# Patient Record
Sex: MALE | Race: BLACK OR AFRICAN AMERICAN | Employment: FULL TIME | URBAN - METROPOLITAN AREA
[De-identification: names, ages, dates, MRNs, and addresses within clinical notes are randomized per-mention and may not be internally consistent; named-entity substitution may affect disease eponyms.]

---

## 2017-01-04 ENCOUNTER — HOSPITAL ENCOUNTER (OUTPATIENT)
Age: 62
Discharge: HOME | End: 2017-01-04
Attending: INTERNAL MEDICINE
Payer: COMMERCIAL

## 2017-01-04 DIAGNOSIS — R25.2 CRAMP OF BOTH LOWER EXTREMITIES: ICD-10-CM

## 2017-01-04 PROCEDURE — 72148 MRI LUMBAR SPINE W/O DYE: CPT

## 2017-01-06 ENCOUNTER — TELEPHONE (OUTPATIENT)
Dept: INTERNAL MEDICINE CLINIC | Age: 62
End: 2017-01-06

## 2017-01-06 NOTE — TELEPHONE ENCOUNTER
Patient called wanting Dr Aletha Thayer nurse to call him concerning the MRI he had done on 01/04/2017.  #[phone]

## 2017-01-06 NOTE — TELEPHONE ENCOUNTER
Patient given mri results--requests something else be sent in to help pain from pressed nerve. OK per Dr Marie West to send in another round of prednisone.

## 2017-01-06 NOTE — TELEPHONE ENCOUNTER
Called and informed patient of MRI results per Dr Francesca Tierney. Informed him that L3-4 disc is out and pressing on nerve running down left side. Patient voiced understanding and stated that he still has severe pain in the left side. Informed him that Dr Francesca Tierney recommends we send him to see Dr Óscar Mahan to which patient agrees. Requests a call once appointment has been made.

## 2017-01-07 RX ORDER — PREDNISONE 20 MG/1
TABLET ORAL
Qty: 20 TAB | Refills: 0 | OUTPATIENT
Start: 2017-01-07 | End: 2017-02-06 | Stop reason: ALTCHOICE

## 2017-01-12 NOTE — TELEPHONE ENCOUNTER
Mr. Kyra Park has been notified of his upcoming appointment with Dr. Rhett Sargent on 1/13 at @ 2:00, at [facility], [address] [place] [address]. [address], [address] [facility] Phone [phone]

## 2017-01-16 RX ORDER — POTASSIUM CHLORIDE 750 MG/1
TABLET, EXTENDED RELEASE ORAL
Qty: 60 TAB | Refills: 1 | Status: SHIPPED | OUTPATIENT
Start: 2017-01-16 | End: 2017-02-09 | Stop reason: SINTOL

## 2017-02-06 ENCOUNTER — OFFICE VISIT (OUTPATIENT)
Dept: INTERNAL MEDICINE CLINIC | Age: 62
End: 2017-02-06

## 2017-02-06 VITALS
TEMPERATURE: 98.5 F | DIASTOLIC BLOOD PRESSURE: 110 MMHG | RESPIRATION RATE: 16 BRPM | BODY MASS INDEX: 42.7 KG/M2 | WEIGHT: 305 LBS | SYSTOLIC BLOOD PRESSURE: 180 MMHG | OXYGEN SATURATION: 96 % | HEART RATE: 87 BPM | HEIGHT: 71 IN

## 2017-02-06 DIAGNOSIS — I10 ESSENTIAL HYPERTENSION: Primary | ICD-10-CM

## 2017-02-06 DIAGNOSIS — I25.10 ATHEROSCLEROSIS OF NATIVE CORONARY ARTERY OF NATIVE HEART WITHOUT ANGINA PECTORIS: ICD-10-CM

## 2017-02-06 RX ORDER — FUROSEMIDE 20 MG/1
TABLET ORAL
Qty: 30 TAB | Refills: 3 | Status: SHIPPED | OUTPATIENT
Start: 2017-02-06 | End: 2017-03-27 | Stop reason: SDUPTHER

## 2017-02-06 NOTE — PROGRESS NOTES
1. Have you been to the ER, urgent care clinic since your last visit? Hospitalized since your last visit? No    2. Have you seen or consulted any other health care providers outside of the [address] [place] [address] since your last visit? Include any pap smears or colon screening.  No

## 2017-02-06 NOTE — PATIENT INSTRUCTIONS
Health Maintenance Due   Topic Date Due   Elissa Sean Test  1955    Eye Exam  01/08/1967    DTaP/Tdap/Td  (1 - Tdap) 01/08/1978    Pneumococcal Vaccine (2 of 3 - PCV13) 02/03/2012    Hemoglobin A1C    12/29/2016    Shingles Vaccine  01/08/2017

## 2017-02-08 NOTE — PROGRESS NOTES
The patient presents to the office today with the chief complaint of hypertension    HPI    The patient remains on medications for hypertension. he is tolerating the medications well. The patient recently lost his father   The patient has no complaints but his BP has been up. The nicol had been on Catapres Patch #2 but now his on on the # 1 patch. Review of Systems   Respiratory: Negative for shortness of breath. Cardiovascular: Negative for chest pain and leg swelling. Allergies   Allergen Reactions    Iodine Other (comments)     Eyes swell shut      Shellfish Containing Products Swelling       Current Outpatient Prescriptions   Medication Sig Dispense Refill    furosemide (LASIX) 20 mg tablet 1 tablet each AM 30 Tab 3    KLOR-CON M10 10 mEq tablet TAKE 1 TABLET BY MOUTH TWICE A DAY 60 Tab 1    hydrALAZINE (APRESOLINE) 50 mg tablet 1 tablet twice per day (Stop Labetalol and Indapamide) 60 Tab 3    metoprolol succinate (TOPROL-XL) 50 mg XL tablet 1 tablet each AM (Stop Labetalol and Indapamide) 30 Tab 3    baclofen (LIORESAL) 10 mg tablet 1 tablet twice per day (Stop Cyclobenzaprine) 60 Tab 3    cloNIDine (CATAPRES) 0.2 mg/24 hr patch Apply weekly 12 Patch 3    amLODIPine-benazepril (LOTREL) 10-40 mg per capsule TAKE 1 CAPSULE DAILY (STOP NIFEDIPINE AND STOP LOSARTAN). 30 Cap 6    cyclobenzaprine (FLEXERIL) 10 mg tablet Take 1 Tab by mouth three (3) times daily as needed for Muscle Spasm(s). 90 Tab 1    allopurinol (ZYLOPRIM) 100 mg tablet Take 1 Tab by mouth daily. 90 Tab 3    tolterodine ER (DETROL LA) 4 mg ER capsule Take 1 Cap by mouth daily. 90 Cap 3    aspirin 81 mg tablet Take 81 mg by mouth daily.          Past Medical History   Diagnosis Date    BPH without obstruction/lower urinary tract symptoms     Bursitis of right shoulder     CAD (coronary artery disease)     Chest pain      history of hospitalization with chest pain and a negative workup in 2008    Chronic edema  Constipation      die to medication    Coronary artery disease      mild, non obstructive/EF 65%    Dyspnea on exertion     Echocardiogram 12/16/08     IVC dilated; suboptimal endocardial border; EF 65%    ED (erectile dysfunction)     Elevated PSA     Frequency     GERD (gastroesophageal reflux disease)     Gout     H/O cystoscopy 06/20/2013    Hematuria, unspecified     Hypercholesterolemia     Hypertension     Hypertension      Hypogonadism male     Impotence of organic origin     Left ventricular diastolic dysfunction     Malignant neoplasm of prostate (HCC)      hx of t1a, izzy 6, 5 % of 1  core    Morbid obesity ([address]     Myocardial perfusion 09/05/08     Basal inferior defect c/w artifact; EF 63%    Overactive bladder     S/P cardiac cath 07/30/10     oD2-40%; pRCA-20-30%; EF 65%    Sleep apnea     Slowing of urinary stream     Type II or unspecified type diabetes mellitus without mention of complication, not stated as uncontrolled        Past Surgical History   Procedure Laterality Date    Hx tonsillectomy      Pr colonoscopy flx dx w/collj spec when pfrmd      Hx heart catheterization  7/30/10    Incision/drain abscess extra      Hx other surgical  06/27/13     Prostate       Social History     Social History    Marital status:      Spouse name: N/A    Number of children: N/A    Years of education: N/A     Occupational History    Not on file.      Social History Main Topics    Smoking status: Former Smoker     Types: Cigars     Quit date: 10/4/1999    Smokeless tobacco: Never Used    Alcohol use No      Comment: socially    Drug use: No    Sexual activity: Not Currently     Other Topics Concern    Not on file     Social History Narrative       Patient does not have an advanced directive on file    Visit Vitals    BP (!) 180/110 (BP 1 Location: Left arm, BP Patient Position: Sitting)    Pulse 87    Temp 98.5 °F (36.9 °C)    Resp 16    Ht 5' 11\" (1.803 m)    Wt 305 lb (138.3 kg)    SpO2 96%    BMI 42.54 kg/m2       Physical Exam   Neck: Carotid bruit is not present. No thyromegaly present. Cardiovascular: Normal rate and regular rhythm. Exam reveals no gallop. No murmur heard. Pulmonary/Chest: He has no wheezes. He has no rales. Abdominal: Soft. Bowel sounds are normal. He exhibits no distension and no mass. There is no tenderness. No renal artery bruits   Musculoskeletal: He exhibits no edema. Lymphadenopathy:     He has no cervical adenopathy. BMI:  I have reviewed/discussed the above normal BMI with the patient. I have recommended the following interventions: dietary management education, guidance, and counseling . The plan is as follows: I have counseled this patient on diet and exercise regimens. .        Orders Only on 12/12/2016   Component Date Value Ref Range Status    Glucose 12/12/2016 109* 65 - 99 mg/dL Final    BUN 12/12/2016 33* 6 - 22 mg/dL Final    Creatinine 12/12/2016 1.5* 0.5 - 1.2 mg/dL Final    Sodium 12/12/2016 144  133 - 145 mmol/L Final    Potassium 12/12/2016 3.5  3.5 - 5.5 mmol/L Final    Chloride 12/12/2016 99  98 - 110 mmol/L Final    CO2 12/12/2016 29  20 - 32 mmol/L Final    AST (SGOT) 12/12/2016 20  10 - 37 U/L Final    ALT (SGPT) 12/12/2016 23  5 - 40 U/L Final    Alk. phosphatase 12/12/2016 61  25 - 115 U/L Final    Bilirubin, total 12/12/2016 <0.1* 0.2 - 1.2 mg/dL Final    Calcium 12/12/2016 9.3  8.4 - 10.4 mg/dL Final    Protein, total 12/12/2016 6.6  6.4 - 8.3 g/dL Final    Albumin 12/12/2016 3.9  3.5 - 5.0 g/dL Final    A-G Ratio 12/12/2016 1.4  1.1 - 2.6 ratio Final    Globulin 12/12/2016 2.7  2.0 - 4.0 g/dL Final    Anion gap 12/12/2016 16.0  mmol/L Final    Comment: Test includes Albumin, Alkaline Phosphatase, ALT, AST, BUN, Calcium, CO2,  Chloride, Creatinine, Glucose, Potassium, Sodium, Total Bilirubin and Total  Protein.   Estimated GFR results are reported in mL/min/1.73 sq.m. by the MDRD equation. This eGFR is validated for stable chronic renal failure patients. This   equation  is unreliable in acute illness or patients with normal renal function.  GFRAA 12/12/2016 56.7* >60.0 Final    GFRNA 12/12/2016 46.8* >60.0 Final    C-Reactive Protein, Qt 12/12/2016 0.5  0.0 - 0.5 mg/dL Final    Sed rate (ESR) 12/12/2016 8.0  0.0 - 20.0 mm/Hr Final    WBC 12/12/2016 4.5  4.0 - 11.0 K/uL Final    RBC 12/12/2016 4.46  3.80 - 5.80 M/uL Final    HGB 12/12/2016 13.0* 13.1 - 17.2 g/dL Final    HCT 12/12/2016 41.1  39.3 - 51.6 % Final    MCV 12/12/2016 92  80 - 95 fL Final    MCH 12/12/2016 29  26 - 34 pg Final    MCHC 12/12/2016 32  32 - 36 g/dL Final    RDW 12/12/2016 14.1  10.0 - 16.0 % Final    PLATELET 13/82/5360 800  140 - 440 K/uL Final    MPV 12/12/2016 10.5  6.0 - 10.8 fL Final    NEUTROPHILS 12/12/2016 54  40 - 75 % Final    Lymphocytes 12/12/2016 34  27 - 45 % Final    MONOCYTES 12/12/2016 7  3 - 9 % Final    EOSINOPHILS 12/12/2016 5  0 - 6 % Final    BASOPHILS 12/12/2016 0  0 - 2 % Final    ABS. NEUTROPHILS 12/12/2016 2.4  1.8 - 7.7 K/uL Final    ABSOLUTE LYMPHOCYTE COUNT 12/12/2016 1.5  1.0 - 4.8 K/uL Final    ABS. MONOCYTES 12/12/2016 0.3  0.1 - 0.9 K/uL Final    ABS. EOSINOPHILS 12/12/2016 0.2  0.0 - 0.5 K/uL Final    ABS. BASOPHILS 12/12/2016 0.0  0.0 - 0.2 K/uL Final       .No results found for any visits on 02/06/17. Assessment / Plan      ICD-10-CM ICD-9-CM    1. Essential hypertension I10 401.9    2. Atherosclerosis of native coronary artery of native heart without angina pectoris I25.10 414.01      Go back to # Catapres  Increase Hydralazine to 100 mg  he was advised to continue his maintenance medications      Follow-up Disposition:  Return in about 2 months (around 4/6/2017). I asked Franck Dominguez if he has any questions and I answered the questions.   Franck Dominguez states that he understands the treatment plan and agrees with the treatment plan

## 2017-02-09 ENCOUNTER — OFFICE VISIT (OUTPATIENT)
Dept: INTERNAL MEDICINE CLINIC | Age: 62
End: 2017-02-09

## 2017-02-09 VITALS
RESPIRATION RATE: 18 BRPM | SYSTOLIC BLOOD PRESSURE: 136 MMHG | OXYGEN SATURATION: 98 % | DIASTOLIC BLOOD PRESSURE: 86 MMHG | BODY MASS INDEX: 43.24 KG/M2 | TEMPERATURE: 97.8 F | HEART RATE: 86 BPM | HEIGHT: 71 IN

## 2017-02-09 DIAGNOSIS — I10 ESSENTIAL HYPERTENSION: ICD-10-CM

## 2017-02-09 DIAGNOSIS — I10 ESSENTIAL HYPERTENSION: Primary | ICD-10-CM

## 2017-02-09 PROBLEM — E66.01 MORBID (SEVERE) OBESITY DUE TO EXCESS CALORIES (HCC): Status: ACTIVE | Noted: 2017-02-09

## 2017-02-09 PROBLEM — C61 PROSTATE CANCER (HCC): Status: ACTIVE | Noted: 2017-02-09

## 2017-02-09 RX ORDER — AMLODIPINE BESYLATE AND BENAZEPRIL HYDROCHLORIDE 10; 40 MG/1; MG/1
CAPSULE ORAL
Qty: 30 CAP | Refills: 6 | Status: SHIPPED | OUTPATIENT
Start: 2017-02-09 | End: 2017-03-27

## 2017-02-09 RX ORDER — HYDRALAZINE HYDROCHLORIDE 50 MG/1
TABLET, FILM COATED ORAL
Qty: 60 TAB | Refills: 3
Start: 2017-02-09 | End: 2017-03-27

## 2017-02-09 NOTE — PROGRESS NOTES
Patient presents for   Chief Complaint   Patient presents with    Hypertension     follow up after high blood pressure at ortho appointment     Fall risk assessment was not indicated. Depression screening was not indicated Follow up questions were not indicated. 1. Have you been to the ER, urgent care clinic since your last visit? Hospitalized since your last visit? No    2. Have you seen or consulted any other health care providers outside of the [address] [place] [address] since your last visit? Include any pap smears or colon screening.  No

## 2017-02-10 LAB
ANION GAP SERPL CALC-SCNC: 16 MMOL/L
BUN SERPL-MCNC: 23 MG/DL (ref 6–22)
CALCIUM SERPL-MCNC: 8.5 MG/DL (ref 8.4–10.4)
CHLORIDE SERPL-SCNC: 104 MMOL/L (ref 98–110)
CO2 SERPL-SCNC: 26 MMOL/L (ref 20–32)
CREAT SERPL-MCNC: 1.4 MG/DL (ref 0.8–1.6)
GFRAA, 66117: >60
GFRNA, 66118: 50.4
GLUCOSE SERPL-MCNC: 105 MG/DL (ref 65–99)
POTASSIUM SERPL-SCNC: 3.6 MMOL/L (ref 3.5–5.5)
SODIUM SERPL-SCNC: 146 MMOL/L (ref 133–145)

## 2017-02-12 NOTE — PROGRESS NOTES
The patient presents to the office today with the chief complaint of hypertension    HPI    The patient remains on medications for hypertension. he is tolerating the medications well. He recently had not been taking the Lotrel. The patient has no complaints but his father has recently . Review of Systems   Respiratory: Negative for shortness of breath. Cardiovascular: Negative for chest pain and leg swelling. Allergies   Allergen Reactions    Iodine Other (comments)     Eyes swell shut      Shellfish Containing Products Swelling       Current Outpatient Prescriptions   Medication Sig Dispense Refill    tolterodine ER (DETROL LA) 4 mg ER capsule Take 1 Cap by mouth daily. 90 Cap 3    avanafil (STENDRA) 200 mg tab tablet Take 1 Tab by mouth as needed for Other. Indications: Erectile Dysfunction 6 Tab 6    hydrALAZINE (APRESOLINE) 50 mg tablet 2 tablets twice per day 60 Tab 3    amLODIPine-benazepril (LOTREL) 10-40 mg per capsule 1 capsule each AM 30 Cap 6    furosemide (LASIX) 20 mg tablet 1 tablet each AM (Patient taking differently: Take 20 mg by mouth daily. 1 tablet each AM) 30 Tab 3    metoprolol succinate (TOPROL-XL) 50 mg XL tablet 1 tablet each AM (Stop Labetalol and Indapamide) 30 Tab 3    cloNIDine (CATAPRES) 0.2 mg/24 hr patch Apply weekly 12 Patch 3    allopurinol (ZYLOPRIM) 100 mg tablet Take 1 Tab by mouth daily. 90 Tab 3    aspirin 81 mg tablet Take 81 mg by mouth daily.       baclofen (LIORESAL) 10 mg tablet 1 tablet twice per day (Stop Cyclobenzaprine) 60 Tab 3       Past Medical History   Diagnosis Date    BPH without obstruction/lower urinary tract symptoms     Bursitis of right shoulder     CAD (coronary artery disease)     Chest pain      history of hospitalization with chest pain and a negative workup in     Chronic edema     Constipation      die to medication    Coronary artery disease      mild, non obstructive/EF 65%    Dyspnea on exertion     Echocardiogram 12/16/08     IVC dilated; suboptimal endocardial border; EF 65%    ED (erectile dysfunction)     Elevated PSA     Frequency     GERD (gastroesophageal reflux disease)     Gout     H/O cystoscopy 06/20/2013    Hematuria, unspecified     Hypercholesterolemia     Hypertension     Hypertension      Hypogonadism male     Impotence of organic origin     Left ventricular diastolic dysfunction     Malignant neoplasm of prostate (HCC)      hx of t1a, izzy 6, 5 % of 1  core    Morbid obesity ([address]     Myocardial perfusion 09/05/08     Basal inferior defect c/w artifact; EF 63%    Overactive bladder     S/P cardiac cath 07/30/10     oD2-40%; pRCA-20-30%; EF 65%    Sleep apnea     Slowing of urinary stream     Type II or unspecified type diabetes mellitus without mention of complication, not stated as uncontrolled        Past Surgical History   Procedure Laterality Date    Hx tonsillectomy      Pr colonoscopy flx dx w/collj spec when pfrmd      Hx heart catheterization  7/30/10    Incision/drain abscess extra      Hx other surgical  06/27/13     Prostate       Social History     Social History    Marital status:      Spouse name: N/A    Number of children: N/A    Years of education: N/A     Occupational History    Not on file. Social History Main Topics    Smoking status: Former Smoker     Types: Cigars     Quit date: 10/4/1999    Smokeless tobacco: Never Used    Alcohol use No      Comment: socially    Drug use: No    Sexual activity: Not Currently     Other Topics Concern    Not on file     Social History Narrative       Patient does not have an advanced directive on file    Visit Vitals    /86 (BP 1 Location: Left arm, BP Patient Position: Sitting)    Pulse 86    Temp 97.8 °F (36.6 °C) (Tympanic)    Resp 18    Ht 5' 11\" (1.803 m)    SpO2 98%    BMI 43.24 kg/m2       Physical Exam   Neck: Carotid bruit is not present.    Cardiovascular: Normal rate and regular rhythm. Exam reveals no gallop. No murmur heard. Pulmonary/Chest: He has no wheezes. He has no rales. BMI:  I have reviewed/discussed the above normal BMI with the patient. I have recommended the following interventions: dietary management education, guidance, and counseling . The plan is as follows: I have counseled this patient on diet and exercise regimens. .        Office Visit on 02/09/2017   Component Date Value Ref Range Status    Color (UA POC) 02/09/2017 Yellow   Final    Clarity (UA POC) 02/09/2017 Clear   Final    Glucose (UA POC) 02/09/2017 Negative  Negative Final    Bilirubin (UA POC) 02/09/2017 Negative  Negative Final    Ketones (UA POC) 02/09/2017 Negative  Negative Final    Specific gravity (UA POC) 02/09/2017 1.015  1.001 - 1.035 Final    Blood (UA POC) 02/09/2017 Negative  Negative Final    pH (UA POC) 02/09/2017 5.5  4.6 - 8.0 Final    Protein (UA POC) 02/09/2017 1+  Negative mg/dL Final    Urobilinogen (UA POC) 02/09/2017 0.2 mg/dL  0.2 - 1 Final    Nitrites (UA POC) 02/09/2017 Negative  Negative Final    Leukocyte esterase (UA POC) 02/09/2017 Negative  Negative Final    PVR 02/09/2017 125  cc Final   Orders Only on 02/09/2017   Component Date Value Ref Range Status    Glucose 02/09/2017 105* 65 - 99 mg/dL Final    BUN 02/09/2017 23* 6 - 22 mg/dL Final    Creatinine 02/09/2017 1.4  0.8 - 1.6 mg/dL Final    Sodium 02/09/2017 146* 133 - 145 mmol/L Final    Potassium 02/09/2017 3.6  3.5 - 5.5 mmol/L Final    Chloride 02/09/2017 104  98 - 110 mmol/L Final    CO2 02/09/2017 26  20 - 32 mmol/L Final    Calcium 02/09/2017 8.5  8.4 - 10.4 mg/dL Final    Anion gap 02/09/2017 16.0  mmol/L Final    Comment: Test includes BUN, CO2, Chloride, Creatinine, Glucose, Potassium, Calcium and  Sodium. Estimated GFR results are reported in mL/min/1.73 sq.m. by the MDRD equation. This eGFR is validated for stable chronic renal failure patients.  This   equation  is unreliable in acute illness or patients with normal renal function.  GFRAA 02/09/2017 >60.0  >60.0 Final    GFRNA 02/09/2017 50.4* >60.0 Final   Orders Only on 12/12/2016   Component Date Value Ref Range Status    Glucose 12/12/2016 109* 65 - 99 mg/dL Final    BUN 12/12/2016 33* 6 - 22 mg/dL Final    Creatinine 12/12/2016 1.5* 0.5 - 1.2 mg/dL Final    Sodium 12/12/2016 144  133 - 145 mmol/L Final    Potassium 12/12/2016 3.5  3.5 - 5.5 mmol/L Final    Chloride 12/12/2016 99  98 - 110 mmol/L Final    CO2 12/12/2016 29  20 - 32 mmol/L Final    AST (SGOT) 12/12/2016 20  10 - 37 U/L Final    ALT (SGPT) 12/12/2016 23  5 - 40 U/L Final    Alk. phosphatase 12/12/2016 61  25 - 115 U/L Final    Bilirubin, total 12/12/2016 <0.1* 0.2 - 1.2 mg/dL Final    Calcium 12/12/2016 9.3  8.4 - 10.4 mg/dL Final    Protein, total 12/12/2016 6.6  6.4 - 8.3 g/dL Final    Albumin 12/12/2016 3.9  3.5 - 5.0 g/dL Final    A-G Ratio 12/12/2016 1.4  1.1 - 2.6 ratio Final    Globulin 12/12/2016 2.7  2.0 - 4.0 g/dL Final    Anion gap 12/12/2016 16.0  mmol/L Final    Comment: Test includes Albumin, Alkaline Phosphatase, ALT, AST, BUN, Calcium, CO2,  Chloride, Creatinine, Glucose, Potassium, Sodium, Total Bilirubin and Total  Protein. Estimated GFR results are reported in mL/min/1.73 sq.m. by the MDRD equation. This eGFR is validated for stable chronic renal failure patients. This   equation  is unreliable in acute illness or patients with normal renal function.       GFRAA 12/12/2016 56.7* >60.0 Final    GFRNA 12/12/2016 46.8* >60.0 Final    C-Reactive Protein, Qt 12/12/2016 0.5  0.0 - 0.5 mg/dL Final    Sed rate (ESR) 12/12/2016 8.0  0.0 - 20.0 mm/Hr Final    WBC 12/12/2016 4.5  4.0 - 11.0 K/uL Final    RBC 12/12/2016 4.46  3.80 - 5.80 M/uL Final    HGB 12/12/2016 13.0* 13.1 - 17.2 g/dL Final    HCT 12/12/2016 41.1  39.3 - 51.6 % Final    MCV 12/12/2016 92  80 - 95 fL Final    MCH 12/12/2016 29  26 - 34 pg Final    MCHC 12/12/2016 32  32 - 36 g/dL Final    RDW 12/12/2016 14.1  10.0 - 16.0 % Final    PLATELET 77/14/1417 275  140 - 440 K/uL Final    MPV 12/12/2016 10.5  6.0 - 10.8 fL Final    NEUTROPHILS 12/12/2016 54  40 - 75 % Final    Lymphocytes 12/12/2016 34  27 - 45 % Final    MONOCYTES 12/12/2016 7  3 - 9 % Final    EOSINOPHILS 12/12/2016 5  0 - 6 % Final    BASOPHILS 12/12/2016 0  0 - 2 % Final    ABS. NEUTROPHILS 12/12/2016 2.4  1.8 - 7.7 K/uL Final    ABSOLUTE LYMPHOCYTE COUNT 12/12/2016 1.5  1.0 - 4.8 K/uL Final    ABS. MONOCYTES 12/12/2016 0.3  0.1 - 0.9 K/uL Final    ABS. EOSINOPHILS 12/12/2016 0.2  0.0 - 0.5 K/uL Final    ABS. BASOPHILS 12/12/2016 0.0  0.0 - 0.2 K/uL Final       .  Results for orders placed or performed in visit on 02/09/17   AMB POC URINALYSIS DIP STICK AUTO W/O MICRO   Result Value Ref Range    Color (UA POC) Yellow     Clarity (UA POC) Clear     Glucose (UA POC) Negative Negative    Bilirubin (UA POC) Negative Negative    Ketones (UA POC) Negative Negative    Specific gravity (UA POC) 1.015 1.001 - 1.035    Blood (UA POC) Negative Negative    pH (UA POC) 5.5 4.6 - 8.0    Protein (UA POC) 1+ Negative mg/dL    Urobilinogen (UA POC) 0.2 mg/dL 0.2 - 1    Nitrites (UA POC) Negative Negative    Leukocyte esterase (UA POC) Negative Negative   AMB POC PVR, CASIE,POST-VOID RES,US,NON-IMAGING   Result Value Ref Range     cc   Results for orders placed or performed in visit on 95/32/29   METABOLIC PANEL, BASIC   Result Value Ref Range    Glucose 105 (H) 65 - 99 mg/dL    BUN 23 (H) 6 - 22 mg/dL    Creatinine 1.4 0.8 - 1.6 mg/dL    Sodium 146 (H) 133 - 145 mmol/L    Potassium 3.6 3.5 - 5.5 mmol/L    Chloride 104 98 - 110 mmol/L    CO2 26 20 - 32 mmol/L    Calcium 8.5 8.4 - 10.4 mg/dL    Anion gap 16.0 mmol/L    GFRAA >60.0 >60.0    GFRNA 50.4 (L) >60.0    Narrative    Unless additionally indicated, test performed at: [facility], [address] [place] [address].  PH: [phone]. Assessment / Plan      ICD-10-CM ICD-9-CM    1. Essential hypertension G05 799.0 METABOLIC PANEL, BASIC     Advised refilling the Lotrel and follow the BP  The patient is to call with report in one week    Follow-up Disposition:  Return in about 3 months (around 5/9/2017). I asked Alyssa Beckman if he has any questions and I answered the questions.   Alyssa Beckman states that he understands the treatment plan and agrees with the treatment plan

## 2017-03-27 ENCOUNTER — OFFICE VISIT (OUTPATIENT)
Dept: INTERNAL MEDICINE CLINIC | Age: 62
End: 2017-03-27

## 2017-03-27 VITALS
WEIGHT: 305 LBS | DIASTOLIC BLOOD PRESSURE: 92 MMHG | OXYGEN SATURATION: 97 % | HEART RATE: 88 BPM | RESPIRATION RATE: 16 BRPM | TEMPERATURE: 97.9 F | SYSTOLIC BLOOD PRESSURE: 152 MMHG | BODY MASS INDEX: 42.7 KG/M2 | HEIGHT: 71 IN

## 2017-03-27 DIAGNOSIS — I10 ESSENTIAL HYPERTENSION: ICD-10-CM

## 2017-03-27 DIAGNOSIS — I10 ESSENTIAL HYPERTENSION: Primary | ICD-10-CM

## 2017-03-27 DIAGNOSIS — R60.0 BILATERAL EDEMA OF LOWER EXTREMITY: ICD-10-CM

## 2017-03-27 RX ORDER — BENAZEPRIL HYDROCHLORIDE 40 MG/1
TABLET ORAL
Qty: 30 TAB | Refills: 3 | Status: SHIPPED | OUTPATIENT
Start: 2017-03-27 | End: 2017-07-08 | Stop reason: SDUPTHER

## 2017-03-27 RX ORDER — GABAPENTIN 300 MG/1
300 CAPSULE ORAL 3 TIMES DAILY
COMMUNITY
End: 2018-09-28 | Stop reason: ALTCHOICE

## 2017-03-27 RX ORDER — HYDRALAZINE HYDROCHLORIDE 100 MG/1
TABLET, FILM COATED ORAL
Qty: 60 TAB | Refills: 2 | Status: SHIPPED | OUTPATIENT
Start: 2017-03-27 | End: 2017-07-03 | Stop reason: SDUPTHER

## 2017-03-27 RX ORDER — FUROSEMIDE 20 MG/1
TABLET ORAL
Qty: 60 TAB | Refills: 3 | Status: SHIPPED | OUTPATIENT
Start: 2017-03-27 | End: 2017-07-03 | Stop reason: DRUGHIGH

## 2017-03-27 NOTE — PATIENT INSTRUCTIONS
Health Maintenance Due   Topic Date Due   Gera LaPorte Test  1955    Eye Exam  01/08/1967    DTaP/Tdap/Td  (1 - Tdap) 01/08/1978    Pneumococcal Vaccine (2 of 3 - PCV13) 02/03/2012    Hemoglobin A1C    12/29/2016    Shingles Vaccine  01/08/2017

## 2017-03-27 NOTE — PROGRESS NOTES
1. Have you been to the ER, urgent care clinic since your last visit? Hospitalized since your last visit? No    2. Have you seen or consulted any other health care providers outside of the [address] [place] [address] since your last visit? Include any pap smears or colon screening.  No

## 2017-03-28 LAB
A-G RATIO,AGRAT: 1.6 RATIO (ref 1.1–2.6)
ABSOLUTE LYMPHOCYTE COUNT, 10803: 2 K/UL (ref 1–4.8)
ALBUMIN SERPL-MCNC: 3.9 G/DL (ref 3.5–5)
ALP SERPL-CCNC: 67 U/L (ref 40–125)
ALT SERPL-CCNC: 16 U/L (ref 5–40)
ANION GAP SERPL CALC-SCNC: 15 MMOL/L
AST SERPL W P-5'-P-CCNC: 14 U/L (ref 10–37)
BASOPHILS # BLD: 0 K/UL (ref 0–0.2)
BASOPHILS NFR BLD: 0 % (ref 0–2)
BILIRUB SERPL-MCNC: 0.2 MG/DL (ref 0.2–1.2)
BUN SERPL-MCNC: 15 MG/DL (ref 6–22)
CALCIUM SERPL-MCNC: 9.3 MG/DL (ref 8.4–10.4)
CHLORIDE SERPL-SCNC: 104 MMOL/L (ref 98–110)
CO2 SERPL-SCNC: 28 MMOL/L (ref 20–32)
CREAT SERPL-MCNC: 1.2 MG/DL (ref 0.8–1.6)
EOSINOPHIL # BLD: 0.1 K/UL (ref 0–0.5)
EOSINOPHIL NFR BLD: 3 % (ref 0–6)
ERYTHROCYTE [DISTWIDTH] IN BLOOD BY AUTOMATED COUNT: 15.3 % (ref 10–16)
GFRAA, 66117: >60
GFRNA, 66118: 59.5
GLOBULIN,GLOB: 2.4 G/DL (ref 2–4)
GLUCOSE SERPL-MCNC: 111 MG/DL (ref 65–99)
GRANULOCYTES,GRANS: 53 % (ref 40–75)
HCT VFR BLD AUTO: 42.3 % (ref 39.3–51.6)
HGB BLD-MCNC: 13.2 G/DL (ref 13.1–17.2)
LYMPHOCYTES, LYMLT: 36 % (ref 27–45)
MCH RBC QN AUTO: 28 PG (ref 26–34)
MCHC RBC AUTO-ENTMCNC: 31 G/DL (ref 32–36)
MCV RBC AUTO: 91 FL (ref 80–95)
MONOCYTES # BLD: 0.4 K/UL (ref 0.1–0.9)
MONOCYTES NFR BLD: 7 % (ref 3–9)
NEUTROPHILS # BLD AUTO: 2.9 K/UL (ref 1.8–7.7)
PLATELET # BLD AUTO: 223 K/UL (ref 140–440)
PMV BLD AUTO: 10.3 FL (ref 6–10.8)
POTASSIUM SERPL-SCNC: 3.8 MMOL/L (ref 3.5–5.5)
PROT SERPL-MCNC: 6.3 G/DL (ref 6.2–8.1)
RBC # BLD AUTO: 4.67 M/UL (ref 3.8–5.8)
SODIUM SERPL-SCNC: 147 MMOL/L (ref 133–145)
TSH SERPL DL<=0.005 MIU/L-ACNC: 1.51 MCU/ML (ref 0.27–4.2)
WBC # BLD AUTO: 5.4 K/UL (ref 4–11)

## 2017-03-28 NOTE — PROGRESS NOTES
The patient presents to the office today with the chief complaint of LE swelling    HPI    The patient is on Lasix but the LE swelling is worsening. The patient denies dyspnea. The patient remains on medications for hypertension. Review of Systems   Respiratory: Negative for shortness of breath. Cardiovascular: Positive for leg swelling. Negative for chest pain. Allergies   Allergen Reactions    Iodine Other (comments)     Eyes swell shut      Shellfish Containing Products Swelling       Current Outpatient Prescriptions   Medication Sig Dispense Refill    gabapentin (NEURONTIN) 300 mg capsule Take 300 mg by mouth three (3) times daily.  furosemide (LASIX) 20 mg tablet 2 tablets daily 60 Tab 3    benazepril (LOTENSIN) 40 mg tablet 1 tablet daily (stop Lotrel) 30 Tab 3    hydrALAZINE (APRESOLINE) 100 mg tablet 1 tablet twice per day (note:  New strength) 60 Tab 2    avanafil (STENDRA) 200 mg tab tablet Take 1 Tab by mouth as needed for Other. Indications: Erectile Dysfunction 6 Tab 6    metoprolol succinate (TOPROL-XL) 50 mg XL tablet 1 tablet each AM (Stop Labetalol and Indapamide) 30 Tab 3    cloNIDine (CATAPRES) 0.2 mg/24 hr patch Apply weekly 12 Patch 3    allopurinol (ZYLOPRIM) 100 mg tablet Take 1 Tab by mouth daily. 90 Tab 3    aspirin 81 mg tablet Take 81 mg by mouth daily.          Past Medical History:   Diagnosis Date    BPH without obstruction/lower urinary tract symptoms     Bursitis of right shoulder     CAD (coronary artery disease)     Chest pain     history of hospitalization with chest pain and a negative workup in 2008    Chronic edema     Constipation     die to medication    Coronary artery disease     mild, non obstructive/EF 65%    Dyspnea on exertion     Echocardiogram 12/16/08    IVC dilated; suboptimal endocardial border; EF 65%    ED (erectile dysfunction)     Elevated PSA     Frequency     GERD (gastroesophageal reflux disease)     Gout     H/O cystoscopy 06/20/2013    Hematuria, unspecified     Hypercholesterolemia     Hypertension     Hypertension      Hypogonadism male     Impotence of organic origin     Left ventricular diastolic dysfunction     Malignant neoplasm of prostate (HCC)     hx of t1a, izzy 6, 5 % of 1  core    Morbid obesity ([facility] [address]     Myocardial perfusion 09/05/08    Basal inferior defect c/w artifact; EF 63%    Overactive bladder     S/P cardiac cath 07/30/10    oD2-40%; pRCA-20-30%; EF 65%    Sleep apnea     Slowing of urinary stream     Type II or unspecified type diabetes mellitus without mention of complication, not stated as uncontrolled        Past Surgical History:   Procedure Laterality Date    HX HEART CATHETERIZATION  7/30/10    HX OTHER SURGICAL  06/27/13    Prostate    HX TONSILLECTOMY      INCISION/DRAIN ABSCESS EXTRA      AL COLONOSCOPY FLX DX W/COLLJ SPEC WHEN PFRMD         Social History     Social History    Marital status:      Spouse name: N/A    Number of children: N/A    Years of education: N/A     Occupational History    Not on file. Social History Main Topics    Smoking status: Former Smoker     Types: Cigars     Quit date: 10/4/1999    Smokeless tobacco: Never Used    Alcohol use No      Comment: socially    Drug use: No    Sexual activity: Not Currently     Other Topics Concern    Not on file     Social History Narrative       Patient does not have an advanced directive on file    Visit Vitals    BP (!) 152/92 (BP 1 Location: Right arm, BP Patient Position: Sitting)    Pulse 88    Temp 97.9 °F (36.6 °C) (Tympanic)    Resp 16    Ht 5' 11\" (1.803 m)    Wt 305 lb (138.3 kg)    SpO2 97%    BMI 42.54 kg/m2       Physical Exam   Neck: No thyromegaly present. Cardiovascular: Normal rate and regular rhythm. Exam reveals no gallop. No murmur heard. Pulmonary/Chest: He has no wheezes. He has no rales.    Musculoskeletal: He exhibits edema (4+ LE swelling in both legs).   Lymphadenopathy:     He has no cervical adenopathy. BMI:  I have reviewed/discussed the above normal BMI with the patient. I have recommended the following interventions: dietary management education, guidance, and counseling . The plan is as follows: I have counseled this patient on diet and exercise regimens. .      Office Visit on 02/09/2017   Component Date Value Ref Range Status    Color (UA POC) 02/09/2017 Yellow   Final    Clarity (UA POC) 02/09/2017 Clear   Final    Glucose (UA POC) 02/09/2017 Negative  Negative Final    Bilirubin (UA POC) 02/09/2017 Negative  Negative Final    Ketones (UA POC) 02/09/2017 Negative  Negative Final    Specific gravity (UA POC) 02/09/2017 1.015  1.001 - 1.035 Final    Blood (UA POC) 02/09/2017 Negative  Negative Final    pH (UA POC) 02/09/2017 5.5  4.6 - 8.0 Final    Protein (UA POC) 02/09/2017 1+  Negative mg/dL Final    Urobilinogen (UA POC) 02/09/2017 0.2 mg/dL  0.2 - 1 Final    Nitrites (UA POC) 02/09/2017 Negative  Negative Final    Leukocyte esterase (UA POC) 02/09/2017 Negative  Negative Final    PVR 02/09/2017 125  cc Final   Orders Only on 02/09/2017   Component Date Value Ref Range Status    Glucose 02/09/2017 105* 65 - 99 mg/dL Final    BUN 02/09/2017 23* 6 - 22 mg/dL Final    Creatinine 02/09/2017 1.4  0.8 - 1.6 mg/dL Final    Sodium 02/09/2017 146* 133 - 145 mmol/L Final    Potassium 02/09/2017 3.6  3.5 - 5.5 mmol/L Final    Chloride 02/09/2017 104  98 - 110 mmol/L Final    CO2 02/09/2017 26  20 - 32 mmol/L Final    Calcium 02/09/2017 8.5  8.4 - 10.4 mg/dL Final    Anion gap 02/09/2017 16.0  mmol/L Final    Comment: Test includes BUN, CO2, Chloride, Creatinine, Glucose, Potassium, Calcium and  Sodium. Estimated GFR results are reported in mL/min/1.73 sq.m. by the MDRD equation. This eGFR is validated for stable chronic renal failure patients.  This   equation  is unreliable in acute illness or patients with normal renal function.  GFRAA 02/09/2017 >60.0  >60.0 Final    GFRNA 02/09/2017 50.4* >60.0 Final       .No results found for any visits on 03/27/17. Assessment / Plan      ICD-10-CM ICD-9-CM    1. Essential hypertension K41 332.4 METABOLIC PANEL, COMPREHENSIVE      CBC WITH AUTOMATED DIFF      TSH 3RD GENERATION      ID COLLECTION VENOUS BLOOD,VENIPUNCTURE   2. Bilateral edema of lower extremity R60.0 782. 3      Discontinue Lotrel as the Amlodipine may worsen the edema  he was advised to continue his maintenance medications  Recheck BP and follow symptoms in 5 days    Follow-up Disposition:  Return in about 3 months (around 6/27/2017). I asked Ajay Mcdermott if he has any questions and I answered the questions.   Ajay Mcdermott states that he understands the treatment plan and agrees with the treatment plan

## 2017-04-04 ENCOUNTER — TELEPHONE (OUTPATIENT)
Dept: INTERNAL MEDICINE CLINIC | Age: 62
End: 2017-04-04

## 2017-04-04 DIAGNOSIS — R60.0 BILATERAL EDEMA OF LOWER EXTREMITY: Primary | ICD-10-CM

## 2017-04-04 NOTE — TELEPHONE ENCOUNTER
Called and spoke with patient who states that after increase in fluid pill he still hasn't noticed any change in the edema in his legs. Questions what else can be done.

## 2017-04-05 NOTE — TELEPHONE ENCOUNTER
Called and left message on patient's personal voice mail informing of referral that has been placed. Patient is to return call if there are any questions or he has dates that will not work for him appointment wise. Referral placed with ok per Dr Pa.

## 2017-04-13 ENCOUNTER — OFFICE VISIT (OUTPATIENT)
Dept: VASCULAR SURGERY | Age: 62
End: 2017-04-13

## 2017-04-13 VITALS
RESPIRATION RATE: 12 BRPM | HEART RATE: 98 BPM | SYSTOLIC BLOOD PRESSURE: 180 MMHG | HEIGHT: 71 IN | WEIGHT: 305 LBS | BODY MASS INDEX: 42.7 KG/M2 | DIASTOLIC BLOOD PRESSURE: 102 MMHG

## 2017-04-13 DIAGNOSIS — I89.0 LYMPHEDEMA OF BOTH LOWER EXTREMITIES: ICD-10-CM

## 2017-04-13 DIAGNOSIS — M79.10 MYALGIA: ICD-10-CM

## 2017-04-13 DIAGNOSIS — I10 ESSENTIAL HYPERTENSION: ICD-10-CM

## 2017-04-13 DIAGNOSIS — R60.0 BILATERAL LEG EDEMA: Primary | ICD-10-CM

## 2017-04-13 NOTE — MR AVS SNAPSHOT
Visit Information Date & Time Provider Department Dept. Phone Encounter #  
 4/13/2017  1:00 PM Nicanor Hanson, [address] [facility] [phone] [id number] Follow-up Instructions Return in about 1 month (around 5/13/2017). Your Appointments 4/17/2017  4:30 PM  
Office Visit with [facility] NURSE  
[facility] ([facility] SCHEDULING) [address] [facility] [address] [place] [id number]  
[phone]  
  
   
 [address] [facility] [address] [place] [id number] 5/16/2017 10:30 AM  
Office Visit with [facility] ([facility] Appt Note: 1 month follow up to check unna boot [address]  
[phone] [address] [place] [address]  
  
    
 8/1/2017  9:50 AM  
Nurse Visit with [facility] NURSE [facility] ([facility]) Appt Note: bw  
 [address] 
[address] [place] [address]  
[address] [facility] [address] [place] [address]  
  
    
  
 8/7/2017  9:30 AM  
Any with Zuleyka Moreno MD  
[facility] ([facility]) Appt Note: 6 mon w/ psa prior [address] [place] [address]  
[address] [facility] [address] [facility] [address] [place] [address] Upcoming Health Maintenance Date Due Hepatitis C Screening 1955 EYE EXAM RETINAL OR DILATED Q1 1/8/1967 DTaP/Tdap/Td series (1 - Tdap) 1/8/1978 Pneumococcal 19-64 Highest Risk (2 of 3 - PCV13) 2/3/2012 HEMOGLOBIN A1C Q6M 12/29/2016 ZOSTER VACCINE AGE 60> 1/8/2017 MICROALBUMIN Q1 6/29/2017 LIPID PANEL Q1 6/29/2017 FOOT EXAM Q1 9/7/2017 FOBT Q 1 YEAR AGE 50-75 9/29/2017 Allergies as of 4/13/2017  Review Complete On: 4/13/2017 By: Carly Grewal Severity Noted Reaction Type Reactions Iodine  04/12/2011    Other (comments) Eyes swell shut Shellfish Containing Products  04/12/2011    Swelling Current Immunizations  Never Reviewed Name Date Influenza Vaccine (Quad) PF 10/20/2016 Influenza Vaccine PF 10/2/2015 Pneumococcal Polysaccharide (PPSV-23) 2/3/2011 Not reviewed this visit You Were Diagnosed With   
  
 Codes Comments Bilateral leg edema    -  Primary ICD-10-CM: R60.0 ICD-9-CM: 913. 3 Vitals BP Pulse Resp Height(growth percentile) Weight(growth percentile) BMI  
 (!) 180/102 (BP 1 Location: Left arm, BP Patient Position: Sitting) 98 12 5' 11\" (1.803 m) 305 lb (138.3 kg) 42.54 kg/m2 Smoking Status Former Smoker Vitals History BMI and BSA Data Body Mass Index Body Surface Area 42.54 kg/m 2 2.63 m 2 Preferred Pharmacy Pharmacy Name Phone [id number] Giraldo, [address] [place] [address] Your Updated Medication List  
  
   
This list is accurate as of: 4/13/17  1:53 PM.  Always use your most recent med list.  
  
  
  
  
 allopurinol 100 mg tablet Commonly known as:  Jamey Calender Take 1 Tab by mouth daily. aspirin 81 mg tablet Take 81 mg by mouth daily. avanafil 200 mg Tab tablet Commonly known as:  EWGBTKB Take 1 Tab by mouth as needed for Other. Indications: Erectile Dysfunction  
  
 benazepril 40 mg tablet Commonly known as:  LOTENSIN  
1 tablet daily (stop Lotrel)  
  
 cloNIDine 0.2 mg/24 hr patch Commonly known as:  CATAPRES Apply weekly  
  
 furosemide 20 mg tablet Commonly known as:  LASIX  
2 tablets daily  
  
 gabapentin 300 mg capsule Commonly known as:  NEURONTIN Take 300 mg by mouth three (3) times daily. hydrALAZINE 100 mg tablet Commonly known as:  APRESOLINE  
1 tablet twice per day (note:  New strength)  
  
 metoprolol succinate 50 mg XL tablet Commonly known as:  TOPROL-XL  
1 tablet each AM (Stop Labetalol and Indapamide) Follow-up Instructions Return in about 1 month (around 5/13/2017). To-Do List   
 04/20/2017 Imaging:  DUPLEX LOWER EXT VENOUS BILAT   
  
 04/20/2017 10:00 AM  
  Appointment with [facility]CH [facility]; [facility] RM 1 at [facility]ULAR [facility] ([phone]) All patients under the age of 15 should be referred to [facility]. There are no restrictions for this study. The patient can eat breakfast and take their medications. Patient may hand-carry an order or the physician can fax a written prescription to Central Scheduling @ [phone]. Patient Instructions For management of leg swelling/pain, incorporate these 4 \"E's\" into your daily routine: 
 
 
· Elastic: Wear compression stockings during the day to help relieve symptoms. Talk to your doctor about which ones to get and where to get them. · Elevate: Prop up your legs at or above the level of your heart when possible. This helps keep the blood from pooling in your lower legs and improves blood flow to the rest of your body. I generally encourage 5 minute intervals every 2 hours, or 15-20 minutes every 3-4 hours. · Avoid sitting and standing for long periods. This puts added stress on your veins. · Exercise: Get regular exercise, and control your weight. Walk, bicycle, or swim to improve blood flow in your legs. Even do simple range of motion exercises including foot/ankle circles, flexion/extension. · Emollient: as you experience swelling, the skin often becomes dry. Keep legs moisturized daily. Good products include Gold Bond, Aveeno, Eucerin, Neutrogena. You may also use petroleum base such as vaseline or bag balm. Some patients choose oils such as olive, E, lanolin. Cocoa Butter is another favorite. Look for labels such as cream or ointment, as lotions do not provide as good hydration. For times of itching, may use hydrocortisone cream (cortaid). Please provide this summary of care documentation to your next provider. Your primary care clinician is listed as Artur Garcia. If you have any questions after today's visit, please call [phone].

## 2017-04-13 NOTE — PATIENT INSTRUCTIONS
For management of leg swelling/pain, incorporate these 4 \"E's\" into your daily routine:      · Elastic: Wear compression stockings during the day to help relieve symptoms. Talk to your doctor about which ones to get and where to get them. · Elevate: Prop up your legs at or above the level of your heart when possible. This helps keep the blood from pooling in your lower legs and improves blood flow to the rest of your body. I generally encourage 5 minute intervals every 2 hours, or 15-20 minutes every 3-4 hours. · Avoid sitting and standing for long periods. This puts added stress on your veins. · Exercise: Get regular exercise, and control your weight. Walk, bicycle, or swim to improve blood flow in your legs. Even do simple range of motion exercises including foot/ankle circles, flexion/extension. · Emollient: as you experience swelling, the skin often becomes dry. Keep legs moisturized daily. Good products include Gold Bond, Aveeno, Eucerin, Neutrogena. You may also use petroleum base such as vaseline or bag balm. Some patients choose oils such as olive, E, lanolin. Cocoa Butter is another favorite. Look for labels such as cream or ointment, as lotions do not provide as good hydration. For times of itching, may use hydrocortisone cream (cortaid).

## 2017-04-14 NOTE — PROGRESS NOTES
[facility] File    Chief Complaint   Patient presents with    Swelling     b/l       History and Physical    Mr Carmon Schlatter is referred by his primary care physician for lower extremity edema. He states that this is been going on for several months now, and it all seemed to start after he had some epidural injections for his spine. He says prior to that he had no issues. He denies varicose veins. He does not report any concerns about heart or kidney disease that have been considered as a contributor to his symptoms. He does not have any shortness of breath. He is on a diuretic, but does not feel that is having any effect on the decreased leg edema. With the swelling, the legs are also very heavy and tired. He does work as a  this is definitely impacting work.     Past Medical History:   Diagnosis Date    BPH without obstruction/lower urinary tract symptoms     Bursitis of right shoulder     CAD (coronary artery disease)     Chest pain     history of hospitalization with chest pain and a negative workup in 2008    Chronic edema     Constipation     die to medication    Coronary artery disease     mild, non obstructive/EF 65%    Dyspnea on exertion     Echocardiogram 12/16/08    IVC dilated; suboptimal endocardial border; EF 65%    ED (erectile dysfunction)     Elevated PSA     Frequency     GERD (gastroesophageal reflux disease)     Gout     H/O cystoscopy 06/20/2013    Hematuria, unspecified     Hypercholesterolemia     Hypertension     Hypertension      Hypogonadism male     Impotence of organic origin     Left ventricular diastolic dysfunction     Malignant neoplasm of prostate (HCC)     hx of t1a, izzy 6, 5 % of 1  core    Morbid obesity ([facility] [address]     Myocardial perfusion 09/05/08    Basal inferior defect c/w artifact; EF 63%    Overactive bladder     S/P cardiac cath 07/30/10    oD2-40%; pRCA-20-30%; EF 65%    Sleep apnea     Slowing of urinary stream     Type II or unspecified type diabetes mellitus without mention of complication, not stated as uncontrolled      Patient Active Problem List   Diagnosis Code    Chest pain 786.5    Hypercholesterolemia E78.00    Sleep apnea G47.30    Left ventricular diastolic dysfunction C35.3    Coronary artery disease I25.10    Atypical chest pain/mild nonobstructive CAD/EF 65% R07.89    Hypogonadism male E29.1    Morbid obesity ([facility] [address] E66.01    Elevated PSA R97.20    Overactive bladder N32.81    Impotence of organic origin N52.9    Slowing of urinary stream R39.198    Frequency [id number]    Gout M10.9    Sleep apnea G47.30    Type 2 diabetes mellitus without complication (HCC) K22.0    Chronic edema R60.9    Essential hypertension I10    Prostate cancer ([facility] [address] C61    Morbid (severe) obesity due to excess calories ([facility] [address] E66.01     Past Surgical History:   Procedure Laterality Date    HX HEART CATHETERIZATION  7/30/10    HX OTHER SURGICAL  06/27/13    Prostate    HX TONSILLECTOMY      INCISION/DRAIN ABSCESS EXTRA      DE COLONOSCOPY FLX DX W/COLLJ SPEC WHEN PFRMD       Current Outpatient Prescriptions   Medication Sig Dispense Refill    gabapentin (NEURONTIN) 300 mg capsule Take 300 mg by mouth three (3) times daily.  furosemide (LASIX) 20 mg tablet 2 tablets daily 60 Tab 3    benazepril (LOTENSIN) 40 mg tablet 1 tablet daily (stop Lotrel) 30 Tab 3    hydrALAZINE (APRESOLINE) 100 mg tablet 1 tablet twice per day (note:  New strength) 60 Tab 2    avanafil (STENDRA) 200 mg tab tablet Take 1 Tab by mouth as needed for Other. Indications: Erectile Dysfunction 6 Tab 6    metoprolol succinate (TOPROL-XL) 50 mg XL tablet 1 tablet each AM (Stop Labetalol and Indapamide) 30 Tab 3    cloNIDine (CATAPRES) 0.2 mg/24 hr patch Apply weekly 12 Patch 3    allopurinol (ZYLOPRIM) 100 mg tablet Take 1 Tab by mouth daily. 90 Tab 3    aspirin 81 mg tablet Take 81 mg by mouth daily.        Allergies   Allergen Reactions    Iodine Other (comments)     Eyes swell shut      Shellfish Containing Products Swelling     Social History     Social History    Marital status:      Spouse name: N/A    Number of children: N/A    Years of education: N/A     Occupational History    Not on file. Social History Main Topics    Smoking status: Former Smoker     Types: Cigars     Quit date: 10/4/1999    Smokeless tobacco: Never Used    Alcohol use No      Comment: socially    Drug use: No    Sexual activity: Not Currently     Other Topics Concern    Not on file     Social History Narrative      Family History   Problem Relation Age of Onset    Hypertension Mother     High Cholesterol Mother     Breast Cancer Mother     Diabetes Mother     Heart Disease Mother     Arthritis-osteo Mother     High Cholesterol Father     Hypertension Father     Diabetes Father     Heart Disease Father     Arthritis-osteo Father        Review of Systems    Review of Systems - History obtained from the patient  General ROS: negative  Psychological ROS: negative  Ophthalmic ROS: negative  Respiratory ROS: negative  Cardiovascular ROS: negative  Gastrointestinal ROS: negative  Musculoskeletal ROS: back pain issues  Neurological ROS: negative  Dermatological ROS: negative  Vascular ROS: lower extremity edema        Physical Exam:    Visit Vitals    BP (!) 180/102 (BP 1 Location: Left arm, BP Patient Position: Sitting)    Pulse 98    Resp 12    Ht 5' 11\" (1.803 m)    Wt 305 lb (138.3 kg)    BMI 42.54 kg/m2      General:  Alert, cooperative, no distress. Head:  Normocephalic, without obvious abnormality, atraumatic. Eyes:    Conjunctivae/corneas clear. Pupils equal, round, reactive to light. Extraocular movements intact. Extremities: Bilateral lower extremity edema of about 3+  No varicosities noted   Pulses: Good pulses, triphasic with doppler   Skin: No stasis skin changes, no ulcerations or celluliits         Impression and Plan:  1. Bilateral leg edema    2. Lymphedema of both lower extremities      Orders Placed This Encounter    APPLY OF PASTE BOOT    APPLY OF PASTE BOOT    DUPLEX LOWER EXT VENOUS BILAT     He does have compression stockings but states that he has not been able to get them on. I think with the amount edema he does currently have, stockings may be more irritable than helpful. I suggested we initiate Unna boots to start some decompression of these legs, in hopes that he may be able to ultimately get into his stockings. I explained that with compression therapy he needs to incorporate some leg elevation and range of motion exercises. He should continue the diuretic as well to help mobilize this fluid that we are trying to do physically as well. He has not had a baseline PVLs, so we will do that as well. This could even be some lymphedema that has ultimately manifested itself. The swelling is in a pattern consistent with that, as he has no other obvious signs of venous insufficiency. So I told him that this may offers the opportunity to provide him with a lymphedema pump or lymphedema therapy after we have assessed his response to a short course of conservative therapy. The PVL is scheduled next week, she will call him if any concerns, otherwise he will have another nurse visit for an Unna boot change as needed, and then will do a follow-up assessment in about 1 month. Follow-up Disposition:  Return in about 1 month (around 5/13/2017). ARAMIS Beck    Portions of this note have been created using voice recognition software.

## 2017-04-17 ENCOUNTER — OFFICE VISIT (OUTPATIENT)
Dept: VASCULAR SURGERY | Age: 62
End: 2017-04-17

## 2017-04-17 DIAGNOSIS — R60.9 CHRONIC EDEMA: Primary | ICD-10-CM

## 2017-04-20 ENCOUNTER — HOSPITAL ENCOUNTER (OUTPATIENT)
Dept: VASCULAR SURGERY | Age: 62
Discharge: HOME | End: 2017-04-20
Attending: PHYSICIAN ASSISTANT
Payer: COMMERCIAL

## 2017-04-20 DIAGNOSIS — I89.0 LYMPHEDEMA OF BOTH LOWER EXTREMITIES: ICD-10-CM

## 2017-04-20 DIAGNOSIS — R60.0 BILATERAL LEG EDEMA: ICD-10-CM

## 2017-04-20 PROCEDURE — 93970 EXTREMITY STUDY: CPT

## 2017-04-20 NOTE — PROCEDURES
[facility]  *** FINAL REPORT ***    Name: Jhoana Honeycutt  MRN: [id number]    Outpatient  : 1955  HIS Order #: [id number]  [address] [facility] Visit #: [id number]  Date: 2017    TYPE OF TEST: Peripheral Venous Testing    REASON FOR TEST  Pain in limb, Limb swelling    Right Leg:-  Deep venous thrombosis:           No  Superficial venous thrombosis:    No  Deep venous insufficiency:        Not examined  Superficial venous insufficiency: Not examined    Left Leg:-  Deep venous thrombosis:           No  Superficial venous thrombosis:    No  Deep venous insufficiency:        Not examined  Superficial venous insufficiency: Not examined      INTERPRETATION/FINDINGS  Duplex images were obtained using 2-D gray scale, color flow, and  spectral Doppler analysis. Right leg :  1. Deep veins visualized include the common femoral, femoral,  popliteal, posterior tibial and peroneal veins. 2. No evidence of deep venous thrombosis detected in the veins  visualized. 3. Superficial veins visualized include the great saphenous vein. 4. No evidence of superficial thrombosis detected. 5. Normal multiphasic flow in the posterior tibial artery. Left leg :  1. Deep veins visualized include the common femoral, femoral,  popliteal, posterior tibial and peroneal veins. 2. No evidence of deep venous thrombosis detected in the veins  visualized. 3. Superficial veins visualized include the great saphenous vein. 4. No evidence of superficial thrombosis detected. 5. Normal multiphasic flow in the posterior tibial artery. ADDITIONAL COMMENTS    I have personally reviewed the data relevant to the interpretation of  this  study. TECHNOLOGIST: J CARLOS Castañeda RVS  Signed: 2017 11:57 AM    PHYSICIAN: Dianna Linda D.O.   Signed: 2017 01:45 PM

## 2017-04-22 RX ORDER — METOPROLOL SUCCINATE 50 MG/1
TABLET, EXTENDED RELEASE ORAL
Qty: 30 TAB | Refills: 3 | Status: SHIPPED | OUTPATIENT
Start: 2017-04-22 | End: 2017-06-06 | Stop reason: ALTCHOICE

## 2017-04-25 ENCOUNTER — OFFICE VISIT (OUTPATIENT)
Dept: VASCULAR SURGERY | Age: 62
End: 2017-04-25

## 2017-04-25 DIAGNOSIS — R60.9 CHRONIC EDEMA: Primary | ICD-10-CM

## 2017-04-25 NOTE — MR AVS SNAPSHOT
Visit Information Date & Time Provider Department Dept. Phone Encounter #  
 4/25/2017  4:30 PM [facility] NURSE [facility] [phone] [id number] Your Appointments 5/25/2017  3:45 PM  
Office Visit with [facility] ([facility]) Appt Note: 4 week office visit for measurements 1212 [facility] 540 200 [facility]  
[phone] [facility]12 [facility]  
  
    
 8/1/2017  9:50 AM  
Nurse Visit with [facility] N[facility]SE [facility] ([facility]) Appt Note: bw  
 [address] 
[address] [place] [address]  
1400 [facility] [facility] [address]  
  
    
  
 8/7/2017  9:30 AM  
Any with Lisette Syed MD  
[facility] ([facility]) Appt Note: 6 mon w/ psa prior [address] [place] [address]  
[address] [facility] [address] [place] [address] Upcoming Health Maintenance Date Due Hepatitis C Screening 1955 EYE EXAM RETINAL OR DILATED Q1 1/8/1967 DTaP/Tdap/Td series (1 - Tdap) 1/8/1978 Pneumococcal 19-64 Highest Risk (2 of 3 - PCV13) 2/3/2012 HEMOGLOBIN A1C Q6M 12/29/2016 ZOSTER VACCINE AGE 60> 1/8/2017 MICROALBUMIN Q1 6/29/2017 LIPID PANEL Q1 6/29/2017 FOOT EXAM Q1 9/7/2017 FOBT Q 1 YEAR AGE 50-75 9/29/2017 Allergies as of 4/25/2017  Review Complete On: 4/17/2017 By: Thiago Medina LPN Severity Noted Reaction Type Reactions Iodine  04/12/2011    Other (comments) Eyes swell shut Shellfish Containing Products  04/12/2011    Swelling Current Immunizations  Never Reviewed Name Date Influenza Vaccine (Quad) PF 10/20/2016 Influenza Vaccine PF 10/2/2015 Pneumococcal Polysaccharide (PPSV-23) 2/3/2011 Not reviewed this visit Vitals Smoking Status Former Smoker Preferred Pharmacy Pharmacy Name Phone [id number] Krishnamurthy [address] Your Updated Medication List  
  
   
This list is accurate as of: 4/25/17  5:04 PM.  Always use your most recent med list.  
  
  
  
  
 allopurinol 100 mg tablet Commonly known as:  Davion Mings Take 1 Tab by mouth daily. aspirin 81 mg tablet Take 81 mg by mouth daily. avanafil 200 mg Tab tablet Commonly known as:  HKUIWRA Take 1 Tab by mouth as needed for Other. Indications: Erectile Dysfunction  
  
 benazepril 40 mg tablet Commonly known as:  LOTENSIN  
1 tablet daily (stop Lotrel)  
  
 cloNIDine 0.2 mg/24 hr patch Commonly known as:  CATAPRES Apply weekly  
  
 furosemide 20 mg tablet Commonly known as:  LASIX  
2 tablets daily  
  
 gabapentin 300 mg capsule Commonly known as:  NEURONTIN Take 300 mg by mouth three (3) times daily. hydrALAZINE 100 mg tablet Commonly known as:  APRESOLINE  
1 tablet twice per day (note:  New strength)  
  
 metoprolol succinate 50 mg XL tablet Commonly known as:  TOPROL-XL  
TAKE 1 TABLET BY MOUTH EVERY MORNING (STOP LABETALOL AND INDAPAMIDE) Please provide this summary of care documentation to your next provider. Your primary care clinician is listed as Tabitha Parker. If you have any questions after today's visit, please call [phone].

## 2017-04-26 NOTE — PROGRESS NOTES
Patient being seen to assess bilateral lower extremity swelling. Patient has been wearing double layer tubi  size E to bilateral lower extremities since last visit and still has some swelling but non pitting at this time. Patient is a  and stays in a seated position majority of the day and is unable to fit into compression stockings that has at home. Patient had reflux ultrasound completed and was found not to have any reflux per ARAMIS Prado. Have taken leg measurements this visit and will attempt to obtain patient a lymphedema pump to help control patient's swelling a little better. Patient attempts to elevate lower extremities at night and when off of work to help with swelling which patient states does make swelling decrease some. Have encouraged patient to exercise some during the day while at work and if able to take breaks and ambulate some, stretch and perform ankle pumps. Patient states understands. Patient will return in 4 weeks for reassessment and is to try and obtain new compression stockings as well.     Right leg  Ankle: 38.0 cm  Calf: 51.0 cm  Knee: 62.5 cm  Thigh: 69.0 cm    Left leg  Ankle: 37.0 cm  Calf: 55.0 cm  Knee: 59.5 cm  Thigh: 60.0 cm

## 2017-05-08 ENCOUNTER — OFFICE VISIT (OUTPATIENT)
Dept: INTERNAL MEDICINE CLINIC | Age: 62
End: 2017-05-08

## 2017-05-08 VITALS
DIASTOLIC BLOOD PRESSURE: 88 MMHG | BODY MASS INDEX: 42.7 KG/M2 | SYSTOLIC BLOOD PRESSURE: 144 MMHG | OXYGEN SATURATION: 97 % | HEIGHT: 71 IN | TEMPERATURE: 98.5 F | WEIGHT: 305 LBS | HEART RATE: 89 BPM | RESPIRATION RATE: 18 BRPM

## 2017-05-08 DIAGNOSIS — I10 ESSENTIAL HYPERTENSION: ICD-10-CM

## 2017-05-08 DIAGNOSIS — R51.9 HEADACHE, TEMPORAL: ICD-10-CM

## 2017-05-08 DIAGNOSIS — R19.5 POSITIVE FECAL OCCULT BLOOD TEST: ICD-10-CM

## 2017-05-08 DIAGNOSIS — I26.99 PULMONARY EMBOLISM, OTHER: Primary | ICD-10-CM

## 2017-05-08 DIAGNOSIS — R60.0 PEDAL EDEMA: ICD-10-CM

## 2017-05-08 DIAGNOSIS — G47.33 OBSTRUCTIVE SLEEP APNEA: ICD-10-CM

## 2017-05-08 RX ORDER — CHLORTHALIDONE 25 MG/1
25 TABLET ORAL DAILY
COMMUNITY
Start: 2017-05-02 | End: 2017-06-06 | Stop reason: ALTCHOICE

## 2017-05-08 NOTE — PROGRESS NOTES
Rea Snider is a 63 y.o. male presenting today for Hospital Follow Up (PE)  . HPI:  Rea Snider presents to the office today for hospital follow-up. Patient was discharged from [facility] on Tuesday, May 2nd. He presented to the ED with chest pain and dyspnea. Patient was diagnosed with a pulmonary embolism and was treated with Xarelto. Patient presents to the office today stating he is feeling much better. He noted having two very large coffee ground bowel movement on yesterday with mild abdominal cramping. Review of Systems   HENT: Negative for congestion and sore throat. Respiratory: Negative for cough, sputum production and shortness of breath. Cardiovascular: Positive for leg swelling. Negative for chest pain and palpitations. Gastrointestinal: Positive for abdominal pain, blood in stool (\"coffe grounds\") and diarrhea (2 large coffe ground BM x 1 day ago). Negative for nausea and vomiting. Genitourinary: Negative for urgency. Musculoskeletal: Negative for back pain. Neurological: Positive for headaches. Negative for dizziness. Allergies   Allergen Reactions    Iodine Other (comments)     Eyes swell shut      Shellfish Containing Products Swelling       Current Outpatient Prescriptions   Medication Sig Dispense Refill    rivaroxaban (XARELTO STARTER CHELSEA) 15 mg (42)- 20 mg (9) DsPk XARELTO Starter Pack for DVT and/or PE treatment - take as directed      chlorthalidone (HYGROTEN) 25 mg tablet Take 25 mg by mouth daily.  metoprolol succinate (TOPROL-XL) 50 mg XL tablet TAKE 1 TABLET BY MOUTH EVERY MORNING (STOP LABETALOL AND INDAPAMIDE) 30 Tab 3    gabapentin (NEURONTIN) 300 mg capsule Take 300 mg by mouth three (3) times daily.       furosemide (LASIX) 20 mg tablet 2 tablets daily 60 Tab 3    benazepril (LOTENSIN) 40 mg tablet 1 tablet daily (stop Lotrel) 30 Tab 3    hydrALAZINE (APRESOLINE) 100 mg tablet 1 tablet twice per day (note:  New strength) 60 Tab 2    avanafil (STENDRA) 200 mg tab tablet Take 1 Tab by mouth as needed for Other. Indications: Erectile Dysfunction 6 Tab 6    cloNIDine (CATAPRES) 0.2 mg/24 hr patch Apply weekly 12 Patch 3    allopurinol (ZYLOPRIM) 100 mg tablet Take 1 Tab by mouth daily. 90 Tab 3    aspirin 81 mg tablet Take 81 mg by mouth daily.          Past Medical History:   Diagnosis Date    BPH without obstruction/lower urinary tract symptoms     Bursitis of right shoulder     CAD (coronary artery disease)     Chest pain     history of hospitalization with chest pain and a negative workup in 2008    Chronic edema     Constipation     die to medication    Coronary artery disease     mild, non obstructive/EF 65%    Dyspnea on exertion     Echocardiogram 12/16/08    IVC dilated; suboptimal endocardial border; EF 65%    ED (erectile dysfunction)     Elevated PSA     Frequency     GERD (gastroesophageal reflux disease)     Gout     H/O cystoscopy 06/20/2013    Hematuria, unspecified     Hypercholesterolemia     Hypertension     Hypertension      Hypogonadism male     Impotence of organic origin     Left ventricular diastolic dysfunction     Malignant neoplasm of prostate (HCC)     hx of t1a, izzy 6, 5 % of 1  core    Morbid obesity ([address]     Myocardial perfusion 09/05/08    Basal inferior defect c/w artifact; EF 63%    Overactive bladder     S/P cardiac cath 07/30/10    oD2-40%; pRCA-20-30%; EF 65%    Sleep apnea     Slowing of urinary stream     Type II or unspecified type diabetes mellitus without mention of complication, not stated as uncontrolled        Past Surgical History:   Procedure Laterality Date    HX HEART CATHETERIZATION  7/30/10    HX OTHER SURGICAL  06/27/13    Prostate    HX TONSILLECTOMY      INCISION/DRAIN ABSCESS EXTRA      IL COLONOSCOPY FLX DX W/COLLJ SPEC WHEN PFRMD         Social History     Social History    Marital status:      Spouse name: N/A    Number of children: N/A    Years of education: N/A     Occupational History    Not on file. Social History Main Topics    Smoking status: Former Smoker     Types: Cigars     Quit date: 10/4/1999    Smokeless tobacco: Never Used    Alcohol use No      Comment: socially    Drug use: No    Sexual activity: Not Currently     Other Topics Concern    Not on file     Social History Narrative       Patient does not have an advanced directive on file    Vitals:    05/08/17 1106   BP: 144/88   Pulse: 89   Resp: 18   Temp: 98.5 °F (36.9 °C)   TempSrc: Tympanic   SpO2: 97%   Weight: 305 lb (138.3 kg)   Height: 5' 11\" (1.803 m)   PainSc:   6   PainLoc: Head       Physical Exam   Constitutional: No distress. Cardiovascular: Normal rate, regular rhythm and normal heart sounds. Pulmonary/Chest: Effort normal and breath sounds normal. He has no rales. Abdominal: Soft. Bowel sounds are normal. There is no tenderness. Genitourinary: Rectal exam shows guaiac positive stool. Musculoskeletal: He exhibits edema (+2 pitting an pedal edema). He exhibits no tenderness. Lymphadenopathy:     He has no cervical adenopathy. Neurological: He is alert. Nursing note and vitals reviewed. Orders Only on 03/27/2017   Component Date Value Ref Range Status    Glucose 03/27/2017 111* 65 - 99 mg/dL Final    BUN 03/27/2017 15  6 - 22 mg/dL Final    Creatinine 03/27/2017 1.2  0.8 - 1.6 mg/dL Final    Sodium 03/27/2017 147* 133 - 145 mmol/L Final    Potassium 03/27/2017 3.8  3.5 - 5.5 mmol/L Final    Chloride 03/27/2017 104  98 - 110 mmol/L Final    CO2 03/27/2017 28  20 - 32 mmol/L Final    AST (SGOT) 03/27/2017 14  10 - 37 U/L Final    ALT (SGPT) 03/27/2017 16  5 - 40 U/L Final    Alk.  phosphatase 03/27/2017 67  40 - 125 U/L Final    Bilirubin, total 03/27/2017 0.2  0.2 - 1.2 mg/dL Final    Calcium 03/27/2017 9.3  8.4 - 10.4 mg/dL Final    Protein, total 03/27/2017 6.3  6.2 - 8.1 g/dL Final    Albumin 03/27/2017 3.9  3.5 - 5.0 g/dL Final    A-G Ratio 03/27/2017 1.6  1.1 - 2.6 ratio Final    Globulin 03/27/2017 2.4  2.0 - 4.0 g/dL Final    Anion gap 03/27/2017 15.0  mmol/L Final    Comment: Test includes Albumin, Alkaline Phosphatase, ALT, AST, BUN, Calcium, CO2,  Chloride, Creatinine, Glucose, Potassium, Sodium, Total Bilirubin and Total  Protein. Estimated GFR results are reported in mL/min/1.73 sq.m. by the MDRD equation. This eGFR is validated for stable chronic renal failure patients. This   equation  is unreliable in acute illness or patients with normal renal function.  GFRAA 03/27/2017 >60.0  >60.0 Final    GFRNA 03/27/2017 59.5* >60.0 Final    WBC 03/27/2017 5.4  4.0 - 11.0 K/uL Final    RBC 03/27/2017 4.67  3.80 - 5.80 M/uL Final    HGB 03/27/2017 13.2  13.1 - 17.2 g/dL Final    HCT 03/27/2017 42.3  39.3 - 51.6 % Final    MCV 03/27/2017 91  80 - 95 fL Final    MCH 03/27/2017 28  26 - 34 pg Final    MCHC 03/27/2017 31* 32 - 36 g/dL Final    RDW 03/27/2017 15.3  10.0 - 16.0 % Final    PLATELET 47/97/1876 632  140 - 440 K/uL Final    MPV 03/27/2017 10.3  6.0 - 10.8 fL Final    NEUTROPHILS 03/27/2017 53  40 - 75 % Final    Lymphocytes 03/27/2017 36  27 - 45 % Final    MONOCYTES 03/27/2017 7  3 - 9 % Final    EOSINOPHILS 03/27/2017 3  0 - 6 % Final    BASOPHILS 03/27/2017 0  0 - 2 % Final    ABS. NEUTROPHILS 03/27/2017 2.9  1.8 - 7.7 K/uL Final    ABSOLUTE LYMPHOCYTE COUNT 03/27/2017 2.0  1.0 - 4.8 K/uL Final    ABS. MONOCYTES 03/27/2017 0.4  0.1 - 0.9 K/uL Final    ABS. EOSINOPHILS 03/27/2017 0.1  0.0 - 0.5 K/uL Final    ABS.  BASOPHILS 03/27/2017 0.0  0.0 - 0.2 K/uL Final    TSH 03/27/2017 1.51  0.27 - 4.20 mcU/mL Final   Office Visit on 02/09/2017   Component Date Value Ref Range Status    Color (UA POC) 02/09/2017 Yellow   Final    Clarity (UA POC) 02/09/2017 Clear   Final    Glucose (UA POC) 02/09/2017 Negative  Negative Final    Bilirubin (UA POC) 02/09/2017 Negative  Negative Final    Ketones (UA POC) 02/09/2017 Negative  Negative Final    Specific gravity (UA POC) 02/09/2017 1.015  1.001 - 1.035 Final    Blood (UA POC) 02/09/2017 Negative  Negative Final    pH (UA POC) 02/09/2017 5.5  4.6 - 8.0 Final    Protein (UA POC) 02/09/2017 1+  Negative mg/dL Final    Urobilinogen (UA POC) 02/09/2017 0.2 mg/dL  0.2 - 1 Final    Nitrites (UA POC) 02/09/2017 Negative  Negative Final    Leukocyte esterase (UA POC) 02/09/2017 Negative  Negative Final    PVR 02/09/2017 125  cc Final   Orders Only on 02/09/2017   Component Date Value Ref Range Status    Glucose 02/09/2017 105* 65 - 99 mg/dL Final    BUN 02/09/2017 23* 6 - 22 mg/dL Final    Creatinine 02/09/2017 1.4  0.8 - 1.6 mg/dL Final    Sodium 02/09/2017 146* 133 - 145 mmol/L Final    Potassium 02/09/2017 3.6  3.5 - 5.5 mmol/L Final    Chloride 02/09/2017 104  98 - 110 mmol/L Final    CO2 02/09/2017 26  20 - 32 mmol/L Final    Calcium 02/09/2017 8.5  8.4 - 10.4 mg/dL Final    Anion gap 02/09/2017 16.0  mmol/L Final    Comment: Test includes BUN, CO2, Chloride, Creatinine, Glucose, Potassium, Calcium and  Sodium. Estimated GFR results are reported in mL/min/1.73 sq.m. by the MDRD equation. This eGFR is validated for stable chronic renal failure patients. This   equation  is unreliable in acute illness or patients with normal renal function.  GFRAA 02/09/2017 >60.0  >60.0 Final    GFRNA 02/09/2017 50.4* >60.0 Final       .No results found for any visits on 05/08/17. Assessment / Plan:      ICD-10-CM ICD-9-CM    1. Pulmonary embolism, other ([facility] I26.99 415.19    2. Headache, temporal R51 784.0 SED RATE (ESR)      C REACTIVE PROTEIN, QT   3. Obstructive sleep apnea G47.33 327.23 REFERRAL TO SLEEP STUDIES   4. Essential hypertension I10 401.9    5. Positive fecal occult blood test R19.5 792.1    6. Pedal edema R60.0 782.3      FOBT positive- will refer patient back to [facility] for ? GI bleed. Recent new Xarelto patient for pulmonary embolism. HTN- slightly above goal.    Headache- patient need Sed Rate and CRP r/o TA  Sleep Study referral for COURTNEY symptoms    Follow-up Disposition:  Return if symptoms worsen or fail to improve. I asked the patient if he  had any questions and answered his  questions.   The patient stated that he understands the treatment plan and agrees with the treatment plan

## 2017-05-08 NOTE — PROGRESS NOTES
Patient presents for   Chief Complaint   Patient presents with   [facility] Follow Up     PE     Fall risk assessment was not indicated. Depression screening was not indicated Follow up questions were not indicated. 1. Have you been to the ER, urgent care clinic since your last visit? Hospitalized since your last visit? No    2. Have you seen or consulted any other health care providers outside of the [address] [place] [address] since your last visit? Include any pap smears or colon screening.  No

## 2017-05-08 NOTE — MR AVS SNAPSHOT
Visit Information Date & Time Provider Department Dept. Phone Encounter #  
 5/8/2017 10:45 AM Jessica Gutierrez NP [facility] OF Marlen Nguyen [phone] [id number] Your Appointments 5/25/2017  3:45 PM  
Office Visit with ARAMIS [facility] ([facility]) Appt Note: 4 week office visit for measurements [address] [facility] [address] [facility]  
[phone] [address] [facility] [address] [facility] [address]  
  
    
 8/1/2017  9:50 AM  
Nurse Visit with [facility] NURSE [facility] Appt Note: bw  
 [address] 
[address] [place] [address]  
14[address] [facility] 3[address] [facility] [address]  
  
    
  
 8/7/2017  9:30 AM  
Any with Barry Raymond MD  
[facility]) Appt Note: 6 mon w/ psa prior [address] [place] [address]  
[address] [facility] [address] [facility] [address] Upcoming Health Maintenance Date Due Hepatitis C Screening 1955 EYE EXAM RETINAL OR DILATED Q1 1/8/1967 DTaP/Tdap/Td series (1 - Tdap) 1/8/1978 Pneumococcal 19-64 Highest Risk (2 of 3 - PCV13) 2/3/2012 HEMOGLOBIN A1C Q6M 12/29/2016 ZOSTER VACCINE AGE 60> 1/8/2017 MICROALBUMIN Q1 6/29/2017 LIPID PANEL Q1 6/29/2017 INFLUENZA AGE 9 TO ADULT 8/1/2017 FOOT EXAM Q1 9/7/2017 FOBT Q 1 YEAR AGE 50-75 9/29/2017 Allergies as of 5/8/2017  Review Complete On: 5/8/2017 By: Jessica Gutierrez NP Severity Noted Reaction Type Reactions Iodine  04/12/2011    Other (comments) Eyes swell shut Shellfish Containing Products  04/12/2011    Swelling Current Immunizations  Never Reviewed Name Date Influenza Vaccine (Quad) PF 10/20/2016 Influenza Vaccine PF 10/2/2015 Pneumococcal Polysaccharide (PPSV-23) 2/3/2011 Not reviewed this visit You Were Diagnosed With   
  
 Codes Comments Headache, temporal    -  Primary ICD-10-CM: O22 ICD-9-CM: 784.0 Obstructive sleep apnea     ICD-10-CM: G47.33 
ICD-9-CM: 327.23 Essential hypertension     ICD-10-CM: I10 
ICD-9-CM: 401.9 Positive fecal occult blood test     ICD-10-CM: R19.5 ICD-9-CM: 792.1 Vitals BP Pulse Temp Resp Height(growth percentile) 144/88 (BP 1 Location: Left arm, BP Patient Position: Sitting) 89 98.5 °F (36.9 °C) (Tympanic) 18 5' 11\" (1.803 m) Weight(growth percentile) SpO2 BMI Smoking Status 305 lb (138.3 kg) 97% 42.54 kg/m2 Former Smoker BMI and BSA Data Body Mass Index Body Surface Area 42.54 kg/m 2 2.63 m 2 Preferred Pharmacy Pharmacy Name Phone [id number]- [place] [address] Your Updated Medication List  
  
   
This list is accurate as of: 5/8/17 12:36 PM.  Always use your most recent med list.  
  
  
  
  
 allopurinol 100 mg tablet Commonly known as:  Onnie Nora Take 1 Tab by mouth daily. aspirin 81 mg tablet Take 81 mg by mouth daily. avanafil 200 mg Tab tablet Commonly known as:  QOPNPBE Take 1 Tab by mouth as needed for Other. Indications: Erectile Dysfunction  
  
 benazepril 40 mg tablet Commonly known as:  LOTENSIN  
1 tablet daily (stop Lotrel)  
  
 chlorthalidone 25 mg tablet Commonly known as:  Ashia Mcclellan Take 25 mg by mouth daily. cloNIDine 0.2 mg/24 hr patch Commonly known as:  CATAPRES Apply weekly  
  
 furosemide 20 mg tablet Commonly known as:  LASIX  
2 tablets daily  
  
 gabapentin 300 mg capsule Commonly known as:  NEURONTIN Take 300 mg by mouth three (3) times daily. hydrALAZINE 100 mg tablet Commonly known as:  APRESOLINE  
1 tablet twice per day (note:  New strength)  
  
 metoprolol succinate 50 mg XL tablet Commonly known as:  TOPROL-XL  
 TAKE 1 TABLET BY MOUTH EVERY MORNING (STOP LABETALOL AND INDAPAMIDE) rivaroxaban 15 mg (42)- 20 mg (9) Dspk Commonly known as:  Morna Cargo CHELSEA Rayray Barthel Pack for DVT and/or PE treatment - take as directed To-Do List   
 05/08/2017 Lab:  C REACTIVE PROTEIN, QT   
  
 05/08/2017 Lab:  SED RATE (ESR) Please provide this summary of care documentation to your next provider. Your primary care clinician is listed as Tabitha Parker. If you have any questions after today's visit, please call [phone].

## 2017-05-19 ENCOUNTER — OFFICE VISIT (OUTPATIENT)
Dept: INTERNAL MEDICINE CLINIC | Age: 62
End: 2017-05-19

## 2017-05-19 VITALS
SYSTOLIC BLOOD PRESSURE: 148 MMHG | TEMPERATURE: 97.9 F | DIASTOLIC BLOOD PRESSURE: 88 MMHG | HEART RATE: 64 BPM | RESPIRATION RATE: 16 BRPM | HEIGHT: 71 IN | OXYGEN SATURATION: 96 % | BODY MASS INDEX: 42.7 KG/M2 | WEIGHT: 305 LBS

## 2017-05-19 DIAGNOSIS — Z87.19 H/O: UPPER GI BLEED: Primary | ICD-10-CM

## 2017-05-19 DIAGNOSIS — R60.9 CHRONIC EDEMA: ICD-10-CM

## 2017-05-19 DIAGNOSIS — I10 ESSENTIAL HYPERTENSION: ICD-10-CM

## 2017-05-19 RX ORDER — COLCHICINE 0.6 MG/1
0.6 TABLET ORAL DAILY
COMMUNITY
End: 2018-08-28 | Stop reason: ALTCHOICE

## 2017-05-19 RX ORDER — OMEPRAZOLE 40 MG/1
40 CAPSULE, DELAYED RELEASE ORAL
COMMUNITY
Start: 2017-05-11 | End: 2017-06-10

## 2017-05-19 RX ORDER — SUCRALFATE 1 G/10ML
SUSPENSION ORAL 4 TIMES DAILY
COMMUNITY
End: 2017-06-06 | Stop reason: SDUPTHER

## 2017-05-19 NOTE — PROGRESS NOTES
1. Have you been to the ER, urgent care clinic since your last visit? Hospitalized since your last visit? Yes When: last week Where: [facility] Reason for visit: pe    2. Have you seen or consulted any other health care providers outside of the [address] [place] [address] since your last visit? Include any pap smears or colon screening.  No

## 2017-05-19 NOTE — MR AVS SNAPSHOT
Visit Information Date & Time Provider Department Dept. Phone Encounter #  
 5/19/2017 10:30 AM Zakiya Cole NP [facility] INTERNAL MEDICINE OF [address] [place] [address] Drive [phone] [id number] Your Appointments 5/25/2017  3:45 PM  
Office Visit with [facility] ([facility]) Appt Note: 4 week office visit for measurements [address] [facility] [address] [facility]  
[phone] [address] [facility] [address]  
  
    
 8/1/2017  9:50 AM  
Nurse Visit with [facility] NURSE [facility] ([facility]) Appt Note: bw  
 [address] 
[address] [place] [address]  
[address] [facility] [address] [place] [address]  
  
    
  
 8/7/2017  9:30 AM  
Any with Gregory Lobato MD  
[facility] ([facility]) Appt Note: 6 mon w/ psa prior [address] [place] [address]  
[address] [facility] [address] [place] [address] Upcoming Health Maintenance Date Due Hepatitis C Screening 1955 EYE EXAM RETINAL OR DILATED Q1 1/8/1967 DTaP/Tdap/Td series (1 - Tdap) 1/8/1978 Pneumococcal 19-64 Highest Risk (2 of 3 - PCV13) 2/3/2012 HEMOGLOBIN A1C Q6M 12/29/2016 ZOSTER VACCINE AGE 60> 1/8/2017 MICROALBUMIN Q1 6/29/2017 INFLUENZA AGE 9 TO ADULT 8/1/2017 FOOT EXAM Q1 9/7/2017 FOBT Q 1 YEAR AGE 50-75 9/29/2017 LIPID PANEL Q1 5/10/2018 Allergies as of 5/19/2017  Review Complete On: 5/19/2017 By: John Andre LPN Severity Noted Reaction Type Reactions Iodine  04/12/2011    Other (comments) Eyes swell shut Shellfish Containing Products  04/12/2011    Swelling Current Immunizations  Never Reviewed Name Date Influenza Vaccine (Quad) PF 10/20/2016 Influenza Vaccine PF 10/2/2015 Pneumococcal Polysaccharide (PPSV-23) 2/3/2011 Not reviewed this visit Vitals BP Pulse Temp Resp Height(growth percentile) 148/88 (BP 1 Location: Left arm, BP Patient Position: Sitting) 64 97.9 °F (36.6 °C) (Tympanic) 16 5' 11\" (1.803 m) Weight(growth percentile) SpO2 BMI Smoking Status 305 lb (138.3 kg) 96% 42.54 kg/m2 Former Smoker Vitals History BMI and BSA Data Body Mass Index Body Surface Area 42.54 kg/m 2 2.63 m 2 Preferred Pharmacy Pharmacy Name Phone [id number]- [place] Sarah, [address] [address] [facility] Your Updated Medication List  
  
   
This list is accurate as of: 5/19/17 11:38 AM.  Always use your most recent med list.  
  
  
  
  
 allopurinol 100 mg tablet Commonly known as:  Lavella Harris Take 1 Tab by mouth daily. aspirin 81 mg tablet Take 81 mg by mouth daily. avanafil 200 mg Tab tablet Commonly known as:  ZXQAQYR Take 1 Tab by mouth as needed for Other. Indications: Erectile Dysfunction  
  
 benazepril 40 mg tablet Commonly known as:  LOTENSIN  
1 tablet daily (stop Lotrel) CARAFATE 100 mg/mL suspension Generic drug:  sucralfate Take  by mouth four (4) times daily. chlorthalidone 25 mg tablet Commonly known as:  Jonita Ruder Take 25 mg by mouth daily. cloNIDine 0.2 mg/24 hr patch Commonly known as:  CATAPRES Apply weekly  
  
 colchicine 0.6 mg tablet Take 0.6 mg by mouth daily. furosemide 20 mg tablet Commonly known as:  LASIX  
2 tablets daily  
  
 gabapentin 300 mg capsule Commonly known as:  NEURONTIN Take 300 mg by mouth three (3) times daily. hydrALAZINE 100 mg tablet Commonly known as:  APRESOLINE  
1 tablet twice per day (note:  New strength)  
  
 metoprolol succinate 50 mg XL tablet Commonly known as:  TOPROL-XL  
TAKE 1 TABLET BY MOUTH EVERY MORNING (STOP LABETALOL AND INDAPAMIDE) omeprazole 40 mg capsule Commonly known as:  PRILOSEC 40 mg. Patient Instructions Health Maintenance Due Topic Date Due  
 Hepatitis C Test  1955  Eye Exam  01/08/1967  
 DTaP/Tdap/Td  (1 - Tdap) 01/08/1978  Pneumococcal Vaccine (2 of 3 - PCV13) 02/03/2012  Hemoglobin A1C    12/29/2016  Shingles Vaccine  01/08/2017 Please provide this summary of care documentation to your next provider. Your primary care clinician is listed as Lelo Patiño. If you have any questions after today's visit, please call [phone].

## 2017-05-19 NOTE — PROGRESS NOTES
Cintia Chavira is a 63 y.o. male presenting today for Hospital Follow Up  . HPI:  Cintia Chavira presents to the office today for hospital follow-up. Patient was admitted to [facility] for Gastrointestinal bleed while taking Xarelto for a pulmonary embolism. Patient has a EGD which noted gastritis. His Xarelto was stopped and he was given Carafate. He presents to the office today feeling much better. He is negative for hematochezia today. He has had two days of soft stools. Review of Systems   Constitutional: Negative for fever. Respiratory: Negative for cough and hemoptysis. Cardiovascular: Positive for leg swelling (lymphedema). Gastrointestinal: Positive for diarrhea. Negative for abdominal pain, blood in stool, constipation, melena, nausea and vomiting. Genitourinary: Negative for dysuria and urgency. Musculoskeletal: Negative for myalgias. Neurological: Negative for headaches. Allergies   Allergen Reactions    Iodine Other (comments)     Eyes swell shut      Shellfish Containing Products Swelling       Current Outpatient Prescriptions   Medication Sig Dispense Refill    omeprazole (PRILOSEC) 40 mg capsule 40 mg.      sucralfate (CARAFATE) 100 mg/mL suspension Take  by mouth four (4) times daily.  colchicine 0.6 mg tablet Take 0.6 mg by mouth daily.  chlorthalidone (HYGROTEN) 25 mg tablet Take 25 mg by mouth daily.  metoprolol succinate (TOPROL-XL) 50 mg XL tablet TAKE 1 TABLET BY MOUTH EVERY MORNING (STOP LABETALOL AND INDAPAMIDE) 30 Tab 3    gabapentin (NEURONTIN) 300 mg capsule Take 300 mg by mouth three (3) times daily.       furosemide (LASIX) 20 mg tablet 2 tablets daily 60 Tab 3    benazepril (LOTENSIN) 40 mg tablet 1 tablet daily (stop Lotrel) 30 Tab 3    hydrALAZINE (APRESOLINE) 100 mg tablet 1 tablet twice per day (note:  New strength) 60 Tab 2    cloNIDine (CATAPRES) 0.2 mg/24 hr patch Apply weekly 12 Patch 3    allopurinol (ZYLOPRIM) 100 mg tablet Take 1 Tab by mouth daily. 90 Tab 3    avanafil (STENDRA) 200 mg tab tablet Take 1 Tab by mouth as needed for Other. Indications: Erectile Dysfunction 6 Tab 6    aspirin 81 mg tablet Take 81 mg by mouth daily. Past Medical History:   Diagnosis Date    BPH without obstruction/lower urinary tract symptoms     Bursitis of right shoulder     CAD (coronary artery disease)     Chest pain     history of hospitalization with chest pain and a negative workup in 2008    Chronic edema     Constipation     die to medication    Coronary artery disease     mild, non obstructive/EF 65%    Dyspnea on exertion     Echocardiogram 12/16/08    IVC dilated; suboptimal endocardial border; EF 65%    ED (erectile dysfunction)     Elevated PSA     Frequency     GERD (gastroesophageal reflux disease)     Gout     H/O cystoscopy 06/20/2013    Hematuria, unspecified     Hypercholesterolemia     Hypertension     Hypertension      Hypogonadism male     Impotence of organic origin     Left ventricular diastolic dysfunction     Malignant neoplasm of prostate (HCC)     hx of t1a, izzy 6, 5 % of 1  core    Morbid obesity ([facility] [address]     Myocardial perfusion 09/05/08    Basal inferior defect c/w artifact; EF 63%    Overactive bladder     S/P cardiac cath 07/30/10    oD2-40%; pRCA-20-30%; EF 65%    Sleep apnea     Slowing of urinary stream     Type II or unspecified type diabetes mellitus without mention of complication, not stated as uncontrolled        Past Surgical History:   Procedure Laterality Date    HX HEART CATHETERIZATION  7/30/10    HX OTHER SURGICAL  06/27/13    Prostate    HX TONSILLECTOMY      INCISION/DRAIN ABSCESS EXTRA      OK COLONOSCOPY FLX DX W/COLLJ SPEC WHEN PFRMD         Social History     Social History    Marital status:      Spouse name: N/A    Number of children: N/A    Years of education: N/A     Occupational History    Not on file.      Social History Main Topics    Smoking status: Former Smoker     Types: Cigars     Quit date: 10/4/1999    Smokeless tobacco: Never Used    Alcohol use No      Comment: socially    Drug use: No    Sexual activity: Not Currently     Other Topics Concern    Not on file     Social History Narrative       Patient does not have an advanced directive on file    Vitals:    05/19/17 1104   BP: 148/88   Pulse: 64   Resp: 16   Temp: 97.9 °F (36.6 °C)   TempSrc: Tympanic   SpO2: 96%   Weight: 305 lb (138.3 kg)   Height: 5' 11\" (1.803 m)   PainSc:   0 - No pain       Physical Exam   Constitutional: No distress. Cardiovascular: Normal rate, regular rhythm and normal heart sounds. No murmur heard. Pulmonary/Chest: Effort normal and breath sounds normal.   Abdominal: Soft. Musculoskeletal: He exhibits edema. Lymphadenopathy:     He has no cervical adenopathy. Nursing note and vitals reviewed. Orders Only on 03/27/2017   Component Date Value Ref Range Status    Glucose 03/27/2017 111* 65 - 99 mg/dL Final    BUN 03/27/2017 15  6 - 22 mg/dL Final    Creatinine 03/27/2017 1.2  0.8 - 1.6 mg/dL Final    Sodium 03/27/2017 147* 133 - 145 mmol/L Final    Potassium 03/27/2017 3.8  3.5 - 5.5 mmol/L Final    Chloride 03/27/2017 104  98 - 110 mmol/L Final    CO2 03/27/2017 28  20 - 32 mmol/L Final    AST (SGOT) 03/27/2017 14  10 - 37 U/L Final    ALT (SGPT) 03/27/2017 16  5 - 40 U/L Final    Alk.  phosphatase 03/27/2017 67  40 - 125 U/L Final    Bilirubin, total 03/27/2017 0.2  0.2 - 1.2 mg/dL Final    Calcium 03/27/2017 9.3  8.4 - 10.4 mg/dL Final    Protein, total 03/27/2017 6.3  6.2 - 8.1 g/dL Final    Albumin 03/27/2017 3.9  3.5 - 5.0 g/dL Final    A-G Ratio 03/27/2017 1.6  1.1 - 2.6 ratio Final    Globulin 03/27/2017 2.4  2.0 - 4.0 g/dL Final    Anion gap 03/27/2017 15.0  mmol/L Final    Comment: Test includes Albumin, Alkaline Phosphatase, ALT, AST, BUN, Calcium, CO2,  Chloride, Creatinine, Glucose, Potassium, Sodium, Total Bilirubin and Total  Protein. Estimated GFR results are reported in mL/min/1.73 sq.m. by the MDRD equation. This eGFR is validated for stable chronic renal failure patients. This   equation  is unreliable in acute illness or patients with normal renal function.  GFRAA 03/27/2017 >60.0  >60.0 Final    GFRNA 03/27/2017 59.5* >60.0 Final    WBC 03/27/2017 5.4  4.0 - 11.0 K/uL Final    RBC 03/27/2017 4.67  3.80 - 5.80 M/uL Final    HGB 03/27/2017 13.2  13.1 - 17.2 g/dL Final    HCT 03/27/2017 42.3  39.3 - 51.6 % Final    MCV 03/27/2017 91  80 - 95 fL Final    MCH 03/27/2017 28  26 - 34 pg Final    MCHC 03/27/2017 31* 32 - 36 g/dL Final    RDW 03/27/2017 15.3  10.0 - 16.0 % Final    PLATELET 67/88/5105 350  140 - 440 K/uL Final    MPV 03/27/2017 10.3  6.0 - 10.8 fL Final    NEUTROPHILS 03/27/2017 53  40 - 75 % Final    Lymphocytes 03/27/2017 36  27 - 45 % Final    MONOCYTES 03/27/2017 7  3 - 9 % Final    EOSINOPHILS 03/27/2017 3  0 - 6 % Final    BASOPHILS 03/27/2017 0  0 - 2 % Final    ABS. NEUTROPHILS 03/27/2017 2.9  1.8 - 7.7 K/uL Final    ABSOLUTE LYMPHOCYTE COUNT 03/27/2017 2.0  1.0 - 4.8 K/uL Final    ABS. MONOCYTES 03/27/2017 0.4  0.1 - 0.9 K/uL Final    ABS. EOSINOPHILS 03/27/2017 0.1  0.0 - 0.5 K/uL Final    ABS. BASOPHILS 03/27/2017 0.0  0.0 - 0.2 K/uL Final    TSH 03/27/2017 1.51  0.27 - 4.20 mcU/mL Final       .No results found for any visits on 05/19/17. Assessment / Plan:      ICD-10-CM ICD-9-CM    1. H/O: upper GI bleed Z87.19 V12.79    2. Essential hypertension I10 401.9    3. Chronic edema R60.9 782.3          Labs in 2 months   CMP  CBC  Patient scheduled to go to Lymphedema clinic  B/p slightly above target today  F/u in 2 months      Follow-up Disposition:  Return in about 2 months (around 7/19/2017). I asked the patient if he  had any questions and answered his  questions.   The patient stated that he understands the treatment plan and agrees with the treatment plan

## 2017-05-19 NOTE — PATIENT INSTRUCTIONS
Health Maintenance Due   Topic Date Due   Luna Coleman Test  1955    Eye Exam  01/08/1967    DTaP/Tdap/Td  (1 - Tdap) 01/08/1978    Pneumococcal Vaccine (2 of 3 - PCV13) 02/03/2012    Hemoglobin A1C    12/29/2016    Shingles Vaccine  01/08/2017

## 2017-05-25 ENCOUNTER — OFFICE VISIT (OUTPATIENT)
Dept: VASCULAR SURGERY | Age: 62
End: 2017-05-25

## 2017-05-25 VITALS
RESPIRATION RATE: 17 BRPM | BODY MASS INDEX: 42.7 KG/M2 | SYSTOLIC BLOOD PRESSURE: 150 MMHG | WEIGHT: 305 LBS | DIASTOLIC BLOOD PRESSURE: 110 MMHG | HEART RATE: 71 BPM | HEIGHT: 71 IN

## 2017-05-25 DIAGNOSIS — I89.0 LYMPHEDEMA OF BOTH LOWER EXTREMITIES: Primary | ICD-10-CM

## 2017-05-25 NOTE — PROGRESS NOTES
The following are bilateral leg measurements from this visit:    Right:  Inseam: 76.4 cm  Ankle: 36.1 cm  Calf: 51.7 cm  Knee: 53.0 cm  Thigh: 65.5 cm    Left:  Inseam: 76.0 cm  Ankle: 35.9 cm  Calf: 49.3 cm  Knee: 51.8 cm  Thigh: 71.5 cm

## 2017-05-26 NOTE — PROGRESS NOTES
Mr Greg Dillon is here for a one-month follow-up for his lower extremity edema. When we first saw him we initiated Unna boots with transition to compression sleeves. We have done PVL studies which were also negative for DVT. There was appreciable findings consistent with lymphedema so we did also initiate evaluation for lymphedema pump, which is also currently in process and appears encouraging that he will be able to obtain it. He does still remain with some edema, but overall feels that it is improved since he first came to us. Since he was here last though he tells me of hospitalization at [facility].  He had presented with some chest pain, and a V/Q scan had indicated intermediate probability of PE. A repeat PVL study was done there also, and also negative. He was started on anticoagulation therapy, but then later readmitted with GI bleed symptoms, and anticoagulation stopped. There was some suggestion that some of his chest pain could be GI related from reflux. He is on some new medication for management of that. He was also seen by cardiology and seen in outpatient follow-up last week, and is now on a 3 month follow-up routine. He does have nitro to take if he has recurrent chest pain. There is also some notable chronic kidney disease, but that is being managed medically as well. He is on some short-term disability from work related to this recent hospitalization, but is optimistic he will be able to return back. His legs appear pretty well today. Skin is intact without ulcers no pigmentation no cellulitis. Edema is about 1-2+. But this is improved from his initial presentation. For now he will continue with his compression stocking sleeves, leg elevation, and range of motion exercises. He will soon hopefully have a lymphedema pump. I reminded him that daily use is an additional role for helping him control his leg edema.   So as long as he has the tools for management, we would just simply recommend seeing him back as needed. I did explain that some patients with history of swelling can often have flareups in spite of their use of conservative therapies. If that should happen with him, to let us know we will gladly reevaluate him and reconsider Unna boot therapy short-term.

## 2017-06-06 ENCOUNTER — OFFICE VISIT (OUTPATIENT)
Dept: INTERNAL MEDICINE CLINIC | Age: 62
End: 2017-06-06

## 2017-06-06 VITALS
HEIGHT: 71 IN | TEMPERATURE: 97.9 F | OXYGEN SATURATION: 98 % | DIASTOLIC BLOOD PRESSURE: 100 MMHG | BODY MASS INDEX: 42.7 KG/M2 | WEIGHT: 305 LBS | HEART RATE: 70 BPM | SYSTOLIC BLOOD PRESSURE: 154 MMHG | RESPIRATION RATE: 16 BRPM

## 2017-06-06 DIAGNOSIS — I10 ESSENTIAL HYPERTENSION: ICD-10-CM

## 2017-06-06 DIAGNOSIS — R07.9 CHEST PAIN, UNSPECIFIED TYPE: Primary | ICD-10-CM

## 2017-06-06 DIAGNOSIS — R22.0 LOCALIZED SWELLING, MASS, AND LUMP OF HEAD: ICD-10-CM

## 2017-06-06 RX ORDER — LABETALOL 200 MG/1
TABLET, FILM COATED ORAL
Qty: 60 TAB | Refills: 3 | Status: SHIPPED | OUTPATIENT
Start: 2017-06-06 | End: 2017-10-09 | Stop reason: SDUPTHER

## 2017-06-06 RX ORDER — SUCRALFATE 1 G/10ML
SUSPENSION ORAL
Qty: 414 ML | Refills: 3 | Status: SHIPPED | OUTPATIENT
Start: 2017-06-06 | End: 2018-09-01

## 2017-06-06 NOTE — PATIENT INSTRUCTIONS
Health Maintenance Due   Topic Date Due    Hepatitis C Test  1955    Eye Exam  01/08/1967    DTaP/Tdap/Td  (1 - Tdap) 01/08/1978    Pneumococcal Vaccine (2 of 3 - PCV13) 02/03/2012    Hemoglobin A1C    12/29/2016    Shingles Vaccine  01/08/2017    Albumin Urine Test  06/29/2017

## 2017-06-06 NOTE — PROGRESS NOTES
The patient presents to the office today with the chief complaint of headache    HPI    The patient is status post a recent recent evaluation for chest pain. The patient was told that his EKG is abnormal but his the cardiac work up was negative. The patient continues with headache. His BP has been up. The persists with a prominence of his forehead which is becoming more pronounced. Review of Systems   Respiratory: Negative for shortness of breath. Cardiovascular: Negative for chest pain and leg swelling. Neurological: Positive for headaches. Allergies   Allergen Reactions    Iodine Other (comments)     Eyes swell shut      Shellfish Containing Products Swelling       Current Outpatient Prescriptions   Medication Sig Dispense Refill    labetalol (NORMODYNE) 200 mg tablet 1 tablet twice per day (Stop Metoprolol) 60 Tab 3    sucralfate (CARAFATE) 100 mg/mL suspension 2 teaspoons three times per day 414 mL 3    omeprazole (PRILOSEC) 40 mg capsule 40 mg.      colchicine 0.6 mg tablet Take 0.6 mg by mouth daily.  gabapentin (NEURONTIN) 300 mg capsule Take 300 mg by mouth three (3) times daily.  furosemide (LASIX) 20 mg tablet 2 tablets daily 60 Tab 3    benazepril (LOTENSIN) 40 mg tablet 1 tablet daily (stop Lotrel) 30 Tab 3    hydrALAZINE (APRESOLINE) 100 mg tablet 1 tablet twice per day (note:  New strength) 60 Tab 2    avanafil (STENDRA) 200 mg tab tablet Take 1 Tab by mouth as needed for Other. Indications: Erectile Dysfunction 6 Tab 6    allopurinol (ZYLOPRIM) 100 mg tablet Take 1 Tab by mouth daily. 90 Tab 3    aspirin 81 mg tablet Take 81 mg by mouth daily.       cloNIDine (CATAPRES) 0.2 mg/24 hr patch Apply weekly 12 Patch 3       Past Medical History:   Diagnosis Date    BPH without obstruction/lower urinary tract symptoms     Bursitis of right shoulder     CAD (coronary artery disease)     Chest pain     history of hospitalization with chest pain and a negative workup in 2008    Chronic edema     Constipation     die to medication    Coronary artery disease     mild, non obstructive/EF 65%    Dyspnea on exertion     Echocardiogram 12/16/08    IVC dilated; suboptimal endocardial border; EF 65%    ED (erectile dysfunction)     Elevated PSA     Frequency     GERD (gastroesophageal reflux disease)     Gout     H/O cystoscopy 06/20/2013    Hematuria, unspecified     Hypercholesterolemia     Hypertension     Hypertension      Hypogonadism male     Impotence of organic origin     Left ventricular diastolic dysfunction     Malignant neoplasm of prostate (HCC)     hx of t1a, izzy 6, 5 % of 1  core    Morbid obesity ([facility] [address]     Myocardial perfusion 09/05/08    Basal inferior defect c/w artifact; EF 63%    Overactive bladder     S/P cardiac cath 07/30/10    oD2-40%; pRCA-20-30%; EF 65%    Sleep apnea     Slowing of urinary stream     Type II or unspecified type diabetes mellitus without mention of complication, not stated as uncontrolled        Past Surgical History:   Procedure Laterality Date    HX HEART CATHETERIZATION  7/30/10    HX OTHER SURGICAL  06/27/13    Prostate    HX TONSILLECTOMY      INCISION/DRAIN ABSCESS EXTRA      MI COLONOSCOPY FLX DX W/COLLJ SPEC WHEN PFRMD         Social History     Social History    Marital status: SINGLE     Spouse name: N/A    Number of children: N/A    Years of education: N/A     Occupational History    Not on file.      Social History Main Topics    Smoking status: Former Smoker     Types: Cigars     Quit date: 10/4/1999    Smokeless tobacco: Never Used    Alcohol use No      Comment: socially    Drug use: No    Sexual activity: Not Currently     Other Topics Concern    Not on file     Social History Narrative       Patient does not have an advanced directive on file    Visit Vitals    BP (!) 154/100 (BP 1 Location: Left arm, BP Patient Position: Sitting)    Pulse 70    Temp 97.9 °F (36.6 °C) (Tympanic)    Resp 16    Ht 5' 11\" (1.803 m)    Wt 305 lb (138.3 kg)    SpO2 98%    BMI 42.54 kg/m2       Physical Exam   Neck: No thyromegaly present. Cardiovascular: Normal rate and regular rhythm. Exam reveals no gallop. No murmur heard. Pulmonary/Chest: He has no wheezes. He has no rales. Abdominal: Soft. Bowel sounds are normal. He exhibits no distension and no mass. There is no tenderness. Musculoskeletal: He exhibits no edema. Lymphadenopathy:     He has no cervical adenopathy. Skin:           No Cervical Lymphadenopathy  No Supraclavicular Lymphadenopathy  Thyroid is Normal  Lungs are clear to ausculation and percussion  Heart:  S1 S2 are normal, S4 is present. No S3, No mummers  No Carotid Bruits  Abdomen:  Normal Bowel Sounds. No tenderness. No masses. No Hepatomegaly or Splenomegly  LE:  Strong Pedal Pulses. No Edema      BMI:  I have reviewed/discussed the above normal BMI with the patient. I have recommended the following interventions: dietary management education, guidance, and counseling . Marilyn Cain Orders Only on 03/27/2017   Component Date Value Ref Range Status    Glucose 03/27/2017 111* 65 - 99 mg/dL Final    BUN 03/27/2017 15  6 - 22 mg/dL Final    Creatinine 03/27/2017 1.2  0.8 - 1.6 mg/dL Final    Sodium 03/27/2017 147* 133 - 145 mmol/L Final    Potassium 03/27/2017 3.8  3.5 - 5.5 mmol/L Final    Chloride 03/27/2017 104  98 - 110 mmol/L Final    CO2 03/27/2017 28  20 - 32 mmol/L Final    AST (SGOT) 03/27/2017 14  10 - 37 U/L Final    ALT (SGPT) 03/27/2017 16  5 - 40 U/L Final    Alk.  phosphatase 03/27/2017 67  40 - 125 U/L Final    Bilirubin, total 03/27/2017 0.2  0.2 - 1.2 mg/dL Final    Calcium 03/27/2017 9.3  8.4 - 10.4 mg/dL Final    Protein, total 03/27/2017 6.3  6.2 - 8.1 g/dL Final    Albumin 03/27/2017 3.9  3.5 - 5.0 g/dL Final    A-G Ratio 03/27/2017 1.6  1.1 - 2.6 ratio Final    Globulin 03/27/2017 2.4  2.0 - 4.0 g/dL Final    Anion gap 03/27/2017 15.0  mmol/L Final    Comment: Test includes Albumin, Alkaline Phosphatase, ALT, AST, BUN, Calcium, CO2,  Chloride, Creatinine, Glucose, Potassium, Sodium, Total Bilirubin and Total  Protein. Estimated GFR results are reported in mL/min/1.73 sq.m. by the MDRD equation. This eGFR is validated for stable chronic renal failure patients. This   equation  is unreliable in acute illness or patients with normal renal function.  GFRAA 03/27/2017 >60.0  >60.0 Final    GFRNA 03/27/2017 59.5* >60.0 Final    WBC 03/27/2017 5.4  4.0 - 11.0 K/uL Final    RBC 03/27/2017 4.67  3.80 - 5.80 M/uL Final    HGB 03/27/2017 13.2  13.1 - 17.2 g/dL Final    HCT 03/27/2017 42.3  39.3 - 51.6 % Final    MCV 03/27/2017 91  80 - 95 fL Final    MCH 03/27/2017 28  26 - 34 pg Final    MCHC 03/27/2017 31* 32 - 36 g/dL Final    RDW 03/27/2017 15.3  10.0 - 16.0 % Final    PLATELET 90/89/6957 754  140 - 440 K/uL Final    MPV 03/27/2017 10.3  6.0 - 10.8 fL Final    NEUTROPHILS 03/27/2017 53  40 - 75 % Final    Lymphocytes 03/27/2017 36  27 - 45 % Final    MONOCYTES 03/27/2017 7  3 - 9 % Final    EOSINOPHILS 03/27/2017 3  0 - 6 % Final    BASOPHILS 03/27/2017 0  0 - 2 % Final    ABS. NEUTROPHILS 03/27/2017 2.9  1.8 - 7.7 K/uL Final    ABSOLUTE LYMPHOCYTE COUNT 03/27/2017 2.0  1.0 - 4.8 K/uL Final    ABS. MONOCYTES 03/27/2017 0.4  0.1 - 0.9 K/uL Final    ABS. EOSINOPHILS 03/27/2017 0.1  0.0 - 0.5 K/uL Final    ABS. BASOPHILS 03/27/2017 0.0  0.0 - 0.2 K/uL Final    TSH 03/27/2017 1.51  0.27 - 4.20 mcU/mL Final       .  Results for orders placed or performed in visit on 06/06/17   AMB POC EKG ROUTINE W/ 12 LEADS, INTER & REP    Impression    Left Ventricular Hypertrophy with associated ST wave abnormalities       Assessment / Plan      ICD-10-CM ICD-9-CM    1. Chest pain, unspecified type R07.9 786.50 AMB POC EKG ROUTINE W/ 12 LEADS, INTER & REP   2. Localized swelling, mass, and lump of head R22.0 784. 2 XR SKULL MIN 4 V(COMP)   3. Essential hypertension I10 401.9      Change Metoprolol to Labetalol  he was advised to continue his  Other maintenance medications  Xray skull    Follow-up Disposition:  Return in about 3 months (around 9/6/2017). I asked Mitra Dumont if he has any questions and I answered the questions.   Mitra Dumont states that he understands the treatment plan and agrees with the treatment plan

## 2017-06-06 NOTE — MR AVS SNAPSHOT
Visit Information Date & Time Provider Department Dept. Phone Encounter #  
 6/6/2017  8:00 AM Paul Stafford MD [facility] INTERNAL MEDICINE OF [place] [phone] [id number] Your Appointments 7/20/2017  2:15 PM  
Follow Up with Naeem Jeff NP  
[facility] INTERNAL MEDICINE OF [place] ([address]) Appt Note: 2 mos [address] [place] [address]  
  
   
 [address] [place] [address]  
  
    
 8/1/2017  9:50 AM  
Nurse Visit with [facility] WB NURSE [facility] ([facility]) Appt Note: bw  
 [address] 
[address] [place] [address]  
[address] [facility] [address] [place] [address]  
  
    
  
 8/7/2017  9:30 AM  
Any with Art Howard MD  
[facility] ([facility]) Appt Note: 6 mon w/ psa prior [address] [place] [address]  
[address] [facility] [address] [place] [address] Upcoming Health Maintenance Date Due Hepatitis C Screening 1955 EYE EXAM RETINAL OR DILATED Q1 1/8/1967 DTaP/Tdap/Td series (1 - Tdap) 1/8/1978 Pneumococcal 19-64 Highest Risk (2 of 3 - PCV13) 2/3/2012 HEMOGLOBIN A1C Q6M 12/29/2016 ZOSTER VACCINE AGE 60> 1/8/2017 MICROALBUMIN Q1 6/29/2017 INFLUENZA AGE 9 TO ADULT 8/1/2017 FOOT EXAM Q1 9/7/2017 FOBT Q 1 YEAR AGE 50-75 9/29/2017 LIPID PANEL Q1 5/10/2018 Allergies as of 6/6/2017  Review Complete On: 6/6/2017 By: Paul Stafford MD  
  
 Severity Noted Reaction Type Reactions Iodine  04/12/2011    Other (comments) Eyes swell shut Shellfish Containing Products  04/12/2011    Swelling Current Immunizations  Never Reviewed Name Date Influenza Vaccine (Quad) PF 10/20/2016 Influenza Vaccine PF 10/2/2015 Pneumococcal Polysaccharide (PPSV-23) 2/3/2011 Not reviewed this visit You Were Diagnosed With   
  
 Codes Comments Chest pain, unspecified type    -  Primary ICD-10-CM: R07.9 ICD-9-CM: 786.50 Localized swelling, mass, and lump of head     ICD-10-CM: R22.0 ICD-9-CM: 689. 2 Vitals BP Pulse Temp Resp Height(growth percentile) (!) 154/100 (BP 1 Location: Left arm, BP Patient Position: Sitting) 70 97.9 °F (36.6 °C) (Tympanic) 16 5' 11\" (1.803 m) Weight(growth percentile) SpO2 BMI Smoking Status 305 lb (138.3 kg) 98% 42.54 kg/m2 Former Smoker BMI and BSA Data Body Mass Index Body Surface Area 42.54 kg/m 2 2.63 m 2 Preferred Pharmacy Pharmacy Name Phone [id number], [address] [place] [address] Your Updated Medication List  
  
   
This list is accurate as of: 6/6/17  9:03 AM.  Always use your most recent med list.  
  
  
  
  
 allopurinol 100 mg tablet Commonly known as:  Jake Floras Take 1 Tab by mouth daily. aspirin 81 mg tablet Take 81 mg by mouth daily. avanafil 200 mg Tab tablet Commonly known as:  WCJNWJB Take 1 Tab by mouth as needed for Other. Indications: Erectile Dysfunction  
  
 benazepril 40 mg tablet Commonly known as:  LOTENSIN  
1 tablet daily (stop Lotrel)  
  
 cloNIDine 0.2 mg/24 hr patch Commonly known as:  CATAPRES Apply weekly  
  
 colchicine 0.6 mg tablet Take 0.6 mg by mouth daily. furosemide 20 mg tablet Commonly known as:  LASIX  
2 tablets daily  
  
 gabapentin 300 mg capsule Commonly known as:  NEURONTIN Take 300 mg by mouth three (3) times daily. hydrALAZINE 100 mg tablet Commonly known as:  APRESOLINE  
1 tablet twice per day (note:  New strength)  
  
 labetalol 200 mg tablet Commonly known as:  NORMODYNE  
1 tablet twice per day (Stop Metoprolol) omeprazole 40 mg capsule Commonly known as:  PRILOSEC 40 mg.  
  
 sucralfate 100 mg/mL suspension Commonly known as:  Winfield Ke  
 2 teaspoons three times per day Prescriptions Sent to Pharmacy Refills  
 labetalol (NORMODYNE) 200 mg tablet 3 Si tablet twice per day (Stop Metoprolol) Class: Normal  
 Pharmacy: [id number]- [address] Ph #: [phone]  
 sucralfate (CARAFATE) 100 mg/mL suspension 3 Si teaspoons three times per day Class: Normal  
 Pharmacy: [id number]- [place] , [address] [place] [address],[place] [address] Ph #: [phone] We Performed the Following AMB POC EKG ROUTINE  LEADS, INTER & REP [05282 CPT(R)] To-Do List   
 2017 Imaging:  XR SKULL MIN 4 V(COMP) Patient Instructions Health Maintenance Due Topic Date Due  
 Hepatitis C Test  1955  Eye Exam  1967  
 DTaP/Tdap/Td  (1 - Tdap) 1978  Pneumococcal Vaccine (2 of 3 - PCV13) 2012  Hemoglobin A1C    2016  Shingles Vaccine  2017  Albumin Urine Test  2017 Please provide this summary of care documentation to your next provider. Your primary care clinician is listed as Cait San. If you have any questions after today's visit, please call [phone].

## 2017-06-06 NOTE — PROGRESS NOTES
1. Have you been to the ER, urgent care clinic since your last visit? Hospitalized since your last visit? No    2. Have you seen or consulted any other health care providers outside of the [address] [place] [address] since your last visit? Include any pap smears or colon screening.  No

## 2017-06-12 ENCOUNTER — HOSPITAL ENCOUNTER (OUTPATIENT)
Dept: GENERAL RADIOLOGY | Age: 62
Discharge: HOME | End: 2017-06-12
Payer: COMMERCIAL

## 2017-06-12 DIAGNOSIS — R22.0 LOCALIZED SWELLING, MASS, AND LUMP OF HEAD: ICD-10-CM

## 2017-06-12 PROCEDURE — 70260 X-RAY EXAM OF SKULL: CPT

## 2017-06-14 ENCOUNTER — OFFICE VISIT (OUTPATIENT)
Dept: INTERNAL MEDICINE CLINIC | Age: 62
End: 2017-06-14

## 2017-06-14 VITALS
WEIGHT: 307.4 LBS | HEIGHT: 71 IN | SYSTOLIC BLOOD PRESSURE: 198 MMHG | TEMPERATURE: 98.8 F | HEART RATE: 75 BPM | BODY MASS INDEX: 43.03 KG/M2 | OXYGEN SATURATION: 97 % | RESPIRATION RATE: 16 BRPM | DIASTOLIC BLOOD PRESSURE: 112 MMHG

## 2017-06-14 DIAGNOSIS — I10 ESSENTIAL HYPERTENSION: Primary | ICD-10-CM

## 2017-06-14 RX ORDER — POTASSIUM CHLORIDE 750 MG/1
10 TABLET, EXTENDED RELEASE ORAL DAILY
Qty: 30 TAB | Refills: 2 | Status: SHIPPED | OUTPATIENT
Start: 2017-06-14 | End: 2017-09-11 | Stop reason: SDUPTHER

## 2017-06-14 RX ORDER — HYDROCHLOROTHIAZIDE 12.5 MG/1
12.5 TABLET ORAL DAILY
Qty: 30 TAB | Refills: 2 | Status: SHIPPED | OUTPATIENT
Start: 2017-06-14 | End: 2017-09-11 | Stop reason: SDUPTHER

## 2017-06-14 RX ORDER — HYDROCHLOROTHIAZIDE 25 MG/1
25 TABLET ORAL DAILY
Qty: 30 TAB | Refills: 1 | Status: CANCELLED | OUTPATIENT
Start: 2017-06-14

## 2017-06-14 RX ORDER — AMLODIPINE BESYLATE 5 MG/1
5 TABLET ORAL DAILY
Qty: 30 TAB | Refills: 2 | Status: SHIPPED | OUTPATIENT
Start: 2017-06-14 | End: 2017-07-03

## 2017-06-14 NOTE — MR AVS SNAPSHOT
Visit Information Date & Time Provider Department Dept. Phone Encounter #  
 6/14/2017 12:00 PM Althea Adams NP [facility] INTERNAL MEDICINE OF [address] [id number] Follow-up Instructions Return in about 2 days (around 6/16/2017). Your Appointments 6/16/2017 10:00 AM  
Follow Up with Althea Adams NP  
[facility] INTERNAL MEDICINE OF [place] ([facility]) Appt Note: 2 day f/u  
 [address] [place] [address]  
  
   
 [address] [place] [address]  
  
    
 6/20/2017  8:15 AM  
Follow Up with Sohail Prieto MD  
[facility] INTERNAL MEDICINE OF [facility]) Appt Note: 2 wk f/u  
 [address] [place] [address]  
[phone]  
  
   
 [address] [facility] [address]  
  
    
 7/20/2017  2:15 PM  
Follow Up with Althea Adams NP  
[facility] INTERNAL MEDICINE OF [place] ([facility]) Appt Note: 2 mos [address] [place] [address]  
[phone]  
  
    
 8/1/2017  9:50 AM  
Nurse Visit with [facility] WB NURSE [facility] ([facility]) Appt Note: bw  
 [address] 
[address] [place] [address]  
[address] [facility] [address] [facility] [address]  
  
    
  
 8/7/2017  9:30 AM  
Any with Teena Veloz MD  
[facility] ([facility]) Appt Note: 6 mon w/ psa prior [address] [place] [address]  
[address] [facility] [address] [facility] [address] Upcoming Health Maintenance Date Due Hepatitis C Screening 1955 EYE EXAM RETINAL OR DILATED Q1 1/8/1967 DTaP/Tdap/Td series (1 - Tdap) 1/8/1978 Pneumococcal 19-64 Highest Risk (2 of 3 - PCV13) 2/3/2012 HEMOGLOBIN A1C Q6M 12/29/2016 ZOSTER VACCINE AGE 60> 1/8/2017 MICROALBUMIN Q1 6/29/2017 INFLUENZA AGE 9 TO ADULT 8/1/2017 FOOT EXAM Q1 9/7/2017 FOBT Q 1 YEAR AGE 50-75 9/29/2017 LIPID PANEL Q1 5/10/2018 Allergies as of 6/14/2017  Review Complete On: 6/14/2017 By: Yaritza Nolan LPN Severity Noted Reaction Type Reactions Iodine  04/12/2011    Other (comments) Eyes swell shut Shellfish Containing Products  04/12/2011    Swelling Current Immunizations  Never Reviewed Name Date Influenza Vaccine (Quad) PF 10/20/2016 Influenza Vaccine PF 10/2/2015 Pneumococcal Polysaccharide (PPSV-23) 2/3/2011 Not reviewed this visit You Were Diagnosed With   
  
 Codes Comments Essential hypertension    -  Primary ICD-10-CM: I10 
ICD-9-CM: 401.9 Vitals BP Pulse Temp Resp Height(growth percentile) (!) 198/112 (BP 1 Location: Left arm, BP Patient Position: Sitting) 75 98.8 °F (37.1 °C) (Tympanic) 16 5' 11\" (1.803 m) Weight(growth percentile) SpO2 BMI Smoking Status 307 lb 6.4 oz (139.4 kg) 97% 42.87 kg/m2 Former Smoker BMI and BSA Data Body Mass Index Body Surface Area  
 42.87 kg/m 2 2.64 m 2 Preferred Pharmacy Pharmacy Name Phone [id number] [address] Your Updated Medication List  
  
   
This list is accurate as of: 6/14/17  1:26 PM.  Always use your most recent med list.  
  
  
  
  
 allopurinol 100 mg tablet Commonly known as:  True Leash Take 1 Tab by mouth daily. amLODIPine 5 mg tablet Commonly known as:  Sameul Francisco Javier Take 1 Tab by mouth daily. aspirin 81 mg tablet Take 81 mg by mouth daily. avanafil 200 mg Tab tablet Commonly known as:  QEJKEIQ Take 1 Tab by mouth as needed for Other. Indications: Erectile Dysfunction  
  
 benazepril 40 mg tablet Commonly known as:  LOTENSIN  
1 tablet daily (stop Lotrel)  
  
 cloNIDine 0.2 mg/24 hr patch Commonly known as:  CATAPRES Apply weekly  
  
 colchicine 0.6 mg tablet Take 0.6 mg by mouth daily. furosemide 20 mg tablet Commonly known as:  LASIX  
2 tablets daily  
  
 gabapentin 300 mg capsule Commonly known as:  NEURONTIN Take 300 mg by mouth three (3) times daily. hydrALAZINE 100 mg tablet Commonly known as:  APRESOLINE  
1 tablet twice per day (note:  New strength)  
  
 hydroCHLOROthiazide 12.5 mg tablet Commonly known as:  HYDRODIURIL Take 1 Tab by mouth daily. labetalol 200 mg tablet Commonly known as:  NORMODYNE  
1 tablet twice per day (Stop Metoprolol) potassium chloride 10 mEq tablet Commonly known as:  KLOR-CON Take 1 Tab by mouth daily. sucralfate 100 mg/mL suspension Commonly known as:  CARAFATE  
2 teaspoons three times per day Prescriptions Sent to Pharmacy Refills  
 amLODIPine (NORVASC) 5 mg tablet 2 Sig: Take 1 Tab by mouth daily. Class: Normal  
 Pharmacy: [facility][id number]- [address] Ph #: [phone] Route: Oral  
 potassium chloride (KLOR-CON) 10 mEq tablet 2 Sig: Take 1 Tab by mouth daily. Class: Normal  
 Pharmacy: [facility] #[id number]- [address] Ph #: [phone] Route: Oral  
 hydroCHLOROthiazide (HYDRODIURIL) 12.5 mg tablet 2 Sig: Take 1 Tab by mouth daily. Class: Normal  
 Pharmacy: [facility] #[id number]- [address] Ph #: [phone] Route: Oral  
  
Follow-up Instructions Return in about 2 days (around 6/16/2017). Please provide this summary of care documentation to your next provider. Your primary care clinician is listed as Elisha Broussard. If you have any questions after today's visit, please call [phone].

## 2017-06-14 NOTE — PROGRESS NOTES
1. Have you been to the ER, urgent care clinic since your last visit? Hospitalized since your last visit? No    2. Have you seen or consulted any other health care providers outside of the [address] [place] [address] since your last visit? Include any pap smears or colon screening.  Yes When: today Where: dr Sherral Hamman for visit: sleep follow up

## 2017-06-15 NOTE — PROGRESS NOTES
Lou Garnica is a 63 y.o. male presenting today for Hypertension  . HPI:  Lou Garnica presents to the office today for hypertension. Patient walked into the office this afternoon stating he was at a sleep study appointment today and his blood pressure was elevated. Patient noted he has had a slight headache over the last 2 days. His b/p today is 198/112. He is negative for chest pain, dyspnea or palpitations. Review of Systems   Constitutional: Negative for malaise/fatigue. Respiratory: Negative for cough and shortness of breath. Cardiovascular: Positive for leg swelling. Negative for palpitations and orthopnea. Neurological: Positive for headaches. Negative for dizziness. Allergies   Allergen Reactions    Iodine Other (comments)     Eyes swell shut      Shellfish Containing Products Swelling       Current Outpatient Prescriptions   Medication Sig Dispense Refill    amLODIPine (NORVASC) 5 mg tablet Take 1 Tab by mouth daily. 30 Tab 2    potassium chloride (KLOR-CON) 10 mEq tablet Take 1 Tab by mouth daily. 30 Tab 2    hydroCHLOROthiazide (HYDRODIURIL) 12.5 mg tablet Take 1 Tab by mouth daily. 30 Tab 2    labetalol (NORMODYNE) 200 mg tablet 1 tablet twice per day (Stop Metoprolol) 60 Tab 3    sucralfate (CARAFATE) 100 mg/mL suspension 2 teaspoons three times per day 414 mL 3    colchicine 0.6 mg tablet Take 0.6 mg by mouth daily.  gabapentin (NEURONTIN) 300 mg capsule Take 300 mg by mouth three (3) times daily.  furosemide (LASIX) 20 mg tablet 2 tablets daily 60 Tab 3    benazepril (LOTENSIN) 40 mg tablet 1 tablet daily (stop Lotrel) 30 Tab 3    hydrALAZINE (APRESOLINE) 100 mg tablet 1 tablet twice per day (note:  New strength) 60 Tab 2    avanafil (STENDRA) 200 mg tab tablet Take 1 Tab by mouth as needed for Other.  Indications: Erectile Dysfunction 6 Tab 6    cloNIDine (CATAPRES) 0.2 mg/24 hr patch Apply weekly 12 Patch 3    allopurinol (ZYLOPRIM) 100 mg tablet Take 1 Tab by mouth daily. 90 Tab 3    aspirin 81 mg tablet Take 81 mg by mouth daily. Past Medical History:   Diagnosis Date    BPH without obstruction/lower urinary tract symptoms     Bursitis of right shoulder     CAD (coronary artery disease)     Chest pain     history of hospitalization with chest pain and a negative workup in 2008    Chronic edema     Constipation     die to medication    Coronary artery disease     mild, non obstructive/EF 65%    Dyspnea on exertion     Echocardiogram 12/16/08    IVC dilated; suboptimal endocardial border; EF 65%    ED (erectile dysfunction)     Elevated PSA     Frequency     GERD (gastroesophageal reflux disease)     Gout     H/O cystoscopy 06/20/2013    Hematuria, unspecified     Hypercholesterolemia     Hypertension     Hypertension      Hypogonadism male     Impotence of organic origin     Left ventricular diastolic dysfunction     Malignant neoplasm of prostate (HCC)     hx of t1a, izzy 6, 5 % of 1  core    Morbid obesity ([facility] [address]     Myocardial perfusion 09/05/08    Basal inferior defect c/w artifact; EF 63%    Overactive bladder     S/P cardiac cath 07/30/10    oD2-40%; pRCA-20-30%; EF 65%    Sleep apnea     Slowing of urinary stream     Type II or unspecified type diabetes mellitus without mention of complication, not stated as uncontrolled        Past Surgical History:   Procedure Laterality Date    HX HEART CATHETERIZATION  7/30/10    HX OTHER SURGICAL  06/27/13    Prostate    HX TONSILLECTOMY      INCISION/DRAIN ABSCESS EXTRA      PA COLONOSCOPY FLX DX W/COLLJ SPEC WHEN PFRMD         Social History     Social History    Marital status: SINGLE     Spouse name: N/A    Number of children: N/A    Years of education: N/A     Occupational History    Not on file.      Social History Main Topics    Smoking status: Former Smoker     Types: Cigars     Quit date: 10/4/1999    Smokeless tobacco: Never Used    Alcohol use No Comment: socially    Drug use: No    Sexual activity: Not Currently     Other Topics Concern    Not on file     Social History Narrative       Patient does not have an advanced directive on file    Vitals:    06/14/17 1256   BP: (!) 198/112   Pulse: 75   Resp: 16   Temp: 98.8 °F (37.1 °C)   TempSrc: Tympanic   SpO2: 97%   Weight: 307 lb 6.4 oz (139.4 kg)   Height: 5' 11\" (1.803 m)   PainSc:   4   PainLoc: Head       Physical Exam   Constitutional: No distress. HENT:   Head: Normocephalic. Cardiovascular: Normal rate, regular rhythm and normal heart sounds. Pulmonary/Chest: Effort normal and breath sounds normal.   Musculoskeletal: He exhibits edema. Neurological: He is alert. Nursing note and vitals reviewed. Orders Only on 03/27/2017   Component Date Value Ref Range Status    Glucose 03/27/2017 111* 65 - 99 mg/dL Final    BUN 03/27/2017 15  6 - 22 mg/dL Final    Creatinine 03/27/2017 1.2  0.8 - 1.6 mg/dL Final    Sodium 03/27/2017 147* 133 - 145 mmol/L Final    Potassium 03/27/2017 3.8  3.5 - 5.5 mmol/L Final    Chloride 03/27/2017 104  98 - 110 mmol/L Final    CO2 03/27/2017 28  20 - 32 mmol/L Final    AST (SGOT) 03/27/2017 14  10 - 37 U/L Final    ALT (SGPT) 03/27/2017 16  5 - 40 U/L Final    Alk. phosphatase 03/27/2017 67  40 - 125 U/L Final    Bilirubin, total 03/27/2017 0.2  0.2 - 1.2 mg/dL Final    Calcium 03/27/2017 9.3  8.4 - 10.4 mg/dL Final    Protein, total 03/27/2017 6.3  6.2 - 8.1 g/dL Final    Albumin 03/27/2017 3.9  3.5 - 5.0 g/dL Final    A-G Ratio 03/27/2017 1.6  1.1 - 2.6 ratio Final    Globulin 03/27/2017 2.4  2.0 - 4.0 g/dL Final    Anion gap 03/27/2017 15.0  mmol/L Final    Comment: Test includes Albumin, Alkaline Phosphatase, ALT, AST, BUN, Calcium, CO2,  Chloride, Creatinine, Glucose, Potassium, Sodium, Total Bilirubin and Total  Protein. Estimated GFR results are reported in mL/min/1.73 sq.m. by the MDRD equation.   This eGFR is validated for stable chronic renal failure patients. This   equation  is unreliable in acute illness or patients with normal renal function.  GFRAA 03/27/2017 >60.0  >60.0 Final    GFRNA 03/27/2017 59.5* >60.0 Final    WBC 03/27/2017 5.4  4.0 - 11.0 K/uL Final    RBC 03/27/2017 4.67  3.80 - 5.80 M/uL Final    HGB 03/27/2017 13.2  13.1 - 17.2 g/dL Final    HCT 03/27/2017 42.3  39.3 - 51.6 % Final    MCV 03/27/2017 91  80 - 95 fL Final    MCH 03/27/2017 28  26 - 34 pg Final    MCHC 03/27/2017 31* 32 - 36 g/dL Final    RDW 03/27/2017 15.3  10.0 - 16.0 % Final    PLATELET 96/49/2516 629  140 - 440 K/uL Final    MPV 03/27/2017 10.3  6.0 - 10.8 fL Final    NEUTROPHILS 03/27/2017 53  40 - 75 % Final    Lymphocytes 03/27/2017 36  27 - 45 % Final    MONOCYTES 03/27/2017 7  3 - 9 % Final    EOSINOPHILS 03/27/2017 3  0 - 6 % Final    BASOPHILS 03/27/2017 0  0 - 2 % Final    ABS. NEUTROPHILS 03/27/2017 2.9  1.8 - 7.7 K/uL Final    ABSOLUTE LYMPHOCYTE COUNT 03/27/2017 2.0  1.0 - 4.8 K/uL Final    ABS. MONOCYTES 03/27/2017 0.4  0.1 - 0.9 K/uL Final    ABS. EOSINOPHILS 03/27/2017 0.1  0.0 - 0.5 K/uL Final    ABS. BASOPHILS 03/27/2017 0.0  0.0 - 0.2 K/uL Final    TSH 03/27/2017 1.51  0.27 - 4.20 mcU/mL Final       .No results found for any visits on 06/14/17. Assessment / Plan:      ICD-10-CM ICD-9-CM    1. Essential hypertension I10 401.9 amLODIPine (NORVASC) 5 mg tablet      potassium chloride (KLOR-CON) 10 mEq tablet      hydroCHLOROthiazide (HYDRODIURIL) 12.5 mg tablet     No salt die  HTN-uncontrolled  Amlodipine rx  HCTZ rx  Potassium rx  F/u in 2 days    If develop a severe headache please go to the ED or call 911    Follow-up Disposition:  Return in about 2 days (around 6/16/2017). I asked the patient if he  had any questions and answered his  questions.   The patient stated that he understands the treatment plan and agrees with the treatment plan

## 2017-06-16 ENCOUNTER — OFFICE VISIT (OUTPATIENT)
Dept: INTERNAL MEDICINE CLINIC | Age: 62
End: 2017-06-16

## 2017-06-16 VITALS
TEMPERATURE: 98.5 F | SYSTOLIC BLOOD PRESSURE: 180 MMHG | DIASTOLIC BLOOD PRESSURE: 100 MMHG | BODY MASS INDEX: 42.84 KG/M2 | RESPIRATION RATE: 16 BRPM | HEIGHT: 71 IN | HEART RATE: 70 BPM | WEIGHT: 306 LBS | OXYGEN SATURATION: 97 %

## 2017-06-16 DIAGNOSIS — E66.01 MORBID OBESITY, UNSPECIFIED OBESITY TYPE (HCC): ICD-10-CM

## 2017-06-16 DIAGNOSIS — I10 ESSENTIAL HYPERTENSION: Primary | ICD-10-CM

## 2017-06-16 DIAGNOSIS — E78.00 HYPERCHOLESTEROLEMIA: ICD-10-CM

## 2017-06-16 RX ORDER — OMEPRAZOLE 40 MG/1
40 CAPSULE, DELAYED RELEASE ORAL DAILY
COMMUNITY
End: 2017-06-16 | Stop reason: SDUPTHER

## 2017-06-16 RX ORDER — ALLOPURINOL 100 MG/1
100 TABLET ORAL DAILY
Qty: 90 TAB | Refills: 3 | Status: SHIPPED | OUTPATIENT
Start: 2017-06-16 | End: 2018-05-29 | Stop reason: SDUPTHER

## 2017-06-16 RX ORDER — AMLODIPINE BESYLATE 10 MG/1
10 TABLET ORAL DAILY
Qty: 30 TAB | Refills: 1 | Status: SHIPPED | OUTPATIENT
Start: 2017-06-16 | End: 2017-08-14 | Stop reason: SDUPTHER

## 2017-06-16 RX ORDER — OMEPRAZOLE 40 MG/1
40 CAPSULE, DELAYED RELEASE ORAL 2 TIMES DAILY
Qty: 60 CAP | Refills: 5 | Status: SHIPPED | OUTPATIENT
Start: 2017-06-16 | End: 2017-12-16 | Stop reason: SDUPTHER

## 2017-06-16 NOTE — MR AVS SNAPSHOT
Visit Information Date & Time Provider Department Dept. Phone Encounter #  
 6/16/2017 10:00 AM Anthony Christian NP [facility] INTERNAL MEDICINE OF [place] Bruner [phone] [id number] Your Appointments 6/20/2017  8:15 AM  
Follow Up with Dennis White MD  
[address] [place] [address] [facility]) Appt Note: 2 wk f/u  
 [address] [facility]  
[phone]  
  
   
 [address] [place] [address]  
  
    
 7/20/2017  2:15 PM  
Follow Up with Anthony Christian NP  
[facility] INTERNAL MEDICINE OF [place] ([facility]) Appt Note: 2 mos [address] [place] [address]  
  
   
 [address] [place] [address]  
  
    
 8/1/2017  9:50 AM  
Nurse Visit with [facility] WB NURSE [facility] ([facility]) Appt Note: bw  
 [address] 
[address] [place] [address]  
[address] [facility] [address] [place] [address]  
  
    
  
 8/7/2017  9:30 AM  
Any with Shelby Brooke MD  
[facility] ([facility]) Appt Note: 6 mon w/ psa prior [address] [place] [address]  
[address] [facility] [address] [facility] [address] [place] [address] Upcoming Health Maintenance Date Due Hepatitis C Screening 1955 EYE EXAM RETINAL OR DILATED Q1 1/8/1967 DTaP/Tdap/Td series (1 - Tdap) 1/8/1978 Pneumococcal 19-64 Highest Risk (2 of 3 - PCV13) 2/3/2012 HEMOGLOBIN A1C Q6M 12/29/2016 ZOSTER VACCINE AGE 60> 1/8/2017 MICROALBUMIN Q1 6/29/2017 INFLUENZA AGE 9 TO ADULT 8/1/2017 FOOT EXAM Q1 9/7/2017 FOBT Q 1 YEAR AGE 50-75 9/29/2017 LIPID PANEL Q1 5/10/2018 Allergies as of 6/16/2017  Review Complete On: 6/16/2017 By: Anthony Christian NP Severity Noted Reaction Type Reactions Iodine  04/12/2011    Other (comments) Eyes swell shut Shellfish Containing Products  04/12/2011    Swelling Current Immunizations  Reviewed on 6/16/2017 Name Date Influenza Vaccine 9/1/2016 12:00 AM  
 Influenza Vaccine (Quad) PF 10/20/2016 Influenza Vaccine PF 10/2/2015 Pneumococcal Polysaccharide (PPSV-23) 2/3/2011 Reviewed by Robyn Simental, PHARMD on 6/16/2017 at  7:11 AM  
You Were Diagnosed With   
  
 Codes Comments Essential hypertension    -  Primary ICD-10-CM: I10 
ICD-9-CM: 401.9 Morbid obesity, unspecified obesity type     ICD-10-CM: E66.01 
ICD-9-CM: 278.01 Vitals BP Pulse Temp Resp Height(growth percentile) Weight(growth percentile) (!) 180/100 70 98.5 °F (36.9 °C) (Tympanic) 16 5' 11\" (1.803 m) 306 lb (138.8 kg) SpO2 BMI Smoking Status 97% 42.68 kg/m2 Former Smoker Vitals History BMI and BSA Data Body Mass Index Body Surface Area  
 42.68 kg/m 2 2.64 m 2 Preferred Pharmacy Pharmacy Name Phone [id number], [address] [place] [address] Your Updated Medication List  
  
   
This list is accurate as of: 6/16/17 11:10 AM.  Always use your most recent med list.  
  
  
  
  
 allopurinol 100 mg tablet Commonly known as:  Jamey Calender Take 1 Tab by mouth daily. * amLODIPine 5 mg tablet Commonly known as:  Jenny Half Take 1 Tab by mouth daily. * amLODIPine 10 mg tablet Commonly known as:  Jenny Half Take 1 Tab by mouth daily. aspirin 81 mg tablet Take 81 mg by mouth daily. avanafil 200 mg Tab tablet Commonly known as:  XEPKBNQ Take 1 Tab by mouth as needed for Other. Indications: Erectile Dysfunction  
  
 benazepril 40 mg tablet Commonly known as:  LOTENSIN  
1 tablet daily (stop Lotrel)  
  
 cloNIDine 0.2 mg/24 hr patch Commonly known as:  CATAPRES Apply weekly  
  
 colchicine 0.6 mg tablet Take 0.6 mg by mouth daily. furosemide 20 mg tablet Commonly known as:  LASIX 2 tablets daily  
  
 gabapentin 300 mg capsule Commonly known as:  NEURONTIN Take 300 mg by mouth three (3) times daily. hydrALAZINE 100 mg tablet Commonly known as:  APRESOLINE  
1 tablet twice per day (note:  New strength)  
  
 hydroCHLOROthiazide 12.5 mg tablet Commonly known as:  HYDRODIURIL Take 1 Tab by mouth daily. labetalol 200 mg tablet Commonly known as:  NORMODYNE  
1 tablet twice per day (Stop Metoprolol) omeprazole 40 mg capsule Commonly known as:  PRILOSEC Take 1 Cap by mouth two (2) times a day. potassium chloride 10 mEq tablet Commonly known as:  KLOR-CON Take 1 Tab by mouth daily. sucralfate 100 mg/mL suspension Commonly known as:  CARAFATE  
2 teaspoons three times per day * Notice: This list has 2 medication(s) that are the same as other medications prescribed for you. Read the directions carefully, and ask your doctor or other care provider to review them with you. Prescriptions Sent to Pharmacy Refills  
 omeprazole (PRILOSEC) 40 mg capsule 5 Sig: Take 1 Cap by mouth two (2) times a day. Class: Normal  
 Pharmacy: [facility]/pharmacy #[id number] Ph #: [phone] Route: Oral  
 allopurinol (ZYLOPRIM) 100 mg tablet 3 Sig: Take 1 Tab by mouth daily. Class: Normal  
 Pharmacy: [facility]/pharmacy #[id number]- [facility], [address] [place] [address] Ph #: [phone] Route: Oral  
 amLODIPine (NORVASC) 10 mg tablet 1 Sig: Take 1 Tab by mouth daily. Class: Normal  
 Pharmacy: [facility]/pharmacy #[id number], [address] [place] [address] Ph #: [phone] Route: Oral  
  
 Please provide this summary of care documentation to your next provider. Your primary care clinician is listed as Cait San. If you have any questions after today's visit, please call [phone].

## 2017-06-16 NOTE — PROGRESS NOTES
Patient presents for   Chief Complaint   Patient presents with    Hypertension     2 day follow up     Fall risk assessment was not indicated. Depression screening was not indicated Follow up questions were not indicated. 1. Have you been to the ER, urgent care clinic since your last visit? Hospitalized since your last visit? No    2. Have you seen or consulted any other health care providers outside of the [address] [place] [address] since your last visit? Include any pap smears or colon screening.  No

## 2017-06-16 NOTE — PROGRESS NOTES
Rea Snider is a 63 y.o. male presenting today for Hypertension (2 day follow up)  . HPI:  Rea Snider presents to the office today for hypertension follow-up care. Patient was seen in the practice 2 days ago for B/p of 198/110 and a headache. Patient was given a prescription for Norvasc 5 mg and asked to return today. Patient notes his headache has resolved and his blood pressure has decreased some. He is negative for chest pain or dyspnea. Review of Systems   Constitutional: Negative for malaise/fatigue. Respiratory: Negative for cough. Cardiovascular: Positive for leg swelling (lymphedema). Negative for chest pain and palpitations. Neurological: Negative for headaches. Allergies   Allergen Reactions    Iodine Other (comments)     Eyes swell shut      Shellfish Containing Products Swelling       Current Outpatient Prescriptions   Medication Sig Dispense Refill    omeprazole (PRILOSEC) 40 mg capsule Take 1 Cap by mouth two (2) times a day. 60 Cap 5    allopurinol (ZYLOPRIM) 100 mg tablet Take 1 Tab by mouth daily. 90 Tab 3    amLODIPine (NORVASC) 10 mg tablet Take 1 Tab by mouth daily. 30 Tab 1    amLODIPine (NORVASC) 5 mg tablet Take 1 Tab by mouth daily. 30 Tab 2    potassium chloride (KLOR-CON) 10 mEq tablet Take 1 Tab by mouth daily. 30 Tab 2    hydroCHLOROthiazide (HYDRODIURIL) 12.5 mg tablet Take 1 Tab by mouth daily. 30 Tab 2    labetalol (NORMODYNE) 200 mg tablet 1 tablet twice per day (Stop Metoprolol) 60 Tab 3    sucralfate (CARAFATE) 100 mg/mL suspension 2 teaspoons three times per day 414 mL 3    colchicine 0.6 mg tablet Take 0.6 mg by mouth daily.  gabapentin (NEURONTIN) 300 mg capsule Take 300 mg by mouth three (3) times daily.       furosemide (LASIX) 20 mg tablet 2 tablets daily 60 Tab 3    benazepril (LOTENSIN) 40 mg tablet 1 tablet daily (stop Lotrel) 30 Tab 3    hydrALAZINE (APRESOLINE) 100 mg tablet 1 tablet twice per day (note:  New strength) 60 Tab 2  avanafil (STENDRA) 200 mg tab tablet Take 1 Tab by mouth as needed for Other. Indications: Erectile Dysfunction 6 Tab 6    cloNIDine (CATAPRES) 0.2 mg/24 hr patch Apply weekly 12 Patch 3    aspirin 81 mg tablet Take 81 mg by mouth daily. Past Medical History:   Diagnosis Date    BPH without obstruction/lower urinary tract symptoms     Bursitis of right shoulder     CAD (coronary artery disease)     Chest pain     history of hospitalization with chest pain and a negative workup in 2008    Chronic edema     Constipation     die to medication    Coronary artery disease     mild, non obstructive/EF 65%    Dyspnea on exertion     Echocardiogram 12/16/08    IVC dilated; suboptimal endocardial border; EF 65%    ED (erectile dysfunction)     Elevated PSA     Frequency     GERD (gastroesophageal reflux disease)     Gout     H/O cystoscopy 06/20/2013    Hematuria, unspecified     Hypercholesterolemia     Hypertension     Hypertension      Hypogonadism male     Impotence of organic origin     Left ventricular diastolic dysfunction     Malignant neoplasm of prostate (HCC)     hx of t1a, izzy 6, 5 % of 1  core    Morbid obesity ([address]     Myocardial perfusion 09/05/08    Basal inferior defect c/w artifact; EF 63%    Overactive bladder     S/P cardiac cath 07/30/10    oD2-40%; pRCA-20-30%; EF 65%    Sleep apnea     Slowing of urinary stream     Type II or unspecified type diabetes mellitus without mention of complication, not stated as uncontrolled        Past Surgical History:   Procedure Laterality Date    HX HEART CATHETERIZATION  7/30/10    HX OTHER SURGICAL  06/27/13    Prostate    HX TONSILLECTOMY      INCISION/DRAIN ABSCESS EXTRA      FL COLONOSCOPY FLX DX W/COLLJ SPEC WHEN PFRMD         Social History     Social History    Marital status: SINGLE     Spouse name: N/A    Number of children: N/A    Years of education: N/A     Occupational History    Not on file. Social History Main Topics    Smoking status: Former Smoker     Types: Cigars     Quit date: 10/4/1999    Smokeless tobacco: Never Used    Alcohol use No      Comment: socially    Drug use: No    Sexual activity: Not Currently     Other Topics Concern    Not on file     Social History Narrative       Patient does not have an advanced directive on file    Vitals:    06/16/17 1029 06/16/17 1100   BP: (!) 172/110 (!) 180/100   Pulse: 70    Resp: 16    Temp: 98.5 °F (36.9 °C)    TempSrc: Tympanic    SpO2: 97%    Weight: 306 lb (138.8 kg)    Height: 5' 11\" (1.803 m)    PainSc:   3    PainLoc: Chest        Physical Exam   Constitutional: No distress. HENT:   Head: Normocephalic. Cardiovascular: Normal rate, regular rhythm and normal heart sounds. No murmur heard. Pulmonary/Chest: Effort normal and breath sounds normal.   Musculoskeletal: He exhibits edema. Nursing note and vitals reviewed. Orders Only on 03/27/2017   Component Date Value Ref Range Status    Glucose 03/27/2017 111* 65 - 99 mg/dL Final    BUN 03/27/2017 15  6 - 22 mg/dL Final    Creatinine 03/27/2017 1.2  0.8 - 1.6 mg/dL Final    Sodium 03/27/2017 147* 133 - 145 mmol/L Final    Potassium 03/27/2017 3.8  3.5 - 5.5 mmol/L Final    Chloride 03/27/2017 104  98 - 110 mmol/L Final    CO2 03/27/2017 28  20 - 32 mmol/L Final    AST (SGOT) 03/27/2017 14  10 - 37 U/L Final    ALT (SGPT) 03/27/2017 16  5 - 40 U/L Final    Alk.  phosphatase 03/27/2017 67  40 - 125 U/L Final    Bilirubin, total 03/27/2017 0.2  0.2 - 1.2 mg/dL Final    Calcium 03/27/2017 9.3  8.4 - 10.4 mg/dL Final    Protein, total 03/27/2017 6.3  6.2 - 8.1 g/dL Final    Albumin 03/27/2017 3.9  3.5 - 5.0 g/dL Final    A-G Ratio 03/27/2017 1.6  1.1 - 2.6 ratio Final    Globulin 03/27/2017 2.4  2.0 - 4.0 g/dL Final    Anion gap 03/27/2017 15.0  mmol/L Final    Comment: Test includes Albumin, Alkaline Phosphatase, ALT, AST, BUN, Calcium, CO2,  Chloride, Creatinine, Glucose, Potassium, Sodium, Total Bilirubin and Total  Protein. Estimated GFR results are reported in mL/min/1.73 sq.m. by the MDRD equation. This eGFR is validated for stable chronic renal failure patients. This   equation  is unreliable in acute illness or patients with normal renal function.  GFRAA 03/27/2017 >60.0  >60.0 Final    GFRNA 03/27/2017 59.5* >60.0 Final    WBC 03/27/2017 5.4  4.0 - 11.0 K/uL Final    RBC 03/27/2017 4.67  3.80 - 5.80 M/uL Final    HGB 03/27/2017 13.2  13.1 - 17.2 g/dL Final    HCT 03/27/2017 42.3  39.3 - 51.6 % Final    MCV 03/27/2017 91  80 - 95 fL Final    MCH 03/27/2017 28  26 - 34 pg Final    MCHC 03/27/2017 31* 32 - 36 g/dL Final    RDW 03/27/2017 15.3  10.0 - 16.0 % Final    PLATELET 74/10/8726 497  140 - 440 K/uL Final    MPV 03/27/2017 10.3  6.0 - 10.8 fL Final    NEUTROPHILS 03/27/2017 53  40 - 75 % Final    Lymphocytes 03/27/2017 36  27 - 45 % Final    MONOCYTES 03/27/2017 7  3 - 9 % Final    EOSINOPHILS 03/27/2017 3  0 - 6 % Final    BASOPHILS 03/27/2017 0  0 - 2 % Final    ABS. NEUTROPHILS 03/27/2017 2.9  1.8 - 7.7 K/uL Final    ABSOLUTE LYMPHOCYTE COUNT 03/27/2017 2.0  1.0 - 4.8 K/uL Final    ABS. MONOCYTES 03/27/2017 0.4  0.1 - 0.9 K/uL Final    ABS. EOSINOPHILS 03/27/2017 0.1  0.0 - 0.5 K/uL Final    ABS. BASOPHILS 03/27/2017 0.0  0.0 - 0.2 K/uL Final    TSH 03/27/2017 1.51  0.27 - 4.20 mcU/mL Final       .No results found for any visits on 06/16/17. Assessment / Plan:      ICD-10-CM ICD-9-CM    1. Essential hypertension I10 401.9 amLODIPine (NORVASC) 10 mg tablet   2. Morbid obesity, unspecified obesity type E66.01 278.01 REFERRAL TO NUTRITION   3. Hypercholesterolemia E78.00 272.0 REFERRAL TO NUTRITION     HTN- uncontrolled.  Amlodipine 10 mg daily  TO the ED if the headache get worse or return  Monitor B/p @ home and if remains elevated to the ED  Will send patient to Nutritionist for help with food choices  Keep schedule appointment with Dr. Malagon Favorite for next week  F/u prn    Follow-up Disposition:  Return if symptoms worsen or fail to improve. I asked the patient if he  had any questions and answered his  questions.   The patient stated that he understands the treatment plan and agrees with the treatment plan

## 2017-06-26 RX ORDER — PREDNISONE 20 MG/1
TABLET ORAL
Qty: 20 TAB | Refills: 0 | Status: SHIPPED | OUTPATIENT
Start: 2017-06-26 | End: 2017-07-24 | Stop reason: ALTCHOICE

## 2017-06-26 NOTE — TELEPHONE ENCOUNTER
Called and informed patient that per Dr Pa he is to continue taking the allopurinol for now but we will also sent in a dose of prednisone. Patient voiced understanding.

## 2017-07-03 ENCOUNTER — OFFICE VISIT (OUTPATIENT)
Dept: INTERNAL MEDICINE CLINIC | Age: 62
End: 2017-07-03

## 2017-07-03 VITALS
BODY MASS INDEX: 42.84 KG/M2 | TEMPERATURE: 98.2 F | RESPIRATION RATE: 16 BRPM | OXYGEN SATURATION: 98 % | SYSTOLIC BLOOD PRESSURE: 152 MMHG | HEIGHT: 71 IN | WEIGHT: 306 LBS | HEART RATE: 102 BPM | DIASTOLIC BLOOD PRESSURE: 100 MMHG

## 2017-07-03 DIAGNOSIS — I10 ESSENTIAL HYPERTENSION: Primary | ICD-10-CM

## 2017-07-03 DIAGNOSIS — I10 ESSENTIAL HYPERTENSION: ICD-10-CM

## 2017-07-03 DIAGNOSIS — E66.01 MORBID (SEVERE) OBESITY DUE TO EXCESS CALORIES (HCC): ICD-10-CM

## 2017-07-03 RX ORDER — NITROGLYCERIN 0.4 MG/1
TABLET SUBLINGUAL
COMMUNITY
End: 2018-11-19 | Stop reason: SDUPTHER

## 2017-07-03 RX ORDER — FUROSEMIDE 20 MG/1
TABLET ORAL
Qty: 30 TAB | Refills: 1 | Status: SHIPPED | OUTPATIENT
Start: 2017-07-03 | End: 2018-08-28 | Stop reason: ALTCHOICE

## 2017-07-03 RX ORDER — MINOXIDIL 10 MG/1
5 TABLET ORAL DAILY
Qty: 30 TAB | Refills: 1 | Status: CANCELLED | OUTPATIENT
Start: 2017-07-03

## 2017-07-03 RX ORDER — HYDRALAZINE HYDROCHLORIDE 100 MG/1
TABLET, FILM COATED ORAL
Qty: 60 TAB | Refills: 2 | Status: SHIPPED | OUTPATIENT
Start: 2017-07-03 | End: 2017-09-26 | Stop reason: SDUPTHER

## 2017-07-03 RX ORDER — TOLTERODINE 4 MG/1
4 CAPSULE, EXTENDED RELEASE ORAL DAILY
COMMUNITY
End: 2018-06-18 | Stop reason: SDUPTHER

## 2017-07-03 NOTE — MR AVS SNAPSHOT
Visit Information Date & Time Provider Department Dept. Phone Encounter #  
 7/3/2017  8:30 AM Princess Cameron NP [facility] INTERNAL MEDICINE OF [place] [phone] [id number] Your Appointments 7/24/2017  8:30 AM  
Follow Up with Princess Cameron NP  
[facility] INTERNAL MEDICINE OF [place] ([address]) Appt Note: 3 wk f/u  
 [address] [facility] [facility] [facility] [address]  
  
   
 [address] [place] [address]  
  
    
 8/1/2017  9:50 AM  
Nurse Visit with [facility] WB NURSE [facility] ([address]) Appt Note: bw  
 [address] 
[address] [place] [address]  
[address] [facility] [address] [place] [address]  
  
    
  
 8/7/2017  9:30 AM  
Any with Marc Cleveland MD  
[facility] ([address]) Appt Note: 6 mon w/ psa prior [address] [place] [address]  
[address] [facility] [address] [place] [address] Upcoming Health Maintenance Date Due Hepatitis C Screening 1955 EYE EXAM RETINAL OR DILATED Q1 1/8/1967 DTaP/Tdap/Td series (1 - Tdap) 1/8/1978 Pneumococcal 19-64 Highest Risk (2 of 3 - PCV13) 2/3/2012 HEMOGLOBIN A1C Q6M 12/29/2016 ZOSTER VACCINE AGE 60> 1/8/2017 MICROALBUMIN Q1 6/29/2017 INFLUENZA AGE 9 TO ADULT 8/1/2017 FOOT EXAM Q1 9/7/2017 FOBT Q 1 YEAR AGE 50-75 9/29/2017 LIPID PANEL Q1 5/10/2018 Allergies as of 7/3/2017  Review Complete On: 7/3/2017 By: Ashutosh Pillai LPN Severity Noted Reaction Type Reactions Iodine  04/12/2011    Other (comments) Eyes swell shut Shellfish Containing Products  04/12/2011    Swelling Current Immunizations  Reviewed on 6/16/2017 Name Date Influenza Vaccine 9/1/2016 12:00 AM  
 Influenza Vaccine (Quad) PF 10/20/2016 Influenza Vaccine PF 10/2/2015 Pneumococcal Polysaccharide (PPSV-23) 2/3/2011 Not reviewed this visit You Were Diagnosed With   
  
 Codes Comments Essential hypertension    -  Primary ICD-10-CM: I10 
ICD-9-CM: 401.9 Morbid (severe) obesity due to excess calories (HCC)     ICD-10-CM: E66.01 
ICD-9-CM: 278.01 Vitals BP Pulse Temp Resp Height(growth percentile) (!) 152/100 (BP 1 Location: Left arm, BP Patient Position: Sitting) (!) 102 98.2 °F (36.8 °C) (Tympanic) 16 5' 11\" (1.803 m) Weight(growth percentile) SpO2 BMI Smoking Status 306 lb (138.8 kg) 98% 42.68 kg/m2 Former Smoker Vitals History BMI and BSA Data Body Mass Index Body Surface Area  
 42.68 kg/m 2 2.64 m 2 Preferred Pharmacy Pharmacy Name Phone [facility] #[id number]- [facility], [address] [place] [address] Your Updated Medication List  
  
   
This list is accurate as of: 7/3/17  9:43 AM.  Always use your most recent med list.  
  
  
  
  
 allopurinol 100 mg tablet Commonly known as:  Amezcua Massed Take 1 Tab by mouth daily. amLODIPine 10 mg tablet Commonly known as:  Ephraim Lute Take 1 Tab by mouth daily. aspirin 81 mg tablet Take 81 mg by mouth daily. avanafil 200 mg Tab tablet Commonly known as:  FRVCTPS Take 1 Tab by mouth as needed for Other. Indications: Erectile Dysfunction  
  
 benazepril 40 mg tablet Commonly known as:  LOTENSIN  
1 tablet daily (stop Lotrel)  
  
 cloNIDine 0.2 mg/24 hr patch Commonly known as:  CATAPRES Apply weekly  
  
 colchicine 0.6 mg tablet Take 0.6 mg by mouth daily. furosemide 20 mg tablet Commonly known as:  LASIX Take 1 tab by mouth daily  
  
 gabapentin 300 mg capsule Commonly known as:  NEURONTIN Take 300 mg by mouth three (3) times daily. hydrALAZINE 100 mg tablet Commonly known as:  APRESOLINE  
1 tablet twice per day (note:  New strength)  
  
 hydroCHLOROthiazide 12.5 mg tablet Commonly known as:  HYDRODIURIL Take 1 Tab by mouth daily. labetalol 200 mg tablet Commonly known as:  NORMODYNE  
1 tablet twice per day (Stop Metoprolol)  
  
 nitroglycerin 0.4 mg SL tablet Commonly known as:  NITROSTAT  
by SubLINGual route every five (5) minutes as needed for Chest Pain. omeprazole 40 mg capsule Commonly known as:  PRILOSEC Take 1 Cap by mouth two (2) times a day. potassium chloride 10 mEq tablet Commonly known as:  KLOR-CON Take 1 Tab by mouth daily. predniSONE 20 mg tablet Commonly known as:  DELTASONE  
3 tabs daily x 3 days, 2 tabs daily x 3 days, 1 tab daily x 3 days, 1/2 tab daily x 3 days  
  
 sucralfate 100 mg/mL suspension Commonly known as:  CARAFATE  
2 teaspoons three times per day  
  
 tolterodine ER 4 mg ER capsule Commonly known as:  Juan C Adenland Take 4 mg by mouth daily. Prescriptions Sent to Pharmacy Refills  
 furosemide (LASIX) 20 mg tablet 1 Sig: Take 1 tab by mouth daily Class: Normal  
 Pharmacy: [facility]/pharmacy #[id number]- [address] Ph #: [phone]  
 hydrALAZINE (APRESOLINE) 100 mg tablet 2 Si tablet twice per day (note:  New strength) Class: Normal  
 Pharmacy: [facility]/pharmacy #[id number]- [address] [facility], [address] [place] [address],[place] [address] Ph #: [phone] To-Do List   
 2017 Imaging:  DUPLEX RENAL ART/SHARI BILATERAL   
  
 2017 Lab:  METABOLIC PANEL, COMPREHENSIVE   
  
 2017 3:30 PM  
  Appointment with Delbert Milton RD at [facility] Please provide this summary of care documentation to your next provider. Your primary care clinician is listed as Warner Buys. If you have any questions after today's visit, please call [phone].

## 2017-07-03 NOTE — PROGRESS NOTES
Patient presents for   Chief Complaint   Patient presents with    Hypertension     2 week follow      Fall risk assessment was not indicated. Depression screening was not indicated Follow up questions were not indicated. 1. Have you been to the ER, urgent care clinic since your last visit? Hospitalized since your last visit? No    2. Have you seen or consulted any other health care providers outside of the [facility] since your last visit? Include any pap smears or colon screening.  No

## 2017-07-03 NOTE — PROGRESS NOTES
Barbie Gilmore is a 63 y.o. male presenting today for Hypertension (2 week follow )  . HPI:  Barbie Gilmore presents to the office today for hypertension follow-up care. Patient noted his b/p has improved and he is taking his B/p medication daily. His B/p medication was adjusted x 2 weeks ago and he is here for recheck. Review of Systems   Constitutional: Negative for malaise/fatigue. Respiratory: Negative for cough and shortness of breath. Cardiovascular: Positive for chest pain (intermittent. Releieved with ASA 81 mg) and leg swelling (lymphedema). Negative for palpitations. Neurological: Negative for dizziness and headaches. Allergies   Allergen Reactions    Iodine Other (comments)     Eyes swell shut      Shellfish Containing Products Swelling       Current Outpatient Prescriptions   Medication Sig Dispense Refill    tolterodine ER (DETROL LA) 4 mg ER capsule Take 4 mg by mouth daily.  nitroglycerin (NITROSTAT) 0.4 mg SL tablet by SubLINGual route every five (5) minutes as needed for Chest Pain.  furosemide (LASIX) 20 mg tablet Take 1 tab by mouth daily 30 Tab 1    hydrALAZINE (APRESOLINE) 100 mg tablet 1 tablet twice per day (note:  New strength) 60 Tab 2    omeprazole (PRILOSEC) 40 mg capsule Take 1 Cap by mouth two (2) times a day. 60 Cap 5    allopurinol (ZYLOPRIM) 100 mg tablet Take 1 Tab by mouth daily. 90 Tab 3    amLODIPine (NORVASC) 10 mg tablet Take 1 Tab by mouth daily. 30 Tab 1    potassium chloride (KLOR-CON) 10 mEq tablet Take 1 Tab by mouth daily. 30 Tab 2    hydroCHLOROthiazide (HYDRODIURIL) 12.5 mg tablet Take 1 Tab by mouth daily.  30 Tab 2    labetalol (NORMODYNE) 200 mg tablet 1 tablet twice per day (Stop Metoprolol) 60 Tab 3    sucralfate (CARAFATE) 100 mg/mL suspension 2 teaspoons three times per day 414 mL 3    benazepril (LOTENSIN) 40 mg tablet 1 tablet daily (stop Lotrel) 30 Tab 3    avanafil (STENDRA) 200 mg tab tablet Take 1 Tab by mouth as needed for Other. Indications: Erectile Dysfunction 6 Tab 6    cloNIDine (CATAPRES) 0.2 mg/24 hr patch Apply weekly 12 Patch 3    aspirin 81 mg tablet Take 81 mg by mouth daily.  predniSONE (DELTASONE) 20 mg tablet 3 tabs daily x 3 days, 2 tabs daily x 3 days, 1 tab daily x 3 days, 1/2 tab daily x 3 days 20 Tab 0    colchicine 0.6 mg tablet Take 0.6 mg by mouth daily.  gabapentin (NEURONTIN) 300 mg capsule Take 300 mg by mouth three (3) times daily.          Past Medical History:   Diagnosis Date    BPH without obstruction/lower urinary tract symptoms     Bursitis of right shoulder     CAD (coronary artery disease)     Chest pain     history of hospitalization with chest pain and a negative workup in 2008    Chronic edema     Constipation     die to medication    Coronary artery disease     mild, non obstructive/EF 65%    Dyspnea on exertion     Echocardiogram 12/16/08    IVC dilated; suboptimal endocardial border; EF 65%    ED (erectile dysfunction)     Elevated PSA     Frequency     GERD (gastroesophageal reflux disease)     Gout     H/O cystoscopy 06/20/2013    Hematuria, unspecified     Hypercholesterolemia     Hypertension     Hypertension      Hypogonadism male     Impotence of organic origin     Left ventricular diastolic dysfunction     Malignant neoplasm of prostate (HCC)     hx of t1a, izzy 6, 5 % of 1  core    Morbid obesity ([facility] [address]     Myocardial perfusion 09/05/08    Basal inferior defect c/w artifact; EF 63%    Overactive bladder     S/P cardiac cath 07/30/10    oD2-40%; pRCA-20-30%; EF 65%    Sleep apnea     Slowing of urinary stream     Type II or unspecified type diabetes mellitus without mention of complication, not stated as uncontrolled        Past Surgical History:   Procedure Laterality Date    HX HEART CATHETERIZATION  7/30/10    HX OTHER SURGICAL  06/27/13    Prostate    HX TONSILLECTOMY      INCISION/DRAIN ABSCESS EXTRA      HI COLONOSCOPY FLX DX W/COLLJ SPEC WHEN PFRMD         Social History     Social History    Marital status: SINGLE     Spouse name: N/A    Number of children: N/A    Years of education: N/A     Occupational History    Not on file. Social History Main Topics    Smoking status: Former Smoker     Types: Cigars     Quit date: 10/4/1999    Smokeless tobacco: Never Used    Alcohol use No      Comment: socially    Drug use: No    Sexual activity: Not Currently     Other Topics Concern    Not on file     Social History Narrative       Patient does not have an advanced directive on file    Vitals:    07/03/17 0845   BP: (!) 152/100   Pulse: (!) 102   Resp: 16   Temp: 98.2 °F (36.8 °C)   TempSrc: Tympanic   SpO2: 98%   Weight: 306 lb (138.8 kg)   Height: 5' 11\" (1.803 m)   PainSc:   0 - No pain       Physical Exam   Constitutional: No distress. Cardiovascular: Normal rate and regular rhythm. Pulmonary/Chest: Effort normal and breath sounds normal.   Musculoskeletal: He exhibits edema (lymphedema. Paient noted it has improved). He exhibits no tenderness. Neurological: He is alert. Nursing note and vitals reviewed. No visits with results within 3 Month(s) from this visit. Latest known visit with results is:    Orders Only on 03/27/2017   Component Date Value Ref Range Status    Glucose 03/27/2017 111* 65 - 99 mg/dL Final    BUN 03/27/2017 15  6 - 22 mg/dL Final    Creatinine 03/27/2017 1.2  0.8 - 1.6 mg/dL Final    Sodium 03/27/2017 147* 133 - 145 mmol/L Final    Potassium 03/27/2017 3.8  3.5 - 5.5 mmol/L Final    Chloride 03/27/2017 104  98 - 110 mmol/L Final    CO2 03/27/2017 28  20 - 32 mmol/L Final    AST (SGOT) 03/27/2017 14  10 - 37 U/L Final    ALT (SGPT) 03/27/2017 16  5 - 40 U/L Final    Alk.  phosphatase 03/27/2017 67  40 - 125 U/L Final    Bilirubin, total 03/27/2017 0.2  0.2 - 1.2 mg/dL Final    Calcium 03/27/2017 9.3  8.4 - 10.4 mg/dL Final    Protein, total 03/27/2017 6.3  6.2 - 8.1 g/dL Final  Albumin 03/27/2017 3.9  3.5 - 5.0 g/dL Final    A-G Ratio 03/27/2017 1.6  1.1 - 2.6 ratio Final    Globulin 03/27/2017 2.4  2.0 - 4.0 g/dL Final    Anion gap 03/27/2017 15.0  mmol/L Final    Comment: Test includes Albumin, Alkaline Phosphatase, ALT, AST, BUN, Calcium, CO2,  Chloride, Creatinine, Glucose, Potassium, Sodium, Total Bilirubin and Total  Protein. Estimated GFR results are reported in mL/min/1.73 sq.m. by the MDRD equation. This eGFR is validated for stable chronic renal failure patients. This   equation  is unreliable in acute illness or patients with normal renal function.  GFRAA 03/27/2017 >60.0  >60.0 Final    GFRNA 03/27/2017 59.5* >60.0 Final    WBC 03/27/2017 5.4  4.0 - 11.0 K/uL Final    RBC 03/27/2017 4.67  3.80 - 5.80 M/uL Final    HGB 03/27/2017 13.2  13.1 - 17.2 g/dL Final    HCT 03/27/2017 42.3  39.3 - 51.6 % Final    MCV 03/27/2017 91  80 - 95 fL Final    MCH 03/27/2017 28  26 - 34 pg Final    MCHC 03/27/2017 31* 32 - 36 g/dL Final    RDW 03/27/2017 15.3  10.0 - 16.0 % Final    PLATELET 93/36/6131 359  140 - 440 K/uL Final    MPV 03/27/2017 10.3  6.0 - 10.8 fL Final    NEUTROPHILS 03/27/2017 53  40 - 75 % Final    Lymphocytes 03/27/2017 36  27 - 45 % Final    MONOCYTES 03/27/2017 7  3 - 9 % Final    EOSINOPHILS 03/27/2017 3  0 - 6 % Final    BASOPHILS 03/27/2017 0  0 - 2 % Final    ABS. NEUTROPHILS 03/27/2017 2.9  1.8 - 7.7 K/uL Final    ABSOLUTE LYMPHOCYTE COUNT 03/27/2017 2.0  1.0 - 4.8 K/uL Final    ABS. MONOCYTES 03/27/2017 0.4  0.1 - 0.9 K/uL Final    ABS. EOSINOPHILS 03/27/2017 0.1  0.0 - 0.5 K/uL Final    ABS. BASOPHILS 03/27/2017 0.0  0.0 - 0.2 K/uL Final    TSH 03/27/2017 1.51  0.27 - 4.20 mcU/mL Final       .No results found for any visits on 07/03/17. Assessment / Plan:      ICD-10-CM ICD-9-CM    1.  Essential hypertension I34 179.2 METABOLIC PANEL, COMPREHENSIVE      DUPLEX RENAL ART/SHARI BILATERAL      furosemide (LASIX) 20 mg tablet hydrALAZINE (APRESOLINE) 100 mg tablet   2. Morbid (severe) obesity due to excess calories (HCC) E66.01 278.01      HTN- controlled  CMP today  Lasix dose adjustment  Refilled Hydralazine (patient was not taking this med)  F/u in 3 weeks    Follow-up Disposition:  Return in about 3 weeks (around 7/24/2017). I asked the patient if he  had any questions and answered his  questions.   The patient stated that he understands the treatment plan and agrees with the treatment plan

## 2017-07-06 ENCOUNTER — LAB ONLY (OUTPATIENT)
Dept: INTERNAL MEDICINE CLINIC | Age: 62
End: 2017-07-06

## 2017-07-06 DIAGNOSIS — E79.0 HYPERURICEMIA: ICD-10-CM

## 2017-07-06 DIAGNOSIS — E78.00 HYPERCHOLESTEROLEMIA: ICD-10-CM

## 2017-07-06 DIAGNOSIS — R60.9 CHRONIC EDEMA: ICD-10-CM

## 2017-07-06 DIAGNOSIS — I10 ESSENTIAL HYPERTENSION: Primary | ICD-10-CM

## 2017-07-07 ENCOUNTER — TELEPHONE (OUTPATIENT)
Dept: INTERNAL MEDICINE CLINIC | Age: 62
End: 2017-07-07

## 2017-07-07 LAB
A-G RATIO,AGRAT: 1.4 RATIO (ref 1.1–2.6)
ALBUMIN SERPL-MCNC: 3.8 G/DL (ref 3.5–5)
ALP SERPL-CCNC: 75 U/L (ref 40–125)
ALT SERPL-CCNC: 9 U/L (ref 5–40)
ANION GAP SERPL CALC-SCNC: 18 MMOL/L
AST SERPL W P-5'-P-CCNC: 14 U/L (ref 10–37)
BILIRUB SERPL-MCNC: 0.2 MG/DL (ref 0.2–1.2)
BUN SERPL-MCNC: 35 MG/DL (ref 6–22)
CALCIUM SERPL-MCNC: 9.2 MG/DL (ref 8.4–10.4)
CHLORIDE SERPL-SCNC: 100 MMOL/L (ref 98–110)
CO2 SERPL-SCNC: 26 MMOL/L (ref 20–32)
CREAT SERPL-MCNC: 1.8 MG/DL (ref 0.8–1.6)
GFRAA, 66117: 46.3
GFRNA, 66118: 38.2
GLOBULIN,GLOB: 2.7 G/DL (ref 2–4)
GLUCOSE SERPL-MCNC: 135 MG/DL (ref 65–99)
POTASSIUM SERPL-SCNC: 3.2 MMOL/L (ref 3.5–5.5)
PROT SERPL-MCNC: 6.5 G/DL (ref 6.2–8.1)
SODIUM SERPL-SCNC: 144 MMOL/L (ref 133–145)

## 2017-07-07 NOTE — TELEPHONE ENCOUNTER
Mr Marylu Burnham contacted with instructions to stop HCTZ and restart potassium and increase fluids due to increase liver function values and low potassium lvl, Mr Marylu Burnham expressed understanding.

## 2017-07-10 RX ORDER — BENAZEPRIL HYDROCHLORIDE 40 MG/1
TABLET ORAL
Qty: 30 TAB | Refills: 3 | Status: SHIPPED | OUTPATIENT
Start: 2017-07-10 | End: 2017-11-08 | Stop reason: SDUPTHER

## 2017-07-11 ENCOUNTER — HOSPITAL ENCOUNTER (OUTPATIENT)
Dept: VASCULAR SURGERY | Age: 62
Discharge: HOME | End: 2017-07-11
Attending: NURSE PRACTITIONER
Payer: COMMERCIAL

## 2017-07-11 DIAGNOSIS — I10 ESSENTIAL HYPERTENSION: ICD-10-CM

## 2017-07-11 PROCEDURE — 93975 VASCULAR STUDY: CPT

## 2017-07-11 NOTE — PROCEDURES
[facility]  *** FINAL REPORT ***    Name: Fabi Wooten  MRN: [id number]    Outpatient  : 1955  HIS Order #: [id number]  [id number] [facility] Visit #: [id number]  Date: 2017    TYPE OF TEST: Visceral Arterial Duplex    REASON FOR TEST    Aortic PSV: 112.0 cm/s  Diameter AP: 1.7 cm   TV: 1.7 cm                   Right          Left  Renal Artery:- -------------  -------------  Proximal  PSV: 100.0           57.0  Mid       PSV:  59.0           69.0  Distal    PSV:  87.0           59.0  Aortic ratio :   0.9            0.6    Medullary PSV:  38.0           56.0            EDV:   9.0           16.0            EDR:   0.2            0.3            SDR:   4.2            3.5    Cortical  PSV:  25.0           33.0            EDV:   8.0           14.0            EDR:   0.3            0.4            SDR:   3.1            2.4  Stenosis:      Normal         Normal  Kidney size:   11.5 cm        11.3 cm               x  5.0 cm      x  5.2 cm    Hilar:-        Right          Left  Acc. Time  AT:   4 secs         8 secs  Acc. Index AI:             RI: 0.66           0.68    INTERPRETATION/FINDINGS  Duplex images were obtained using 2-D gray scale, color flow, and  spectral Doppler analysis. RENAL:  1. No significant renal artery stenosis identified, both renal  arteries visualized. 2. The right kidney measures 11.5 cm. 3. The left kidney measures 11.3 cm.  4. The renal vein is patent bilaterally. 5.  No evidence of aneurysm noted in the abdominal aorta. ADDITIONAL COMMENTS  Technically difficult scan due to patient body habiatus and laboured  breathing. I have personally reviewed the data relevant to the interpretation of  this  study.     TECHNOLOGIST: Sandy Garduno [facility], RVT/  Signed: 2017 10:44 AM    PHYSICIAN: Melissa Maravilla MD  Signed: 2017 01:55 PM

## 2017-07-12 ENCOUNTER — TELEPHONE (OUTPATIENT)
Dept: INTERNAL MEDICINE CLINIC | Age: 62
End: 2017-07-12

## 2017-07-12 NOTE — TELEPHONE ENCOUNTER
Patient just called to let Amparo Day know he went to Dr Carlos Eduardo Cunningham and they took his BP and it was 140/70 so the BP med is working .  Any Questions call [phone]

## 2017-07-19 ENCOUNTER — TELEPHONE (OUTPATIENT)
Dept: INTERNAL MEDICINE CLINIC | Age: 62
End: 2017-07-19

## 2017-07-19 NOTE — TELEPHONE ENCOUNTER
I have attempted to contact this patient by phone with the following results: left message to return my call on answering machine. In reference to CMP and Duplex results.

## 2017-07-19 NOTE — TELEPHONE ENCOUNTER
----- Message from Fely Blackwood NP sent at 7/18/2017  8:28 AM EDT -----  Please have the patient return to the office for an appointment

## 2017-07-19 NOTE — TELEPHONE ENCOUNTER
Contacted Uma Lubin and informed Him of duplex results per Shemar Frederick NP's request. Uma Lubin expressed understanding.  He will continue with his appointment on Monday to discuss labwork

## 2017-07-24 ENCOUNTER — OFFICE VISIT (OUTPATIENT)
Dept: INTERNAL MEDICINE CLINIC | Age: 62
End: 2017-07-24

## 2017-07-24 VITALS
WEIGHT: 305 LBS | RESPIRATION RATE: 18 BRPM | HEART RATE: 80 BPM | DIASTOLIC BLOOD PRESSURE: 88 MMHG | BODY MASS INDEX: 42.7 KG/M2 | SYSTOLIC BLOOD PRESSURE: 160 MMHG | HEIGHT: 71 IN | OXYGEN SATURATION: 97 % | TEMPERATURE: 98.3 F

## 2017-07-24 DIAGNOSIS — E87.6 HYPOKALEMIA: ICD-10-CM

## 2017-07-24 DIAGNOSIS — I10 ESSENTIAL HYPERTENSION: Primary | ICD-10-CM

## 2017-07-24 RX ORDER — ISOSORBIDE MONONITRATE 30 MG/1
30 TABLET, EXTENDED RELEASE ORAL DAILY
COMMUNITY
End: 2018-09-01

## 2017-07-24 NOTE — PROGRESS NOTES
Patient presents for   Chief Complaint   Patient presents with    Hypertension     follow up    Results     Fall risk assessment was not indicated. Depression screening was not indicated Follow up questions were not indicated. 1. Have you been to the ER, urgent care clinic since your last visit? Hospitalized since your last visit? No    2. Have you seen or consulted any other health care providers outside of the [address] [place] [address] since your last visit? Include any pap smears or colon screening.  No

## 2017-07-24 NOTE — PROGRESS NOTES
Jose Kan is a 63 y.o. male presenting today for Hypertension (follow up) and Results  . HPI:  Jose Kan presents to the office today for hypertension and lab results follow-up care. Patient notes he is feeling okay except he continues to have brief periods of chest tightness. Patient noted the Cardiologist is aware and changed his medication as he thought the Nitroglycerin was the inciting factor. Patient has not been taking his Hydralazine as he thought this med was discontinued. His labs showed hypokalemia. Patient takes Potassium daily but remains on lasix and HCTZ. Review of Systems   Respiratory: Negative for cough and shortness of breath. Cardiovascular: Positive for chest pain (intermittent chest tightness) and leg swelling. Negative for palpitations. Genitourinary: Negative for dysuria. Allergies   Allergen Reactions    Iodine Other (comments)     Eyes swell shut      Shellfish Containing Products Swelling       Current Outpatient Prescriptions   Medication Sig Dispense Refill    isosorbide mononitrate ER (IMDUR) 30 mg tablet Take 30 mg by mouth daily.  benazepril (LOTENSIN) 40 mg tablet TAKE 1 TABLET BY MOUTH DAILY (STOP LOTREL) 30 Tab 3    tolterodine ER (DETROL LA) 4 mg ER capsule Take 4 mg by mouth daily.  furosemide (LASIX) 20 mg tablet Take 1 tab by mouth daily 30 Tab 1    hydrALAZINE (APRESOLINE) 100 mg tablet 1 tablet twice per day (note:  New strength) 60 Tab 2    omeprazole (PRILOSEC) 40 mg capsule Take 1 Cap by mouth two (2) times a day. 60 Cap 5    allopurinol (ZYLOPRIM) 100 mg tablet Take 1 Tab by mouth daily. 90 Tab 3    amLODIPine (NORVASC) 10 mg tablet Take 1 Tab by mouth daily. 30 Tab 1    potassium chloride (KLOR-CON) 10 mEq tablet Take 1 Tab by mouth daily. 30 Tab 2    hydroCHLOROthiazide (HYDRODIURIL) 12.5 mg tablet Take 1 Tab by mouth daily.  30 Tab 2    labetalol (NORMODYNE) 200 mg tablet 1 tablet twice per day (Stop Metoprolol) 60 Tab 3    sucralfate (CARAFATE) 100 mg/mL suspension 2 teaspoons three times per day 414 mL 3    colchicine 0.6 mg tablet Take 0.6 mg by mouth daily.  gabapentin (NEURONTIN) 300 mg capsule Take 300 mg by mouth three (3) times daily.  avanafil (STENDRA) 200 mg tab tablet Take 1 Tab by mouth as needed for Other. Indications: Erectile Dysfunction 6 Tab 6    cloNIDine (CATAPRES) 0.2 mg/24 hr patch Apply weekly 12 Patch 3    aspirin 81 mg tablet Take 81 mg by mouth daily.  nitroglycerin (NITROSTAT) 0.4 mg SL tablet by SubLINGual route every five (5) minutes as needed for Chest Pain.          Past Medical History:   Diagnosis Date    BPH without obstruction/lower urinary tract symptoms     Bursitis of right shoulder     CAD (coronary artery disease)     Chest pain     history of hospitalization with chest pain and a negative workup in 2008    Chronic edema     Constipation     die to medication    Coronary artery disease     mild, non obstructive/EF 65%    Dyspnea on exertion     Echocardiogram 12/16/08    IVC dilated; suboptimal endocardial border; EF 65%    ED (erectile dysfunction)     Elevated PSA     Frequency     GERD (gastroesophageal reflux disease)     Gout     H/O cystoscopy 06/20/2013    Hematuria, unspecified     Hypercholesterolemia     Hypertension     Hypertension      Hypogonadism male     Impotence of organic origin     Left ventricular diastolic dysfunction     Malignant neoplasm of prostate (HCC)     hx of t1a, izzy 6, 5 % of 1  core    Morbid obesity ([facility] [address]     Myocardial perfusion 09/05/08    Basal inferior defect c/w artifact; EF 63%    Overactive bladder     S/P cardiac cath 07/30/10    oD2-40%; pRCA-20-30%; EF 65%    Sleep apnea     Slowing of urinary stream     Type II or unspecified type diabetes mellitus without mention of complication, not stated as uncontrolled        Past Surgical History:   Procedure Laterality Date    HX HEART CATHETERIZATION  7/30/10  HX OTHER SURGICAL  06/27/13    Prostate    HX TONSILLECTOMY      INCISION/DRAIN ABSCESS EXTRA      NV COLONOSCOPY FLX DX W/COLLJ SPEC WHEN PFRMD         Social History     Social History    Marital status: SINGLE     Spouse name: N/A    Number of children: N/A    Years of education: N/A     Occupational History    Not on file. Social History Main Topics    Smoking status: Former Smoker     Types: Cigars     Quit date: 10/4/1999    Smokeless tobacco: Never Used    Alcohol use No      Comment: socially    Drug use: No    Sexual activity: Not Currently     Other Topics Concern    Not on file     Social History Narrative       Patient does not have an advanced directive on file    Vitals:    07/24/17 0845   BP: 160/88   Pulse: 80   Resp: 18   Temp: 98.3 °F (36.8 °C)   TempSrc: Tympanic   SpO2: 97%   Weight: 305 lb (138.3 kg)   Height: 5' 11\" (1.803 m)   PainSc:   0 - No pain       Physical Exam   Constitutional: No distress. Cardiovascular: Normal rate, regular rhythm and normal heart sounds. No murmur heard. Pulmonary/Chest: Effort normal.   Musculoskeletal: He exhibits edema (hx of lymphedema). Lymphadenopathy:     He has no cervical adenopathy. Neurological: Gait normal.   Nursing note and vitals reviewed. Orders Only on 07/03/2017   Component Date Value Ref Range Status    Glucose 07/06/2017 135* 65 - 99 mg/dL Final    BUN 07/06/2017 35* 6 - 22 mg/dL Final    Creatinine 07/06/2017 1.8* 0.8 - 1.6 mg/dL Final    Sodium 07/06/2017 144  133 - 145 mmol/L Final    Potassium 07/06/2017 3.2* 3.5 - 5.5 mmol/L Final    Chloride 07/06/2017 100  98 - 110 mmol/L Final    CO2 07/06/2017 26  20 - 32 mmol/L Final    AST (SGOT) 07/06/2017 14  10 - 37 U/L Final    ALT (SGPT) 07/06/2017 9  5 - 40 U/L Final    Alk.  phosphatase 07/06/2017 75  40 - 125 U/L Final    Bilirubin, total 07/06/2017 0.2  0.2 - 1.2 mg/dL Final    Calcium 07/06/2017 9.2  8.4 - 10.4 mg/dL Final    Protein, total 07/06/2017 6.5  6.2 - 8.1 g/dL Final    Albumin 07/06/2017 3.8  3.5 - 5.0 g/dL Final    A-G Ratio 07/06/2017 1.4  1.1 - 2.6 ratio Final    Globulin 07/06/2017 2.7  2.0 - 4.0 g/dL Final    Anion gap 07/06/2017 18.0  mmol/L Final    Comment: Test includes Albumin, Alkaline Phosphatase, ALT, AST, BUN, Calcium, CO2,  Chloride, Creatinine, Glucose, Potassium, Sodium, Total Bilirubin and Total  Protein. Estimated GFR results are reported in mL/min/1.73 sq.m. by the MDRD equation. This eGFR is validated for stable chronic renal failure patients. This   equation  is unreliable in acute illness or patients with normal renal function.  GFRAA 07/06/2017 46.3* >60.0 Final    GFRNA 07/06/2017 38.2* >60.0 Final       .No results found for any visits on 07/24/17. Assessment / Plan:      ICD-10-CM ICD-9-CM    1. Essential hypertension I10 401.9    2. Hypokalemia Q72.3 848.6 METABOLIC PANEL, COMPREHENSIVE       Take Hydralazine today  Will repeat CMP today  HTN- b/p elevated today  F/u in 10 days    Follow-up Disposition:  Return if symptoms worsen or fail to improve. I asked the patient if he  had any questions and answered his  questions.   The patient stated that he understands the treatment plan and agrees with the treatment plan

## 2017-07-25 LAB
A-G RATIO,AGRAT: 1.7 RATIO (ref 1.1–2.6)
ALBUMIN SERPL-MCNC: 3.9 G/DL (ref 3.5–5)
ALP SERPL-CCNC: 83 U/L (ref 40–125)
ALT SERPL-CCNC: 12 U/L (ref 5–40)
ANION GAP SERPL CALC-SCNC: 15 MMOL/L
AST SERPL W P-5'-P-CCNC: 15 U/L (ref 10–37)
BILIRUB SERPL-MCNC: 0.2 MG/DL (ref 0.2–1.2)
BUN SERPL-MCNC: 27 MG/DL (ref 6–22)
CALCIUM SERPL-MCNC: 9.3 MG/DL (ref 8.4–10.4)
CHLORIDE SERPL-SCNC: 98 MMOL/L (ref 98–110)
CO2 SERPL-SCNC: 28 MMOL/L (ref 20–32)
CREAT SERPL-MCNC: 1.4 MG/DL (ref 0.8–1.6)
GFRAA, 66117: >60
GFRNA, 66118: 50.4
GLOBULIN,GLOB: 2.3 G/DL (ref 2–4)
GLUCOSE SERPL-MCNC: 105 MG/DL (ref 65–99)
POTASSIUM SERPL-SCNC: 3.5 MMOL/L (ref 3.5–5.5)
PROT SERPL-MCNC: 6.2 G/DL (ref 6.2–8.1)
SODIUM SERPL-SCNC: 141 MMOL/L (ref 133–145)

## 2017-07-27 ENCOUNTER — TELEPHONE (OUTPATIENT)
Dept: INTERNAL MEDICINE CLINIC | Age: 62
End: 2017-07-27

## 2017-07-27 NOTE — TELEPHONE ENCOUNTER
----- Message from Brianne Auguste NP sent at 7/27/2017  9:18 AM EDT -----  Please notify patient that lab results were reviewed and the labs have improved

## 2017-07-27 NOTE — TELEPHONE ENCOUNTER
I have attempted to contact this patient by phone with the following results: left message to return my call on answering machine.  In reference to labs drawn on 07/24/2017

## 2017-07-27 NOTE — TELEPHONE ENCOUNTER
Contacted Xochitl Carnes and informed Him of lab results per Shakeel Hinojosa NP's request. Xochitl Carnes expressed understanding.  Letter written per Amparo Day to clear Mr Marty He for return to work once he is cleared by cardiology

## 2017-08-03 ENCOUNTER — DOCUMENTATION ONLY (OUTPATIENT)
Dept: VASCULAR SURGERY | Age: 62
End: 2017-08-03

## 2017-08-03 ENCOUNTER — OFFICE VISIT (OUTPATIENT)
Dept: INTERNAL MEDICINE CLINIC | Age: 62
End: 2017-08-03

## 2017-08-03 VITALS
BODY MASS INDEX: 43.12 KG/M2 | RESPIRATION RATE: 18 BRPM | SYSTOLIC BLOOD PRESSURE: 122 MMHG | WEIGHT: 308 LBS | HEART RATE: 78 BPM | DIASTOLIC BLOOD PRESSURE: 84 MMHG | OXYGEN SATURATION: 97 % | HEIGHT: 71 IN | TEMPERATURE: 98.3 F

## 2017-08-03 DIAGNOSIS — R60.9 PERIPHERAL EDEMA: ICD-10-CM

## 2017-08-03 DIAGNOSIS — I10 ESSENTIAL HYPERTENSION: Primary | ICD-10-CM

## 2017-08-03 RX ORDER — FUROSEMIDE 20 MG/1
TABLET ORAL
Qty: 30 TAB | Refills: 1 | Status: SHIPPED | OUTPATIENT
Start: 2017-08-03 | End: 2018-08-28 | Stop reason: SDUPTHER

## 2017-08-03 NOTE — PROGRESS NOTES
Patient presents for   Chief Complaint   Patient presents with    Hypertension     Fall risk assessment was not indicated. Depression screening was not indicated Follow up questions were not indicated. 1. Have you been to the ER, urgent care clinic since your last visit? Hospitalized since your last visit? No    2. Have you seen or consulted any other health care providers outside of the [address] [place] [address] since your last visit? Include any pap smears or colon screening.  No

## 2017-08-03 NOTE — MR AVS SNAPSHOT
Visit Information Date & Time Provider Department Dept. Phone Encounter #  
 8/3/2017  8:30 AM Brianne Auguste NP [facility] INTERNAL MEDICINE OF [place] [phone] [id number] Follow-up Instructions Return in about 3 months (around 11/3/2017). Your Appointments 11/8/2017  8:30 AM  
Follow Up with Brianne Auguste NP  
[facility] INTERNAL MEDICINE OF [place] ([address]) Appt Note: 3MO  
 [address], [address] [place] [address]  
  
   
 [address], [address] [place] [address]  
  
    
  
 8/7/2017  9:30 AM  
Any with Tian Hernandez MD  
[facility] ([address]) Appt Note: 6 mon w/ psa prior [address] [place] [address]  
[address] [facility] [address] [facility] [address] [place] [address] Upcoming Health Maintenance Date Due Hepatitis C Screening 1955 EYE EXAM RETINAL OR DILATED Q1 1/8/1967 DTaP/Tdap/Td series (1 - Tdap) 1/8/1978 Pneumococcal 19-64 Highest Risk (2 of 3 - PCV13) 2/3/2012 ZOSTER VACCINE AGE 60> 11/8/2016 HEMOGLOBIN A1C Q6M 12/29/2016 MICROALBUMIN Q1 6/29/2017 INFLUENZA AGE 9 TO ADULT 8/1/2017 FOBT Q 1 YEAR AGE 50-75 9/29/2017 FOOT EXAM Q1 9/7/2017 LIPID PANEL Q1 5/10/2018 Allergies as of 8/3/2017  Review Complete On: 8/3/2017 By: Brianne Auguste NP Severity Noted Reaction Type Reactions Iodine  04/12/2011    Other (comments) Eyes swell shut Shellfish Containing Products  04/12/2011    Swelling Current Immunizations  Reviewed on 6/16/2017 Name Date Influenza Vaccine 9/1/2016 12:00 AM  
 Influenza Vaccine (Quad) PF 10/20/2016 Influenza Vaccine PF 10/2/2015 Pneumococcal Polysaccharide (PPSV-23) 2/3/2011 Not reviewed this visit You Were Diagnosed With   
  
 Codes Comments Essential hypertension    -  Primary ICD-10-CM: I10 
ICD-9-CM: 401.9 Peripheral edema     ICD-10-CM: R60.9 ICD-9-CM: 508. 3 Vitals BP Pulse Temp Resp Height(growth percentile) 122/84 (BP 1 Location: Left arm, BP Patient Position: Sitting) 78 98.3 °F (36.8 °C) (Tympanic) 18 5' 11\" (1.803 m) Weight(growth percentile) SpO2 BMI Smoking Status 308 lb (139.7 kg) 97% 42.96 kg/m2 Former Smoker BMI and BSA Data Body Mass Index Body Surface Area 42.96 kg/m 2 2.65 m 2 Preferred Pharmacy Pharmacy Name Phone [id number] [place], [address] [place] [address] Your Updated Medication List  
  
   
This list is accurate as of: 8/3/17  9:31 AM.  Always use your most recent med list.  
  
  
  
  
 allopurinol 100 mg tablet Commonly known as:  Charol Zeeshan Take 1 Tab by mouth daily. amLODIPine 10 mg tablet Commonly known as:  Ame Gerard Take 1 Tab by mouth daily. aspirin 81 mg tablet Take 81 mg by mouth daily. avanafil 200 mg Tab tablet Commonly known as:  MZHSBIH Take 1 Tab by mouth as needed for Other. Indications: Erectile Dysfunction  
  
 benazepril 40 mg tablet Commonly known as:  LOTENSIN  
TAKE 1 TABLET BY MOUTH DAILY (STOP LOTREL)  
  
 cloNIDine 0.2 mg/24 hr patch Commonly known as:  CATAPRES Apply weekly  
  
 colchicine 0.6 mg tablet Take 0.6 mg by mouth daily. * furosemide 20 mg tablet Commonly known as:  LASIX Take 1 tab by mouth daily * furosemide 20 mg tablet Commonly known as:  LASIX Take 1 pill by mouth as needed for edema  
  
 gabapentin 300 mg capsule Commonly known as:  NEURONTIN Take 300 mg by mouth three (3) times daily. hydrALAZINE 100 mg tablet Commonly known as:  APRESOLINE  
1 tablet twice per day (note:  New strength)  
  
 hydroCHLOROthiazide 12.5 mg tablet Commonly known as:  HYDRODIURIL Take 1 Tab by mouth daily. isosorbide mononitrate ER 30 mg tablet Commonly known as:  IMDUR Take 30 mg by mouth daily. labetalol 200 mg tablet Commonly known as:  NORMODYNE  
1 tablet twice per day (Stop Metoprolol)  
  
 nitroglycerin 0.4 mg SL tablet Commonly known as:  NITROSTAT  
by SubLINGual route every five (5) minutes as needed for Chest Pain. omeprazole 40 mg capsule Commonly known as:  PRILOSEC Take 1 Cap by mouth two (2) times a day. potassium chloride 10 mEq tablet Commonly known as:  KLOR-CON Take 1 Tab by mouth daily. sucralfate 100 mg/mL suspension Commonly known as:  CARAFATE  
2 teaspoons three times per day  
  
 tolterodine ER 4 mg ER capsule Commonly known as:  Corky Bun Take 4 mg by mouth daily. * Notice: This list has 2 medication(s) that are the same as other medications prescribed for you. Read the directions carefully, and ask your doctor or other care provider to review them with you. Prescriptions Sent to Pharmacy Refills  
 furosemide (LASIX) 20 mg tablet 1 Sig: Take 1 pill by mouth as needed for edema Class: Normal  
 Pharmacy: [id number] [place], [address] [place] [address] #: [phone] Follow-up Instructions Return in about 3 months (around 11/3/2017). To-Do List   
 08/18/2017 3:30 PM  
  Appointment with Pal Capps RD at [address] [place] [address] Please provide this summary of care documentation to your next provider. Your primary care clinician is listed as Benito Rivera. If you have any questions after today's visit, please call [phone].

## 2017-08-03 NOTE — PROGRESS NOTES
Anthony Bernal is a 63 y.o. male presenting today for Hypertension  . HPI:  Anthony Bernal presents to the office today for hypertension follow-up care. Patient denied any chest pain or palpitations. He noted he is feeling good today and has returned back to work. Review of Systems   Respiratory: Negative for cough and shortness of breath. Cardiovascular: Positive for leg swelling (improved with diurectic and lymphedema leg machine). Negative for chest pain and palpitations. Neurological: Negative for dizziness. Allergies   Allergen Reactions    Iodine Other (comments)     Eyes swell shut      Shellfish Containing Products Swelling       Current Outpatient Prescriptions   Medication Sig Dispense Refill    furosemide (LASIX) 20 mg tablet Take 1 pill by mouth as needed for edema 30 Tab 1    isosorbide mononitrate ER (IMDUR) 30 mg tablet Take 30 mg by mouth daily.  benazepril (LOTENSIN) 40 mg tablet TAKE 1 TABLET BY MOUTH DAILY (STOP LOTREL) 30 Tab 3    tolterodine ER (DETROL LA) 4 mg ER capsule Take 4 mg by mouth daily.  nitroglycerin (NITROSTAT) 0.4 mg SL tablet by SubLINGual route every five (5) minutes as needed for Chest Pain.  furosemide (LASIX) 20 mg tablet Take 1 tab by mouth daily 30 Tab 1    hydrALAZINE (APRESOLINE) 100 mg tablet 1 tablet twice per day (note:  New strength) 60 Tab 2    omeprazole (PRILOSEC) 40 mg capsule Take 1 Cap by mouth two (2) times a day. 60 Cap 5    allopurinol (ZYLOPRIM) 100 mg tablet Take 1 Tab by mouth daily. 90 Tab 3    amLODIPine (NORVASC) 10 mg tablet Take 1 Tab by mouth daily. 30 Tab 1    potassium chloride (KLOR-CON) 10 mEq tablet Take 1 Tab by mouth daily. 30 Tab 2    hydroCHLOROthiazide (HYDRODIURIL) 12.5 mg tablet Take 1 Tab by mouth daily.  30 Tab 2    labetalol (NORMODYNE) 200 mg tablet 1 tablet twice per day (Stop Metoprolol) 60 Tab 3    sucralfate (CARAFATE) 100 mg/mL suspension 2 teaspoons three times per day 414 mL 3    colchicine 0.6 mg tablet Take 0.6 mg by mouth daily.  gabapentin (NEURONTIN) 300 mg capsule Take 300 mg by mouth three (3) times daily.  avanafil (STENDRA) 200 mg tab tablet Take 1 Tab by mouth as needed for Other. Indications: Erectile Dysfunction 6 Tab 6    cloNIDine (CATAPRES) 0.2 mg/24 hr patch Apply weekly 12 Patch 3    aspirin 81 mg tablet Take 81 mg by mouth daily.          Past Medical History:   Diagnosis Date    BPH without obstruction/lower urinary tract symptoms     Bursitis of right shoulder     CAD (coronary artery disease)     Chest pain     history of hospitalization with chest pain and a negative workup in 2008    Chronic edema     Constipation     die to medication    Coronary artery disease     mild, non obstructive/EF 65%    Dyspnea on exertion     Echocardiogram 12/16/08    IVC dilated; suboptimal endocardial border; EF 65%    ED (erectile dysfunction)     Elevated PSA     Frequency     GERD (gastroesophageal reflux disease)     Gout     H/O cystoscopy 06/20/2013    Hematuria, unspecified     Hypercholesterolemia     Hypertension     Hypertension      Hypogonadism male     Impotence of organic origin     Left ventricular diastolic dysfunction     Malignant neoplasm of prostate (HCC)     hx of t1a, izzy 6, 5 % of 1  core    Morbid obesity ([facility] [address]     Myocardial perfusion 09/05/08    Basal inferior defect c/w artifact; EF 63%    Overactive bladder     S/P cardiac cath 07/30/10    oD2-40%; pRCA-20-30%; EF 65%    Sleep apnea     Slowing of urinary stream     Type II or unspecified type diabetes mellitus without mention of complication, not stated as uncontrolled        Past Surgical History:   Procedure Laterality Date    HX HEART CATHETERIZATION  7/30/10    HX OTHER SURGICAL  06/27/13    Prostate    HX TONSILLECTOMY      INCISION/DRAIN ABSCESS EXTRA      UT COLONOSCOPY FLX DX W/COLLJ SPEC WHEN PFRMD         Social History     Social History    Marital status: SINGLE     Spouse name: N/A    Number of children: N/A    Years of education: N/A     Occupational History    Not on file. Social History Main Topics    Smoking status: Former Smoker     Types: Cigars     Quit date: 10/4/1999    Smokeless tobacco: Never Used    Alcohol use No      Comment: socially    Drug use: No    Sexual activity: Not Currently     Other Topics Concern    Not on file     Social History Narrative       Patient does not have an advanced directive on file    Vitals:    08/03/17 0844   BP: 122/84   Pulse: 78   Resp: 18   Temp: 98.3 °F (36.8 °C)   TempSrc: Tympanic   SpO2: 97%   Weight: 308 lb (139.7 kg)   Height: 5' 11\" (1.803 m)   PainSc:   0 - No pain       Physical Exam   Cardiovascular: Normal rate, regular rhythm and normal heart sounds. Pulmonary/Chest: Effort normal and breath sounds normal.   Musculoskeletal: He exhibits edema and tenderness. Orders Only on 07/24/2017   Component Date Value Ref Range Status    Glucose 07/25/2017 105* 65 - 99 mg/dL Final    BUN 07/25/2017 27* 6 - 22 mg/dL Final    Creatinine 07/25/2017 1.4  0.8 - 1.6 mg/dL Final    Sodium 07/25/2017 141  133 - 145 mmol/L Final    Potassium 07/25/2017 3.5  3.5 - 5.5 mmol/L Final    Chloride 07/25/2017 98  98 - 110 mmol/L Final    CO2 07/25/2017 28  20 - 32 mmol/L Final    AST (SGOT) 07/25/2017 15  10 - 37 U/L Final    ALT (SGPT) 07/25/2017 12  5 - 40 U/L Final    Alk.  phosphatase 07/25/2017 83  40 - 125 U/L Final    Bilirubin, total 07/25/2017 0.2  0.2 - 1.2 mg/dL Final    Calcium 07/25/2017 9.3  8.4 - 10.4 mg/dL Final    Protein, total 07/25/2017 6.2  6.2 - 8.1 g/dL Final    Albumin 07/25/2017 3.9  3.5 - 5.0 g/dL Final    A-G Ratio 07/25/2017 1.7  1.1 - 2.6 ratio Final    Globulin 07/25/2017 2.3  2.0 - 4.0 g/dL Final    Anion gap 07/25/2017 15.0  mmol/L Final    Comment: Test includes Albumin, Alkaline Phosphatase, ALT, AST, BUN, Calcium, CO2,  Chloride, Creatinine, Glucose, Potassium, Sodium, Total Bilirubin and Total  Protein. Estimated GFR results are reported in mL/min/1.73 sq.m. by the MDRD equation. This eGFR is validated for stable chronic renal failure patients. This   equation  is unreliable in acute illness or patients with normal renal function.  GFRAA 07/25/2017 >60.0  >60.0 Final    GFRNA 07/25/2017 50.4* >60.0 Final   Orders Only on 07/03/2017   Component Date Value Ref Range Status    Glucose 07/06/2017 135* 65 - 99 mg/dL Final    BUN 07/06/2017 35* 6 - 22 mg/dL Final    Creatinine 07/06/2017 1.8* 0.8 - 1.6 mg/dL Final    Sodium 07/06/2017 144  133 - 145 mmol/L Final    Potassium 07/06/2017 3.2* 3.5 - 5.5 mmol/L Final    Chloride 07/06/2017 100  98 - 110 mmol/L Final    CO2 07/06/2017 26  20 - 32 mmol/L Final    AST (SGOT) 07/06/2017 14  10 - 37 U/L Final    ALT (SGPT) 07/06/2017 9  5 - 40 U/L Final    Alk. phosphatase 07/06/2017 75  40 - 125 U/L Final    Bilirubin, total 07/06/2017 0.2  0.2 - 1.2 mg/dL Final    Calcium 07/06/2017 9.2  8.4 - 10.4 mg/dL Final    Protein, total 07/06/2017 6.5  6.2 - 8.1 g/dL Final    Albumin 07/06/2017 3.8  3.5 - 5.0 g/dL Final    A-G Ratio 07/06/2017 1.4  1.1 - 2.6 ratio Final    Globulin 07/06/2017 2.7  2.0 - 4.0 g/dL Final    Anion gap 07/06/2017 18.0  mmol/L Final    Comment: Test includes Albumin, Alkaline Phosphatase, ALT, AST, BUN, Calcium, CO2,  Chloride, Creatinine, Glucose, Potassium, Sodium, Total Bilirubin and Total  Protein. Estimated GFR results are reported in mL/min/1.73 sq.m. by the MDRD equation. This eGFR is validated for stable chronic renal failure patients. This   equation  is unreliable in acute illness or patients with normal renal function.  GFRAA 07/06/2017 46.3* >60.0 Final    GFRNA 07/06/2017 38.2* >60.0 Final       .No results found for any visits on 08/03/17. Assessment / Plan:      ICD-10-CM ICD-9-CM    1. Essential hypertension I10 401.9 furosemide (LASIX) 20 mg tablet   2. Peripheral edema R60.9 782.3 furosemide (LASIX) 20 mg tablet     HTN- controlled  Lasix prn  F/u in 3 month  Will repeat CMP       Follow-up Disposition:  Return in about 3 months (around 11/3/2017). I asked the patient if he  had any questions and answered his  questions.   The patient stated that he understands the treatment plan and agrees with the treatment plan

## 2017-08-14 DIAGNOSIS — I10 ESSENTIAL HYPERTENSION: ICD-10-CM

## 2017-08-14 RX ORDER — AMLODIPINE BESYLATE 10 MG/1
TABLET ORAL
Qty: 30 TAB | Refills: 1 | Status: SHIPPED | OUTPATIENT
Start: 2017-08-14 | End: 2017-10-09 | Stop reason: SDUPTHER

## 2017-08-18 ENCOUNTER — HOSPITAL ENCOUNTER (OUTPATIENT)
Dept: NUTRITION | Age: 62
Discharge: HOME | End: 2017-08-18
Payer: COMMERCIAL

## 2017-08-18 PROCEDURE — 97802 MEDICAL NUTRITION INDIV IN: CPT

## 2017-08-18 NOTE — PROGRESS NOTES
[address] [facility]  [address], [place], [address] - Phone: [phone]     Nutrition Assessment  Medical Nutrition Therapy   Outpatient  Initial Evaluation         Patient Name: Marc Alvarez : 1955   Treatment Diagnosis: Obesity Onset Date:    Referral Source: Abelardo Gonzalez, * Start of Care [facility]): 2017     Ht:  71 In  cm Wt: 308 lb  kg   BMI: 37  BMR male    BMR female      Diagnosis:         Medications of Nutritional Significance:   Lasix, Norvasc, Imdur     Subjective/Assessment: Pt is a 64 yo male with Obesity seeking education on weight loss and lifestyle changes. Pt is a heavy  and walks 1 mile to his crane and back daily and gets a total of ~ 3 miles per day. Pt drinks ~ 1/2 gal of water daily as well. He has a heart attack and clot in April and was on FMLA until about 3 weeks ago. He lives with his girlfriend who does the majority of cooking but they have discussed sharing the responsibility and also the need to make changes to diet and lifestyle. Pt indulges on special occasions only. Also discussed the option of having a protein drink for a breakfast replacement. Discussed meal timing (follow a timeline), composition, and portion control. Discussed the plate method and how to better balance meals and snacks. Discussed eating with 1-2 hours of waking, going no longer than 5 hours without eating, but no closer than 3 hours, and stopping eating 2 hours before bed Drink water and Sugar Free beverages only. Complete at least 120 mins of physical activity each week, divided as schedule permits. Educated pt on carbohydrate counting, label reading, meal timing, and serving sizes. Pt expressed comprehension, high motivation, and compliance is expected.        Current Eating Patterns:  Wake up: 0500  B: turk colindres, egg, cheese, sandwich  L: Ham and cheese sandwich, butterscotch pudding  D: pepper steak w/ broccoli, rice, asher     Handouts/  Information Provided: [x]  Carbohydrates  [x]  Protein  []  Fiber  [x]  Serving Sizes  [x]  Meal and Snack Ideas  []  Food Journals []  Diabetes  []  Cholesterol  []  Sodium  [x]  Gen Nutr Guidelines  []  SBGM Guidelines  [x]  Others: [place]   Estimate Needs:   Calories:  2000 Protein: 180 Carbs: 160 Fat: 71   Kcal/day  g/day  g/day  g/day         percent: 36 percent: 32 percent: 32     Nutritional Goal - To promote lifestyle changes to result in:    [x]  weight loss  []  Improved diabetic control  []  Decreased cholesterol levels  []  Decreased blood pressure  []  weight maintenance []  Preventing any interactions associated with food allergies  []  Adequate weight gain toward goal weight  []  Other:          Recommendations: 1. Appropriate meal timing; follow a structured timeline  2. Focus on Protein sources at meals and snacks. Never eat carbs alone, always pair them with protein,   Carb Limits: 35-45 gm at meals, 10-15 gm @ snacks  3. Use the plate method for portion contol and balance  4. Exercise for a minimum of 30 min 3-4 times per week, advance to daily.      Information Reviewed with: Patient   Potential Barriers to Learning: None     Dietitian Signature: Alyssa Murcia MA RD Date: 8/18/2017   Follow-up: 9-29-17 @ 1400 Time: 3:09 PM

## 2017-09-01 ENCOUNTER — OFFICE VISIT (OUTPATIENT)
Dept: ORTHOPEDIC SURGERY | Facility: CLINIC | Age: 62
End: 2017-09-01

## 2017-09-01 VITALS
HEART RATE: 74 BPM | TEMPERATURE: 97.3 F | SYSTOLIC BLOOD PRESSURE: 136 MMHG | BODY MASS INDEX: 43.76 KG/M2 | DIASTOLIC BLOOD PRESSURE: 76 MMHG | OXYGEN SATURATION: 96 % | HEIGHT: 71 IN | RESPIRATION RATE: 18 BRPM | WEIGHT: 312.6 LBS

## 2017-09-01 DIAGNOSIS — M22.41 CHONDROMALACIA, PATELLA, RIGHT: Primary | ICD-10-CM

## 2017-09-01 DIAGNOSIS — M17.12 PRIMARY OSTEOARTHRITIS OF LEFT KNEE: ICD-10-CM

## 2017-09-01 DIAGNOSIS — M25.561 CHRONIC PAIN OF RIGHT KNEE: ICD-10-CM

## 2017-09-01 DIAGNOSIS — M17.11 OSTEOARTHROSIS, LOCALIZED, PRIMARY, KNEE, RIGHT: ICD-10-CM

## 2017-09-01 DIAGNOSIS — G89.29 CHRONIC PAIN OF RIGHT KNEE: ICD-10-CM

## 2017-09-01 RX ORDER — BUPIVACAINE HYDROCHLORIDE 2.5 MG/ML
4 INJECTION, SOLUTION EPIDURAL; INFILTRATION; INTRACAUDAL ONCE
Qty: 4 ML | Refills: 0
Start: 2017-09-01 | End: 2017-09-01

## 2017-09-01 RX ORDER — BETAMETHASONE SODIUM PHOSPHATE AND BETAMETHASONE ACETATE 3; 3 MG/ML; MG/ML
3 INJECTION, SUSPENSION INTRA-ARTICULAR; INTRALESIONAL; INTRAMUSCULAR; SOFT TISSUE ONCE
Qty: 0.5 ML | Refills: 0
Start: 2017-09-01 | End: 2017-09-01
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
## 2017-09-01 NOTE — PATIENT INSTRUCTIONS
Knee Arthritis: Exercises  Your Care Instructions  Here are some examples of exercises for knee arthritis. Start each exercise slowly. Ease off the exercise if you start to have pain. Your doctor or physical therapist will tell you when you can start these exercises and which ones will work best for you. How to do the exercises  Knee flexion with heel slide    1. Lie on your back with your knees bent. 2. Slide your heel back by bending your affected knee as far as you can. Then hook your other foot around your ankle to help pull your heel even farther back. 3. Hold for about 6 seconds, then rest for up to 10 seconds. 4. Repeat 8 to 12 times. 5. Switch legs and repeat steps 1 through 4, even if only one knee is sore. Quad sets    1. Sit with your affected leg straight and supported on the floor or a firm bed. Place a small, rolled-up towel under your knee. Your other leg should be bent, with that foot flat on the floor. 2. Tighten the thigh muscles of your affected leg by pressing the back of your knee down into the towel. 3. Hold for about 6 seconds, then rest for up to 10 seconds. 4. Repeat 8 to 12 times. 5. Switch legs and repeat steps 1 through 4, even if only one knee is sore. Straight-leg raises to the front    1. Lie on your back with your good knee bent so that your foot rests flat on the floor. Your affected leg should be straight. Make sure that your low back has a normal curve. You should be able to slip your hand in between the floor and the small of your back, with your palm touching the floor and your back touching the back of your hand. 2. Tighten the thigh muscles in your affected leg by pressing the back of your knee flat down to the floor. Hold your knee straight. 3. Keeping the thigh muscles tight and your leg straight, lift your affected leg up so that your heel is about 12 inches off the floor. Hold for about 6 seconds, then lower slowly.   4. Relax for up to 10 seconds between repetitions. 5. Repeat 8 to 12 times. 6. Switch legs and repeat steps 1 through 5, even if only one knee is sore. Active knee flexion    1. Lie on your stomach with your knees straight. If your kneecap is uncomfortable, roll up a washcloth and put it under your leg just above your kneecap. 2. Lift the foot of your affected leg by bending the knee so that you bring the foot up toward your buttock. If this motion hurts, try it without bending your knee quite as far. This may help you avoid any painful motion. 3. Slowly move your leg up and down. 4. Repeat 8 to 12 times. 5. Switch legs and repeat steps 1 through 4, even if only one knee is sore. Quadriceps stretch (facedown)    1. Lie flat on your stomach, and rest your face on the floor. 2. Wrap a towel or belt strap around the lower part of your affected leg. Then use the towel or belt strap to slowly pull your heel toward your buttock until you feel a stretch. 3. Hold for about 15 to 30 seconds, then relax your leg against the towel or belt strap. 4. Repeat 2 to 4 times. 5. Switch legs and repeat steps 1 through 4, even if only one knee is sore. Stationary exercise bike    If you do not have a stationary exercise bike at home, you can find one to ride at your local health club or community center. 1. Adjust the height of the bike seat so that your knee is slightly bent when your leg is extended downward. If your knee hurts when the pedal reaches the top, you can raise the seat so that your knee does not bend as much. 2. Start slowly. At first, try to do 5 to 10 minutes of cycling with little to no resistance. Then increase your time and the resistance bit by bit until you can do 20 to 30 minutes without pain. 3. If you start to have pain, rest your knee until your pain gets back to the level that is normal for you. Or cycle for less time or with less effort. Follow-up care is a key part of your treatment and safety.  Be sure to make and go to all appointments, and call your doctor if you are having problems. It's also a good idea to know your test results and keep a list of the medicines you take. Where can you learn more? Go to http://davian-johnson.info/. Enter C159 in the search box to learn more about \"Knee Arthritis: Exercises. \"  Current as of: March 21, 2017  Content Version: 11.3  © 2006-2017 [employer]. Care instructions adapted under license by [employer] (which disclaims liability or warranty for this information). If you have questions about a medical condition or this instruction, always ask your healthcare professional. [place] [address] any warranty or liability for your use of this information. Joint Injections: Care Instructions  Your Care Instructions  Joint injections are shots into a joint, such as the knee. They may be used to put in medicines, such as pain relievers. Or they can be used to take out fluid. Sometimes the fluid is tested in a lab. This can help find the cause of a joint problem. A corticosteroid, or steroid, shot is used to reduce inflammation in tendons or joints. It is often used to treat problems such as arthritis, tendinitis, and bursitis. Steroids can be injected directly into a painful, inflamed joint. They can also help reduce inflammation of a bursa. A bursa is a sac of fluid. It cushions and lubricates areas where tendons, ligaments, skin, muscles, or bones rub against each other. A steroid shot can sometimes help with short-term pain relief when other treatments haven't worked. If steroid shots help, pain may improve for weeks or months. Follow-up care is a key part of your treatment and safety. Be sure to make and go to all appointments, and call your doctor if you are having problems. It's also a good idea to know your test results and keep a list of the medicines you take. How can you care for yourself at home?   · Put ice or a cold pack on the area for 10 to 20 minutes at a time. Put a thin cloth between the ice and your skin. · Take anti-inflammatory medicines to reduce pain, swelling, or inflammation. These include ibuprofen (Advil, Motrin) and naproxen (Aleve). Read and follow all instructions on the label. · Avoid strenuous activities for several days, especially those that put stress on the area where you got the shot. · If you have dressings over the area, keep them clean and dry. You may remove them when your doctor tells you to. When should you call for help? Call your doctor now or seek immediate medical care if:  · You have signs of infection, such as:  ¨ Increased pain, swelling, warmth, or redness. ¨ Red streaks leading from the site. ¨ Pus draining from the site. ¨ A fever. Watch closely for changes in your health, and be sure to contact your doctor if you have any problems. Where can you learn more? Go to http://davian-johnson.info/. Enter N616 in the search box to learn more about \"Joint Injections: Care Instructions. \"  Current as of: March 21, 2017  Content Version: 11.3  © 0651-8984 [employer]. Care instructions adapted under license by [employer] (which disclaims liability or warranty for this information). If you have questions about a medical condition or this instruction, always ask your healthcare professional. [address] any warranty or liability for your use of this information.

## 2017-09-01 NOTE — LETTER
9/1/2017 11:50 AM 
 
Mr. Murali Katz [address] [facility] [id number] THE ABOVE PATIENT WAS SEEN TODAY.  
 
 
 
Sincerely, 
 
 
Lorena Aguilera MD

## 2017-09-11 DIAGNOSIS — I10 ESSENTIAL HYPERTENSION: ICD-10-CM

## 2017-09-13 ENCOUNTER — CLINICAL SUPPORT (OUTPATIENT)
Dept: INTERNAL MEDICINE CLINIC | Age: 62
End: 2017-09-13

## 2017-09-13 DIAGNOSIS — Z23 ENCOUNTER FOR IMMUNIZATION: Primary | ICD-10-CM

## 2017-09-26 DIAGNOSIS — I10 ESSENTIAL HYPERTENSION: ICD-10-CM

## 2017-09-26 RX ORDER — HYDRALAZINE HYDROCHLORIDE 100 MG/1
TABLET, FILM COATED ORAL
Qty: 60 TAB | Refills: 2 | Status: SHIPPED | OUTPATIENT
Start: 2017-09-26 | End: 2017-10-18 | Stop reason: SDUPTHER

## 2017-10-05 ENCOUNTER — OFFICE VISIT (OUTPATIENT)
Dept: ORTHOPEDIC SURGERY | Facility: CLINIC | Age: 62
End: 2017-10-05

## 2017-10-05 VITALS
WEIGHT: 305.4 LBS | HEART RATE: 90 BPM | SYSTOLIC BLOOD PRESSURE: 170 MMHG | OXYGEN SATURATION: 97 % | DIASTOLIC BLOOD PRESSURE: 95 MMHG | RESPIRATION RATE: 18 BRPM | BODY MASS INDEX: 42.76 KG/M2 | HEIGHT: 71 IN | TEMPERATURE: 98.4 F

## 2017-10-05 DIAGNOSIS — M54.5 CHRONIC BILATERAL LOW BACK PAIN, WITH SCIATICA PRESENCE UNSPECIFIED: ICD-10-CM

## 2017-10-05 DIAGNOSIS — M17.11 PRIMARY OSTEOARTHRITIS OF RIGHT KNEE: Primary | ICD-10-CM

## 2017-10-05 DIAGNOSIS — G89.29 CHRONIC BILATERAL LOW BACK PAIN, WITH SCIATICA PRESENCE UNSPECIFIED: ICD-10-CM

## 2017-10-05 DIAGNOSIS — G89.29 CHRONIC PAIN OF RIGHT KNEE: ICD-10-CM

## 2017-10-05 DIAGNOSIS — M25.561 CHRONIC PAIN OF RIGHT KNEE: ICD-10-CM

## 2017-10-05 DIAGNOSIS — M22.41 CHONDROMALACIA, PATELLA, RIGHT: ICD-10-CM

## 2017-10-05 RX ORDER — HYALURONATE SODIUM 10 MG/ML
2 SYRINGE (ML) INTRAARTICULAR ONCE
Qty: 2 ML | Refills: 0
Start: 2017-10-05 | End: 2017-10-05

## 2017-10-05 NOTE — PATIENT INSTRUCTIONS
Knee Arthritis: Exercises  Your Care Instructions  Here are some examples of exercises for knee arthritis. Start each exercise slowly. Ease off the exercise if you start to have pain. Your doctor or physical therapist will tell you when you can start these exercises and which ones will work best for you. How to do the exercises  Knee flexion with heel slide    1. Lie on your back with your knees bent. 2. Slide your heel back by bending your affected knee as far as you can. Then hook your other foot around your ankle to help pull your heel even farther back. 3. Hold for about 6 seconds, then rest for up to 10 seconds. 4. Repeat 8 to 12 times. 5. Switch legs and repeat steps 1 through 4, even if only one knee is sore. Quad sets    1. Sit with your affected leg straight and supported on the floor or a firm bed. Place a small, rolled-up towel under your knee. Your other leg should be bent, with that foot flat on the floor. 2. Tighten the thigh muscles of your affected leg by pressing the back of your knee down into the towel. 3. Hold for about 6 seconds, then rest for up to 10 seconds. 4. Repeat 8 to 12 times. 5. Switch legs and repeat steps 1 through 4, even if only one knee is sore. Straight-leg raises to the front    1. Lie on your back with your good knee bent so that your foot rests flat on the floor. Your affected leg should be straight. Make sure that your low back has a normal curve. You should be able to slip your hand in between the floor and the small of your back, with your palm touching the floor and your back touching the back of your hand. 2. Tighten the thigh muscles in your affected leg by pressing the back of your knee flat down to the floor. Hold your knee straight. 3. Keeping the thigh muscles tight and your leg straight, lift your affected leg up so that your heel is about 12 inches off the floor. Hold for about 6 seconds, then lower slowly.   4. Relax for up to 10 seconds between repetitions. 5. Repeat 8 to 12 times. 6. Switch legs and repeat steps 1 through 5, even if only one knee is sore. Active knee flexion    1. Lie on your stomach with your knees straight. If your kneecap is uncomfortable, roll up a washcloth and put it under your leg just above your kneecap. 2. Lift the foot of your affected leg by bending the knee so that you bring the foot up toward your buttock. If this motion hurts, try it without bending your knee quite as far. This may help you avoid any painful motion. 3. Slowly move your leg up and down. 4. Repeat 8 to 12 times. 5. Switch legs and repeat steps 1 through 4, even if only one knee is sore. Quadriceps stretch (facedown)    1. Lie flat on your stomach, and rest your face on the floor. 2. Wrap a towel or belt strap around the lower part of your affected leg. Then use the towel or belt strap to slowly pull your heel toward your buttock until you feel a stretch. 3. Hold for about 15 to 30 seconds, then relax your leg against the towel or belt strap. 4. Repeat 2 to 4 times. 5. Switch legs and repeat steps 1 through 4, even if only one knee is sore. Stationary exercise bike    If you do not have a stationary exercise bike at home, you can find one to ride at your local health club or community center. 1. Adjust the height of the bike seat so that your knee is slightly bent when your leg is extended downward. If your knee hurts when the pedal reaches the top, you can raise the seat so that your knee does not bend as much. 2. Start slowly. At first, try to do 5 to 10 minutes of cycling with little to no resistance. Then increase your time and the resistance bit by bit until you can do 20 to 30 minutes without pain. 3. If you start to have pain, rest your knee until your pain gets back to the level that is normal for you. Or cycle for less time or with less effort. Follow-up care is a key part of your treatment and safety.  Be sure to make and go to all appointments, and call your doctor if you are having problems. It's also a good idea to know your test results and keep a list of the medicines you take. Where can you learn more? Go to http://davian-johnson.info/. Enter C159 in the search box to learn more about \"Knee Arthritis: Exercises. \"  Current as of: March 21, 2017  Content Version: 11.3  © 2006-2017 [employer]. Care instructions adapted under license by [employer] (which disclaims liability or warranty for this information). If you have questions about a medical condition or this instruction, always ask your healthcare professional. [address] any warranty or liability for your use of this information. Hyaluronic Acid (By injection)   Hyaluronic Acid (zax-sp-rgt-ON-ate AS-id)  Treats severe pain in your knee due to osteoarthritis. Brand Name(s): Euflexxa, Gel-One, GelSyn-3, GenVisc 850, Hyalgan, Hymovis, Monovisc, Orthovisc, Supartz FX   There may be other brand names for this medicine. When This Medicine Should Not Be Used: This medicine is not right for everyone. You should not receive it if you had an allergic reaction to hyaluronic acid or if you have a bleeding disorder. How to Use This Medicine:   Injectable  · Your doctor will tell you how many injections you will need. This medicine is injected into your knee joint. · A nurse or other health provider will give you this medicine. Drugs and Foods to Avoid:      Ask your doctor or pharmacist before using any other medicine, including over-the-counter medicines, vitamins, and herbal products. Warnings While Using This Medicine:   · Tell your doctor if you are pregnant or breastfeeding, or if you have any allergies, including to birds, feathers, or eggs. · Rest your knee for 48 hours after you receive an injection. Do not do strenuous, weightbearing activities, such as jogging or tennis.  Avoid activities that keep you standing for longer than 1 hour. Possible Side Effects While Using This Medicine:   Call your doctor right away if you notice any of these side effects:  · Allergic reaction: Itching or hives, swelling in your face or hands, swelling or tingling in your mouth or throat, chest tightness, trouble breathing  If you notice these less serious side effects, talk with your doctor:   · Mild increase in pain or swelling in your knee  · Pain, redness, or swelling where the medicine is injected  If you notice other side effects that you think are caused by this medicine, tell your doctor. Call your doctor for medical advice about side effects. You may report side effects to FDA at 2-075-FDA-0692  © 2017 [address] Information is for End User's use only and may not be sold, redistributed or otherwise used for commercial purposes. The above information is an  only. It is not intended as medical advice for individual conditions or treatments. Talk to your doctor, nurse or pharmacist before following any medical regimen to see if it is safe and effective for you.
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
## 2017-10-09 DIAGNOSIS — I10 ESSENTIAL HYPERTENSION: ICD-10-CM

## 2017-10-09 RX ORDER — LABETALOL 200 MG/1
TABLET, FILM COATED ORAL
Qty: 60 TAB | Refills: 3 | Status: SHIPPED | OUTPATIENT
Start: 2017-10-09 | End: 2018-02-11 | Stop reason: SDUPTHER

## 2017-10-09 RX ORDER — AMLODIPINE BESYLATE 10 MG/1
TABLET ORAL
Qty: 30 TAB | Refills: 1 | Status: SHIPPED | OUTPATIENT
Start: 2017-10-09 | End: 2017-12-16 | Stop reason: SDUPTHER

## 2017-10-12 ENCOUNTER — OFFICE VISIT (OUTPATIENT)
Dept: ORTHOPEDIC SURGERY | Facility: CLINIC | Age: 62
End: 2017-10-12

## 2017-10-12 VITALS
SYSTOLIC BLOOD PRESSURE: 148 MMHG | TEMPERATURE: 97.5 F | HEART RATE: 80 BPM | RESPIRATION RATE: 20 BRPM | HEIGHT: 71 IN | WEIGHT: 303 LBS | DIASTOLIC BLOOD PRESSURE: 87 MMHG | BODY MASS INDEX: 42.42 KG/M2 | OXYGEN SATURATION: 97 %

## 2017-10-12 DIAGNOSIS — M22.41 CHONDROMALACIA, PATELLA, RIGHT: ICD-10-CM

## 2017-10-12 DIAGNOSIS — G89.29 CHRONIC PAIN OF RIGHT KNEE: ICD-10-CM

## 2017-10-12 DIAGNOSIS — M25.561 CHRONIC PAIN OF RIGHT KNEE: ICD-10-CM

## 2017-10-12 DIAGNOSIS — M17.11 PRIMARY OSTEOARTHRITIS OF RIGHT KNEE: Primary | ICD-10-CM

## 2017-10-12 RX ORDER — HYALURONATE SODIUM 10 MG/ML
2 SYRINGE (ML) INTRAARTICULAR ONCE
Qty: 2 ML | Refills: 0
Start: 2017-10-12 | End: 2017-10-12

## 2017-10-12 NOTE — PATIENT INSTRUCTIONS
Knee Arthritis: Exercises  Your Care Instructions  Here are some examples of exercises for knee arthritis. Start each exercise slowly. Ease off the exercise if you start to have pain. Your doctor or physical therapist will tell you when you can start these exercises and which ones will work best for you. How to do the exercises  Knee flexion with heel slide    1. Lie on your back with your knees bent. 2. Slide your heel back by bending your affected knee as far as you can. Then hook your other foot around your ankle to help pull your heel even farther back. 3. Hold for about 6 seconds, then rest for up to 10 seconds. 4. Repeat 8 to 12 times. 5. Switch legs and repeat steps 1 through 4, even if only one knee is sore. Quad sets    1. Sit with your affected leg straight and supported on the floor or a firm bed. Place a small, rolled-up towel under your knee. Your other leg should be bent, with that foot flat on the floor. 2. Tighten the thigh muscles of your affected leg by pressing the back of your knee down into the towel. 3. Hold for about 6 seconds, then rest for up to 10 seconds. 4. Repeat 8 to 12 times. 5. Switch legs and repeat steps 1 through 4, even if only one knee is sore. Straight-leg raises to the front    1. Lie on your back with your good knee bent so that your foot rests flat on the floor. Your affected leg should be straight. Make sure that your low back has a normal curve. You should be able to slip your hand in between the floor and the small of your back, with your palm touching the floor and your back touching the back of your hand. 2. Tighten the thigh muscles in your affected leg by pressing the back of your knee flat down to the floor. Hold your knee straight. 3. Keeping the thigh muscles tight and your leg straight, lift your affected leg up so that your heel is about 12 inches off the floor. Hold for about 6 seconds, then lower slowly.   4. Relax for up to 10 seconds between repetitions. 5. Repeat 8 to 12 times. 6. Switch legs and repeat steps 1 through 5, even if only one knee is sore. Active knee flexion    1. Lie on your stomach with your knees straight. If your kneecap is uncomfortable, roll up a washcloth and put it under your leg just above your kneecap. 2. Lift the foot of your affected leg by bending the knee so that you bring the foot up toward your buttock. If this motion hurts, try it without bending your knee quite as far. This may help you avoid any painful motion. 3. Slowly move your leg up and down. 4. Repeat 8 to 12 times. 5. Switch legs and repeat steps 1 through 4, even if only one knee is sore. Quadriceps stretch (facedown)    1. Lie flat on your stomach, and rest your face on the floor. 2. Wrap a towel or belt strap around the lower part of your affected leg. Then use the towel or belt strap to slowly pull your heel toward your buttock until you feel a stretch. 3. Hold for about 15 to 30 seconds, then relax your leg against the towel or belt strap. 4. Repeat 2 to 4 times. 5. Switch legs and repeat steps 1 through 4, even if only one knee is sore. Stationary exercise bike    If you do not have a stationary exercise bike at home, you can find one to ride at your local health club or community center. 1. Adjust the height of the bike seat so that your knee is slightly bent when your leg is extended downward. If your knee hurts when the pedal reaches the top, you can raise the seat so that your knee does not bend as much. 2. Start slowly. At first, try to do 5 to 10 minutes of cycling with little to no resistance. Then increase your time and the resistance bit by bit until you can do 20 to 30 minutes without pain. 3. If you start to have pain, rest your knee until your pain gets back to the level that is normal for you. Or cycle for less time or with less effort. Follow-up care is a key part of your treatment and safety.  Be sure to make and go to all appointments, and call your doctor if you are having problems. It's also a good idea to know your test results and keep a list of the medicines you take. Where can you learn more? Go to http://davian-johnson.info/. Enter C159 in the search box to learn more about \"Knee Arthritis: Exercises. \"  Current as of: March 21, 2017  Content Version: 11.3  © 2006-2017 [employer]. Care instructions adapted under license by [employer] (which disclaims liability or warranty for this information). If you have questions about a medical condition or this instruction, always ask your healthcare professional. [address] any warranty or liability for your use of this information. Hyaluronic Acid (By injection)   Hyaluronic Acid ([id number]-lx-[id number]-ON-ate AS-id)  Treats severe pain in your knee due to osteoarthritis. Brand Name(s): Euflexxa, Gel-One, GelSyn-3, GenVisc 850, Hyalgan, Hymovis, Monovisc, Orthovisc, Supartz FX   There may be other brand names for this medicine. When This Medicine Should Not Be Used: This medicine is not right for everyone. You should not receive it if you had an allergic reaction to hyaluronic acid or if you have a bleeding disorder. How to Use This Medicine:   Injectable  · Your doctor will tell you how many injections you will need. This medicine is injected into your knee joint. · A nurse or other health provider will give you this medicine. Drugs and Foods to Avoid:      Ask your doctor or pharmacist before using any other medicine, including over-the-counter medicines, vitamins, and herbal products. Warnings While Using This Medicine:   · Tell your doctor if you are pregnant or breastfeeding, or if you have any allergies, including to birds, feathers, or eggs. · Rest your knee for 48 hours after you receive an injection. Do not do strenuous, weightbearing activities, such as jogging or tennis.  Avoid activities that keep you standing for longer than 1 hour. Possible Side Effects While Using This Medicine:   Call your doctor right away if you notice any of these side effects:  · Allergic reaction: Itching or hives, swelling in your face or hands, swelling or tingling in your mouth or throat, chest tightness, trouble breathing  If you notice these less serious side effects, talk with your doctor:   · Mild increase in pain or swelling in your knee  · Pain, redness, or swelling where the medicine is injected  If you notice other side effects that you think are caused by this medicine, tell your doctor. Call your doctor for medical advice about side effects. You may report side effects to FDA at 8-743-FDA-1490  © 2017 [facility] [place] Information is for End User's use only and may not be sold, redistributed or otherwise used for commercial purposes. The above information is an  only. It is not intended as medical advice for individual conditions or treatments. Talk to your doctor, nurse or pharmacist before following any medical regimen to see if it is safe and effective for you.

## 2017-10-12 NOTE — PROGRESS NOTES
Patient: Jazzy Valente                MRN: [id number]       SSN: [id number]  YOB: 1955        AGE: 63 y.o. SEX: male  Body mass index is 42.26 kg/(m^2). PCP: Gerard Dyer MD  10/12/17    CC: RIGHT KNEE PAIN     HISTORY:  Jazzy Valente is a 63 y.o. male who is seen for right knee pain. ICD-10-CM ICD-9-CM    1. Primary osteoarthritis of right knee M17.11 715.16 NE DRAIN/INJECT LARGE JOINT/BURSA      EUFLEXXA INJECTION PER DOSE      sodium hyaluronate (SUPARTZ FX/HYALGAN/GENIVSC) 10 mg/mL syrg injection   2. Chronic pain of right knee M25.561 719.46 NE DRAIN/INJECT LARGE JOINT/BURSA    G89.29 338.29 EUFLEXXA INJECTION PER DOSE      sodium hyaluronate (SUPARTZ FX/HYALGAN/GENIVSC) 10 mg/mL syrg injection   3. Chondromalacia, patella, right M22.41 717.7 NE DRAIN/INJECT LARGE JOINT/BURSA      EUFLEXXA INJECTION PER DOSE      sodium hyaluronate (SUPARTZ FX/HYALGAN/GENIVSC) 10 mg/mL syrg injection     PROCEDURE:  After discussing treatment options, Mr. Stephanie Greenberg right knee was injected with 2 cc of Euflexxa. Chart reviewed for the following:   Nicky Wynne MD, have reviewed the History, Physical and updated the Allergic reactions for Ritaport performed immediately prior to start of procedure:  Nicky Wynne MD, have performed the following reviews on Jazzy Valente prior to the start of the procedure:            * Patient was identified by name and date of birth   * Agreement on procedure being performed was verified  * Risks and Benefits explained to the patient  * Procedure site verified and marked as necessary  * Patient was positioned for comfort  * Consent was obtained     Time: 4:34PM     Date of procedure: 10/12/2017    Procedure performed by:  Young Hogan MD    Mr. Jarret Garcia tolerated the procedure well with no complications. PLAN:  After discussing treatment options, patient's right knee was injected with 2 cc of Euflexxa.   Mr. Jarret Garcia will follow up in one week to complete his Euflexxa injection series.       Scribed by Maximino Allen ([facility]) as dictated by Wilson Love MD

## 2017-10-18 DIAGNOSIS — I10 ESSENTIAL HYPERTENSION: ICD-10-CM

## 2017-10-18 NOTE — TELEPHONE ENCOUNTER
Requested Prescriptions     Pending Prescriptions Disp Refills    hydrALAZINE (APRESOLINE) 100 mg tablet 180 Tab 3     Sig: TAKE 1 TABLET BY MOUTH TWICE DAILY

## 2017-10-19 ENCOUNTER — OFFICE VISIT (OUTPATIENT)
Dept: ORTHOPEDIC SURGERY | Facility: CLINIC | Age: 62
End: 2017-10-19

## 2017-10-19 VITALS
BODY MASS INDEX: 42.98 KG/M2 | OXYGEN SATURATION: 96 % | HEART RATE: 85 BPM | HEIGHT: 71 IN | DIASTOLIC BLOOD PRESSURE: 82 MMHG | WEIGHT: 307 LBS | SYSTOLIC BLOOD PRESSURE: 148 MMHG

## 2017-10-19 DIAGNOSIS — M22.41 CHONDROMALACIA, PATELLA, RIGHT: ICD-10-CM

## 2017-10-19 DIAGNOSIS — M25.561 CHRONIC PAIN OF RIGHT KNEE: ICD-10-CM

## 2017-10-19 DIAGNOSIS — G89.29 CHRONIC PAIN OF RIGHT KNEE: ICD-10-CM

## 2017-10-19 DIAGNOSIS — M17.11 PRIMARY OSTEOARTHRITIS OF RIGHT KNEE: Primary | ICD-10-CM

## 2017-10-19 RX ORDER — HYDRALAZINE HYDROCHLORIDE 100 MG/1
TABLET, FILM COATED ORAL
Qty: 180 TAB | Refills: 3 | Status: SHIPPED | OUTPATIENT
Start: 2017-10-19 | End: 2018-08-28 | Stop reason: DRUGHIGH

## 2017-10-19 RX ORDER — HYALURONATE SODIUM 10 MG/ML
2 SYRINGE (ML) INTRAARTICULAR ONCE
Qty: 2 ML | Refills: 0
Start: 2017-10-19 | End: 2017-10-19

## 2017-10-19 NOTE — PROGRESS NOTES
Patient: Nicole Carrasco                MRN: [id number]       SSN: [id number]  YOB: 1955        AGE: 63 y.o. SEX: male  Body mass index is 42.82 kg/(m^2). PCP: Shmuel Reyes MD  10/19/17    Chief Complaint   Patient presents with    Knee Pain     right, Euflexxa #3     HISTORY:  Nicole Carrasco is a 63 y.o. male who is seen for right knee pain. PROCEDURE:  After discussing treatment options, Mr. Dave Quinteros right knee was injected with 2 cc of Euflexxa. TIME OUT performed immediately prior to start of procedure:  Karrie Jacques MD, have performed the following reviews on Nicole Carrasco prior to the start of the procedure:            * Patient was identified by name and date of birth   * Agreement on procedure being performed was verified  * Risks and Benefits explained to the patient  * Procedure site verified and marked as necessary  * Patient was positioned for comfort  * Consent was obtained     Time: 5:08 PM     Date of procedure: 10/19/2017    Procedure performed by:  Cami Pinon MD    Mr. Sherran Bence tolerated the procedure well with no complications      [id number] ICD-9-CM    1. Primary osteoarthritis of right knee M17.11 715.16 IN DRAIN/INJECT LARGE JOINT/BURSA      EUFLEXXA INJECTION PER DOSE      sodium hyaluronate (SUPARTZ FX/HYALGAN/GENIVSC) 10 mg/mL syrg injection   2. Chondromalacia, patella, right M22.41 717.7 IN DRAIN/INJECT LARGE JOINT/BURSA      EUFLEXXA INJECTION PER DOSE      sodium hyaluronate (SUPARTZ FX/HYALGAN/GENIVSC) 10 mg/mL syrg injection   3. Chronic pain of right knee M25.561 719.46 IN DRAIN/INJECT LARGE JOINT/BURSA    G89.29 338.29 EUFLEXXA INJECTION PER DOSE      sodium hyaluronate (SUPARTZ FX/HYALGAN/GENIVSC) 10 mg/mL syrg injection     PLAN:  After discussing treatment options, patient's right knee was injected with 2 cc of Euflexxa. Mr. Sherran Bence will follow up PRN now that he has completed his Euflexxa injection series.       Scribed by Jose Juan Reynolds ([facility]) as dictated by Wilson Love MD

## 2017-10-19 NOTE — PATIENT INSTRUCTIONS
Knee Arthritis: Exercises  Your Care Instructions  Here are some examples of exercises for knee arthritis. Start each exercise slowly. Ease off the exercise if you start to have pain. Your doctor or physical therapist will tell you when you can start these exercises and which ones will work best for you. How to do the exercises  Knee flexion with heel slide    1. Lie on your back with your knees bent. 2. Slide your heel back by bending your affected knee as far as you can. Then hook your other foot around your ankle to help pull your heel even farther back. 3. Hold for about 6 seconds, then rest for up to 10 seconds. 4. Repeat 8 to 12 times. 5. Switch legs and repeat steps 1 through 4, even if only one knee is sore. Quad sets    1. Sit with your affected leg straight and supported on the floor or a firm bed. Place a small, rolled-up towel under your knee. Your other leg should be bent, with that foot flat on the floor. 2. Tighten the thigh muscles of your affected leg by pressing the back of your knee down into the towel. 3. Hold for about 6 seconds, then rest for up to 10 seconds. 4. Repeat 8 to 12 times. 5. Switch legs and repeat steps 1 through 4, even if only one knee is sore. Straight-leg raises to the front    1. Lie on your back with your good knee bent so that your foot rests flat on the floor. Your affected leg should be straight. Make sure that your low back has a normal curve. You should be able to slip your hand in between the floor and the small of your back, with your palm touching the floor and your back touching the back of your hand. 2. Tighten the thigh muscles in your affected leg by pressing the back of your knee flat down to the floor. Hold your knee straight. 3. Keeping the thigh muscles tight and your leg straight, lift your affected leg up so that your heel is about 12 inches off the floor. Hold for about 6 seconds, then lower slowly.   4. Relax for up to 10 seconds between repetitions. 5. Repeat 8 to 12 times. 6. Switch legs and repeat steps 1 through 5, even if only one knee is sore. Active knee flexion    1. Lie on your stomach with your knees straight. If your kneecap is uncomfortable, roll up a washcloth and put it under your leg just above your kneecap. 2. Lift the foot of your affected leg by bending the knee so that you bring the foot up toward your buttock. If this motion hurts, try it without bending your knee quite as far. This may help you avoid any painful motion. 3. Slowly move your leg up and down. 4. Repeat 8 to 12 times. 5. Switch legs and repeat steps 1 through 4, even if only one knee is sore. Quadriceps stretch (facedown)    1. Lie flat on your stomach, and rest your face on the floor. 2. Wrap a towel or belt strap around the lower part of your affected leg. Then use the towel or belt strap to slowly pull your heel toward your buttock until you feel a stretch. 3. Hold for about 15 to 30 seconds, then relax your leg against the towel or belt strap. 4. Repeat 2 to 4 times. 5. Switch legs and repeat steps 1 through 4, even if only one knee is sore. Stationary exercise bike    If you do not have a stationary exercise bike at home, you can find one to ride at your local health club or community center. 1. Adjust the height of the bike seat so that your knee is slightly bent when your leg is extended downward. If your knee hurts when the pedal reaches the top, you can raise the seat so that your knee does not bend as much. 2. Start slowly. At first, try to do 5 to 10 minutes of cycling with little to no resistance. Then increase your time and the resistance bit by bit until you can do 20 to 30 minutes without pain. 3. If you start to have pain, rest your knee until your pain gets back to the level that is normal for you. Or cycle for less time or with less effort. Follow-up care is a key part of your treatment and safety.  Be sure to make and go to all appointments, and call your doctor if you are having problems. It's also a good idea to know your test results and keep a list of the medicines you take. Where can you learn more? Go to http://davian-johnson.info/. Enter C159 in the search box to learn more about \"Knee Arthritis: Exercises. \"  Current as of: March 21, 2017  Content Version: 11.3  © 2006-2017 [employer]. Care instructions adapted under license by [employer] (which disclaims liability or warranty for this information). If you have questions about a medical condition or this instruction, always ask your healthcare professional. [address] any warranty or liability for your use of this information. Hyaluronic Acid (By injection)   Hyaluronic Acid (dbl-kz-sek-ON-ate AS-id)  Treats severe pain in your knee due to osteoarthritis. Brand Name(s): Euflexxa, Gel-One, GelSyn-3, GenVisc 850, Hyalgan, Hymovis, Monovisc, Orthovisc, Supartz FX, Visco-3   There may be other brand names for this medicine. When This Medicine Should Not Be Used: This medicine is not right for everyone. You should not receive it if you had an allergic reaction to hyaluronic acid or if you have a bleeding disorder. How to Use This Medicine:   Injectable  · Your doctor will tell you how many injections you will need. This medicine is injected into your knee joint. · A nurse or other health provider will give you this medicine. Drugs and Foods to Avoid:      Ask your doctor or pharmacist before using any other medicine, including over-the-counter medicines, vitamins, and herbal products. Warnings While Using This Medicine:   · Tell your doctor if you are pregnant or breastfeeding, or if you have any allergies, including to birds, feathers, or eggs. · Rest your knee for 48 hours after you receive an injection. Do not do strenuous, weightbearing activities, such as jogging or tennis.  Avoid activities that keep you standing for longer than 1 hour. Possible Side Effects While Using This Medicine:   Call your doctor right away if you notice any of these side effects:  · Allergic reaction: Itching or hives, swelling in your face or hands, swelling or tingling in your mouth or throat, chest tightness, trouble breathing  If you notice these less serious side effects, talk with your doctor:   · Mild increase in pain or swelling in your knee  · Pain, redness, or swelling where the medicine is injected  If you notice other side effects that you think are caused by this medicine, tell your doctor. Call your doctor for medical advice about side effects. You may report side effects to FDA at 8-969-FDA-5835  © 2017 [address] Information is for End User's use only and may not be sold, redistributed or otherwise used for commercial purposes. The above information is an  only. It is not intended as medical advice for individual conditions or treatments. Talk to your doctor, nurse or pharmacist before following any medical regimen to see if it is safe and effective for you.

## 2017-11-04 RX ORDER — CLONIDINE 0.2 MG/24H
PATCH, EXTENDED RELEASE TRANSDERMAL
Qty: 12 PATCH | Refills: 3 | Status: SHIPPED | OUTPATIENT
Start: 2017-11-04 | End: 2018-11-19 | Stop reason: SDUPTHER

## 2017-11-08 RX ORDER — BENAZEPRIL HYDROCHLORIDE 40 MG/1
TABLET ORAL
Qty: 30 TAB | Refills: 3 | Status: SHIPPED | OUTPATIENT
Start: 2017-11-08 | End: 2018-03-30 | Stop reason: SDUPTHER

## 2017-11-09 ENCOUNTER — OFFICE VISIT (OUTPATIENT)
Dept: INTERNAL MEDICINE CLINIC | Age: 62
End: 2017-11-09

## 2017-11-09 VITALS
WEIGHT: 309 LBS | TEMPERATURE: 97.8 F | HEIGHT: 71 IN | HEART RATE: 87 BPM | BODY MASS INDEX: 43.26 KG/M2 | DIASTOLIC BLOOD PRESSURE: 68 MMHG | SYSTOLIC BLOOD PRESSURE: 132 MMHG | RESPIRATION RATE: 16 BRPM | OXYGEN SATURATION: 98 %

## 2017-11-09 DIAGNOSIS — E66.01 MORBID OBESITY (HCC): ICD-10-CM

## 2017-11-09 DIAGNOSIS — I10 ESSENTIAL HYPERTENSION: Primary | ICD-10-CM

## 2017-11-09 DIAGNOSIS — E78.00 HYPERCHOLESTEROLEMIA: ICD-10-CM

## 2017-11-09 DIAGNOSIS — E11.9 DIABETES MELLITUS TYPE 2, DIET-CONTROLLED (HCC): ICD-10-CM

## 2017-11-09 DIAGNOSIS — I89.0 LYMPHEDEMA OF BOTH LOWER EXTREMITIES: ICD-10-CM

## 2017-11-09 NOTE — PROGRESS NOTES
Jazzy Valente is a 63 y.o. male presenting today for Well Male (3 month follow up)  . HPI:  Jazzy Valente presents to the office today for hypertension follow-up care. Patient noted he is feeling well. He is back to work full-time. He is negative for any chest pain or palpitations. Review of Systems   Constitutional: Negative for chills and fever. HENT: Negative for congestion. Respiratory: Negative for cough and shortness of breath. Cardiovascular: Negative for chest pain and palpitations. Gastrointestinal: Negative for abdominal pain, constipation, diarrhea, nausea and vomiting. Neurological: Negative for dizziness. Allergies   Allergen Reactions    Iodine Other (comments)     Eyes swell shut      Shellfish Containing Products Swelling       Current Outpatient Prescriptions   Medication Sig Dispense Refill    benazepril (LOTENSIN) 40 mg tablet TAKE 1 TABLET BY MOUTH DAILY (STOP LOTREL) 30 Tab 3    cloNIDine (CATAPRES) 0.2 mg/24 hr patch APPLY 1 PATCH ONCE WEEKLY 12 Patch 3    hydrALAZINE (APRESOLINE) 100 mg tablet TAKE 1 TABLET BY MOUTH TWICE DAILY 180 Tab 3    amLODIPine (NORVASC) 10 mg tablet TAKE 1 TAB BY MOUTH DAILY. 30 Tab 1    labetalol (NORMODYNE) 200 mg tablet 1 TABLET TWICE PER DAY (STOP METOPROLOL) 60 Tab 3    isosorbide mononitrate ER (IMDUR) 30 mg tablet Take 30 mg by mouth daily.  tolterodine ER (DETROL LA) 4 mg ER capsule Take 4 mg by mouth daily.  nitroglycerin (NITROSTAT) 0.4 mg SL tablet by SubLINGual route every five (5) minutes as needed for Chest Pain.  omeprazole (PRILOSEC) 40 mg capsule Take 1 Cap by mouth two (2) times a day. 60 Cap 5    allopurinol (ZYLOPRIM) 100 mg tablet Take 1 Tab by mouth daily. 90 Tab 3    potassium chloride (KLOR-CON) 10 mEq tablet Take 1 Tab by mouth daily. 30 Tab 2    hydroCHLOROthiazide (HYDRODIURIL) 12.5 mg tablet Take 1 Tab by mouth daily.  30 Tab 2    sucralfate (CARAFATE) 100 mg/mL suspension 2 teaspoons three times per day 414 mL 3    colchicine 0.6 mg tablet Take 0.6 mg by mouth daily.  aspirin 81 mg tablet Take 81 mg by mouth daily.  furosemide (LASIX) 20 mg tablet Take 1 pill by mouth as needed for edema 30 Tab 1    furosemide (LASIX) 20 mg tablet Take 1 tab by mouth daily 30 Tab 1    gabapentin (NEURONTIN) 300 mg capsule Take 300 mg by mouth three (3) times daily.  avanafil (STENDRA) 200 mg tab tablet Take 1 Tab by mouth as needed for Other.  Indications: Erectile Dysfunction 6 Tab 6       Past Medical History:   Diagnosis Date    BPH without obstruction/lower urinary tract symptoms     Bursitis of right shoulder     CAD (coronary artery disease)     Chest pain     history of hospitalization with chest pain and a negative workup in 2008    Chronic edema     Constipation     die to medication    Coronary artery disease     mild, non obstructive/EF 65%    Dyspnea on exertion     Echocardiogram 12/16/08    IVC dilated; suboptimal endocardial border; EF 65%    ED (erectile dysfunction)     Elevated PSA     Frequency     GERD (gastroesophageal reflux disease)     Gout     H/O cystoscopy 06/20/2013    Hematuria, unspecified     Hypercholesterolemia     Hypertension     Hypertension      Hypogonadism male     Impotence of organic origin     Left ventricular diastolic dysfunction     Malignant neoplasm of prostate (HCC)     hx of t1a, izzy 6, 5 % of 1  core    Morbid obesity ([facility] [address]     Myocardial perfusion 09/05/08    Basal inferior defect c/w artifact; EF 63%    Overactive bladder     S/P cardiac cath 07/30/10    oD2-40%; pRCA-20-30%; EF 65%    Sleep apnea     Slowing of urinary stream     Type II or unspecified type diabetes mellitus without mention of complication, not stated as uncontrolled        Past Surgical History:   Procedure Laterality Date    HX HEART CATHETERIZATION  7/30/10    HX OTHER SURGICAL  06/27/13    Prostate    HX TONSILLECTOMY      INCISION/DRAIN ABSCESS EXTRA      AL COLONOSCOPY FLX DX W/COLLJ SPEC WHEN PFRMD         Social History     Social History    Marital status: SINGLE     Spouse name: N/A    Number of children: N/A    Years of education: N/A     Occupational History    Not on file. Social History Main Topics    Smoking status: Former Smoker     Types: Cigars     Quit date: 10/4/1999    Smokeless tobacco: Never Used    Alcohol use No      Comment: socially    Drug use: No    Sexual activity: Not Currently     Other Topics Concern    Not on file     Social History Narrative       Patient does not have an advanced directive on file    Vitals:    11/09/17 1410   BP: 132/68   Pulse: 87   Resp: 16   Temp: 97.8 °F (36.6 °C)   TempSrc: Tympanic   SpO2: 98%   Weight: 309 lb (140.2 kg)   Height: 5' 11\" (1.803 m)   PainSc:   0 - No pain       Physical Exam   Constitutional: No distress. HENT:   Head: Normocephalic. Right Ear: External ear normal.   Left Ear: External ear normal.   Neck: Neck supple. Cardiovascular: Normal rate, regular rhythm and normal heart sounds. Pulmonary/Chest: Effort normal and breath sounds normal. No respiratory distress. Abdominal: Soft. He exhibits no distension. Musculoskeletal: He exhibits edema (lymphedeam). Lymphadenopathy:     He has no cervical adenopathy. Neurological: He is alert. Nursing note and vitals reviewed. Orders Only on 11/09/2017   Component Date Value Ref Range Status    Glucose 11/09/2017 86  70 - 99 mg/dL Final    BUN 11/09/2017 26* 6 - 22 mg/dL Final    Creatinine 11/09/2017 1.5  0.8 - 1.6 mg/dL Final    Sodium 11/09/2017 145  133 - 145 mmol/L Final    Potassium 11/09/2017 4.0  3.5 - 5.5 mmol/L Final    Chloride 11/09/2017 104  98 - 110 mmol/L Final    CO2 11/09/2017 28  20 - 32 mmol/L Final    AST (SGOT) 11/09/2017 16  10 - 37 U/L Final    ALT (SGPT) 11/09/2017 14  5 - 40 U/L Final    Alk.  phosphatase 11/09/2017 86  40 - 125 U/L Final    Bilirubin, total 11/09/2017 0.1* 0.2 - 1.2 mg/dL Final    Calcium 11/09/2017 9.2  8.4 - 10.4 mg/dL Final    Protein, total 11/09/2017 6.9  6.2 - 8.1 g/dL Final    Albumin 11/09/2017 4.2  3.5 - 5.0 g/dL Final    A-G Ratio 11/09/2017 1.6  1.1 - 2.6 ratio Final    Globulin 11/09/2017 2.7  2.0 - 4.0 g/dL Final    Anion gap 11/09/2017 13.0  mmol/L Final    Comment: Test includes Albumin, Alkaline Phosphatase, ALT, AST, BUN, Calcium, CO2,  Chloride, Creatinine, Glucose, Potassium, Sodium, Total Bilirubin and Total  Protein. Estimated GFR results are reported in mL/min/1.73 sq.m. by the MDRD equation. This eGFR is validated for stable chronic renal failure patients. This   equation  is unreliable in acute illness or patients with normal renal function.  GFRAA 11/09/2017 56.6* >60.0 Final    GFRNA 11/09/2017 46.7* >60.0 Final    Triglyceride 11/09/2017 149  40 - 149 mg/dL Final    HDL Cholesterol 11/09/2017 58  40 - 59 mg/dL Final    Cholesterol, total 11/09/2017 221* 110 - 200 mg/dL Final    LDL, calculated 11/09/2017 133* 50 - 99 mg/dL Final    VLDL, calculated 11/09/2017 30  8 - 30 mg/dL Final    Comment: Test includes cholesterol, HDL cholesterol, triglycerides and LDL.   Cholesterol Recommended NCEP guidelines in mg/dL:  Less than 200      Desirable  200 - 239          Borderline High  Greater than or  = to 240   High      Hemoglobin A1c 11/09/2017 5.7  4.8 - 5.9 % Final    AVG GLU 11/09/2017 117  91 - 123 mg/dL Final    WBC 11/09/2017 4.7  4.0 - 11.0 K/uL Final    RBC 11/09/2017 4.38  3.80 - 5.80 M/uL Final    HGB 11/09/2017 11.8* 13.1 - 17.2 g/dL Final    HCT 11/09/2017 38.5* 39.3 - 51.6 % Final    MCV 11/09/2017 88  80 - 95 fL Final    MCH 11/09/2017 27  26 - 34 pg Final    MCHC 11/09/2017 31* 32 - 36 g/dL Final    RDW 11/09/2017 15.7  10.0 - 16.0 % Final    PLATELET 84/23/6581 408  140 - 440 K/uL Final    MPV 11/09/2017 10.3  6.0 - 10.8 fL Final    NEUTROPHILS 11/09/2017 50  40 - 75 % Final    Lymphocytes 11/09/2017 37  27 - 45 % Final    MONOCYTES 11/09/2017 11* 3 - 9 % Final    EOSINOPHILS 11/09/2017 2  0 - 6 % Final    BASOPHILS 11/09/2017 0  0 - 2 % Final    ABS. NEUTROPHILS 11/09/2017 2.3  1.8 - 7.7 K/uL Final    ABSOLUTE LYMPHOCYTE COUNT 11/09/2017 1.7  1.0 - 4.8 K/uL Final    ABS. MONOCYTES 11/09/2017 0.5  0.1 - 0.9 K/uL Final    ABS. EOSINOPHILS 11/09/2017 0.1  0.0 - 0.5 K/uL Final    ABS. BASOPHILS 11/09/2017 0.0  0.0 - 0.2 K/uL Final    TSH 11/09/2017 1.71  0.27 - 4.20 mcU/mL Final       .  Results for orders placed or performed in visit on 61/91/10   METABOLIC PANEL, COMPREHENSIVE   Result Value Ref Range    Glucose 86 70 - 99 mg/dL    BUN 26 (H) 6 - 22 mg/dL    Creatinine 1.5 0.8 - 1.6 mg/dL    Sodium 145 133 - 145 mmol/L    Potassium 4.0 3.5 - 5.5 mmol/L    Chloride 104 98 - 110 mmol/L    CO2 28 20 - 32 mmol/L    AST (SGOT) 16 10 - 37 U/L    ALT (SGPT) 14 5 - 40 U/L    Alk. phosphatase 86 40 - 125 U/L    Bilirubin, total 0.1 (L) 0.2 - 1.2 mg/dL    Calcium 9.2 8.4 - 10.4 mg/dL    Protein, total 6.9 6.2 - 8.1 g/dL    Albumin 4.2 3.5 - 5.0 g/dL    A-G Ratio 1.6 1.1 - 2.6 ratio    Globulin 2.7 2.0 - 4.0 g/dL    Anion gap 13.0 mmol/L    GFRAA 56.6 (L) >60.0    GFRNA 46.7 (L) >60.0    Narrative    Unless additionally indicated, test performed at: [facility], [address] [place] [address]. PH: [phone]. LIPID PANEL   Result Value Ref Range    Triglyceride 149 40 - 149 mg/dL    HDL Cholesterol 58 40 - 59 mg/dL    Cholesterol, total 221 (H) 110 - 200 mg/dL    LDL, calculated 133 (H) 50 - 99 mg/dL    VLDL, calculated 30 8 - 30 mg/dL    Narrative    Unless additionally indicated, test performed at: [facility], [address] [place] [address]. PH: [phone].    HEMOGLOBIN A1C W/O EAG   Result Value Ref Range    Hemoglobin A1c 5.7 4.8 - 5.9 %    AVG  91 - 123 mg/dL    Narrative    Unless additionally indicated, test performed at: [facility], [address] [place] [address]. PH: [phone]. CBC WITH AUTOMATED DIFF   Result Value Ref Range    WBC 4.7 4.0 - 11.0 K/uL    RBC 4.38 3.80 - 5.80 M/uL    HGB 11.8 (L) 13.1 - 17.2 g/dL    HCT 38.5 (L) 39.3 - 51.6 %    MCV 88 80 - 95 fL    MCH 27 26 - 34 pg    MCHC 31 (L) 32 - 36 g/dL    RDW 15.7 10.0 - 16.0 %    PLATELET 019 185 - 833 K/uL    MPV 10.3 6.0 - 10.8 fL    NEUTROPHILS 50 40 - 75 %    Lymphocytes 37 27 - 45 %    MONOCYTES 11 (H) 3 - 9 %    EOSINOPHILS 2 0 - 6 %    BASOPHILS 0 0 - 2 %    ABS. NEUTROPHILS 2.3 1.8 - 7.7 K/uL    ABSOLUTE LYMPHOCYTE COUNT 1.7 1.0 - 4.8 K/uL    ABS. MONOCYTES 0.5 0.1 - 0.9 K/uL    ABS. EOSINOPHILS 0.1 0.0 - 0.5 K/uL    ABS. BASOPHILS 0.0 0.0 - 0.2 K/uL    Narrative    Unless additionally indicated, test performed at: [facility], [address] [place] [address]. PH: [phone]. TSH 3RD GENERATION   Result Value Ref Range    TSH 1.71 0.27 - 4.20 mcU/mL    Narrative    Unless additionally indicated, test performed at: [facility], [address] [place] [address]. PH: [phone]. Assessment / Plan:      ICD-10-CM ICD-9-CM    1. Morbid obesity ([address] P13.34 379.13 METABOLIC PANEL, COMPREHENSIVE      LIPID PANEL      CBC WITH AUTOMATED DIFF      TSH 3RD GENERATION   2. Diabetes mellitus type 2, diet-controlled (HCC) F15.5 731.08 METABOLIC PANEL, COMPREHENSIVE      HEMOGLOBIN A1C W/O EAG   3. Essential hypertension I10 401.9    4. Hypercholesterolemia E78.00 272.0 LIPID PANEL   5. Lymphedema of both lower extremities I89.0 457.1      Labs ordered   Diabetes- diet controlled  HTN- controlled  Continue current treatment plan  Lymphedema- patient has lymphedema boots and states he uses them twice per week    Follow-up Disposition:  Return in about 4 months (around 3/9/2018), or if symptoms worsen or fail to improve.     I asked the patient if he  had any questions and answered his questions. The patient stated that he understands the treatment plan and agrees with the treatment plan    Discussed the patient's BMI with him. The BMI follow up plan is as follows:     dietary management education, guidance, and counseling  encourage exercise  monitor weight    An After Visit Summary was printed and given to the patient.

## 2017-11-09 NOTE — PATIENT INSTRUCTIONS
Body Mass Index: Care Instructions  Your Care Instructions    Body mass index (BMI) can help you see if your weight is raising your risk for health problems. It uses a formula to compare how much you weigh with how tall you are. · A BMI lower than 18.5 is considered underweight. · A BMI between 18.5 and 24.9 is considered healthy. · A BMI between 25 and 29.9 is considered overweight. A BMI of 30 or higher is considered obese. If your BMI is in the normal range, it means that you have a lower risk for weight-related health problems. If your BMI is in the overweight or obese range, you may be at increased risk for weight-related health problems, such as high blood pressure, heart disease, stroke, arthritis or joint pain, and diabetes. If your BMI is in the underweight range, you may be at increased risk for health problems such as fatigue, lower protection (immunity) against illness, muscle loss, bone loss, hair loss, and hormone problems. BMI is just one measure of your risk for weight-related health problems. You may be at higher risk for health problems if you are not active, you eat an unhealthy diet, or you drink too much alcohol or use tobacco products. Follow-up care is a key part of your treatment and safety. Be sure to make and go to all appointments, and call your doctor if you are having problems. It's also a good idea to know your test results and keep a list of the medicines you take. How can you care for yourself at home? · Practice healthy eating habits. This includes eating plenty of fruits, vegetables, whole grains, lean protein, and low-fat dairy. · If your doctor recommends it, get more exercise. Walking is a good choice. Bit by bit, increase the amount you walk every day. Try for at least 30 minutes on most days of the week. · Do not smoke. Smoking can increase your risk for health problems. If you need help quitting, talk to your doctor about stop-smoking programs and medicines. These can increase your chances of quitting for good. · Limit alcohol to 2 drinks a day for men and 1 drink a day for women. Too much alcohol can cause health problems. If you have a BMI higher than 25  · Your doctor may do other tests to check your risk for weight-related health problems. This may include measuring the distance around your waist. A waist measurement of more than 40 inches in men or 35 inches in women can increase the risk of weight-related health problems. · Talk with your doctor about steps you can take to stay healthy or improve your health. You may need to make lifestyle changes to lose weight and stay healthy, such as changing your diet and getting regular exercise. If you have a BMI lower than 18.5  · Your doctor may do other tests to check your risk for health problems. · Talk with your doctor about steps you can take to stay healthy or improve your health. You may need to make lifestyle changes to gain or maintain weight and stay healthy, such as getting more healthy foods in your diet and doing exercises to build muscle. Where can you learn more? Go to http://davian-johnson.info/. Enter S176 in the search box to learn more about \"Body Mass Index: Care Instructions. \"  Current as of: October 13, 2016  Content Version: 11.4  © 8736-0372 [employer]. Care instructions adapted under license by [employer] (which disclaims liability or warranty for this information). If you have questions about a medical condition or this instruction, always ask your healthcare professional. [address] any warranty or liability for your use of this information.

## 2017-11-09 NOTE — PROGRESS NOTES
Patient presents for   Chief Complaint   Patient presents with    Well Male     3 month follow up     Fall risk assessment was not indicated. Depression screening was not indicated Follow up questions were not indicated. 1. Have you been to the ER, urgent care clinic since your last visit? Hospitalized since your last visit? No    2. Have you seen or consulted any other health care providers outside of the [address] [place] [address] since your last visit? Include any pap smears or colon screening.  No

## 2017-11-10 LAB
A-G RATIO,AGRAT: 1.6 RATIO (ref 1.1–2.6)
ABSOLUTE LYMPHOCYTE COUNT, 10803: 1.7 K/UL (ref 1–4.8)
ALBUMIN SERPL-MCNC: 4.2 G/DL (ref 3.5–5)
ALP SERPL-CCNC: 86 U/L (ref 40–125)
ALT SERPL-CCNC: 14 U/L (ref 5–40)
ANION GAP SERPL CALC-SCNC: 13 MMOL/L
AST SERPL W P-5'-P-CCNC: 16 U/L (ref 10–37)
AVG GLU, 10930: 117 MG/DL (ref 91–123)
BASOPHILS # BLD: 0 K/UL (ref 0–0.2)
BASOPHILS NFR BLD: 0 % (ref 0–2)
BILIRUB SERPL-MCNC: 0.1 MG/DL (ref 0.2–1.2)
BUN SERPL-MCNC: 26 MG/DL (ref 6–22)
CALCIUM SERPL-MCNC: 9.2 MG/DL (ref 8.4–10.4)
CHLORIDE SERPL-SCNC: 104 MMOL/L (ref 98–110)
CHOLEST SERPL-MCNC: 221 MG/DL (ref 110–200)
CO2 SERPL-SCNC: 28 MMOL/L (ref 20–32)
CREAT SERPL-MCNC: 1.5 MG/DL (ref 0.8–1.6)
EOSINOPHIL # BLD: 0.1 K/UL (ref 0–0.5)
EOSINOPHIL NFR BLD: 2 % (ref 0–6)
ERYTHROCYTE [DISTWIDTH] IN BLOOD BY AUTOMATED COUNT: 15.7 % (ref 10–16)
GFRAA, 66117: 56.6
GFRNA, 66118: 46.7
GLOBULIN,GLOB: 2.7 G/DL (ref 2–4)
GLUCOSE SERPL-MCNC: 86 MG/DL (ref 70–99)
GRANULOCYTES,GRANS: 50 % (ref 40–75)
HBA1C MFR BLD HPLC: 5.7 % (ref 4.8–5.9)
HCT VFR BLD AUTO: 38.5 % (ref 39.3–51.6)
HDLC SERPL-MCNC: 58 MG/DL (ref 40–59)
HGB BLD-MCNC: 11.8 G/DL (ref 13.1–17.2)
LDLC SERPL CALC-MCNC: 133 MG/DL (ref 50–99)
LYMPHOCYTES, LYMLT: 37 % (ref 27–45)
MCH RBC QN AUTO: 27 PG (ref 26–34)
MCHC RBC AUTO-ENTMCNC: 31 G/DL (ref 32–36)
MCV RBC AUTO: 88 FL (ref 80–95)
MONOCYTES # BLD: 0.5 K/UL (ref 0.1–0.9)
MONOCYTES NFR BLD: 11 % (ref 3–9)
NEUTROPHILS # BLD AUTO: 2.3 K/UL (ref 1.8–7.7)
PLATELET # BLD AUTO: 209 K/UL (ref 140–440)
PMV BLD AUTO: 10.3 FL (ref 6–10.8)
POTASSIUM SERPL-SCNC: 4 MMOL/L (ref 3.5–5.5)
PROT SERPL-MCNC: 6.9 G/DL (ref 6.2–8.1)
RBC # BLD AUTO: 4.38 M/UL (ref 3.8–5.8)
SODIUM SERPL-SCNC: 145 MMOL/L (ref 133–145)
TRIGL SERPL-MCNC: 149 MG/DL (ref 40–149)
TSH SERPL DL<=0.005 MIU/L-ACNC: 1.71 MCU/ML (ref 0.27–4.2)
VLDLC SERPL CALC-MCNC: 30 MG/DL (ref 8–30)
WBC # BLD AUTO: 4.7 K/UL (ref 4–11)

## 2017-11-22 ENCOUNTER — TELEPHONE (OUTPATIENT)
Dept: INTERNAL MEDICINE CLINIC | Age: 62
End: 2017-11-22

## 2017-11-22 NOTE — TELEPHONE ENCOUNTER
I have attempted to contact this patient by phone with the following results: left message to return my call on answering machine.  In reference to labs drawn on 11/09/2017

## 2017-11-22 NOTE — TELEPHONE ENCOUNTER
----- Message from Wendi Pearce NP sent at 11/22/2017  1:30 AM EST -----  Please notify patient that lab results were reviewed and the results are ok. He had a slight decline in his H &H. ?- any blood in stool. If no, we will repeat this lab at the next visit.

## 2017-11-22 NOTE — PROGRESS NOTES
Please notify patient that lab results were reviewed and the results are ok. He had a slight decline in his H &H. ?- any blood in stool. If no, we will repeat this lab at the next visit.

## 2017-11-28 NOTE — TELEPHONE ENCOUNTER
Contacted Tessa Gibbs and informed Him of lab results and recomendation per Lana Pitt NP's request. Tessa Gibbs expressed understanding.  Mr Terence Llanos denied any blood in stool and set up routine appointment for 4 months per Gerard Gómez NP

## 2017-12-16 DIAGNOSIS — I10 ESSENTIAL HYPERTENSION: ICD-10-CM

## 2017-12-18 RX ORDER — AMLODIPINE BESYLATE 10 MG/1
TABLET ORAL
Qty: 30 TAB | Refills: 1 | Status: SHIPPED | OUTPATIENT
Start: 2017-12-18 | End: 2018-11-15 | Stop reason: DRUGHIGH

## 2017-12-18 RX ORDER — OMEPRAZOLE 40 MG/1
CAPSULE, DELAYED RELEASE ORAL
Qty: 60 CAP | Refills: 5 | Status: ON HOLD | OUTPATIENT
Start: 2017-12-18 | End: 2018-09-01

## 2018-01-26 RX ORDER — HYDROCHLOROTHIAZIDE 12.5 MG/1
TABLET ORAL
Qty: 30 TAB | Refills: 2 | Status: SHIPPED | OUTPATIENT
Start: 2018-01-26 | End: 2018-04-29 | Stop reason: SDUPTHER

## 2018-01-26 RX ORDER — POTASSIUM CHLORIDE 750 MG/1
TABLET, FILM COATED, EXTENDED RELEASE ORAL
Qty: 30 TAB | Refills: 2 | Status: SHIPPED | OUTPATIENT
Start: 2018-01-26 | End: 2018-04-29 | Stop reason: SDUPTHER

## 2018-02-01 ENCOUNTER — OFFICE VISIT (OUTPATIENT)
Dept: ORTHOPEDIC SURGERY | Facility: CLINIC | Age: 63
End: 2018-02-01

## 2018-02-01 VITALS
SYSTOLIC BLOOD PRESSURE: 163 MMHG | DIASTOLIC BLOOD PRESSURE: 88 MMHG | HEIGHT: 71 IN | WEIGHT: 315 LBS | TEMPERATURE: 97.5 F | HEART RATE: 87 BPM | BODY MASS INDEX: 44.1 KG/M2 | OXYGEN SATURATION: 95 % | RESPIRATION RATE: 18 BRPM

## 2018-02-01 DIAGNOSIS — M25.462 KNEE EFFUSION, LEFT: ICD-10-CM

## 2018-02-01 DIAGNOSIS — S80.02XA CONTUSION OF LEFT KNEE, INITIAL ENCOUNTER: ICD-10-CM

## 2018-02-01 DIAGNOSIS — M22.41 CHONDROMALACIA, PATELLA, RIGHT: ICD-10-CM

## 2018-02-01 DIAGNOSIS — G89.29 CHRONIC PAIN OF RIGHT KNEE: ICD-10-CM

## 2018-02-01 DIAGNOSIS — M25.562 ACUTE PAIN OF LEFT KNEE: Primary | ICD-10-CM

## 2018-02-01 DIAGNOSIS — M17.12 PRIMARY OSTEOARTHRITIS OF LEFT KNEE: ICD-10-CM

## 2018-02-01 DIAGNOSIS — M17.11 PRIMARY OSTEOARTHRITIS OF RIGHT KNEE: ICD-10-CM

## 2018-02-01 DIAGNOSIS — R60.9 PERIPHERAL EDEMA: ICD-10-CM

## 2018-02-01 DIAGNOSIS — M25.561 CHRONIC PAIN OF RIGHT KNEE: ICD-10-CM

## 2018-02-01 RX ORDER — BUPIVACAINE HYDROCHLORIDE 2.5 MG/ML
10 INJECTION, SOLUTION EPIDURAL; INFILTRATION; INTRACAUDAL ONCE
Qty: 10 ML | Refills: 0
Start: 2018-02-01 | End: 2018-02-01

## 2018-02-01 RX ORDER — BUPIVACAINE HYDROCHLORIDE 2.5 MG/ML
4 INJECTION, SOLUTION EPIDURAL; INFILTRATION; INTRACAUDAL ONCE
Qty: 4 ML | Refills: 0
Start: 2018-02-01 | End: 2018-02-01

## 2018-02-01 RX ORDER — BETAMETHASONE SODIUM PHOSPHATE AND BETAMETHASONE ACETATE 3; 3 MG/ML; MG/ML
6 INJECTION, SUSPENSION INTRA-ARTICULAR; INTRALESIONAL; INTRAMUSCULAR; SOFT TISSUE ONCE
Qty: 0.5 ML | Refills: 0
Start: 2018-02-01 | End: 2018-02-01

## 2018-02-01 NOTE — MR AVS SNAPSHOT
[facility] 
 
 
 [address] [facility], [address] [place] [address] 
[phone] Patient: Megan Chandler MRN: [id number] UCE:[birth year] Visit Information Date & Time Provider Department Dept. Phone Encounter #  
 2/1/2018  4:20 PM Jean Pierre Sanabria, [address] [facility] [phone] [id number] Follow-up Instructions Return if symptoms worsen or fail to improve. Your Appointments 2/1/2018  4:20 PM  
Follow Up with Jean Pierre Sanabria MD  
[address] [facility], [address] [facility]) Appt Note: knee pain f/u; KNEE F/U; KNEE F/U  
 [address] [facility], [address] [place] [address]  
[phone]  
  
   
 [address] [facility], [address] [address]  
  
    
 3/8/2018  3:00 PM  
Follow Up with Kesha Olson NP  
[facility] [place] ([facility]) Appt Note: routine follow up repeat cbc  
 [address] [facility], [address] [place] [address]  
[phone]  
  
   
 [address] [facility], [address] [place] [address]  
  
    
 8/7/2018  9:50 AM  
Nurse Visit with [facility] NURSE [facility] ([facility]) Appt Note: PSA  
 [address] 
[address] [place] [address]  
[address] [facility] [address] [place] [address]  
  
    
  
 8/20/2018  3:00 PM  
Any with Dee Torrez MD  
[facility] ([facility]) Appt Note: 1 yr fu psa prior [address] [place] [address]  
[address] [facility] [address] [place] [address] Upcoming Health Maintenance Date Due Hepatitis C Screening 1955 EYE EXAM RETINAL OR DILATED Q1 1/8/1967 DTaP/Tdap/Td series (1 - Tdap) 1/8/1978 Pneumococcal 19-64 Highest Risk (2 of 3 - PCV13) 2/3/2012 ZOSTER VACCINE AGE 60> 11/8/2016 MICROALBUMIN Q1 6/29/2017 FOOT EXAM Q1 9/7/2017 FOBT Q 1 YEAR AGE 50-75 9/29/2017 HEMOGLOBIN A1C Q6M 5/9/2018 LIPID PANEL Q1 11/9/2018 Allergies as of 2/1/2018  Review Complete On: 2/1/2018 By: Kayy Staton Severity Noted Reaction Type Reactions Iodine  04/12/2011    Other (comments) Eyes swell shut Shellfish Containing Products  04/12/2011    Swelling Current Immunizations  Reviewed on 6/16/2017 Name Date Influenza Vaccine 9/1/2016 12:00 AM  
 Influenza Vaccine (Quad) PF 9/13/2017, 10/20/2016 Influenza Vaccine PF 10/2/2015 Pneumococcal Polysaccharide (PPSV-23) 2/3/2011 Not reviewed this visit You Were Diagnosed With   
  
 Codes Comments Acute pain of left knee    -  Primary ICD-10-CM: N59.248 ICD-9-CM: 719.46 Primary osteoarthritis of right knee     ICD-10-CM: M17.11 ICD-9-CM: 715.16 Chondromalacia, patella, right     ICD-10-CM: M22.41 
ICD-9-CM: 717.7 Chronic pain of right knee     ICD-10-CM: M25.561, G89.29 ICD-9-CM: 719.46, 338.29 BMI 40.0-44.9, adult [facility])     ICD-10-CM: Z68.41 
ICD-9-CM: V85.41 Peripheral edema     ICD-10-CM: R60.9 ICD-9-CM: 782.3 Contusion of left knee, initial encounter     ICD-10-CM: S80.02XA ICD-9-CM: 924.11 Knee effusion, left     ICD-10-CM: M25.462 ICD-9-CM: 719.06   
 Primary osteoarthritis of left knee     ICD-10-CM: M17.12 
ICD-9-CM: 715.16 Vitals BP Pulse Temp Resp Height(growth percentile) Weight(growth percentile) 163/88 87 97.5 °F (36.4 °C) (Oral) 18 5' 11\" (1.803 m) 316 lb 3.2 oz (143.4 kg) SpO2 BMI Smoking Status 95% 44.1 kg/m2 Former Smoker BMI and BSA Data Body Mass Index Body Surface Area  
 44.1 kg/m 2 2.68 m 2 Preferred Pharmacy Pharmacy Name Phone [id number]- [place], [address] Your Updated Medication List  
  
   
This list is accurate as of: 2/1/18  1:41 PM.  Always use your most recent med list.  
  
  
  
  
 allopurinol 100 mg tablet Commonly known as:  Mitcheal Meena Take 1 Tab by mouth daily. amLODIPine 10 mg tablet Commonly known as:  Desiree David TAKE 1 TAB BY MOUTH DAILY. aspirin 81 mg tablet Take 81 mg by mouth daily. avanafil 200 mg Tab tablet Commonly known as:  UNLICOF Take 1 Tab by mouth as needed for Other. Indications: Erectile Dysfunction  
  
 benazepril 40 mg tablet Commonly known as:  LOTENSIN  
TAKE 1 TABLET BY MOUTH DAILY (STOP LOTREL) betamethasone 6 mg/mL injection Commonly known as:  CELESTONE SOLUSPAN  
1 mL by Intra artICUlar route once for 1 dose. * bupivacaine (PF) 0.25 % (2.5 mg/mL) injection Commonly known as:  MARCAINE (PF)  
4 mL by Intra artICUlar route once for 1 dose. * bupivacaine (PF) 0.25 % (2.5 mg/mL) injection Commonly known as:  MARCAINE (PF) 10 mL by Intra artICUlar route once for 1 dose. cloNIDine 0.2 mg/24 hr patch Commonly known as:  CATAPRES  
APPLY 1 PATCH ONCE WEEKLY  
  
 colchicine 0.6 mg tablet Take 0.6 mg by mouth daily. * furosemide 20 mg tablet Commonly known as:  LASIX Take 1 tab by mouth daily * furosemide 20 mg tablet Commonly known as:  LASIX Take 1 pill by mouth as needed for edema  
  
 gabapentin 300 mg capsule Commonly known as:  NEURONTIN Take 300 mg by mouth three (3) times daily. hydrALAZINE 100 mg tablet Commonly known as:  APRESOLINE  
TAKE 1 TABLET BY MOUTH TWICE DAILY  
  
 hydroCHLOROthiazide 12.5 mg tablet Commonly known as:  HYDRODIURIL  
TAKE 1 TABLET BY MOUTH EVERY DAY  
  
 isosorbide mononitrate ER 30 mg tablet Commonly known as:  IMDUR Take 30 mg by mouth daily. KLOR-CON 10 10 mEq tablet Generic drug:  potassium chloride SR  
TAKE 1 TAB BY MOUTH DAILY. labetalol 200 mg tablet Commonly known as:  NORMODYNE  
1 TABLET TWICE PER DAY (STOP METOPROLOL)  
  
 nitroglycerin 0.4 mg SL tablet Commonly known as:  NITROSTAT by SubLINGual route every five (5) minutes as needed for Chest Pain. omeprazole 40 mg capsule Commonly known as:  PRILOSEC  
TAKE ONE CAPSULE BY MOUTH TWICE A DAY  
  
 sucralfate 100 mg/mL suspension Commonly known as:  CARAFATE  
2 teaspoons three times per day  
  
 tolterodine ER 4 mg ER capsule Commonly known as:  Meka Ro Take 4 mg by mouth daily. * Notice: This list has 4 medication(s) that are the same as other medications prescribed for you. Read the directions carefully, and ask your doctor or other care provider to review them with you. We Performed the Following BETAMETHASONE ACETATE & SODIUM PHOSPHATE INJECTION 3 MG EA. [ [facility]] DRAIN/INJECT LARGE JOINT/BURSA [id number] CPT(R)] Follow-up Instructions Return if symptoms worsen or fail to improve. Patient Instructions Knee: Exercises Your Care Instructions Here are some examples of exercises for your knee. Start each exercise slowly. Ease off the exercise if you start to have pain. Your doctor or physical therapist will tell you when you can start these exercises and which ones will work best for you. How to do the exercises [facility] [employer] 1. Sit with your leg straight and supported on the floor or a firm bed. (If you feel discomfort in the front or back of your knee, place a small towel roll under your knee.) 2. Tighten the muscles on top of your thigh by pressing the back of your knee flat down to the floor. (If you feel discomfort under your kneecap, place a small towel roll under your knee.) 3. Hold for about 6 seconds, then rest for up to 10 seconds. 4. Do 8 to 12 repetitions several times a day. Straight-leg raises to the front 1. Lie on your back with your good knee bent so that your foot rests flat on the floor. Your injured leg should be straight. Make sure that your low back has a normal curve.  You should be able to slip your flat hand in between the floor and the small of your back, with your palm touching the floor and your back touching the back of your hand. 2. Tighten the thigh muscles in the injured leg by pressing the back of your knee flat down to the floor. Hold your knee straight. 3. Keeping the thigh muscles tight, lift your injured leg up so that your heel is about 12 inches off the floor. Hold for about 6 seconds and then lower slowly. 4. Do 8 to 12 repetitions, 3 times a day. Straight-leg raises to the outside 1. Lie on your side, with your injured leg on top. 2. Tighten the front thigh muscles of your injured leg to keep your knee straight. 3. Keep your hip and your leg straight in line with the rest of your body, and keep your knee pointing forward. Do not drop your hip back. 4. Lift your injured leg straight up toward the ceiling, about 12 inches off the floor. Hold for about 6 seconds, then slowly lower your leg. 5. Do 8 to 12 repetitions. Straight-leg raises to the back 1. Lie on your stomach, and lift your leg straight up behind you (toward the ceiling). 2. Lift your toes about 6 inches off the floor, hold for about 6 seconds, then lower slowly. 3. Do 8 to 12 repetitions. Straight-leg raises to the inside 1. Lie on the side of your body with the injured leg. 2. You can either prop your other (good) leg up on a chair, or you can bend your good knee and put that foot in front of your injured knee. Do not drop your hip back. 3. Tighten the muscles on the front of your thigh to straighten your injured knee. 4. Keep your kneecap pointing forward, and lift your whole leg up toward the ceiling about 6 inches. Hold for about 6 seconds, then lower slowly. 5. Do 8 to 12 repetitions. Heel dig bridging 1. Lie on your back with both knees bent and your ankles bent so that only your heels are digging into the floor. Your knees should be bent about 90 degrees. 2. Then push your heels into the floor, squeeze your buttocks, and lift your hips off the floor until your shoulders, hips, and knees are all in a straight line. 3. Hold for about 6 seconds as you continue to breathe normally, and then slowly lower your hips back down to the floor and rest for up to 10 seconds. 4. Do 8 to 12 repetitions. Hamstring curls 1. Lie on your stomach with your knees straight. If your kneecap is uncomfortable, roll up a washcloth and put it under your leg just above your kneecap. 2. Lift the foot of your injured leg by bending the knee so that you bring the foot up toward your buttock. If this motion hurts, try it without bending your knee quite as far. This may help you avoid any painful motion. 3. Slowly lower your leg back to the floor. 4. Do 8 to 12 repetitions. 5. With permission from your doctor or physical therapist, you may also want to add a cuff weight to your ankle (not more than 5 pounds). With weight, you do not have to lift your leg more than 12 inches to get a hamstring workout. Shallow standing knee bends Do this exercise only if you have very little pain; if you have no clicking, locking, or giving way if you have an injured knee; and if it does not hurt while you are doing 8 to 12 repetitions. 1. Stand with your hands lightly resting on a counter or chair in front of you. Put your feet shoulder-width apart. 2. Slowly bend your knees so that you squat down like you are going to sit in a chair. Make sure your knees do not go in front of your toes. 3. Lower yourself about 6 inches. Your heels should remain on the floor at all times. 4. Rise slowly to a standing position. Heel raises 1. Stand with your feet a few inches apart, with your hands lightly resting on a counter or chair in front of you. 2. Slowly raise your heels off the floor while keeping your knees straight. 3. Hold for about 6 seconds, then slowly lower your heels to the floor. 4. Do 8 to 12 repetitions several times during the day. Follow-up care is a key part of your treatment and safety. Be sure to make and go to all appointments, and call your doctor if you are having problems. It's also a good idea to know your test results and keep a list of the medicines you take. Where can you learn more? Go to http://davian-johnson.info/. Enter Y190 in the search box to learn more about \"Knee: Exercises. \" Current as of: March 21, 2017 Content Version: 11.4 © 1704-5914 [employer]. Care instructions adapted under license by [employer] (which disclaims liability or warranty for this information). If you have questions about a medical condition or this instruction, always ask your healthcare professional. [address] any warranty or liability for your use of this information. Joint Injections: Care Instructions Your Care Instructions Joint injections are shots into a joint, such as the knee. They may be used to put in medicines, such as pain relievers. Or they can be used to take out fluid. Sometimes the fluid is tested in a lab. This can help find the cause of a joint problem. A corticosteroid, or steroid, shot is used to reduce inflammation in tendons or joints. It is often used to treat problems such as arthritis, tendinitis, and bursitis. Steroids can be injected directly into a painful, inflamed joint. They can also help reduce inflammation of a bursa. A bursa is a sac of fluid. It cushions and lubricates areas where tendons, ligaments, skin, muscles, or bones rub against each other. A steroid shot can sometimes help with short-term pain relief when other treatments haven't worked. If steroid shots help, pain may improve for weeks or months. Follow-up care is a key part of your treatment and safety.  Be sure to make and go to all appointments, and call your doctor if you are having problems. It's also a good idea to know your test results and keep a list of the medicines you take. How can you care for yourself at home? · Put ice or a cold pack on the area for 10 to 20 minutes at a time. Put a thin cloth between the ice and your skin. · Take anti-inflammatory medicines to reduce pain, swelling, or inflammation. These include ibuprofen (Advil, Motrin) and naproxen (Aleve). Read and follow all instructions on the label. · Avoid strenuous activities for several days, especially those that put stress on the area where you got the shot. · If you have dressings over the area, keep them clean and dry. You may remove them when your doctor tells you to. When should you call for help? Call your doctor now or seek immediate medical care if: 
? · You have signs of infection, such as: 
¨ Increased pain, swelling, warmth, or redness. ¨ Red streaks leading from the site. ¨ Pus draining from the site. ¨ A fever. ? Watch closely for changes in your health, and be sure to contact your doctor if you have any problems. Where can you learn more? Go to http://davian-johnson.info/. Enter N616 in the search box to learn more about \"Joint Injections: Care Instructions. \" Current as of: March 21, 2017 Content Version: 11.4 © 8547-3189 [employer]. Care instructions adapted under license by [employer] (which disclaims liability or warranty for this information). If you have questions about a medical condition or this instruction, always ask your healthcare professional. [place] [address] any warranty or liability for your use of this information. Introducing [facility]! Dear Tonio Lane: Thank you for requesting a [employer] account. Our records indicate that you have previously registered for a [employer] account but its currently inactive. Please call our [employer] support line at [phone]. Additional Information If you have questions, please visit the Frequently Asked Questions section of the [employer] website at [email or web address] Remember, [employer] is NOT to be used for urgent needs. For medical emergencies, dial 911. Now available from your iPhone and Android! Please provide this summary of care documentation to your next provider. Your primary care clinician is listed as Kole Jimenez. If you have any questions after today's visit, please call [phone].

## 2018-02-01 NOTE — PATIENT INSTRUCTIONS
Knee: Exercises  Your Care Instructions  Here are some examples of exercises for your knee. Start each exercise slowly. Ease off the exercise if you start to have pain. Your doctor or physical therapist will tell you when you can start these exercises and which ones will work best for you. How to do the exercises  Quad sets    1. Sit with your leg straight and supported on the floor or a firm bed. (If you feel discomfort in the front or back of your knee, place a small towel roll under your knee.)  2. Tighten the muscles on top of your thigh by pressing the back of your knee flat down to the floor. (If you feel discomfort under your kneecap, place a small towel roll under your knee.)  3. Hold for about 6 seconds, then rest for up to 10 seconds. 4. Do 8 to 12 repetitions several times a day. Straight-leg raises to the front    1. Lie on your back with your good knee bent so that your foot rests flat on the floor. Your injured leg should be straight. Make sure that your low back has a normal curve. You should be able to slip your flat hand in between the floor and the small of your back, with your palm touching the floor and your back touching the back of your hand. 2. Tighten the thigh muscles in the injured leg by pressing the back of your knee flat down to the floor. Hold your knee straight. 3. Keeping the thigh muscles tight, lift your injured leg up so that your heel is about 12 inches off the floor. Hold for about 6 seconds and then lower slowly. 4. Do 8 to 12 repetitions, 3 times a day. Straight-leg raises to the outside    1. Lie on your side, with your injured leg on top. 2. Tighten the front thigh muscles of your injured leg to keep your knee straight. 3. Keep your hip and your leg straight in line with the rest of your body, and keep your knee pointing forward. Do not drop your hip back. 4. Lift your injured leg straight up toward the ceiling, about 12 inches off the floor.  Hold for about 6 seconds, then slowly lower your leg. 5. Do 8 to 12 repetitions. Straight-leg raises to the back    1. Lie on your stomach, and lift your leg straight up behind you (toward the ceiling). 2. Lift your toes about 6 inches off the floor, hold for about 6 seconds, then lower slowly. 3. Do 8 to 12 repetitions. Straight-leg raises to the inside    1. Lie on the side of your body with the injured leg. 2. You can either prop your other (good) leg up on a chair, or you can bend your good knee and put that foot in front of your injured knee. Do not drop your hip back. 3. Tighten the muscles on the front of your thigh to straighten your injured knee. 4. Keep your kneecap pointing forward, and lift your whole leg up toward the ceiling about 6 inches. Hold for about 6 seconds, then lower slowly. 5. Do 8 to 12 repetitions. Heel dig bridging    1. Lie on your back with both knees bent and your ankles bent so that only your heels are digging into the floor. Your knees should be bent about 90 degrees. 2. Then push your heels into the floor, squeeze your buttocks, and lift your hips off the floor until your shoulders, hips, and knees are all in a straight line. 3. Hold for about 6 seconds as you continue to breathe normally, and then slowly lower your hips back down to the floor and rest for up to 10 seconds. 4. Do 8 to 12 repetitions. Hamstring curls    1. Lie on your stomach with your knees straight. If your kneecap is uncomfortable, roll up a washcloth and put it under your leg just above your kneecap. 2. Lift the foot of your injured leg by bending the knee so that you bring the foot up toward your buttock. If this motion hurts, try it without bending your knee quite as far. This may help you avoid any painful motion. 3. Slowly lower your leg back to the floor. 4. Do 8 to 12 repetitions.   5. With permission from your doctor or physical therapist, you may also want to add a cuff weight to your ankle (not more than 5 pounds). With weight, you do not have to lift your leg more than 12 inches to get a hamstring workout. Shallow standing knee bends    Do this exercise only if you have very little pain; if you have no clicking, locking, or giving way if you have an injured knee; and if it does not hurt while you are doing 8 to 12 repetitions. 1. Stand with your hands lightly resting on a counter or chair in front of you. Put your feet shoulder-width apart. 2. Slowly bend your knees so that you squat down like you are going to sit in a chair. Make sure your knees do not go in front of your toes. 3. Lower yourself about 6 inches. Your heels should remain on the floor at all times. 4. Rise slowly to a standing position. Heel raises    1. Stand with your feet a few inches apart, with your hands lightly resting on a counter or chair in front of you. 2. Slowly raise your heels off the floor while keeping your knees straight. 3. Hold for about 6 seconds, then slowly lower your heels to the floor. 4. Do 8 to 12 repetitions several times during the day. Follow-up care is a key part of your treatment and safety. Be sure to make and go to all appointments, and call your doctor if you are having problems. It's also a good idea to know your test results and keep a list of the medicines you take. Where can you learn more? Go to http://davian-johnson.info/. Enter T174 in the search box to learn more about \"Knee: Exercises. \"  Current as of: March 21, 2017  Content Version: 11.4  © 9668-5033 [employer]. Care instructions adapted under license by [employer] (which disclaims liability or warranty for this information). If you have questions about a medical condition or this instruction, always ask your healthcare professional. [address] any warranty or liability for your use of this information.        Joint Injections: Care Instructions  Your Care Instructions  Joint injections are shots into a joint, such as the knee. They may be used to put in medicines, such as pain relievers. Or they can be used to take out fluid. Sometimes the fluid is tested in a lab. This can help find the cause of a joint problem. A corticosteroid, or steroid, shot is used to reduce inflammation in tendons or joints. It is often used to treat problems such as arthritis, tendinitis, and bursitis. Steroids can be injected directly into a painful, inflamed joint. They can also help reduce inflammation of a bursa. A bursa is a sac of fluid. It cushions and lubricates areas where tendons, ligaments, skin, muscles, or bones rub against each other. A steroid shot can sometimes help with short-term pain relief when other treatments haven't worked. If steroid shots help, pain may improve for weeks or months. Follow-up care is a key part of your treatment and safety. Be sure to make and go to all appointments, and call your doctor if you are having problems. It's also a good idea to know your test results and keep a list of the medicines you take. How can you care for yourself at home? · Put ice or a cold pack on the area for 10 to 20 minutes at a time. Put a thin cloth between the ice and your skin. · Take anti-inflammatory medicines to reduce pain, swelling, or inflammation. These include ibuprofen (Advil, Motrin) and naproxen (Aleve). Read and follow all instructions on the label. · Avoid strenuous activities for several days, especially those that put stress on the area where you got the shot. · If you have dressings over the area, keep them clean and dry. You may remove them when your doctor tells you to. When should you call for help? Call your doctor now or seek immediate medical care if:  ? · You have signs of infection, such as:  ¨ Increased pain, swelling, warmth, or redness. ¨ Red streaks leading from the site. ¨ Pus draining from the site. ¨ A fever. ? Watch closely for changes in your health, and be sure to contact your doctor if you have any problems. Where can you learn more? Go to http://davian-johnson.info/. Enter N616 in the search box to learn more about \"Joint Injections: Care Instructions. \"  Current as of: March 21, 2017  Content Version: 11.4  © 2389-6901 [employer]. Care instructions adapted under license by [employer] (which disclaims liability or warranty for this information). If you have questions about a medical condition or this instruction, always ask your healthcare professional. [address] any warranty or liability for your use of this information.
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
## 2018-02-11 RX ORDER — LABETALOL 200 MG/1
TABLET, FILM COATED ORAL
Qty: 60 TAB | Refills: 3 | Status: SHIPPED | OUTPATIENT
Start: 2018-02-11 | End: 2018-08-28 | Stop reason: DRUGHIGH

## 2018-03-30 RX ORDER — BENAZEPRIL HYDROCHLORIDE 40 MG/1
TABLET ORAL
Qty: 30 TAB | Refills: 3 | Status: SHIPPED | OUTPATIENT
Start: 2018-03-30 | End: 2018-08-08 | Stop reason: SDUPTHER

## 2018-04-29 DIAGNOSIS — I10 ESSENTIAL HYPERTENSION: ICD-10-CM

## 2018-04-30 RX ORDER — HYDROCHLOROTHIAZIDE 12.5 MG/1
TABLET ORAL
Qty: 30 TAB | Refills: 2 | Status: SHIPPED | OUTPATIENT
Start: 2018-04-30 | End: 2018-06-21 | Stop reason: SDUPTHER

## 2018-04-30 RX ORDER — POTASSIUM CHLORIDE 750 MG/1
TABLET, FILM COATED, EXTENDED RELEASE ORAL
Qty: 30 TAB | Refills: 2 | Status: SHIPPED | OUTPATIENT
Start: 2018-04-30 | End: 2018-06-21 | Stop reason: SDUPTHER

## 2018-05-17 ENCOUNTER — OFFICE VISIT (OUTPATIENT)
Dept: INTERNAL MEDICINE CLINIC | Age: 63
End: 2018-05-17

## 2018-05-17 VITALS
TEMPERATURE: 98.4 F | OXYGEN SATURATION: 97 % | DIASTOLIC BLOOD PRESSURE: 84 MMHG | WEIGHT: 302 LBS | SYSTOLIC BLOOD PRESSURE: 160 MMHG | HEIGHT: 71 IN | BODY MASS INDEX: 42.28 KG/M2 | HEART RATE: 81 BPM | RESPIRATION RATE: 16 BRPM

## 2018-05-17 DIAGNOSIS — R60.9 CHRONIC EDEMA: ICD-10-CM

## 2018-05-17 DIAGNOSIS — Z12.11 COLON CANCER SCREENING: ICD-10-CM

## 2018-05-17 DIAGNOSIS — I10 ESSENTIAL HYPERTENSION: Primary | ICD-10-CM

## 2018-05-17 DIAGNOSIS — E78.00 HYPERCHOLESTEROLEMIA: ICD-10-CM

## 2018-05-17 DIAGNOSIS — I10 ESSENTIAL HYPERTENSION: ICD-10-CM

## 2018-05-17 DIAGNOSIS — Z11.59 ENCOUNTER FOR HEPATITIS C SCREENING TEST FOR LOW RISK PATIENT: ICD-10-CM

## 2018-05-17 DIAGNOSIS — E66.01 MORBID OBESITY (HCC): ICD-10-CM

## 2018-05-17 DIAGNOSIS — Z23 ENCOUNTER FOR IMMUNIZATION: ICD-10-CM

## 2018-05-17 NOTE — PROGRESS NOTES
Barbie Gilmore is a 66 y.o. male presenting today for Well Male  . HPI:  Barbie Gilmore presents to the office today for hypertension follow-up care. Patient reports he is feeling well today. He blood pressure is uncontrolled but patient did not bring his medications to the visit and he is unsure of the medications he takes daily. His blood pressure today is 160/94. He is negative for chest pain, palpitations or dyspnea. He has chronic lower extremity lymphedema and is inconsistent with during the lymphedema therapy he was given by the Vascular physician. He notes his lymphedema is much improved since his last visit. He is schedule to see Cardiology today and he was advised to take his medications with him so that they can be adjusted. He is planning a Gastric sleeve surgery and  Is seeking clearance from his Cardiologist today. Review of Systems   Constitutional: Negative for fever. Respiratory: Negative for cough, sputum production and shortness of breath. Cardiovascular: Positive for leg swelling. Negative for chest pain and palpitations. Gastrointestinal: Negative for abdominal pain, constipation, diarrhea, nausea and vomiting. Genitourinary: Negative for dysuria. Musculoskeletal: Negative for myalgias. Neurological: Negative for dizziness and headaches. Allergies   Allergen Reactions    Iodine Other (comments)     Eyes swell shut      Shellfish Containing Products Swelling       Current Outpatient Prescriptions   Medication Sig Dispense Refill    varicella-zoster recombinant, PF, (SHINGRIX, PF,) 50 mcg/0.5 mL susr injection Please repeat dose in 2-3 months 0.5 mL 1    KLOR-CON 10 10 mEq tablet TAKE 1 TAB BY MOUTH DAILY. 30 Tab 2    hydroCHLOROthiazide (HYDRODIURIL) 12.5 mg tablet TAKE 1 TABLET BY MOUTH EVERY DAY 30 Tab 2    benazepril (LOTENSIN) 40 mg tablet TAKE 1 TABLET BY MOUTH DAILY (STOP LOTREL) 30 Tab 3    amLODIPine (NORVASC) 10 mg tablet TAKE 1 TAB BY MOUTH DAILY.  27 Tab 1    cloNIDine (CATAPRES) 0.2 mg/24 hr patch APPLY 1 PATCH ONCE WEEKLY 12 Patch 3    hydrALAZINE (APRESOLINE) 100 mg tablet TAKE 1 TABLET BY MOUTH TWICE DAILY 180 Tab 3    tolterodine ER (DETROL LA) 4 mg ER capsule Take 4 mg by mouth daily.  nitroglycerin (NITROSTAT) 0.4 mg SL tablet by SubLINGual route every five (5) minutes as needed for Chest Pain.  allopurinol (ZYLOPRIM) 100 mg tablet Take 1 Tab by mouth daily. 90 Tab 3    colchicine 0.6 mg tablet Take 0.6 mg by mouth daily.  avanafil (STENDRA) 200 mg tab tablet Take 1 Tab by mouth as needed for Other. Indications: Erectile Dysfunction 6 Tab 6    aspirin 81 mg tablet Take 81 mg by mouth daily.  labetalol (NORMODYNE) 200 mg tablet 1 TABLET TWICE PER DAY (STOP METOPROLOL) 60 Tab 3    omeprazole (PRILOSEC) 40 mg capsule TAKE ONE CAPSULE BY MOUTH TWICE A DAY 60 Cap 5    furosemide (LASIX) 20 mg tablet Take 1 pill by mouth as needed for edema 30 Tab 1    isosorbide mononitrate ER (IMDUR) 30 mg tablet Take 30 mg by mouth daily.  furosemide (LASIX) 20 mg tablet Take 1 tab by mouth daily 30 Tab 1    sucralfate (CARAFATE) 100 mg/mL suspension 2 teaspoons three times per day 414 mL 3    gabapentin (NEURONTIN) 300 mg capsule Take 300 mg by mouth three (3) times daily.          Past Medical History:   Diagnosis Date    BPH without obstruction/lower urinary tract symptoms     Bursitis of right shoulder     CAD (coronary artery disease)     Chest pain     history of hospitalization with chest pain and a negative workup in 2008    Chronic edema     Constipation     die to medication    Coronary artery disease     mild, non obstructive/EF 65%    Dyspnea on exertion     Echocardiogram 12/16/08    IVC dilated; suboptimal endocardial border; EF 65%    ED (erectile dysfunction)     Elevated PSA     Frequency     GERD (gastroesophageal reflux disease)     Gout     H/O cystoscopy 06/20/2013    Hematuria, unspecified     Hypercholesterolemia     Hypertension     Hypertension      Hypogonadism male     Impotence of organic origin     Left ventricular diastolic dysfunction     Malignant neoplasm of prostate (HCC)     hx of t1a, izzy 6, 5 % of 1  core    Morbid obesity ([address]     Myocardial perfusion 09/05/08    Basal inferior defect c/w artifact; EF 63%    Overactive bladder     S/P cardiac cath 07/30/10    oD2-40%; pRCA-20-30%; EF 65%    Sleep apnea     Slowing of urinary stream     Type II or unspecified type diabetes mellitus without mention of complication, not stated as uncontrolled        Past Surgical History:   Procedure Laterality Date    HX HEART CATHETERIZATION  7/30/10    HX OTHER SURGICAL  06/27/13    Prostate    HX TONSILLECTOMY      VA COLONOSCOPY FLX DX W/COLLJ SPEC WHEN PFRMD      VA I & D ABSC XTRAORAL SOFT TISS COMP         Social History     Social History    Marital status: SINGLE     Spouse name: N/A    Number of children: N/A    Years of education: N/A     Occupational History    Not on file. Social History Main Topics    Smoking status: Former Smoker     Types: Cigars     Quit date: 10/4/1999    Smokeless tobacco: Never Used    Alcohol use No      Comment: socially    Drug use: No    Sexual activity: Not Currently     Other Topics Concern    Not on file     Social History Narrative       Patient does not have an advanced directive on file    Vitals:    05/17/18 1108   BP: 160/84   Pulse: 81   Resp: 16   Temp: 98.4 °F (36.9 °C)   TempSrc: Tympanic   SpO2: 97%   Weight: 302 lb (137 kg)   Height: 5' 11\" (1.803 m)   PainSc:   5   PainLoc: Shoulder       Physical Exam   Constitutional: No distress. HENT:   Right Ear: External ear normal.   Left Ear: External ear normal.   Mouth/Throat: No oropharyngeal exudate. Neck: Normal range of motion. Neck supple. Pulmonary/Chest: Effort normal and breath sounds normal.   Abdominal: Soft.  Bowel sounds are normal.   Musculoskeletal: Normal range of motion. He exhibits edema (bilateral lower extremity lymphedema). Lymphadenopathy:     He has no cervical adenopathy. Neurological: He is alert. Skin: Skin is warm. Vitals reviewed. No visits with results within 3 Month(s) from this visit. Latest known visit with results is:    Orders Only on 11/09/2017   Component Date Value Ref Range Status    Glucose 11/09/2017 86  70 - 99 mg/dL Final    BUN 11/09/2017 26* 6 - 22 mg/dL Final    Creatinine 11/09/2017 1.5  0.8 - 1.6 mg/dL Final    Sodium 11/09/2017 145  133 - 145 mmol/L Final    Potassium 11/09/2017 4.0  3.5 - 5.5 mmol/L Final    Chloride 11/09/2017 104  98 - 110 mmol/L Final    CO2 11/09/2017 28  20 - 32 mmol/L Final    AST (SGOT) 11/09/2017 16  10 - 37 U/L Final    ALT (SGPT) 11/09/2017 14  5 - 40 U/L Final    Alk. phosphatase 11/09/2017 86  40 - 125 U/L Final    Bilirubin, total 11/09/2017 0.1* 0.2 - 1.2 mg/dL Final    Calcium 11/09/2017 9.2  8.4 - 10.4 mg/dL Final    Protein, total 11/09/2017 6.9  6.2 - 8.1 g/dL Final    Albumin 11/09/2017 4.2  3.5 - 5.0 g/dL Final    A-G Ratio 11/09/2017 1.6  1.1 - 2.6 ratio Final    Globulin 11/09/2017 2.7  2.0 - 4.0 g/dL Final    Anion gap 11/09/2017 13.0  mmol/L Final    Comment: Test includes Albumin, Alkaline Phosphatase, ALT, AST, BUN, Calcium, CO2,  Chloride, Creatinine, Glucose, Potassium, Sodium, Total Bilirubin and Total  Protein. Estimated GFR results are reported in mL/min/1.73 sq.m. by the MDRD equation. This eGFR is validated for stable chronic renal failure patients. This   equation  is unreliable in acute illness or patients with normal renal function.       GFRAA 11/09/2017 56.6* >60.0 Final    GFRNA 11/09/2017 46.7* >60.0 Final    Triglyceride 11/09/2017 149  40 - 149 mg/dL Final    HDL Cholesterol 11/09/2017 58  40 - 59 mg/dL Final    Cholesterol, total 11/09/2017 221* 110 - 200 mg/dL Final    LDL, calculated 11/09/2017 133* 50 - 99 mg/dL Final    VLDL, calculated 11/09/2017 30  8 - 30 mg/dL Final    Comment: Test includes cholesterol, HDL cholesterol, triglycerides and LDL. Cholesterol Recommended NCEP guidelines in mg/dL:  Less than 200      Desirable  200 - 239          Borderline High  Greater than or  = to 240   High      Hemoglobin A1c 11/09/2017 5.7  4.8 - 5.9 % Final    AVG GLU 11/09/2017 117  91 - 123 mg/dL Final    WBC 11/09/2017 4.7  4.0 - 11.0 K/uL Final    RBC 11/09/2017 4.38  3.80 - 5.80 M/uL Final    HGB 11/09/2017 11.8* 13.1 - 17.2 g/dL Final    HCT 11/09/2017 38.5* 39.3 - 51.6 % Final    MCV 11/09/2017 88  80 - 95 fL Final    MCH 11/09/2017 27  26 - 34 pg Final    MCHC 11/09/2017 31* 32 - 36 g/dL Final    RDW 11/09/2017 15.7  10.0 - 16.0 % Final    PLATELET 20/69/8242 767  140 - 440 K/uL Final    MPV 11/09/2017 10.3  6.0 - 10.8 fL Final    NEUTROPHILS 11/09/2017 50  40 - 75 % Final    Lymphocytes 11/09/2017 37  27 - 45 % Final    MONOCYTES 11/09/2017 11* 3 - 9 % Final    EOSINOPHILS 11/09/2017 2  0 - 6 % Final    BASOPHILS 11/09/2017 0  0 - 2 % Final    ABS. NEUTROPHILS 11/09/2017 2.3  1.8 - 7.7 K/uL Final    ABSOLUTE LYMPHOCYTE COUNT 11/09/2017 1.7  1.0 - 4.8 K/uL Final    ABS. MONOCYTES 11/09/2017 0.5  0.1 - 0.9 K/uL Final    ABS. EOSINOPHILS 11/09/2017 0.1  0.0 - 0.5 K/uL Final    ABS. BASOPHILS 11/09/2017 0.0  0.0 - 0.2 K/uL Final    TSH 11/09/2017 1.71  0.27 - 4.20 mcU/mL Final       .No results found for any visits on 05/17/18. Assessment / Plan:      ICD-10-CM ICD-9-CM    1. Essential hypertension Q40 387.5 METABOLIC PANEL, COMPREHENSIVE      CBC WITH AUTOMATED DIFF   2. Colon cancer screening Z12.11 V76.51 REFERRAL TO GASTROENTEROLOGY   3. Morbid obesity ([facility] [address] E66.01 278.01 HEMOGLOBIN A1C WITH EAG      TSH 3RD GENERATION   4. Chronic edema R60.9 782.3    5. Hypercholesterolemia E78.00 272.0 LIPID PANEL   6. Encounter for hepatitis C screening test for low risk patient Z11.59 V73.89 HEPATITIS C AB   7.  Encounter for immunization Z23 V03.89 varicella-zoster recombinant, PF, (SHINGRIX, PF,) 50 mcg/0.5 mL susr injection       Fasting labs ordered  Referral to GI for colonoscopy  Hep C ordered  HTN- elevated. Patient blood pressure is elevated. Patient did not bring his medications and unsure what he is taking. Patient instructed to take his blood pressure medications to the Cardiologist      Follow-up Disposition:  Return in about 4 months (around 9/17/2018). I asked the patient if he  had any questions and answered his  questions.   The patient stated that he understands the treatment plan and agrees with the treatment plan

## 2018-05-17 NOTE — MR AVS SNAPSHOT
[address] [facility] [place] 
 
 
 [address] [facility] [address] 
[phone] Patient: Citlali Hobson MRN: [id number] ORJ:[id number] Visit Information Date & Time Provider Department Dept. Phone Encounter #  
 5/17/2018 10:45 AM Christiano Jean NP [facility] Olga Macario [phone] [id number] Your Appointments 8/7/2018  9:50 AM  
Nurse Visit with Gely Devine [facility] ([facility]) Appt Note: PSA  
 [address] [facility] [address]  
  
    
  
 8/20/2018  3:00 PM  
Any with Toma Collado MD  
[facility]) Appt Note: 1 yr fu psa prior [address] [facility] [address]  
  
   
 [address] [facility] [address] [address] [address] Upcoming Health Maintenance Date Due Hepatitis C Screening 1955 MICROALBUMIN Q1 6/29/2017 FOOT EXAM Q1 9/7/2017 FOBT Q 1 YEAR AGE 50-75 9/29/2017 HEMOGLOBIN A1C Q6M 5/9/2018 DTaP/Tdap/Td series (1 - Tdap) 6/1/2018* EYE EXAM RETINAL OR DILATED Q1 7/1/2018* Pneumococcal 19-64 Highest Risk (2 of 3 - PCV13) 8/1/2018* ZOSTER VACCINE AGE 60> 5/1/2019* Influenza Age 5 to Adult 8/1/2018 LIPID PANEL Q1 11/9/2018 *Topic was postponed. The date shown is not the original due date. Allergies as of 5/17/2018  Review Complete On: 5/17/2018 By: Christiano Jean NP Severity Noted Reaction Type Reactions Iodine  04/12/2011    Other (comments) Eyes swell shut Shellfish Containing Products  04/12/2011    Swelling Current Immunizations  Reviewed on 5/14/2018 Name Date Influenza Vaccine 9/1/2016 12:00 AM  
 Influenza Vaccine (Quad) PF 9/13/2017, 10/20/2016 Influenza Vaccine PF 10/2/2015 Pneumococcal Polysaccharide (PPSV-23) 2/3/2011 Not reviewed this visit You Were Diagnosed With   
  
 Codes Comments Essential hypertension    -  Primary ICD-10-CM: I10 
ICD-9-CM: 401.9 Colon cancer screening     ICD-10-CM: Z12.11 ICD-9-CM: V76.51 Morbid obesity ([address]     ICD-10-CM: E66.01 
ICD-9-CM: 278.01 Chronic edema     ICD-10-CM: R60.9 ICD-9-CM: 553. 3 Hypercholesterolemia     ICD-10-CM: E78.00 ICD-9-CM: 272.0 Encounter for hepatitis C screening test for low risk patient     ICD-10-CM: Z11.59 
ICD-9-CM: V73.89 Encounter for immunization     ICD-10-CM: W69 ICD-9-CM: V03.89 Vitals BP Pulse Temp Resp Height(growth percentile) 160/84 (BP 1 Location: Left arm, BP Patient Position: Sitting) 81 98.4 °F (36.9 °C) (Tympanic) 16 5' 11\" (1.803 m) Weight(growth percentile) SpO2 BMI Smoking Status 302 lb (137 kg) 97% 42.12 kg/m2 Former Smoker Vitals History BMI and BSA Data Body Mass Index Body Surface Area  
 42.12 kg/m 2 2.62 m 2 Preferred Pharmacy Pharmacy Name Phone [id number] [address] Your Updated Medication List  
  
   
This list is accurate as of 5/17/18 12:14 PM.  Always use your most recent med list.  
  
  
  
  
 allopurinol 100 mg tablet Commonly known as:  Polina Hammonds Take 1 Tab by mouth daily. amLODIPine 10 mg tablet Commonly known as:  Marisela Skidmore TAKE 1 TAB BY MOUTH DAILY. aspirin 81 mg tablet Take 81 mg by mouth daily. avanafil 200 mg Tab tablet Commonly known as:  OTTIBTQ Take 1 Tab by mouth as needed for Other. Indications: Erectile Dysfunction  
  
 benazepril 40 mg tablet Commonly known as:  LOTENSIN  
TAKE 1 TABLET BY MOUTH DAILY (STOP LOTREL)  
  
 cloNIDine 0.2 mg/24 hr patch Commonly known as:  CATAPRES  
APPLY 1 PATCH ONCE WEEKLY  
  
 colchicine 0.6 mg tablet Take 0.6 mg by mouth daily. * furosemide 20 mg tablet Commonly known as:  LASIX Take 1 tab by mouth daily * furosemide 20 mg tablet Commonly known as:  LASIX Take 1 pill by mouth as needed for edema  
  
 gabapentin 300 mg capsule Commonly known as:  NEURONTIN Take 300 mg by mouth three (3) times daily. hydrALAZINE 100 mg tablet Commonly known as:  APRESOLINE  
TAKE 1 TABLET BY MOUTH TWICE DAILY  
  
 hydroCHLOROthiazide 12.5 mg tablet Commonly known as:  HYDRODIURIL  
TAKE 1 TABLET BY MOUTH EVERY DAY  
  
 isosorbide mononitrate ER 30 mg tablet Commonly known as:  IMDUR Take 30 mg by mouth daily. KLOR-CON 10 10 mEq tablet Generic drug:  potassium chloride SR  
TAKE 1 TAB BY MOUTH DAILY. labetalol 200 mg tablet Commonly known as:  NORMODYNE  
1 TABLET TWICE PER DAY (STOP METOPROLOL)  
  
 nitroglycerin 0.4 mg SL tablet Commonly known as:  NITROSTAT  
by SubLINGual route every five (5) minutes as needed for Chest Pain. omeprazole 40 mg capsule Commonly known as:  PRILOSEC  
TAKE ONE CAPSULE BY MOUTH TWICE A DAY  
  
 sucralfate 100 mg/mL suspension Commonly known as:  CARAFATE  
2 teaspoons three times per day  
  
 tolterodine ER 4 mg ER capsule Commonly known as:  Marget Bodily Take 4 mg by mouth daily. varicella-zoster recombinant (PF) 50 mcg/0.5 mL Susr injection Commonly known as:  SHINGRIX (PF) Please repeat dose in 2-3 months * Notice: This list has 2 medication(s) that are the same as other medications prescribed for you. Read the directions carefully, and ask your doctor or other care provider to review them with you. Prescriptions Printed Refills  
 varicella-zoster recombinant, PF, (SHINGRIX, PF,) 50 mcg/0.5 mL susr injection 1 Sig: Please repeat dose in 2-3 months Class: Print We Performed the Following REFERRAL TO GASTROENTEROLOGY [[id number] Custom] Comments:  
 Colon cancer screening To-Do List   
 05/17/2018 Lab:  CBC WITH AUTOMATED DIFF   
  
 05/17/2018 Lab:  HEMOGLOBIN A1C WITH EAG   
  
 05/17/2018 Lab:  HEPATITIS C AB   
  
 05/17/2018 Lab:  LIPID PANEL   
  
 05/17/2018 Lab:  METABOLIC PANEL, COMPREHENSIVE   
  
 05/17/2018 Lab:  TSH 3RD GENERATION Referral Information Referral ID Referred By Referred To  
  
 [id number] [place], [address] Harvinder diaz, [address] Phone: [phone] Fax: [phone] Visits Status Start Date End Date 1 New Request 5/17/18 5/17/19 If your referral has a status of pending review or denied, additional information will be sent to support the outcome of this decision. Introducing [address] Dear Maryam Ley: Thank you for requesting a [employer] account. Our records indicate that you have previously registered for a Stam[employer] account but its currently inactive. Please call our Stamplay support line at [phone]. Additional Information If you have questions, please visit the Frequently Asked Questions section of the Stam[employer] website at [email or web address] Remember, Stamplay is NOT to be used for urgent needs. For medical emergencies, dial 911. Now available from your iPhone and Android! Please provide this summary of care documentation to your next provider. Your primary care clinician is listed as Fish Holguin. If you have any questions after today's visit, please call [phone].

## 2018-05-31 RX ORDER — ALLOPURINOL 100 MG/1
TABLET ORAL
Qty: 90 TAB | Refills: 3 | Status: SHIPPED | OUTPATIENT
Start: 2018-05-31 | End: 2018-11-19 | Stop reason: SDUPTHER

## 2018-06-21 DIAGNOSIS — I10 ESSENTIAL HYPERTENSION: ICD-10-CM

## 2018-06-21 NOTE — TELEPHONE ENCOUNTER
Requested Prescriptions     Pending Prescriptions Disp Refills    hydroCHLOROthiazide (HYDRODIURIL) 12.5 mg tablet 90 Tab 2    potassium chloride SR (KLOR-CON 10) 10 mEq tablet 30 Tab 2

## 2018-06-22 RX ORDER — POTASSIUM CHLORIDE 750 MG/1
TABLET, FILM COATED, EXTENDED RELEASE ORAL
Qty: 30 TAB | Refills: 2 | Status: SHIPPED | OUTPATIENT
Start: 2018-06-22 | End: 2018-06-25 | Stop reason: SDUPTHER

## 2018-06-22 RX ORDER — HYDROCHLOROTHIAZIDE 12.5 MG/1
12.5 TABLET ORAL DAILY
Qty: 90 TAB | Refills: 2 | Status: SHIPPED | OUTPATIENT
Start: 2018-06-22 | End: 2018-09-01

## 2018-06-25 DIAGNOSIS — I10 ESSENTIAL HYPERTENSION: ICD-10-CM

## 2018-06-25 RX ORDER — POTASSIUM CHLORIDE 750 MG/1
TABLET, FILM COATED, EXTENDED RELEASE ORAL
Qty: 90 TAB | Refills: 2 | Status: SHIPPED | OUTPATIENT
Start: 2018-06-25 | End: 2018-09-01

## 2018-06-25 NOTE — TELEPHONE ENCOUNTER
Requested Prescriptions     Pending Prescriptions Disp Refills    potassium chloride SR (KLOR-CON 10) 10 mEq tablet 30 Tab 2     Sig: TAKE 1 TAB BY MOUTH DAILY.      90 day supply

## 2018-06-29 ENCOUNTER — HOSPITAL ENCOUNTER (OUTPATIENT)
Dept: LAB | Age: 63
Discharge: HOME | End: 2018-06-29

## 2018-06-29 LAB — SENTARA SPECIMEN COL,SENBCF: NORMAL

## 2018-06-29 PROCEDURE — 99001 SPECIMEN HANDLING PT-LAB: CPT | Performed by: NURSE PRACTITIONER

## 2018-06-29 NOTE — LETTER
7/5/2018 7:48 AM 
 
Mr. Lorena Nelson [address] [facility] [address] Dear Lorena Nelson: 
 
Please find your most recent results below. Resulted Orders LIPID PANEL Result Value Ref Range Triglyceride 81 40 - 149 mg/dL HDL Cholesterol 68 (H) 40 - 59 mg/dL Cholesterol, total 201 (H) 110 - 200 mg/dL CHOLESTEROL/HDL 3.0 0.0 - 5.0 LDL, calculated 117 (H) 50 - 99 mg/dL VLDL, calculated 16 8 - 30 mg/dL Comment:  
   Test includes cholesterol, HDL cholesterol, triglycerides and LDL. Cholesterol Recommended NCEP guidelines in mg/dL: 
Less than 200            Desirable 200 - 239                Borderline High Greater than or  = 240   High Please Note: Total Chol/HDL Ratio Men     Women 1/2 Avg. Risk    3.4     3.3 Avg. Risk    5.0     4.4 
2X  Avg. Risk    9.6     7.1 3X  Avg. Risk   23.4    11.0 Narrative Unless additionally indicated, test performed at: [address], [address] [place] [address]. PH: [phone]. METABOLIC PANEL, COMPREHENSIVE Result Value Ref Range Glucose 94 70 - 99 mg/dL BUN 32 (H) 6 - 22 mg/dL Creatinine 1.8 (H) 0.8 - 1.6 mg/dL Sodium 148 (H) 133 - 145 mmol/L Potassium 3.8 3.5 - 5.5 mmol/L Chloride 105 98 - 110 mmol/L  
 CO2 30 20 - 32 mmol/L  
 AST (SGOT) 18 10 - 37 U/L  
 ALT (SGPT) 16 5 - 40 U/L Alk. phosphatase 66 40 - 125 U/L Bilirubin, total 0.4 0.2 - 1.2 mg/dL Calcium 8.8 8.4 - 10.4 mg/dL Protein, total 6.0 (L) 6.2 - 8.1 g/dL Albumin 3.6 3.5 - 5.0 g/dL A-G Ratio 1.5 1.1 - 2.6 ratio Globulin 2.4 2.0 - 4.0 g/dL Anion gap 13.0 mmol/L Comment:  
   Test includes Albumin, Alkaline Phosphatase, ALT, AST, BUN, Calcium, CO2, Chloride, Creatinine, Glucose, Potassium, Sodium, Total Bilirubin and Total 
Protein. Estimated GFR results are reported in mL/min/1.73 sq.m. by the MDRD equation. This eGFR is validated for stable chronic renal failure patients. This  
equation 
is unreliable in acute illness or patients with normal renal function. GFRAA 46.1 (L) >60.0 GFRNA 38.1 (L) >60.0 Narrative Unless additionally indicated, test performed at: [address], [address] [place] [address]. PH: [phone]. HEPATITIS C AB Result Value Ref Range Hep C Virus Ab None Detected None Detec Narrative Unless additionally indicated, test performed at: [address], [address] [place] [address]. PH: [phone]. TSH 3RD GENERATION Result Value Ref Range TSH 0.80 0.27 - 4.20 mcU/mL Narrative Unless additionally indicated, test performed at: [address], [address] [place] [address]. PH: [phone]. CBC WITH AUTOMATED DIFF Result Value Ref Range WBC 5.4 4.0 - 11.0 K/uL  
 RBC 4.35 3.80 - 5.80 M/uL  
 HGB 11.6 (L) 13.1 - 17.2 g/dL HCT 39.1 (L) 39.3 - 51.6 % MCV 90 80 - 95 fL  
 MCH 27 26 - 34 pg MCHC 30 (L) 31 - 36 g/dL  
 RDW 18.7 (H) 10.0 - 15.5 % PLATELET 717 683 - 299 K/uL MPV 10.3 9.0 - 13.0 fL  
 NEUTROPHILS 56 40 - 75 % Lymphocytes 34 20 - 45 % MONOCYTES 7 3 - 12 % EOSINOPHILS 3 0 - 6 % BASOPHILS 0 0 - 2 %  
 ABS. NEUTROPHILS 3.0 1.8 - 7.7 K/uL ABSOLUTE LYMPHOCYTE COUNT 1.8 1.0 - 4.8 K/uL  
 ABS. MONOCYTES 0.4 0.1 - 1.0 K/uL  
 ABS. EOSINOPHILS 0.2 0.0 - 0.5 K/uL  
 ABS. BASOPHILS 0.0 0.0 - 0.2 K/uL Narrative Unless additionally indicated, test performed at: [address], [address] [place] [address]. PH: [phone]. HEMOGLOBIN A1C W/O EAG Result Value Ref Range Hemoglobin A1c 5.3 4.8 - 5.9 % AVG  91 - 123 mg/dL Narrative Unless additionally indicated, test performed at: [address], [address] [place] [address]. PH: [phone].   
 
 
RECOMMENDATIONS: 
 None. Keep up the good work! Please call me if you have any questions: [phone] Sincerely, 
 
 
Anastasia Bustillo NP

## 2018-06-30 LAB
A-G RATIO,AGRAT: 1.5 RATIO (ref 1.1–2.6)
ABSOLUTE LYMPHOCYTE COUNT, 10803: 1.8 K/UL (ref 1–4.8)
ALBUMIN SERPL-MCNC: 3.6 G/DL (ref 3.5–5)
ALP SERPL-CCNC: 66 U/L (ref 40–125)
ALT SERPL-CCNC: 16 U/L (ref 5–40)
ANION GAP SERPL CALC-SCNC: 13 MMOL/L
AST SERPL W P-5'-P-CCNC: 18 U/L (ref 10–37)
AVG GLU, 10930: 104 MG/DL (ref 91–123)
BASOPHILS # BLD: 0 K/UL (ref 0–0.2)
BASOPHILS NFR BLD: 0 % (ref 0–2)
BILIRUB SERPL-MCNC: 0.4 MG/DL (ref 0.2–1.2)
BUN SERPL-MCNC: 32 MG/DL (ref 6–22)
CALCIUM SERPL-MCNC: 8.8 MG/DL (ref 8.4–10.4)
CHLORIDE SERPL-SCNC: 105 MMOL/L (ref 98–110)
CHOLEST SERPL-MCNC: 201 MG/DL (ref 110–200)
CO2 SERPL-SCNC: 30 MMOL/L (ref 20–32)
CREAT SERPL-MCNC: 1.8 MG/DL (ref 0.8–1.6)
EOSINOPHIL # BLD: 0.2 K/UL (ref 0–0.5)
EOSINOPHIL NFR BLD: 3 % (ref 0–6)
ERYTHROCYTE [DISTWIDTH] IN BLOOD BY AUTOMATED COUNT: 18.7 % (ref 10–15.5)
GFRAA, 66117: 46.1
GFRNA, 66118: 38.1
GLOBULIN,GLOB: 2.4 G/DL (ref 2–4)
GLUCOSE SERPL-MCNC: 94 MG/DL (ref 70–99)
GRANULOCYTES,GRANS: 56 % (ref 40–75)
HBA1C MFR BLD HPLC: 5.3 % (ref 4.8–5.9)
HCT VFR BLD AUTO: 39.1 % (ref 39.3–51.6)
HCV AB SER IA-ACNC: NORMAL
HDLC SERPL-MCNC: 3 MG/DL (ref 0–5)
HDLC SERPL-MCNC: 68 MG/DL (ref 40–59)
HGB BLD-MCNC: 11.6 G/DL (ref 13.1–17.2)
LDLC SERPL CALC-MCNC: 117 MG/DL (ref 50–99)
LYMPHOCYTES, LYMLT: 34 % (ref 20–45)
MCH RBC QN AUTO: 27 PG (ref 26–34)
MCHC RBC AUTO-ENTMCNC: 30 G/DL (ref 31–36)
MCV RBC AUTO: 90 FL (ref 80–95)
MONOCYTES # BLD: 0.4 K/UL (ref 0.1–1)
MONOCYTES NFR BLD: 7 % (ref 3–12)
NEUTROPHILS # BLD AUTO: 3 K/UL (ref 1.8–7.7)
PLATELET # BLD AUTO: 228 K/UL (ref 140–440)
PMV BLD AUTO: 10.3 FL (ref 9–13)
POTASSIUM SERPL-SCNC: 3.8 MMOL/L (ref 3.5–5.5)
PROT SERPL-MCNC: 6 G/DL (ref 6.2–8.1)
RBC # BLD AUTO: 4.35 M/UL (ref 3.8–5.8)
SODIUM SERPL-SCNC: 148 MMOL/L (ref 133–145)
TRIGL SERPL-MCNC: 81 MG/DL (ref 40–149)
TSH SERPL DL<=0.005 MIU/L-ACNC: 0.8 MCU/ML (ref 0.27–4.2)
VLDLC SERPL CALC-MCNC: 16 MG/DL (ref 8–30)
WBC # BLD AUTO: 5.4 K/UL (ref 4–11)

## 2018-08-12 RX ORDER — BENAZEPRIL HYDROCHLORIDE 40 MG/1
TABLET ORAL
Qty: 30 TAB | Refills: 3 | Status: SHIPPED | OUTPATIENT
Start: 2018-08-12 | End: 2018-09-01

## 2018-08-27 ENCOUNTER — TELEPHONE (OUTPATIENT)
Dept: INTERNAL MEDICINE CLINIC | Age: 63
End: 2018-08-27

## 2018-08-27 NOTE — TELEPHONE ENCOUNTER
Patient called in his blood pressure  1st one was 191/119  Pulse 79  ,2nd  165/99  Pulse 81  Please call him at [phone]

## 2018-08-27 NOTE — TELEPHONE ENCOUNTER
Patient called and stated that something is going on with his medications. He just had gastric bypass surgery last week. Please call him as soon as possible at [phone].

## 2018-08-27 NOTE — TELEPHONE ENCOUNTER
Mr Quique Irving reports loss of vision over weekend and at Patient first visit his B/P was 200/100. They advised he go to ER if this occurred again. Mr Quique Irving is concerned that it is a reaction to his medication. He contacted his surgeons office and is waiting a return call. This writer advised him to take his blood pressure and is it was still elevated to call us back for an appointment and that if he had any vision loss again or chest pain to go to ED. He expressed understanding.

## 2018-08-28 ENCOUNTER — APPOINTMENT (OUTPATIENT)
Dept: CT IMAGING | Age: 63
DRG: 247 | End: 2018-08-28
Attending: EMERGENCY MEDICINE
Payer: COMMERCIAL

## 2018-08-28 ENCOUNTER — HOSPITAL ENCOUNTER (INPATIENT)
Age: 63
LOS: 4 days | Discharge: HOME | DRG: 247 | End: 2018-09-01
Attending: EMERGENCY MEDICINE | Admitting: INTERNAL MEDICINE
Payer: COMMERCIAL

## 2018-08-28 ENCOUNTER — APPOINTMENT (OUTPATIENT)
Dept: GENERAL RADIOLOGY | Age: 63
DRG: 247 | End: 2018-08-28
Attending: EMERGENCY MEDICINE
Payer: COMMERCIAL

## 2018-08-28 ENCOUNTER — OFFICE VISIT (OUTPATIENT)
Dept: INTERNAL MEDICINE CLINIC | Age: 63
End: 2018-08-28

## 2018-08-28 VITALS
DIASTOLIC BLOOD PRESSURE: 120 MMHG | BODY MASS INDEX: 39.76 KG/M2 | WEIGHT: 284 LBS | SYSTOLIC BLOOD PRESSURE: 176 MMHG | TEMPERATURE: 98.5 F | OXYGEN SATURATION: 97 % | HEART RATE: 76 BPM | RESPIRATION RATE: 16 BRPM | HEIGHT: 71 IN

## 2018-08-28 DIAGNOSIS — E87.6 HYPOKALEMIA: ICD-10-CM

## 2018-08-28 DIAGNOSIS — I24.9 ACS (ACUTE CORONARY SYNDROME) (HCC): Primary | ICD-10-CM

## 2018-08-28 DIAGNOSIS — I25.119 CORONARY ARTERY DISEASE WITH ANGINA PECTORIS, UNSPECIFIED VESSEL OR LESION TYPE, UNSPECIFIED WHETHER NATIVE OR TRANSPLANTED HEART (HCC): ICD-10-CM

## 2018-08-28 DIAGNOSIS — I10 ESSENTIAL HYPERTENSION: ICD-10-CM

## 2018-08-28 DIAGNOSIS — E86.0 DEHYDRATION: ICD-10-CM

## 2018-08-28 DIAGNOSIS — E66.01 MORBID (SEVERE) OBESITY DUE TO EXCESS CALORIES (HCC): Primary | ICD-10-CM

## 2018-08-28 DIAGNOSIS — R07.9 CHEST PAIN AT REST: ICD-10-CM

## 2018-08-28 DIAGNOSIS — N17.9 AKI (ACUTE KIDNEY INJURY) (HCC): ICD-10-CM

## 2018-08-28 PROBLEM — Z98.890 S/P GASTRIC SURGERY: Status: ACTIVE | Noted: 2018-08-28

## 2018-08-28 PROBLEM — N18.30 ACUTE RENAL FAILURE SUPERIMPOSED ON STAGE 3 CHRONIC KIDNEY DISEASE (HCC): Status: ACTIVE | Noted: 2018-08-28

## 2018-08-28 LAB
ALBUMIN SERPL-MCNC: 3 G/DL (ref 3.4–5)
ALBUMIN SERPL-MCNC: 3.4 G/DL (ref 3.4–5)
ALBUMIN/GLOB SERPL: 0.9 {RATIO} (ref 0.8–1.7)
ALBUMIN/GLOB SERPL: 0.9 {RATIO} (ref 0.8–1.7)
ALP SERPL-CCNC: 65 U/L (ref 45–117)
ALP SERPL-CCNC: 74 U/L (ref 45–117)
ALT SERPL-CCNC: 30 U/L (ref 16–61)
ALT SERPL-CCNC: 33 U/L (ref 16–61)
ANION GAP SERPL CALC-SCNC: 10 MMOL/L (ref 3–18)
ANION GAP SERPL CALC-SCNC: 9 MMOL/L (ref 3–18)
APTT PPP: 39.4 SEC (ref 23–36.4)
AST SERPL-CCNC: 27 U/L (ref 15–37)
AST SERPL-CCNC: 43 U/L (ref 15–37)
BASOPHILS # BLD: 0 K/UL (ref 0–0.1)
BASOPHILS NFR BLD: 0 % (ref 0–2)
BILIRUB DIRECT SERPL-MCNC: 0.2 MG/DL (ref 0–0.2)
BILIRUB SERPL-MCNC: 0.3 MG/DL (ref 0.2–1)
BILIRUB SERPL-MCNC: 0.5 MG/DL (ref 0.2–1)
BUN SERPL-MCNC: 38 MG/DL (ref 7–18)
BUN SERPL-MCNC: 38 MG/DL (ref 7–18)
BUN/CREAT SERPL: 15 (ref 12–20)
BUN/CREAT SERPL: 16 (ref 12–20)
CALCIUM SERPL-MCNC: 8.1 MG/DL (ref 8.5–10.1)
CALCIUM SERPL-MCNC: 8.7 MG/DL (ref 8.5–10.1)
CHLORIDE SERPL-SCNC: 102 MMOL/L (ref 100–108)
CHLORIDE SERPL-SCNC: 104 MMOL/L (ref 100–108)
CK MB CFR SERPL CALC: 2 % (ref 0–4)
CK MB CFR SERPL CALC: 6.7 % (ref 0–4)
CK MB SERPL-MCNC: 20.3 NG/ML (ref 5–25)
CK MB SERPL-MCNC: 5.2 NG/ML (ref 5–25)
CK SERPL-CCNC: 266 U/L (ref 39–308)
CK SERPL-CCNC: 303 U/L (ref 39–308)
CO2 SERPL-SCNC: 30 MMOL/L (ref 21–32)
CO2 SERPL-SCNC: 32 MMOL/L (ref 21–32)
CREAT SERPL-MCNC: 2.33 MG/DL (ref 0.6–1.3)
CREAT SERPL-MCNC: 2.46 MG/DL (ref 0.6–1.3)
DIFFERENTIAL METHOD BLD: ABNORMAL
EOSINOPHIL # BLD: 0.1 K/UL (ref 0–0.4)
EOSINOPHIL NFR BLD: 1 % (ref 0–5)
ERYTHROCYTE [DISTWIDTH] IN BLOOD BY AUTOMATED COUNT: 15.1 % (ref 11.6–14.5)
GLOBULIN SER CALC-MCNC: 3.5 G/DL (ref 2–4)
GLOBULIN SER CALC-MCNC: 3.9 G/DL (ref 2–4)
GLUCOSE SERPL-MCNC: 145 MG/DL (ref 74–99)
GLUCOSE SERPL-MCNC: 93 MG/DL (ref 74–99)
HCT VFR BLD AUTO: 40.8 % (ref 36–48)
HGB BLD-MCNC: 13.4 G/DL (ref 13–16)
INR PPP: 1.1 (ref 0.8–1.2)
LACTATE BLD-SCNC: 1.6 MMOL/L (ref 0.4–2)
LYMPHOCYTES # BLD: 1.1 K/UL (ref 0.9–3.6)
LYMPHOCYTES NFR BLD: 13 % (ref 21–52)
MAGNESIUM SERPL-MCNC: 2.1 MG/DL (ref 1.6–2.6)
MCH RBC QN AUTO: 27.4 PG (ref 24–34)
MCHC RBC AUTO-ENTMCNC: 32.8 G/DL (ref 31–37)
MCV RBC AUTO: 83.4 FL (ref 74–97)
MONOCYTES # BLD: 0.3 K/UL (ref 0.05–1.2)
MONOCYTES NFR BLD: 3 % (ref 3–10)
NEUTS SEG # BLD: 7.5 K/UL (ref 1.8–8)
NEUTS SEG NFR BLD: 83 % (ref 40–73)
PLATELET # BLD AUTO: 179 K/UL (ref 135–420)
PMV BLD AUTO: 9.6 FL (ref 9.2–11.8)
POTASSIUM SERPL-SCNC: 2.9 MMOL/L (ref 3.5–5.5)
POTASSIUM SERPL-SCNC: 3.5 MMOL/L (ref 3.5–5.5)
PROT SERPL-MCNC: 6.5 G/DL (ref 6.4–8.2)
PROT SERPL-MCNC: 7.3 G/DL (ref 6.4–8.2)
PROTHROMBIN TIME: 13.9 SEC (ref 11.5–15.2)
RBC # BLD AUTO: 4.89 M/UL (ref 4.7–5.5)
SODIUM SERPL-SCNC: 142 MMOL/L (ref 136–145)
SODIUM SERPL-SCNC: 145 MMOL/L (ref 136–145)
TROPONIN I SERPL-MCNC: 0.12 NG/ML (ref 0–0.04)
TROPONIN I SERPL-MCNC: 1.61 NG/ML (ref 0–0.04)
WBC # BLD AUTO: 9 K/UL (ref 4.6–13.2)

## 2018-08-28 PROCEDURE — 80076 HEPATIC FUNCTION PANEL: CPT | Performed by: EMERGENCY MEDICINE

## 2018-08-28 PROCEDURE — 65270000029 HC RM PRIVATE

## 2018-08-28 PROCEDURE — 71045 X-RAY EXAM CHEST 1 VIEW: CPT

## 2018-08-28 PROCEDURE — 85025 COMPLETE CBC W/AUTO DIFF WBC: CPT | Performed by: EMERGENCY MEDICINE

## 2018-08-28 PROCEDURE — 74011000258 HC RX REV CODE- 258: Performed by: EMERGENCY MEDICINE

## 2018-08-28 PROCEDURE — 82553 CREATINE MB FRACTION: CPT | Performed by: EMERGENCY MEDICINE

## 2018-08-28 PROCEDURE — 93005 ELECTROCARDIOGRAM TRACING: CPT

## 2018-08-28 PROCEDURE — 99285 EMERGENCY DEPT VISIT HI MDM: CPT

## 2018-08-28 PROCEDURE — 74011250636 HC RX REV CODE- 250/636: Performed by: EMERGENCY MEDICINE

## 2018-08-28 PROCEDURE — 85730 THROMBOPLASTIN TIME PARTIAL: CPT | Performed by: EMERGENCY MEDICINE

## 2018-08-28 PROCEDURE — 80053 COMPREHEN METABOLIC PANEL: CPT | Performed by: EMERGENCY MEDICINE

## 2018-08-28 PROCEDURE — 70450 CT HEAD/BRAIN W/O DYE: CPT

## 2018-08-28 PROCEDURE — 96375 TX/PRO/DX INJ NEW DRUG ADDON: CPT

## 2018-08-28 PROCEDURE — 74011250637 HC RX REV CODE- 250/637: Performed by: INTERNAL MEDICINE

## 2018-08-28 PROCEDURE — 83735 ASSAY OF MAGNESIUM: CPT | Performed by: EMERGENCY MEDICINE

## 2018-08-28 PROCEDURE — 83605 ASSAY OF LACTIC ACID: CPT

## 2018-08-28 PROCEDURE — 96365 THER/PROPH/DIAG IV INF INIT: CPT

## 2018-08-28 PROCEDURE — 65660000000 HC RM CCU STEPDOWN

## 2018-08-28 PROCEDURE — 85610 PROTHROMBIN TIME: CPT | Performed by: INTERNAL MEDICINE

## 2018-08-28 RX ORDER — GUAIFENESIN 100 MG/5ML
81 LIQUID (ML) ORAL DAILY
Status: DISCONTINUED | OUTPATIENT
Start: 2018-08-29 | End: 2018-08-29

## 2018-08-28 RX ORDER — PANTOPRAZOLE SODIUM 40 MG/1
40 TABLET, DELAYED RELEASE ORAL
Status: DISCONTINUED | OUTPATIENT
Start: 2018-08-29 | End: 2018-08-29

## 2018-08-28 RX ORDER — SODIUM CHLORIDE 450 MG/100ML
60 INJECTION, SOLUTION INTRAVENOUS CONTINUOUS
Status: DISCONTINUED | OUTPATIENT
Start: 2018-08-28 | End: 2018-09-01

## 2018-08-28 RX ORDER — ISOSORBIDE MONONITRATE 30 MG/1
30 TABLET, EXTENDED RELEASE ORAL DAILY
Status: CANCELLED | OUTPATIENT
Start: 2018-08-29

## 2018-08-28 RX ORDER — MORPHINE SULFATE 2 MG/ML
2 INJECTION, SOLUTION INTRAMUSCULAR; INTRAVENOUS
Status: DISCONTINUED | OUTPATIENT
Start: 2018-08-28 | End: 2018-09-01

## 2018-08-28 RX ORDER — LABETALOL 200 MG/1
200 TABLET, FILM COATED ORAL 2 TIMES DAILY
Status: DISCONTINUED | OUTPATIENT
Start: 2018-08-28 | End: 2018-08-28

## 2018-08-28 RX ORDER — NITROGLYCERIN 0.4 MG/1
0.4 TABLET SUBLINGUAL
Status: DISCONTINUED | OUTPATIENT
Start: 2018-08-28 | End: 2018-09-01 | Stop reason: HOSPADM

## 2018-08-28 RX ORDER — POTASSIUM CHLORIDE 7.45 MG/ML
10 INJECTION INTRAVENOUS
Status: DISPENSED | OUTPATIENT
Start: 2018-08-28 | End: 2018-08-28

## 2018-08-28 RX ORDER — FAMOTIDINE 20 MG/1
20 TABLET, FILM COATED ORAL 2 TIMES DAILY
Status: CANCELLED | OUTPATIENT
Start: 2018-08-29

## 2018-08-28 RX ORDER — SODIUM CHLORIDE 9 MG/ML
100 INJECTION, SOLUTION INTRAVENOUS CONTINUOUS
Status: DISCONTINUED | OUTPATIENT
Start: 2018-08-28 | End: 2018-08-28

## 2018-08-28 RX ORDER — ONDANSETRON 2 MG/ML
4 INJECTION INTRAMUSCULAR; INTRAVENOUS
Status: DISCONTINUED | OUTPATIENT
Start: 2018-08-28 | End: 2018-09-01 | Stop reason: HOSPADM

## 2018-08-28 RX ORDER — HEPARIN SODIUM 10000 [USP'U]/100ML
7.5-25 INJECTION, SOLUTION INTRAVENOUS
Status: DISCONTINUED | OUTPATIENT
Start: 2018-08-28 | End: 2018-08-31 | Stop reason: ALTCHOICE

## 2018-08-28 RX ORDER — AMLODIPINE BESYLATE 10 MG/1
10 TABLET ORAL DAILY
Status: DISCONTINUED | OUTPATIENT
Start: 2018-08-29 | End: 2018-09-01 | Stop reason: HOSPADM

## 2018-08-28 RX ORDER — OXYCODONE AND ACETAMINOPHEN 5; 325 MG/1; MG/1
1 TABLET ORAL
Status: DISCONTINUED | OUTPATIENT
Start: 2018-08-28 | End: 2018-09-01 | Stop reason: HOSPADM

## 2018-08-28 RX ORDER — OXYCODONE AND ACETAMINOPHEN 5; 325 MG/1; MG/1
TABLET ORAL
COMMUNITY
End: 2018-11-15

## 2018-08-28 RX ORDER — HEPARIN SODIUM 1000 [USP'U]/ML
31.3 INJECTION, SOLUTION INTRAVENOUS; SUBCUTANEOUS ONCE
Status: COMPLETED | OUTPATIENT
Start: 2018-08-28 | End: 2018-08-28

## 2018-08-28 RX ORDER — DOCUSATE SODIUM 100 MG/1
100 CAPSULE, LIQUID FILLED ORAL
Status: DISCONTINUED | OUTPATIENT
Start: 2018-08-28 | End: 2018-09-01 | Stop reason: HOSPADM

## 2018-08-28 RX ORDER — ISOSORBIDE MONONITRATE 60 MG/1
60 TABLET, EXTENDED RELEASE ORAL DAILY
Status: DISCONTINUED | OUTPATIENT
Start: 2018-08-29 | End: 2018-09-01 | Stop reason: HOSPADM

## 2018-08-28 RX ORDER — AMLODIPINE BESYLATE 10 MG/1
10 TABLET ORAL DAILY
Status: CANCELLED | OUTPATIENT
Start: 2018-08-29

## 2018-08-28 RX ORDER — LABETALOL 100 MG/1
100 TABLET, FILM COATED ORAL 2 TIMES DAILY
Status: DISCONTINUED | OUTPATIENT
Start: 2018-08-28 | End: 2018-08-28

## 2018-08-28 RX ORDER — PROMETHAZINE HYDROCHLORIDE 25 MG/1
25 TABLET ORAL
COMMUNITY
End: 2018-11-15

## 2018-08-28 RX ORDER — FAMOTIDINE 20 MG/1
20 TABLET, FILM COATED ORAL 2 TIMES DAILY
Status: DISCONTINUED | OUTPATIENT
Start: 2018-08-29 | End: 2018-08-30

## 2018-08-28 RX ORDER — HYDRALAZINE HYDROCHLORIDE 50 MG/1
50 TABLET, FILM COATED ORAL 2 TIMES DAILY
Qty: 60 TAB | Refills: 1 | Status: SHIPPED | OUTPATIENT
Start: 2018-08-28 | End: 2018-11-15

## 2018-08-28 RX ORDER — LABETALOL 100 MG/1
100 TABLET, FILM COATED ORAL EVERY 12 HOURS
Status: DISCONTINUED | OUTPATIENT
Start: 2018-08-28 | End: 2018-08-29

## 2018-08-28 RX ORDER — LABETALOL 100 MG/1
100 TABLET, FILM COATED ORAL 2 TIMES DAILY
Status: CANCELLED | OUTPATIENT
Start: 2018-08-29

## 2018-08-28 RX ORDER — SODIUM CHLORIDE 9 MG/ML
125 INJECTION, SOLUTION INTRAVENOUS CONTINUOUS
Status: DISCONTINUED | OUTPATIENT
Start: 2018-08-28 | End: 2018-08-28

## 2018-08-28 RX ORDER — HYDRALAZINE HYDROCHLORIDE 50 MG/1
50 TABLET, FILM COATED ORAL 2 TIMES DAILY
Status: CANCELLED | OUTPATIENT
Start: 2018-08-28

## 2018-08-28 RX ORDER — HYDRALAZINE HYDROCHLORIDE 20 MG/ML
10 INJECTION INTRAMUSCULAR; INTRAVENOUS
Status: DISCONTINUED | OUTPATIENT
Start: 2018-08-28 | End: 2018-09-01 | Stop reason: HOSPADM

## 2018-08-28 RX ORDER — CLONIDINE 0.2 MG/24H
1 PATCH, EXTENDED RELEASE TRANSDERMAL
Status: DISCONTINUED | OUTPATIENT
Start: 2018-08-29 | End: 2018-08-29 | Stop reason: SDUPTHER

## 2018-08-28 RX ORDER — ACETAMINOPHEN 325 MG/1
650 TABLET ORAL
Status: DISCONTINUED | OUTPATIENT
Start: 2018-08-28 | End: 2018-09-01 | Stop reason: HOSPADM

## 2018-08-28 RX ORDER — NITROGLYCERIN 0.4 MG/1
0.4 TABLET SUBLINGUAL
Status: CANCELLED | OUTPATIENT
Start: 2018-08-28

## 2018-08-28 RX ORDER — LABETALOL 100 MG/1
100 TABLET, FILM COATED ORAL 2 TIMES DAILY
Qty: 60 TAB | Refills: 1 | Status: SHIPPED | OUTPATIENT
Start: 2018-08-28 | End: 2018-09-01

## 2018-08-28 RX ORDER — FAMOTIDINE 20 MG/1
20 TABLET, FILM COATED ORAL 2 TIMES DAILY
COMMUNITY
End: 2018-11-15

## 2018-08-28 RX ORDER — NALOXONE HYDROCHLORIDE 0.4 MG/ML
0.4 INJECTION, SOLUTION INTRAMUSCULAR; INTRAVENOUS; SUBCUTANEOUS AS NEEDED
Status: DISCONTINUED | OUTPATIENT
Start: 2018-08-28 | End: 2018-09-01 | Stop reason: HOSPADM

## 2018-08-28 RX ORDER — ONDANSETRON 2 MG/ML
4 INJECTION INTRAMUSCULAR; INTRAVENOUS
Status: COMPLETED | OUTPATIENT
Start: 2018-08-28 | End: 2018-08-28

## 2018-08-28 RX ADMIN — SODIUM CHLORIDE 1000 ML: 900 INJECTION, SOLUTION INTRAVENOUS at 18:05

## 2018-08-28 RX ADMIN — POTASSIUM CHLORIDE 10 MEQ: 10 INJECTION, SOLUTION INTRAVENOUS at 19:18

## 2018-08-28 RX ADMIN — POTASSIUM CHLORIDE 10 MEQ: 10 INJECTION, SOLUTION INTRAVENOUS at 18:01

## 2018-08-28 RX ADMIN — HEPARIN SODIUM 4000 UNITS: 1000 INJECTION, SOLUTION INTRAVENOUS; SUBCUTANEOUS at 22:55

## 2018-08-28 RX ADMIN — ONDANSETRON 4 MG: 2 INJECTION INTRAMUSCULAR; INTRAVENOUS at 17:10

## 2018-08-28 RX ADMIN — LABETALOL HYDROCHLORIDE 100 MG: 100 TABLET, FILM COATED ORAL at 23:36

## 2018-08-28 RX ADMIN — LIDOCAINE HYDROCHLORIDE: 10 INJECTION, SOLUTION EPIDURAL; INFILTRATION; INTRACAUDAL; PERINEURAL at 23:07

## 2018-08-28 RX ADMIN — THIAMINE HYDROCHLORIDE 100 MG: 100 INJECTION, SOLUTION INTRAMUSCULAR; INTRAVENOUS at 21:00

## 2018-08-28 NOTE — IP AVS SNAPSHOT
[address] [place] [id number] [phone] Patient: Jazzy Valente MRN: [id number] PLV:2/1/6018 A check shane indicates which time of day the medication should be taken. My Medications START taking these medications Instructions Each Dose to Equal  
 Morning Noon Evening Bedtime  
 aspirin 81 mg chewable tablet Replaces:  aspirin 81 mg tablet Your next dose is:  09/01/18 Take 1 Tab by mouth two (2) times a day. 81 mg  
    
   
   
1800 
  
   
  
 atorvastatin 40 mg tablet Commonly known as:  LIPITOR Your next dose is:  09/01/18 Take 1 Tab by mouth nightly. 40 mg  
    
   
   
1800 
  
   
  
 carvedilol 12.5 mg tablet Commonly known as:  Stephie Daina Your next dose is:  09/01/18 Take 1 Tab by mouth two (2) times daily (with meals). 12.5 mg  
    
   
   
1700 
  
   
  
 clopidogrel 75 mg Tab Commonly known as:  PLAVIX Start taking on:  9/2/2018 Take 1 Tab by mouth daily. 75 mg CHANGE how you take these medications Instructions Each Dose to Equal  
 Morning Noon Evening Bedtime  
 isosorbide mononitrate ER 60 mg CR tablet Commonly known as:  IMDUR Start taking on:  9/2/2018 What changed:   
- medication strength 
- how much to take Take 1 Tab by mouth daily. 60 mg  
    
   
   
   
  
 omeprazole 40 mg capsule Commonly known as:  PRILOSEC What changed:  See the new instructions. Take 1 Cap by mouth daily. 40 mg CONTINUE taking these medications Instructions Each Dose to Equal  
 Morning Noon Evening Bedtime  
 allopurinol 100 mg tablet Commonly known as:  Gonzella Cotta Your next dose is:  09/02/18 TAKE 1 TAB BY MOUTH DAILY. amLODIPine 10 mg tablet Commonly known as:  Tad Aureliano Your next dose is:  09/02/18 TAKE 1 TAB BY MOUTH DAILY. cloNIDine 0.2 mg/24 hr patch Commonly known as:  CATAPRES Your next dose is:  09/05/18 APPLY 1 PATCH ONCE WEEKLY  
     
  
   
   
   
  
 famotidine 20 mg tablet Commonly known as:  PEPCID Your next dose is:  09/01/2018 Take 20 mg by mouth two (2) times a day. 20 mg  
    
   
   
1800  
   
  
 gabapentin 300 mg capsule Commonly known as:  NEURONTIN Your next dose is:  09/02/18 Take 300 mg by mouth three (3) times daily. 300 mg  
    
   
   
   
  
 hydrALAZINE 50 mg tablet Commonly known as:  APRESOLINE Your next dose is:  09/01/18 Take 1 Tab by mouth two (2) times a day. 50 mg  
    
   
   
1600 
  
   
  
 nitroglycerin 0.4 mg SL tablet Commonly known as:  NITROSTAT  
   
 by SubLINGual route every five (5) minutes as needed for Chest Pain. oxyCODONE-acetaminophen 5-325 mg per tablet Commonly known as:  PERCOCET Take  by mouth every four (4) hours as needed for Pain. promethazine 25 mg tablet Commonly known as:  PHENERGAN Take 25 mg by mouth every six (6) hours as needed for Nausea. 25 mg  
    
   
   
   
  
 varicella-zoster recombinant (PF) 50 mcg/0.5 mL Susr injection Commonly known as:  SHINGRIX (PF) Please repeat dose in 2-3 months STOP taking these medications   
 aspirin 81 mg tablet Replaced by:  aspirin 81 mg chewable tablet  
   
  
 avanafil 200 mg Tab tablet Commonly known as:  STENDRA  
   
  
 benazepril 40 mg tablet Commonly known as:  LOTENSIN  
   
  
 colchicine 0.6 mg tablet  
   
  
 furosemide 20 mg tablet Commonly known as:  LASIX  
   
  
 hydroCHLOROthiazide 12.5 mg tablet Commonly known as:  HYDRODIURIL  
   
  
 labetalol 100 mg tablet Commonly known as:  NORMODYNE  
   
  
 potassium chloride SR 10 mEq tablet Commonly known as:  KLOR-CON 10  
   
  
 sucralfate 100 mg/mL suspension Commonly known as:  CARAFATE  
   
  
 tolterodine ER 4 mg ER capsule Commonly known as:  Wendelin Claude Where to Get Your Medications Information on where to get these meds will be given to you by the nurse or doctor. ! Ask your nurse or doctor about these medications  
  aspirin 81 mg chewable tablet  
 atorvastatin 40 mg tablet  
 carvedilol 12.5 mg tablet  
 clopidogrel 75 mg Tab  
 isosorbide mononitrate ER 60 mg CR tablet  
 omeprazole 40 mg capsule

## 2018-08-28 NOTE — PROGRESS NOTES
Chief Complaint   Patient presents with    Elevated Blood Pressure         Is someone accompanying this pt? no    Is the patient using any DME equipment during OV? no    Depression Screening:  PHQ over the last two weeks 5/17/2018 2/1/2018 10/12/2017 5/25/2017 4/13/2017 9/28/2016 10/2/2015   PHQ Not Done - Patient Decline - - - - -   Little interest or pleasure in doing things Not at all - Not at all Several days Not at all Not at all Not at all   Feeling down, depressed, irritable, or hopeless Not at all - Not at all Several days Not at all Not at all Not at all   Total Score PHQ 2 0 - 0 2 0 0 0       Learning Assessment:  Learning Assessment 5/25/2017 9/6/2016   PRIMARY LEARNER Patient Patient   CO-LEARNER CAREGIVER No -   [address]   LEARNER PREFERENCE PRIMARY READING READING   ANSWERED BY patient Patient   RELATIONSHIP SELF SELF       Abuse Screening:  Abuse Screening Questionnaire 2/16/2016   Do you ever feel afraid of your partner? N   Are you in a relationship with someone who physically or mentally threatens you? N   Is it safe for you to go home? Y         Health Maintenance reviewed and discussed per provider. Pt is due for   Health Maintenance Due   Topic    EYE EXAM RETINAL OR DILATED Q1     DTaP/Tdap/Td series (1 - Tdap)    Pneumococcal 19-64 Highest Risk (2 of 3 - PCV13)    MICROALBUMIN Q1     FOOT EXAM Q1     FOBT Q 1 YEAR AGE 54-65     Influenza Age 5 to Adult     Please order/place referral if appropriate. Pt currently taking Antiplatelet therapy? no      Coordination of Care:  1. Have you been to the ER, urgent care clinic since your last visit? Hospitalized since your last visit? yes    2. Have you seen or consulted any other health care providers outside of the [address] [place] [address] since your last visit? Include any pap smears or colon screening. no    Please see Red banners under Allergies, Med rec, Immunizations to remove outside inquires.  All correct information has been verified with patient and added to chart. Mr Terence Llanos was observed in office while taking medications. After taking the last of his medications he became nauseas and dizzy and complained of visual changes. Lights were turned off and he was given cold water to sip and a cold compress for his head. He reported feeling like someone was standing on his chest. Danielle Cheema NP made aware and EKG ordered. 911 was called after Danielle Cheema NP and Dr Angela Penaloza reviewed EKG. Ambulance arrived report given and Mr Terence Llanos left office on stretcher.

## 2018-08-28 NOTE — Clinical Note
Lesion 1. Balloon inserted. Balloon inflated using multiple inflations. Lesion 1: Pressure = 14 lizandro; Duration = 25 sec.

## 2018-08-28 NOTE — IP AVS SNAPSHOT
[address] [place] [id number] 
[phone] Patient: Neri Lutz MRN: [id number] UPS:2[birth year]4/3478 About your hospitalization You were admitted on:  August 28, 2018 You last received care in the:  [facility] 2 CV STEPDOWN You were discharged on:  September 1, 2018 Why you were hospitalized Your primary diagnosis was:  Acs (Acute Coronary Syndrome) (Hcc) Your diagnoses also included:  Hypokalemia, Acute Renal Failure Superimposed On Stage 3 Chronic Kidney Disease (Hcc), Coronary Atherosclerosis Of Native Coronary Artery, Morbid Obesity (Hcc), Essential Hypertension, S/P Gastric Surgery Follow-up Information Follow up With Details Comments Contact Info Trista Bolton NP   [address] [place] [address] 6 [facility] [address] [phone] 
  
   f/u appt /time on tuesday due to holiday Your Scheduled Appointments Thursday September 13, 2018  3:00 PM EDT Follow Up with Tatiana Redwood, NP [facility] ([address]) [address] [place] [address], [address] [facility] [address]  
[phone] Discharge Orders Procedure Order Date Status Priority Quantity Spec Type Associated Dx DIET GASTRIC BYPASS 2-4 wks post-op; No options chosen 09/01/18 0950 Normal Routine 1 Questions: Options:  2-4 wks post-op Additional options:  No options chosen METABOLIC PANEL, BASIC 59/48/60 0950 Future Routine 1 Blood Comments:  On 9/5/18. Call report to PCP and  
Reason: stage 3 ckd A check shane indicates which time of day the medication should be taken. My Medications START taking these medications Instructions Each Dose to Equal  
 Morning Noon Evening Bedtime  
 aspirin 81 mg chewable tablet Replaces:  aspirin 81 mg tablet Your next dose is:  09/01/18 Take 1 Tab by mouth two (2) times a day. 81 mg  
    
   
   
1800 atorvastatin 40 mg tablet Commonly known as:  LIPITOR Your next dose is:  09/01/18 Take 1 Tab by mouth nightly. 40 mg  
    
   
   
1800 
  
   
  
 carvedilol 12.5 mg tablet Commonly known as:  Lily Chew Your next dose is:  09/01/18 Take 1 Tab by mouth two (2) times daily (with meals). 12.5 mg  
    
   
   
1700 
  
   
  
 clopidogrel 75 mg Tab Commonly known as:  PLAVIX Start taking on:  9/2/2018 Take 1 Tab by mouth daily. 75 mg CHANGE how you take these medications Instructions Each Dose to Equal  
 Morning Noon Evening Bedtime  
 isosorbide mononitrate ER 60 mg CR tablet Commonly known as:  IMDUR Start taking on:  9/2/2018 What changed:   
- medication strength 
- how much to take Take 1 Tab by mouth daily. 60 mg  
    
   
   
   
  
 omeprazole 40 mg capsule Commonly known as:  PRILOSEC What changed:  See the new instructions. Take 1 Cap by mouth daily. 40 mg CONTINUE taking these medications Instructions Each Dose to Equal  
 Morning Noon Evening Bedtime  
 allopurinol 100 mg tablet Commonly known as:  Carolyn Bolus Your next dose is:  09/02/18 TAKE 1 TAB BY MOUTH DAILY. amLODIPine 10 mg tablet Commonly known as:  Horris Bills Your next dose is:  09/02/18 TAKE 1 TAB BY MOUTH DAILY. cloNIDine 0.2 mg/24 hr patch Commonly known as:  CATAPRES Your next dose is:  09/05/18 APPLY 1 PATCH ONCE WEEKLY  
     
  
   
   
   
  
 famotidine 20 mg tablet Commonly known as:  PEPCID Your next dose is:  09/01/2018 Take 20 mg by mouth two (2) times a day. 20 mg  
    
   
   
1800  
   
  
 gabapentin 300 mg capsule Commonly known as:  NEURONTIN Your next dose is:  09/02/18 Take 300 mg by mouth three (3) times daily. 300 mg  
    
   
   
   
  
 hydrALAZINE 50 mg tablet Commonly known as:  APRESOLINE Your next dose is:  09/01/18 Take 1 Tab by mouth two (2) times a day. 50 mg  
    
   
   
1600 
  
   
  
 nitroglycerin 0.4 mg SL tablet Commonly known as:  NITROSTAT  
   
 by SubLINGual route every five (5) minutes as needed for Chest Pain. oxyCODONE-acetaminophen 5-325 mg per tablet Commonly known as:  PERCOCET Take  by mouth every four (4) hours as needed for Pain. promethazine 25 mg tablet Commonly known as:  PHENERGAN Take 25 mg by mouth every six (6) hours as needed for Nausea. 25 mg  
    
   
   
   
  
 varicella-zoster recombinant (PF) 50 mcg/0.5 mL Susr injection Commonly known as:  SHINGRIX (PF) Please repeat dose in 2-3 months STOP taking these medications   
 aspirin 81 mg tablet Replaced by:  aspirin 81 mg chewable tablet  
   
  
 avanafil 200 mg Tab tablet Commonly known as:  STENDRA  
   
  
 benazepril 40 mg tablet Commonly known as:  LOTENSIN  
   
  
 colchicine 0.6 mg tablet  
   
  
 furosemide 20 mg tablet Commonly known as:  LASIX  
   
  
 hydroCHLOROthiazide 12.5 mg tablet Commonly known as:  HYDRODIURIL  
   
  
 labetalol 100 mg tablet Commonly known as:  NORMODYNE  
   
  
 potassium chloride SR 10 mEq tablet Commonly known as:  KLOR-CON 10  
   
  
 sucralfate 100 mg/mL suspension Commonly known as:  CARAFATE  
   
  
 tolterodine ER 4 mg ER capsule Commonly known as:  [place] Where to Get Your Medications Information on where to get these meds will be given to you by the nurse or doctor. ! Ask your nurse or doctor about these medications  
  aspirin 81 mg chewable tablet  
 atorvastatin 40 mg tablet  
 carvedilol 12.5 mg tablet  
 clopidogrel 75 mg Tab  
 isosorbide mononitrate ER 60 mg CR tablet  
 omeprazole 40 mg capsule Opioid Education Prescription Opioids: What You Need to Know: 
 
Prescription opioids can be used to help relieve moderate-to-severe pain and are often prescribed following a surgery or injury, or for certain health conditions. These medications can be an important part of treatment but also come with serious risks. Opioids are strong pain medicines. Examples include hydrocodone, oxycodone, fentanyl, and morphine. Heroin is an example of an illegal opioid. It is important to work with your health care provider to make sure you are getting the safest, most effective care. WHAT ARE THE RISKS AND SIDE EFFECTS OF OPIOID USE? Prescription opioids carry serious risks of addiction and overdose, especially with prolonged use. An opioid overdose, often marked by slow breathing, can cause sudden death. The use of prescription opioids can have a number of side effects as well, even when taken as directed. · Tolerance-meaning you might need to take more of a medication for the same pain relief · Physical dependence-meaning you have symptoms of withdrawal when the medication is stopped. Withdrawal symptoms can include nausea, sweating, chills, diarrhea, stomach cramps, and muscle aches. Withdrawal can last up to several weeks, depending on which drug you took and how long you took it. · Increased sensitivity to pain · Constipation · Nausea, vomiting, and dry mouth · Sleepiness and dizziness · Confusion · Depression · Low levels of testosterone that can result in lower sex drive, energy, and strength · Itching and sweating RISKS ARE GREATER WITH:      
· History of drug misuse, substance use disorder, or overdose · Mental health conditions (such as depression or anxiety) · Sleep apnea · Older age (72 years or older) · Pregnancy Avoid alcohol while taking prescription opioids. Also, unless specifically advised by your health care provider, medications to avoid include: · Benzodiazepines (such as Xanax or Valium) · Muscle relaxants (such as Soma or Flexeril) · Hypnotics (such as Ambien or Lunesta) · Other prescription opioids KNOW YOUR OPTIONS Talk to your health care provider about ways to manage your pain that don't involve prescription opioids. Some of these options may actually work better and have fewer risks and side effects. Options may include: 
· Pain relievers such as acetaminophen, ibuprofen, and naproxen · Some medications that are also used for depression or seizures · Physical therapy and exercise · Counseling to help patients learn how to cope better with triggers of pain and stress. · Application of heat or cold compress · Massage therapy · Relaxation techniques Be Informed Make sure you know the name of your medication, how much and how often to take it, and its potential risks & side effects. IF YOU ARE PRESCRIBED OPIOIDS FOR PAIN: 
· Never take opioids in greater amounts or more often than prescribed. Remember the goal is not to be pain-free but to manage your pain at a tolerable level. · Follow up with your primary care provider to: · Work together to create a plan on how to manage your pain. · Talk about ways to help manage your pain that don't involve prescription opioids. · Talk about any and all concerns and side effects. · Help prevent misuse and abuse. · Never sell or share prescription opioids · Help prevent misuse and abuse. · Store prescription opioids in a secure place and out of reach of others (this may include visitors, children, friends, and family). · Safely dispose of unused/unwanted prescription opioids: Find your community drug take-back program or your pharmacy mail-back program, or flush them down the toilet, following guidance from the Food and Drug Administration (www.fda.gov/Drugs/ResourcesForYou). · Visit www.cdc.gov/drugoverdose to learn about the risks of opioid abuse and overdose. · If you believe you may be struggling with addiction, tell your health care provider and ask for guidance or call Sosa Tejada at 2-773-238-QIXD. Discharge Instructions Learning About Percutaneous Coronary Intervention What is percutaneous coronary intervention? Percutaneous coronary intervention (PCI) is the name for procedures to open a blocked coronary artery. The two most common are coronary angioplasty and coronary stent placement. A PCI is a way to open a blocked coronary artery before, during, or after a heart attack. It gets blood flowing to your heart. And it can help prevent heart problems by widening an artery that has been narrowed by fatty buildup (plaque). This also may be called balloon angioplasty. Before a PCI, a doctor does a test to find blocked arteries. The test is called cardiac catheterization. The doctor puts a tiny tube called a catheter into an artery in your groin or arm. The doctor moves the catheter through the artery to your heart. Then he or she puts a dye into the catheter. This makes your heart's arteries show up on a screen so the doctor can see any blockages. The test also can measure the pressure inside your heart's chambers. If you have a blocked artery, the doctor may do an angioplasty or a coronary stent procedure. In an angioplasty, the doctor uses a catheter with a tiny balloon at the tip. He or she puts it into the blocked area and inflates it. The balloon presses the plaque against the walls of the artery. This makes more room for blood to flow. In most cases, the doctor then puts a stent in the artery. A stent is a small, expandable tube that presses against the walls of the artery. The stent is left in the artery to keep the artery open. This helps blood flow. It also may keep small pieces of plaque from breaking off and causing a heart attack. How is PCI done? PCI is done in a cardiac catheterization laboratory (\"cath lab\"). It is done by a heart specialist called a cardiologist. The whole procedure may take 1½ to 3 hours. You lie on a table under a large X-ray machine. You will get medicine through an IV in one of your veins. It helps you relax and not feel pain. You will be awake during the procedure. But you may not be able to remember much about it. The doctor will inject some medicine into your arm or groin to numb the skin. You will feel a small needle stick. It's like having a blood test. You may feel some pressure when the doctor puts in the catheter. But you will not feel pain. The doctor will look at X-ray pictures on a monitor (like a TV set) to move the catheter to your heart. You may feel warm or flushed for a short time when the doctor injects dye into your artery. The doctor then will inflate a tiny balloon at the end of the catheter. The balloon is inflated for a brief time. Then it is deflated and removed. The doctor also may use the catheter to put a stent in the artery. What can you expect after PCI? The catheter will be removed. A nurse or doctor may press on a bandage on the opening. Then a bandage or a compression device may be placed on your groin or wrist at the catheter insertion site. This prevents bleeding. After the test, you will be taken to a room where the catheter site and your heart rate, blood pressure, and temperature will be checked several times. If the catheter was put in your groin, you will need to lie still and keep your leg straight for several hours. If the catheter was put in your arm, you may be able to sit up and get out of bed right away. But you will need to keep your arm still for at least one hour. The average hospital stay is 1 to 2 days for most procedures. When you go home, you will get instructions from your doctor to help you recover well and prevent problems. Make sure to drink plenty of fluids (unless your doctor tells you not to) for several hours after the test. This will help flush the dye out of your body. Your doctor will prescribe blood-thinning medicines. You will likely take aspirin plus another blood thinner. It is very important that you take these medicines exactly as directed. They help keep the coronary artery open and reduce your risk of a heart attack. If you have this procedure, you will still need to make lifestyle changes like eating healthy, being active, and not smoking. This will give you the best chance for a longer, healthier life. Follow-up care is a key part of your treatment and safety. Be sure to make and go to all appointments, and call your doctor if you are having problems. It's also a good idea to know your test results and keep a list of the medicines you take. Where can you learn more? Go to http://davian-johnson.info/. Enter J315 in the search box to learn more about \"Learning About Percutaneous Coronary Intervention. \" Current as of: May 10, 2017 Content Version: 11.7 © 5104-1449 [employer]. Care instructions adapted under license by [employer] (which disclaims liability or warranty for this information). If you have questions about a medical condition or this instruction, always ask your healthcare professional. [address] any warranty or liability for your use of this information. Percutaneous Coronary Intervention: What to Expect at [facility] Your Recovery Percutaneous coronary intervention (PCI) is the name for procedures that are used to open a narrowed or blocked coronary artery. The two most common PCI procedures are coronary angioplasty and coronary stent placement. Your groin or arm may have a bruise and feel sore for a day or two after a percutaneous coronary intervention (PCI).  You can do light activities around the house, but nothing strenuous for several days. This care sheet gives you a general idea about how long it will take for you to recover. But each person recovers at a different pace. Follow the steps below to get better as quickly as possible. How can you care for yourself at home? Activity 
  · If the doctor gave you a sedative: ¨ For 24 hours, don't do anything that requires attention to detail. It takes time for the medicine's effects to completely wear off. ¨ For your safety, do not drive or operate any machinery that could be dangerous. Wait until the medicine wears off and you can think clearly and react easily.  
  · Do not do strenuous exercise and do not lift, pull, or push anything heavy until your doctor says it is okay. This may be for a day or two. You can walk around the house and do light activity, such as cooking.  
  · If the catheter was placed in your groin, try not to walk up stairs for the first couple of days.  
  · If the catheter was placed in your arm near your wrist, do not bend your wrist deeply for the first couple of days. Be careful using your hand to get into and out of a chair or bed.  
  · Carry your stent identification card with you at all times.  
  · If your doctor recommends it, get more exercise. Walking is a good choice. Bit by bit, increase the amount you walk every day. Try for at least 30 minutes on most days of the week. Diet 
  · Drink plenty of fluids to help your body flush out the dye. If you have kidney, heart, or liver disease and have to limit fluids, talk with your doctor before you increase the amount of fluids you drink.  
  · Keep eating a heart-healthy diet that has lots of fruits, vegetables, and whole grains. If you have not been eating this way, talk to your doctor. You also may want to talk to a dietitian. This expert can help you to learn about healthy foods and plan meals. Medicines   · Your doctor will tell you if and when you can restart your medicines. He or she will also give you instructions about taking any new medicines.  
  · If you take blood thinners, such as warfarin (Coumadin), clopidogrel (Plavix), or aspirin, be sure to talk to your doctor. He or she will tell you if and when to start taking those medicines again. Make sure that you understand exactly what your doctor wants you to do.  
  · Your doctor will prescribe blood-thinning medicines. You will likely take aspirin plus another antiplatelet, such as clopidogrel (Plavix). It is very important that you take these medicines exactly as directed. These medicines help keep the coronary artery open and reduce your risk of a heart attack.  
  · Call your doctor if you think you are having a problem with your medicine.  
 Care of the catheter site 
  · For 1 or 2 days, keep a bandage over the spot where the catheter was inserted. The bandage probably will fall off in this time.  
  · Put ice or a cold pack on the area for 10 to 20 minutes at a time to help with soreness or swelling. Put a thin cloth between the ice and your skin.  
  · You may shower 24 to 48 hours after the procedure, if your doctor okays it. Pat the incision dry.  
  · Do not soak the catheter site until it is healed. Don't take a bath for 1 week, or until your doctor tells you it is okay.  
  · If you are bleeding, lie down and press on the area for 15 minutes to try to make it stop. If the bleeding does not stop, call your doctor or seek immediate medical care. Follow-up care is a key part of your treatment and safety. Be sure to make and go to all appointments, and call your doctor if you are having problems. It's also a good idea to know your test results and keep a list of the medicines you take. When should you call for help? Call 911 anytime you think you may need emergency care. For example, call if: 
  · You passed out (lost consciousness).   · You have severe trouble breathing.  
  · You have sudden chest pain and shortness of breath, or you cough up blood.  
  · You have symptoms of a heart attack, such as: ¨ Chest pain or pressure. ¨ Sweating. ¨ Shortness of breath. ¨ Nausea or vomiting. ¨ Pain that spreads from the chest to the neck, jaw, or one or both shoulders or arms. ¨ Dizziness or lightheadedness. ¨ A fast or uneven pulse. After calling 911, chew 1 adult-strength aspirin. Wait for an ambulance. Do not try to drive yourself.  
  · You have been diagnosed with angina, and you have angina symptoms that do not go away with rest or are not getting better within 5 minutes after you take one dose of nitroglycerin.  
 Call your doctor now or seek immediate medical care if: 
  · You are bleeding from the area where the catheter was put in your artery.  
  · You have a fast-growing, painful lump at the catheter site.  
  · You have signs of infection, such as: 
¨ Increased pain, swelling, warmth, or redness. ¨ Red streaks leading from the catheter site. ¨ Pus draining from the catheter site. ¨ A fever.  
  · Your leg or arm looks blue or feels cold, numb, or tingly.  
 Watch closely for changes in your health, and be sure to contact your doctor if you have any problems. Where can you learn more? Go to http://davian-johnson.info/. Enter C797 in the search box to learn more about \"Percutaneous Coronary Intervention: What to Expect at Home. \" Current as of: December 6, 2017 Content Version: 11.7 © 1848-7740 [employer]. Care instructions adapted under license by [employer] (which disclaims liability or warranty for this information). If you have questions about a medical condition or this instruction, always ask your healthcare professional. [place] [address] any warranty or liability for your use of this information. DASH Diet: Care Instructions Your Care Instructions The DASH diet is an eating plan that can help lower your blood pressure. DASH stands for Dietary Approaches to Stop Hypertension. Hypertension is high blood pressure. The DASH diet focuses on eating foods that are high in calcium, potassium, and magnesium. These nutrients can lower blood pressure. The foods that are highest in these nutrients are fruits, vegetables, low-fat dairy products, nuts, seeds, and legumes. But taking calcium, potassium, and magnesium supplements instead of eating foods that are high in those nutrients does not have the same effect. The DASH diet also includes whole grains, fish, and poultry. The DASH diet is one of several lifestyle changes your doctor may recommend to lower your high blood pressure. Your doctor may also want you to decrease the amount of sodium in your diet. Lowering sodium while following the DASH diet can lower blood pressure even further than just the DASH diet alone. Follow-up care is a key part of your treatment and safety. Be sure to make and go to all appointments, and call your doctor if you are having problems. It's also a good idea to know your test results and keep a list of the medicines you take. How can you care for yourself at home? Following the DASH diet · Eat 4 to 5 servings of fruit each day. A serving is 1 medium-sized piece of fruit, ½ cup chopped or canned fruit, 1/4 cup dried fruit, or 4 ounces (½ cup) of fruit juice. Choose fruit more often than fruit juice. · Eat 4 to 5 servings of vegetables each day. A serving is 1 cup of lettuce or raw leafy vegetables, ½ cup of chopped or cooked vegetables, or 4 ounces (½ cup) of vegetable juice. Choose vegetables more often than vegetable juice. · Get 2 to 3 servings of low-fat and fat-free dairy each day. A serving is 8 ounces of milk, 1 cup of yogurt, or 1 ½ ounces of cheese. · Eat 6 to 8 servings of grains each day.  A serving is 1 slice of bread, 1 ounce of dry cereal, or ½ cup of cooked rice, pasta, or cooked cereal. Try to choose whole-grain products as much as possible. · Limit lean meat, poultry, and fish to 2 servings each day. A serving is 3 ounces, about the size of a deck of cards. · Eat 4 to 5 servings of nuts, seeds, and legumes (cooked dried beans, lentils, and split peas) each week. A serving is 1/3 cup of nuts, 2 tablespoons of seeds, or ½ cup of cooked beans or peas. · Limit fats and oils to 2 to 3 servings each day. A serving is 1 teaspoon of vegetable oil or 2 tablespoons of salad dressing. · Limit sweets and added sugars to 5 servings or less a week. A serving is 1 tablespoon jelly or jam, ½ cup sorbet, or 1 cup of lemonade. · Eat less than 2,300 milligrams (mg) of sodium a day. If you limit your sodium to 1,500 mg a day, you can lower your blood pressure even more. Tips for success · Start small. Do not try to make dramatic changes to your diet all at once. You might feel that you are missing out on your favorite foods and then be more likely to not follow the plan. Make small changes, and stick with them. Once those changes become habit, add a few more changes. · Try some of the following: ¨ Make it a goal to eat a fruit or vegetable at every meal and at snacks. This will make it easy to get the recommended amount of fruits and vegetables each day. ¨ Try yogurt topped with fruit and nuts for a snack or healthy dessert. ¨ Add lettuce, tomato, cucumber, and onion to sandwiches. ¨ Combine a ready-made pizza crust with low-fat mozzarella cheese and lots of vegetable toppings. Try using tomatoes, squash, spinach, broccoli, carrots, cauliflower, and onions. ¨ Have a variety of cut-up vegetables with a low-fat dip as an appetizer instead of chips and dip. ¨ Sprinkle sunflower seeds or chopped almonds over salads. Or try adding chopped walnuts or almonds to cooked vegetables. ¨ Try some vegetarian meals using beans and peas. Add garbanzo or kidney beans to salads. Make burritos and tacos with mashed colmenares beans or black beans. Where can you learn more? Go to http://davian-johnson.info/. Enter B396 in the search box to learn more about \"DASH Diet: Care Instructions. \" Current as of: December 6, 2017 Content Version: 11.7 © 3229-3483 [employer]. Care instructions adapted under license by [employer] (which disclaims liability or warranty for this information). If you have questions about a medical condition or this instruction, always ask your healthcare professional. [place] [address] any warranty or liability for your use of this information. [employer] Announcement We are excited to announce that we are making your provider's discharge notes available to you in [employer]. You will see these notes when they are completed and signed by the physician that discharged you from your recent hospital stay. If you have any questions or concerns about any information you see in [employer], please call the Health Information Department where you were seen or reach out to your Primary Care Provider for more information about your plan of care. Introducing [facility]! Ligia De León introduces [employer] patient portal. Now you can access parts of your medical record, email your doctor's office, and request medication refills online. 1. In your internet browser, go to [email or web address] 2. Click on the First Time User? Click Here link in the Sign In box. You will see the New Member Sign Up page. 3. Enter your [employer] Access Code exactly as it appears below. You will not need to use this code after youve completed the sign-up process. If you do not sign up before the expiration date, you must request a new code. · [employer] Access Code: [email or web address] Expires: 9/27/2018 11:38 AM 
 
 4. Enter the last four digits of your Social Security Number (xxxx) and Date of Birth (mm/dd/yyyy) as indicated and click Submit. You will be taken to the next sign-up page. 5. Create a [employer] ID. This will be your [employer] login ID and cannot be changed, so think of one that is secure and easy to remember. 6. Create a [employer] password. You can change your password at any time. 7. Enter your Password Reset Question and Answer. This can be used at a later time if you forget your password. 8. Enter your e-mail address. You will receive e-mail notification when new information is available in [address] 9. Click Sign Up. You can now view and download portions of your medical record. 10. Click the Download Summary menu link to download a portable copy of your medical information. If you have questions, please visit the Frequently Asked Questions section of the [employer] website. Remember, [employer] is NOT to be used for urgent needs. For medical emergencies, dial 911. Now available from your iPhone and Android! Introducing Garrett Hamlin As a [facility] patient, I wanted to make you aware of our electronic visit tool called [facility]. [facility] 24/7 allows you to connect within minutes with a medical provider 24 hours a day, seven days a week via a mobile device or tablet or logging into a secure website from your computer. You can access Garrett Marmolejohankfin from anywhere in the [place]. A virtual visit might be right for you when you have a simple condition and feel like you just dont want to get out of bed, or cant get away from work for an appointment, when your regular [facility] provider is not available (evenings, weekends or holidays), or when youre out of town and need minor care.   Electronic visits cost only $49 and if the Garrett Hamlin provider determines a prescription is needed to treat your condition, one can be electronically transmitted to a nearby pharmacy*. Please take a moment to enroll today if you have not already done so. The enrollment process is free and takes just a few minutes. To enroll, please download the [employer] 24/7 keya to your tablet or phone, or visit [email or web address] to enroll on your computer. And, as an [address] [place] [address] patient with a [employer] account, the results of your visits will be scanned into your electronic medical record and your primary care provider will be able to view the scanned results. We urge you to continue to see your regular [employer] provider for your ongoing medical care. And while your primary care provider may not be the one available when you seek a [employer] virtual visit, the peace of mind you get from getting a real diagnosis real time can be priceless. For more information on [employer], view our Frequently Asked Questions (FAQs) at [email or web address]. Sincerely, 
 
Jose Youngblood MD 
Chief Medical Officer [address] [place] [address] *:  certain medications cannot be prescribed via [employer] Providers Seen During Your Hospitalization Provider Specialty Primary office phone Mitra Amador MD Emergency Medicine [phone] Velvet Vallejo MD Hospitalist [phone] Erickson Medrano MD Internal Medicine [phone] Your Primary Care Physician (PCP) Primary Care Physician Office Phone Office Fax [address] [place] [address], [id number] [place] [address] [address] [phone] You are allergic to the following Allergen Reactions Iodine Other (comments) Eyes swell shut Shellfish Containing Products Swelling Recent Documentation Height Weight BMI Smoking Status 1.803 m 131.4 kg 40.39 kg/m2 Former Smoker Emergency Contacts Name Discharge Info Relation Home Work Mobile Radha Ramsay DECLINED CAREGIVER [4] Sister [23]  [phone] Vicky Garcia DISCHARGE CAREGIVER [3] Girlfriend [18] [phone] [address] [place] [address] CAREGIVER [3] Child [2] [phone] Patient Belongings The following personal items are in your possession at time of discharge: 
  Dental Appliances: None  Visual Aid: None      Home Medications: None   Jewelry: None  Clothing: At bedside, Footwear, Pants, Shirt, Socks, Undergarments    Other Valuables: Cell Phone  Personal Items Sent to Safe: none Please provide this summary of care documentation to your next provider. Signatures-by signing, you are acknowledging that this After Visit Summary has been reviewed with you and you have received a copy. Patient Signature:  ____________________________________________________________ Date:  ____________________________________________________________  
  
Carmita Ladd Provider Signature:  ____________________________________________________________ Date:  ____________________________________________________________

## 2018-08-28 NOTE — ED PROVIDER NOTES
EMERGENCY DEPARTMENT HISTORY AND PHYSICAL EXAM 
 
4:42 PM 
 
 
Date: 8/28/2018 Patient Name: Pierre Adams History of Presenting Illness Chief Complaint Patient presents with  Chest Pain History Provided By: Patient Chief Complaint: Chest pain Duration:  Today 16:00 Timing:  Acute Location: N/A Quality: N/A Severity: N/A Modifying Factors: The patient states his symptoms are triggered when he takes his blood pressure medication. Associated Symptoms: blurry vision, nausea, vomiting Additional History (Context): Pierre Adams is a 66 y.o. male with history of a MI 4/30/18 and recent gastric sleeve surgery on 8/20/18 who presents with acute chest pain today at 16:00 while seeing his PCP. The patient states he was rapidly losing weight and began having blurry vision, nausea, and vomiting after taking his blood pressure medication. He states that he stopped taking his blood pressure medication on 8/23/18 and was feeling improved. While at his PCP's office the patient was given his blood pressured medications when his blurry vision, nausea, and vomiting returned and he also began having his chest pain. Cardiologist is Dr. Janice Acuña. No stents. Denies tobacco and alcohol use. No other symptoms or concerns were expressed. PCP: Marilin Chapman NP Current Facility-Administered Medications Medication Dose Route Frequency Provider Last Rate Last Dose  
 0.9% sodium chloride infusion  125 mL/hr IntraVENous CONTINUOUS Konstantin Smith MD      
 heparin (porcine) 1,000 unit/mL injection 4,000 Units  31.3 Units/kg IntraVENous ONCE Konstantin Smith MD      
 heparin 25,000 units in D5W 250 ml infusion  7.5-25 Units/kg/hr IntraVENous TITRATE Konstantin Smith MD      
 
Current Outpatient Prescriptions Medication Sig Dispense Refill  oxyCODONE-acetaminophen (PERCOCET) 5-325 mg per tablet Take  by mouth every four (4) hours as needed for Pain.  famotidine (PEPCID) 20 mg tablet Take 20 mg by mouth two (2) times a day.  promethazine (PHENERGAN) 25 mg tablet Take 25 mg by mouth every six (6) hours as needed for Nausea.  hydrALAZINE (APRESOLINE) 50 mg tablet Take 1 Tab by mouth two (2) times a day. 60 Tab 1  
 labetalol (NORMODYNE) 100 mg tablet Take 1 Tab by mouth two (2) times a day. 60 Tab 1  
 benazepril (LOTENSIN) 40 mg tablet TAKE 1 TABLET BY MOUTH DAILY (STOP LOTREL) 30 Tab 3  potassium chloride SR (KLOR-CON 10) 10 mEq tablet TAKE 1 TAB BY MOUTH DAILY. 90 Tab 2  
 hydroCHLOROthiazide (HYDRODIURIL) 12.5 mg tablet Take 1 Tab by mouth daily. 90 Tab 2  
 tolterodine ER (DETROL LA) 4 mg ER capsule Take 1 Cap by mouth daily. 90 Cap 0  
 allopurinol (ZYLOPRIM) 100 mg tablet TAKE 1 TAB BY MOUTH DAILY. 90 Tab 3  varicella-zoster recombinant, PF, (SHINGRIX, PF,) 50 mcg/0.5 mL susr injection Please repeat dose in 2-3 months 0.5 mL 1  
 amLODIPine (NORVASC) 10 mg tablet TAKE 1 TAB BY MOUTH DAILY. 30 Tab 1  
 omeprazole (PRILOSEC) 40 mg capsule TAKE ONE CAPSULE BY MOUTH TWICE A DAY 60 Cap 5  cloNIDine (CATAPRES) 0.2 mg/24 hr patch APPLY 1 PATCH ONCE WEEKLY 12 Patch 3  
 furosemide (LASIX) 20 mg tablet Take 1 pill by mouth as needed for edema 30 Tab 1  
 isosorbide mononitrate ER (IMDUR) 30 mg tablet Take 30 mg by mouth daily.  nitroglycerin (NITROSTAT) 0.4 mg SL tablet by SubLINGual route every five (5) minutes as needed for Chest Pain.  furosemide (LASIX) 20 mg tablet Take 1 tab by mouth daily 30 Tab 1  
 sucralfate (CARAFATE) 100 mg/mL suspension 2 teaspoons three times per day 414 mL 3  
 colchicine 0.6 mg tablet Take 0.6 mg by mouth daily.  gabapentin (NEURONTIN) 300 mg capsule Take 300 mg by mouth three (3) times daily.  avanafil (STENDRA) 200 mg tab tablet Take 1 Tab by mouth as needed for Other. Indications: Erectile Dysfunction 6 Tab 6  
 aspirin 81 mg tablet Take 81 mg by mouth daily. Past History Past Medical History: 
Past Medical History:  
Diagnosis Date  BPH without obstruction/lower urinary tract symptoms  Bursitis of right shoulder  CAD (coronary artery disease)  Chest pain   
 history of hospitalization with chest pain and a negative workup in 2008  Chronic edema  Constipation   
 die to medication  Coronary artery disease   
 mild, non obstructive/EF 65%  Dyspnea on exertion  Echocardiogram 12/16/08 IVC dilated; suboptimal endocardial border; EF 65%  ED (erectile dysfunction)  Elevated PSA  Frequency  GERD (gastroesophageal reflux disease)  Gout  H/O cystoscopy 06/20/2013  Hematuria, unspecified  Hypercholesterolemia  Hypertension  Hypertension  Hypogonadism male  Impotence of organic origin  Left ventricular diastolic dysfunction  Malignant neoplasm of prostate (HCC)   
 hx of t1a, izzy 6, 5 % of 1  core  Morbid obesity ([facility] [address]  Myocardial perfusion 09/05/08 Basal inferior defect c/w artifact; EF 63%  Overactive bladder  S/P cardiac cath 07/30/10  
 oD2-40%; pRCA-20-30%; EF 65%  Sleep apnea  Slowing of urinary stream   
 Type II or unspecified type diabetes mellitus without mention of complication, not stated as uncontrolled Past Surgical History: 
Past Surgical History:  
Procedure Laterality Date  HX HEART CATHETERIZATION  7/30/10  HX OTHER SURGICAL  06/27/13 Prostate  HX TONSILLECTOMY  MS COLONOSCOPY FLX DX W/COLLJ SPEC WHEN PFRMD    
 MS I & D ABSC XTRAORAL SOFT TISS COMP Family History: 
Family History Problem Relation Age of Onset  Hypertension Mother  High Cholesterol Mother  Breast Cancer Mother  Diabetes Mother  Heart Disease Mother [facility] Arthritis-osteo Mother  High Cholesterol Father  Hypertension Father  Diabetes Father  Heart Disease Father  Arthritis-osteo Father Social History: 
Social History Substance Use Topics  Smoking status: Former Smoker Types: Cigars Quit date: 10/4/1999  Smokeless tobacco: Never Used  Alcohol use No  
   Comment: socially Allergies: Allergies Allergen Reactions  Iodine Other (comments) Eyes swell shut  Shellfish Containing Products Swelling Review of Systems Review of Systems Constitutional: Negative for activity change, fatigue and fever. HENT: Negative for congestion and rhinorrhea. Eyes: Positive for visual disturbance. Respiratory: Negative for shortness of breath. Cardiovascular: Positive for chest pain. Negative for palpitations. Gastrointestinal: Positive for nausea and vomiting. Negative for abdominal pain and diarrhea. Genitourinary: Negative for dysuria and hematuria. Musculoskeletal: Negative for back pain. Skin: Negative for rash. Neurological: Negative for dizziness, weakness and light-headedness. All other systems reviewed and are negative. Physical Exam  
 
Visit Vitals  /80  Pulse 94  Temp 97.8 °F (36.6 °C)  Resp 21  
 Ht 5' 11\" (1.803 m)  Wt 127.9 kg (282 lb)  SpO2 92%  BMI 39.33 kg/m2 Physical Exam  
Constitutional: He is oriented to person, place, and time. He appears well-developed and well-nourished. No distress. Obese HENT:  
Head: Normocephalic and atraumatic. Right Ear: External ear normal.  
Left Ear: External ear normal.  
Nose: Nose normal.  
Mouth/Throat: Oropharynx is clear and moist.  
Eyes: Conjunctivae and EOM are normal. Pupils are equal, round, and reactive to light. No scleral icterus. Neck: Normal range of motion. Neck supple. No JVD present. No tracheal deviation present. No thyromegaly present. Cardiovascular: Normal rate, regular rhythm, normal heart sounds and intact distal pulses. Exam reveals no gallop and no friction rub. No murmur heard. Pulmonary/Chest: Effort normal and breath sounds normal. He exhibits no tenderness. Abdominal: Soft. Bowel sounds are normal. He exhibits no distension. There is tenderness. There is no rebound and no guarding. Mild diffuse tenderness, laparoscopic sx scarring noted Musculoskeletal: He exhibits edema. He exhibits no tenderness. Lymphadenopathy:  
  He has no cervical adenopathy. Neurological: He is alert and oriented to person, place, and time. No cranial nerve deficit. Coordination normal.  
No sensory loss, Gait not observed, no arm or leg drift Skin: Skin is warm and dry. Psychiatric: He has a normal mood and affect. His behavior is normal. Judgment and thought content normal.  
Nursing note and vitals reviewed. Diagnostic Study Results Labs - Recent Results (from the past 12 hour(s)) EKG, 12 LEAD, INITIAL Collection Time: 08/28/18  4:36 PM  
Result Value Ref Range Ventricular Rate 94 BPM  
 Atrial Rate 94 BPM  
 P-R Interval 170 ms QRS Duration 100 ms Q-T Interval 376 ms QTC Calculation (Bezet) 470 ms Calculated P Axis 53 degrees Calculated R Axis -5 degrees Calculated T Axis 94 degrees Diagnosis Normal sinus rhythm Possible Left atrial enlargement Left ventricular hypertrophy with repolarization abnormality Abnormal ECG When compared with ECG of 17-JUN-2013 17:15, Inverted T waves have replaced nonspecific T wave abnormality in Lateral  
leads METABOLIC PANEL, BASIC Collection Time: 08/28/18  4:43 PM  
Result Value Ref Range Sodium 142 136 - 145 mmol/L Potassium 2.9 (LL) 3.5 - 5.5 mmol/L Chloride 102 100 - 108 mmol/L  
 CO2 30 21 - 32 mmol/L Anion gap 10 3.0 - 18 mmol/L Glucose 145 (H) 74 - 99 mg/dL BUN 38 (H) 7.0 - 18 MG/DL Creatinine 2.46 (H) 0.6 - 1.3 MG/DL  
 BUN/Creatinine ratio 15 12 - 20 GFR est AA 33 (L) >60 ml/min/1.73m2 GFR est non-AA 27 (L) >60 ml/min/1.73m2  Calcium 8.7 8.5 - 10.1 MG/DL  
 CBC WITH AUTOMATED DIFF Collection Time: 08/28/18  4:43 PM  
Result Value Ref Range WBC 9.0 4.6 - 13.2 K/uL  
 RBC 4.89 4.70 - 5.50 M/uL  
 HGB 13.4 13.0 - 16.0 g/dL HCT 40.8 36.0 - 48.0 % MCV 83.4 74.0 - 97.0 FL  
 MCH 27.4 24.0 - 34.0 PG  
 MCHC 32.8 31.0 - 37.0 g/dL  
 RDW 15.1 (H) 11.6 - 14.5 % PLATELET 360 108 - 852 K/uL MPV 9.6 9.2 - 11.8 FL  
 NEUTROPHILS 83 (H) 40 - 73 % LYMPHOCYTES 13 (L) 21 - 52 % MONOCYTES 3 3 - 10 % EOSINOPHILS 1 0 - 5 % BASOPHILS 0 0 - 2 %  
 ABS. NEUTROPHILS 7.5 1.8 - 8.0 K/UL  
 ABS. LYMPHOCYTES 1.1 0.9 - 3.6 K/UL  
 ABS. MONOCYTES 0.3 0.05 - 1.2 K/UL  
 ABS. EOSINOPHILS 0.1 0.0 - 0.4 K/UL  
 ABS. BASOPHILS 0.0 0.0 - 0.1 K/UL  
 DF AUTOMATED CARDIAC PANEL,(CK, CKMB & TROPONIN) Collection Time: 08/28/18  4:43 PM  
Result Value Ref Range  39 - 308 U/L  
 CK - MB 5.2 (H) <3.6 ng/ml CK-MB Index 2.0 0.0 - 4.0 % Troponin-I, Qt. 0.12 (H) 0.0 - 0.045 NG/ML  
HEPATIC FUNCTION PANEL Collection Time: 08/28/18  4:43 PM  
Result Value Ref Range Protein, total 7.3 6.4 - 8.2 g/dL Albumin 3.4 3.4 - 5.0 g/dL Globulin 3.9 2.0 - 4.0 g/dL A-G Ratio 0.9 0.8 - 1.7 Bilirubin, total 0.5 0.2 - 1.0 MG/DL Bilirubin, direct 0.2 0.0 - 0.2 MG/DL Alk. phosphatase 74 45 - 117 U/L  
 AST (SGOT) 27 15 - 37 U/L  
 ALT (SGPT) 33 16 - 61 U/L MAGNESIUM Collection Time: 08/28/18  4:43 PM  
Result Value Ref Range Magnesium 2.1 1.6 - 2.6 mg/dL POC LACTIC ACID Collection Time: 08/28/18  4:47 PM  
Result Value Ref Range Lactic Acid (POC) 1.6 0.4 - 2.0 mmol/L  
CARDIAC PANEL,(CK, CKMB & TROPONIN) Collection Time: 08/28/18  8:01 PM  
Result Value Ref Range  39 - 308 U/L  
 CK - MB 20.3 (H) <3.6 ng/ml CK-MB Index 6.7 (H) 0.0 - 4.0 % Troponin-I, Qt. 1.61 (HH) 0.0 - 0.045 NG/ML Radiologic Studies -  
CT HEAD WO CONT Final Result XR CHEST PORT Final Result CXR: 
 Top normal cardiac size to minimal cardiomegaly. Atherosclerosis. CT Head: 1. No CT evidence for acute intracranial process. 
-Please note that CT is relatively insensitive for acute ischemia in the first 
24-48 hours, and MRI may be helpful if clinically indicated. 2. Mild to moderate white matter disease, presumed chronic ischemic. Medical Decision Making I am the first provider for this patient. I reviewed the vital signs, available nursing notes, past medical history, past surgical history, family history and social history. Vital Signs-Reviewed the patient's vital signs. Pulse Oximetry Analysis -  99% on room air, stable Cardiac Monitor: 
Rate: 92 
Rhythm:  Normal Sinus Rhythm EKG: Interpreted by the EP. Time Interpreted: 16:36 Rate: 94 Rhythm: Sinus Tachycardia Interpretation: LVH. No STEMI. Records Reviewed: Nursing Notes, Old Medical Records and Previous electrocardiograms (Time of Review: 4:42 PM) 
 
ED Course: Progress Notes, Reevaluation, and Consults: 
Consult:  Discussed care with Dr. Shawn Matos, bariatric surgeon. Standard discussion; including history of patients chief complaint, available diagnostic results, and treatment course. From his standpoint, if patient is vomiting, does not require any emergent intervention at this point. Reports from a surgical standpoint, Heparin is okay this far out of surgery. 8:56 PM, 8/28/2018  
 
9:00 PM: Reviewed patient's labs. Patient's troponin trending upwards. Will admit to medicine, start Heparin and consult hospitalist. 
 
Consult:  Discussed care with Dr. Maryam Huddleston, hospitalist. Standard discussion; including history of patients chief complaint, available diagnostic results, and treatment course. Accepts admit. 9:00 PM, 8/28/2018 Provider Notes (Medical Decision Making): 66 y.o. male with a hx of HTN, CAD, obesity, prostate CA, dyslipidemia, 1 week post operative from a sleeve gastrectomy done at [facility] presents to the ED with complaint of blurred vision, nausea, and chest pressure. Pt has been losing wt rapidly. He is taking PO fluids in well but has been having vision changes now CP with BP medications. He was at his PMD most of the day getting his BP meds restarted. Pt BP has improved after his meds but had some vision changes then CP. Pt EKG notes some changes but appears to be LVH with strain. He has a hx of CAD based on previous cath and a stress done in the past noting nonreversible ischemia in the past several years. Pt in the ED is nonfocal.  Will follow cardiac labs, CT head, replete lytes, hydrate then reevaluate. Sharon Factor, DO 5:50 PM 
 
Critical Care Time: The services I provided to this patient were to treat and/or prevent clinically significant deterioration that could result in the failure of one or more body systems and/or organ systems due to ACS. Services included the following: 
-reviewing nursing notes and old charts 
-vital sign assessments 
-direct patient care 
-medication orders and management 
-interpreting and reviewing diagnostic studies/labs 
-re-evaluations 
-documentation time Aggregate critical care time was 45 minutes, which includes only time during which I was engaged in work directly related to the patient's care as described above, whether I was at bedside or elsewhere in the Emergency Department. It did not include time spent performing other reported procedures or the services of residents, students, nurses, or advance practice providers. Sharon Factor, DO 9:17 PM 
 
 
For Hospitalized Patients: 
 
1. Hospitalization Decision Time: The decision to hospitalize the patient was made by Dr. Zulma Boo at 9:01 PM on 8/28/2018 2. Aspirin: Aspirin was not given because the patient did not present with a stroke at the time of their Emergency Department evaluation Diagnosis Clinical Impression: 1. ACS [facility]acute coronary syndrome) ([facility] 2. Hypokalemia 3. AWA (acute kidney injury) ([facility] 4. Dehydration Disposition: Admit. Follow-up Information None Patient's Medications Start Taking No medications on file Continue Taking ALLOPURINOL (ZYLOPRIM) 100 MG TABLET    TAKE 1 TAB BY MOUTH DAILY. AMLODIPINE (NORVASC) 10 MG TABLET    TAKE 1 TAB BY MOUTH DAILY. ASPIRIN 81 MG TABLET    Take 81 mg by mouth daily. AVANAFIL (STENDRA) 200 MG TAB TABLET    Take 1 Tab by mouth as needed for Other. Indications: Erectile Dysfunction BENAZEPRIL (LOTENSIN) 40 MG TABLET    TAKE 1 TABLET BY MOUTH DAILY (STOP LOTREL) CLONIDINE (CATAPRES) 0.2 MG/24 HR PATCH    APPLY 1 PATCH ONCE WEEKLY COLCHICINE 0.6 MG TABLET    Take 0.6 mg by mouth daily. FAMOTIDINE (PEPCID) 20 MG TABLET    Take 20 mg by mouth two (2) times a day. FUROSEMIDE (LASIX) 20 MG TABLET    Take 1 tab by mouth daily FUROSEMIDE (LASIX) 20 MG TABLET    Take 1 pill by mouth as needed for edema GABAPENTIN (NEURONTIN) 300 MG CAPSULE    Take 300 mg by mouth three (3) times daily. HYDRALAZINE (APRESOLINE) 50 MG TABLET    Take 1 Tab by mouth two (2) times a day. HYDROCHLOROTHIAZIDE (HYDRODIURIL) 12.5 MG TABLET    Take 1 Tab by mouth daily. ISOSORBIDE MONONITRATE ER (IMDUR) 30 MG TABLET    Take 30 mg by mouth daily. LABETALOL (NORMODYNE) 100 MG TABLET    Take 1 Tab by mouth two (2) times a day. NITROGLYCERIN (NITROSTAT) 0.4 MG SL TABLET    by SubLINGual route every five (5) minutes as needed for Chest Pain. OMEPRAZOLE (PRILOSEC) 40 MG CAPSULE    TAKE ONE CAPSULE BY MOUTH TWICE A DAY OXYCODONE-ACETAMINOPHEN (PERCOCET) 5-325 MG PER TABLET    Take  by mouth every four (4) hours as needed for Pain. POTASSIUM CHLORIDE SR (KLOR-CON 10) 10 MEQ TABLET    TAKE 1 TAB BY MOUTH DAILY. PROMETHAZINE (PHENERGAN) 25 MG TABLET    Take 25 mg by mouth every six (6) hours as needed for Nausea. SUCRALFATE (CARAFATE) 100 MG/ML SUSPENSION    2 teaspoons three times per day TOLTERODINE ER (DETROL LA) 4 MG ER CAPSULE    Take 1 Cap by mouth daily. VARICELLA-ZOSTER RECOMBINANT, PF, (SHINGRIX, PF,) 50 MCG/0.5 ML SUSR INJECTION    Please repeat dose in 2-3 months These Medications have changed No medications on file Stop Taking No medications on file  
 
_______________________________ Attestations: 
Scribe Attestation Pierre Borges and Owen Bishop acting as scribes for and in the presence of Lacey Mcintosh MD     
August 28, 2018 at 4:42 PM 
    
Provider Attestation:     
I personally performed the services described in the documentation, reviewed the documentation, as recorded by the scribe in my presence, and it accurately and completely records my words and actions. August 28, 2018 at 4:42 PM - Lacey Mcintosh MD 
 
   
_______________________________

## 2018-08-28 NOTE — LETTER
18 RE:  Tessa Gibbs :  1955 Isosorbide 60 mg tab take 1/2 tablet(30 mg) once daily Labetalol 100 mg tab take one tablet twice daily (New Prescription) Hydralazine 50 mg tab take one tablet twice daily (New Prescription) Benazepril 40 mg tab take one tablet once daily Hydrochlorothiazide 12.5 mg take one tablet once daily

## 2018-08-28 NOTE — Clinical Note
Lesion 1. Balloon inflated using single inflation technique. Lesion 1: Pressure = 13 lizandro; Duration = 17 sec.

## 2018-08-28 NOTE — PATIENT INSTRUCTIONS
Body Mass Index: Care Instructions  Your Care Instructions    Body mass index (BMI) can help you see if your weight is raising your risk for health problems. It uses a formula to compare how much you weigh with how tall you are. · A BMI lower than 18.5 is considered underweight. · A BMI between 18.5 and 24.9 is considered healthy. · A BMI between 25 and 29.9 is considered overweight. A BMI of 30 or higher is considered obese. If your BMI is in the normal range, it means that you have a lower risk for weight-related health problems. If your BMI is in the overweight or obese range, you may be at increased risk for weight-related health problems, such as high blood pressure, heart disease, stroke, arthritis or joint pain, and diabetes. If your BMI is in the underweight range, you may be at increased risk for health problems such as fatigue, lower protection (immunity) against illness, muscle loss, bone loss, hair loss, and hormone problems. BMI is just one measure of your risk for weight-related health problems. You may be at higher risk for health problems if you are not active, you eat an unhealthy diet, or you drink too much alcohol or use tobacco products. Follow-up care is a key part of your treatment and safety. Be sure to make and go to all appointments, and call your doctor if you are having problems. It's also a good idea to know your test results and keep a list of the medicines you take. How can you care for yourself at home? · Practice healthy eating habits. This includes eating plenty of fruits, vegetables, whole grains, lean protein, and low-fat dairy. · If your doctor recommends it, get more exercise. Walking is a good choice. Bit by bit, increase the amount you walk every day. Try for at least 30 minutes on most days of the week. · Do not smoke. Smoking can increase your risk for health problems. If you need help quitting, talk to your doctor about stop-smoking programs and medicines. These can increase your chances of quitting for good. · Limit alcohol to 2 drinks a day for men and 1 drink a day for women. Too much alcohol can cause health problems. If you have a BMI higher than 25  · Your doctor may do other tests to check your risk for weight-related health problems. This may include measuring the distance around your waist. A waist measurement of more than 40 inches in men or 35 inches in women can increase the risk of weight-related health problems. · Talk with your doctor about steps you can take to stay healthy or improve your health. You may need to make lifestyle changes to lose weight and stay healthy, such as changing your diet and getting regular exercise. If you have a BMI lower than 18.5  · Your doctor may do other tests to check your risk for health problems. · Talk with your doctor about steps you can take to stay healthy or improve your health. You may need to make lifestyle changes to gain or maintain weight and stay healthy, such as getting more healthy foods in your diet and doing exercises to build muscle. Where can you learn more? Go to http://davian-johnson.info/. Enter S176 in the search box to learn more about \"Body Mass Index: Care Instructions. \"  Current as of: October 13, 2016  Content Version: 11.4  © 6040-1035 [employer]. Care instructions adapted under license by [employer] (which disclaims liability or warranty for this information). If you have questions about a medical condition or this instruction, always ask your healthcare professional. [address] any warranty or liability for your use of this information.

## 2018-08-28 NOTE — Clinical Note
TRANSFER - IN REPORT:  
 
Verbal report received from: MIRIAM RN . Report consisted of patient's Situation, Background, Assessment and  
Recommendations(SBAR). Opportunity for questions and clarification was provided. Assessment completed upon patient's arrival to unit and care assumed.

## 2018-08-28 NOTE — ED NOTES
Got report from Marry Leavitt (off going nurse) pt alert and oriented respirations even and unlabored.

## 2018-08-28 NOTE — ED TRIAGE NOTES
The patient presents for evaluation of chest pain that began at 1600 today. Denies nausea, shortness of breath, or diaphoresis. The patient received Aspirin 325 mg PO and Nitroglycerin 0.4 mg SL x 1 PTA by medic.

## 2018-08-28 NOTE — Clinical Note
TRANSFER - OUT REPORT:  
 
Verbal report given to: MIRIAM RN. Report consisted of patient's Situation, Background, Assessment and  
Recommendations(SBAR). Opportunity for questions and clarification was provided.

## 2018-08-28 NOTE — PROGRESS NOTES
Lynn Billy is a 66 y.o. male presenting today for Elevated Blood Pressure  . HPI:  Lynn Billy presents to the office today for hypertension follow-up. Patient is s/p gastric bypass and is complaining of elevate blood pressure. He has attempted to take his medication but is afraid because when he took his medicine x 3 days ago he had blurry vision. He is negative for chest pain, palpitation or dypsnea. Review of Systems   Eyes: Positive for blurred vision. Respiratory: Negative for cough. Cardiovascular: Negative for chest pain and palpitations. Neurological: Negative for dizziness and headaches. Allergies   Allergen Reactions    Iodine Other (comments)     Eyes swell shut      Shellfish Containing Products Swelling       Current Outpatient Prescriptions   Medication Sig Dispense Refill    oxyCODONE-acetaminophen (PERCOCET) 5-325 mg per tablet Take  by mouth every four (4) hours as needed for Pain.  famotidine (PEPCID) 20 mg tablet Take 20 mg by mouth two (2) times a day.  promethazine (PHENERGAN) 25 mg tablet Take 25 mg by mouth every six (6) hours as needed for Nausea.  hydrALAZINE (APRESOLINE) 50 mg tablet Take 1 Tab by mouth two (2) times a day. 60 Tab 1    allopurinol (ZYLOPRIM) 100 mg tablet TAKE 1 TAB BY MOUTH DAILY. 90 Tab 3    cloNIDine (CATAPRES) 0.2 mg/24 hr patch APPLY 1 PATCH ONCE WEEKLY 12 Patch 3    nitroglycerin (NITROSTAT) 0.4 mg SL tablet by SubLINGual route every five (5) minutes as needed for Chest Pain.  gabapentin (NEURONTIN) 300 mg capsule Take 300 mg by mouth three (3) times daily.  isosorbide mononitrate ER (IMDUR) 60 mg CR tablet Take 1 Tab by mouth daily. 30 Tab 0    omeprazole (PRILOSEC) 40 mg capsule Take 1 Cap by mouth daily. 30 Cap 0    atorvastatin (LIPITOR) 40 mg tablet Take 1 Tab by mouth nightly. 30 Tab 0    clopidogrel (PLAVIX) 75 mg tab Take 1 Tab by mouth daily.  30 Tab 0    carvedilol (COREG) 12.5 mg tablet Take 1 Tab by mouth two (2) times daily (with meals). 60 Tab 0    aspirin 81 mg chewable tablet Take 1 Tab by mouth two (2) times a day. 60 Tab 0    varicella-zoster recombinant, PF, (SHINGRIX, PF,) 50 mcg/0.5 mL susr injection Please repeat dose in 2-3 months 0.5 mL 1    amLODIPine (NORVASC) 10 mg tablet TAKE 1 TAB BY MOUTH DAILY.  27 Tab 1       Past Medical History:   Diagnosis Date    BPH without obstruction/lower urinary tract symptoms     Bursitis of right shoulder     CAD (coronary artery disease)     Chest pain     history of hospitalization with chest pain and a negative workup in 2008    Chronic edema     Constipation     die to medication    Coronary artery disease     mild, non obstructive/EF 65%    Dyspnea on exertion     Echocardiogram 12/16/08    IVC dilated; suboptimal endocardial border; EF 65%    ED (erectile dysfunction)     Elevated PSA     Frequency     GERD (gastroesophageal reflux disease)     Gout     H/O cystoscopy 06/20/2013    Hematuria, unspecified     Hypercholesterolemia     Hypertension     Hypertension      Hypogonadism male     Impotence of organic origin     Left ventricular diastolic dysfunction     Malignant neoplasm of prostate (HCC)     hx of t1a, izzy 6, 5 % of 1  core    Morbid obesity ([address]     Myocardial perfusion 09/05/08    Basal inferior defect c/w artifact; EF 63%    Overactive bladder     S/P cardiac cath 07/30/10    oD2-40%; pRCA-20-30%; EF 65%    Sleep apnea     Slowing of urinary stream     Type II or unspecified type diabetes mellitus without mention of complication, not stated as uncontrolled        Past Surgical History:   Procedure Laterality Date    HX HEART CATHETERIZATION  7/30/10    HX OTHER SURGICAL  06/27/13    Prostate    HX TONSILLECTOMY      OK COLONOSCOPY FLX DX W/COLLJ SPEC WHEN PFRMD      OK I & D ABSC XTRAORAL SOFT TISS COMP         Social History     Social History    Marital status: SINGLE     Spouse name: N/A    Number of children: N/A    Years of education: N/A     Occupational History    Not on file. Social History Main Topics    Smoking status: Former Smoker     Types: Cigars     Quit date: 10/4/1999    Smokeless tobacco: Never Used    Alcohol use No      Comment: socially    Drug use: No    Sexual activity: Not Currently     Other Topics Concern    Not on file     Social History Narrative       Patient does not have an advanced directive on file    Vitals:    08/28/18 1201   BP: (!) 176/120   Pulse: 76   Resp: 16   Temp: 98.5 °F (36.9 °C)   TempSrc: Tympanic   SpO2: 97%   Weight: 284 lb (128.8 kg)   Height: 5' 11\" (1.803 m)   PainSc:   6   PainLoc: Abdomen       Physical Exam   Constitutional: No distress. Eyes: EOM are normal. Pupils are equal, round, and reactive to light. Right eye exhibits no discharge. Left eye exhibits no discharge. Cardiovascular: Normal rate, regular rhythm and normal heart sounds. Pulmonary/Chest: Effort normal and breath sounds normal.   Nursing note and vitals reviewed. Hospital Outpatient Visit on 06/29/2018   Component Date Value Ref Range Status    [facility] SPECIM[facility] COL 06/29/2018 Specimens collected/sent to [facility]    Final   Orders Only on 06/29/2018   Component Date Value Ref Range Status    Triglyceride 06/29/2018 81  40 - 149 mg/dL Final    HDL Cholesterol 06/29/2018 68* 40 - 59 mg/dL Final    Cholesterol, total 06/29/2018 201* 110 - 200 mg/dL Final    CHOLESTEROL/HDL 06/29/2018 3.0  0.0 - 5.0 Final    LDL, calculated 06/29/2018 117* 50 - 99 mg/dL Final    VLDL, calculated 06/29/2018 16  8 - 30 mg/dL Final    Comment: Test includes cholesterol, HDL cholesterol, triglycerides and LDL. Cholesterol Recommended NCEP guidelines in mg/dL:  Less than 200            Desirable  200 - 239                Borderline High  Greater than or  = 240   High  Please Note:  Total Chol/HDL Ratio                   Men     Women  1/2 Avg. Risk    3.4     3.3      Avg.  Risk 5.0     4.4  2X  Avg. Risk    9.6     7.1  3X  Avg. Risk   23.4    11.0      Glucose 06/29/2018 94  70 - 99 mg/dL Final    BUN 06/29/2018 32* 6 - 22 mg/dL Final    Creatinine 06/29/2018 1.8* 0.8 - 1.6 mg/dL Final    Sodium 06/29/2018 148* 133 - 145 mmol/L Final    Potassium 06/29/2018 3.8  3.5 - 5.5 mmol/L Final    Chloride 06/29/2018 105  98 - 110 mmol/L Final    CO2 06/29/2018 30  20 - 32 mmol/L Final    AST (SGOT) 06/29/2018 18  10 - 37 U/L Final    ALT (SGPT) 06/29/2018 16  5 - 40 U/L Final    Alk. phosphatase 06/29/2018 66  40 - 125 U/L Final    Bilirubin, total 06/29/2018 0.4  0.2 - 1.2 mg/dL Final    Calcium 06/29/2018 8.8  8.4 - 10.4 mg/dL Final    Protein, total 06/29/2018 6.0* 6.2 - 8.1 g/dL Final    Albumin 06/29/2018 3.6  3.5 - 5.0 g/dL Final    A-G Ratio 06/29/2018 1.5  1.1 - 2.6 ratio Final    Globulin 06/29/2018 2.4  2.0 - 4.0 g/dL Final    Anion gap 06/29/2018 13.0  mmol/L Final    Comment: Test includes Albumin, Alkaline Phosphatase, ALT, AST, BUN, Calcium, CO2,  Chloride, Creatinine, Glucose, Potassium, Sodium, Total Bilirubin and Total  Protein. Estimated GFR results are reported in mL/min/1.73 sq.m. by the MDRD equation. This eGFR is validated for stable chronic renal failure patients. This   equation  is unreliable in acute illness or patients with normal renal function.       GFRAA 06/29/2018 46.1* >60.0 Final    GFRNA 06/29/2018 38.1* >60.0 Final    Hep C Virus Ab 06/29/2018 None Detected  None Detec Final    TSH 06/29/2018 0.80  0.27 - 4.20 mcU/mL Final    WBC 06/29/2018 5.4  4.0 - 11.0 K/uL Final    RBC 06/29/2018 4.35  3.80 - 5.80 M/uL Final    HGB 06/29/2018 11.6* 13.1 - 17.2 g/dL Final    HCT 06/29/2018 39.1* 39.3 - 51.6 % Final    MCV 06/29/2018 90  80 - 95 fL Final    MCH 06/29/2018 27  26 - 34 pg Final    MCHC 06/29/2018 30* 31 - 36 g/dL Final    RDW 06/29/2018 18.7* 10.0 - 15.5 % Final    PLATELET 01/55/6650 038  140 - 440 K/uL Final    MPV 06/29/2018 10.3  9.0 - 13.0 fL Final    NEUTROPHILS 06/29/2018 56  40 - 75 % Final    Lymphocytes 06/29/2018 34  20 - 45 % Final    MONOCYTES 06/29/2018 7  3 - 12 % Final    EOSINOPHILS 06/29/2018 3  0 - 6 % Final    BASOPHILS 06/29/2018 0  0 - 2 % Final    ABS. NEUTROPHILS 06/29/2018 3.0  1.8 - 7.7 K/uL Final    ABSOLUTE LYMPHOCYTE COUNT 06/29/2018 1.8  1.0 - 4.8 K/uL Final    ABS. MONOCYTES 06/29/2018 0.4  0.1 - 1.0 K/uL Final    ABS. EOSINOPHILS 06/29/2018 0.2  0.0 - 0.5 K/uL Final    ABS. BASOPHILS 06/29/2018 0.0  0.0 - 0.2 K/uL Final    Hemoglobin A1c 06/29/2018 5.3  4.8 - 5.9 % Final    AVG GLU 06/29/2018 104  91 - 123 mg/dL Final       .No results found for any visits on 08/28/18. Assessment / Plan:      ICD-10-CM ICD-9-CM    1. Morbid (severe) obesity due to excess calories (HCC) E66.01 278.01    2. Essential hypertension I10 401.9 hydrALAZINE (APRESOLINE) 50 mg tablet      DISCONTINUED: labetalol (NORMODYNE) 100 mg tablet   3. Chest pain at rest R07.9 786.50 AMB POC EKG ROUTINE W/ 12 LEADS, INTER & REP     Patient will remain in the office and take a pill every 30 minutes to see if he has any additional blurred vision  1200- patient remains in the office. No complaints of blurred vision or nausea  1300- patient remains in the office. Still has two more pill to take  1335- patient is remains in the office. Tolerating the medication  1445- patient complaining of feeling nauseated. Cool cloth given and patient sitting in room with his eye closed  1540- patient complaining of feeling hot and like an elephant is sitting on his chest. EkKG ordered  EKG reviewed. Patient requires transport to [facility],  Report called to the ED      Follow-up Disposition:  Return if symptoms worsen or fail to improve. I asked the patient if he  had any questions and answered his  questions. The patient stated that he understands the treatment plan and agrees with the treatment plan        Discussed the patient's BMI with him. The BMI follow up plan is as follows:     dietary management education, guidance, and counseling  encourage exercise  monitor weight  prescribed dietary intake    An After Visit Summary was printed and given to the patient.

## 2018-08-29 ENCOUNTER — APPOINTMENT (OUTPATIENT)
Dept: ULTRASOUND IMAGING | Age: 63
DRG: 247 | End: 2018-08-29
Attending: INTERNAL MEDICINE
Payer: COMMERCIAL

## 2018-08-29 LAB
ANION GAP SERPL CALC-SCNC: 8 MMOL/L (ref 3–18)
ANION GAP SERPL CALC-SCNC: 8 MMOL/L (ref 3–18)
APPEARANCE UR: CLEAR
APTT PPP: 46.7 SEC (ref 23–36.4)
APTT PPP: 51.5 SEC (ref 23–36.4)
APTT PPP: 80 SEC (ref 23–36.4)
BASOPHILS # BLD: 0 K/UL (ref 0–0.1)
BASOPHILS NFR BLD: 0 % (ref 0–2)
BILIRUB UR QL: NEGATIVE
BUN SERPL-MCNC: 38 MG/DL (ref 7–18)
BUN SERPL-MCNC: 43 MG/DL (ref 7–18)
BUN/CREAT SERPL: 13 (ref 12–20)
BUN/CREAT SERPL: 15 (ref 12–20)
CALCIUM SERPL-MCNC: 8.4 MG/DL (ref 8.5–10.1)
CALCIUM SERPL-MCNC: 8.5 MG/DL (ref 8.5–10.1)
CHLORIDE SERPL-SCNC: 101 MMOL/L (ref 100–108)
CHLORIDE SERPL-SCNC: 104 MMOL/L (ref 100–108)
CHOLEST SERPL-MCNC: 163 MG/DL
CK MB CFR SERPL CALC: 9.6 % (ref 0–4)
CK MB CFR SERPL CALC: 9.6 % (ref 0–4)
CK MB SERPL-MCNC: 56 NG/ML (ref 5–25)
CK MB SERPL-MCNC: 57.2 NG/ML (ref 5–25)
CK SERPL-CCNC: 581 U/L (ref 39–308)
CK SERPL-CCNC: 598 U/L (ref 39–308)
CO2 SERPL-SCNC: 31 MMOL/L (ref 21–32)
CO2 SERPL-SCNC: 33 MMOL/L (ref 21–32)
COLOR UR: ABNORMAL
CREAT SERPL-MCNC: 2.8 MG/DL (ref 0.6–1.3)
CREAT SERPL-MCNC: 2.82 MG/DL (ref 0.6–1.3)
CREAT UR-MCNC: 308 MG/DL (ref 30–125)
DIFFERENTIAL METHOD BLD: ABNORMAL
EOSINOPHIL # BLD: 0.1 K/UL (ref 0–0.4)
EOSINOPHIL NFR BLD: 1 % (ref 0–5)
EPITH CASTS URNS QL MICRO: NORMAL /LPF (ref 0–5)
ERYTHROCYTE [DISTWIDTH] IN BLOOD BY AUTOMATED COUNT: 15.7 % (ref 11.6–14.5)
EST. AVERAGE GLUCOSE BLD GHB EST-MCNC: 105 MG/DL
FOLATE SERPL-MCNC: >20 NG/ML (ref 3.1–17.5)
GLUCOSE SERPL-MCNC: 112 MG/DL (ref 74–99)
GLUCOSE SERPL-MCNC: 96 MG/DL (ref 74–99)
GLUCOSE UR STRIP.AUTO-MCNC: NEGATIVE MG/DL
HBA1C MFR BLD: 5.3 % (ref 4.2–5.6)
HCT VFR BLD AUTO: 36.2 % (ref 36–48)
HDLC SERPL-MCNC: 44 MG/DL (ref 40–60)
HDLC SERPL: 3.7 {RATIO} (ref 0–5)
HGB BLD-MCNC: 11.4 G/DL (ref 13–16)
HGB UR QL STRIP: NEGATIVE
HYALINE CASTS URNS QL MICRO: NORMAL /LPF (ref 0–2)
KETONES UR QL STRIP.AUTO: ABNORMAL MG/DL
LDLC SERPL CALC-MCNC: 101.4 MG/DL (ref 0–100)
LEUKOCYTE ESTERASE UR QL STRIP.AUTO: ABNORMAL
LIPID PROFILE,FLP: ABNORMAL
LYMPHOCYTES # BLD: 1.8 K/UL (ref 0.9–3.6)
LYMPHOCYTES NFR BLD: 27 % (ref 21–52)
MAGNESIUM SERPL-MCNC: 2.4 MG/DL (ref 1.6–2.6)
MCH RBC QN AUTO: 26.6 PG (ref 24–34)
MCHC RBC AUTO-ENTMCNC: 31.5 G/DL (ref 31–37)
MCV RBC AUTO: 84.6 FL (ref 74–97)
MONOCYTES # BLD: 0.4 K/UL (ref 0.05–1.2)
MONOCYTES NFR BLD: 6 % (ref 3–10)
NEUTS SEG # BLD: 4.6 K/UL (ref 1.8–8)
NEUTS SEG NFR BLD: 66 % (ref 40–73)
NITRITE UR QL STRIP.AUTO: NEGATIVE
PH UR STRIP: 5 [PH] (ref 5–8)
PHOSPHATE SERPL-MCNC: 4.5 MG/DL (ref 2.5–4.9)
PLATELET # BLD AUTO: 185 K/UL (ref 135–420)
PMV BLD AUTO: 10.1 FL (ref 9.2–11.8)
POTASSIUM SERPL-SCNC: 3.4 MMOL/L (ref 3.5–5.5)
POTASSIUM SERPL-SCNC: 3.6 MMOL/L (ref 3.5–5.5)
PROT UR STRIP-MCNC: 30 MG/DL
PROT UR-MCNC: 61 MG/DL
RBC # BLD AUTO: 4.28 M/UL (ref 4.7–5.5)
RBC #/AREA URNS HPF: NORMAL /HPF (ref 0–5)
SODIUM SERPL-SCNC: 142 MMOL/L (ref 136–145)
SODIUM SERPL-SCNC: 143 MMOL/L (ref 136–145)
SODIUM UR-SCNC: 19 MMOL/L (ref 20–110)
SP GR UR REFRACTOMETRY: 1.02 (ref 1–1.03)
TRIGL SERPL-MCNC: 88 MG/DL (ref ?–150)
TROPONIN I SERPL-MCNC: 19.7 NG/ML (ref 0–0.04)
TROPONIN I SERPL-MCNC: 20 NG/ML (ref 0–0.04)
UROBILINOGEN UR QL STRIP.AUTO: 1 EU/DL (ref 0.2–1)
VIT B12 SERPL-MCNC: 614 PG/ML (ref 211–911)
VLDLC SERPL CALC-MCNC: 17.6 MG/DL
WBC # BLD AUTO: 6.9 K/UL (ref 4.6–13.2)
WBC URNS QL MICRO: NORMAL /HPF (ref 0–5)

## 2018-08-29 PROCEDURE — 74011000258 HC RX REV CODE- 258: Performed by: INTERNAL MEDICINE

## 2018-08-29 PROCEDURE — 93005 ELECTROCARDIOGRAM TRACING: CPT

## 2018-08-29 PROCEDURE — 74011250636 HC RX REV CODE- 250/636: Performed by: EMERGENCY MEDICINE

## 2018-08-29 PROCEDURE — 65270000029 HC RM PRIVATE

## 2018-08-29 PROCEDURE — 81001 URINALYSIS AUTO W/SCOPE: CPT | Performed by: INTERNAL MEDICINE

## 2018-08-29 PROCEDURE — 85025 COMPLETE CBC W/AUTO DIFF WBC: CPT | Performed by: INTERNAL MEDICINE

## 2018-08-29 PROCEDURE — 83036 HEMOGLOBIN GLYCOSYLATED A1C: CPT | Performed by: NURSE PRACTITIONER

## 2018-08-29 PROCEDURE — 80048 BASIC METABOLIC PNL TOTAL CA: CPT | Performed by: INTERNAL MEDICINE

## 2018-08-29 PROCEDURE — 74011250637 HC RX REV CODE- 250/637: Performed by: INTERNAL MEDICINE

## 2018-08-29 PROCEDURE — 76770 US EXAM ABDO BACK WALL COMP: CPT

## 2018-08-29 PROCEDURE — 74011250636 HC RX REV CODE- 250/636: Performed by: INTERNAL MEDICINE

## 2018-08-29 PROCEDURE — 82607 VITAMIN B-12: CPT | Performed by: INTERNAL MEDICINE

## 2018-08-29 PROCEDURE — 85730 THROMBOPLASTIN TIME PARTIAL: CPT | Performed by: INTERNAL MEDICINE

## 2018-08-29 PROCEDURE — 83735 ASSAY OF MAGNESIUM: CPT | Performed by: INTERNAL MEDICINE

## 2018-08-29 PROCEDURE — 84156 ASSAY OF PROTEIN URINE: CPT | Performed by: INTERNAL MEDICINE

## 2018-08-29 PROCEDURE — 80061 LIPID PANEL: CPT | Performed by: INTERNAL MEDICINE

## 2018-08-29 PROCEDURE — 36415 COLL VENOUS BLD VENIPUNCTURE: CPT | Performed by: INTERNAL MEDICINE

## 2018-08-29 PROCEDURE — 84300 ASSAY OF URINE SODIUM: CPT | Performed by: INTERNAL MEDICINE

## 2018-08-29 PROCEDURE — 82570 ASSAY OF URINE CREATININE: CPT | Performed by: INTERNAL MEDICINE

## 2018-08-29 PROCEDURE — 65660000000 HC RM CCU STEPDOWN

## 2018-08-29 PROCEDURE — 82550 ASSAY OF CK (CPK): CPT | Performed by: INTERNAL MEDICINE

## 2018-08-29 PROCEDURE — 84100 ASSAY OF PHOSPHORUS: CPT | Performed by: INTERNAL MEDICINE

## 2018-08-29 RX ORDER — HEPARIN SODIUM 1000 [USP'U]/ML
3000 INJECTION, SOLUTION INTRAVENOUS; SUBCUTANEOUS ONCE
Status: COMPLETED | OUTPATIENT
Start: 2018-08-29 | End: 2018-08-29

## 2018-08-29 RX ORDER — ASPIRIN 300 MG/1
300 SUPPOSITORY RECTAL DAILY
Status: DISCONTINUED | OUTPATIENT
Start: 2018-08-29 | End: 2018-08-31

## 2018-08-29 RX ORDER — POTASSIUM CHLORIDE 7.45 MG/ML
10 INJECTION INTRAVENOUS
Status: DISCONTINUED | OUTPATIENT
Start: 2018-08-29 | End: 2018-08-29

## 2018-08-29 RX ORDER — CLONIDINE 0.2 MG/24H
1 PATCH, EXTENDED RELEASE TRANSDERMAL
Status: DISCONTINUED | OUTPATIENT
Start: 2018-08-29 | End: 2018-09-01 | Stop reason: HOSPADM

## 2018-08-29 RX ORDER — CARVEDILOL 12.5 MG/1
12.5 TABLET ORAL 2 TIMES DAILY WITH MEALS
Status: DISCONTINUED | OUTPATIENT
Start: 2018-08-29 | End: 2018-09-01 | Stop reason: HOSPADM

## 2018-08-29 RX ORDER — POTASSIUM CHLORIDE 1.5 G/1.77G
20 POWDER, FOR SOLUTION ORAL
Status: COMPLETED | OUTPATIENT
Start: 2018-08-29 | End: 2018-08-29

## 2018-08-29 RX ORDER — ATORVASTATIN CALCIUM 40 MG/1
40 TABLET, FILM COATED ORAL
Status: DISCONTINUED | OUTPATIENT
Start: 2018-08-29 | End: 2018-09-01 | Stop reason: HOSPADM

## 2018-08-29 RX ORDER — HEPARIN SODIUM 1000 [USP'U]/ML
INJECTION, SOLUTION INTRAVENOUS; SUBCUTANEOUS
Status: DISCONTINUED
Start: 2018-08-29 | End: 2018-08-29

## 2018-08-29 RX ADMIN — SODIUM CHLORIDE 125 ML/HR: 450 INJECTION, SOLUTION INTRAVENOUS at 01:44

## 2018-08-29 RX ADMIN — HEPARIN SODIUM 1360 UNITS/HR: 10000 INJECTION, SOLUTION INTRAVENOUS at 22:03

## 2018-08-29 RX ADMIN — POTASSIUM CHLORIDE 10 MEQ: 7.46 INJECTION, SOLUTION INTRAVENOUS at 10:05

## 2018-08-29 RX ADMIN — POTASSIUM CHLORIDE 20 MEQ: 1.5 POWDER, FOR SOLUTION ORAL at 13:57

## 2018-08-29 RX ADMIN — ISOSORBIDE MONONITRATE 60 MG: 60 TABLET, EXTENDED RELEASE ORAL at 09:31

## 2018-08-29 RX ADMIN — FAMOTIDINE 20 MG: 20 TABLET ORAL at 09:30

## 2018-08-29 RX ADMIN — ATORVASTATIN CALCIUM 40 MG: 40 TABLET, FILM COATED ORAL at 21:52

## 2018-08-29 RX ADMIN — HEPARIN SODIUM 3000 UNITS: 1000 INJECTION, SOLUTION INTRAVENOUS; SUBCUTANEOUS at 06:37

## 2018-08-29 RX ADMIN — SODIUM CHLORIDE 125 ML/HR: 450 INJECTION, SOLUTION INTRAVENOUS at 16:13

## 2018-08-29 RX ADMIN — FAMOTIDINE 20 MG: 20 TABLET ORAL at 17:24

## 2018-08-29 RX ADMIN — HEPARIN SODIUM 3000 UNITS: 1000 INJECTION, SOLUTION INTRAVENOUS; SUBCUTANEOUS at 14:09

## 2018-08-29 RX ADMIN — PANTOPRAZOLE SODIUM 40 MG: 40 TABLET, DELAYED RELEASE ORAL at 09:31

## 2018-08-29 RX ADMIN — ASPIRIN 300 MG: 300 SUPPOSITORY RECTAL at 12:37

## 2018-08-29 RX ADMIN — AMLODIPINE BESYLATE 10 MG: 10 TABLET ORAL at 09:31

## 2018-08-29 RX ADMIN — CARVEDILOL 12.5 MG: 12.5 TABLET, FILM COATED ORAL at 17:24

## 2018-08-29 RX ADMIN — CARVEDILOL 12.5 MG: 12.5 TABLET, FILM COATED ORAL at 09:31

## 2018-08-29 NOTE — ED NOTES
TRANSFER - OUT REPORT: 
 
Verbal report given to Ashley(name) on Daniela Haskins  being transferred to [address] [place] [address](unit) for routine progression of care Report consisted of patients Situation, Background, Assessment and  
Recommendations(SBAR). Information from the following report(s) SBAR, ED Summary and MAR was reviewed with the receiving nurse. Lines:  
Peripheral IV 08/28/18 Left Antecubital (Active) Site Assessment Clean, dry, & intact 8/28/2018  4:42 PM  
Phlebitis Assessment 0 8/28/2018  4:42 PM  
Infiltration Assessment 0 8/28/2018  4:42 PM  
Dressing Status Clean, dry, & intact 8/28/2018  4:42 PM  
Dressing Type Transparent 8/28/2018  4:42 PM  
Hub Color/Line Status Pink;Flushed;Patent 8/28/2018  4:42 PM  
Action Taken Blood drawn 8/28/2018  4:42 PM  
   
Peripheral IV (Active) Site Assessment Clean, dry, & intact 8/28/2018  9:50 PM  
Dressing Status Clean, dry, & intact 8/28/2018  9:50 PM  
   
Peripheral IV 08/28/18 Right Antecubital (Active) Opportunity for questions and clarification was provided. Patient transported with: 
 Registered Nurse

## 2018-08-29 NOTE — PROGRESS NOTES
[facility] Hospitalist Group Progress Note Patient: Lisa Silva Age: 66 y.o. : 1955 MR#: [id number] SSN: [id number] Date: 2018 Subjective: No complaints at this time. No chest pain since yesterday. Denies N/V. NAD Objective:  
VS:  
Visit Vitals  /89 (BP 1 Location: Right arm, BP Patient Position: At rest)  Pulse 73  Temp 97.1 °F (36.2 °C)  Resp 20  
 Ht 5' 11\" (1.803 m)  Wt 128.5 kg (283 lb 3.2 oz)  SpO2 94%  BMI 39.5 kg/m2 Tmax/24hrs: Temp (24hrs), Av.7 °F (36.5 °C), Min:97 °F (36.1 °C), Max:98.5 °F (36.9 °C) Intake/Output Summary (Last 24 hours) at 18 1155 Last data filed at 18 5496 Gross per 24 hour Intake           984.52 ml Output              300 ml Net           684.52 ml General:  Alert, NAD Cardiovascular:  RRR Pulmonary:  LSC throughout; respiratory effort WNL 
GI:  Lap sites with steri strips/clean and dry+BS in all four quadrants, soft, non-tender Extremities:  No edema; 2+ dorsalis pedis pulses bilaterally Neuro: alert and oriented x3 Labs:   
Recent Results (from the past 24 hour(s)) EKG, 12 LEAD, INITIAL Collection Time: 18  4:36 PM  
Result Value Ref Range Ventricular Rate 94 BPM  
 Atrial Rate 94 BPM  
 P-R Interval 170 ms QRS Duration 100 ms Q-T Interval 376 ms QTC Calculation (Bezet) 470 ms Calculated P Axis 53 degrees Calculated R Axis -5 degrees Calculated T Axis 94 degrees Diagnosis Normal sinus rhythm Possible Left atrial enlargement Left ventricular hypertrophy with repolarization abnormality Abnormal ECG When compared with ECG of 2013 17:15, Inverted T waves have replaced nonspecific T wave abnormality in Lateral  
leads METABOLIC PANEL, BASIC Collection Time: 18  4:43 PM  
Result Value Ref Range Sodium 142 136 - 145 mmol/L  Potassium 2.9 (LL) 3.5 - 5.5 mmol/L  
 Chloride 102 100 - 108 mmol/L  
 CO2 30 21 - 32 mmol/L Anion gap 10 3.0 - 18 mmol/L Glucose 145 (H) 74 - 99 mg/dL BUN 38 (H) 7.0 - 18 MG/DL Creatinine 2.46 (H) 0.6 - 1.3 MG/DL  
 BUN/Creatinine ratio 15 12 - 20 GFR est AA 33 (L) >60 ml/min/1.73m2 GFR est non-AA 27 (L) >60 ml/min/1.73m2 Calcium 8.7 8.5 - 10.1 MG/DL  
CBC WITH AUTOMATED DIFF Collection Time: 08/28/18  4:43 PM  
Result Value Ref Range WBC 9.0 4.6 - 13.2 K/uL  
 RBC 4.89 4.70 - 5.50 M/uL  
 HGB 13.4 13.0 - 16.0 g/dL HCT 40.8 36.0 - 48.0 % MCV 83.4 74.0 - 97.0 FL  
 MCH 27.4 24.0 - 34.0 PG  
 MCHC 32.8 31.0 - 37.0 g/dL  
 RDW 15.1 (H) 11.6 - 14.5 % PLATELET 777 171 - 791 K/uL MPV 9.6 9.2 - 11.8 FL  
 NEUTROPHILS 83 (H) 40 - 73 % LYMPHOCYTES 13 (L) 21 - 52 % MONOCYTES 3 3 - 10 % EOSINOPHILS 1 0 - 5 % BASOPHILS 0 0 - 2 %  
 ABS. NEUTROPHILS 7.5 1.8 - 8.0 K/UL  
 ABS. LYMPHOCYTES 1.1 0.9 - 3.6 K/UL  
 ABS. MONOCYTES 0.3 0.05 - 1.2 K/UL  
 ABS. EOSINOPHILS 0.1 0.0 - 0.4 K/UL  
 ABS. BASOPHILS 0.0 0.0 - 0.1 K/UL  
 DF AUTOMATED CARDIAC PANEL,(CK, CKMB & TROPONIN) Collection Time: 08/28/18  4:43 PM  
Result Value Ref Range  39 - 308 U/L  
 CK - MB 5.2 (H) <3.6 ng/ml CK-MB Index 2.0 0.0 - 4.0 % Troponin-I, Qt. 0.12 (H) 0.0 - 0.045 NG/ML  
HEPATIC FUNCTION PANEL Collection Time: 08/28/18  4:43 PM  
Result Value Ref Range Protein, total 7.3 6.4 - 8.2 g/dL Albumin 3.4 3.4 - 5.0 g/dL Globulin 3.9 2.0 - 4.0 g/dL A-G Ratio 0.9 0.8 - 1.7 Bilirubin, total 0.5 0.2 - 1.0 MG/DL Bilirubin, direct 0.2 0.0 - 0.2 MG/DL Alk. phosphatase 74 45 - 117 U/L  
 AST (SGOT) 27 15 - 37 U/L  
 ALT (SGPT) 33 16 - 61 U/L MAGNESIUM Collection Time: 08/28/18  4:43 PM  
Result Value Ref Range Magnesium 2.1 1.6 - 2.6 mg/dL PTT Collection Time: 08/28/18  4:45 PM  
Result Value Ref Range aPTT 39.4 (H) 23.0 - 36.4 SEC POC LACTIC ACID  Collection Time: 08/28/18  4:47 PM  
 Result Value Ref Range Lactic Acid (POC) 1.6 0.4 - 2.0 mmol/L  
CARDIAC PANEL,(CK, CKMB & TROPONIN) Collection Time: 08/28/18  8:01 PM  
Result Value Ref Range  39 - 308 U/L  
 CK - MB 20.3 (H) <3.6 ng/ml CK-MB Index 6.7 (H) 0.0 - 4.0 % Troponin-I, Qt. 1.61 (HH) 0.0 - 0.877 NG/ML  
METABOLIC PANEL, COMPREHENSIVE Collection Time: 08/28/18  9:50 PM  
Result Value Ref Range Sodium 145 136 - 145 mmol/L Potassium 3.5 3.5 - 5.5 mmol/L Chloride 104 100 - 108 mmol/L  
 CO2 32 21 - 32 mmol/L Anion gap 9 3.0 - 18 mmol/L Glucose 93 74 - 99 mg/dL BUN 38 (H) 7.0 - 18 MG/DL Creatinine 2.33 (H) 0.6 - 1.3 MG/DL  
 BUN/Creatinine ratio 16 12 - 20 GFR est AA 35 (L) >60 ml/min/1.73m2 GFR est non-AA 29 (L) >60 ml/min/1.73m2 Calcium 8.1 (L) 8.5 - 10.1 MG/DL Bilirubin, total 0.3 0.2 - 1.0 MG/DL  
 ALT (SGPT) 30 16 - 61 U/L  
 AST (SGOT) 43 (H) 15 - 37 U/L Alk. phosphatase 65 45 - 117 U/L Protein, total 6.5 6.4 - 8.2 g/dL Albumin 3.0 (L) 3.4 - 5.0 g/dL Globulin 3.5 2.0 - 4.0 g/dL A-G Ratio 0.9 0.8 - 1.7 PROTHROMBIN TIME + INR Collection Time: 08/28/18  9:50 PM  
Result Value Ref Range Prothrombin time 13.9 11.5 - 15.2 sec INR 1.1 0.8 - 1.2 PTT Collection Time: 08/29/18  4:25 AM  
Result Value Ref Range aPTT 51.5 (H) 23.0 - 36.4 SEC METABOLIC PANEL, BASIC Collection Time: 08/29/18  4:25 AM  
Result Value Ref Range Sodium 143 136 - 145 mmol/L Potassium 3.4 (L) 3.5 - 5.5 mmol/L Chloride 104 100 - 108 mmol/L  
 CO2 31 21 - 32 mmol/L Anion gap 8 3.0 - 18 mmol/L Glucose 96 74 - 99 mg/dL BUN 43 (H) 7.0 - 18 MG/DL Creatinine 2.80 (H) 0.6 - 1.3 MG/DL  
 BUN/Creatinine ratio 15 12 - 20 GFR est AA 28 (L) >60 ml/min/1.73m2 GFR est non-AA 23 (L) >60 ml/min/1.73m2 Calcium 8.5 8.5 - 10.1 MG/DL  
CARDIAC PANEL,(CK, CKMB & TROPONIN) Collection Time: 08/29/18  4:25 AM  
Result Value Ref Range   (H) 39 - 308 U/L  
 CK - MB 56.0 (H) <3.6 ng/ml CK-MB Index 9.6 (H) 0.0 - 4.0 % Troponin-I, Qt. 19.70 (HH) 0.0 - 0.045 NG/ML  
MAGNESIUM Collection Time: 08/29/18  4:25 AM  
Result Value Ref Range Magnesium 2.4 1.6 - 2.6 mg/dL PHOSPHORUS Collection Time: 08/29/18  4:25 AM  
Result Value Ref Range Phosphorus 4.5 2.5 - 4.9 MG/DL  
CBC WITH AUTOMATED DIFF Collection Time: 08/29/18  4:25 AM  
Result Value Ref Range WBC 6.9 4.6 - 13.2 K/uL  
 RBC 4.28 (L) 4.70 - 5.50 M/uL  
 HGB 11.4 (L) 13.0 - 16.0 g/dL HCT 36.2 36.0 - 48.0 % MCV 84.6 74.0 - 97.0 FL  
 MCH 26.6 24.0 - 34.0 PG  
 MCHC 31.5 31.0 - 37.0 g/dL  
 RDW 15.7 (H) 11.6 - 14.5 % PLATELET 808 057 - 054 K/uL MPV 10.1 9.2 - 11.8 FL  
 NEUTROPHILS 66 40 - 73 % LYMPHOCYTES 27 21 - 52 % MONOCYTES 6 3 - 10 % EOSINOPHILS 1 0 - 5 % BASOPHILS 0 0 - 2 %  
 ABS. NEUTROPHILS 4.6 1.8 - 8.0 K/UL  
 ABS. LYMPHOCYTES 1.8 0.9 - 3.6 K/UL  
 ABS. MONOCYTES 0.4 0.05 - 1.2 K/UL  
 ABS. EOSINOPHILS 0.1 0.0 - 0.4 K/UL  
 ABS. BASOPHILS 0.0 0.0 - 0.1 K/UL  
 DF AUTOMATED    
VITAMIN B12 & FOLATE Collection Time: 08/29/18  4:25 AM  
Result Value Ref Range Vitamin B12 614 211 - 911 pg/mL Folate >20.0 (H) 3.10 - 17.50 ng/mL EKG, 12 LEAD, INITIAL Collection Time: 08/29/18  6:32 AM  
Result Value Ref Range Ventricular Rate 82 BPM  
 Atrial Rate 82 BPM  
 P-R Interval 184 ms QRS Duration 94 ms Q-T Interval 434 ms QTC Calculation (Bezet) 507 ms Calculated P Axis 40 degrees Calculated R Axis -17 degrees Calculated T Axis 147 degrees Diagnosis Normal sinus rhythm Left ventricular hypertrophy with repolarization abnormality Prolonged QT Abnormal ECG No previous ECGs available CARDIAC PANEL,(CK, CKMB & TROPONIN) Collection Time: 08/29/18  8:30 AM  
Result Value Ref Range  (H) 39 - 308 U/L  
 CK - MB 57.2 (H) <3.6 ng/ml CK-MB Index 9.6 (H) 0.0 - 4.0 %  Troponin-I, Qt. 20.00 (HH) 0.0 - 0.045 NG/ML  
 LIPID PANEL Collection Time: 08/29/18  8:30 AM  
Result Value Ref Range LIPID PROFILE Cholesterol, total 163 <200 MG/DL Triglyceride 88 <150 MG/DL  
 HDL Cholesterol 44 40 - 60 MG/DL  
 LDL, calculated 101.4 (H) 0 - 100 MG/DL VLDL, calculated 17.6 MG/DL  
 CHOL/HDL Ratio 3.7 0 - 5.0    
URINALYSIS W/ RFLX MICROSCOPIC Collection Time: 08/29/18  9:07 AM  
Result Value Ref Range Color DARK YELLOW Appearance CLEAR Specific gravity 1.019 1.005 - 1.030    
 pH (UA) 5.0 5.0 - 8.0 Protein 30 (A) NEG mg/dL Glucose NEGATIVE  NEG mg/dL Ketone TRACE (A) NEG mg/dL Bilirubin NEGATIVE  NEG Blood NEGATIVE  NEG Urobilinogen 1.0 0.2 - 1.0 EU/dL Nitrites NEGATIVE  NEG Leukocyte Esterase TRACE (A) NEG    
SODIUM, UR, RANDOM Collection Time: 08/29/18  9:07 AM  
Result Value Ref Range Sodium,urine random 19 (L) 20 - 110 MMOL/L  
CREATININE, UR, RANDOM Collection Time: 08/29/18  9:07 AM  
Result Value Ref Range Creatinine, urine 308.00 (H) 30 - 125 mg/dL PROTEIN URINE, RANDOM Collection Time: 08/29/18  9:07 AM  
Result Value Ref Range Protein, urine random 61 (H) <11.9 mg/dL URINE MICROSCOPIC ONLY Collection Time: 08/29/18  9:07 AM  
Result Value Ref Range WBC 2 to 4 0 - 5 /hpf  
 RBC 0 to 2 0 - 5 /hpf Epithelial cells 1+ 0 - 5 /lpf Hyaline cast 1 to 2 0 - 2 /lpf Assessment/Plan: 1. ACS: cardiology consult. Recommendations IV heparin, serial PTT/INR, ASA suppository d/t risk for GIB, IVF, delay cardiac catheterization as patient is stable and for AWA improvement, add statin 2. Chest pain: resolved, telemetry, continue imdur, troponin increase, monitor troponin 3. Hypokalemia: IV supplementation, monitor metabolic panel 4. HTN: labile, catapres patch, BB, norvasc, prn hydralazine, hold ace, hold diuretics 5. GIB hx: PPI, avoid ASA PO, monitor for bleeding 6. HLD: statin initiated this admission 7. AWA on CKD III:  nephrology consult. Recommendations retroperitoneum US, baseline creatinine 1.4-1.8, cardiorenal syndrome, IVF, hold diuretics, hold ace Additional Notes:   
 
Case discussed with:  [x]Patient  []Family  []Nursing  []Case Management DVT Prophylaxis:  []Lovenox  []Hep SQ  []SCDs  []Coumadin   [x]On Heparin gtt Signed By: Leland Herrera NP August 29, 2018

## 2018-08-29 NOTE — ROUTINE PROCESS
Bedside shift change report given to KYLEIGH Saldana and López Florez RN (oncoming nurse) by Caron Briceno RN (offgoing nurse). Report included the following information SBAR, Kardex, Recent Results and Cardiac Rhythm NSR.

## 2018-08-29 NOTE — PROGRESS NOTES
Dr Jyothi Hutchison called with panic Trop level 19.7, patient quietly resting, denies chest pain. Orders given for stat EKG. Patient remains on Heparin drip, cardiology to see patient this a.m on consult per Dr Jyothi Hutchison. 0640 EKG done. Patient informed of urine spec needed. 0730 Urine spec collected and sent to lab. Patient shaved and prep both wrist and remains NPO. Dr Nacho Acevedo in at bs.

## 2018-08-29 NOTE — ED NOTES
Pt alert and oriented, lying in bed with family member at bedside, no complaints at this time, pt aware he is npo at midnight

## 2018-08-29 NOTE — PROGRESS NOTES
Reason for Admission:   ACS 
                
RRAT Score:  10 Plan for utilizing home health:  Home Likelihood of Readmission:  Low Transition of Care Plan:    
 
Patient was admitted with a dx of ACS. Patient is alert and oriented and independent with his ADL's. Patient lives with his sister. Patient has designated Rabia Guerin to participate in his discharge plan and to receive any needed information. Patient's sees Vero Dodd NP at his PCP's office, and his last visit was yesterday. D/c plan is to go home at discharge. Will continue to follow and jennifer with d/c planning as d/c needs are identified. JACOBO Kimball, Care Manager. Care Management Interventions PCP Verified by CM: Yes (yesterday) Palliative Care Criteria Met (RRAT>21 & CHF Dx)?: No 
Mode of Transport at Discharge: Self Transition of Care Consult (CM Consult): Discharge Planning Discharge Durable Medical Equipment: No 
Physical Therapy Consult: No 
Occupational Therapy Consult: No 
Current Support Network: Relative's Home Confirm Follow Up Transport: Family Plan discussed with Pt/Family/Caregiver:  Yes

## 2018-08-29 NOTE — PROGRESS NOTES
Pt seen and examined, chart reviewed Pt follows with Dr Camila Ruvalcaba Use asa supp rather than tablet due to recent gastric bypass. Would appreciate surgical opinion for long term dual antiplatelet if stent needed. Continue IVF, delay cath as pt stable and AWA will likely improve some Watch for bleed due to h/o GI bleed last year Add statins, chk lipids D/w pt in detail Will f/u  
thanks Full note to follow

## 2018-08-29 NOTE — H&P
History & Physical 
 
Patient: Tessa Gibbs MRN: [id number]  CSN: [id number] YOB: 1955  Age: 66 y.o. Sex: male DOA: 8/28/2018 Chief Complaint:  
Chief Complaint Patient presents with  Chest Pain HPI:  
 
Tessa Gibbs is a 66 y.o.  male who has PMH of CAD w/out intervention, HTN, morbid obesity and prostate cancer in remission. Pt is s/p gastric bypass surgery in [facility] Last week Pt presented to ER  after experiencing Chest pain episode in his PCP office. Pain was substernal and described as non-radiating heavy sensation in the center of his chest 6-7/10 in severity associated with multiple episodes of Nausea, vomiting and dizziness sensation. Pt was given ASa but states that his chest pain lasted about one hour. Pt states that he did not take his BP meds for 2 days after his surgery then restarted again. Pt reported dizziness and blurry vision after. Pt visited an urgent care then went to his PCP office where his BP was elevated and was restarted on his meds again gradually as he stopped taking them after his dizziness episode. Pt then developed CP and was brought to the hospital. In ER, Pt had uncontrolled BP but was still dizzy and had blurry vision. His troponin was elevated and had acute on CRF . States that he did not have UOP since this AM 
Currently stable and BP is mildly elevated 150's range. Denies CP and or SOB and has no other Sx Pt is on Full Liquid diet regimen after his surgery Past Medical History:  
Diagnosis Date  BPH without obstruction/lower urinary tract symptoms  Bursitis of right shoulder  CAD (coronary artery disease)  Chest pain   
 history of hospitalization with chest pain and a negative workup in 2008  Chronic edema  Constipation   
 die to medication  Coronary artery disease   
 mild, non obstructive/EF 65%  Dyspnea on exertion  Echocardiogram 12/16/08 IVC dilated; suboptimal endocardial border; EF 65%  ED (erectile dysfunction)  Elevated PSA  Frequency  GERD (gastroesophageal reflux disease)  Gout  H/O cystoscopy 06/20/2013  Hematuria, unspecified  Hypercholesterolemia  Hypertension  Hypertension  Hypogonadism male  Impotence of organic origin  Left ventricular diastolic dysfunction  Malignant neoplasm of prostate (HCC)   
 hx of t1a, izzy 6, 5 % of 1  core  Morbid obesity ([address]  Myocardial perfusion 09/05/08 Basal inferior defect c/w artifact; EF 63%  Overactive bladder  S/P cardiac cath 07/30/10  
 oD2-40%; pRCA-20-30%; EF 65%  Sleep apnea  Slowing of urinary stream   
 Type II or unspecified type diabetes mellitus without mention of complication, not stated as uncontrolled Past Surgical History:  
Procedure Laterality Date  HX HEART CATHETERIZATION  7/30/10  HX OTHER SURGICAL  06/27/13 Prostate  HX TONSILLECTOMY  OK COLONOSCOPY FLX DX W/COLLJ SPEC WHEN PFRMD    
 OK I & D ABSC XTRAORAL SOFT TISS COMP Family History Problem Relation Age of Onset  Hypertension Mother  High Cholesterol Mother  Breast Cancer Mother  Diabetes Mother  Heart Disease Mother Stephanie Smith Arthritis-osteo Mother  High Cholesterol Father  Hypertension Father  Diabetes Father  Heart Disease Father  Arthritis-osteo Father Social History Social History  Marital status: SINGLE Spouse name: N/A  
 Number of children: N/A  
 Years of education: N/A Social History Main Topics  Smoking status: Former Smoker Types: Cigars Quit date: 10/4/1999  Smokeless tobacco: Never Used  Alcohol use No  
   Comment: socially  Drug use: No  
 Sexual activity: Not Currently Other Topics Concern  None Social History Narrative Prior to Admission medications Medication Sig Start Date End Date Taking? Authorizing Provider  
oxyCODONE-acetaminophen (PERCOCET) 5-325 mg per tablet Take  by mouth every four (4) hours as needed for Pain. Historical Provider  
famotidine (PEPCID) 20 mg tablet Take 20 mg by mouth two (2) times a day. Historical Provider  
promethazine (PHENERGAN) 25 mg tablet Take 25 mg by mouth every six (6) hours as needed for Nausea. Historical Provider  
hydrALAZINE (APRESOLINE) 50 mg tablet Take 1 Tab by mouth two (2) times a day. 8/28/18   Kely Syed NP  
labetalol (NORMODYNE) 100 mg tablet Take 1 Tab by mouth two (2) times a day. 8/28/18   Kely Syed NP  
benazepril (LOTENSIN) 40 mg tablet TAKE 1 TABLET BY MOUTH DAILY (STOP LOTREL) 8/12/18   Julissa Walton MD  
potassium chloride SR (KLOR-CON 10) 10 mEq tablet TAKE 1 TAB BY MOUTH DAILY. 6/25/18   Julissa Walton MD  
hydroCHLOROthiazide (HYDRODIURIL) 12.5 mg tablet Take 1 Tab by mouth daily. 6/22/18   Julissa Walton MD  
tolterodine ER (DETROL LA) 4 mg ER capsule Take 1 Cap by mouth daily. 6/18/18   Prerna De La Rosa MD  
allopurinol (ZYLOPRIM) 100 mg tablet TAKE 1 TAB BY MOUTH DAILY. 5/31/18   Kely Syed NP  
varicella-zoster recombinant, PF, [facility], PF,) 50 mcg/0.5 mL susr injection Please repeat dose in 2-3 months 5/17/18   Kely Syed NP  
amLODIPine (NORVASC) 10 mg tablet TAKE 1 TAB BY MOUTH DAILY. 12/18/17   Kely Syed NP  
omeprazole (PRILOSEC) 40 mg capsule TAKE ONE CAPSULE BY MOUTH TWICE A DAY 12/18/17   Kely Syed NP  
cloNIDine (CATAPRES) 0.2 mg/24 hr patch APPLY 1 PATCH ONCE WEEKLY 11/4/17   Julissa Walton MD  
isosorbide mononitrate ER (IMDUR) 30 mg tablet Take 30 mg by mouth daily. Historical Provider  
nitroglycerin (NITROSTAT) 0.4 mg SL tablet by SubLINGual route every five (5) minutes as needed for Chest Pain. Historical Provider sucralfate (CARAFATE) 100 mg/mL suspension 2 teaspoons three times per day 17   Greyson Robertson MD  
gabapentin (NEURONTIN) 300 mg capsule Take 300 mg by mouth three (3) times daily. Historical Provider  
avanafil (STENDRA) 200 mg tab tablet Take 1 Tab by mouth as needed for Other. Indications: Erectile Dysfunction 17   Taylor Tsai MD  
aspirin 81 mg tablet Take 81 mg by mouth daily. 11   Historical Provider Allergies Allergen Reactions  Iodine Other (comments) Eyes swell shut  Shellfish Containing Products Swelling Review of Systems GENERAL: Patient alert, awake and oriented times 3, able to communicate full sentences and not in distress. HEENT: blurry vision, no earache, tinnitus, sore throat or sinus congestion. NECK: No pain or stiffness. PULMONARY: No shortness of breath, cough or wheeze. Cardiovascular: no pnd / orthopnea, +ve CP GASTROINTESTINAL: mild abdominal pain,+ve  nausea, vomiting No diarrhea, melena or bright red blood per rectum. GENITOURINARY: No urinary frequency, urgency, hesitancy or dysuria. MUSCULOSKELETAL: No joint or muscle pain, no back pain, no recent trauma. DERMATOLOGIC: No rash, no itching, no lesions. ENDOCRINE: No polyuria, polydipsia, no heat or cold intolerance. No recent change in weight. Decrease UOP  
HEMATOLOGICAL: No anemia or easy bruising or bleeding. NEUROLOGIC: No headache, seizures, numbness, tingling or weakness. +ve dizziness Physical Exam:  
 
Physical Exam: 
Visit Vitals  /64  Pulse 86  Temp 97.8 °F (36.6 °C)  Resp 12  Ht 5' 11\" (1.803 m)  Wt 127.9 kg (282 lb)  SpO2 96%  BMI 39.33 kg/m2 O2 Device: Room air Temp (24hrs), Av.2 °F (36.8 °C), Min:97.8 °F (36.6 °C), Max:98.5 °F (36.9 °C) General:  Alert, cooperative, no distress, appears stated age. Morbidly obes Head: Normocephalic, without obvious abnormality, atraumatic. Eyes:  Conjunctivae/corneas clear. PERRL, EOMs intact. Nose: Nares normal. No drainage or sinus tenderness. Neck: Supple, symmetrical, trachea midline, no adenopathy, thyroid: no enlargement, no carotid bruit and no JVD. Lungs:   Clear to auscultation bilaterally. Heart:  Regular rate and rhythm, S1, S2 normal.  
  Abdomen: Soft, non-tender. Bowel sounds normal. Sutures look clean Extremities: Extremities normal, atraumatic, no cyanosis or edema. Pulses: 2+ and symmetric all extremities. Skin:  No rashes or lesions Neurologic: AAOx3, No focal motor or sensory deficit. Labs Reviewed: 
 
Lab results reviewed. For significant abnormal values and values requiring intervention, see assessment and plan. CXR and EKG Procedures/imaging: see electronic medical records for all procedures/Xrays and details which were not copied into this note but were reviewed prior to creation of Plan Assessment/Plan Principal Problem: 
  ACS (acute coronary syndrome) ([facility] (8/28/2018) Active Problems: 
  Coronary artery disease () Overview: mild, non obstructive/EF 65% Morbid obesity ([facility] (5/14/2012) Essential hypertension (2/17/2016) Hypokalemia (8/28/2018) Acute renal failure superimposed on stage 3 chronic kidney disease ([facility] (8/28/2018) S/P gastric surgery (8/28/2018) Pt is admitted for ACS/NSTEMI with acute ST depression during chest pain that resolved afterwards Labile HTN Hypokalemia with multiple episodes of N/V during chest pain time Acute on CRF Pt is s/p gastric bypass surgery last week. Case D/W surgery By ER attending >> cleared for anticoagulation Heparin drip Cardiology is called Series of cardiac enzymes Will continue BP meds including Labetalol 200, imdur 60 and clonidine patch Holding Nephrotoxic meds including Benazepril and HCTZ Continue IVF Nephro consult is called DVT/GI Prophylaxis: Heparin gtt Plan of care is discussed in details with Patient/Family at bedside and agreed upon Negar Reed MD 
8/28/2018 10:40 PM

## 2018-08-29 NOTE — PROGRESS NOTES
Problem: Falls - Risk of 
Goal: *Absence of Falls Document Clide Failing Fall Risk and appropriate interventions in the flowsheet. Outcome: Progressing Towards Goal 
Fall Risk Interventions: 
  
 
  
 
Medication Interventions: Patient to call before getting OOB, Teach patient to arise slowly History of Falls Interventions: Consult care management for discharge planning

## 2018-08-29 NOTE — CONSULTS
Consult Note      Consult requested by: Rosa Tomas MD    ADMIT DATE: 8/28/2018  CONSULT DATE: August 29, 2018           Admission diagnosis: ACS (acute coronary syndrome) [facility])   Reason for Nephrology Consultation: AWA    Assessment:   1) AWA on CKD3 ( ~ 1.4-1.8) in the setting of prerenal azotemia v/s ATN  in setting of n/vx/poor intake /labile blood pressures , also may have  A component of cardiorenal syndrome , being on benazepril and lasix in this setting may also have contributed  Urinalysis , urine electrolytes ordered, volume status appears to be dry , continue IV hydration @ 75  cc/hrs   Ck wnl, renal US ordered to evaluate for obstructive uropathy , does have h/o BPH  2) CKD3 d/t diabetic nephropathy v/s HTN nephrosclerosis , did have proteinuria ,will quantify  3) hypokalemia with metabolic alkalosis, seems to be d/t vomiting , replace Kcl and hydrate , recheck  4) chest paain/NSTMI : trend cardiac enzymes, cardiology consulted   5) h/o BPH , renal US ordered, strict I/O , check PVR  6) HTN , hold ACE , diuretics, continue rest of meds. Avoid too tight control , allow ~ 140-150 SBP  7) DM2 , Per IM    Please call with questions,    Jean-Paul rOdaz MD [facility]  Cell [phone]  Pager: [phone]    HPI: Nestor Keita is a 66 y.o. male [address] with history of chronic kidney disease stage III, Hypertension, diabetes mellitus type 2 who presented to the emergency room from PCP's office after having chest Pain. Patient recently had gastric bypass surgery on 28th of August 2018. Subsequently after discharge patient noted that he felt lightheaded,blurring of vision while he was driving one day. He subsequently stopped all his blood pressure medications, monitored his pressure to be in 236 systolic but was asymptomatic. Subsequently in a few days patient started having nausea and vomiting, followed up with his PCPand took his blood pressure medications.  Within 30 minutes patient developed blurring of vision, dizziness, and vomited out to his medications. Subsequently patient developed pain substernal was given for dozes off baby aspirin  iand nitroglycerin tablet, Patient was admitted with a non-ST MI       Past Medical History:   Diagnosis Date    BPH without obstruction/lower urinary tract symptoms     Bursitis of right shoulder     CAD (coronary artery disease)     Chest pain     history of hospitalization with chest pain and a negative workup in 2008    Chronic edema     Constipation     die to medication    Coronary artery disease     mild, non obstructive/EF 65%    Dyspnea on exertion     Echocardiogram 12/16/08    IVC dilated; suboptimal endocardial border; EF 65%    ED (erectile dysfunction)     Elevated PSA     Frequency     GERD (gastroesophageal reflux disease)     Gout     H/O cystoscopy 06/20/2013    Hematuria, unspecified     Hypercholesterolemia     Hypertension     Hypertension      Hypogonadism male     Impotence of organic origin     Left ventricular diastolic dysfunction     Malignant neoplasm of prostate (HCC)     hx of t1a, izzy 6, 5 % of 1  core    Morbid obesity ([facility]     Myocardial perfusion 09/05/08    Basal inferior defect c/w artifact; EF 63%    Overactive bladder     S/P cardiac cath 07/30/10    oD2-40%; pRCA-20-30%; EF 65%    Sleep apnea     Slowing of urinary stream     Type II or unspecified type diabetes mellitus without mention of complication, not stated as uncontrolled       Past Surgical History:   Procedure Laterality Date    HX HEART CATHETERIZATION  7/30/10    HX OTHER SURGICAL  06/27/13    Prostate    HX TONSILLECTOMY      MT COLONOSCOPY FLX DX W/COLLJ SPEC WHEN PFRMD      MT I & D ABSC XTRAORAL SOFT TISS COMP         Social History     Social History    Marital status: SINGLE     Spouse name: N/A    Number of children: N/A    Years of education: N/A     Occupational History    Not on file.      Social History Main Topics    Smoking status: Former Smoker     Types: Cigars     Quit date: 10/4/1999    Smokeless tobacco: Never Used    Alcohol use No      Comment: socially    Drug use: No    Sexual activity: Not Currently     Other Topics Concern    Not on file     Social History Narrative       Family History   Problem Relation Age of Onset    Hypertension Mother     High Cholesterol Mother     Breast Cancer Mother     Diabetes Mother     Heart Disease Mother     Arthritis-osteo Mother     High Cholesterol Father     Hypertension Father     Diabetes Father     Heart Disease Father     Arthritis-osteo Father      Allergies   Allergen Reactions    Iodine Other (comments)     Eyes swell shut      Shellfish Containing Products Swelling        Home Medications:     Prescriptions Prior to Admission   Medication Sig    famotidine (PEPCID) 20 mg tablet Take 20 mg by mouth two (2) times a day.  hydrALAZINE (APRESOLINE) 50 mg tablet Take 1 Tab by mouth two (2) times a day.  labetalol (NORMODYNE) 100 mg tablet Take 1 Tab by mouth two (2) times a day.  benazepril (LOTENSIN) 40 mg tablet TAKE 1 TABLET BY MOUTH DAILY (STOP LOTREL)    potassium chloride SR (KLOR-CON 10) 10 mEq tablet TAKE 1 TAB BY MOUTH DAILY.  hydroCHLOROthiazide (HYDRODIURIL) 12.5 mg tablet Take 1 Tab by mouth daily.  allopurinol (ZYLOPRIM) 100 mg tablet TAKE 1 TAB BY MOUTH DAILY.  cloNIDine (CATAPRES) 0.2 mg/24 hr patch APPLY 1 PATCH ONCE WEEKLY    isosorbide mononitrate ER (IMDUR) 30 mg tablet Take 30 mg by mouth daily.  aspirin 81 mg tablet Take 81 mg by mouth daily.  oxyCODONE-acetaminophen (PERCOCET) 5-325 mg per tablet Take  by mouth every four (4) hours as needed for Pain.  promethazine (PHENERGAN) 25 mg tablet Take 25 mg by mouth every six (6) hours as needed for Nausea.  tolterodine ER (DETROL LA) 4 mg ER capsule Take 1 Cap by mouth daily.     varicella-zoster recombinant, PF, (SHINGRIX, PF,) 50 mcg/0.5 mL susr injection Please repeat dose in 2-3 months    amLODIPine (NORVASC) 10 mg tablet TAKE 1 TAB BY MOUTH DAILY.  omeprazole (PRILOSEC) 40 mg capsule TAKE ONE CAPSULE BY MOUTH TWICE A DAY    nitroglycerin (NITROSTAT) 0.4 mg SL tablet by SubLINGual route every five (5) minutes as needed for Chest Pain.  sucralfate (CARAFATE) 100 mg/mL suspension 2 teaspoons three times per day    gabapentin (NEURONTIN) 300 mg capsule Take 300 mg by mouth three (3) times daily.  avanafil (STENDRA) 200 mg tab tablet Take 1 Tab by mouth as needed for Other. Indications: Erectile Dysfunction       Current Inpatient Medications:     Current Facility-Administered Medications   Medication Dose Route Frequency    cloNIDine (CATAPRES) 0.2 mg/24 hr patch 1 Patch  1 Patch TransDERmal Q7D    carvedilol (COREG) tablet 12.5 mg  12.5 mg Oral BID WITH MEALS    aspirin (ASA) suppository 300 mg  300 mg Rectal DAILY    atorvastatin (LIPITOR) tablet 40 mg  40 mg Oral QHS    heparin 25,000 units in D5W 250 ml infusion  7.5-25 Units/kg/hr IntraVENous TITRATE    amLODIPine (NORVASC) tablet 10 mg  10 mg Oral DAILY    famotidine (PEPCID) tablet 20 mg  20 mg Oral BID    isosorbide mononitrate ER (IMDUR) tablet 60 mg  60 mg Oral DAILY    nitroglycerin (NITROSTAT) tablet 0.4 mg  0.4 mg SubLINGual Q5MIN PRN    hydrALAZINE (APRESOLINE) 20 mg/mL injection 10 mg  10 mg IntraVENous Q6H PRN    acetaminophen (TYLENOL) tablet 650 mg  650 mg Oral Q6H PRN    oxyCODONE-acetaminophen (PERCOCET) 5-325 mg per tablet 1 Tab  1 Tab Oral Q6H PRN    naloxone (NARCAN) injection 0.4 mg  0.4 mg IntraVENous PRN    ondansetron (ZOFRAN) injection 4 mg  4 mg IntraVENous Q6H PRN    docusate sodium (COLACE) capsule 100 mg  100 mg Oral BID PRN    morphine injection 2 mg  2 mg IntraVENous Q4H PRN    0.45% sodium chloride infusion  125 mL/hr IntraVENous CONTINUOUS       Review of Systems:     No fever or chills. No sore throat.   No cough or hemoptysis  No  abdominal pain, melena or hematochezia. No constipation or diarrhea. No dysuria, no gross hematuria of voiding difficulties. No ankle swelling, no joint paints. No muscle aches. No skin changes. No dizziness  or lightheadedness. No headaches. Physical Assessment:     Vitals:    08/29/18 0009 08/29/18 0436 08/29/18 0756 08/29/18 1124   BP: 148/82 144/86 157/88 147/89   Pulse: 90 80 77 73   Resp: 20 20 20 20   Temp: 98 °F (36.7 °C) 97.7 °F (36.5 °C) 97 °F (36.1 °C) 97.1 °F (36.2 °C)   SpO2: 92% 93% 93% 94%   Weight:  128.5 kg (283 lb 3.2 oz)     Height:         Last 3 Recorded Weights in this Encounter    08/28/18 1638 08/29/18 0436   Weight: 127.9 kg (282 lb) 128.5 kg (283 lb 3.2 oz)     Admission weight: Weight: 127.9 kg (282 lb) (08/28/18 1638)      Intake/Output Summary (Last 24 hours) at 08/29/18 1525  Last data filed at 08/29/18 0756   Gross per 24 hour   Intake           984.52 ml   Output              300 ml   Net           684.52 ml       Patient is in no apparent distress. HEENT: mmm  Sclerae are anicteric. Oropharynx clear. Neck: no cervical lymphadenopathy or thyromegaly. Lungs: good air entry, clear to auscultation bilaterally. Trachea at the midline. Cardiovascular system: S1, S2, regular rate and rhythm. No murmurs, gallops or rubs. No jvd. Abdomen: soft, non tender, BS+   Extremities: no edema.     Data Review:    Labs: Results:       Chemistry Recent Labs      08/29/18   0425  08/28/18   2150  08/28/18   1643   GLU  96  93  145*   NA  143  145  142   K  3.4*  3.5  2.9*   CL  104  104  102   CO2  31  32  30   BUN  43*  38*  38*   CREA  2.80*  2.33*  2.46*   CA  8.5  8.1*  8.7   AGAP  8  9  10   BUCR  15  16  15   AP   --   65  74   TP   --   6.5  7.3   ALB   --   3.0*  3.4   GLOB   --   3.5  3.9   AGRAT   --   0.9  0.9   PHOS  4.5   --    --          CBC w/Diff Recent Labs      08/29/18   0425  08/28/18   1643   WBC  6.9  9.0   RBC  4.28*  4.89   HGB  11.4*  13.4   HCT  36.2  40.8   PLT  185 179   GRANS  66  83*   LYMPH  27  13*   EOS  1  1         Iron/Ferritin No results for input(s): IRON in the last 72 hours. No lab exists for component: TIBCCALC   PTH/VIT D No results for input(s): PTH in the last 72 hours.     No lab exists for component: VITD           Zahra Murphy MD  8/29/2018  3:25 PM      August 29, 2018

## 2018-08-29 NOTE — CONSULTS
[facility] - Consult Note    Date of  Admission: 8/28/2018  4:34 PM     Primary Care Physician:  Quintin Burr NP     Plan:       1. NSTEMI- currently chest pain free. No SOB. No CHF symptoms. troponing trending up. ekg today w/o acute ischemic changes. Added asa suppository. Continue with heparin drip, statin and bb. Cardiac cath when renal function stabilizes. Discussed with Dr Kiara Carrillo ([facility]) OK with dual antiplatelet therapy  if stent needed. Ordered echo to reassess lvf,rvf or any wma. Previous Echocardiogram, 05/01/2017, showing normal EF at 60% without wall motion abnormalities. 2. Hx of Abnormal nuclear stress test, 05/09/2017, suggesting small fixed inferolateral defect. No ischemia. Was managed medically   3. Hypertension- on admission with very labile BP. stable now continue bb. Norvasc and Clonidine    4. Hx of PE Hospitalization, 04/30/2017 [facility] - possible pulmonary embolism based on medium risk VQ scan. Discharged on Xarelto. 5. GI bleed 5/17-  Heme positive stools. Was diagnosed with esophagitis   6. Family history of cardiac disease. 7. S/p sleeve gastrectomy- laparoscopy on 8/20/19 Dr Suma Mace   8. Hyperlipidemia- continue statin LDL not at target   9. AWA on CKD- on ivf creatinine increased from baseline (1.7) renal following   10.  Nausea and vomiting- medications and w/u per medical team        Assessment:     Hospital Problems  Date Reviewed: 8/28/2018          Codes Class Noted POA    Hypokalemia ICD-10-CM: E87.6  ICD-9-CM: 276.8  8/28/2018 Unknown        * (Principal)ACS (acute coronary syndrome) ([facility] ICD-10-CM: I24.9  ICD-9-CM: 411.1  8/28/2018 Unknown        Acute renal failure superimposed on stage 3 chronic kidney disease ([facility] ICD-10-CM: N17.9, N18.3  ICD-9-CM: 584.9, 585.3  8/28/2018 Unknown        S/P gastric surgery ICD-10-CM: J32.612  ICD-9-CM: V45.89  8/28/2018 Unknown        Essential hypertension ICD-10-CM: I10  ICD-9-CM: 401.9 2/17/2016 Yes        Morbid obesity ([facility] [address] ICD-10-CM: E66.01  ICD-9-CM: 278.01  5/14/2012 Yes        Coronary artery disease ICD-10-CM: I25.10  ICD-9-CM: 414.01  Unknown Yes    Overview Signed 4/12/2011  3:49 PM by Sourav Postal     mild, non obstructive/EF 65%                        History of Present Illness: This is a 66 y.o. male admitted for ACS (acute coronary syndrome) ([facility] [address] AWA (acute kidney injury) ([facility] [address] Hypokalemia. Patient with PMHx of hypertension, CAD, hyperlipidemia, obesity, GERD, Hx of PE and recent gastric bypass in [facility] 8/20/18. Patient came to the ED  after experiencing Chest pain episode in his PCP office. Patient c/o left side chest pain, intermittent, described as pressure and  heavy sensation level 6-7/10 in severity associated with multiple episodes of diaphoresis, nausea and  dizziness sensation. He states that he did not take his BP meds for 2 days after his surgery then restarted again. Patient visited an urgent care then went to his PCP office where his BP was elevated and was restarted on his meds again in the PCP office and he developed dizziness and blurry vision after taking his medications in the ED patient BP was noted to be very labile. In ER patient  had intermittent  uncontrolled BP. His troponin was elevated and had acute on CRF. He is resting in bed comfortable no chest pain or chest pressure. No SOB. Reports mild abdominal discomfort. Has chronic BLE that is improving. No blood in stool or urine. No recent CVA.      Past Medical History:     Past Medical History:   Diagnosis Date    BPH without obstruction/lower urinary tract symptoms     Bursitis of right shoulder     CAD (coronary artery disease)     Chest pain     history of hospitalization with chest pain and a negative workup in 2008    Chronic edema     Constipation     die to medication    Coronary artery disease     mild, non obstructive/EF 65%    Dyspnea on exertion     Echocardiogram 12/16/08 IVC dilated; suboptimal endocardial border; EF 65%    ED (erectile dysfunction)     Elevated PSA     Frequency     GERD (gastroesophageal reflux disease)     Gout     H/O cystoscopy 06/20/2013    Hematuria, unspecified     Hypercholesterolemia     Hypertension     Hypertension      Hypogonadism male     Impotence of organic origin     Left ventricular diastolic dysfunction     Malignant neoplasm of prostate (HCC)     hx of t1a, izzy 6, 5 % of 1  core    Morbid obesity ([address]     Myocardial perfusion 09/05/08    Basal inferior defect c/w artifact; EF 63%    Overactive bladder     S/P cardiac cath 07/30/10    oD2-40%; pRCA-20-30%; EF 65%    Sleep apnea     Slowing of urinary stream     Type II or unspecified type diabetes mellitus without mention of complication, not stated as uncontrolled          Social History:     Social History     Social History    Marital status: SINGLE     Spouse name: N/A    Number of children: N/A    Years of education: N/A     Social History Main Topics    Smoking status: Former Smoker     Types: Cigars     Quit date: 10/4/1999    Smokeless tobacco: Never Used    Alcohol use No      Comment: socially    Drug use: No    Sexual activity: Not Currently     Other Topics Concern    None     Social History Narrative        Family History:     Family History   Problem Relation Age of Onset    Hypertension Mother     High Cholesterol Mother     Breast Cancer Mother     Diabetes Mother     Heart Disease Mother     Arthritis-osteo Mother     High Cholesterol Father     Hypertension Father     Diabetes Father     Heart Disease Father     Arthritis-osteo Father         Medications:      Allergies   Allergen Reactions    Iodine Other (comments)     Eyes swell shut      Shellfish Containing Products Swelling        Current Facility-Administered Medications   Medication Dose Route Frequency    cloNIDine (CATAPRES) 0.2 mg/24 hr patch 1 Patch  1 Patch TransDERmal Q7D    carvedilol (COREG) tablet 12.5 mg  12.5 mg Oral BID WITH MEALS    aspirin (ASA) suppository 300 mg  300 mg Rectal DAILY    atorvastatin (LIPITOR) tablet 40 mg  40 mg Oral QHS    potassium chloride 10 mEq in 100 ml IVPB  10 mEq IntraVENous Q1H    heparin 25,000 units in D5W 250 ml infusion  7.5-25 Units/kg/hr IntraVENous TITRATE    amLODIPine (NORVASC) tablet 10 mg  10 mg Oral DAILY    famotidine (PEPCID) tablet 20 mg  20 mg Oral BID    isosorbide mononitrate ER (IMDUR) tablet 60 mg  60 mg Oral DAILY    nitroglycerin (NITROSTAT) tablet 0.4 mg  0.4 mg SubLINGual Q5MIN PRN    hydrALAZINE (APRESOLINE) 20 mg/mL injection 10 mg  10 mg IntraVENous Q6H PRN    acetaminophen (TYLENOL) tablet 650 mg  650 mg Oral Q6H PRN    oxyCODONE-acetaminophen (PERCOCET) 5-325 mg per tablet 1 Tab  1 Tab Oral Q6H PRN    naloxone (NARCAN) injection 0.4 mg  0.4 mg IntraVENous PRN    ondansetron (ZOFRAN) injection 4 mg  4 mg IntraVENous Q6H PRN    docusate sodium (COLACE) capsule 100 mg  100 mg Oral BID PRN    morphine injection 2 mg  2 mg IntraVENous Q4H PRN    0.45% sodium chloride infusion  125 mL/hr IntraVENous CONTINUOUS    pantoprazole (PROTONIX) tablet 40 mg  40 mg Oral ACB        Review Of Systems:           Constitutional: No fever, no chills, no weight loss, no night sweats   HEENT: No epistaxis, no nasal drainage, no difficulty in swallowing, no redness in eyes  Respiratory:dyspnea on exertion   Cardiovascular: no chest pain, chest pressure,  chronic leg edema  Gastrointestinal:  abd pain, nausea and vomiting. Genitourinary: No urinary symptoms or hematuria  Integument/breast: No ulcers or rashes  Musculoskeletal: no muscle pain, no weakness  Neurological: blurred vision, dizziness.    Behvioral/Psych: No anxiety, no depression             Physical Exam:     Visit Vitals    /88 (BP 1 Location: Right arm, BP Patient Position: At rest)    Pulse 77    Temp 97 °F (36.1 °C)    Resp 20    Ht 5' 11\" (1.803 m)    Wt 128.5 kg (283 lb 3.2 oz)    SpO2 93%    BMI 39.5 kg/m2     BP Readings from Last 3 Encounters:   08/29/18 157/88   08/28/18 (!) 176/120   05/17/18 160/84     Pulse Readings from Last 3 Encounters:   08/29/18 77   08/28/18 76   05/17/18 81     Wt Readings from Last 3 Encounters:   08/29/18 128.5 kg (283 lb 3.2 oz)   08/28/18 128.8 kg (284 lb)   05/17/18 137 kg (302 lb)       General:  alert, cooperative, no distress, appears stated age, moderately obese  Skin: Warm and dry, acyanotic, normal color. Head: Normocephalic, atraumatic. Eyes: Sclerae anicteric, conjunctivae without injection. Neck:  nontender, no nuchal rigidity, no masses, no stridor, no carotid bruit, no JVD  Lungs:  clear to auscultation bilaterally. Heart:  regular rate and rhythm, S1, S2 normal, no S3 or S4, no click, no rub  Abdomen:  abdomen is soft without significant tenderness, masses, organomegaly or guarding  Extremities:  extremities normal, atraumatic, no cyanosis. +1 edema BLE   Neurological: grossly intact. No focal abnormalities, moves all extremities well. Psychiatric Affect: The patient is awake, alert and oriented x3. Lillette Crank is interactive and appropriate.      Data Review:     Recent Results (from the past 48 hour(s))   EKG, 12 LEAD, INITIAL    Collection Time: 08/28/18  4:36 PM   Result Value Ref Range    Ventricular Rate 94 BPM    Atrial Rate 94 BPM    P-R Interval 170 ms    QRS Duration 100 ms    Q-T Interval 376 ms    QTC Calculation (Bezet) 470 ms    Calculated P Axis 53 degrees    Calculated R Axis -5 degrees    Calculated T Axis 94 degrees    Diagnosis       Normal sinus rhythm  Possible Left atrial enlargement  Left ventricular hypertrophy with repolarization abnormality  Abnormal ECG  When compared with ECG of 17-JUN-2013 17:15,  Inverted T waves have replaced nonspecific T wave abnormality in Lateral   leads     METABOLIC PANEL, BASIC    Collection Time: 08/28/18  4:43 PM   Result Value Ref Range    Sodium 142 136 - 145 mmol/L    Potassium 2.9 (LL) 3.5 - 5.5 mmol/L    Chloride 102 100 - 108 mmol/L    CO2 30 21 - 32 mmol/L    Anion gap 10 3.0 - 18 mmol/L    Glucose 145 (H) 74 - 99 mg/dL    BUN 38 (H) 7.0 - 18 MG/DL    Creatinine 2.46 (H) 0.6 - 1.3 MG/DL    BUN/Creatinine ratio 15 12 - 20      GFR est AA 33 (L) >60 ml/min/1.73m2    GFR est non-AA 27 (L) >60 ml/min/1.73m2    Calcium 8.7 8.5 - 10.1 MG/DL   CBC WITH AUTOMATED DIFF    Collection Time: 08/28/18  4:43 PM   Result Value Ref Range    WBC 9.0 4.6 - 13.2 K/uL    RBC 4.89 4.70 - 5.50 M/uL    HGB 13.4 13.0 - 16.0 g/dL    HCT 40.8 36.0 - 48.0 %    MCV 83.4 74.0 - 97.0 FL    MCH 27.4 24.0 - 34.0 PG    MCHC 32.8 31.0 - 37.0 g/dL    RDW 15.1 (H) 11.6 - 14.5 %    PLATELET 894 752 - 067 K/uL    MPV 9.6 9.2 - 11.8 FL    NEUTROPHILS 83 (H) 40 - 73 %    LYMPHOCYTES 13 (L) 21 - 52 %    MONOCYTES 3 3 - 10 %    EOSINOPHILS 1 0 - 5 %    BASOPHILS 0 0 - 2 %    ABS. NEUTROPHILS 7.5 1.8 - 8.0 K/UL    ABS. LYMPHOCYTES 1.1 0.9 - 3.6 K/UL    ABS. MONOCYTES 0.3 0.05 - 1.2 K/UL    ABS. EOSINOPHILS 0.1 0.0 - 0.4 K/UL    ABS. BASOPHILS 0.0 0.0 - 0.1 K/UL    DF AUTOMATED     CARDIAC PANEL,(CK, CKMB & TROPONIN)    Collection Time: 08/28/18  4:43 PM   Result Value Ref Range     39 - 308 U/L    CK - MB 5.2 (H) <3.6 ng/ml    CK-MB Index 2.0 0.0 - 4.0 %    Troponin-I, Qt. 0.12 (H) 0.0 - 0.045 NG/ML   HEPATIC FUNCTION PANEL    Collection Time: 08/28/18  4:43 PM   Result Value Ref Range    Protein, total 7.3 6.4 - 8.2 g/dL    Albumin 3.4 3.4 - 5.0 g/dL    Globulin 3.9 2.0 - 4.0 g/dL    A-G Ratio 0.9 0.8 - 1.7      Bilirubin, total 0.5 0.2 - 1.0 MG/DL    Bilirubin, direct 0.2 0.0 - 0.2 MG/DL    Alk.  phosphatase 74 45 - 117 U/L    AST (SGOT) 27 15 - 37 U/L    ALT (SGPT) 33 16 - 61 U/L   MAGNESIUM    Collection Time: 08/28/18  4:43 PM   Result Value Ref Range    Magnesium 2.1 1.6 - 2.6 mg/dL   PTT    Collection Time: 08/28/18  4:45 PM   Result Value Ref Range    aPTT 39.4 (H) 23.0 - 36.4 SEC   POC LACTIC ACID    Collection Time: 08/28/18  4:47 PM   Result Value Ref Range    Lactic Acid (POC) 1.6 0.4 - 2.0 mmol/L   CARDIAC PANEL,(CK, CKMB & TROPONIN)    Collection Time: 08/28/18  8:01 PM   Result Value Ref Range     39 - 308 U/L    CK - MB 20.3 (H) <3.6 ng/ml    CK-MB Index 6.7 (H) 0.0 - 4.0 %    Troponin-I, Qt. 1.61 (HH) 0.0 - 8.366 NG/ML   METABOLIC PANEL, COMPREHENSIVE    Collection Time: 08/28/18  9:50 PM   Result Value Ref Range    Sodium 145 136 - 145 mmol/L    Potassium 3.5 3.5 - 5.5 mmol/L    Chloride 104 100 - 108 mmol/L    CO2 32 21 - 32 mmol/L    Anion gap 9 3.0 - 18 mmol/L    Glucose 93 74 - 99 mg/dL    BUN 38 (H) 7.0 - 18 MG/DL    Creatinine 2.33 (H) 0.6 - 1.3 MG/DL    BUN/Creatinine ratio 16 12 - 20      GFR est AA 35 (L) >60 ml/min/1.73m2    GFR est non-AA 29 (L) >60 ml/min/1.73m2    Calcium 8.1 (L) 8.5 - 10.1 MG/DL    Bilirubin, total 0.3 0.2 - 1.0 MG/DL    ALT (SGPT) 30 16 - 61 U/L    AST (SGOT) 43 (H) 15 - 37 U/L    Alk.  phosphatase 65 45 - 117 U/L    Protein, total 6.5 6.4 - 8.2 g/dL    Albumin 3.0 (L) 3.4 - 5.0 g/dL    Globulin 3.5 2.0 - 4.0 g/dL    A-G Ratio 0.9 0.8 - 1.7     PROTHROMBIN TIME + INR    Collection Time: 08/28/18  9:50 PM   Result Value Ref Range    Prothrombin time 13.9 11.5 - 15.2 sec    INR 1.1 0.8 - 1.2     PTT    Collection Time: 08/29/18  4:25 AM   Result Value Ref Range    aPTT 51.5 (H) 23.0 - 67.1 SEC   METABOLIC PANEL, BASIC    Collection Time: 08/29/18  4:25 AM   Result Value Ref Range    Sodium 143 136 - 145 mmol/L    Potassium 3.4 (L) 3.5 - 5.5 mmol/L    Chloride 104 100 - 108 mmol/L    CO2 31 21 - 32 mmol/L    Anion gap 8 3.0 - 18 mmol/L    Glucose 96 74 - 99 mg/dL    BUN 43 (H) 7.0 - 18 MG/DL    Creatinine 2.80 (H) 0.6 - 1.3 MG/DL    BUN/Creatinine ratio 15 12 - 20      GFR est AA 28 (L) >60 ml/min/1.73m2    GFR est non-AA 23 (L) >60 ml/min/1.73m2    Calcium 8.5 8.5 - 10.1 MG/DL   CARDIAC PANEL,(CK, CKMB & TROPONIN)    Collection Time: 08/29/18  4:25 AM   Result Value Ref Range     (H) 39 - 308 U/L    CK - MB 56.0 (H) <3.6 ng/ml    CK-MB Index 9.6 (H) 0.0 - 4.0 %    Troponin-I, Qt. 19.70 (HH) 0.0 - 0.045 NG/ML   MAGNESIUM    Collection Time: 08/29/18  4:25 AM   Result Value Ref Range    Magnesium 2.4 1.6 - 2.6 mg/dL   PHOSPHORUS    Collection Time: 08/29/18  4:25 AM   Result Value Ref Range    Phosphorus 4.5 2.5 - 4.9 MG/DL   CBC WITH AUTOMATED DIFF    Collection Time: 08/29/18  4:25 AM   Result Value Ref Range    WBC 6.9 4.6 - 13.2 K/uL    RBC 4.28 (L) 4.70 - 5.50 M/uL    HGB 11.4 (L) 13.0 - 16.0 g/dL    HCT 36.2 36.0 - 48.0 %    MCV 84.6 74.0 - 97.0 FL    MCH 26.6 24.0 - 34.0 PG    MCHC 31.5 31.0 - 37.0 g/dL    RDW 15.7 (H) 11.6 - 14.5 %    PLATELET 520 443 - 478 K/uL    MPV 10.1 9.2 - 11.8 FL    NEUTROPHILS 66 40 - 73 %    LYMPHOCYTES 27 21 - 52 %    MONOCYTES 6 3 - 10 %    EOSINOPHILS 1 0 - 5 %    BASOPHILS 0 0 - 2 %    ABS. NEUTROPHILS 4.6 1.8 - 8.0 K/UL    ABS. LYMPHOCYTES 1.8 0.9 - 3.6 K/UL    ABS. MONOCYTES 0.4 0.05 - 1.2 K/UL    ABS. EOSINOPHILS 0.1 0.0 - 0.4 K/UL    ABS.  BASOPHILS 0.0 0.0 - 0.1 K/UL    DF AUTOMATED     VITAMIN B12 & FOLATE    Collection Time: 08/29/18  4:25 AM   Result Value Ref Range    Vitamin B12 614 211 - 911 pg/mL    Folate >20.0 (H) 3.10 - 17.50 ng/mL   EKG, 12 LEAD, INITIAL    Collection Time: 08/29/18  6:32 AM   Result Value Ref Range    Ventricular Rate 82 BPM    Atrial Rate 82 BPM    P-R Interval 184 ms    QRS Duration 94 ms    Q-T Interval 434 ms    QTC Calculation (Bezet) 507 ms    Calculated P Axis 40 degrees    Calculated R Axis -17 degrees    Calculated T Axis 147 degrees    Diagnosis       Normal sinus rhythm  Left ventricular hypertrophy with repolarization abnormality  Prolonged QT  Abnormal ECG  No previous ECGs available           Intake/Output Summary (Last 24 hours) at 08/29/18 0953  Last data filed at 08/29/18 0756   Gross per 24 hour   Intake           984.52 ml   Output 300 ml   Net           684.52 ml       Cardiographics:     ECG: Normal sinus rhythm . Left ventricular hypertrophy with repolarization abnormality     Echocardiogram: ordered  echo       Signed By: Lindsay Hidalgo NP     August 29, 2018      Patient seen independently  Discussed the details with NP and patient.  Please see orders & recommendations  Letty Mosqueda MD

## 2018-08-29 NOTE — ROUTINE PROCESS
TRANSFER - IN REPORT: 
 
Verbal report received from [address] [facility], RN(name) on Daniela Haskins  being received from ED(unit) for routine progression of care Report consisted of patients Situation, Background, Assessment and  
Recommendations(SBAR). Information from the following report(s) ED Summary, SBAR and VS was reviewed with the receiving nurse. Opportunity for questions and clarification was provided. 0001Assessment completed upon patients arrival to unit and care assumed. Heparin drip on guard rail, rate confirmed. Patient oriented to unit, call light in reach and urinal provided.

## 2018-08-29 NOTE — PROGRESS NOTES
In Patient Progress note Admit Date: 8/28/2018 Impression:  
 
1) AWA on CKD3 ( ~ 1.4-1.8) d/t prerenal azotemia  D/t  n/vx/poor intake ,v/s ATN in setting of accelerated HTN , 
being on benazepril and lasix in this setting may also have contributed Creatinine trending up and poor urine output recorded. Urinalysis with no infection, urine electrolytes with low Na indicative of volume depletion,  
volume status appears to be dry , continue IV hydration 1/2 NS @ 125  cc/hrs Ck wnl, renal US  Showed normal left kidney and bladder, rt kidney not visualized 
please record strict I/o , place boland catheter as patient  has BPH if needed  and if poor urine output recorded 2) CKD3 d/t diabetic nephropathy v/s HTN nephrosclerosis , did have proteinuria , presently minimal  
3) hypokalemia with metabolic alkalosis, seems to be d/t vomiting , replace Kcl and continue to hydrate Symptoms have improved 4) chest pain/NSTMI : trend cardiac enzymes, presently asymptomatic ,cardiology consulted. 5) h/o BPH, limited USG as above, strict I/O, poor urine output recorded , please place boland catheter 6) HTN , hold ACE , diuretics, continue rest of meds. Avoid too tight control , allow ~ 140-150 SBP 7) DM2 , Per IM 8) s/p gastric bypass surgery  
  
Please call with questions, 
  
Mike Delarosa MD FASN Cell [phone] Pager: [phone] Subjective:  
 
Denies CP, SOB today Current Facility-Administered Medications:  
  cloNIDine (CATAPRES) 0.2 mg/24 hr patch 1 Patch, 1 Patch, TransDERmal, Q7D, Partha Holt MD, Stopped at 08/29/18 3094   carvedilol (COREG) tablet 12.5 mg, 12.5 mg, Oral, BID WITH MEALS, Juana Ro MD, 12.5 mg at 08/29/18 8114   aspirin (ASA) suppository 300 mg, 300 mg, Rectal, DAILY, Juana Ro MD, 300 mg at 08/29/18 1237 
  atorvastatin (LIPITOR) tablet 40 mg, 40 mg, Oral, QHS, Juana Ro MD 
   heparin 25,000 units in D5W 250 ml infusion, 7.5-25 Units/kg/hr, IntraVENous, TITRATE, Lindsey Tijerina MD, Last Rate: 13.6 mL/hr at 08/29/18 1400, 1,360 Units/hr at 08/29/18 1400 
  amLODIPine (NORVASC) tablet 10 mg, 10 mg, Oral, DAILY, Todd Morejon MD, 10 mg at 08/29/18 2737   famotidine (PEPCID) tablet 20 mg, 20 mg, Oral, BID, Todd Morejon MD, 20 mg at 08/29/18 0930 
  isosorbide mononitrate ER (IMDUR) tablet 60 mg, 60 mg, Oral, DAILY, Todd Morejon MD, 60 mg at 08/29/18 0931 
  nitroglycerin (NITROSTAT) tablet 0.4 mg, 0.4 mg, SubLINGual, Q5MIN PRN, Todd Morejon MD 
  hydrALAZINE (APRESOLINE) 20 mg/mL injection 10 mg, 10 mg, IntraVENous, Q6H PRN, Todd Morejon MD 
  acetaminophen (TYLENOL) tablet 650 mg, 650 mg, Oral, Q6H PRN, Todd Morejon MD 
  oxyCODONE-acetaminophen (PERCOCET) 5-325 mg per tablet 1 Tab, 1 Tab, Oral, Q6H PRN, Todd Morejon MD 
  naloxone [facility]) injection 0.4 mg, 0.4 mg, IntraVENous, PRN, Todd Morejon MD 
  ondansetron (ZOFRAN) injection 4 mg, 4 mg, IntraVENous, Q6H PRN, Todd Morejon MD 
  docusate sodium (COLACE) capsule 100 mg, 100 mg, Oral, BID PRN, Todd Morejon MD 
  morphine injection 2 mg, 2 mg, IntraVENous, Q4H PRN, Todd Morejon MD 
  0.45% sodium chloride infusion, 125 mL/hr, IntraVENous, CONTINUOUS, Todd Morejon MD, Last Rate: 125 mL/hr at 08/29/18 0144, 125 mL/hr at 08/29/18 0144 Objective:  
 
Visit Vitals  /89 (BP 1 Location: Right arm, BP Patient Position: At rest)  Pulse 73  Temp 97.1 °F (36.2 °C)  Resp 20  
 Ht 5' 11\" (1.803 m)  Wt 128.5 kg (283 lb 3.2 oz)  SpO2 94%  BMI 39.5 kg/m2 Intake/Output Summary (Last 24 hours) at 08/29/18 1423 Last data filed at 08/29/18 8909 Gross per 24 hour Intake           984.52 ml Output              300 ml Net           684.52 ml Physical Exam:  
 
gen NAD HENT mmm RS AEBE clear CVS s1 s2 wnl no JVD GI soft BS + Ext no edema Data Review: 
 
Recent Labs  
   08/29/18 
 0425 WBC  6.9  
RBC  4.28* HCT  36.2 MCV  84.6 MCH  26.6 MCHC  31.5  
RDW  15.7* Recent Labs  
   08/29/18 
 0425  08/28/18 
 2150  08/28/18 
 1643 BUN  43*  38*  38* CREA  2.80*  2.33*  2.46* CA  8.5  8.1*  8.7 ALB   --   3.0*  3.4  
K  3.4*  3.5  2.9* NA  143  145  142 CL  104  104  102 CO2  31  32  30 PHOS  4.5   --    --   
GLU  96  93  145* Russell Mon MD

## 2018-08-30 ENCOUNTER — APPOINTMENT (OUTPATIENT)
Dept: NON INVASIVE DIAGNOSTICS | Age: 63
DRG: 247 | End: 2018-08-30
Attending: NURSE PRACTITIONER
Payer: COMMERCIAL

## 2018-08-30 LAB
ANION GAP SERPL CALC-SCNC: 7 MMOL/L (ref 3–18)
APTT PPP: 66.3 SEC (ref 23–36.4)
APTT PPP: 77.4 SEC (ref 23–36.4)
ATRIAL RATE: 82 BPM
ATRIAL RATE: 94 BPM
BASOPHILS # BLD: 0 K/UL (ref 0–0.1)
BASOPHILS NFR BLD: 0 % (ref 0–2)
BUN SERPL-MCNC: 35 MG/DL (ref 7–18)
BUN/CREAT SERPL: 14 (ref 12–20)
CALCIUM SERPL-MCNC: 8.3 MG/DL (ref 8.5–10.1)
CALCULATED P AXIS, ECG09: 40 DEGREES
CALCULATED P AXIS, ECG09: 53 DEGREES
CALCULATED R AXIS, ECG10: -17 DEGREES
CALCULATED R AXIS, ECG10: -5 DEGREES
CALCULATED T AXIS, ECG11: 147 DEGREES
CALCULATED T AXIS, ECG11: 94 DEGREES
CHLORIDE SERPL-SCNC: 103 MMOL/L (ref 100–108)
CK MB CFR SERPL CALC: 2.1 % (ref 0–4)
CK MB CFR SERPL CALC: 2.7 % (ref 0–4)
CK MB CFR SERPL CALC: 3.7 % (ref 0–4)
CK MB SERPL-MCNC: 11.3 NG/ML (ref 5–25)
CK MB SERPL-MCNC: 5.8 NG/ML (ref 5–25)
CK MB SERPL-MCNC: 7.8 NG/ML (ref 5–25)
CK SERPL-CCNC: 275 U/L (ref 39–308)
CK SERPL-CCNC: 292 U/L (ref 39–308)
CK SERPL-CCNC: 307 U/L (ref 39–308)
CO2 SERPL-SCNC: 31 MMOL/L (ref 21–32)
CREAT SERPL-MCNC: 2.47 MG/DL (ref 0.6–1.3)
DIAGNOSIS, 93000: NORMAL
DIAGNOSIS, 93000: NORMAL
DIFFERENTIAL METHOD BLD: ABNORMAL
ECHO AO ASC DIAM: 3.36 CM
ECHO AO ROOT DIAM: 3.27 CM
ECHO IVC SNIFF: 2.61 CM
ECHO LA AREA 4C: 27.8 CM2
ECHO LA VOL 2C: 99.22 ML (ref 18–58)
ECHO LA VOL 4C: 101.02 ML (ref 18–58)
ECHO LA VOL BP: 113.16 ML (ref 18–58)
ECHO LA VOL/BSA BIPLANE: 46.09 ML/M2
ECHO LA VOLUME INDEX A2C: 40.41 ML/M2
ECHO LA VOLUME INDEX A4C: 41.15 ML/M2
ECHO LV INTERNAL DIMENSION DIASTOLIC: 5.73 CM (ref 4.2–5.9)
ECHO LV INTERNAL DIMENSION SYSTOLIC: 4.14 CM
ECHO LV ISOVOLUMETRIC RELAXATION TIME (IVRT): 103.8 MS
ECHO LV IVSD: 1.22 CM (ref 0.6–1)
ECHO LV POSTERIOR WALL DIASTOLIC: 1.28 CM (ref 0.6–1)
ECHO LVOT DIAM: 2.1 CM
ECHO LVOT PEAK GRADIENT: 2.7 MMHG
ECHO LVOT PEAK VELOCITY: 82.72 CM/S
ECHO LVOT VTI: 16.5 CM
ECHO MV A VELOCITY: 64.09 CM/S
ECHO MV E DECELERATION TIME (DT): 212.2 MS
ECHO MV E VELOCITY: 0.74 CM/S
ECHO MV E/A RATIO: 0.01
EOSINOPHIL # BLD: 0.2 K/UL (ref 0–0.4)
EOSINOPHIL NFR BLD: 4 % (ref 0–5)
ERYTHROCYTE [DISTWIDTH] IN BLOOD BY AUTOMATED COUNT: 15.4 % (ref 11.6–14.5)
GLUCOSE SERPL-MCNC: 106 MG/DL (ref 74–99)
HCT VFR BLD AUTO: 37.3 % (ref 36–48)
HGB BLD-MCNC: 11.9 G/DL (ref 13–16)
LYMPHOCYTES # BLD: 2.2 K/UL (ref 0.9–3.6)
LYMPHOCYTES NFR BLD: 44 % (ref 21–52)
MCH RBC QN AUTO: 27 PG (ref 24–34)
MCHC RBC AUTO-ENTMCNC: 31.9 G/DL (ref 31–37)
MCV RBC AUTO: 84.6 FL (ref 74–97)
MONOCYTES # BLD: 0.3 K/UL (ref 0.05–1.2)
MONOCYTES NFR BLD: 5 % (ref 3–10)
NEUTS SEG # BLD: 2.3 K/UL (ref 1.8–8)
NEUTS SEG NFR BLD: 47 % (ref 40–73)
P-R INTERVAL, ECG05: 170 MS
P-R INTERVAL, ECG05: 184 MS
PLATELET # BLD AUTO: 181 K/UL (ref 135–420)
PMV BLD AUTO: 10.1 FL (ref 9.2–11.8)
POTASSIUM SERPL-SCNC: 3.6 MMOL/L (ref 3.5–5.5)
Q-T INTERVAL, ECG07: 376 MS
Q-T INTERVAL, ECG07: 434 MS
QRS DURATION, ECG06: 100 MS
QRS DURATION, ECG06: 94 MS
QTC CALCULATION (BEZET), ECG08: 470 MS
QTC CALCULATION (BEZET), ECG08: 507 MS
RBC # BLD AUTO: 4.41 M/UL (ref 4.7–5.5)
SODIUM SERPL-SCNC: 141 MMOL/L (ref 136–145)
TROPONIN I SERPL-MCNC: 4.15 NG/ML (ref 0–0.04)
TROPONIN I SERPL-MCNC: 4.89 NG/ML (ref 0–0.04)
TROPONIN I SERPL-MCNC: 5.73 NG/ML (ref 0–0.04)
TROPONIN I SERPL-MCNC: ABNORMAL NG/ML (ref 0–0.04)
VENTRICULAR RATE, ECG03: 82 BPM
VENTRICULAR RATE, ECG03: 94 BPM
WBC # BLD AUTO: 5 K/UL (ref 4.6–13.2)

## 2018-08-30 PROCEDURE — 84484 ASSAY OF TROPONIN QUANT: CPT | Performed by: INTERNAL MEDICINE

## 2018-08-30 PROCEDURE — 85730 THROMBOPLASTIN TIME PARTIAL: CPT | Performed by: INTERNAL MEDICINE

## 2018-08-30 PROCEDURE — 65660000000 HC RM CCU STEPDOWN

## 2018-08-30 PROCEDURE — 74011250636 HC RX REV CODE- 250/636: Performed by: INTERNAL MEDICINE

## 2018-08-30 PROCEDURE — 74011250637 HC RX REV CODE- 250/637: Performed by: INTERNAL MEDICINE

## 2018-08-30 PROCEDURE — 74011000258 HC RX REV CODE- 258: Performed by: INTERNAL MEDICINE

## 2018-08-30 PROCEDURE — 82550 ASSAY OF CK (CPK): CPT | Performed by: NURSE PRACTITIONER

## 2018-08-30 PROCEDURE — 36415 COLL VENOUS BLD VENIPUNCTURE: CPT | Performed by: INTERNAL MEDICINE

## 2018-08-30 PROCEDURE — C8929 TTE W OR WO FOL WCON,DOPPLER: HCPCS

## 2018-08-30 PROCEDURE — 85025 COMPLETE CBC W/AUTO DIFF WBC: CPT | Performed by: INTERNAL MEDICINE

## 2018-08-30 PROCEDURE — 65270000029 HC RM PRIVATE

## 2018-08-30 PROCEDURE — 80048 BASIC METABOLIC PNL TOTAL CA: CPT | Performed by: INTERNAL MEDICINE

## 2018-08-30 RX ORDER — FACIAL-BODY WIPES
10 EACH TOPICAL
Status: COMPLETED | OUTPATIENT
Start: 2018-08-30 | End: 2018-08-30

## 2018-08-30 RX ORDER — ADHESIVE BANDAGE
30 BANDAGE TOPICAL DAILY PRN
Status: DISCONTINUED | OUTPATIENT
Start: 2018-08-30 | End: 2018-09-01 | Stop reason: HOSPADM

## 2018-08-30 RX ORDER — FAMOTIDINE 20 MG/1
20 TABLET, FILM COATED ORAL DAILY
Status: DISCONTINUED | OUTPATIENT
Start: 2018-08-31 | End: 2018-08-31

## 2018-08-30 RX ORDER — FACIAL-BODY WIPES
10 EACH TOPICAL DAILY PRN
Status: DISCONTINUED | OUTPATIENT
Start: 2018-08-30 | End: 2018-08-30

## 2018-08-30 RX ORDER — FACIAL-BODY WIPES
10 EACH TOPICAL DAILY PRN
Status: DISCONTINUED | OUTPATIENT
Start: 2018-08-30 | End: 2018-09-01 | Stop reason: HOSPADM

## 2018-08-30 RX ORDER — POTASSIUM CHLORIDE 20 MEQ/1
40 TABLET, EXTENDED RELEASE ORAL EVERY 4 HOURS
Status: COMPLETED | OUTPATIENT
Start: 2018-08-30 | End: 2018-08-30

## 2018-08-30 RX ADMIN — POTASSIUM CHLORIDE 40 MEQ: 20 TABLET, EXTENDED RELEASE ORAL at 08:48

## 2018-08-30 RX ADMIN — PERFLUTREN 2 ML: 6.52 INJECTION, SUSPENSION INTRAVENOUS at 14:49

## 2018-08-30 RX ADMIN — FAMOTIDINE 20 MG: 20 TABLET ORAL at 08:48

## 2018-08-30 RX ADMIN — ASPIRIN 300 MG: 300 SUPPOSITORY RECTAL at 08:49

## 2018-08-30 RX ADMIN — SODIUM CHLORIDE 125 ML/HR: 450 INJECTION, SOLUTION INTRAVENOUS at 01:01

## 2018-08-30 RX ADMIN — POTASSIUM CHLORIDE 40 MEQ: 20 TABLET, EXTENDED RELEASE ORAL at 12:12

## 2018-08-30 RX ADMIN — BISACODYL 10 MG: 10 SUPPOSITORY RECTAL at 12:11

## 2018-08-30 RX ADMIN — AMLODIPINE BESYLATE 10 MG: 10 TABLET ORAL at 08:47

## 2018-08-30 RX ADMIN — CARVEDILOL 12.5 MG: 12.5 TABLET, FILM COATED ORAL at 18:30

## 2018-08-30 RX ADMIN — CARVEDILOL 12.5 MG: 12.5 TABLET, FILM COATED ORAL at 08:48

## 2018-08-30 RX ADMIN — ISOSORBIDE MONONITRATE 60 MG: 60 TABLET, EXTENDED RELEASE ORAL at 08:47

## 2018-08-30 RX ADMIN — ATORVASTATIN CALCIUM 40 MG: 40 TABLET, FILM COATED ORAL at 21:30

## 2018-08-30 NOTE — PROGRESS NOTES
[facility] 
 
 
CARDIOLOGY PROGRESS NOTE 
RECS: 
 
1. NSTEMI- currently chest pain free. . troponin down trending. Continue with heparin drip, statin and bb. Cardiac cath possibly tomorrow depending on renal function. Discussed with Dr Kiara Carrillo ([facility]) OK with dual antiplatelet therapy  if stent needed. follow echo to reassess lvf,rvf or any wma. Previous Echocardiogram, 05/01/2017, showing normal EF at 60% without wall motion abnormalities. 2. Hx of Abnormal nuclear stress test, 05/09/2017, suggesting small fixed inferolateral defect. No ischemia. Was managed medically 3. Hypertension- on admission with very labile BP. stable now continue bb. Norvasc and Clonidine 4. Hx of PE Hospitalization, 04/30/2017 [facility] - possible pulmonary embolism based on medium risk VQ scan. Discharged on Xarelto. 5. GI bleed 5/17-  Heme positive stools. was diagnosed with esophagitis 6. Family history of cardiac disease. 7. S/p sleeve gastrectomy- laparoscopy on 8/20/19 Dr Suma Mace 8. Hyperlipidemia- continue statin LDL not at target 9. AWA on CKD-Creatinine levels improving well. 10. Nausea and vomiting- better. Plan discussed with patient in detail. He understands and is in agreement ASSESSMENT:  
Hospital Problems  Date Reviewed: 8/28/2018 Codes Class Noted POA Hypokalemia ICD-10-CM: E87.6 ICD-9-CM: 276.8  8/28/2018 Unknown * (Principal)ACS (acute coronary syndrome) (HCC) ICD-10-CM: I24.9 ICD-9-CM: 411.1  8/28/2018 Unknown Acute renal failure superimposed on stage 3 chronic kidney disease (HCC) ICD-10-CM: N17.9, N18.3 ICD-9-CM: 584.9, 585.3  8/28/2018 Unknown S/P gastric surgery ICD-10-CM: C10.107 ICD-9-CM: V45.89  8/28/2018 Unknown Essential hypertension ICD-10-CM: I10 
ICD-9-CM: 401.9  2/17/2016 Yes Morbid obesity ([facility] [address] ICD-10-CM: E66.01 
ICD-9-CM: 278.01  5/14/2012 Yes Coronary artery disease ICD-10-CM: I25.10 ICD-9-CM: 414.01  Unknown Yes Overview Signed 4/12/2011  3:49 PM by Higinio Coma  
  mild, non obstructive/EF 65% SUBJECTIVE: 
 
No CP Improved SOB OBJECTIVE: 
 
VS:  
Visit Vitals  /90 (BP 1 Location: Left arm, BP Patient Position: Sitting)  Pulse 69  Temp 97.1 °F (36.2 °C)  Resp 18  Ht 5' 11\" (1.803 m)  Wt 130 kg (286 lb 11.2 oz)  SpO2 94%  BMI 39.99 kg/m2 Intake/Output Summary (Last 24 hours) at 08/30/18 1204 Last data filed at 08/30/18 1123 Gross per 24 hour Intake           1823.9 ml Output             2550 ml Net           -726.1 ml  
 
TELE: normal sinus rhythm General: alert, well developed, pleasant and in no apparent distress HENT: Normocephalic, atraumatic. Normal external eye. Neck :  JVD difficult to assess due to obesity Cardiac:  regular rate and rhythm, S1, S2 normal, no murmur, click, rub or gallop Lungs: clear to auscultation bilaterally Abdomen: Soft, nontender, no masses Extremities: +1 edema BLE  peripheral pulses diminished Labs: Results:  
   
Chemistry Recent Labs 08/30/18 
 0030  08/29/18 
 1745  08/29/18 
 0425  08/28/18 
 2150  08/28/18 
 1643 GLU  106*  112*  96  93  145* NA  141  142  143  145  142  
K  3.6  3.6  3.4*  3.5  2.9*  
CL  103  101  104  104  102 CO2  31  33*  31  32  30 BUN  35*  38*  43*  38*  38* CREA  2.47*  2.82*  2.80*  2.33*  2.46* CA  8.3*  8.4*  8.5  8.1*  8.7 AGAP  7  8  8  9  10 BUCR  14  13  15  16  15 AP   --    --    --   65  74 TP   --    --    --   6.5  7.3 ALB   --    --    --   3.0*  3.4 GLOB   --    --    --   3.5  3.9 AGRAT   --    --    --   0.9  0.9  
  
CBC w/Diff Recent Labs 08/30/18 
 0030  08/29/18 
 0425  08/28/18 
 1643 WBC  5.0  6.9  9.0  
RBC  4.41*  4.28*  4.89  
HGB  11.9*  11.4*  13.4 HCT  37.3  36.2  40.8 PLT  181  185  179 GRANS  47  66  83* LYMPH  44  27  13*  
 EOS  4  1  1 Cardiac Enzymes Recent Labs 08/30/18 
 0920  08/29/18 
 0830 CPK  307  598* CKND1  3.7  9.6* Coagulation Recent Labs 08/30/18 
 0920  08/30/18 
 0030   08/28/18 
 2150 PTP   --    --    --   13.9 INR   --    --    --   1.1 APTT  77.4*  66.3*   < >   --   
 < > = values in this interval not displayed. Lipid Panel Lab Results Component Value Date/Time Cholesterol, total 163 08/29/2018 08:30 AM  
 HDL Cholesterol 44 08/29/2018 08:30 AM  
 LDL, calculated 101.4 (H) 08/29/2018 08:30 AM  
 VLDL, calculated 17.6 08/29/2018 08:30 AM  
 Triglyceride 88 08/29/2018 08:30 AM  
 CHOL/HDL Ratio 3.7 08/29/2018 08:30 AM  
  
BNP No results for input(s): BNPP in the last 72 hours. Liver Enzymes Recent Labs  
   08/28/18 
 2150 TP  6.5 ALB  3.0* AP  65 SGOT  43* Thyroid Studies Lab Results Component Value Date/Time TSH 0.80 06/29/2018 12:15 PM  
    
 
 
 
Manishmaria 57 Patient seen independently Discussed the details with NP and patient. Please see orders & recommendations Erika Pena MD

## 2018-08-30 NOTE — PROGRESS NOTES
The patient was seen and examined independently. Labs reviewed. No chest or abdominal pain. No SOB. Has constipation. Walking in room. VSS. CTA bilaterally on lung exam and CVS: RRR. NSTEMI - cont heparin drip and management per cardiology. Nephrology following. dulcolax suppository. Cont current management. The plan was discussed with NP, ms. Harley Jain. Please read the progress note written by NP for further detail.

## 2018-08-30 NOTE — PROGRESS NOTES
Problem: Falls - Risk of 
Goal: *Absence of Falls Document Andrew Flores Fall Risk and appropriate interventions in the flowsheet. Outcome: Progressing Towards Goal 
Fall Risk Interventions: 
  
 
  
 
Medication Interventions: Patient to call before getting OOB History of Falls Interventions: Consult care management for discharge planning

## 2018-08-30 NOTE — PROGRESS NOTES
Echocardiogram completed. Patient returned to room with armband in place. Report to follow.  
 
[address] [facility]

## 2018-08-30 NOTE — PROGRESS NOTES
[facility] Hospitalist Group Progress Note Patient: Quique Bacon Age: 66 y.o. : 1955 MR#: [id number] SSN: [id number] Date: 2018 Subjective:  
Complaints of constipations. Last BM Tuesday. Denies abdominal pain, N/V. Passing flatulus. Assessment/Plan: 1. ACS: cardiology consult. Troponin trending down, Recommendations IV heparin, serial PTT/INR, ASA suppository d/t risk for GIB, IVF, delay cardiac catheterization as patient is stable and for AWA improvement, add statin 2. Chest pain: resolved, telemetry, continue imdur, troponin trending down, monitor troponin 3. Hypokalemia: resolved today, IV supplementation, monitor metabolic panel 4. HTN: better controlled today, catapres patch, BB, norvasc, prn hydralazine, hold ace, hold diuretics 5. GIB hx: PPI, avoid ASA PO, monitor for bleeding 6. HLD: statin initiated this admission 7. AWA on CKD III: creatinine improving, nephrology consult. Recommendations retroperitoneum US results right kidney and bladder unremarkable/left kidney not visualized, baseline creatinine 1.4-1.8, cardiorenal syndrome, IVF, hold diuretics, hold ace 8. Constipation: dulcolax and milk of magnesium Additional Notes:   
 
Case discussed with:  [x]Patient  []Family  []Nursing  []Case Management DVT Prophylaxis:  []Lovenox  []Hep SQ  []SCDs  []Coumadin   [x]On Heparin gtt Objective:  
VS:  
Visit Vitals  /90 (BP 1 Location: Left arm, BP Patient Position: Sitting)  Pulse 69  Temp 97.1 °F (36.2 °C)  Resp 18  Ht 5' 11\" (1.803 m)  Wt 130 kg (286 lb 11.2 oz)  SpO2 94%  BMI 39.99 kg/m2 Tmax/24hrs: Temp (24hrs), Av.3 °F (36.3 °C), Min:97 °F (36.1 °C), Max:98.1 °F (36.7 °C) Intake/Output Summary (Last 24 hours) at 18 1207 Last data filed at 18 1123 Gross per 24 hour Intake           1823.9 ml Output             2550 ml Net           -726.1 ml  
 
 
General:  Alert, NAD 
 Cardiovascular:  RRR Pulmonary:  LSC throughout; respiratory effort WNL 
GI:  Lap sites with steri strips/clean dry, +BS in all four quadrants, soft, non-tender Extremities:  No edema; 2+ dorsalis pedis pulses bilaterally Neuro: alert and oriented x3 Labs:   
Recent Results (from the past 24 hour(s)) PTT Collection Time: 08/29/18  1:00 PM  
Result Value Ref Range aPTT 46.7 (H) 23.0 - 36.4 SEC  
PTT Collection Time: 08/29/18  5:45 PM  
Result Value Ref Range aPTT 80.0 (H) 23.0 - 36.4 SEC METABOLIC PANEL, BASIC Collection Time: 08/29/18  5:45 PM  
Result Value Ref Range Sodium 142 136 - 145 mmol/L Potassium 3.6 3.5 - 5.5 mmol/L Chloride 101 100 - 108 mmol/L  
 CO2 33 (H) 21 - 32 mmol/L Anion gap 8 3.0 - 18 mmol/L Glucose 112 (H) 74 - 99 mg/dL BUN 38 (H) 7.0 - 18 MG/DL Creatinine 2.82 (H) 0.6 - 1.3 MG/DL  
 BUN/Creatinine ratio 13 12 - 20 GFR est AA 28 (L) >60 ml/min/1.73m2 GFR est non-AA 23 (L) >60 ml/min/1.73m2 Calcium 8.4 (L) 8.5 - 10.1 MG/DL  
PTT Collection Time: 08/30/18 12:30 AM  
Result Value Ref Range aPTT 66.3 (H) 23.0 - 36.4 SEC METABOLIC PANEL, BASIC Collection Time: 08/30/18 12:30 AM  
Result Value Ref Range Sodium 141 136 - 145 mmol/L Potassium 3.6 3.5 - 5.5 mmol/L Chloride 103 100 - 108 mmol/L  
 CO2 31 21 - 32 mmol/L Anion gap 7 3.0 - 18 mmol/L Glucose 106 (H) 74 - 99 mg/dL BUN 35 (H) 7.0 - 18 MG/DL Creatinine 2.47 (H) 0.6 - 1.3 MG/DL  
 BUN/Creatinine ratio 14 12 - 20 GFR est AA 32 (L) >60 ml/min/1.73m2 GFR est non-AA 27 (L) >60 ml/min/1.73m2 Calcium 8.3 (L) 8.5 - 10.1 MG/DL  
CBC WITH AUTOMATED DIFF Collection Time: 08/30/18 12:30 AM  
Result Value Ref Range WBC 5.0 4.6 - 13.2 K/uL  
 RBC 4.41 (L) 4.70 - 5.50 M/uL  
 HGB 11.9 (L) 13.0 - 16.0 g/dL HCT 37.3 36.0 - 48.0 % MCV 84.6 74.0 - 97.0 FL  
 MCH 27.0 24.0 - 34.0 PG  
 MCHC 31.9 31.0 - 37.0 g/dL  
 RDW 15.4 (H) 11.6 - 14.5 % PLATELET 118 510 - 272 K/uL MPV 10.1 9.2 - 11.8 FL  
 NEUTROPHILS 47 40 - 73 % LYMPHOCYTES 44 21 - 52 % MONOCYTES 5 3 - 10 % EOSINOPHILS 4 0 - 5 % BASOPHILS 0 0 - 2 %  
 ABS. NEUTROPHILS 2.3 1.8 - 8.0 K/UL  
 ABS. LYMPHOCYTES 2.2 0.9 - 3.6 K/UL  
 ABS. MONOCYTES 0.3 0.05 - 1.2 K/UL  
 ABS. EOSINOPHILS 0.2 0.0 - 0.4 K/UL  
 ABS. BASOPHILS 0.0 0.0 - 0.1 K/UL  
 DF AUTOMATED    
PTT Collection Time: 08/30/18  9:20 AM  
Result Value Ref Range aPTT 77.4 (H) 23.0 - 36.4 SEC  
TROPONIN I Collection Time: 08/30/18  9:20 AM  
Result Value Ref Range Troponin-I, Qt. 5.73 (HH) 0.0 - 0.045 NG/ML  
CARDIAC PANEL,(CK, CKMB & TROPONIN) Collection Time: 08/30/18  9:20 AM  
Result Value Ref Range  39 - 308 U/L  
 CK - MB 11.3 (H) <3.6 ng/ml CK-MB Index 3.7 0.0 - 4.0 % Troponin-I, Qt. DUPLICATE REQUEST NG/ML Signed By: Esthela Burden NP August 30, 2018

## 2018-08-30 NOTE — PROGRESS NOTES
In Patient Progress note Admit Date: 8/28/2018 Impression:  
 
1) AWA on CKD3 ( ~ 1.4-1.8) d/t prerenal azotemia  D/t  n/vx/poor intake ,v/s ATN in setting of accelerated HTN , 
being on benazepril and lasix in this setting may also have contributed Creatinine improving , good urine output , renal parameters should improve Urinalysis with no infection, urine electrolytes with low Na were  indicative of volume depletion,  
volume status appears have improved with hydration , cut down IVF to 60 cc/hrs Ck wnl, renal US  Showed normal left kidney and bladder, rt kidney not visualized 
please record strict I/o  
2) CKD3 d/t diabetic nephropathy v/s HTN nephrosclerosis , did have proteinuria , presently minimal  
3) hypokalemia with metabolic alkalosis, seems to be d/t vomiting , improved ,monitor 4) chest pain/NSTMI : cardiac enzymes trending down , presently asymptomatic ,cardiology consulted. 5) h/o BPH, check PVR intermittantly 6) HTN , hold ACE , diuretics, continue rest of meds. Avoid too tight control , allow ~ 140-150 SBP 7) DM2 , Per IM 8) s/p gastric bypass surgery  
  
Please call with questions, discussed with DR Lakes[facility]adares [phone] Pager: [phone] Subjective:  
 
Denies CP, SOB today Current Facility-Administered Medications:  
  [START ON 8/31/2018] famotidine (PEPCID) tablet 20 mg, 20 mg, Oral, DAILY, Rhett Carrera MD 
  magnesium hydroxide (MILK OF MAGNESIA) 400 mg/5 mL oral suspension 30 mL, 30 mL, Oral, DAILY PRN, Ariane White NP 
  bisacodyl (DULCOLAX) suppository 10 mg, 10 mg, Rectal, DAILY PRN, Sanford Anaya MD 
  cloNIDine (CATAPRES) 0.2 mg/24 hr patch 1 Patch, 1 Patch, TransDERmal, Q7D, Rhett Carrera MD, Stopped at 08/29/18 8102   carvedilol (COREG) tablet 12.5 mg, 12.5 mg, Oral, BID WITH MEALS, Deny Marin MD, 12.5 mg at 08/30/18 0702   aspirin (ASA) suppository 300 mg, 300 mg, Rectal, DAILY, Jerod Torres MD, 300 mg at 08/30/18 0849 
  atorvastatin (LIPITOR) tablet 40 mg, 40 mg, Oral, QHS, Jerod Torres MD, 40 mg at 08/29/18 2152   heparin 25,000 units in D5W 250 ml infusion, 7.5-25 Units/kg/hr, IntraVENous, TITRATE, Rafaela Sheppard MD, Last Rate: 14.6 mL/hr at 08/30/18 0308, 1,460 Units/hr at 08/30/18 0308   amLODIPine (NORVASC) tablet 10 mg, 10 mg, Oral, DAILY, Samuel Valente MD, 10 mg at 08/30/18 0847 
  isosorbide mononitrate ER (IMDUR) tablet 60 mg, 60 mg, Oral, DAILY, Samuel Valente MD, 60 mg at 08/30/18 0847 
  nitroglycerin (NITROSTAT) tablet 0.4 mg, 0.4 mg, SubLINGual, Q5MIN PRN, Samuel Valente MD 
  hydrALAZINE (APRESOLINE) 20 mg/mL injection 10 mg, 10 mg, IntraVENous, Q6H PRN, Samuel Valente MD 
  acetaminophen (TYLENOL) tablet 650 mg, 650 mg, Oral, Q6H PRN, Samuel Valente MD 
  oxyCODONE-acetaminophen (PERCOCET) 5-325 mg per tablet 1 Tab, 1 Tab, Oral, Q6H PRN, Samuel aVlente MD 
  naloxone [facility]) injection 0.4 mg, 0.4 mg, IntraVENous, PRN, Samuel Valente MD 
  ondansetron (ZOFRAN) injection 4 mg, 4 mg, IntraVENous, Q6H PRN, Samuel Valente MD 
  docusate sodium (COLACE) capsule 100 mg, 100 mg, Oral, BID PRN, Samuel Valente MD 
  morphine injection 2 mg, 2 mg, IntraVENous, Q4H PRN, Samuel Valente MD 
  0.45% sodium chloride infusion, 60 mL/hr, IntraVENous, CONTINUOUS, Russell Mon MD, Last Rate: 125 mL/hr at 08/30/18 0101, 125 mL/hr at 08/30/18 0101 Objective:  
 
Visit Vitals  /90  Pulse 69  Temp 97.1 °F (36.2 °C)  Resp 18  Ht 5' 11\" (1.803 m)  Wt 129.7 kg (286 lb)  SpO2 94%  BMI 39.89 kg/m2 Intake/Output Summary (Last 24 hours) at 08/30/18 1354 Last data filed at 08/30/18 1123 Gross per 24 hour Intake           1823.9 ml Output             2550 ml Net           -726.1 ml Physical Exam:  
 
gen NAD HENT mmm RS AEBE clear CVS s1 s2 wnl no JVD 
GI soft BS + Ext no edema Data Review: 
 
Recent Labs 08/30/18 
 0030 WBC  5.0  
RBC  4.41* HCT  37.3 MCV  84.6 MCH  27.0 MCHC  31.9  
RDW  15.4* Recent Labs 08/30/18 
 0030  08/29/18 
 1745  08/29/18 
 0425  08/28/18 
 2150  08/28/18 
 1643 BUN  35*  38*  43*  38*  38* CREA  2.47*  2.82*  2.80*  2.33*  2.46* CA  8.3*  8.4*  8.5  8.1*  8.7 ALB   --    --    --   3.0*  3.4  
K  3.6  3.6  3.4*  3.5  2.9* NA  141  142  143  145  142 CL  103  101  104  104  102 CO2  31  33*  31  32  30 PHOS   --    --   4.5   --    --   
GLU  106*  112*  96  93  145* Zahra Murphy MD

## 2018-08-30 NOTE — ROUTINE PROCESS
Bedside\"} shift change report given to MARKUS [facility] RN(oncoming nurse) Radha ARIZMENDI and Maki RN(offgoing nurse). Report included the following information SBAR, LABS, VS, and summary of care. Patient sleeping in bed

## 2018-08-31 LAB
ANION GAP SERPL CALC-SCNC: 8 MMOL/L (ref 3–18)
APTT PPP: 73.2 SEC (ref 23–36.4)
BUN SERPL-MCNC: 30 MG/DL (ref 7–18)
BUN/CREAT SERPL: 15 (ref 12–20)
CALCIUM SERPL-MCNC: 8.7 MG/DL (ref 8.5–10.1)
CHLORIDE SERPL-SCNC: 105 MMOL/L (ref 100–108)
CO2 SERPL-SCNC: 30 MMOL/L (ref 21–32)
CREAT SERPL-MCNC: 1.96 MG/DL (ref 0.6–1.3)
GLUCOSE SERPL-MCNC: 80 MG/DL (ref 74–99)
POTASSIUM SERPL-SCNC: 3.6 MMOL/L (ref 3.5–5.5)
SODIUM SERPL-SCNC: 143 MMOL/L (ref 136–145)

## 2018-08-31 PROCEDURE — 93458 L HRT ARTERY/VENTRICLE ANGIO: CPT | Performed by: INTERNAL MEDICINE

## 2018-08-31 PROCEDURE — B2111ZZ FLUOROSCOPY OF MULTIPLE CORONARY ARTERIES USING LOW OSMOLAR CONTRAST: ICD-10-PCS | Performed by: INTERNAL MEDICINE

## 2018-08-31 PROCEDURE — 85730 THROMBOPLASTIN TIME PARTIAL: CPT | Performed by: INTERNAL MEDICINE

## 2018-08-31 PROCEDURE — 74011000258 HC RX REV CODE- 258: Performed by: INTERNAL MEDICINE

## 2018-08-31 PROCEDURE — 77030028790 HC ACC ST CTRL VLV COPLT ABBT -B: Performed by: INTERNAL MEDICINE

## 2018-08-31 PROCEDURE — C1887 CATHETER, GUIDING: HCPCS | Performed by: INTERNAL MEDICINE

## 2018-08-31 PROCEDURE — 74011250636 HC RX REV CODE- 250/636: Performed by: NURSE PRACTITIONER

## 2018-08-31 PROCEDURE — 74011250636 HC RX REV CODE- 250/636

## 2018-08-31 PROCEDURE — C1894 INTRO/SHEATH, NON-LASER: HCPCS | Performed by: INTERNAL MEDICINE

## 2018-08-31 PROCEDURE — 93454 CORONARY ARTERY ANGIO S&I: CPT | Performed by: INTERNAL MEDICINE

## 2018-08-31 PROCEDURE — 74011636320 HC RX REV CODE- 636/320: Performed by: INTERNAL MEDICINE

## 2018-08-31 PROCEDURE — 027034Z DILATION OF CORONARY ARTERY, ONE ARTERY WITH DRUG-ELUTING INTRALUMINAL DEVICE, PERCUTANEOUS APPROACH: ICD-10-PCS | Performed by: INTERNAL MEDICINE

## 2018-08-31 PROCEDURE — C1769 GUIDE WIRE: HCPCS | Performed by: INTERNAL MEDICINE

## 2018-08-31 PROCEDURE — 74011250637 HC RX REV CODE- 250/637: Performed by: INTERNAL MEDICINE

## 2018-08-31 PROCEDURE — 74011250636 HC RX REV CODE- 250/636: Performed by: INTERNAL MEDICINE

## 2018-08-31 PROCEDURE — 65270000029 HC RM PRIVATE

## 2018-08-31 PROCEDURE — 99153 MOD SED SAME PHYS/QHP EA: CPT | Performed by: INTERNAL MEDICINE

## 2018-08-31 PROCEDURE — 99152 MOD SED SAME PHYS/QHP 5/>YRS: CPT | Performed by: INTERNAL MEDICINE

## 2018-08-31 PROCEDURE — 92928 PRQ TCAT PLMT NTRAC ST 1 LES: CPT | Performed by: INTERNAL MEDICINE

## 2018-08-31 PROCEDURE — 74011000250 HC RX REV CODE- 250: Performed by: INTERNAL MEDICINE

## 2018-08-31 PROCEDURE — 85347 COAGULATION TIME ACTIVATED: CPT

## 2018-08-31 PROCEDURE — C1874 STENT, COATED/COV W/DEL SYS: HCPCS | Performed by: INTERNAL MEDICINE

## 2018-08-31 PROCEDURE — 36415 COLL VENOUS BLD VENIPUNCTURE: CPT | Performed by: INTERNAL MEDICINE

## 2018-08-31 PROCEDURE — C1760 CLOSURE DEV, VASC: HCPCS | Performed by: INTERNAL MEDICINE

## 2018-08-31 PROCEDURE — 80048 BASIC METABOLIC PNL TOTAL CA: CPT | Performed by: INTERNAL MEDICINE

## 2018-08-31 PROCEDURE — C1725 CATH, TRANSLUMIN NON-LASER: HCPCS | Performed by: INTERNAL MEDICINE

## 2018-08-31 PROCEDURE — 77030013797 HC KT TRNSDUC PRSSR EDWD -A: Performed by: INTERNAL MEDICINE

## 2018-08-31 PROCEDURE — 77030016699 HC CATH ANGI DX INFN1 CARD -A: Performed by: INTERNAL MEDICINE

## 2018-08-31 DEVICE — EVEROLIMUS-ELUTING PLATINUM CHROMIUM CORONARY STENT SYSTEM
Type: IMPLANTABLE DEVICE | Status: FUNCTIONAL
Brand: SYNERGY™

## 2018-08-31 RX ORDER — CLOPIDOGREL 300 MG/1
TABLET, FILM COATED ORAL AS NEEDED
Status: DISCONTINUED | OUTPATIENT
Start: 2018-08-31 | End: 2018-08-31 | Stop reason: HOSPADM

## 2018-08-31 RX ORDER — GUAIFENESIN 100 MG/5ML
162 LIQUID (ML) ORAL DAILY
Status: DISCONTINUED | OUTPATIENT
Start: 2018-09-01 | End: 2018-09-01

## 2018-08-31 RX ORDER — TEMAZEPAM 15 MG/1
15 CAPSULE ORAL
Status: DISCONTINUED | OUTPATIENT
Start: 2018-08-31 | End: 2018-09-01 | Stop reason: HOSPADM

## 2018-08-31 RX ORDER — MIDAZOLAM HYDROCHLORIDE 1 MG/ML
INJECTION, SOLUTION INTRAMUSCULAR; INTRAVENOUS AS NEEDED
Status: DISCONTINUED | OUTPATIENT
Start: 2018-08-31 | End: 2018-08-31 | Stop reason: HOSPADM

## 2018-08-31 RX ORDER — ATROPINE SULFATE 0.1 MG/ML
0.5 INJECTION INTRAVENOUS AS NEEDED
Status: DISCONTINUED | OUTPATIENT
Start: 2018-08-31 | End: 2018-09-01 | Stop reason: HOSPADM

## 2018-08-31 RX ORDER — HYDRALAZINE HYDROCHLORIDE 25 MG/1
25 TABLET, FILM COATED ORAL 3 TIMES DAILY
Status: DISCONTINUED | OUTPATIENT
Start: 2018-08-31 | End: 2018-09-01

## 2018-08-31 RX ORDER — DIPHENHYDRAMINE HYDROCHLORIDE 50 MG/ML
25 INJECTION, SOLUTION INTRAMUSCULAR; INTRAVENOUS ONCE
Status: COMPLETED | OUTPATIENT
Start: 2018-08-31 | End: 2018-08-31

## 2018-08-31 RX ORDER — FAMOTIDINE 20 MG/1
20 TABLET, FILM COATED ORAL 2 TIMES DAILY
Status: DISCONTINUED | OUTPATIENT
Start: 2018-08-31 | End: 2018-09-01 | Stop reason: HOSPADM

## 2018-08-31 RX ORDER — CLOPIDOGREL BISULFATE 75 MG/1
75 TABLET ORAL DAILY
Status: DISCONTINUED | OUTPATIENT
Start: 2018-09-01 | End: 2018-09-01 | Stop reason: HOSPADM

## 2018-08-31 RX ORDER — ACETYLCYSTEINE 200 MG/ML
1200 SOLUTION ORAL; RESPIRATORY (INHALATION) EVERY 12 HOURS
Status: DISCONTINUED | OUTPATIENT
Start: 2018-08-31 | End: 2018-09-01 | Stop reason: HOSPADM

## 2018-08-31 RX ORDER — FENTANYL CITRATE 50 UG/ML
INJECTION, SOLUTION INTRAMUSCULAR; INTRAVENOUS AS NEEDED
Status: DISCONTINUED | OUTPATIENT
Start: 2018-08-31 | End: 2018-08-31 | Stop reason: HOSPADM

## 2018-08-31 RX ORDER — LIDOCAINE HYDROCHLORIDE 10 MG/ML
INJECTION, SOLUTION EPIDURAL; INFILTRATION; INTRACAUDAL; PERINEURAL AS NEEDED
Status: DISCONTINUED | OUTPATIENT
Start: 2018-08-31 | End: 2018-08-31 | Stop reason: HOSPADM

## 2018-08-31 RX ORDER — SODIUM CHLORIDE 0.9 % (FLUSH) 0.9 %
5-10 SYRINGE (ML) INJECTION EVERY 8 HOURS
Status: DISCONTINUED | OUTPATIENT
Start: 2018-08-31 | End: 2018-09-01 | Stop reason: HOSPADM

## 2018-08-31 RX ORDER — ASPIRIN 81 MG/1
162 TABLET ORAL DAILY
Status: DISCONTINUED | OUTPATIENT
Start: 2018-09-01 | End: 2018-08-31

## 2018-08-31 RX ORDER — SODIUM CHLORIDE 9 MG/ML
75 INJECTION, SOLUTION INTRAVENOUS CONTINUOUS
Status: DISCONTINUED | OUTPATIENT
Start: 2018-08-31 | End: 2018-09-01

## 2018-08-31 RX ORDER — SODIUM CHLORIDE 0.9 % (FLUSH) 0.9 %
5-10 SYRINGE (ML) INJECTION AS NEEDED
Status: DISCONTINUED | OUTPATIENT
Start: 2018-08-31 | End: 2018-09-01 | Stop reason: HOSPADM

## 2018-08-31 RX ORDER — HEPARIN SODIUM 1000 [USP'U]/ML
INJECTION, SOLUTION INTRAVENOUS; SUBCUTANEOUS AS NEEDED
Status: DISCONTINUED | OUTPATIENT
Start: 2018-08-31 | End: 2018-08-31 | Stop reason: HOSPADM

## 2018-08-31 RX ORDER — HYDRALAZINE HYDROCHLORIDE 20 MG/ML
INJECTION INTRAMUSCULAR; INTRAVENOUS AS NEEDED
Status: DISCONTINUED | OUTPATIENT
Start: 2018-08-31 | End: 2018-08-31 | Stop reason: HOSPADM

## 2018-08-31 RX ADMIN — CARVEDILOL 12.5 MG: 12.5 TABLET, FILM COATED ORAL at 08:23

## 2018-08-31 RX ADMIN — AMLODIPINE BESYLATE 10 MG: 10 TABLET ORAL at 08:24

## 2018-08-31 RX ADMIN — ATORVASTATIN CALCIUM 40 MG: 40 TABLET, FILM COATED ORAL at 21:46

## 2018-08-31 RX ADMIN — FAMOTIDINE 20 MG: 20 TABLET ORAL at 17:23

## 2018-08-31 RX ADMIN — Medication 10 ML: at 22:01

## 2018-08-31 RX ADMIN — HYDRALAZINE HYDROCHLORIDE 25 MG: 25 TABLET ORAL at 21:46

## 2018-08-31 RX ADMIN — Medication 10 ML: at 16:18

## 2018-08-31 RX ADMIN — HYDRALAZINE HYDROCHLORIDE 25 MG: 25 TABLET ORAL at 16:09

## 2018-08-31 RX ADMIN — SODIUM CHLORIDE 60 ML/HR: 450 INJECTION, SOLUTION INTRAVENOUS at 07:45

## 2018-08-31 RX ADMIN — METHYLPREDNISOLONE SODIUM SUCCINATE 120 MG: 40 INJECTION, POWDER, FOR SOLUTION INTRAMUSCULAR; INTRAVENOUS at 10:20

## 2018-08-31 RX ADMIN — ISOSORBIDE MONONITRATE 60 MG: 60 TABLET, EXTENDED RELEASE ORAL at 08:23

## 2018-08-31 RX ADMIN — ACETYLCYSTEINE 1200 MG: 200 SOLUTION ORAL; RESPIRATORY (INHALATION) at 10:19

## 2018-08-31 RX ADMIN — CARVEDILOL 12.5 MG: 12.5 TABLET, FILM COATED ORAL at 17:23

## 2018-08-31 RX ADMIN — DIPHENHYDRAMINE HYDROCHLORIDE 25 MG: 50 INJECTION, SOLUTION INTRAMUSCULAR; INTRAVENOUS at 10:20

## 2018-08-31 RX ADMIN — ASPIRIN 300 MG: 300 SUPPOSITORY RECTAL at 08:24

## 2018-08-31 RX ADMIN — FAMOTIDINE 20 MG: 20 TABLET ORAL at 08:24

## 2018-08-31 RX ADMIN — ACETYLCYSTEINE 1200 MG: 200 SOLUTION ORAL; RESPIRATORY (INHALATION) at 20:03

## 2018-08-31 RX ADMIN — HYDRALAZINE HYDROCHLORIDE 25 MG: 25 TABLET ORAL at 08:25

## 2018-08-31 RX ADMIN — SODIUM CHLORIDE 75 ML/HR: 900 INJECTION, SOLUTION INTRAVENOUS at 16:18

## 2018-08-31 NOTE — PROGRESS NOTES
[facility] 
 
 
CARDIOLOGY PROGRESS NOTE 
RECS: 
 
1. NSTEMI- currently chest pain free. . troponin down trending. Continue with heparin drip, statin and bb. Cardiac cath today. Discussed with Dr Lux Gann ([facility]) on admission, OK with dual antiplatelet therapy  if stent needed. Echo showing preserved EF% lvh, no wma seen and Moderate (grade 2) left ventricular diastolic dysfunction. 2. Hx of Abnormal nuclear stress test, 05/09/2017, suggesting small fixed inferolateral defect. No ischemia. Was managed medically 3. Hypertension- elevated today Norvasc and Clonidine. Added hydralazine will monitor 4. Hx of PE Hospitalization, 04/30/2017 [facility] - possible pulmonary embolism based on medium risk VQ scan. Discharged on Xarelto. 5. GI bleed 5/17-  Heme positive stools. was diagnosed with esophagitis 6. Family history of cardiac disease. 7. S/p sleeve gastrectomy- laparoscopy on 8/20/19 Dr Livia Najera 8. Hyperlipidemia- continue statin LDL not at target 9. AWA on CKD-Creatinine levels improving well. 10. Nausea and vomiting- better. Plan discussed with patient in detail. He understands and is in agreement Echo 8/30/18 · Definity contrast was given to enhance imaging. · Estimated left ventricular ejection fraction is 56 - 60%. Left ventricular mild concentric hypertrophy. Normal left ventricular wall motion, no regional wall motion abnormality noted. Moderate (grade 2) left ventricular diastolic dysfunction. · Right ventricular cavity size is mildly dilated. · Left atrial cavity size is moderately dilated. · Tricuspid regurgitation is inadequate for estimation of right ventricular systolic pressure. · Trace mitral valve regurgitation. 
   
 
ASSESSMENT:  
Hospital Problems  Date Reviewed: 8/28/2018 Codes Class Noted POA Hypokalemia ICD-10-CM: E87.6 ICD-9-CM: 276.8  8/28/2018 Unknown * (Principal)ACS (acute coronary syndrome) (HCC) ICD-10-CM: I24.9 ICD-9-CM: 411.1  8/28/2018 Unknown Acute renal failure superimposed on stage 3 chronic kidney disease (HCC) ICD-10-CM: N17.9, N18.3 ICD-9-CM: 584.9, 585.3  8/28/2018 Unknown S/P gastric surgery ICD-10-CM: G62.052 ICD-9-CM: V45.89  8/28/2018 Unknown Essential hypertension ICD-10-CM: I10 
ICD-9-CM: 401.9  2/17/2016 Yes Morbid obesity ([facility] [address] ICD-10-CM: E66.01 
ICD-9-CM: 278.01  5/14/2012 Yes Coronary artery disease ICD-10-CM: I25.10 ICD-9-CM: 414.01  Unknown Yes Overview Signed 4/12/2011  3:49 PM by Clara Zuleta  
  mild, non obstructive/EF 65% SUBJECTIVE: 
 
No CP Improved SOB OBJECTIVE: 
 
VS:  
Visit Vitals  BP (!) 169/100 (BP 1 Location: Left arm, BP Patient Position: At rest)  Pulse 82  Temp 97.1 °F (36.2 °C)  Resp 20  
 Ht 5' 11\" (1.803 m)  Wt 130.4 kg (287 lb 6.4 oz)  SpO2 97%  BMI 40.08 kg/m2 Intake/Output Summary (Last 24 hours) at 08/31/18 1110 Last data filed at 08/31/18 9868 Gross per 24 hour Intake                0 ml Output             3050 ml Net            -3050 ml  
 
TELE: normal sinus rhythm General: alert, well developed, pleasant and in no apparent distress HENT: Normocephalic, atraumatic. Normal external eye. Neck :  JVD difficult to assess due to obesity Cardiac:  regular rate and rhythm, S1, S2 normal, no murmur, click, rub or gallop Lungs: clear to auscultation bilaterally Abdomen: Soft, nontender, no masses Extremities: +1 edema BLE  peripheral pulses diminished Labs: Results:  
   
Chemistry Recent Labs 08/31/18 
 5362  08/30/18 
 0030  08/29/18 
 1745   08/28/18 
 2150  08/28/18 
 1643 GLU  80  106*  112*   < >  93  145* NA  143  141  142   < >  145  142  
K  3.6  3.6  3.6   < >  3.5  2.9*  
CL  105  103  101   < >  104  102 CO2  30  31  33*   < >  32  30 BUN  30*  35*  38*   < >  38*  38*  
 CREA  1.96*  2.47*  2.82*   < >  2.33*  2.46* CA  8.7  8.3*  8.4*   < >  8.1*  8.7 AGAP  8  7  8   < >  9  10 BUCR  15  14  13   < >  16  15 AP   --    --    --    --   65  74 TP   --    --    --    --   6.5  7.3 ALB   --    --    --    --   3.0*  3.4 GLOB   --    --    --    --   3.5  3.9 AGRAT   --    --    --    --   0.9  0.9  
 < > = values in this interval not displayed. CBC w/Diff Recent Labs 08/30/18 
 0030  08/29/18 
 0425  08/28/18 
 1643 WBC  5.0  6.9  9.0  
RBC  4.41*  4.28*  4.89  
HGB  11.9*  11.4*  13.4 HCT  37.3  36.2  40.8 PLT  181  185  179 GRANS  47  66  83* LYMPH  44  27  13* EOS  4  1  1 Cardiac Enzymes Recent Labs 08/30/18 
 2233  08/30/18 700 Misael CPK  275  292 CKND1  2.1  2.7 Coagulation Recent Labs 08/31/18 
 0337  08/30/18 
 0920   08/28/18 
 2150 PTP   --    --    --   13.9 INR   --    --    --   1.1 APTT  73.2*  77.4*   < >   --   
 < > = values in this interval not displayed. Lipid Panel Lab Results Component Value Date/Time Cholesterol, total 163 08/29/2018 08:30 AM  
 HDL Cholesterol 44 08/29/2018 08:30 AM  
 LDL, calculated 101.4 (H) 08/29/2018 08:30 AM  
 VLDL, calculated 17.6 08/29/2018 08:30 AM  
 Triglyceride 88 08/29/2018 08:30 AM  
 CHOL/HDL Ratio 3.7 08/29/2018 08:30 AM  
  
BNP No results for input(s): BNPP in the last 72 hours. Liver Enzymes Recent Labs  
   08/28/18 
 2150 TP  6.5 ALB  3.0* AP  65 SGOT  43* Thyroid Studies Lab Results Component Value Date/Time TSH 0.80 06/29/2018 12:15 PM  
    
 
 
 
Karine 57 Patient seen independently Discussed the details with NP and patient. Please see orders & recommendations Rachel Canseco MD

## 2018-08-31 NOTE — PROGRESS NOTES
Problem: Falls - Risk of 
Goal: *Absence of Falls Document Josselin Hills Fall Risk and appropriate interventions in the flowsheet. Outcome: Progressing Towards Goal 
Fall Risk Interventions: 
  
 
  
 
Medication Interventions: Patient to call before getting OOB, Teach patient to arise slowly History of Falls Interventions: Door open when patient unattended

## 2018-08-31 NOTE — PROGRESS NOTES
Problem: Falls - Risk of 
Goal: *Absence of Falls Document Julio Reas Fall Risk and appropriate interventions in the flowsheet. Outcome: Progressing Towards Goal 
Fall Risk Interventions: 
  
 
  
 
Medication Interventions: Patient to call before getting OOB History of Falls Interventions: Consult care management for discharge planning

## 2018-08-31 NOTE — ROUTINE PROCESS
TRANSFER - OUT REPORT: 
 
Verbal report given to [address]ina [address] RN(name) on [employer] [facility]  being transferred to [facility](unit) for routine progression of care Report consisted of patients Situation, Background, Assessment and  
Recommendations(SBAR). Information from the following report(s) SBAR was reviewed with the receiving nurse. Lines:  
Peripheral IV (Active) Site Assessment Clean, dry, & intact 8/29/2018  8:05 AM  
Phlebitis Assessment 0 8/29/2018  8:05 AM  
Infiltration Assessment 0 8/29/2018  8:05 AM  
Dressing Status Clean, dry, & intact 8/29/2018  8:05 AM  
Dressing Type Transparent 8/29/2018  8:05 AM  
Hub Color/Line Status Blue 8/29/2018  8:05 AM  
Action Taken Open ports on tubing capped 8/29/2018  8:05 AM  
Alcohol Cap Used Yes 8/29/2018  8:05 AM  
   
Peripheral IV 08/28/18 Right Antecubital (Active) Site Assessment Clean, dry, & intact 8/31/2018  2:00 PM  
Phlebitis Assessment 0 8/31/2018  2:00 PM  
Infiltration Assessment 0 8/31/2018  2:00 PM  
Dressing Status Clean, dry, & intact 8/31/2018  2:00 PM  
Dressing Type Transparent 8/31/2018  2:00 PM  
Hub Color/Line Status Pink;Flushed 8/31/2018  2:00 PM  
Action Taken Open ports on tubing capped 8/31/2018  8:02 AM  
Alcohol Cap Used Yes 8/31/2018  8:02 AM  
   
Peripheral IV 08/31/18 Left Antecubital (Active) Site Assessment Clean, dry, & intact 8/31/2018  2:00 PM  
Phlebitis Assessment 0 8/31/2018  2:00 PM  
Infiltration Assessment 0 8/31/2018  2:00 PM  
Dressing Status Clean, dry, & intact 8/31/2018  2:00 PM  
Dressing Type Transparent 8/31/2018  2:00 PM  
Hub Color/Line Status Pink;Flushed 8/31/2018  2:00 PM  
  
 
Opportunity for questions and clarification was provided. Patient transported with: 
 Registered Nurse: YASMEEN Mckeon

## 2018-08-31 NOTE — PROGRESS NOTES
The patient was seen and examined independently. Labs reviewed. No chest or abdominal pain. No SOB. Had a BM. No more nausea and vomiting. Walking in room. VSS. CTA bilaterally on lung exam and CVS: RRR. NSTEMI - cont heparin drip and management per cardiology. May go for cardiac cath today. AWA: Nephrology following. Other co-morbidities: Cont current management. The plan was discussed with NP, ms. Harley Jain. Please read the progress note written by NP for further detail.

## 2018-08-31 NOTE — PROGRESS NOTES
Dr Fátima Jorgensen will be covering for me this long weekend. Please call [phone] ( answering service ) or page him through the . Please call with questions, 
 
Drake Wallace MD [facility] [phone] Pager: [phone]

## 2018-08-31 NOTE — PROGRESS NOTES
In Patient Progress note Admit Date: 8/28/2018 Impression:  
 
1) AWA on CKD3 (~1.4-1.8) d/t prerenal azotemia   
being on benazepril and lasix in this setting may also have contributed Creatinine improved close to baseline  
volume status appears have improved with hydration , continue 1/2 NS @ 60 cc/hrs. As patient to get cardiac cath today. Discussed with patient risk of ELENA and need to Continue IV hydration to prevent ELENA. Will give a dose of mucomyst 1 dose prior and 2 doses 
after cath , discussed with patient no evidence of benefit in terms of studies , but may help 
renal US showed normal left kidney and bladder, rt kidney not visualized. please strict I/o   
2)CKD3 d/t diabetic nephropathy v/s HTN nephrosclerosis 3)hypokalemia with metabolic alkalosis, seems to be d/t vomiting , improved ,monitor 4)chest pain/NSTMI :noted plans for cardiac cath later today 5)h/o BPH, check PVR intermittantly 6)HTN , hold ACE , diuretics, continue rest of meds. Avoid too tight control , allow ~ 140-150 SBP 
   up titrate hydralazine as needed 7)DM2 , Per IM 
8)s/p gastric bypass surgery  
  
Please call with questions,  
  
Pelon Machado MD FASN Cell [phone] Pager: [phone] Subjective:  
 
Denies CP, SOB today Cardiac cath today Current Facility-Administered Medications:  
  hydrALAZINE (APRESOLINE) tablet 25 mg, 25 mg, Oral, TID, Silvia Woods MD, 25 mg at 08/31/18 0825 
  methylPREDNISolone (PF) (SOLU-MEDROL) injection 120 mg, 120 mg, IntraVENous, NOW, Pily Oliveira NP 
  diphenhydrAMINE (BENADRYL) injection 25 mg, 25 mg, IntraVENous, ONCE, Pily Oliveira NP 
  famotidine (PEPCID) tablet 20 mg, 20 mg, Oral, DAILY, Sandra Avendano MD, 20 mg at 08/31/18 4565 
  magnesium hydroxide (MILK OF MAGNESIA) 400 mg/5 mL oral suspension 30 mL, 30 mL, Oral, DAILY PRN, Ajith Hunter NP 
  bisacodyl (DULCOLAX) suppository 10 mg, 10 mg, Rectal, DAILY PRN, Arlene Weaver MD 
  cloNIDine (CATAPRES) 0.2 mg/24 hr patch 1 Patch, 1 Patch, TransDERmal, Q7D, Elpidio Montez MD, Stopped at 08/29/18 1466   carvedilol (COREG) tablet 12.5 mg, 12.5 mg, Oral, BID WITH MEALS, Bao Wallace MD, 12.5 mg at 08/31/18 7757   aspirin (ASA) suppository 300 mg, 300 mg, Rectal, DAILY, Bao Wallace MD, 300 mg at 08/31/18 0824 
  atorvastatin (LIPITOR) tablet 40 mg, 40 mg, Oral, QHS, Bao Wallace MD, 40 mg at 08/30/18 2130 
  heparin 25,000 units in D5W 250 ml infusion, 7.5-25 Units/kg/hr, IntraVENous, TITRATE, Violeta Monique MD, Last Rate: 14.6 mL/hr at 08/31/18 0744, 1,460 Units/hr at 08/31/18 0744 
  amLODIPine (NORVASC) tablet 10 mg, 10 mg, Oral, DAILY, Elpidio Montez MD, 10 mg at 08/31/18 5818 
  isosorbide mononitrate ER (IMDUR) tablet 60 mg, 60 mg, Oral, DAILY, Elpidio Montez MD, 60 mg at 08/31/18 0823 
  nitroglycerin (NITROSTAT) tablet 0.4 mg, 0.4 mg, SubLINGual, Q5MIN PRN, Elpidio Montez MD 
  hydrALAZINE (APRESOLINE) 20 mg/mL injection 10 mg, 10 mg, IntraVENous, Q6H PRN, Elpidio Montez MD 
  acetaminophen (TYLENOL) tablet 650 mg, 650 mg, Oral, Q6H PRN, Elpidio Montez MD 
  oxyCODONE-acetaminophen (PERCOCET) 5-325 mg per tablet 1 Tab, 1 Tab, Oral, Q6H PRN, Elpidio Montez MD 
  naloxone [facility]) injection 0.4 mg, 0.4 mg, IntraVENous, PRN, Elpidio Montez MD 
  ondansetron (ZOFRAN) injection 4 mg, 4 mg, IntraVENous, Q6H PRN, Elpidio Montez MD 
  docusate sodium (COLACE) capsule 100 mg, 100 mg, Oral, BID PRN, Elpidio Montez MD 
  morphine injection 2 mg, 2 mg, IntraVENous, Q4H PRN, Elpdiio Montez MD 
  0.45% sodium chloride infusion, 60 mL/hr, IntraVENous, CONTINUOUS, Pollo Longo MD, Last Rate: 60 mL/hr at 08/31/18 0745, 60 mL/hr at 08/31/18 0745 Objective:  
 
Visit Vitals  BP (!) 169/100 (BP 1 Location: Left arm, BP Patient Position: At rest)  Pulse 82  Temp 97.1 °F (36.2 °C)  Resp 20  Ht 5' 11\" (1.803 m)  Wt 130.4 kg (287 lb 6.4 oz)  SpO2 97%  BMI 40.08 kg/m2 Intake/Output Summary (Last 24 hours) at 08/31/18 4347 Last data filed at 08/31/18 3948 Gross per 24 hour Intake                0 ml Output             2850 ml Net            -2850 ml Physical Exam:  
 
gen NAD HENT mmm RS AEBE clear CVS s1 s2 wnl no JVD 
GI soft BS + Ext trace edema Data Review: 
 
Recent Labs 08/30/18 
 0030 WBC  5.0  
RBC  4.41* HCT  37.3 MCV  84.6 MCH  27.0 MCHC  31.9  
RDW  15.4* Recent Labs 08/31/18 
 3279  08/30/18 
 0030  08/29/18 
 1745  08/29/18 
 0425  08/28/18 
 2150  08/28/18 
 1643 BUN  30*  35*  38*  43*  38*  38* CREA  1.96*  2.47*  2.82*  2.80*  2.33*  2.46* CA  8.7  8.3*  8.4*  8.5  8.1*  8.7 ALB   --    --    --    --   3.0*  3.4  
K  3.6  3.6  3.6  3.4*  3.5  2.9* NA  143  141  142  143  145  142 CL  105  103  101  104  104  102 CO2  30  31  33*  31  32  30 PHOS   --    --    --   4.5   --    --   
GLU  80  106*  112*  96  93  145* Lyly Rees MD

## 2018-08-31 NOTE — PROGRESS NOTES
[facility] Progress Note Patient: Carlotta Bahena Age: 66 y.o. : 1955 MR#: [id number] SSN: [id number] Date: 2018 Subjective: No complaints at this time. Scheduled for cardiac catheterization this afternoon. BM with bowel regimen All 10 systems reviewed and negative Objective:  
VS: /83 (BP 1 Location: Left arm, BP Patient Position: Sitting)  Pulse 60  Temp 97.2 °F (36.2 °C)  Resp 18  Ht 5' 11\" (1.803 m)  Wt 130.4 kg (287 lb 6.4 oz)  SpO2 94%  BMI 40.08 kg/m2 Tmax/24hrs: Temp (24hrs), Av.2 °F (36.2 °C), Min:97 °F (36.1 °C), Max:97.7 °F (36.5 °C) Intake/Output Summary (Last 24 hours) at 18 1228 Last data filed at 18 3468 Gross per 24 hour Intake              240 ml Output             3600 ml Net            -3360 ml General:  Alert, NAD Cardiovascular:  RRR Pulmonary:  LSC throughout; respiratory effort WNL 
GI:  +BS in all four quadrants, soft, non-tender Extremities:  No edema; 2+ dorsalis pedis pulses bilaterally Neuro: alert and oriented x3 Labs:   
Recent Results (from the past 24 hour(s)) ECHO ADULT COMPLETE Collection Time: 18  2:30 PM  
Result Value Ref Range LA Volume 113.16 18 - 58 mL Ao Root D 3.27 cm  
 AO ASC D 3.36 cm  
 LVIDd 5.73 4.2 - 5.9 cm  
 LVPWd 1.28 (A) 0.6 - 1.0 cm LVIDs 4.14 cm IVSd 1.22 (A) 0.6 - 1.0 cm  
 LVOT d 2.10 cm LVOT Peak Velocity 82.72 cm/s LVOT Peak Gradient 2.7 mmHg LVOT VTI 16.50 cm Left Ventricle Isovolumic Relaxation Time 103.8 ms  
 MV A Antonnio 64.09 cm/s  
 MV E Antonino 0.74 cm/s  
 MV E/A 0.01 Inferior Vena Cava Diameter Sniffing 2.61 cm  
 LA Vol 4C 101.02 (A) 18 - 58 mL  
 LA Vol 2C 99.22 (A) 18 - 58 mL  
 LA Area 4C 27.8 cm2 Mitral Valve E Wave Deceleration Time 212.2 ms  
 LA Vol Index 46.09 ml/m2 LA Vol Index 40.41 ml/m2 LA Vol Index 41.15 ml/m2 CARDIAC PANEL,(CK, CKMB & TROPONIN) Collection Time: 08/30/18  3:45 PM  
Result Value Ref Range  39 - 308 U/L  
 CK - MB 7.8 (H) <3.6 ng/ml CK-MB Index 2.7 0.0 - 4.0 % Troponin-I, Qt. 4.89 (HH) 0.0 - 0.045 NG/ML  
CARDIAC PANEL,(CK, CKMB & TROPONIN) Collection Time: 08/30/18 10:33 PM  
Result Value Ref Range  39 - 308 U/L  
 CK - MB 5.8 (H) <3.6 ng/ml CK-MB Index 2.1 0.0 - 4.0 % Troponin-I, Qt. 4.15 (HH) 0.0 - 0.045 NG/ML  
PTT Collection Time: 08/31/18  3:39 AM  
Result Value Ref Range aPTT 73.2 (H) 23.0 - 36.4 SEC METABOLIC PANEL, BASIC Collection Time: 08/31/18  3:39 AM  
Result Value Ref Range Sodium 143 136 - 145 mmol/L Potassium 3.6 3.5 - 5.5 mmol/L Chloride 105 100 - 108 mmol/L  
 CO2 30 21 - 32 mmol/L Anion gap 8 3.0 - 18 mmol/L Glucose 80 74 - 99 mg/dL BUN 30 (H) 7.0 - 18 MG/DL Creatinine 1.96 (H) 0.6 - 1.3 MG/DL  
 BUN/Creatinine ratio 15 12 - 20 GFR est AA 42 (L) >60 ml/min/1.73m2 GFR est non-AA 35 (L) >60 ml/min/1.73m2 Calcium 8.7 8.5 - 10.1 MG/DL Assessment/Plan: 1. ACS: cardiac cath today. cardiology consult. Troponin trending down, Recommendations IV heparin, serial PTT/INR, D/W Dr. Mary Ham for Weight Loss, [facility] and ok with dual antiplatelet therapy if stent placement is needed, statin, ASA suppository 2. Chest pain: resolved, telemetry, continue imdur, troponin trending down 3. Hypokalemia: resolved monitor metabolic panel 4. HTN: cardio input added hydralazine PO today, hypertensive episodes. Continue catapres patch, BB, amolodipine 5. GIB hx: PPI, avoid ASA PO, monitor for bleeding 6. HLD: statin 7. AWA on CKD III: creatinine improving, nephrology consult. Recommendations retroperitoneum US results right kidney and bladder unremarkable/left kidney not visualized, baseline creatinine 1.4-1.8, cardiorenal syndrome, IVF, hold diuretics, hold ace 8. Constipation: dulcolax and milk of magnesium Additional Notes: Case discussed with:  [x]Patient  []Family  []Nursing  []Case Management DVT Prophylaxis:  []Lovenox  []Hep SQ  []SCDs  []Coumadin   [x]On Heparin gtt Signed By: Екатерина Dai NP August 31, 2018

## 2018-08-31 NOTE — ROUTINE PROCESS
Bedside and Verbal shift change report given to ADOLPH Monae (oncoming nurse) by Anabelle Cárdenas (offgoing nurse). Report included the following information SBAR, Intake/Output, MAR, Recent Results and Cardiac Rhythm Sinus Rhythm.

## 2018-08-31 NOTE — PROGRESS NOTES
1938 bedside turnover given to me by KYLEIGH Glover. Pt is on the cardiac telemetry monitor Cvtel 9. He denies chest pain and denies shortness of breath. He is still on bedrest post catheterization. I assessed his catheterization entry site and it is clean and dry. Call bell is within reach on the night stand. 2033 pt sleeping in bed with the television on. When he saw me in the room he stated \"I forgot my phone \", asked if we had one he could borrow I searched and located one and plugged it in for him. 2132 pt in bed, brother visiting him, arrived from [place]. hrly round pt no pain, given an apple juice and ice water site assessed, clean and dry 2215 meds given and pt is still visiting with his brother. Recent gastric bypass, given snacks 18 pt's girlfriend arrived, pt hourly round assessed, checked femoral for bleeding, bandage is still dry and intact pt reports no pain currently and not short of breath, will continue to assess, still on cardiac telemetry monitor with call bell on night stand. [id number] patient and girlfriend sleeping, peeked in for hourly rounding no needs no pain 7577 vitals assessed pain and needs assessed 
0720 bedside turnover given to KYLEIGH Patricia, [facility], ED summary and an update on pt care plan. Denies chest pain and denies shortness of breath.   Call bell within reach and cardiac telemetry monitor cvtel 9 still on/

## 2018-08-31 NOTE — PROGRESS NOTES
Pt seen and examined, Needs card cath The benefits and risks of cardiac catheterization have been discussed in detail with the patient present at the time. Patient understands  risk of potential cath complications including but not limited to bleeding, infection, difficulty healing the arteriotomy access site(2 chances in 100) which may require surgical repair, potential thromboembolic complications which could result in stroke, myocardial infarction, vascular injury, loss of limb or organ function and/or death( 1 chance in 1000)and potential allergic reaction to contrast dye or other medication used during the procedure. Patient is also aware of the therapeutic implications for medical management vs coronary artery bypass surgery vs percutaneous coronary intervention in treatment of coronary artery disease. The additional risks for percutaneous coronary artery intervention include the need for emergent bypass surgery at 1 chance in 100 and for restenosis which may range from 35% for plain balloon angioplasty, 15% for bare metal stent, and 5% for drug eluting stent implants. The need for mandatory uninterrupted dual antiplatelet therapy with lifelong Aspirin combined with Plavix for up to 12 months following drug eluting stents, and minimum 1 month following bare metal stents to prevent stent thrombosis which is the equivalent of acute heart attack has been reviewed in detail. No contraindications to use of drug eluting stents has been discovered.

## 2018-09-01 VITALS
BODY MASS INDEX: 40.54 KG/M2 | WEIGHT: 289.6 LBS | HEIGHT: 71 IN | TEMPERATURE: 97.8 F | DIASTOLIC BLOOD PRESSURE: 71 MMHG | HEART RATE: 74 BPM | RESPIRATION RATE: 18 BRPM | OXYGEN SATURATION: 96 % | SYSTOLIC BLOOD PRESSURE: 137 MMHG

## 2018-09-01 LAB
ANION GAP SERPL CALC-SCNC: 8 MMOL/L (ref 3–18)
APTT PPP: 35.7 SEC (ref 23–36.4)
ATRIAL RATE: 73 BPM
BUN SERPL-MCNC: 36 MG/DL (ref 7–18)
BUN/CREAT SERPL: 18 (ref 12–20)
CALCIUM SERPL-MCNC: 8.2 MG/DL (ref 8.5–10.1)
CALCULATED P AXIS, ECG09: 62 DEGREES
CALCULATED R AXIS, ECG10: 0 DEGREES
CALCULATED T AXIS, ECG11: 138 DEGREES
CHLORIDE SERPL-SCNC: 104 MMOL/L (ref 100–108)
CO2 SERPL-SCNC: 29 MMOL/L (ref 21–32)
CREAT SERPL-MCNC: 2.04 MG/DL (ref 0.6–1.3)
DIAGNOSIS, 93000: NORMAL
GLUCOSE SERPL-MCNC: 123 MG/DL (ref 74–99)
P-R INTERVAL, ECG05: 176 MS
POTASSIUM SERPL-SCNC: 4.2 MMOL/L (ref 3.5–5.5)
Q-T INTERVAL, ECG07: 428 MS
QRS DURATION, ECG06: 94 MS
QTC CALCULATION (BEZET), ECG08: 471 MS
SODIUM SERPL-SCNC: 141 MMOL/L (ref 136–145)
VENTRICULAR RATE, ECG03: 73 BPM

## 2018-09-01 PROCEDURE — 74011250637 HC RX REV CODE- 250/637: Performed by: INTERNAL MEDICINE

## 2018-09-01 PROCEDURE — 93005 ELECTROCARDIOGRAM TRACING: CPT

## 2018-09-01 PROCEDURE — 74011000250 HC RX REV CODE- 250: Performed by: INTERNAL MEDICINE

## 2018-09-01 PROCEDURE — 36415 COLL VENOUS BLD VENIPUNCTURE: CPT | Performed by: INTERNAL MEDICINE

## 2018-09-01 PROCEDURE — 80048 BASIC METABOLIC PNL TOTAL CA: CPT | Performed by: INTERNAL MEDICINE

## 2018-09-01 PROCEDURE — 74011250636 HC RX REV CODE- 250/636: Performed by: INTERNAL MEDICINE

## 2018-09-01 PROCEDURE — 85730 THROMBOPLASTIN TIME PARTIAL: CPT | Performed by: INTERNAL MEDICINE

## 2018-09-01 RX ORDER — PANTOPRAZOLE SODIUM 40 MG/1
40 TABLET, DELAYED RELEASE ORAL
Status: DISCONTINUED | OUTPATIENT
Start: 2018-09-01 | End: 2018-09-01 | Stop reason: HOSPADM

## 2018-09-01 RX ORDER — ASPIRIN 300 MG/1
300 SUPPOSITORY RECTAL DAILY
Qty: 30 SUPPOSITORY | Refills: 0 | Status: SHIPPED | OUTPATIENT
Start: 2018-09-01 | End: 2018-09-01

## 2018-09-01 RX ORDER — SODIUM CHLORIDE 9 MG/ML
75 INJECTION, SOLUTION INTRAVENOUS CONTINUOUS
Status: DISPENSED | OUTPATIENT
Start: 2018-09-01 | End: 2018-09-01

## 2018-09-01 RX ORDER — ISOSORBIDE MONONITRATE 60 MG/1
60 TABLET, EXTENDED RELEASE ORAL DAILY
Qty: 30 TAB | Refills: 0 | Status: SHIPPED | OUTPATIENT
Start: 2018-09-02 | End: 2018-10-17 | Stop reason: SDUPTHER

## 2018-09-01 RX ORDER — ATORVASTATIN CALCIUM 40 MG/1
40 TABLET, FILM COATED ORAL
Qty: 30 TAB | Refills: 0 | Status: SHIPPED | OUTPATIENT
Start: 2018-09-01 | End: 2018-09-28 | Stop reason: ALTCHOICE

## 2018-09-01 RX ORDER — CARVEDILOL 12.5 MG/1
12.5 TABLET ORAL 2 TIMES DAILY WITH MEALS
Qty: 60 TAB | Refills: 0 | Status: SHIPPED | OUTPATIENT
Start: 2018-09-01 | End: 2018-09-28 | Stop reason: ALTCHOICE

## 2018-09-01 RX ORDER — OMEPRAZOLE 40 MG/1
40 CAPSULE, DELAYED RELEASE ORAL DAILY
Qty: 30 CAP | Refills: 0 | Status: SHIPPED | OUTPATIENT
Start: 2018-09-01 | End: 2019-02-25 | Stop reason: ALTCHOICE

## 2018-09-01 RX ORDER — CLOPIDOGREL BISULFATE 75 MG/1
75 TABLET ORAL DAILY
Qty: 30 TAB | Refills: 0 | Status: SHIPPED | OUTPATIENT
Start: 2018-09-02 | End: 2018-09-28 | Stop reason: SDUPTHER

## 2018-09-01 RX ORDER — ASPIRIN 300 MG/1
300 SUPPOSITORY RECTAL DAILY
Status: DISCONTINUED | OUTPATIENT
Start: 2018-09-01 | End: 2018-09-01

## 2018-09-01 RX ORDER — GUAIFENESIN 100 MG/5ML
81 LIQUID (ML) ORAL 2 TIMES DAILY
Status: DISCONTINUED | OUTPATIENT
Start: 2018-09-01 | End: 2018-09-01 | Stop reason: HOSPADM

## 2018-09-01 RX ORDER — GUAIFENESIN 100 MG/5ML
81 LIQUID (ML) ORAL 2 TIMES DAILY
Qty: 60 TAB | Refills: 0 | Status: SHIPPED | OUTPATIENT
Start: 2018-09-01

## 2018-09-01 RX ORDER — HYDRALAZINE HYDROCHLORIDE 50 MG/1
50 TABLET, FILM COATED ORAL 3 TIMES DAILY
Status: DISCONTINUED | OUTPATIENT
Start: 2018-09-01 | End: 2018-09-01 | Stop reason: HOSPADM

## 2018-09-01 RX ADMIN — ACETYLCYSTEINE 1200 MG: 200 SOLUTION ORAL; RESPIRATORY (INHALATION) at 09:00

## 2018-09-01 RX ADMIN — Medication 10 ML: at 06:00

## 2018-09-01 RX ADMIN — ASPIRIN 81 MG CHEWABLE TABLET 162 MG: 81 TABLET CHEWABLE at 08:11

## 2018-09-01 RX ADMIN — HYDRALAZINE HYDROCHLORIDE 25 MG: 25 TABLET ORAL at 08:11

## 2018-09-01 RX ADMIN — SODIUM CHLORIDE 75 ML/HR: 900 INJECTION, SOLUTION INTRAVENOUS at 09:29

## 2018-09-01 RX ADMIN — PANTOPRAZOLE SODIUM 40 MG: 40 TABLET, DELAYED RELEASE ORAL at 11:00

## 2018-09-01 RX ADMIN — CLOPIDOGREL BISULFATE 75 MG: 75 TABLET ORAL at 08:11

## 2018-09-01 RX ADMIN — AMLODIPINE BESYLATE 10 MG: 10 TABLET ORAL at 08:11

## 2018-09-01 RX ADMIN — ASPIRIN 81 MG 81 MG: 81 TABLET ORAL at 11:00

## 2018-09-01 RX ADMIN — FAMOTIDINE 20 MG: 20 TABLET ORAL at 08:11

## 2018-09-01 RX ADMIN — CARVEDILOL 12.5 MG: 12.5 TABLET, FILM COATED ORAL at 08:11

## 2018-09-01 RX ADMIN — ISOSORBIDE MONONITRATE 60 MG: 60 TABLET, EXTENDED RELEASE ORAL at 08:11

## 2018-09-01 NOTE — PROGRESS NOTES
Problem: Falls - Risk of 
Goal: *Absence of Falls Document Heri Kan Fall Risk and appropriate interventions in the flowsheet. Outcome: Progressing Towards Goal 
Fall risk prevention implemented by following: 
 
Fall Risk Interventions: 
  
 
  
 
Medication Interventions: Teach patient to arise slowly, Patient to call before getting OOB History of Falls Interventions: Bed/chair exit alarm, Room close to nurse's station Problem: Pain Goal: *Control of Pain Outcome: Progressing Towards Goal 
Pt's pain controlled by following: 
Assess pain characteristics and mgmt barriers Identify pain expectations and concerns Identify support systems Monitor change in pt condition Medication side effect assessment Pain relief response

## 2018-09-01 NOTE — DISCHARGE SUMMARY
PATIENT DISCHARGE INSTRUCTIONS      PATIENT DISCHARGE INSTRUCTIONS    Dyana Larson / [id number] : 1955    Admitted 2018 Discharged: 2018     Dictated # [id number]    · It is important that you take the medication exactly as they are prescribed. · Keep your medication in the bottles provided by the pharmacist and keep a list of the medication names, dosages, and times to be taken in your wallet. · Do not take other medications without consulting your doctor. What to do at Home    Recommended Diet: Cardiac Diet and gastric bypass diet    Recommended Activity: Activity as tolerated    Current Discharge Medication List      START taking these medications    Details   atorvastatin (LIPITOR) 40 mg tablet Take 1 Tab by mouth nightly. Qty: 30 Tab, Refills: 0      clopidogrel (PLAVIX) 75 mg tab Take 1 Tab by mouth daily. Qty: 30 Tab, Refills: 0      carvedilol (COREG) 12.5 mg tablet Take 1 Tab by mouth two (2) times daily (with meals). Qty: 60 Tab, Refills: 0      aspirin 81 mg chewable tablet Take 1 Tab by mouth two (2) times a day. Qty: 60 Tab, Refills: 0         CONTINUE these medications which have CHANGED    Details   isosorbide mononitrate ER (IMDUR) 60 mg CR tablet Take 1 Tab by mouth daily. Qty: 30 Tab, Refills: 0      omeprazole (PRILOSEC) 40 mg capsule Take 1 Cap by mouth daily. Qty: 30 Cap, Refills: 0         CONTINUE these medications which have NOT CHANGED    Details   famotidine (PEPCID) 20 mg tablet Take 20 mg by mouth two (2) times a day. hydrALAZINE (APRESOLINE) 50 mg tablet Take 1 Tab by mouth two (2) times a day. Qty: 60 Tab, Refills: 1    Associated Diagnoses: Essential hypertension      allopurinol (ZYLOPRIM) 100 mg tablet TAKE 1 TAB BY MOUTH DAILY.   Qty: 90 Tab, Refills: 3      cloNIDine (CATAPRES) 0.2 mg/24 hr patch APPLY 1 PATCH ONCE WEEKLY  Qty: 12 Patch, Refills: 3      oxyCODONE-acetaminophen (PERCOCET) 5-325 mg per tablet Take  by mouth every four (4) hours as needed for Pain. promethazine (PHENERGAN) 25 mg tablet Take 25 mg by mouth every six (6) hours as needed for Nausea. varicella-zoster recombinant, PF, (SHINGRIX, PF,) 50 mcg/0.5 mL susr injection Please repeat dose in 2-3 months  Qty: 0.5 mL, Refills: 1    Associated Diagnoses: Encounter for immunization      amLODIPine (NORVASC) 10 mg tablet TAKE 1 TAB BY MOUTH DAILY. Qty: 30 Tab, Refills: 1    Associated Diagnoses: Essential hypertension      nitroglycerin (NITROSTAT) 0.4 mg SL tablet by SubLINGual route every five (5) minutes as needed for Chest Pain.      gabapentin (NEURONTIN) 300 mg capsule Take 300 mg by mouth three (3) times daily.          STOP taking these medications       labetalol (NORMODYNE) 100 mg tablet Comments:   Reason for Stopping:         benazepril (LOTENSIN) 40 mg tablet Comments:   Reason for Stopping:         potassium chloride SR (KLOR-CON 10) 10 mEq tablet Comments:   Reason for Stopping:         hydroCHLOROthiazide (HYDRODIURIL) 12.5 mg tablet Comments:   Reason for Stopping:         aspirin 81 mg tablet Comments:   Reason for Stopping:         tolterodine ER (DETROL LA) 4 mg ER capsule Comments:   Reason for Stopping:         furosemide (LASIX) 20 mg tablet Comments:   Reason for Stopping:         furosemide (LASIX) 20 mg tablet Comments:   Reason for Stopping:         sucralfate (CARAFATE) 100 mg/mL suspension Comments:   Reason for Stopping:         colchicine 0.6 mg tablet Comments:   Reason for Stopping:         avanafil (STENDRA) 200 mg tab tablet Comments:   Reason for Stopping:                 Signed By: Amanuel Dickson MD     September 1, 2018

## 2018-09-01 NOTE — PROGRESS NOTES
I have reviewed discharge instructions with the patient. The patient verbalized understanding. Discharge medications reviewed with patient and appropriate educational materials and side effects teaching were provided. Patient armband removed and shredded, IVs removed, taken off tele, wheeled down to ride.

## 2018-09-01 NOTE — PROGRESS NOTES
[facility] Patient: Kizzy Field Age: 66 y.o. : 1955 MR#: [id number] SSN: [id number] Date: 2018 Subjective:  
 
Feeling better. No chest or abdominal pain. No SOB or cough. States he is making good urine and flushing it so we do not have accurate Is/Os. No nausea or vomiting. Tolerating diet. Family is at bedside. Objective:  
 
VS: /81  Pulse 77  Temp 98.5 °F (36.9 °C)  Resp 18  Ht 5' 11\" (1.803 m)  Wt 131.4 kg (289 lb 9.6 oz)  SpO2 98%  BMI 40.39 kg/m2 Tmax/24hrs: Temp (24hrs), Av.2 °F (36.8 °C), Min:97.2 °F (36.2 °C), Max:98.6 °F (37 °C) Intake/Output Summary (Last 24 hours) at 18 6328 Last data filed at 18 4355 Gross per 24 hour Intake              600 ml Output              400 ml Net              200 ml General:  Alert, NAD Cardiovascular:  RRR Pulmonary:  CTA bilaterally. No wheezes GI: soft, ND, NT, BS + Extremities:  No pedal edema. Neuro: alert and oriented x 3, NAD Assessment/Plan: 1. ACS: S/p PETER to Cx. D/w dr. Sarkar Nighat - cont  mg with Plavix, statin and coreg. patient can go home from his side if he is making good urine. D/w dr. Susana Malave. 
2. Chest pain: resolved. 3. Hypokalemia: resolved. 4. HTN: cont current management. Increase hydralazine to home dose. 5. GIB hx: cont Pepcid and add PPI 6. HLD: ON statin 7. AWA on CKD III: creatinine stable post-procedure. IVF for 4 more hours. Can be monitored as an outpatient. 8. Constipation: resolved. State he will take OTC milk of magnesium 9. Obesity S/p sleeve gastrectomy on 18.  
 
--> Discharge patient home today Case discussed with:  [x]Patient  []Family  []Nursing  []Case Management DVT Prophylaxis:  []Lovenox  []Hep SQ  []SCDs  []Coumadin   [x]On Heparin gtt BMP:  
Lab Results Component Value Date/Time   2018 02:05 AM  
 K 4.2 2018 02:05 AM  
  09/01/2018 02:05 AM  
 CO2 29 09/01/2018 02:05 AM  
 AGAP 8 09/01/2018 02:05 AM  
  (H) 09/01/2018 02:05 AM  
 BUN 36 (H) 09/01/2018 02:05 AM  
 CREA 2.04 (H) 09/01/2018 02:05 AM  
 GFRAA 40 (L) 09/01/2018 02:05 AM  
 GFRNA 33 (L) 09/01/2018 02:05 AM  
 
CBC: No results found for: WBC, HGB, HGBEXT, HCT, HCTEXT, PLT, PLTEXT, HGBEXT, HCTEXT, PLTEXT Current Discharge Medication List  
  
START taking these medications Details  
aspirin (ASA) 300 mg suppository Insert 1 Suppository into rectum daily. Qty: 30 Suppository, Refills: 0  
  
atorvastatin (LIPITOR) 40 mg tablet Take 1 Tab by mouth nightly. Qty: 30 Tab, Refills: 0  
  
clopidogrel (PLAVIX) 75 mg tab Take 1 Tab by mouth daily. Qty: 30 Tab, Refills: 0  
  
carvedilol (COREG) 12.5 mg tablet Take 1 Tab by mouth two (2) times daily (with meals). Qty: 60 Tab, Refills: 0  
  
aspirin 81 mg chewable tablet Take 1 Tab by mouth two (2) times a day. Qty: 60 Tab, Refills: 0 CONTINUE these medications which have CHANGED Details  
isosorbide mononitrate ER (IMDUR) 60 mg CR tablet Take 1 Tab by mouth daily. Qty: 30 Tab, Refills: 0  
  
omeprazole (PRILOSEC) 40 mg capsule Take 1 Cap by mouth daily. Qty: 30 Cap, Refills: 0 CONTINUE these medications which have NOT CHANGED Details  
famotidine (PEPCID) 20 mg tablet Take 20 mg by mouth two (2) times a day. hydrALAZINE (APRESOLINE) 50 mg tablet Take 1 Tab by mouth two (2) times a day. Qty: 60 Tab, Refills: 1 Associated Diagnoses: Essential hypertension  
  
allopurinol (ZYLOPRIM) 100 mg tablet TAKE 1 TAB BY MOUTH DAILY. Qty: 90 Tab, Refills: 3  
  
cloNIDine (CATAPRES) 0.2 mg/24 hr patch APPLY 1 PATCH ONCE WEEKLY Qty: 12 Patch, Refills: 3  
  
oxyCODONE-acetaminophen (PERCOCET) 5-325 mg per tablet Take  by mouth every four (4) hours as needed for Pain. promethazine (PHENERGAN) 25 mg tablet Take 25 mg by mouth every six (6) hours as needed for Nausea. varicella-zoster recombinant, PF, (SHINGRIX, PF,) 50 mcg/0.5 mL susr injection Please repeat dose in 2-3 months 
Qty: 0.5 mL, Refills: 1 Associated Diagnoses: Encounter for immunization  
  
amLODIPine (NORVASC) 10 mg tablet TAKE 1 TAB BY MOUTH DAILY. Qty: 30 Tab, Refills: 1 Associated Diagnoses: Essential hypertension  
  
nitroglycerin (NITROSTAT) 0.4 mg SL tablet by SubLINGual route every five (5) minutes as needed for Chest Pain.  
  
gabapentin (NEURONTIN) 300 mg capsule Take 300 mg by mouth three (3) times daily. STOP taking these medications  
  
 labetalol (NORMODYNE) 100 mg tablet Comments:  
Reason for Stopping:   
   
 benazepril (LOTENSIN) 40 mg tablet Comments:  
Reason for Stopping:   
   
 potassium chloride SR (KLOR-CON 10) 10 mEq tablet Comments:  
Reason for Stopping:   
   
 hydroCHLOROthiazide (HYDRODIURIL) 12.5 mg tablet Comments:  
Reason for Stopping:   
   
 aspirin 81 mg tablet Comments:  
Reason for Stopping:   
   
 tolterodine ER (DETROL LA) 4 mg ER capsule Comments:  
Reason for Stopping:   
   
 furosemide (LASIX) 20 mg tablet Comments:  
Reason for Stopping:   
   
 furosemide (LASIX) 20 mg tablet Comments:  
Reason for Stopping:   
   
 sucralfate (CARAFATE) 100 mg/mL suspension Comments:  
Reason for Stopping:   
   
 colchicine 0.6 mg tablet Comments:  
Reason for Stopping:   
   
 avanafil (STENDRA) 200 mg tab tablet Comments:  
Reason for Stopping:   
   
  
 
 
 
Signed By: Carlos Smith MD   
 September 1, 2018

## 2018-09-01 NOTE — DISCHARGE INSTRUCTIONS
Learning About Percutaneous Coronary Intervention  What is percutaneous coronary intervention? Percutaneous coronary intervention (PCI) is the name for procedures to open a blocked coronary artery. The two most common are coronary angioplasty and coronary stent placement. A PCI is a way to open a blocked coronary artery before, during, or after a heart attack. It gets blood flowing to your heart. And it can help prevent heart problems by widening an artery that has been narrowed by fatty buildup (plaque). This also may be called balloon angioplasty. Before a PCI, a doctor does a test to find blocked arteries. The test is called cardiac catheterization. The doctor puts a tiny tube called a catheter into an artery in your groin or arm. The doctor moves the catheter through the artery to your heart. Then he or she puts a dye into the catheter. This makes your heart's arteries show up on a screen so the doctor can see any blockages. The test also can measure the pressure inside your heart's chambers. If you have a blocked artery, the doctor may do an angioplasty or a coronary stent procedure. In an angioplasty, the doctor uses a catheter with a tiny balloon at the tip. He or she puts it into the blocked area and inflates it. The balloon presses the plaque against the walls of the artery. This makes more room for blood to flow. In most cases, the doctor then puts a stent in the artery. A stent is a small, expandable tube that presses against the walls of the artery. The stent is left in the artery to keep the artery open. This helps blood flow. It also may keep small pieces of plaque from breaking off and causing a heart attack. How is PCI done? PCI is done in a cardiac catheterization laboratory (\"cath lab\"). It is done by a heart specialist called a cardiologist. The whole procedure may take 1½ to 3 hours. You lie on a table under a large X-ray machine. You will get medicine through an IV in one of your veins. It helps you relax and not feel pain. You will be awake during the procedure. But you may not be able to remember much about it. The doctor will inject some medicine into your arm or groin to numb the skin. You will feel a small needle stick. It's like having a blood test. You may feel some pressure when the doctor puts in the catheter. But you will not feel pain. The doctor will look at X-ray pictures on a monitor (like a TV set) to move the catheter to your heart. You may feel warm or flushed for a short time when the doctor injects dye into your artery. The doctor then will inflate a tiny balloon at the end of the catheter. The balloon is inflated for a brief time. Then it is deflated and removed. The doctor also may use the catheter to put a stent in the artery. What can you expect after PCI? The catheter will be removed. A nurse or doctor may press on a bandage on the opening. Then a bandage or a compression device may be placed on your groin or wrist at the catheter insertion site. This prevents bleeding. After the test, you will be taken to a room where the catheter site and your heart rate, blood pressure, and temperature will be checked several times. If the catheter was put in your groin, you will need to lie still and keep your leg straight for several hours. If the catheter was put in your arm, you may be able to sit up and get out of bed right away. But you will need to keep your arm still for at least one hour. The average hospital stay is 1 to 2 days for most procedures. When you go home, you will get instructions from your doctor to help you recover well and prevent problems. Make sure to drink plenty of fluids (unless your doctor tells you not to) for several hours after the test. This will help flush the dye out of your body. Your doctor will prescribe blood-thinning medicines. You will likely take aspirin plus another blood thinner.  It is very important that you take these medicines exactly as directed. They help keep the coronary artery open and reduce your risk of a heart attack. If you have this procedure, you will still need to make lifestyle changes like eating healthy, being active, and not smoking. This will give you the best chance for a longer, healthier life. Follow-up care is a key part of your treatment and safety. Be sure to make and go to all appointments, and call your doctor if you are having problems. It's also a good idea to know your test results and keep a list of the medicines you take. Where can you learn more? Go to http://davianIV Diagnosticsjohnson.info/. Enter Z273 in the search box to learn more about \"Learning About Percutaneous Coronary Intervention. \"  Current as of: May 10, 2017  Content Version: 11.7  © 2798-0458 [employer]. Care instructions adapted under license by [employer] (which disclaims liability or warranty for this information). If you have questions about a medical condition or this instruction, always ask your healthcare professional. [address] any warranty or liability for your use of this information. Percutaneous Coronary Intervention: What to Expect at [facility]    Percutaneous coronary intervention (PCI) is the name for procedures that are used to open a narrowed or blocked coronary artery. The two most common PCI procedures are coronary angioplasty and coronary stent placement. Your groin or arm may have a bruise and feel sore for a day or two after a percutaneous coronary intervention (PCI). You can do light activities around the house, but nothing strenuous for several days. This care sheet gives you a general idea about how long it will take for you to recover. But each person recovers at a different pace. Follow the steps below to get better as quickly as possible. How can you care for yourself at home?   Activity    · If the doctor gave you a sedative:  ¨ For 24 hours, don't do anything that requires attention to detail. It takes time for the medicine's effects to completely wear off. ¨ For your safety, do not drive or operate any machinery that could be dangerous. Wait until the medicine wears off and you can think clearly and react easily.     · Do not do strenuous exercise and do not lift, pull, or push anything heavy until your doctor says it is okay. This may be for a day or two. You can walk around the house and do light activity, such as cooking.     · If the catheter was placed in your groin, try not to walk up stairs for the first couple of days.     · If the catheter was placed in your arm near your wrist, do not bend your wrist deeply for the first couple of days. Be careful using your hand to get into and out of a chair or bed.     · Carry your stent identification card with you at all times.     · If your doctor recommends it, get more exercise. Walking is a good choice. Bit by bit, increase the amount you walk every day. Try for at least 30 minutes on most days of the week. Diet    · Drink plenty of fluids to help your body flush out the dye. If you have kidney, heart, or liver disease and have to limit fluids, talk with your doctor before you increase the amount of fluids you drink.     · Keep eating a heart-healthy diet that has lots of fruits, vegetables, and whole grains. If you have not been eating this way, talk to your doctor. You also may want to talk to a dietitian. This expert can help you to learn about healthy foods and plan meals. Medicines    · Your doctor will tell you if and when you can restart your medicines. He or she will also give you instructions about taking any new medicines.     · If you take blood thinners, such as warfarin (Coumadin), clopidogrel (Plavix), or aspirin, be sure to talk to your doctor. He or she will tell you if and when to start taking those medicines again.  Make sure that you understand exactly what your doctor wants you to do.     · Your doctor will prescribe blood-thinning medicines. You will likely take aspirin plus another antiplatelet, such as clopidogrel (Plavix). It is very important that you take these medicines exactly as directed. These medicines help keep the coronary artery open and reduce your risk of a heart attack.     · Call your doctor if you think you are having a problem with your medicine.    Care of the catheter site    · For 1 or 2 days, keep a bandage over the spot where the catheter was inserted. The bandage probably will fall off in this time.     · Put ice or a cold pack on the area for 10 to 20 minutes at a time to help with soreness or swelling. Put a thin cloth between the ice and your skin.     · You may shower 24 to 48 hours after the procedure, if your doctor okays it. Pat the incision dry.     · Do not soak the catheter site until it is healed. Don't take a bath for 1 week, or until your doctor tells you it is okay.     · If you are bleeding, lie down and press on the area for 15 minutes to try to make it stop. If the bleeding does not stop, call your doctor or seek immediate medical care. Follow-up care is a key part of your treatment and safety. Be sure to make and go to all appointments, and call your doctor if you are having problems. It's also a good idea to know your test results and keep a list of the medicines you take. When should you call for help? Call 911 anytime you think you may need emergency care. For example, call if:    · You passed out (lost consciousness).     · You have severe trouble breathing.     · You have sudden chest pain and shortness of breath, or you cough up blood.     · You have symptoms of a heart attack, such as:  ¨ Chest pain or pressure. ¨ Sweating. ¨ Shortness of breath. ¨ Nausea or vomiting. ¨ Pain that spreads from the chest to the neck, jaw, or one or both shoulders or arms. ¨ Dizziness or lightheadedness. ¨ A fast or uneven pulse.   After calling 911, chew 1 adult-strength aspirin. Wait for an ambulance. Do not try to drive yourself.     · You have been diagnosed with angina, and you have angina symptoms that do not go away with rest or are not getting better within 5 minutes after you take one dose of nitroglycerin.    Call your doctor now or seek immediate medical care if:    · You are bleeding from the area where the catheter was put in your artery.     · You have a fast-growing, painful lump at the catheter site.     · You have signs of infection, such as:  ¨ Increased pain, swelling, warmth, or redness. ¨ Red streaks leading from the catheter site. ¨ Pus draining from the catheter site. ¨ A fever.     · Your leg or arm looks blue or feels cold, numb, or tingly.    Watch closely for changes in your health, and be sure to contact your doctor if you have any problems. Where can you learn more? Go to http://davianWyzAnt.comjohnson.info/. Enter W112 in the search box to learn more about \"Percutaneous Coronary Intervention: What to Expect at Home. \"  Current as of: December 6, 2017  Content Version: 11.7  © 1767-1649 [employer]. Care instructions adapted under license by [employer] (which disclaims liability or warranty for this information). If you have questions about a medical condition or this instruction, always ask your healthcare professional. [address] any warranty or liability for your use of this information. DASH Diet: Care Instructions  Your Care Instructions    The DASH diet is an eating plan that can help lower your blood pressure. DASH stands for Dietary Approaches to Stop Hypertension. Hypertension is high blood pressure. The DASH diet focuses on eating foods that are high in calcium, potassium, and magnesium. These nutrients can lower blood pressure. The foods that are highest in these nutrients are fruits, vegetables, low-fat dairy products, nuts, seeds, and legumes.  But taking calcium, potassium, and magnesium supplements instead of eating foods that are high in those nutrients does not have the same effect. The DASH diet also includes whole grains, fish, and poultry. The DASH diet is one of several lifestyle changes your doctor may recommend to lower your high blood pressure. Your doctor may also want you to decrease the amount of sodium in your diet. Lowering sodium while following the DASH diet can lower blood pressure even further than just the DASH diet alone. Follow-up care is a key part of your treatment and safety. Be sure to make and go to all appointments, and call your doctor if you are having problems. It's also a good idea to know your test results and keep a list of the medicines you take. How can you care for yourself at home? Following the DASH diet  · Eat 4 to 5 servings of fruit each day. A serving is 1 medium-sized piece of fruit, ½ cup chopped or canned fruit, 1/4 cup dried fruit, or 4 ounces (½ cup) of fruit juice. Choose fruit more often than fruit juice. · Eat 4 to 5 servings of vegetables each day. A serving is 1 cup of lettuce or raw leafy vegetables, ½ cup of chopped or cooked vegetables, or 4 ounces (½ cup) of vegetable juice. Choose vegetables more often than vegetable juice. · Get 2 to 3 servings of low-fat and fat-free dairy each day. A serving is 8 ounces of milk, 1 cup of yogurt, or 1 ½ ounces of cheese. · Eat 6 to 8 servings of grains each day. A serving is 1 slice of bread, 1 ounce of dry cereal, or ½ cup of cooked rice, pasta, or cooked cereal. Try to choose whole-grain products as much as possible. · Limit lean meat, poultry, and fish to 2 servings each day. A serving is 3 ounces, about the size of a deck of cards. · Eat 4 to 5 servings of nuts, seeds, and legumes (cooked dried beans, lentils, and split peas) each week. A serving is 1/3 cup of nuts, 2 tablespoons of seeds, or ½ cup of cooked beans or peas.   · Limit fats and oils to 2 to 3 servings each day. A serving is 1 teaspoon of vegetable oil or 2 tablespoons of salad dressing. · Limit sweets and added sugars to 5 servings or less a week. A serving is 1 tablespoon jelly or jam, ½ cup sorbet, or 1 cup of lemonade. · Eat less than 2,300 milligrams (mg) of sodium a day. If you limit your sodium to 1,500 mg a day, you can lower your blood pressure even more. Tips for success  · Start small. Do not try to make dramatic changes to your diet all at once. You might feel that you are missing out on your favorite foods and then be more likely to not follow the plan. Make small changes, and stick with them. Once those changes become habit, add a few more changes. · Try some of the following:  ¨ Make it a goal to eat a fruit or vegetable at every meal and at snacks. This will make it easy to get the recommended amount of fruits and vegetables each day. ¨ Try yogurt topped with fruit and nuts for a snack or healthy dessert. ¨ Add lettuce, tomato, cucumber, and onion to sandwiches. ¨ Combine a ready-made pizza crust with low-fat mozzarella cheese and lots of vegetable toppings. Try using tomatoes, squash, spinach, broccoli, carrots, cauliflower, and onions. ¨ Have a variety of cut-up vegetables with a low-fat dip as an appetizer instead of chips and dip. ¨ Sprinkle sunflower seeds or chopped almonds over salads. Or try adding chopped walnuts or almonds to cooked vegetables. ¨ Try some vegetarian meals using beans and peas. Add garbanzo or kidney beans to salads. Make burritos and tacos with mashed colmenares beans or black beans. Where can you learn more? Go to http://davian-johnson.info/. Enter D013 in the search box to learn more about \"DASH Diet: Care Instructions. \"  Current as of: December 6, 2017  Content Version: 11.7  © 2777-9746 [employer].  Care instructions adapted under license by [employer] (which disclaims liability or warranty for this information). If you have questions about a medical condition or this instruction, always ask your healthcare professional. [place] [address] any warranty or liability for your use of this information.

## 2018-09-01 NOTE — PROGRESS NOTES
RENAL PROGRESS NOTE Luis Alfredo Rdz Assessment/Plan: · AWA on ckd 3 in a setting of nstemi. Scr is stable since yesterday, no significant contrast nephropathy after yesterday's cath/intervention. · HTN. Agree with d/c regimen. For now continue to hold ace I. Continue to hold diuretics given recent gastric sleeve surgery. · CAD/nstemi, s/p cxr intervention yesterday. EF is preserved. · After d/c patient is to f/u with Dr. Juan Salcido at our [place] office in 2 weeks. Patient is to call for appt. Importance d/w patient. Subjective: 
Patient complaints off: No complaints except vomited lunch. No SOB/CP. Ambulates in the room. Making urine. Patient Active Problem List  
Diagnosis Code  Chest pain 786.5  Hypercholesterolemia E78.00  Sleep apnea G47.30  Left ventricular diastolic dysfunction C95.6  Coronary artery disease I25.10  Atypical chest pain/mild nonobstructive CAD/EF 65% R07.89  
 Hypogonadism male E29.1  Morbid obesity ([facility]) E66.01  
 Elevated PSA R97.20  
 Overactive bladder N32.81  
 Impotence of organic origin N52.9  Slowing of urinary stream R39.198  
 Frequency [id number]  Gout M10.9  Sleep apnea G47.30  Type 2 diabetes mellitus without complication ([facility]) J58.7  Chronic edema R60.9  Essential hypertension I10  
 Prostate cancer ([facility] [address] C61  Morbid (severe) obesity due to excess calories ([facility]) E66.01  
 Hypokalemia E87.6  ACS (acute coronary syndrome) ([facility]) I24.9  AWA (acute kidney injury) ([facility] [address] N17.9  Acute renal failure superimposed on stage 3 chronic kidney disease (HCC) N17.9, N18.3  
 S/P gastric surgery Z98.890 Current Facility-Administered Medications Medication Dose Route Frequency Provider Last Rate Last Dose  hydrALAZINE (APRESOLINE) tablet 50 mg  50 mg Oral TID Zackary Johnston MD      
 pantoprazole (PROTONIX) tablet 40 mg  40 mg Oral ACB Zackary Billingsley MD   40 mg at 09/01/18 1100  aspirin chewable tablet 81 mg  81 mg Oral BID Mariza Estes MD   81 mg at 09/01/18 1100  
 acetylcysteine (MUCOMYST) 200 mg/mL (20 %) solution 1,200 mg  1,200 mg Oral Q12H Ramos Guevara MD   1,200 mg at 09/01/18 0900  famotidine (PEPCID) tablet 20 mg  20 mg Oral BID Rodolfo Dempsey MD   20 mg at 09/01/18 0811  
 sodium chloride (NS) flush 5-10 mL  5-10 mL IntraVENous Q8H King Meghan MD   10 mL at 09/01/18 0600  
 sodium chloride (NS) flush 5-10 mL  5-10 mL IntraVENous PRN King Meghan MD      
 temazepam (RESTORIL) capsule 15 mg  15 mg Oral QHS PRN King Meghan MD      
 atropine injection 0.5 mg  0.5 mg IntraVENous PRN King Meghan MD      
 clopidogrel (PLAVIX) tablet 75 mg  75 mg Oral DAILY King Meghan MD   75 mg at 09/01/18 5319  
 magnesium hydroxide (MILK OF MAGNESIA) 400 mg/5 mL oral suspension 30 mL  30 mL Oral DAILY PRN Priti Paeg NP      
 bisacodyl (DULCOLAX) suppository 10 mg  10 mg Rectal DAILY PRN Mariza Estes MD      
 cloNIDine (CATAPRES) 0.2 mg/24 hr patch 1 Patch  1 Patch TransDERmal Q7D Rodolfo Dempsey MD   Stopped at 08/29/18 2955  carvedilol (COREG) tablet 12.5 mg  12.5 mg Oral BID WITH MEALS King Meghan MD   12.5 mg at 09/01/18 0811  
 atorvastatin (LIPITOR) tablet 40 mg  40 mg Oral QHS King Meghan MD   40 mg at 08/31/18 2146  amLODIPine (NORVASC) tablet 10 mg  10 mg Oral DAILY Rodolfo Dempsey MD   10 mg at 09/01/18 1374  
 isosorbide mononitrate ER (IMDUR) tablet 60 mg  60 mg Oral DAILY Rodolfo Dempsey MD   60 mg at 09/01/18 0811  
 nitroglycerin (NITROSTAT) tablet 0.4 mg  0.4 mg SubLINGual Q5MIN PRN Rodolfo Dempsey MD      
 hydrALAZINE (APRESOLINE) 20 mg/mL injection 10 mg  10 mg IntraVENous Q6H PRN Rodolfo Dempsey MD      
  acetaminophen (TYLENOL) tablet 650 mg  650 mg Oral Q6H PRN Genaro Tejada MD      
 oxyCODONE-acetaminophen (PERCOCET) 5-325 mg per tablet 1 Tab  1 Tab Oral Q6H PRN Genaro Tejada MD      
 naloxone [facility]) injection 0.4 mg  0.4 mg IntraVENous PRN Genaro Tejada MD      
 ondansetron [facility]) injection 4 mg  4 mg IntraVENous Q6H PRN Genaro Tejada MD      
 docusate sodium (COLACE) capsule 100 mg  100 mg Oral BID PRN Genaro Tejada MD      
 
 
Objective Vitals:  
 09/01/18 0716 09/01/18 0800 09/01/18 0925 09/01/18 1059 BP: (!) 180/107  160/81 137/71 Pulse: 77   74 Resp: 18   18 Temp: 98.5 °F (36.9 °C)   97.8 °F (36.6 °C) SpO2: 98% 98%  96% Weight:      
Height:      
 
 
 
Intake/Output Summary (Last 24 hours) at 09/01/18 1321 Last data filed at 09/01/18 1234 Gross per 24 hour Intake              840 ml Output              400 ml Net              440 ml Admission weight: Weight: 127.9 kg (282 lb) (08/28/18 1638) Last Weight Metrics: 
Weight Loss Metrics 9/1/2018 8/28/2018 8/28/2018 8/28/2018 5/17/2018 2/1/2018 11/9/2017 Pre op / Initial Wt - - - - - - - Today's Wt 289 lb 9.6 oz - 284 lb - 302 lb 316 lb 3.2 oz 309 lb BMI - 40.39 kg/m2 - 39.61 kg/m2 42.12 kg/m2 44.1 kg/m2 43.1 kg/m2 Ideal Body Wt - - - - - - - Excess Body Wt - - - - - - - Wt loss to date - - - - - - -  
% Wt Loss - - - - - - -  
80% EBW - - - - - - - Physical Assessment:  
 
General: NAD, alert and oriented. Neck: No jvd. LUNGS: Clear to Auscultation, No rales, rhonchi or wheezes. CVS EXM: S1, S2  RRR. Abdomen: soft, non tender. Lower Extremities:  1+  edema. Lab CBC w/Diff Recent Labs 08/30/18 
 0030 WBC  5.0  
RBC  4.41* HGB  11.9*  
HCT  37.3 PLT  181 GRANS  47 LYMPH  44 EOS  4 Chemistry Recent Labs  
   09/01/18 
 0205  08/31/18 
 8840  08/30/18 
 0030 GLU  123*  80  106* NA  141  143  141  
K  4.2  3.6  3.6 CL  104  105  103 CO2  29  30  31 BUN  36*  30*  35* CREA  2.04*  1.96*  2.47* CA  8.2*  8.7  8.3* AGAP  8  8  7 BUCR  18  15  14 Lab Results Component Value Date/Time Iron 60 11/08/2012 12:13 PM  
 Lab Results Component Value Date/Time Calcium 8.2 (L) 09/01/2018 02:05 AM  
 Phosphorus 4.5 08/29/2018 04:25 AM  
  
 
Sadie Krishnamurthy M.D. [facility] Phone [phone] Pager [phone]

## 2018-09-01 NOTE — PROGRESS NOTES
9/1/2018 RE: Violeta Sepulveda To Whom it May Concern: This is to certify that Violeta Sepulveda was hospitalized at our facility [facility] from August 28- September 1, 2018. Please feel free to contact my office if you have any questions or concerns. Thank you for your assistance in this matter. Sincerely, Pily Carl [phone]

## 2018-09-02 NOTE — DISCHARGE SUMMARY
[address]  MR#: [id number]  : 1955  ACCOUNT #: [de-identified]   ADMIT DATE: 2018  DISCHARGE DATE: 2018    DISPOSITION:  Discharge to home. DISCHARGE CONDITION:  Stable. DISCHARGE DIAGNOSES:  1.  Non-ST elevation myocardial infarction status post treatment. 2.  Abnormal nuclear stress test and cardiac catheterization. 3.  Coronary artery disease and left circumflex stenting placement. 4.  History of gastrointestinal bleed. 5.  Recent gastric bypass surgery for obesity. 6.  Family history of cardiac disease. 7.  Hyperlipidemia. 8.  Acute on stage III chronic kidney disease, now better. 9.  Nausea and vomiting, resolved. 10.  Constipation, resolved. DISCHARGE MEDICATIONS:  1. Aspirin 81 mg twice a day. 2.  Plavix 75 mg daily. 3.  Lipitor 40 mg daily. 4.  Coreg 12.5 mg b.i.d.  5.  Imdur 60 mg daily. 6.  Prilosec 40 mg daily. 7.  Pepcid 20 mg twice a day. 8.  Hydralazine 50 mg 3 times a day. 9.  Allopurinol 100 mg daily if he was taking it. 10.  Clonidine 0.2 mg weekly patch. 11.  Percocet p.r.n.  12.  Phenergan p.r.n.  13.  Norvasc 10 mg daily. 14.  Nitrostat 0.4 mg p.r.n.  15.  Neurontin 300 mg 3 times a day if he was taking it. IMAGING AND PROCEDURES:  1.  X-ray chest was done at the time of admission, reported cardiomegaly. 2.  CT head was done, reported no acute intracranial finding. 3.  Renal ultrasound was done, showed right kidney and bladder unremarkable, left kidney not visualized due to bowel gas. 4.  Echocardiogram was done, showed ejection fraction of 56-60%. 5.  Coronary angiogram was performed. The patient had 3-vessel coronary artery disease with 50% mid right coronary stenosis, 70% ostial second diagonal stenosis and 90% mid circumflex stenosis. Successful coronary intervention of mid circumflex, deploying drug-eluting stent. CONSULTANTS:  1. Cardiology with Dr. Dora Licea.   2.  Nephrology with Dr. Dominick Atkinson. HOSPITAL COURSE:  The patient was admitted to the hospital on 08/28/2018 with a complaint of chest pain. Please refer to hospital admission H and P for further detail. Patient was found out to have acute on stage III chronic kidney disease and non-STEMI  at the time of admission. With regards to non-STEMI, he was treated with a heparin drip. He also had hypotension, so his home medication was adjusted. Because of the patient having acute on stage III chronic kidney disease. His treatment was delayed for 1 extra day. He had a stress test done which was abnormal.  The patient underwent cardiac catheterization and he was given preparation with the steroid before because he was ALLERGIC TO IODINE. He tolerated the procedure very well. He had 3-vessel coronary artery disease as mentioned above and had a drug-eluting stent placed to mid circumflex. He did not have any more chest pain. He was started on Plavix and also was recommended that he should be continued 162 mg aspirin. The patient was tolerating this medication without any problem. I spoke to Dr. Hannah Lemus and Dr. June Torres on the day of discharge, both cleared for the discharge as the patient was making enough urine and his creatinine has baseline about 1.9-2. The patient was advised to increase a little bit fluid intake at least for next few days and have BMP done as an outpatient. He was also being followed by the nephrologist here and had a renal ultrasound done which showed above-mentioned finding. Patient will be continued on clonidine, Coreg, hydralazine and Imdur for his hypertension. He was taken off of hydrochlorothiazide, Lasix and losartan because of acute on stage III chronic kidney disease. The patient did not have any nausea, vomiting, tolerating diet very well. He had constipation, which was treated appropriately. On the day of discharge, he was doing better and wanted to go home.   He will be discharged home with above-mentioned medication. DISCHARGE INSTRUCTIONS:  Gastric bypass diet. ACTIVITY:  As tolerated. FOLLOWUP:   1. Follow up with PCP in five 5-7 days. 2.  Follow up with Dr. Elyse Begum office in 2-3 weeks. 3.  Follow up with her Dr. Dominick Atkinson in 10-14 days. 4.  BMP around 09/04/2018. The patient was encouraged to keep appointment on this Tuesday with Dr. Kiara Carrillo, who is his gastric bypass surgeon. Total time is greater than 35 minutes.       MD ALEXIS Mcintosh/MACKENZIE  D: 09/01/2018 10:35     T: 09/01/2018 11:24  JOB #: [id number]  CC: Florentino Miles MD  CC: Zunilda Carrera MD  CC: Mar Baez MD  CC: Angelo La  CC: Latrice Priest MD

## 2018-09-04 ENCOUNTER — TELEPHONE (OUTPATIENT)
Dept: CARDIAC REHAB | Age: 63
End: 2018-09-04

## 2018-09-04 NOTE — TELEPHONE ENCOUNTER
Cardiac rehab missed seeing patient over the weekend. Information mailed to patient about relaxation, exercise, nutrition, smoking cessation and the outpatient cardiac rehab program. Will forward information to the  so that patient may begin the next phase of recovery.

## 2018-09-06 LAB
ACT BLD: 142 SECS (ref 79–138)
ACT BLD: 219 SECS (ref 79–138)

## 2018-09-10 RX ORDER — IPRATROPIUM BROMIDE AND ALBUTEROL SULFATE 2.5; .5 MG/3ML; MG/3ML
3 SOLUTION RESPIRATORY (INHALATION)
Qty: 270 NEBULE | Refills: 1 | Status: CANCELLED | OUTPATIENT
Start: 2018-09-10

## 2018-09-10 NOTE — TELEPHONE ENCOUNTER
Patient came in for refill of IPRAT-ALBUT 0.5-3(2.5) mg 3 ml  Inhale contents of 1 vial every 6 hours if needed    Nebulizer solution      Did not see it on his med list.

## 2018-09-13 ENCOUNTER — OFFICE VISIT (OUTPATIENT)
Dept: INTERNAL MEDICINE CLINIC | Age: 63
End: 2018-09-13

## 2018-09-13 VITALS
HEART RATE: 63 BPM | TEMPERATURE: 98.8 F | RESPIRATION RATE: 18 BRPM | BODY MASS INDEX: 39.2 KG/M2 | HEIGHT: 71 IN | OXYGEN SATURATION: 96 % | DIASTOLIC BLOOD PRESSURE: 86 MMHG | WEIGHT: 280 LBS | SYSTOLIC BLOOD PRESSURE: 138 MMHG

## 2018-09-13 DIAGNOSIS — R60.9 CHRONIC EDEMA: ICD-10-CM

## 2018-09-13 DIAGNOSIS — Z98.890 S/P GASTRIC SURGERY: ICD-10-CM

## 2018-09-13 DIAGNOSIS — E66.01 MORBID OBESITY (HCC): ICD-10-CM

## 2018-09-13 DIAGNOSIS — E87.6 HYPOKALEMIA: ICD-10-CM

## 2018-09-13 DIAGNOSIS — I10 ESSENTIAL HYPERTENSION: Primary | ICD-10-CM

## 2018-09-13 DIAGNOSIS — I24.9 ACS (ACUTE CORONARY SYNDROME) (HCC): ICD-10-CM

## 2018-09-13 NOTE — PROGRESS NOTES
Makenna Smith is a 66 y.o. male presenting today for Well Male (4 month follow up)  . HPI:  Makenna Smith presents to the office today for hypertension, diabetes, acute coronary syndrome and obesity follow-up care. Patient is s/p non-ST elevation cardiac infarction on 8/28/18. Coronary angiogram was performed. The patient had 3-vessel coronary artery disease with 50% mid right coronary stenosis, 70% ostial second diagonal stenosis and 90% mid circumflex stenosis. Successful coronary intervention of mid circumflex, deploying drug-eluting stent. Patient presents today without complaints of chest pain, palpation or dyspnea. He is scheduled to see cardiology next week. He is s/p gastric bypass x 2 weeks and has lost over 20 lbs. His last HBA1C was 5.3 and he is not taking diabetic medications. Review of Systems   Constitutional: Negative for malaise/fatigue. Respiratory: Negative for cough and shortness of breath. Cardiovascular: Positive for leg swelling. Negative for chest pain and palpitations. Neurological: Negative for dizziness and headaches. Allergies   Allergen Reactions    Iodine Other (comments)     Eyes swell shut      Shellfish Containing Products Swelling       Current Outpatient Prescriptions   Medication Sig Dispense Refill    isosorbide mononitrate ER (IMDUR) 60 mg CR tablet Take 1 Tab by mouth daily. 30 Tab 0    omeprazole (PRILOSEC) 40 mg capsule Take 1 Cap by mouth daily. 30 Cap 0    atorvastatin (LIPITOR) 40 mg tablet Take 1 Tab by mouth nightly. 30 Tab 0    clopidogrel (PLAVIX) 75 mg tab Take 1 Tab by mouth daily. 30 Tab 0    carvedilol (COREG) 12.5 mg tablet Take 1 Tab by mouth two (2) times daily (with meals). 60 Tab 0    aspirin 81 mg chewable tablet Take 1 Tab by mouth two (2) times a day. 60 Tab 0    oxyCODONE-acetaminophen (PERCOCET) 5-325 mg per tablet Take  by mouth every four (4) hours as needed for Pain.       famotidine (PEPCID) 20 mg tablet Take 20 mg by mouth two (2) times a day.  promethazine (PHENERGAN) 25 mg tablet Take 25 mg by mouth every six (6) hours as needed for Nausea.  hydrALAZINE (APRESOLINE) 50 mg tablet Take 1 Tab by mouth two (2) times a day. 60 Tab 1    allopurinol (ZYLOPRIM) 100 mg tablet TAKE 1 TAB BY MOUTH DAILY. 90 Tab 3    amLODIPine (NORVASC) 10 mg tablet TAKE 1 TAB BY MOUTH DAILY. 30 Tab 1    cloNIDine (CATAPRES) 0.2 mg/24 hr patch APPLY 1 PATCH ONCE WEEKLY 12 Patch 3    nitroglycerin (NITROSTAT) 0.4 mg SL tablet by SubLINGual route every five (5) minutes as needed for Chest Pain.  gabapentin (NEURONTIN) 300 mg capsule Take 300 mg by mouth three (3) times daily.       varicella-zoster recombinant, PF, (SHINGRIX, PF,) 50 mcg/0.5 mL susr injection Please repeat dose in 2-3 months 0.5 mL 1       Past Medical History:   Diagnosis Date    BPH without obstruction/lower urinary tract symptoms     Bursitis of right shoulder     CAD (coronary artery disease)     Chest pain     history of hospitalization with chest pain and a negative workup in 2008    Chronic edema     Constipation     die to medication    Coronary artery disease     mild, non obstructive/EF 65%    Dyspnea on exertion     Echocardiogram 12/16/08    IVC dilated; suboptimal endocardial border; EF 65%    ED (erectile dysfunction)     Elevated PSA     Frequency     GERD (gastroesophageal reflux disease)     Gout     H/O cystoscopy 06/20/2013    Hematuria, unspecified     Hypercholesterolemia     Hypertension     Hypertension      Hypogonadism male     Impotence of organic origin     Left ventricular diastolic dysfunction     Malignant neoplasm of prostate (HCC)     hx of t1a, izzy 6, 5 % of 1  core    Morbid obesity ([facility] [address]     Myocardial perfusion 09/05/08    Basal inferior defect c/w artifact; EF 63%    Overactive bladder     S/P cardiac cath 07/30/10    oD2-40%; pRCA-20-30%; EF 65%    Sleep apnea     Slowing of urinary stream     Type II or unspecified type diabetes mellitus without mention of complication, not stated as uncontrolled        Past Surgical History:   Procedure Laterality Date    HX HEART CATHETERIZATION  7/30/10    HX OTHER SURGICAL  06/27/13    Prostate    HX TONSILLECTOMY      DE COLONOSCOPY FLX DX W/COLLJ SPEC WHEN PFRMD      DE I & D ABSC XTRAORAL SOFT TISS COMP         Social History     Social History    Marital status: SINGLE     Spouse name: N/A    Number of children: N/A    Years of education: N/A     Occupational History    Not on file. Social History Main Topics    Smoking status: Former Smoker     Types: Cigars     Quit date: 10/4/1999    Smokeless tobacco: Never Used    Alcohol use No      Comment: socially    Drug use: No    Sexual activity: Not Currently     Other Topics Concern    Not on file     Social History Narrative       Patient does not have an advanced directive on file    Vitals:    09/13/18 1454   BP: 138/86   Pulse: 63   Resp: 18   Temp: 98.8 °F (37.1 °C)   TempSrc: Tympanic   SpO2: 96%   Weight: 280 lb (127 kg)   Height: 5' 11\" (1.803 m)   PainSc:   0 - No pain       Physical Exam   Constitutional: No distress. Cardiovascular: Normal rate, regular rhythm and normal heart sounds. Pulmonary/Chest: Effort normal and breath sounds normal.   Abdominal: Soft. Musculoskeletal: He exhibits edema (1-2+ in bilateral lower extremities). Lymphadenopathy:     He has no cervical adenopathy. Neurological: He is alert. Nursing note and vitals reviewed. Admission on 08/28/2018, Discharged on 09/01/2018   No results displayed because visit has over 200 results.       Hospital Outpatient Visit on 06/29/2018   Component Date Value Ref Range Status    [facility] SPECIMEN COL 06/29/2018 Specimens collected/sent to [facility]    Final   Orders Only on 06/29/2018   Component Date Value Ref Range Status    Triglyceride 06/29/2018 81  40 - 149 mg/dL Final    HDL Cholesterol 06/29/2018 68* 40 - 59 mg/dL Final    Cholesterol, total 06/29/2018 201* 110 - 200 mg/dL Final    CHOLESTEROL/HDL 06/29/2018 3.0  0.0 - 5.0 Final    LDL, calculated 06/29/2018 117* 50 - 99 mg/dL Final    VLDL, calculated 06/29/2018 16  8 - 30 mg/dL Final    Comment: Test includes cholesterol, HDL cholesterol, triglycerides and LDL. Cholesterol Recommended NCEP guidelines in mg/dL:  Less than 200            Desirable  200 - 239                Borderline High  Greater than or  = 240   High  Please Note:  Total Chol/HDL Ratio                   Men     Women  1/2 Avg. Risk    3.4     3.3      Avg. Risk    5.0     4.4  2X  Avg. Risk    9.6     7.1  3X  Avg. Risk   23.4    11.0      Glucose 06/29/2018 94  70 - 99 mg/dL Final    BUN 06/29/2018 32* 6 - 22 mg/dL Final    Creatinine 06/29/2018 1.8* 0.8 - 1.6 mg/dL Final    Sodium 06/29/2018 148* 133 - 145 mmol/L Final    Potassium 06/29/2018 3.8  3.5 - 5.5 mmol/L Final    Chloride 06/29/2018 105  98 - 110 mmol/L Final    CO2 06/29/2018 30  20 - 32 mmol/L Final    AST (SGOT) 06/29/2018 18  10 - 37 U/L Final    ALT (SGPT) 06/29/2018 16  5 - 40 U/L Final    Alk. phosphatase 06/29/2018 66  40 - 125 U/L Final    Bilirubin, total 06/29/2018 0.4  0.2 - 1.2 mg/dL Final    Calcium 06/29/2018 8.8  8.4 - 10.4 mg/dL Final    Protein, total 06/29/2018 6.0* 6.2 - 8.1 g/dL Final    Albumin 06/29/2018 3.6  3.5 - 5.0 g/dL Final    A-G Ratio 06/29/2018 1.5  1.1 - 2.6 ratio Final    Globulin 06/29/2018 2.4  2.0 - 4.0 g/dL Final    Anion gap 06/29/2018 13.0  mmol/L Final    Comment: Test includes Albumin, Alkaline Phosphatase, ALT, AST, BUN, Calcium, CO2,  Chloride, Creatinine, Glucose, Potassium, Sodium, Total Bilirubin and Total  Protein. Estimated GFR results are reported in mL/min/1.73 sq.m. by the MDRD equation. This eGFR is validated for stable chronic renal failure patients. This   equation  is unreliable in acute illness or patients with normal renal function.       [facility] 06/29/2018 46.1* >60.0 Final    GFRNA 06/29/2018 38.1* >60.0 Final    Hep C Virus Ab 06/29/2018 None Detected  None Detec Final    TSH 06/29/2018 0.80  0.27 - 4.20 mcU/mL Final    WBC 06/29/2018 5.4  4.0 - 11.0 K/uL Final    RBC 06/29/2018 4.35  3.80 - 5.80 M/uL Final    HGB 06/29/2018 11.6* 13.1 - 17.2 g/dL Final    HCT 06/29/2018 39.1* 39.3 - 51.6 % Final    MCV 06/29/2018 90  80 - 95 fL Final    MCH 06/29/2018 27  26 - 34 pg Final    MCHC 06/29/2018 30* 31 - 36 g/dL Final    RDW 06/29/2018 18.7* 10.0 - 15.5 % Final    PLATELET 28/76/9063 170  140 - 440 K/uL Final    MPV 06/29/2018 10.3  9.0 - 13.0 fL Final    NEUTROPHILS 06/29/2018 56  40 - 75 % Final    Lymphocytes 06/29/2018 34  20 - 45 % Final    MONOCYTES 06/29/2018 7  3 - 12 % Final    EOSINOPHILS 06/29/2018 3  0 - 6 % Final    BASOPHILS 06/29/2018 0  0 - 2 % Final    ABS. NEUTROPHILS 06/29/2018 3.0  1.8 - 7.7 K/uL Final    ABSOLUTE LYMPHOCYTE COUNT 06/29/2018 1.8  1.0 - 4.8 K/uL Final    ABS. MONOCYTES 06/29/2018 0.4  0.1 - 1.0 K/uL Final    ABS. EOSINOPHILS 06/29/2018 0.2  0.0 - 0.5 K/uL Final    ABS. BASOPHILS 06/29/2018 0.0  0.0 - 0.2 K/uL Final    Hemoglobin A1c 06/29/2018 5.3  4.8 - 5.9 % Final    AVG GLU 06/29/2018 104  91 - 123 mg/dL Final       .No results found for any visits on 09/13/18. Assessment / Plan:      ICD-10-CM ICD-9-CM    1. Essential hypertension I10 401.9    2. Morbid obesity ([facility] [address] E66.01 278.01    3. ACS (acute coronary syndrome) (HCC) I24.9 411.1    4. Chronic edema R60.9 782.3    5. S/P gastric surgery Z98.890 V45.89    6. Hypokalemia E87.6 276.8      HTN- controlled this visit  ACS- patient denies any CP since discharge. She is scheduled to see Cardiology next week  Edema- patient reports symptoms improved with elevation  Hypokalemia-last postassium was 4.2    Follow-up Disposition:  Return in about 4 months (around 1/13/2019), or if symptoms worsen or fail to improve.     I asked the patient if he  had any questions and answered his  questions.   The patient stated that he understands the treatment plan and agrees with the treatment plan

## 2018-09-13 NOTE — MR AVS SNAPSHOT
[facility] 
 
 
 [address], [address] [facility] [address] 
[phone] Patient: Jessica Dill MRN: [id number] WVZ:[birth year] Visit Information Date & Time Provider Department Dept. Phone Encounter #  
 9/13/2018  3:00 PM Ann Christianson NP [facility] INTERNAL MEDICINE OF [place] [phone] [id number] Your Appointments 9/28/2018  8:45 AM  
Office Visit with Kimberly Sevilla MD  
[facility]) Appt Note: h/f; h/f  
 [address] [facility], [address] [facility] [address]  
[address], [address] [address]  
  
    
 12/13/2018  4:00 PM  
Follow Up with Ann Christianson NP  
[facility] INTERNAL MEDICINE OF [place] ([facility]) Appt Note: 3 mo f/u  
 [address], [address]  
  
   
 [address], [address] [facility] [address] Upcoming Health Maintenance Date Due  
 EYE EXAM RETINAL OR DILATED Q1 1/8/1967 DTaP/Tdap/Td series (1 - Tdap) 1/8/1978 Pneumococcal 19-64 Highest Risk (2 of 3 - PCV13) 2/3/2012 MICROALBUMIN Q1 6/29/2017 FOOT EXAM Q1 9/7/2017 FOBT Q 1 YEAR AGE 50-75 9/29/2017 ZOSTER VACCINE AGE 60> 5/1/2019* HEMOGLOBIN A1C Q6M 2/28/2019 LIPID PANEL Q1 8/29/2019 *Topic was postponed. The date shown is not the original due date. Allergies as of 9/13/2018  Review Complete On: 9/13/2018 By: Ann Christianson NP Severity Noted Reaction Type Reactions Iodine  04/12/2011    Other (comments) Eyes swell shut Shellfish Containing Products  04/12/2011    Swelling Current Immunizations  Reviewed on 5/14/2018 Name Date Influenza Vaccine 9/1/2018, 9/1/2016 12:00 AM  
 Influenza Vaccine (Quad) PF 9/13/2017, 10/20/2016 Influenza Vaccine PF 10/2/2015 Pneumococcal Polysaccharide (PPSV-23) 2/3/2011 Zoster Recombinant 9/1/2018 Not reviewed this visit Vitals BP Pulse Temp Resp Height(growth percentile) 138/86 (BP 1 Location: Left arm, BP Patient Position: Sitting) 63 98.8 °F (37.1 °C) (Tympanic) 18 5' 11\" (1.803 m) Weight(growth percentile) SpO2 BMI Smoking Status 280 lb (127 kg) 96% 39.05 kg/m2 Former Smoker BMI and BSA Data Body Mass Index Body Surface Area 39.05 kg/m 2 2.52 m 2 Preferred Pharmacy Pharmacy Name Phone [id number] [place], [address] [place] [address] Your Updated Medication List  
  
   
This list is accurate as of 9/13/18  3:32 PM.  Always use your most recent med list.  
  
  
  
  
 allopurinol 100 mg tablet Commonly known as:  ZYLOPRIM  
TAKE 1 TAB BY MOUTH DAILY. amLODIPine 10 mg tablet Commonly known as:  Ariane Lyric TAKE 1 TAB BY MOUTH DAILY. aspirin 81 mg chewable tablet Take 1 Tab by mouth two (2) times a day. atorvastatin 40 mg tablet Commonly known as:  LIPITOR Take 1 Tab by mouth nightly. carvedilol 12.5 mg tablet Commonly known as:  Za Riley Take 1 Tab by mouth two (2) times daily (with meals). cloNIDine 0.2 mg/24 hr patch Commonly known as:  CATAPRES  
APPLY 1 PATCH ONCE WEEKLY  
  
 clopidogrel 75 mg Tab Commonly known as:  PLAVIX Take 1 Tab by mouth daily. famotidine 20 mg tablet Commonly known as:  PEPCID Take 20 mg by mouth two (2) times a day.  
  
 gabapentin 300 mg capsule Commonly known as:  NEURONTIN Take 300 mg by mouth three (3) times daily. hydrALAZINE 50 mg tablet Commonly known as:  APRESOLINE Take 1 Tab by mouth two (2) times a day. isosorbide mononitrate ER 60 mg CR tablet Commonly known as:  IMDUR Take 1 Tab by mouth daily. nitroglycerin 0.4 mg SL tablet Commonly known as:  NITROSTAT  
by SubLINGual route every five (5) minutes as needed for Chest Pain. omeprazole 40 mg capsule Commonly known as:  PRILOSEC  
 Take 1 Cap by mouth daily. oxyCODONE-acetaminophen 5-325 mg per tablet Commonly known as:  PERCOCET Take  by mouth every four (4) hours as needed for Pain. promethazine 25 mg tablet Commonly known as:  PHENERGAN Take 25 mg by mouth every six (6) hours as needed for Nausea. varicella-zoster recombinant (PF) 50 mcg/0.5 mL Susr injection Commonly known as:  SHINGRIX (PF) Please repeat dose in 2-3 months Introducing [facility] SERVICES! Trae Concepcion introduces MailTime patient portal. Now you can access parts of your medical record, email your doctor's office, and request medication refills online. 1. In your internet browser, go to [email or web address] 2. Click on the First Time User? Click Here link in the Sign In box. You will see the New Member Sign Up page. 3. Enter your MailTime Access Code exactly as it appears below. You will not need to use this code after youve completed the sign-up process. If you do not sign up before the expiration date, you must request a new code. · MailTime Access Code: [email or web address] Expires: 9/27/2018 11:38 AM 
 
4. Enter the last four digits of your Social Security Number (xxxx) and Date of Birth (mm/dd/yyyy) as indicated and click Submit. You will be taken to the next sign-up page. 5. Create a MailTime ID. This will be your MailTime login ID and cannot be changed, so think of one that is secure and easy to remember. 6. Create a MailTime password. You can change your password at any time. 7. Enter your Password Reset Question and Answer. This can be used at a later time if you forget your password. 8. Enter your e-mail address. You will receive e-mail notification when new information is available in [address] 9. Click Sign Up. You can now view and download portions of your medical record. 10. Click the Download Summary menu link to download a portable copy of your medical information. If you have questions, please visit the Frequently Asked Questions section of the [employer] website. Remember, [employer] is NOT to be used for urgent needs. For medical emergencies, dial 911. Now available from your iPhone and Android! Please provide this summary of care documentation to your next provider. Your primary care clinician is listed as CAROLINE ENG. If you have any questions after today's visit, please call [phone].

## 2018-09-17 ENCOUNTER — TELEPHONE (OUTPATIENT)
Dept: CARDIAC REHAB | Age: 63
End: 2018-09-17

## 2018-09-17 NOTE — TELEPHONE ENCOUNTER
Cardiac Rehab called patient and left a message about the program. Additional attempts at contact will be made.     Thank you,  Iris Sanchez

## 2018-09-25 RX ORDER — ISOSORBIDE MONONITRATE 60 MG/1
60 TABLET, EXTENDED RELEASE ORAL
COMMUNITY
Start: 2018-09-04 | End: 2018-09-25 | Stop reason: SDUPTHER

## 2018-09-27 ENCOUNTER — OFFICE VISIT (OUTPATIENT)
Dept: ORTHOPEDIC SURGERY | Facility: CLINIC | Age: 63
End: 2018-09-27

## 2018-09-27 VITALS
WEIGHT: 275 LBS | DIASTOLIC BLOOD PRESSURE: 91 MMHG | BODY MASS INDEX: 38.5 KG/M2 | HEIGHT: 71 IN | RESPIRATION RATE: 16 BRPM | TEMPERATURE: 96.9 F | HEART RATE: 63 BPM | OXYGEN SATURATION: 96 % | SYSTOLIC BLOOD PRESSURE: 150 MMHG

## 2018-09-27 DIAGNOSIS — M17.12 PRIMARY OSTEOARTHRITIS OF LEFT KNEE: ICD-10-CM

## 2018-09-27 DIAGNOSIS — G89.29 CHRONIC PAIN OF RIGHT KNEE: ICD-10-CM

## 2018-09-27 DIAGNOSIS — G89.29 CHRONIC PAIN OF LEFT KNEE: ICD-10-CM

## 2018-09-27 DIAGNOSIS — M22.42 CHONDROMALACIA PATELLAE, LEFT: ICD-10-CM

## 2018-09-27 DIAGNOSIS — M22.41 CHONDROMALACIA, PATELLA, RIGHT: ICD-10-CM

## 2018-09-27 DIAGNOSIS — M25.561 CHRONIC PAIN OF RIGHT KNEE: ICD-10-CM

## 2018-09-27 DIAGNOSIS — M25.562 CHRONIC PAIN OF LEFT KNEE: ICD-10-CM

## 2018-09-27 DIAGNOSIS — M17.11 PRIMARY OSTEOARTHRITIS OF RIGHT KNEE: Primary | ICD-10-CM

## 2018-09-27 PROBLEM — E11.21 TYPE 2 DIABETES WITH NEPHROPATHY (HCC): Status: ACTIVE | Noted: 2018-09-27

## 2018-09-27 RX ORDER — SUCRALFATE 1 G/10ML
SUSPENSION ORAL 4 TIMES DAILY
COMMUNITY
End: 2018-09-28 | Stop reason: ALTCHOICE

## 2018-09-27 RX ORDER — COLCHICINE 0.6 MG/1
0.6 TABLET ORAL DAILY
COMMUNITY
End: 2018-09-28

## 2018-09-27 RX ORDER — BETAMETHASONE SODIUM PHOSPHATE AND BETAMETHASONE ACETATE 3; 3 MG/ML; MG/ML
6 INJECTION, SUSPENSION INTRA-ARTICULAR; INTRALESIONAL; INTRAMUSCULAR; SOFT TISSUE ONCE
Qty: 0.5 ML | Refills: 0
Start: 2018-09-27 | End: 2018-09-27

## 2018-09-27 RX ORDER — POTASSIUM CHLORIDE 750 MG/1
10 CAPSULE, EXTENDED RELEASE ORAL 2 TIMES DAILY
COMMUNITY
End: 2018-09-28

## 2018-09-27 RX ORDER — LABETALOL 100 MG/1
TABLET, FILM COATED ORAL 2 TIMES DAILY
COMMUNITY
End: 2018-09-28

## 2018-09-27 RX ORDER — BUPIVACAINE HYDROCHLORIDE 2.5 MG/ML
4 INJECTION, SOLUTION EPIDURAL; INFILTRATION; INTRACAUDAL ONCE
Qty: 4 ML | Refills: 0
Start: 2018-09-27 | End: 2018-09-27

## 2018-09-27 NOTE — PATIENT INSTRUCTIONS
Knee Arthritis: Care Instructions Your Care Instructions Knee arthritis is a breakdown of the cartilage that cushions your knee joint. When the cartilage wears down, your bones rub against each other. This causes pain and stiffness. Knee arthritis tends to get worse with time. Treatment for knee arthritis involves reducing pain, making the leg muscles stronger, and staying at a healthy body weight. The treatment usually does not improve the health of the cartilage, but it can reduce pain and improve how well your knee works. You can take simple measures to protect your knee joints, ease your pain, and help you stay active. Follow-up care is a key part of your treatment and safety. Be sure to make and go to all appointments, and call your doctor if you are having problems. It's also a good idea to know your test results and keep a list of the medicines you take. How can you care for yourself at home? · Know that knee arthritis will cause more pain on some days than on others. · Stay at a healthy weight. Lose weight if you are overweight. When you stand up, the pressure on your knees from every pound of body weight is multiplied four times. So if you lose 10 pounds, you will reduce the pressure on your knees by 40 pounds. · Talk to your doctor or physical therapist about exercises that will help ease joint pain. ¨ Stretch to help prevent stiffness and to prevent injury before you exercise. You may enjoy gentle forms of yoga to help keep your knee joints and muscles flexible. ¨ Walk instead of jog. ¨ Ride a bike. This makes your thigh muscles stronger and takes pressure off your knee. ¨ Wear well-fitting and comfortable shoes. ¨ Exercise in chest-deep water. This can help you exercise longer with less pain. ¨ Avoid exercises that include squatting or kneeling. They can put a lot of strain on your knees.  
¨ Talk to your doctor to make sure that the exercise you do is not making the arthritis worse. · Do not sit for long periods of time. Try to walk once in a while to keep your knee from getting stiff. · Ask your doctor or physical therapist whether shoe inserts may reduce your arthritis pain. · If you can afford it, get new athletic shoes at least every year. This can help reduce the strain on your knees. · Use a device to help you do everyday activities. ¨ A cane or walking stick can help you keep your balance when you walk. Hold the cane or walking stick in the hand opposite the painful knee. ¨ If you feel like you may fall when you walk, try using crutches or a front-wheeled walker. These can prevent falls that could cause more damage to your knee. ¨ A knee brace may help keep your knee stable and prevent pain. ¨ You also can use other things to make life easier, such as a higher toilet seat and handrails in the bathtub or shower. · Take pain medicines exactly as directed. ¨ Do not wait until you are in severe pain. You will get better results if you take it sooner. ¨ If you are not taking a prescription pain medicine, take an over-the-counter medicine such as acetaminophen (Tylenol), ibuprofen (Advil, Motrin), or naproxen (Aleve). Read and follow all instructions on the label. ¨ Do not take two or more pain medicines at the same time unless the doctor told you to. Many pain medicines have acetaminophen, which is Tylenol. Too much acetaminophen (Tylenol) can be harmful. ¨ Tell your doctor if you take a blood thinner, have diabetes, or have allergies to shellfish. · Ask your doctor if you might benefit from a shot of steroid medicine into your knee. This may provide pain relief for several months. · Many people take the supplements glucosamine and chondroitin for osteoarthritis. Some people feel they help, but the medical research does not show that they work. Talk to your doctor before you take these supplements. When should you call for help? Call your doctor now or seek immediate medical care if: 
  · You have sudden swelling, warmth, or pain in your knee.  
  · You have knee pain and a fever or rash.  
  · You have such bad pain that you cannot use your knee.  
 Watch closely for changes in your health, and be sure to contact your doctor if you have any problems. Where can you learn more? Go to http://davian-johnson.info/. Enter U146 in the search box to learn more about \"Knee Arthritis: Care Instructions. \" Current as of: October 10, 2017 Content Version: 11.7 © 4831-0436 [employer]. Care instructions adapted under license by [employer] (which disclaims liability or warranty for this information). If you have questions about a medical condition or this instruction, always ask your healthcare professional. [address] any warranty or liability for your use of this information. Knee Arthritis: Exercises Your Care Instructions Here are some examples of exercises for knee arthritis. Start each exercise slowly. Ease off the exercise if you start to have pain. Your doctor or physical therapist will tell you when you can start these exercises and which ones will work best for you. How to do the exercises Knee flexion with heel slide 1. Lie on your back with your knees bent. 2. Slide your heel back by bending your affected knee as far as you can. Then hook your other foot around your ankle to help pull your heel even farther back. 3. Hold for about 6 seconds, then rest for up to 10 seconds. 4. Repeat 8 to 12 times. 5. Switch legs and repeat steps 1 through 4, even if only one knee is sore. [employer] 1. Sit with your affected leg straight and supported on the floor or a firm bed. Place a small, rolled-up towel under your knee. Your other leg should be bent, with that foot flat on the floor.  
2. Tighten the thigh muscles of your affected leg by pressing the back of your knee down into the towel. 3. Hold for about 6 seconds, then rest for up to 10 seconds. 4. Repeat 8 to 12 times. 5. Switch legs and repeat steps 1 through 4, even if only one knee is sore. Straight-leg raises to the front 1. Lie on your back with your good knee bent so that your foot rests flat on the floor. Your affected leg should be straight. Make sure that your low back has a normal curve. You should be able to slip your hand in between the floor and the small of your back, with your palm touching the floor and your back touching the back of your hand. 2. Tighten the thigh muscles in your affected leg by pressing the back of your knee flat down to the floor. Hold your knee straight. 3. Keeping the thigh muscles tight and your leg straight, lift your affected leg up so that your heel is about 12 inches off the floor. Hold for about 6 seconds, then lower slowly. 4. Relax for up to 10 seconds between repetitions. 5. Repeat 8 to 12 times. 6. Switch legs and repeat steps 1 through 5, even if only one knee is sore. Active knee flexion 1. Lie on your stomach with your knees straight. If your kneecap is uncomfortable, roll up a washcloth and put it under your leg just above your kneecap. 2. Lift the foot of your affected leg by bending the knee so that you bring the foot up toward your buttock. If this motion hurts, try it without bending your knee quite as far. This may help you avoid any painful motion. 3. Slowly move your leg up and down. 4. Repeat 8 to 12 times. 5. Switch legs and repeat steps 1 through 4, even if only one knee is sore. Quadriceps stretch (facedown) 1. Lie flat on your stomach, and rest your face on the floor. 2. Wrap a towel or belt strap around the lower part of your affected leg. Then use the towel or belt strap to slowly pull your heel toward your buttock until you feel a stretch.  
3. Hold for about 15 to 30 seconds, then relax your leg against the towel or belt strap. 4. Repeat 2 to 4 times. 5. Switch legs and repeat steps 1 through 4, even if only one knee is sore. Stationary exercise bike 1. If you do not have a stationary exercise bike at home, you can find one to ride at your local health club or community center. 2. Adjust the height of the bike seat so that your knee is slightly bent when your leg is extended downward. If your knee hurts when the pedal reaches the top, you can raise the seat so that your knee does not bend as much. 3. Start slowly. At first, try to do 5 to 10 minutes of cycling with little to no resistance. Then increase your time and the resistance bit by bit until you can do 20 to 30 minutes without pain. 4. If you start to have pain, rest your knee until your pain gets back to the level that is normal for you. Or cycle for less time or with less effort. Follow-up care is a key part of your treatment and safety. Be sure to make and go to all appointments, and call your doctor if you are having problems. It's also a good idea to know your test results and keep a list of the medicines you take. Where can you learn more? Go to http://davian-johnson.info/. Enter C159 in the search box to learn more about \"Knee Arthritis: Exercises. \" Current as of: November 29, 2017 Content Version: 11.7 © 6832-9249 [employer]. Care instructions adapted under license by [employer] (which disclaims liability or warranty for this information). If you have questions about a medical condition or this instruction, always ask your healthcare professional. [place] [address] any warranty or liability for your use of this information.

## 2018-09-27 NOTE — PROGRESS NOTES
Patient: Jazzy Valente                MRN: [id number]       SSN: [id number] YOB: 1955        AGE: 66 y.o. SEX: male PCP: Concepcion Figueroa NP 
09/27/18 Chief Complaint Patient presents with  Knee Pain Left knee pain HISTORY:  Jazzy Valente is a 66 y.o. male who is seen for increased left knee pain and swelling. He reports increased left knee pain when he slipped on ice at home on 1/4/18. He was seen at [facility] on the same day where x-rays were negative for fracture per patient. He reports primarily anterior left knee pain. 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 He states he has a h/o of gout. He notes the right knee pain has been ongoing for several years. He notes at works he does a lot of climbing and walking, aggravating his knee. He notes increased pain at night. He reports buckling of his right knee at times. He responded to a previous right knee aspiration and cortisone injection. He notes increased swelling of his right knee recently. He completed a right knee Euflexxa series on 12/5/16 and 10/19/17. He was previously seen for low back pain. He reports low back pain for many years with no history of injury. He notes relief with Tylenol. He reports he uses massaging leg sleeves that help reduce the edema fluid in this legs and lower back. He reports increased leg muscle spasms recently. Pain Assessment  9/27/2018 Location of Pain Knee Location Modifiers Left Severity of Pain 2 Quality of Pain Dull;Aching;Popping;Cracking Duration of Pain Persistent Frequency of Pain Constant Aggravating Factors Standing;Walking;Bending Limiting Behavior No  
Relieving Factors Rest  
Result of Injury No  
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 His mother, Danny Cesar, is a former patient.   He currently lives alone in [place]. He underwent gastric bypass in August.  He is decreasing at 275 pounds. He is 5'11\". He is hypertensive. He is no longer diabetic. He had a heart attack after his mother- Virgen Russ-  from cancer in April and was out of work for a while. He had another heart attack 2.5 weeks ago requiring a stent. He has lost 35 pounds since his surgery. He has one daughter and 2 sons. He has 13 grandchildren. Last 3 Recorded Weights in this Encounter  
 18 1411 Weight: 275 lb (124.7 kg) Body mass index is 38.35 kg/(m^2). REVIEW OF SYSTEMS: All Below are Negative except: See HPI Constitutional: negative for fever, chills, and weight loss. Cardiovascular: negative for chest pain, claudication, leg swelling, SOB, PETERSEN Gastrointestinal: Negative for pain, N/V/C/D, Blood in stool or urine, dysuria,  hematuria, incontinence, pelvic pain. Musculoskeletal: See HPI Neurological: Negative for dizziness and weakness. Negative for headaches, Visual changes, confusion, seizures Phychiatric/Behavioral: Negative for depression, memory loss, substance  abuse. Extremities: Negative for hair changes, rash, or skin lesion changes. Hematologic: Negative for bleeding problems, bruising, pallor or swollen lymph  nodes Peripheral Vascular: No calf pain, no circulation deficits. Social History Social History  Marital status: SINGLE Spouse name: N/A  
 Number of children: N/A  
 Years of education: N/A Occupational History  Not on file. Social History Main Topics  Smoking status: Former Smoker Types: Cigars Quit date: 10/4/1999  Smokeless tobacco: Never Used  Alcohol use No  
   Comment: socially  Drug use: No  
 Sexual activity: Not Currently Other Topics Concern  Not on file Social History Narrative Allergies Allergen Reactions  Iodine Other (comments) Eyes swell shut  Shellfish Containing Products Swelling Current Outpatient Prescriptions Medication Sig  colchicine 0.6 mg tablet Take 0.6 mg by mouth daily.  labetalol (NORMODYNE) 100 mg tablet Take  by mouth two (2) times a day.  potassium chloride SA (MICRO-K) 10 mEq capsule Take 10 mEq by mouth two (2) times a day.  sucralfate (CARAFATE) 100 mg/mL suspension Take  by mouth four (4) times daily.  aspirin 81 mg chewable tablet Take 1 Tab by mouth two (2) times a day.  oxyCODONE-acetaminophen (PERCOCET) 5-325 mg per tablet Take  by mouth every four (4) hours as needed for Pain.  famotidine (PEPCID) 20 mg tablet Take 20 mg by mouth two (2) times a day.  promethazine (PHENERGAN) 25 mg tablet Take 25 mg by mouth every six (6) hours as needed for Nausea.  allopurinol (ZYLOPRIM) 100 mg tablet TAKE 1 TAB BY MOUTH DAILY.  varicella-zoster recombinant, PF, (SHINGRIX, PF,) 50 mcg/0.5 mL susr injection Please repeat dose in 2-3 months  amLODIPine (NORVASC) 10 mg tablet TAKE 1 TAB BY MOUTH DAILY.  cloNIDine (CATAPRES) 0.2 mg/24 hr patch APPLY 1 PATCH ONCE WEEKLY  nitroglycerin (NITROSTAT) 0.4 mg SL tablet by SubLINGual route every five (5) minutes as needed for Chest Pain.  gabapentin (NEURONTIN) 300 mg capsule Take 300 mg by mouth three (3) times daily.  isosorbide mononitrate ER (IMDUR) 60 mg CR tablet Take 1 Tab by mouth daily.  omeprazole (PRILOSEC) 40 mg capsule Take 1 Cap by mouth daily.  atorvastatin (LIPITOR) 40 mg tablet Take 1 Tab by mouth nightly.  clopidogrel (PLAVIX) 75 mg tab Take 1 Tab by mouth daily.  carvedilol (COREG) 12.5 mg tablet Take 1 Tab by mouth two (2) times daily (with meals).  hydrALAZINE (APRESOLINE) 50 mg tablet Take 1 Tab by mouth two (2) times a day. No current facility-administered medications for this visit. PHYSICAL EXAMINATION: 
Visit Vitals  BP (!) 150/91 (BP 1 Location: Left arm, BP Patient Position: Sitting)  Pulse 63  Temp 96.9 °F (36.1 °C) (Oral)  Resp 16  
   5' 11\" (1.803 m)  Wt 275 lb (124.7 kg)  SpO2 96%  BMI 38.35 kg/m2 ORTHO EXAMINATION: 
Examination Lumbar Thoracic Skin Intact Intact Tenderness +, paralumbar - Tightness +, paralumbar - Lordosis Normal N/A Kyphosis N/A Normal  
Scoliosis - - Flexion Fingertips to mid shin N/A Extension 10 N/A Knee reflexes Normal N/A Ankle reflexes Normal N/A Straight leg raise - N/A Calf tenderness - N/A Examination Right knee Left knee Skin Intact Intact Range of motion 90-10 100-0 Effusion 1+ 3-4+ Medial joint line tenderness + + Lateral joint line tenderness - - Popliteal tenderness - -  
Osteophytes palpable + + Lewiss - - Patella crepitus + + Anterior drawer - - Lateral laxity - - Medial laxity + -  
Varus deformity + -  
Valgus deformity - - Pretibial edema 4+ pitting  4+ Calf tenderness - - PROCEDURE:  After discussing treatment options, patient's left knee was injected with 4 cc Marcaine and 1/2 cc Celestone. Chart reviewed for the following: 
 Nicky Wynne MD, have reviewed the History, Physical and updated the Allergic reactions for Mallissa Valente TIME OUT performed immediately prior to start of procedure: 
Nicky Wynne MD, have performed the following reviews on Mallissa Valente prior to the start of the procedure: 
         
* Patient was identified by name and date of birth * Agreement on procedure being performed was verified * Risks and Benefits explained to the patient * Procedure site verified and marked as necessary * Patient was positioned for comfort * Consent was obtained Time: 1:28 PM  
 
Date of procedure: 9/27/2018 Procedure performed by:  Kimberly Gimenez MD 
 
Mr. Jarret Garcia tolerated the procedure well with no complications. RADIOGRAPHS: 
XR LEFT KNEE 1/4/18 IMPRESSION: Osteoarthritis, particularly involving the patellofemoral joint, moderate joint effusion. No fracture evident. XR RIGHT KNEE 9/1/17 IMPRESSION:  Three views - No fractures, no effusion, complete medial joint R, moderate L space narrowing, medial and lateral osteophytes present. Varus deformity. XR RIGHT KNEE 12/22/15 IMPRESSION:  No fractures, no effusion, medial joint space narrowing, medial osteophytes present, varus deformity. IMPRESSION:   
  ICD-10-CM ICD-9-CM 1. Primary osteoarthritis of right knee M17.11 715.16 PROCEDURE AUTHORIZATION TO   
2. Chondromalacia, patella, right M22.41 717.7 PROCEDURE AUTHORIZATION TO   
3. Chronic pain of right knee M25.561 719.46 PROCEDURE AUTHORIZATION TO   
 G89.29 338.29   
4. Chronic pain of left knee M25.562 719.46 betamethasone (CELESTONE SOLUSPAN) 6 mg/mL injection G89.29 338.29 BETAMETHASONE ACETATE & SODIUM PHOSPHATE INJECTION 3 MG EA.  
   DRAIN/INJECT LARGE JOINT/BURSA  
   bupivacaine, PF, (MARCAINE, PF,) 0.25 % (2.5 mg/mL) injection PROCEDURE AUTHORIZATION TO  5. Primary osteoarthritis of left knee M17.12 715.16 betamethasone (CELESTONE SOLUSPAN) 6 mg/mL injection BETAMETHASONE ACETATE & SODIUM PHOSPHATE INJECTION 3 MG EA.  
   DRAIN/INJECT LARGE JOINT/BURSA  
   bupivacaine, PF, (MARCAINE, PF,) 0.25 % (2.5 mg/mL) injection PROCEDURE AUTHORIZATION TO   
6. Chondromalacia patellae, left M22.42 717.7 betamethasone (CELESTONE SOLUSPAN) 6 mg/mL injection BETAMETHASONE ACETATE & SODIUM PHOSPHATE INJECTION 3 MG EA.  
   DRAIN/INJECT LARGE JOINT/BURSA  
   bupivacaine, PF, (MARCAINE, PF,) 0.25 % (2.5 mg/mL) injection PROCEDURE AUTHORIZATION TO   
 
PLAN: After discussing treatment options, patient's left knee was injected with 4 cc Marcaine and 1/2 cc Celestone. I will see him back as needed. We discussed possible need for right knee arthroplasty at some time in the future pending weight loss to 200# or less.   He was provided with a note for work today. Consider visco supplementation if pain continues.    
 
Scribed by Divya Euceda ([facility]) as dictated by Selma Sinclair MD

## 2018-09-28 ENCOUNTER — OFFICE VISIT (OUTPATIENT)
Dept: CARDIOLOGY CLINIC | Age: 63
End: 2018-09-28

## 2018-09-28 VITALS
HEIGHT: 71 IN | SYSTOLIC BLOOD PRESSURE: 151 MMHG | DIASTOLIC BLOOD PRESSURE: 91 MMHG | BODY MASS INDEX: 38.22 KG/M2 | WEIGHT: 273 LBS | HEART RATE: 82 BPM

## 2018-09-28 DIAGNOSIS — E78.00 HYPERCHOLESTEROLEMIA: ICD-10-CM

## 2018-09-28 DIAGNOSIS — R07.2 PRECORDIAL PAIN: ICD-10-CM

## 2018-09-28 DIAGNOSIS — I10 ESSENTIAL HYPERTENSION: ICD-10-CM

## 2018-09-28 DIAGNOSIS — E66.01 SEVERE OBESITY (BMI >= 40) (HCC): ICD-10-CM

## 2018-09-28 DIAGNOSIS — I25.10 ATHEROSCLEROSIS OF NATIVE CORONARY ARTERY OF NATIVE HEART WITHOUT ANGINA PECTORIS: Primary | ICD-10-CM

## 2018-09-28 DIAGNOSIS — Z98.61 POSTSURGICAL PERCUTANEOUS TRANSLUMINAL CORONARY ANGIOPLASTY STATUS: ICD-10-CM

## 2018-09-28 RX ORDER — CLOPIDOGREL BISULFATE 75 MG/1
75 TABLET ORAL DAILY
Qty: 90 TAB | Refills: 3 | Status: SHIPPED | OUTPATIENT
Start: 2018-09-28 | End: 2019-09-15 | Stop reason: SDUPTHER

## 2018-09-28 RX ORDER — ATORVASTATIN CALCIUM 40 MG/1
40 TABLET, FILM COATED ORAL
Qty: 90 TAB | Refills: 3 | Status: SHIPPED | OUTPATIENT
Start: 2018-09-28 | End: 2019-11-08

## 2018-09-28 RX ORDER — CLOPIDOGREL BISULFATE 75 MG/1
TABLET ORAL
Qty: 30 TAB | Refills: 0 | Status: CANCELLED | OUTPATIENT
Start: 2018-09-28

## 2018-09-28 RX ORDER — CARVEDILOL 12.5 MG/1
12.5 TABLET ORAL 2 TIMES DAILY WITH MEALS
Qty: 180 TAB | Refills: 1 | Status: SHIPPED | OUTPATIENT
Start: 2018-09-28 | End: 2019-03-22 | Stop reason: SDUPTHER

## 2018-09-28 NOTE — PATIENT INSTRUCTIONS
Medications Discontinued During This Encounter   Medication Reason    sucralfate (CARAFATE) 100 mg/mL suspension Discontinued by Another Clinician    labetalol (NORMODYNE) 100 mg tablet Discontinued During Hospital Stay Per P&T Protocol    carvedilol (COREG) 12.5 mg tablet Discontinued by Another Clinician    gabapentin (NEURONTIN) 300 mg capsule Therapy Completed    atorvastatin (LIPITOR) 40 mg tablet Discontinued by Another Clinician    colchicine 0.6 mg tablet Discontinued During Hospital Stay Per P&T Protocol    potassium chloride SA (MICRO-K) 10 mEq capsule Discontinued During Hospital Stay Per P&T Protocol    varicella-zoster recombinant, PF, (SHINGRIX, PF,) 50 mcg/0.5 mL susr injection Therapy Completed    clopidogrel (PLAVIX) 75 mg tab Reorder          Body Mass Index: Care Instructions  Your Care Instructions    Body mass index (BMI) can help you see if your weight is raising your risk for health problems. It uses a formula to compare how much you weigh with how tall you are. · A BMI lower than 18.5 is considered underweight. · A BMI between 18.5 and 24.9 is considered healthy. · A BMI between 25 and 29.9 is considered overweight. A BMI of 30 or higher is considered obese. If your BMI is in the normal range, it means that you have a lower risk for weight-related health problems. If your BMI is in the overweight or obese range, you may be at increased risk for weight-related health problems, such as high blood pressure, heart disease, stroke, arthritis or joint pain, and diabetes. If your BMI is in the underweight range, you may be at increased risk for health problems such as fatigue, lower protection (immunity) against illness, muscle loss, bone loss, hair loss, and hormone problems. BMI is just one measure of your risk for weight-related health problems.  You may be at higher risk for health problems if you are not active, you eat an unhealthy diet, or you drink too much alcohol or use tobacco products. Follow-up care is a key part of your treatment and safety. Be sure to make and go to all appointments, and call your doctor if you are having problems. It's also a good idea to know your test results and keep a list of the medicines you take. How can you care for yourself at home? · Practice healthy eating habits. This includes eating plenty of fruits, vegetables, whole grains, lean protein, and low-fat dairy. · If your doctor recommends it, get more exercise. Walking is a good choice. Bit by bit, increase the amount you walk every day. Try for at least 30 minutes on most days of the week. · Do not smoke. Smoking can increase your risk for health problems. If you need help quitting, talk to your doctor about stop-smoking programs and medicines. These can increase your chances of quitting for good. · Limit alcohol to 2 drinks a day for men and 1 drink a day for women. Too much alcohol can cause health problems. If you have a BMI higher than 25  · Your doctor may do other tests to check your risk for weight-related health problems. This may include measuring the distance around your waist. A waist measurement of more than 40 inches in men or 35 inches in women can increase the risk of weight-related health problems. · Talk with your doctor about steps you can take to stay healthy or improve your health. You may need to make lifestyle changes to lose weight and stay healthy, such as changing your diet and getting regular exercise. If you have a BMI lower than 18.5  · Your doctor may do other tests to check your risk for health problems. · Talk with your doctor about steps you can take to stay healthy or improve your health. You may need to make lifestyle changes to gain or maintain weight and stay healthy, such as getting more healthy foods in your diet and doing exercises to build muscle. Where can you learn more? Go to http://davian-johnson.info/.   Enter S176 in the search box to learn more about \"Body Mass Index: Care Instructions. \"  Current as of: October 13, 2016  Content Version: 11.4  © 5587-8653 [employer]. Care instructions adapted under license by [employer] (which disclaims liability or warranty for this information). If you have questions about a medical condition or this instruction, always ask your healthcare professional. [place] [address] any warranty or liability for your use of this information.

## 2018-09-28 NOTE — MR AVS SNAPSHOT
[address] [facility] [address] 
 
 
 [address], [address] [facility] [address] 
[phone] Patient: Mario Cedeño MRN: [id number] AWC:[id number] Visit Information Date & Time Provider Department Dept. Phone Encounter #  
 9/28/2018  8:45 AM Jordan May MD [facility] [address] [place] [address] [id number] Follow-up Instructions Return in about 6 months (around 3/28/2019), or if symptoms worsen or fail to improve, for post test.  
  
Your Appointments 10/12/2018  1:00 PM  
PROCEDURE with [place] STRESS [facility] [place] [facility] [facility]) Appt Note: [place] [address], [address] [facility] [address]  
[address], [address] [facility] [address]  
  
    
 12/13/2018  4:00 PM  
Follow Up with Jeannette Veloz NP  
[facility] [place] [facility] [facility]) Appt Note: 3 mo f/u  
 [address], [address] [facility] [address]  
  
   
 [address], [address] [facility] [address] 3/15/2019  9:30 AM  
Office Visit with Jordan May MD  
[facility]) Appt Note: 6 month [address], [address] [facility] [address]  
[address], [address] [facility] [address] [place] [address] Upcoming Health Maintenance Date Due  
 EYE EXAM RETINAL OR DILATED Q1 1/8/1967 DTaP/Tdap/Td series (1 - Tdap) 1/8/1978 Pneumococcal 19-64 Highest Risk (2 of 3 - PCV13) 2/3/2012 MICROALBUMIN Q1 6/29/2017 FOOT EXAM Q1 9/7/2017 FOBT Q 1 YEAR AGE 50-75 9/29/2017 Shingrix Vaccine Age 50> (2 of 2) 10/27/2018 HEMOGLOBIN A1C Q6M 2/28/2019 LIPID PANEL Q1 8/29/2019 Allergies as of 9/28/2018  Review Complete On: 9/28/2018 By: Jordan May MD  
  
 Severity Noted Reaction Type Reactions Iodine  04/12/2011    Other (comments) Eyes swell shut Shellfish Containing Products  04/12/2011    Swelling Current Immunizations  Reviewed on 5/14/2018 Name Date Influenza Vaccine 9/1/2018, 9/1/2016 12:00 AM  
 Influenza Vaccine (Quad) PF 9/13/2017, 10/20/2016 Influenza Vaccine PF 10/2/2015 Pneumococcal Polysaccharide (PPSV-23) 2/3/2011 Zoster Recombinant 9/1/2018 Not reviewed this visit You Were Diagnosed With   
  
 Codes Comments Atherosclerosis of native coronary artery of native heart without angina pectoris    -  Primary ICD-10-CM: I25.10 ICD-9-CM: 414.01 Essential hypertension     ICD-10-CM: I10 
ICD-9-CM: 401.9 Postsurgical percutaneous transluminal coronary angioplasty status     ICD-10-CM: Z98.61 ICD-9-CM: V45.82 Severe obesity (BMI >= 40) (HCC)     ICD-10-CM: E66.01 
ICD-9-CM: 278.01 Hypercholesterolemia     ICD-10-CM: E78.00 ICD-9-CM: 272.0 Precordial pain     ICD-10-CM: R07.2 ICD-9-CM: 786.51 Vitals BP Pulse Height(growth percentile) Weight(growth percentile) BMI Smoking Status (!) 151/91 82 5' 11\" (1.803 m) 273 lb (123.8 kg) 38.08 kg/m2 Former Smoker Vitals History BMI and BSA Data Body Mass Index Body Surface Area 38.08 kg/m 2 2.49 m 2 Preferred Pharmacy Pharmacy Name Phone [phone] Your Updated Medication List  
  
   
This list is accurate as of 9/28/18 10:38 AM.  Always use your most recent med list.  
  
  
  
  
 allopurinol 100 mg tablet Commonly known as:  ZYLOPRIM  
TAKE 1 TAB BY MOUTH DAILY. amLODIPine 10 mg tablet Commonly known as:  Wade Mend TAKE 1 TAB BY MOUTH DAILY. aspirin 81 mg chewable tablet Take 1 Tab by mouth two (2) times a day. atorvastatin 40 mg tablet Commonly known as:  LIPITOR Take 1 Tab by mouth nightly. carvedilol 12.5 mg tablet Commonly known as:  Macel Mian Take 1 Tab by mouth two (2) times daily (with meals). cloNIDine 0.2 mg/24 hr patch Commonly known as:  CATAPRES  
 APPLY 1 PATCH ONCE WEEKLY  
  
 clopidogrel 75 mg Tab Commonly known as:  PLAVIX Take 1 Tab by mouth daily. famotidine 20 mg tablet Commonly known as:  PEPCID Take 20 mg by mouth two (2) times a day. hydrALAZINE 50 mg tablet Commonly known as:  APRESOLINE Take 1 Tab by mouth two (2) times a day. isosorbide mononitrate ER 60 mg CR tablet Commonly known as:  IMDUR Take 1 Tab by mouth daily. nitroglycerin 0.4 mg SL tablet Commonly known as:  NITROSTAT  
by SubLINGual route every five (5) minutes as needed for Chest Pain. omeprazole 40 mg capsule Commonly known as:  PRILOSEC Take 1 Cap by mouth daily. oxyCODONE-acetaminophen 5-325 mg per tablet Commonly known as:  PERCOCET Take  by mouth every four (4) hours as needed for Pain. promethazine 25 mg tablet Commonly known as:  PHENERGAN Take 25 mg by mouth every six (6) hours as needed for Nausea. Prescriptions Sent to Pharmacy Refills  
 clopidogrel (PLAVIX) 75 mg tab 3 Sig: Take 1 Tab by mouth daily. Class: Normal  
 Pharmacy: [id number] [address] Ph #: [phone] Route: Oral  
 atorvastatin (LIPITOR) 40 mg tablet 3 Sig: Take 1 Tab by mouth nightly. Class: Normal  
 Pharmacy: [id number] [address] Ph #: [phone] Route: Oral  
 carvedilol (COREG) 12.5 mg tablet 1 Sig: Take 1 Tab by mouth two (2) times daily (with meals). Class: Normal  
 Pharmacy: [id number] [address] Ph #: [phone] Route: Oral  
  
Follow-up Instructions Return in about 6 months (around 3/28/2019), or if symptoms worsen or fail to improve, for post test.  
  
To-Do List   
 Around 10/01/2018 [place] Stress:  EXERCISE CARDIAC STRESS TEST Around 10/05/2018 Outpatient Referral:  REFERRAL TO CARDIAC REHAB Referral Information Referral ID Referred By Referred To  
  
 [id number] Danette Painting Not Available Visits Status Start Date End Date 1 New Request 9/28/18 9/28/19 If your referral has a status of pending review or denied, additional information will be sent to support the outcome of this decision. Patient Instructions Medications Discontinued During This Encounter Medication Reason  sucralfate (CARAFATE) 100 mg/mL suspension Discontinued by Another Clinician  labetalol (NORMODYNE) 100 mg tablet Discontinued During Hospital Stay Per P&T Protocol  carvedilol (COREG) 12.5 mg tablet Discontinued by Another Clinician  
 gabapentin (NEURONTIN) 300 mg capsule Therapy Completed  atorvastatin (LIPITOR) 40 mg tablet Discontinued by Another Clinician  colchicine 0.6 mg tablet Discontinued During Hospital Stay Per P&T Protocol  potassium chloride SA (MICRO-K) 10 mEq capsule Discontinued During Hospital Stay Per P&T Protocol  varicella-zoster recombinant, PF, (SHINGRIX, PF,) 50 mcg/0.5 mL susr injection Therapy Completed  clopidogrel (PLAVIX) 75 mg tab Reorder Body Mass Index: Care Instructions Your Care Instructions Body mass index (BMI) can help you see if your weight is raising your risk for health problems. It uses a formula to compare how much you weigh with how tall you are. · A BMI lower than 18.5 is considered underweight. · A BMI between 18.5 and 24.9 is considered healthy. · A BMI between 25 and 29.9 is considered overweight. A BMI of 30 or higher is considered obese. If your BMI is in the normal range, it means that you have a lower risk for weight-related health problems. If your BMI is in the overweight or obese range, you may be at increased risk for weight-related health problems, such as high blood pressure, heart disease, stroke, arthritis or joint pain, and diabetes.  If your BMI is in the underweight range, you may be at increased risk for health problems such as fatigue, lower protection (immunity) against illness, muscle loss, bone loss, hair loss, and hormone problems. BMI is just one measure of your risk for weight-related health problems. You may be at higher risk for health problems if you are not active, you eat an unhealthy diet, or you drink too much alcohol or use tobacco products. Follow-up care is a key part of your treatment and safety. Be sure to make and go to all appointments, and call your doctor if you are having problems. It's also a good idea to know your test results and keep a list of the medicines you take. How can you care for yourself at home? · Practice healthy eating habits. This includes eating plenty of fruits, vegetables, whole grains, lean protein, and low-fat dairy. · If your doctor recommends it, get more exercise. Walking is a good choice. Bit by bit, increase the amount you walk every day. Try for at least 30 minutes on most days of the week. · Do not smoke. Smoking can increase your risk for health problems. If you need help quitting, talk to your doctor about stop-smoking programs and medicines. These can increase your chances of quitting for good. · Limit alcohol to 2 drinks a day for men and 1 drink a day for women. Too much alcohol can cause health problems. If you have a BMI higher than 25 · Your doctor may do other tests to check your risk for weight-related health problems. This may include measuring the distance around your waist. A waist measurement of more than 40 inches in men or 35 inches in women can increase the risk of weight-related health problems. · Talk with your doctor about steps you can take to stay healthy or improve your health. You may need to make lifestyle changes to lose weight and stay healthy, such as changing your diet and getting regular exercise. If you have a BMI lower than 18.5 · Your doctor may do other tests to check your risk for health problems. · Talk with your doctor about steps you can take to stay healthy or improve your health. You may need to make lifestyle changes to gain or maintain weight and stay healthy, such as getting more healthy foods in your diet and doing exercises to build muscle. Where can you learn more? Go to http://davian-johnson.info/. Enter S176 in the search box to learn more about \"Body Mass Index: Care Instructions. \" Current as of: October 13, 2016 Content Version: 11.4 © 1491-6923 [employer]. Care instructions adapted under license by [employer] (which disclaims liability or warranty for this information). If you have questions about a medical condition or this instruction, always ask your healthcare professional. [address] any warranty or liability for your use of this information. Introducing [facility]! Ligia De León introduces [employer] patient portal. Now you can access parts of your medical record, email your doctor's office, and request medication refills online. 1. In your internet browser, go to [email or web address] 2. Click on the First Time User? Click Here link in the Sign In box. You will see the New Member Sign Up page. 3. Enter your [employer] Access Code exactly as it appears below. You will not need to use this code after youve completed the sign-up process. If you do not sign up before the expiration date, you must request a new code. · [employer] Access Code: [email or web address] Expires: 12/27/2018  9:17 AM 
 
4. Enter the last four digits of your Social Security Number (xxxx) and Date of Birth (mm/dd/yyyy) as indicated and click Submit. You will be taken to the next sign-up page. 5. Create a [employer] ID. This will be your [employer] login ID and cannot be changed, so think of one that is secure and easy to remember. 6. Create a [employer] password. You can change your password at any time. 7. Enter your Password Reset Question and Answer. This can be used at a later time if you forget your password. 8. Enter your e-mail address. You will receive e-mail notification when new information is available in [address] 9. Click Sign Up. You can now view and download portions of your medical record. 10. Click the Download Summary menu link to download a portable copy of your medical information. If you have questions, please visit the Frequently Asked Questions section of the [employer] website. Remember, [employer] is NOT to be used for urgent needs. For medical emergencies, dial 911. Now available from your iPhone and Android! Please provide this summary of care documentation to your next provider. Your primary care clinician is listed as ACROLINE ENG. If you have any questions after today's visit, please call [phone].

## 2018-09-28 NOTE — PROGRESS NOTES
HISTORY OF PRESENT ILLNESS  Tom Dhillon is a 66 y.o. male. HPI Comments: Patient is seen today for Hypertension, Hyperlipidemia, Coronary artery disease, Obesity and status post coronary artery stenting. Patient has decided to follow here due to his convenience as opposed to previous cardiologist who was Dr. Meeks Livers history is provided by the patient and medical records. Associated symptoms include chest pain and shortness of breath. Pertinent negatives include no headaches. Hypertension   The history is provided by the medical records and patient. This is a chronic problem. Associated symptoms include chest pain and shortness of breath. Pertinent negatives include no headaches. Cholesterol Problem   The history is provided by the medical records and patient. This is a chronic problem. Associated symptoms include chest pain and shortness of breath. Pertinent negatives include no headaches. Shortness of Breath   The history is provided by the patient. This is a new problem. The problem occurs intermittently. The current episode started more than 1 week ago. The problem has not changed since onset. Associated symptoms include chest pain. Pertinent negatives include no fever, no headaches, no cough, no wheezing, no PND, no orthopnea, no vomiting, no rash, no leg swelling and no claudication. The problem's precipitants include exercise (1 flight steps fast). Chest Pain (Angina)    The history is provided by the patient. This is a new problem. The current episode started more than 1 week ago. Duration of episode(s) is 1 hour. The problem occurs every several days (3/wk). The pain is associated with rest, normal activity and movement. The pain is present in the lateral region and left side. The quality of the pain is described as dull. The pain does not radiate. Associated symptoms include shortness of breath.  Pertinent negatives include no claudication, no cough, no diaphoresis, no dizziness, no fever, no headaches, no malaise/fatigue, no nausea, no orthopnea, no palpitations, no PND and no vomiting. He has tried nothing for the symptoms. Procedural history includes cardiac catheterization and cardiac stents. Review of Systems   Constitutional: Negative for chills, diaphoresis, fever, malaise/fatigue and weight loss. HENT: Negative for nosebleeds. Eyes: Negative for discharge. Respiratory: Positive for shortness of breath. Negative for cough and wheezing. Cardiovascular: Positive for chest pain. Negative for palpitations, orthopnea, claudication, leg swelling and PND. Gastrointestinal: Negative for diarrhea, nausea and vomiting. Genitourinary: Negative for dysuria and hematuria. Musculoskeletal: Negative for joint pain. Skin: Negative for rash. Neurological: Negative for dizziness, seizures, loss of consciousness and headaches. Endo/Heme/Allergies: Negative for polydipsia. Does not bruise/bleed easily. Psychiatric/Behavioral: Negative for depression and substance abuse. The patient does not have insomnia.       Allergies   Allergen Reactions    Iodine Other (comments)     Eyes swell shut      Shellfish Containing Products Swelling       Past Medical History:   Diagnosis Date    BPH without obstruction/lower urinary tract symptoms     Bursitis of right shoulder     CAD (coronary artery disease)     Chest pain     history of hospitalization with chest pain and a negative workup in 2008    Chronic edema     Constipation     die to medication    Coronary artery disease     mild, non obstructive/EF 65%    Dyspnea on exertion     Echocardiogram 12/16/08    IVC dilated; suboptimal endocardial border; EF 65%    ED (erectile dysfunction)     Elevated PSA     Frequency     GERD (gastroesophageal reflux disease)     Gout     H/O cystoscopy 06/20/2013    Hematuria, unspecified     Hypercholesterolemia     Hypertension     Hypertension      Hypogonadism male     Impotence of organic origin     Left ventricular diastolic dysfunction     Malignant neoplasm of prostate (HCC)     hx of t1a, izzy 6, 5 % of 1  core    Morbid obesity ([facility] [address]     Myocardial perfusion 09/05/08    Basal inferior defect c/w artifact; EF 63%    Overactive bladder     S/P cardiac cath 07/30/10    oD2-40%; pRCA-20-30%; EF 65%    Sleep apnea     Slowing of urinary stream     Type II or unspecified type diabetes mellitus without mention of complication, not stated as uncontrolled        Family History   Problem Relation Age of Onset    Hypertension Mother     High Cholesterol Mother     Breast Cancer Mother     Diabetes Mother     Heart Disease Mother     Arthritis-osteo Mother     High Cholesterol Father     Hypertension Father     Diabetes Father     Heart Disease Father     Arthritis-osteo Father        Social History   Substance Use Topics    Smoking status: Former Smoker     Types: Cigars     Quit date: 10/4/1999    Smokeless tobacco: Never Used    Alcohol use No      Comment: socially        Current Outpatient Prescriptions   Medication Sig    isosorbide mononitrate ER (IMDUR) 60 mg CR tablet Take 1 Tab by mouth daily.  omeprazole (PRILOSEC) 40 mg capsule Take 1 Cap by mouth daily.  clopidogrel (PLAVIX) 75 mg tab Take 1 Tab by mouth daily.  aspirin 81 mg chewable tablet Take 1 Tab by mouth two (2) times a day.  oxyCODONE-acetaminophen (PERCOCET) 5-325 mg per tablet Take  by mouth every four (4) hours as needed for Pain.  famotidine (PEPCID) 20 mg tablet Take 20 mg by mouth two (2) times a day.  promethazine (PHENERGAN) 25 mg tablet Take 25 mg by mouth every six (6) hours as needed for Nausea.  hydrALAZINE (APRESOLINE) 50 mg tablet Take 1 Tab by mouth two (2) times a day.  allopurinol (ZYLOPRIM) 100 mg tablet TAKE 1 TAB BY MOUTH DAILY.  amLODIPine (NORVASC) 10 mg tablet TAKE 1 TAB BY MOUTH DAILY.     cloNIDine (CATAPRES) 0.2 mg/24 hr patch APPLY 1 PATCH ONCE WEEKLY    nitroglycerin (NITROSTAT) 0.4 mg SL tablet by SubLINGual route every five (5) minutes as needed for Chest Pain. No current facility-administered medications for this visit. Past Surgical History:   Procedure Laterality Date    HX HEART CATHETERIZATION  7/30/10    HX OTHER SURGICAL  06/27/13    Prostate    HX TONSILLECTOMY      AZ COLONOSCOPY FLX DX W/COLLJ SPEC WHEN PFRMD      [place] I & D ABSC XTRAORAL SOFT TISS COMP         Visit Vitals    BP (!) 151/91    Pulse 82    Ht 5' 11\" (1.803 m)    Wt 123.8 kg (273 lb)    BMI 38.08 kg/m2       Diagnostic Studies:  I have reviewed the relevant tests done on the patient and show as follows  EKG tracings reviewed by me today. No flowsheet data found. 8/18 Card Cath/PCI  Conclusion           · Three-vessel coronary disease with 50% mid right coronary stenosis, 70% ostial second diagonal stenosis and 90% mid circumflex stenosis  · Circumflex appears to be the culprit vessel for present non-STEMI  · Successful coronary intervention of mid circumflex deploying a drug-eluting stent with residual 0% stenosis. 8/18 ECHO  Interpretation Summary        · Definity contrast was given to enhance imaging. · Estimated left ventricular ejection fraction is 56 - 60%. Left ventricular mild concentric hypertrophy. Normal left ventricular wall motion, no regional wall motion abnormality noted. Moderate (grade 2) left ventricular diastolic dysfunction. · Right ventricular cavity size is mildly dilated. · Left atrial cavity size is moderately dilated. · Tricuspid regurgitation is inadequate for estimation of right ventricular systolic pressure. · Trace mitral valve regurgitation.             Mr. Kolby Resendiz has a reminder for a \"due or due soon\" health maintenance. I have asked that he contact his primary care provider for follow-up on this health maintenance. Physical Exam   Constitutional: He is oriented to person, place, and time.  He appears well-developed and well-nourished. No distress. obese   HENT:   Head: Normocephalic and atraumatic. Mouth/Throat: Normal dentition. Eyes: Right eye exhibits no discharge. Left eye exhibits no discharge. No scleral icterus. Neck: Neck supple. No JVD present. Carotid bruit is not present. No thyromegaly present. Cardiovascular: Normal rate, regular rhythm, S1 normal, S2 normal, normal heart sounds and intact distal pulses. Exam reveals no gallop and no friction rub. No murmur heard. Pulmonary/Chest: Effort normal and breath sounds normal. He has no wheezes. He has no rales. Abdominal: Soft. He exhibits no mass. There is no tenderness. Musculoskeletal: He exhibits edema (trace). Lymphadenopathy:        Right cervical: No superficial cervical adenopathy present. Left cervical: No superficial cervical adenopathy present. Neurological: He is alert and oriented to person, place, and time. Skin: Skin is warm and dry. No rash noted. Psychiatric: He has a normal mood and affect. His behavior is normal.       ASSESSMENT and PLAN    I have reviewed/discussed the above normal BMI with the patient. I have recommended the following interventions: dietary management education, guidance, and counseling, encourage exercise and monitor weight . Chest pain is atypical and clinically musculoskeletal but will do the treadmill stress test and see if there is any change in EKG or symptoms. Unfortunately he has discontinued statins which will be restarted. Beta-blocker also being restarted for hypertension. Patient cautioned for side effect like bradycardia. Get a treadmill to evaluate functional capacity and schedule appointments at cardiac rehab. Diagnoses and all orders for this visit:    1. Atherosclerosis of native coronary artery of native heart without angina pectoris  -     EXERCISE CARDIAC STRESS TEST; Future  -     REFERRAL TO CARDIAC REHAB; Future    2.  Essential hypertension  -     carvedilol (COREG) 12.5 mg tablet; Take 1 Tab by mouth two (2) times daily (with meals). 3. Postsurgical percutaneous transluminal coronary angioplasty status  -     clopidogrel (PLAVIX) 75 mg tab; Take 1 Tab by mouth daily.  -     EXERCISE CARDIAC STRESS TEST; Future  -     REFERRAL TO CARDIAC REHAB; Future    4. Severe obesity (BMI >= 40) ([facility])    5. Hypercholesterolemia  -     atorvastatin (LIPITOR) 40 mg tablet; Take 1 Tab by mouth nightly. Pertinent laboratory and test data reviewed and discussed with patient.   See patient instructions also for other medical advice given    Medications Discontinued During This Encounter   Medication Reason    sucralfate (CARAFATE) 100 mg/mL suspension Discontinued by Another Clinician    labetalol (NORMODYNE) 100 mg tablet Discontinued During Hospital Stay Per P&T Protocol    carvedilol (COREG) 12.5 mg tablet Discontinued by Another Clinician    gabapentin (NEURONTIN) 300 mg capsule Therapy Completed    atorvastatin (LIPITOR) 40 mg tablet Discontinued by Another Clinician    colchicine 0.6 mg tablet Discontinued During Hospital Stay Per P&T Protocol    potassium chloride SA (MICRO-K) 10 mEq capsule Discontinued During Hospital Stay Per P&T Protocol    varicella-zoster recombinant, PF, (SHINGRIX, PF,) 50 mcg/0.5 mL susr injection Therapy Completed    clopidogrel (PLAVIX) 75 mg tab Reorder       Follow-up Disposition:  Return in about 6 months (around 3/28/2019), or if symptoms worsen or fail to improve, for post test.

## 2018-09-28 NOTE — LETTER
Nicole Luna 1955 9/28/2018 Dear Glendy Orellana NP I had the pleasure of evaluating  Mr. Sherran Bence in office today. Below are the relevant portions of my assessment and plan of care. ICD-10-CM ICD-9-CM 1. Atherosclerosis of native coronary artery of native heart without angina pectoris I25.10 414.01 EXERCISE CARDIAC STRESS TEST  
   REFERRAL TO CARDIAC REHAB 2. Essential hypertension I10 401.9 carvedilol (COREG) 12.5 mg tablet 3. Postsurgical percutaneous transluminal coronary angioplasty status Z98.61 V45.82 clopidogrel (PLAVIX) 75 mg tab EXERCISE CARDIAC STRESS TEST  
   REFERRAL TO CARDIAC REHAB 4. Severe obesity (BMI >= 40) ([facility]) E66.01 278.01   
5. Hypercholesterolemia E78.00 272.0 atorvastatin (LIPITOR) 40 mg tablet 6. Precordial pain R07.2 786.51 Current Outpatient Prescriptions Medication Sig Dispense Refill  clopidogrel (PLAVIX) 75 mg tab Take 1 Tab by mouth daily. 90 Tab 3  
 atorvastatin (LIPITOR) 40 mg tablet Take 1 Tab by mouth nightly. 90 Tab 3  carvedilol (COREG) 12.5 mg tablet Take 1 Tab by mouth two (2) times daily (with meals). 180 Tab 1  
 isosorbide mononitrate ER (IMDUR) 60 mg CR tablet Take 1 Tab by mouth daily. 30 Tab 0  
 omeprazole (PRILOSEC) 40 mg capsule Take 1 Cap by mouth daily. 30 Cap 0  
 aspirin 81 mg chewable tablet Take 1 Tab by mouth two (2) times a day. 60 Tab 0  
 oxyCODONE-acetaminophen (PERCOCET) 5-325 mg per tablet Take  by mouth every four (4) hours as needed for Pain.  famotidine (PEPCID) 20 mg tablet Take 20 mg by mouth two (2) times a day.  promethazine (PHENERGAN) 25 mg tablet Take 25 mg by mouth every six (6) hours as needed for Nausea.  hydrALAZINE (APRESOLINE) 50 mg tablet Take 1 Tab by mouth two (2) times a day. 60 Tab 1  
 allopurinol (ZYLOPRIM) 100 mg tablet TAKE 1 TAB BY MOUTH DAILY. 90 Tab 3  
 amLODIPine (NORVASC) 10 mg tablet TAKE 1 TAB BY MOUTH DAILY.  30 Tab 1  
  cloNIDine (CATAPRES) 0.2 mg/24 hr patch APPLY 1 PATCH ONCE WEEKLY 12 Patch 3  
 nitroglycerin (NITROSTAT) 0.4 mg SL tablet by SubLINGual route every five (5) minutes as needed for Chest Pain. Orders Placed This Encounter  REFERRAL TO CARDIAC REHAB Standing Status:   Future Standing Expiration Date:   10/28/2018 Referral Priority:   Routine Referral Type:   Consultation Referral Reason:   Specialty Services Required Requested Specialty:   Cardiac Rehabilitation Number of Visits Requested:   1  clopidogrel (PLAVIX) 75 mg tab Sig: Take 1 Tab by mouth daily. Dispense:  90 Tab Refill:  3  
 atorvastatin (LIPITOR) 40 mg tablet Sig: Take 1 Tab by mouth nightly. Dispense:  90 Tab Refill:  3  carvedilol (COREG) 12.5 mg tablet Sig: Take 1 Tab by mouth two (2) times daily (with meals). Dispense:  180 Tab Refill:  1 If you have questions, please do not hesitate to call me. I look forward to following Mr. Kyra Park along with you. Sincerely, Letty Mosqueda MD

## 2018-10-04 ENCOUNTER — TELEPHONE (OUTPATIENT)
Dept: CARDIAC REHAB | Age: 63
End: 2018-10-04

## 2018-10-04 NOTE — TELEPHONE ENCOUNTER
Cardiac Rehab called patient and left a message about the program. Additional attempts at contact will be made.     Thank you,  Charmaine Morrison

## 2018-10-09 ENCOUNTER — TELEPHONE (OUTPATIENT)
Dept: CARDIAC REHAB | Age: 63
End: 2018-10-09

## 2018-10-09 NOTE — TELEPHONE ENCOUNTER
Cardiac Rehab called patient and left a message about the program. This is the third message left since 9/17/18 without response, so no additional attempts at contact will be made. If patient is interested, please have him call us at [phone].     Thank you,  Jama Burnett

## 2018-10-15 DIAGNOSIS — I10 ESSENTIAL HYPERTENSION: ICD-10-CM

## 2018-10-15 RX ORDER — HYDRALAZINE HYDROCHLORIDE 100 MG/1
TABLET, FILM COATED ORAL
Qty: 180 TAB | Refills: 3 | Status: SHIPPED | OUTPATIENT
Start: 2018-10-15 | End: 2019-03-25

## 2018-10-17 ENCOUNTER — TELEPHONE (OUTPATIENT)
Dept: CARDIOLOGY CLINIC | Age: 63
End: 2018-10-17

## 2018-10-17 RX ORDER — ISOSORBIDE MONONITRATE 60 MG/1
TABLET, EXTENDED RELEASE ORAL
Qty: 30 TAB | Refills: 0 | Status: SHIPPED | OUTPATIENT
Start: 2018-10-17 | End: 2018-11-14 | Stop reason: SDUPTHER

## 2018-10-17 NOTE — TELEPHONE ENCOUNTER
Please review Nuclear stress done today. Patient would like to return to work on 10/22/18 and with any restrictions?

## 2018-10-17 NOTE — TELEPHONE ENCOUNTER
Okay to return to work without any restrictions if he has sedentary job. He should also attend cardiac rehab as advised before. If the job is heavy, gradual increase in exercise and work for next 6 weeks.

## 2018-10-19 NOTE — TELEPHONE ENCOUNTER
Cardiac Rehab called patient and explained his insurance benefits. He understands and wants a call back as soon as the authorization for his insurance is approved.     Thank you,  Clinton Jc

## 2018-10-23 ENCOUNTER — TELEPHONE (OUTPATIENT)
Dept: CARDIAC REHAB | Age: 63
End: 2018-10-23

## 2018-10-23 NOTE — TELEPHONE ENCOUNTER
Cardiac Rehab called patient and informed him of the approval from his insurance. He understands and is scheduled for an evaluation on 10/29/18 at 2:30.     Thank you,  Cecile Galarza

## 2018-10-26 ENCOUNTER — TELEPHONE (OUTPATIENT)
Dept: CARDIAC REHAB | Age: 63
End: 2018-10-26

## 2018-10-26 NOTE — TELEPHONE ENCOUNTER
Cardiac rehab called and left message about patients eval appointment on Monday 10/29/18.     Thank you,  Magaly Cho

## 2018-10-29 ENCOUNTER — HOSPITAL ENCOUNTER (OUTPATIENT)
Dept: CARDIAC REHAB | Age: 63
Discharge: HOME | End: 2018-10-29
Payer: COMMERCIAL

## 2018-10-29 PROCEDURE — 93798 PHYS/QHP OP CAR RHAB W/ECG: CPT

## 2018-10-30 ENCOUNTER — APPOINTMENT (OUTPATIENT)
Dept: CARDIAC REHAB | Age: 63
End: 2018-10-30
Payer: COMMERCIAL

## 2018-11-06 ENCOUNTER — HOSPITAL ENCOUNTER (OUTPATIENT)
Dept: CARDIAC REHAB | Age: 63
Discharge: HOME | End: 2018-11-06
Payer: COMMERCIAL

## 2018-11-06 VITALS — BODY MASS INDEX: 36.82 KG/M2 | WEIGHT: 264 LBS

## 2018-11-06 PROCEDURE — 93798 PHYS/QHP OP CAR RHAB W/ECG: CPT

## 2018-11-08 ENCOUNTER — HOSPITAL ENCOUNTER (OUTPATIENT)
Dept: CARDIAC REHAB | Age: 63
Discharge: HOME | End: 2018-11-08
Payer: COMMERCIAL

## 2018-11-08 VITALS — WEIGHT: 264 LBS | BODY MASS INDEX: 36.82 KG/M2

## 2018-11-08 PROCEDURE — 93798 PHYS/QHP OP CAR RHAB W/ECG: CPT

## 2018-11-09 ENCOUNTER — HOSPITAL ENCOUNTER (EMERGENCY)
Age: 63
Discharge: HOME | End: 2018-11-10
Attending: EMERGENCY MEDICINE
Payer: COMMERCIAL

## 2018-11-09 VITALS
RESPIRATION RATE: 16 BRPM | SYSTOLIC BLOOD PRESSURE: 180 MMHG | DIASTOLIC BLOOD PRESSURE: 87 MMHG | BODY MASS INDEX: 36.82 KG/M2 | HEART RATE: 71 BPM | TEMPERATURE: 98.1 F | WEIGHT: 264 LBS | OXYGEN SATURATION: 98 %

## 2018-11-09 DIAGNOSIS — R10.31 GROIN PAIN, RIGHT: Primary | ICD-10-CM

## 2018-11-09 LAB
ALBUMIN SERPL-MCNC: 3.3 G/DL (ref 3.4–5)
ALBUMIN/GLOB SERPL: 1.1 {RATIO} (ref 0.8–1.7)
ALP SERPL-CCNC: 64 U/L (ref 45–117)
ALT SERPL-CCNC: 15 U/L (ref 16–61)
ANION GAP SERPL CALC-SCNC: 3 MMOL/L (ref 3–18)
APPEARANCE UR: CLEAR
AST SERPL-CCNC: 12 U/L (ref 15–37)
BACTERIA URNS QL MICRO: ABNORMAL /HPF
BASOPHILS # BLD: 0 K/UL (ref 0–0.1)
BASOPHILS NFR BLD: 0 % (ref 0–2)
BILIRUB SERPL-MCNC: 0.3 MG/DL (ref 0.2–1)
BILIRUB UR QL: NEGATIVE
BUN SERPL-MCNC: 42 MG/DL (ref 7–18)
BUN/CREAT SERPL: 21 (ref 12–20)
CALCIUM SERPL-MCNC: 8.2 MG/DL (ref 8.5–10.1)
CHLORIDE SERPL-SCNC: 107 MMOL/L (ref 100–108)
CO2 SERPL-SCNC: 34 MMOL/L (ref 21–32)
COLOR UR: YELLOW
CREAT SERPL-MCNC: 1.98 MG/DL (ref 0.6–1.3)
DIFFERENTIAL METHOD BLD: ABNORMAL
EOSINOPHIL # BLD: 0.1 K/UL (ref 0–0.4)
EOSINOPHIL NFR BLD: 3 % (ref 0–5)
EPITH CASTS URNS QL MICRO: ABNORMAL /LPF (ref 0–5)
ERYTHROCYTE [DISTWIDTH] IN BLOOD BY AUTOMATED COUNT: 16 % (ref 11.6–14.5)
GLOBULIN SER CALC-MCNC: 2.9 G/DL (ref 2–4)
GLUCOSE SERPL-MCNC: 91 MG/DL (ref 74–99)
GLUCOSE UR STRIP.AUTO-MCNC: NEGATIVE MG/DL
HCT VFR BLD AUTO: 32.3 % (ref 36–48)
HGB BLD-MCNC: 10.5 G/DL (ref 13–16)
HGB UR QL STRIP: NEGATIVE
INR PPP: 1.1 (ref 0.8–1.2)
KETONES UR QL STRIP.AUTO: NEGATIVE MG/DL
LEUKOCYTE ESTERASE UR QL STRIP.AUTO: ABNORMAL
LYMPHOCYTES # BLD: 1.7 K/UL (ref 0.9–3.6)
LYMPHOCYTES NFR BLD: 36 % (ref 21–52)
MCH RBC QN AUTO: 27.3 PG (ref 24–34)
MCHC RBC AUTO-ENTMCNC: 32.5 G/DL (ref 31–37)
MCV RBC AUTO: 84.1 FL (ref 74–97)
MONOCYTES # BLD: 0.3 K/UL (ref 0.05–1.2)
MONOCYTES NFR BLD: 7 % (ref 3–10)
MUCOUS THREADS URNS QL MICRO: ABNORMAL /LPF
NEUTS SEG # BLD: 2.6 K/UL (ref 1.8–8)
NEUTS SEG NFR BLD: 54 % (ref 40–73)
NITRITE UR QL STRIP.AUTO: NEGATIVE
PH UR STRIP: 5 [PH] (ref 5–8)
PLATELET # BLD AUTO: 163 K/UL (ref 135–420)
PMV BLD AUTO: 9.5 FL (ref 9.2–11.8)
POTASSIUM SERPL-SCNC: 3.6 MMOL/L (ref 3.5–5.5)
PROT SERPL-MCNC: 6.2 G/DL (ref 6.4–8.2)
PROT UR STRIP-MCNC: ABNORMAL MG/DL
PROTHROMBIN TIME: 13.4 SEC (ref 11.5–15.2)
RBC # BLD AUTO: 3.84 M/UL (ref 4.7–5.5)
RBC #/AREA URNS HPF: ABNORMAL /HPF (ref 0–5)
SODIUM SERPL-SCNC: 144 MMOL/L (ref 136–145)
SP GR UR REFRACTOMETRY: 1.02 (ref 1–1.03)
UROBILINOGEN UR QL STRIP.AUTO: 1 EU/DL (ref 0.2–1)
WBC # BLD AUTO: 4.7 K/UL (ref 4.6–13.2)
WBC URNS QL MICRO: ABNORMAL /HPF (ref 0–4)

## 2018-11-09 PROCEDURE — 85379 FIBRIN DEGRADATION QUANT: CPT

## 2018-11-09 PROCEDURE — 85610 PROTHROMBIN TIME: CPT

## 2018-11-09 PROCEDURE — 80053 COMPREHEN METABOLIC PANEL: CPT

## 2018-11-09 PROCEDURE — 99283 EMERGENCY DEPT VISIT LOW MDM: CPT

## 2018-11-09 PROCEDURE — 85025 COMPLETE CBC W/AUTO DIFF WBC: CPT

## 2018-11-09 PROCEDURE — 81001 URINALYSIS AUTO W/SCOPE: CPT

## 2018-11-09 NOTE — LETTER
ADULT WORK/SCHOOL NOTE Antonette Colvin [address] [place] [id number] 
 
 
11/10/18 To whom it may concern, Please be advised that Antonette Colvin was evaluated and discharged from the Emergency Department on 11/10/18. Antonette Colvin is excused from work/school now through 11/11/18 Should Antonette Colvin require more time off or further clearance, he should follow up with his  own regular physician or specialist. 
 
 
Sincerely, Lanette Bazzi MD

## 2018-11-10 ENCOUNTER — HOSPITAL ENCOUNTER (OUTPATIENT)
Dept: VASCULAR SURGERY | Age: 63
Discharge: HOME | End: 2018-11-10
Payer: COMMERCIAL

## 2018-11-10 DIAGNOSIS — R10.31 GROIN PAIN, RIGHT: ICD-10-CM

## 2018-11-10 LAB — D DIMER PPP FEU-MCNC: 1.09 UG/ML(FEU)

## 2018-11-10 PROCEDURE — 93971 EXTREMITY STUDY: CPT

## 2018-11-10 NOTE — ED PROVIDER NOTES
EMERGENCY DEPARTMENT HISTORY AND PHYSICAL EXAM 
 
11:32 PM 
 
 
Date: 11/9/2018 Patient Name: Christy Craven History of Presenting Illness Chief Complaint Patient presents with  Groin Pain History Provided By: Patient Chief Complaint: Rt Groin Pain Duration:  Days Timing:  Constant Location: Rt Groin Quality: Servando Sommers Severity: Mild Modifying Factors: Worse with movement Associated Symptoms: denies any other associated signs or symptoms Additional History (Context): Christy Craven is a 66 y.o. male with h/o HTN, HLD, CKD, and NSTEMI who presents with right sided groin pain starting 4 days ago. States this was the catheter site for his PCI 1 month ago - sent here by his cardiologist for concern for clot (according to patient - no cardiology note available or communication received). Denies any other issues. States no swelling, mass, or skin changes at site. No abdominal or testicular pain. No CP or SOB. PCP: Zana Reeder NP Current Outpatient Medications Medication Sig Dispense Refill  isosorbide mononitrate ER (IMDUR) 60 mg CR tablet TAKE 1 TABLET BY MOUTH DAILY 30 Tab 0  
 hydrALAZINE (APRESOLINE) 100 mg tablet TAKE 1 TABLET BY MOUTH TWICE DAILY 180 Tab 3  clopidogrel (PLAVIX) 75 mg tab Take 1 Tab by mouth daily. 90 Tab 3  
 atorvastatin (LIPITOR) 40 mg tablet Take 1 Tab by mouth nightly. 90 Tab 3  carvedilol (COREG) 12.5 mg tablet Take 1 Tab by mouth two (2) times daily (with meals). 180 Tab 1  
 omeprazole (PRILOSEC) 40 mg capsule Take 1 Cap by mouth daily. 30 Cap 0  
 aspirin 81 mg chewable tablet Take 1 Tab by mouth two (2) times a day. 60 Tab 0  
 oxyCODONE-acetaminophen (PERCOCET) 5-325 mg per tablet Take  by mouth every four (4) hours as needed for Pain.  famotidine (PEPCID) 20 mg tablet Take 20 mg by mouth two (2) times a day.  promethazine (PHENERGAN) 25 mg tablet Take 25 mg by mouth every six (6) hours as needed for Nausea.  hydrALAZINE (APRESOLINE) 50 mg tablet Take 1 Tab by mouth two (2) times a day. 60 Tab 1  
 allopurinol (ZYLOPRIM) 100 mg tablet TAKE 1 TAB BY MOUTH DAILY. 90 Tab 3  
 amLODIPine (NORVASC) 10 mg tablet TAKE 1 TAB BY MOUTH DAILY. 30 Tab 1  cloNIDine (CATAPRES) 0.2 mg/24 hr patch APPLY 1 PATCH ONCE WEEKLY 12 Patch 3  
 nitroglycerin (NITROSTAT) 0.4 mg SL tablet by SubLINGual route every five (5) minutes as needed for Chest Pain. Past History Past Medical History: 
Past Medical History:  
Diagnosis Date  BPH without obstruction/lower urinary tract symptoms  Bursitis of right shoulder  CAD (coronary artery disease)  Chest pain   
 history of hospitalization with chest pain and a negative workup in 2008  Chronic edema  Constipation   
 die to medication  Coronary artery disease   
 mild, non obstructive/EF 65%  Dyspnea on exertion  Echocardiogram 12/16/08 IVC dilated; suboptimal endocardial border; EF 65%  ED (erectile dysfunction)  Elevated PSA  Frequency  GERD (gastroesophageal reflux disease)  Gout  H/O cystoscopy 06/20/2013  Hematuria, unspecified  Hypercholesterolemia  Hypertension  Hypertension  Hypogonadism male  Impotence of organic origin  Left ventricular diastolic dysfunction  Malignant neoplasm of prostate (HCC)   
 hx of t1a, izzy 6, 5 % of 1  core  Morbid obesity ([facility] [address]  Myocardial perfusion 09/05/08 Basal inferior defect c/w artifact; EF 63%  Overactive bladder  S/P cardiac cath 07/30/10  
 oD2-40%; pRCA-20-30%; EF 65%  Sleep apnea  Slowing of urinary stream   
 Type II or unspecified type diabetes mellitus without mention of complication, not stated as uncontrolled Past Surgical History: 
Past Surgical History:  
Procedure Laterality Date  HX HEART CATHETERIZATION  7/30/10  HX OTHER SURGICAL  06/27/13 Prostate  HX TONSILLECTOMY  LA COLONOSCOPY FLX DX W/COLLJ SPEC WHEN PFRMD    
 LA I & D ABSC XTRAORAL SOFT TISS COMP Family History: 
Family History Problem Relation Age of Onset  Hypertension Mother  High Cholesterol Mother  Breast Cancer Mother  Diabetes Mother  Heart Disease Mother [facility] Arthritis-osteo Mother  High Cholesterol Father  Hypertension Father  Diabetes Father  Heart Disease Father  Arthritis-osteo Father Social History: 
Social History Tobacco Use  Smoking status: Former Smoker Types: Cigars Last attempt to quit: 10/4/1999 Years since quittin.1  Smokeless tobacco: Never Used Substance Use Topics  Alcohol use: No  
  Comment: socially  Drug use: No  
 
 
Allergies: Allergies Allergen Reactions  Iodine Other (comments) Eyes swell shut  Shellfish Containing Products Swelling Review of Systems Review of Systems Constitutional: Negative for chills and fever. Respiratory: Negative for chest tightness and shortness of breath. Cardiovascular: Positive for leg swelling (Mild - Patient states this is baseline for him). Negative for chest pain and palpitations. Gastrointestinal: Negative for abdominal distention, abdominal pain, anal bleeding, blood in stool, constipation, diarrhea, nausea and vomiting. Genitourinary: Negative for difficulty urinating and testicular pain. Musculoskeletal: Negative for back pain and neck pain. Skin: Negative for color change and rash. All other systems reviewed and are negative. Physical Exam  
 
Visit Vitals /87 (BP 1 Location: Left arm, BP Patient Position: At rest) Pulse 71 Temp 98.1 °F (36.7 °C) Resp 16 Wt 119.7 kg (264 lb) SpO2 98% BMI 36.82 kg/m² Physical Exam  
Constitutional: He is oriented to person, place, and time. He appears well-developed and well-nourished. HENT:  
Head: Normocephalic and atraumatic. Neck: Normal range of motion. Neck supple. No JVD present. No tracheal deviation present. Cardiovascular: Normal rate, regular rhythm, normal heart sounds and intact distal pulses. Exam reveals no gallop and no friction rub. No murmur heard. Pulmonary/Chest: Effort normal and breath sounds normal. No respiratory distress. He has no wheezes. He has no rales. He exhibits no tenderness. Abdominal: Bowel sounds are normal. He exhibits no distension and no mass. There is no tenderness. There is no rebound and no guarding. Hernia confirmed negative in the right inguinal area and confirmed negative in the left inguinal area. Genitourinary: Testes normal. Cremasteric reflex is present. Right testis shows no mass, no swelling and no tenderness. Right testis is descended. Left testis shows no mass, no swelling and no tenderness. Genitourinary Comments: Site of catheter insertion (right inguinal) well-healed. No mass. No skin change. No swelling. Non-TTP. Musculoskeletal: Normal range of motion. He exhibits edema (1+ - Patient states this is baseline for him). Lymphadenopathy:  
     Right: No inguinal adenopathy present. Left: No inguinal adenopathy present. Neurological: He is alert and oriented to person, place, and time. Skin: Skin is warm and dry. No rash noted. No erythema. Psychiatric: He has a normal mood and affect. His behavior is normal.  
 
Diagnostic Study Results Labs - Recent Results (from the past 12 hour(s)) URINALYSIS W/ RFLX MICROSCOPIC Collection Time: 11/09/18  7:50 PM  
Result Value Ref Range Color YELLOW Appearance CLEAR Specific gravity 1.020 1.005 - 1.030    
 pH (UA) 5.0 5.0 - 8.0 Protein TRACE (A) NEG mg/dL Glucose NEGATIVE  NEG mg/dL Ketone NEGATIVE  NEG mg/dL Bilirubin NEGATIVE  NEG Blood NEGATIVE  NEG Urobilinogen 1.0 0.2 - 1.0 EU/dL Nitrites NEGATIVE  NEG  Leukocyte Esterase MODERATE (A) NEG    
 URINE MICROSCOPIC ONLY Collection Time: 11/09/18  7:50 PM  
Result Value Ref Range WBC 15 to 25 0 - 4 /hpf  
 RBC NONE 0 - 5 /hpf Epithelial cells 2+ 0 - 5 /lpf Bacteria FEW (A) NEG /hpf Mucus FEW (A) NEG /lpf  
CBC WITH AUTOMATED DIFF Collection Time: 11/09/18  8:58 PM  
Result Value Ref Range WBC 4.7 4.6 - 13.2 K/uL  
 RBC 3.84 (L) 4.70 - 5.50 M/uL  
 HGB 10.5 (L) 13.0 - 16.0 g/dL HCT 32.3 (L) 36.0 - 48.0 % MCV 84.1 74.0 - 97.0 FL  
 MCH 27.3 24.0 - 34.0 PG  
 MCHC 32.5 31.0 - 37.0 g/dL  
 RDW 16.0 (H) 11.6 - 14.5 % PLATELET 433 876 - 971 K/uL MPV 9.5 9.2 - 11.8 FL  
 NEUTROPHILS 54 40 - 73 % LYMPHOCYTES 36 21 - 52 % MONOCYTES 7 3 - 10 % EOSINOPHILS 3 0 - 5 % BASOPHILS 0 0 - 2 %  
 ABS. NEUTROPHILS 2.6 1.8 - 8.0 K/UL  
 ABS. LYMPHOCYTES 1.7 0.9 - 3.6 K/UL  
 ABS. MONOCYTES 0.3 0.05 - 1.2 K/UL  
 ABS. EOSINOPHILS 0.1 0.0 - 0.4 K/UL  
 ABS. BASOPHILS 0.0 0.0 - 0.1 K/UL  
 DF AUTOMATED METABOLIC PANEL, COMPREHENSIVE Collection Time: 11/09/18  8:58 PM  
Result Value Ref Range Sodium 144 136 - 145 mmol/L Potassium 3.6 3.5 - 5.5 mmol/L Chloride 107 100 - 108 mmol/L  
 CO2 34 (H) 21 - 32 mmol/L Anion gap 3 3.0 - 18 mmol/L Glucose 91 74 - 99 mg/dL BUN 42 (H) 7.0 - 18 MG/DL Creatinine 1.98 (H) 0.6 - 1.3 MG/DL  
 BUN/Creatinine ratio 21 (H) 12 - 20 GFR est AA 42 (L) >60 ml/min/1.73m2 GFR est non-AA 35 (L) >60 ml/min/1.73m2 Calcium 8.2 (L) 8.5 - 10.1 MG/DL Bilirubin, total 0.3 0.2 - 1.0 MG/DL  
 ALT (SGPT) 15 (L) 16 - 61 U/L  
 AST (SGOT) 12 (L) 15 - 37 U/L Alk. phosphatase 64 45 - 117 U/L Protein, total 6.2 (L) 6.4 - 8.2 g/dL Albumin 3.3 (L) 3.4 - 5.0 g/dL Globulin 2.9 2.0 - 4.0 g/dL A-G Ratio 1.1 0.8 - 1.7 PROTHROMBIN TIME + INR Collection Time: 11/09/18  8:58 PM  
Result Value Ref Range Prothrombin time 13.4 11.5 - 15.2 sec INR 1.1 0.8 - 1.2 D DIMER  Collection Time: 11/09/18  8:58 PM  
 Result Value Ref Range D DIMER 1.09 (H) <0.46 ug/ml(FEU) Radiologic Studies - No orders to display Medical Decision Making I am the first provider for this patient. I reviewed the vital signs, available nursing notes, past medical history, past surgical history, family history and social history. Vital Signs-Reviewed the patient's vital signs. Pulse Oximetry Analysis -  98% on room air Records Reviewed: Nursing Notes and Old Medical Records (Time of Review: 11:32 PM) 
 
ED Course: Progress Notes, Reevaluation, and Consults: 
 
Provider Notes (Medical Decision Making): 67 yo male s/p PCI with rt inguinal access 1 month ago referred from cardiologist for concern for clot due to 4 months of pain at site. Exam of site without concerning findings - no swelling, mass, skin changes. Mild B/L edema at baseline. No chest pain, SOB, tachycardia concerning for PE. No abdominal pain, testicular exam, or change in BMs, with normal genital exam, therefore doubt hernia. Concern for clot still exists, however, Doppler U/S not available after hours. Also no note or turnover from Cardiologist - will contact to better understand history and concerns. ED Progress Notes:  
11:33 PM - Obtained H&P, awaiting available exam room to be able to perform physical exam (patient currently in hallway bed) 11:48 PM - Patient now in room, changing into gown now. 12:11 AM - Examined patient with no remarkable findings noted. Concern for clot is warranted, however there is not in-house Doppler capability overnight. Patient would need to wait until 0700 tomorrow. Have paged Dr. Brent Richter (the Cardiologist that sent him here) to discuss case - specifically whether he believes patient can be discharged until tomorrow AM or needs to remain in ED overnight. 12:22 AM - Dr. Brent Richter unavailable.  Spoke to his group's on-call provider (Dr. Simone Taylor), who was not aware of the patient and was therefore unable to provide further illumination. Will obtain D-Dimer then have shared decision making conversation with patient regarding staying overnight. 1:00 AM - D-Dimer 1.09. Therefore cannot eliminate possibility of clot. However, given that patient has renal disease, do not consider this elevated value to be a specific finding. Patient with no CP, SOB, or other findings concerning for PE. Patient desires to go home and return in morning, which is reasonable. Patients vitals are within normal limits and patient is stable for discharge. Discussed risks and return precautions at length, specifically focusing on PE or emergency of blood vessels at cath site. Patient to return tomorrow for Doppler Ultrasound - order placed and patient given written follow up instructions. Patient expressed understanding and agreement with plan. Procedures: None Diagnosis Clinical Impression: ICD-10-CM ICD-9-CM 1. Groin pain, right R10.31 789.09 Disposition: Discharge - Return tomorrow AM for Doppler Follow-up Information Follow up With Specialties Details Why Contact Info [facility] EMERGENCY DEPT Emergency Medicine Today Return Saturday 11/10/18 at 0700 for Doppler Ultrasound Study. Return sooner if symptoms worsen. [address] [phone] 
[phone] Medication List  
  
CONTINUE taking these medications   
allopurinol 100 mg tablet Commonly known as:  ZYLOPRIM 
TAKE 1 TAB BY MOUTH DAILY. amLODIPine 10 mg tablet Commonly known as:  Michaelene Soto TAKE 1 TAB BY MOUTH DAILY. aspirin 81 mg chewable tablet Take 1 Tab by mouth two (2) times a day. atorvastatin 40 mg tablet Commonly known as:  LIPITOR Take 1 Tab by mouth nightly. carvedilol 12.5 mg tablet Commonly known as:  Donneta Apgar Take 1 Tab by mouth two (2) times daily (with meals). cloNIDine 0.2 mg/24 hr Ptwk Commonly known as:  CATAPRES 
APPLY 1 PATCH ONCE WEEKLY 
  
clopidogrel 75 mg Tab Commonly known as:  PLAVIX Take 1 Tab by mouth daily. famotidine 20 mg tablet Commonly known as:  PEPCID * hydrALAZINE 50 mg tablet Commonly known as:  APRESOLINE Take 1 Tab by mouth two (2) times a day. * hydrALAZINE 100 mg tablet Commonly known as:  APRESOLINE 
TAKE 1 TABLET BY MOUTH TWICE DAILY 
  
isosorbide mononitrate ER 60 mg CR tablet Commonly known as:  IMDUR 
TAKE 1 TABLET BY MOUTH DAILY 
  
nitroglycerin 0.4 mg SL tablet Commonly known as:  NITROSTAT 
  
omeprazole 40 mg capsule Commonly known as:  PRILOSEC Take 1 Cap by mouth daily. oxyCODONE-acetaminophen 5-325 mg per tablet Commonly known as:  PERCOCET 
  
promethazine 25 mg tablet Commonly known as:  PHENERGAN * This list has 2 medication(s) that are the same as other medications prescribed for you. Read the directions carefully, and ask your doctor or other care provider to review them with you.  
  
  
  
 
_______________________________ Domonique Reed MD (PGY-2) Emergency Medicine Resident 
 
_______________________________ I have participated in the care of this patient. I have reviewed all pertinent clinical information, including history, physical exam and plan prior to discharge of the patient from the emergency department. I called the [facility] vascular lab to give patient information for venous duplex in the morning.  
Nanci Rivera DO

## 2018-11-10 NOTE — ED TRIAGE NOTES
\"I had a stent placed last month, and for the past 4 days I've been having rt groin pain which is where he went when he placed my stent. \"

## 2018-11-13 ENCOUNTER — HOSPITAL ENCOUNTER (OUTPATIENT)
Dept: CARDIAC REHAB | Age: 63
Discharge: HOME | End: 2018-11-13
Payer: COMMERCIAL

## 2018-11-13 VITALS — WEIGHT: 264 LBS | BODY MASS INDEX: 36.82 KG/M2

## 2018-11-13 PROCEDURE — 93798 PHYS/QHP OP CAR RHAB W/ECG: CPT

## 2018-11-14 RX ORDER — ISOSORBIDE MONONITRATE 60 MG/1
TABLET, EXTENDED RELEASE ORAL
Qty: 30 TAB | Refills: 0 | Status: SHIPPED | OUTPATIENT
Start: 2018-11-14 | End: 2019-01-03 | Stop reason: SDUPTHER

## 2018-11-15 ENCOUNTER — OFFICE VISIT (OUTPATIENT)
Dept: INTERNAL MEDICINE CLINIC | Age: 63
End: 2018-11-15

## 2018-11-15 VITALS
RESPIRATION RATE: 16 BRPM | BODY MASS INDEX: 38.18 KG/M2 | OXYGEN SATURATION: 97 % | SYSTOLIC BLOOD PRESSURE: 168 MMHG | HEIGHT: 71 IN | TEMPERATURE: 98.4 F | WEIGHT: 272.7 LBS | DIASTOLIC BLOOD PRESSURE: 100 MMHG | HEART RATE: 86 BPM

## 2018-11-15 DIAGNOSIS — I10 ESSENTIAL HYPERTENSION: Primary | ICD-10-CM

## 2018-11-15 RX ORDER — CALCIUM CARBONATE 200(500)MG
1 TABLET,CHEWABLE ORAL DAILY
COMMUNITY

## 2018-11-15 RX ORDER — AMLODIPINE BESYLATE 5 MG/1
5 TABLET ORAL DAILY
Qty: 30 TAB | Refills: 2 | Status: SHIPPED | OUTPATIENT
Start: 2018-11-15 | End: 2018-11-20 | Stop reason: SDUPTHER

## 2018-11-15 RX ORDER — HYDROCHLOROTHIAZIDE 25 MG/1
25 TABLET ORAL DAILY
Qty: 30 TAB | Refills: 2 | Status: SHIPPED | OUTPATIENT
Start: 2018-11-15 | End: 2019-07-17 | Stop reason: SDUPTHER

## 2018-11-15 NOTE — PROGRESS NOTES
Cathi Fairchild is a 66 y.o. male presenting today for Hypertension ([facility])  . HPI:  Cathi Fairchild presents to the office today for hypertension follow-up care. Patient reports his blood pressure has been elevated over his last several visits. Patient had a visit on 11/9/2018 with another provider his blood pressure was 180/87 and today his blood pressure his blood pressure is 168/100. Patient recently had a gastric bypass and has lost about 40+ pounds. He reports that his diet is changed and he has limited salt. He does have peripheral edema but it has significantly improved since his gastric bypass surgery. He is negative for chest pain, palpitation or dyspnea. Review of Systems   Respiratory: Negative for cough. Cardiovascular: Positive for leg swelling. Negative for chest pain and palpitations. Neurological: Negative for headaches. Allergies   Allergen Reactions    Iodine Other (comments)     Eyes swell shut      Shellfish Containing Products Swelling       Current Outpatient Medications   Medication Sig Dispense Refill    calcium carbonate (TUMS) 200 mg calcium (500 mg) chew Take 1 Tab by mouth daily.  amLODIPine (NORVASC) 5 mg tablet Take 1 Tab by mouth daily. 30 Tab 2    hydroCHLOROthiazide (HYDRODIURIL) 25 mg tablet Take 1 Tab by mouth daily. 30 Tab 2    isosorbide mononitrate ER (IMDUR) 60 mg CR tablet TAKE 1 TABLET BY MOUTH DAILY 30 Tab 0    hydrALAZINE (APRESOLINE) 100 mg tablet TAKE 1 TABLET BY MOUTH TWICE DAILY 180 Tab 3    clopidogrel (PLAVIX) 75 mg tab Take 1 Tab by mouth daily. 90 Tab 3    atorvastatin (LIPITOR) 40 mg tablet Take 1 Tab by mouth nightly. 90 Tab 3    carvedilol (COREG) 12.5 mg tablet Take 1 Tab by mouth two (2) times daily (with meals). 180 Tab 1    omeprazole (PRILOSEC) 40 mg capsule Take 1 Cap by mouth daily. 30 Cap 0    aspirin 81 mg chewable tablet Take 1 Tab by mouth two (2) times a day.  60 Tab 0    allopurinol (ZYLOPRIM) 100 mg tablet TAKE 1 TAB BY MOUTH DAILY. 90 Tab 3    cloNIDine (CATAPRES) 0.2 mg/24 hr patch APPLY 1 PATCH ONCE WEEKLY 12 Patch 3    nitroglycerin (NITROSTAT) 0.4 mg SL tablet by SubLINGual route every five (5) minutes as needed for Chest Pain.          Past Medical History:   Diagnosis Date    BPH without obstruction/lower urinary tract symptoms     Bursitis of right shoulder     CAD (coronary artery disease)     Chest pain     history of hospitalization with chest pain and a negative workup in 2008    Chronic edema     Constipation     die to medication    Coronary artery disease     mild, non obstructive/EF 65%    Dyspnea on exertion     Echocardiogram 12/16/08    IVC dilated; suboptimal endocardial border; EF 65%    ED (erectile dysfunction)     Elevated PSA     Frequency     GERD (gastroesophageal reflux disease)     Gout     H/O cystoscopy 06/20/2013    Hematuria, unspecified     Hypercholesterolemia     Hypertension     Hypertension      Hypogonadism male     Impotence of organic origin     Left ventricular diastolic dysfunction     Malignant neoplasm of prostate (HCC)     hx of t1a, izzy 6, 5 % of 1  core    Morbid obesity ([address]     Myocardial perfusion 09/05/08    Basal inferior defect c/w artifact; EF 63%    Overactive bladder     S/P cardiac cath 07/30/10    oD2-40%; pRCA-20-30%; EF 65%    Sleep apnea     Slowing of urinary stream     Type II or unspecified type diabetes mellitus without mention of complication, not stated as uncontrolled        Past Surgical History:   Procedure Laterality Date    HX HEART CATHETERIZATION  7/30/10    HX OTHER SURGICAL  06/27/13    Prostate    HX TONSILLECTOMY      AL COLONOSCOPY FLX DX W/COLLJ SPEC WHEN PFRMD      AL I & D ABSC XTRAORAL SOFT TISS COMP         Social History     Socioeconomic History    Marital status: SINGLE     Spouse name: Not on file    Number of children: Not on file    Years of education: Not on file    Highest education level: Not on file   Social Needs    Financial resource strain: Not on file    Food insecurity - worry: Not on file    Food insecurity - inability: Not on file    Transportation needs - medical: Not on file   [employer] needs - non-medical: Not on file   Occupational History    Not on file   Tobacco Use    Smoking status: Former Smoker     Types: Cigars     Last attempt to quit: 10/4/1999     Years since quittin.1    Smokeless tobacco: Never Used   Substance and Sexual Activity    Alcohol use: No     Comment: socially    Drug use: No    Sexual activity: Not Currently   Other Topics Concern    Not on file   Social History Narrative    Not on file       Patient does not have an advanced directive on file    Vitals:    11/15/18 1159   BP: (!) 168/100   Pulse: 86   Resp: 16   Temp: 98.4 °F (36.9 °C)   TempSrc: Tympanic   SpO2: 97%   Weight: 272 lb 11.2 oz (123.7 kg)   Height: 5' 11\" (1.803 m)   PainSc:   0 - No pain       Physical Exam   Constitutional: No distress. Cardiovascular: Normal rate, regular rhythm and normal heart sounds. Pulmonary/Chest: Effort normal and breath sounds normal.   Neurological: He is alert. Nursing note and vitals reviewed.       Admission on 2018, Discharged on 11/10/2018   Component Date Value Ref Range Status    WBC 2018 4.7  4.6 - 13.2 K/uL Final    RBC 2018 3.84* 4.70 - 5.50 M/uL Final    HGB 2018 10.5* 13.0 - 16.0 g/dL Final    HCT 2018 32.3* 36.0 - 48.0 % Final    MCV 2018 84.1  74.0 - 97.0 FL Final    MCH 2018 27.3  24.0 - 34.0 PG Final    MCHC 2018 32.5  31.0 - 37.0 g/dL Final    RDW 2018 16.0* 11.6 - 14.5 % Final    PLATELET  929  135 - 420 K/uL Final    MPV 2018 9.5  9.2 - 11.8 FL Final    NEUTROPHILS 2018 54  40 - 73 % Final    LYMPHOCYTES 2018 36  21 - 52 % Final    MONOCYTES 2018 7  3 - 10 % Final    EOSINOPHILS 2018 3  0 - 5 % Final    BASOPHILS 11/09/2018 0  0 - 2 % Final    ABS. NEUTROPHILS 11/09/2018 2.6  1.8 - 8.0 K/UL Final    ABS. LYMPHOCYTES 11/09/2018 1.7  0.9 - 3.6 K/UL Final    ABS. MONOCYTES 11/09/2018 0.3  0.05 - 1.2 K/UL Final    ABS. EOSINOPHILS 11/09/2018 0.1  0.0 - 0.4 K/UL Final    ABS. BASOPHILS 11/09/2018 0.0  0.0 - 0.1 K/UL Final    DF 11/09/2018 AUTOMATED    Final    Sodium 11/09/2018 144  136 - 145 mmol/L Final    Potassium 11/09/2018 3.6  3.5 - 5.5 mmol/L Final    Chloride 11/09/2018 107  100 - 108 mmol/L Final    CO2 11/09/2018 34* 21 - 32 mmol/L Final    Anion gap 11/09/2018 3  3.0 - 18 mmol/L Final    Glucose 11/09/2018 91  74 - 99 mg/dL Final    BUN 11/09/2018 42* 7.0 - 18 MG/DL Final    Creatinine 11/09/2018 1.98* 0.6 - 1.3 MG/DL Final    BUN/Creatinine ratio 11/09/2018 21* 12 - 20   Final    GFR est AA 11/09/2018 42* >60 ml/min/1.73m2 Final    GFR est non-AA 11/09/2018 35* >60 ml/min/1.73m2 Final    Comment: (NOTE)  Estimated GFR is calculated using the Modification of Diet in Renal   Disease (MDRD) Study equation, reported for both  Americans   (GFRAA) and non- Americans (GFRNA), and normalized to 1.73m2   body surface area. The physician must decide which value applies to   the patient. The MDRD study equation should only be used in   individuals age 25 or older. It has not been validated for the   following: pregnant women, patients with serious comorbid conditions,   or on certain medications, or persons with extremes of body size,   muscle mass, or nutritional status.  Calcium 11/09/2018 8.2* 8.5 - 10.1 MG/DL Final    Bilirubin, total 11/09/2018 0.3  0.2 - 1.0 MG/DL Final    ALT (SGPT) 11/09/2018 15* 16 - 61 U/L Final    AST (SGOT) 11/09/2018 12* 15 - 37 U/L Final    Alk.  phosphatase 11/09/2018 64  45 - 117 U/L Final    Protein, total 11/09/2018 6.2* 6.4 - 8.2 g/dL Final    Albumin 11/09/2018 3.3* 3.4 - 5.0 g/dL Final    Globulin 11/09/2018 2.9  2.0 - 4.0 g/dL Final    A-G Ratio 11/09/2018 1.1  0.8 - 1.7   Final    Prothrombin time 11/09/2018 13.4  11.5 - 15.2 sec Final    INR 11/09/2018 1.1  0.8 - 1.2   Final    Comment:            INR Therapeutic Ranges         (on stable oral anticoagulant):     INDICATION                INR  DVT/PE/Atrial Fib          2.0-3.0  MI/Mechanical Heart Valve  2.5-3.5      Color 11/09/2018 YELLOW    Final    Appearance 11/09/2018 CLEAR    Final    Specific gravity 11/09/2018 1.020  1.005 - 1.030   Final    pH (UA) 11/09/2018 5.0  5.0 - 8.0   Final    Protein 11/09/2018 TRACE* NEG mg/dL Final    Glucose 11/09/2018 NEGATIVE   NEG mg/dL Final    Ketone 11/09/2018 NEGATIVE   NEG mg/dL Final    Bilirubin 11/09/2018 NEGATIVE   NEG   Final    Blood 11/09/2018 NEGATIVE   NEG   Final    Urobilinogen 11/09/2018 1.0  0.2 - 1.0 EU/dL Final    Nitrites 11/09/2018 NEGATIVE   NEG   Final    Leukocyte Esterase 11/09/2018 MODERATE* NEG   Final    WBC 11/09/2018 15 to 25  0 - 4 /hpf Final    RBC 11/09/2018 NONE  0 - 5 /hpf Final    Epithelial cells 11/09/2018 2+  0 - 5 /lpf Final    Bacteria 11/09/2018 FEW* NEG /hpf Final    Mucus 11/09/2018 FEW* NEG /lpf Final    D DIMER 11/09/2018 1.09* <0.46 ug/ml(FEU) Final    Comment: (NOTE)  A D-Dimer result less than 0.5 ug/mL FEU combined with a low clinical   pretest probability of DVT and/or PE has a negative predictive value   of %. The positive predictive value is 50% or less.      Ancillary Procedure on 10/17/2018   Component Date Value Ref Range Status    Target HR 10/17/2018 135  bpm Final    Baseline HR 10/17/2018 57  bpm Final    Baseline BP 10/17/2018 186  mmHg Final    Percent HR 10/17/2018 71  % Final    Post peak HR 10/17/2018 96  bpm Final    Stress Base Diastolic BP 92/50/2856 74  mmHg Final    Stress Base Systolic BP 89/88/3567 507  mmHg Final    Stress Base Diastolic BP 97/65/2536 82  mmHg Final    Stress Rate Pressure Product 10/17/2018 18,816  bpm*mmHg Final    Stress Stage 1 HR 10/17/2018 83  bpm Final    Stress Stage 1 BP 10/17/2018 196/82  mmHg Final    Recovery Stage 1 HR 10/17/2018 83  bpm Final    Recovery Stage 1 BP 10/17/2018 190/74  mmHg Final    Recovery Stage 2 HR 10/17/2018 80  bpm Final    Recovery Stage 2 BP 10/17/2018 186/70  mmHg Final    Recovery Stage 3 HR 10/17/2018 73  bpm Final    Recovery Stage 3 BP 10/17/2018 180/68  mmHg Final    Recovery Stage 4 HR 10/17/2018 72  bpm Final    Recovery Stage 4 BP 10/17/2018 174/68  mmHg Final    TID 10/17/2018 0.92   Final    ST Elevation (mm) 10/17/2018 0  mm Final    ST Depression (mm) 10/17/2018 0  mm Final    Nuc Stress Diastolic Volume Index 30/34/4925 192.00  mL/m2 Final    Nuc Stress Systolic Volume Index 32/89/2677 115.00  mL/m2 Final    Nuc Rest EF 10/17/2018 40  % Final   Ancillary Procedure on 10/12/2018   Component Date Value Ref Range Status    Target HR 10/18/2018 135  bpm Final    Baseline HR 10/18/2018 74  bpm Final    Baseline BP 10/18/2018 153  mmHg Final    Stress Base Diastolic BP 13/61/8475 592  mmHg Final    Stress Stage 2 HR 10/18/2018 132  bpm Final    Recovery Stage 1 HR 10/18/2018 100  bpm Final    Recovery Stage 1 BP 10/18/2018 184/82  mmHg Final    Recovery Stage 2 HR 10/18/2018 90  bpm Final    Recovery Stage 2 BP 10/18/2018 174/80  mmHg Final    Recovery Stage 3 HR 10/18/2018 89  bpm Final    Recovery Stage 3 BP 10/18/2018 174/80  mmHg Final    Recovery Stage 4 HR 10/18/2018 88  bpm Final    Recovery Stage 4 BP 10/18/2018 168/84  mmHg Final    Stress Stage 1 HR 10/18/2018 123  bpm Final    Stress Stage 1 BP 10/18/2018 164/96  mmHg Final    Stress Stage 2 BP 10/18/2018 170/82  mmHg Final    Percent HR 10/18/2018 99  % Final    Estimated workload 10/18/2018 7.0  METS Final    Post peak HR 10/18/2018 134  bpm Final    ST Elevation (mm) 10/18/2018 0  mm Final    ST Depression (mm) 10/18/2018 0  mm Final    Angina Index 10/18/2018 2   Final    Exercise duration time 10/18/2018 6:24  min:sec Final    Stress Base Systolic BP 69/71/5142 880  mmHg Final    Stress Base Diastolic BP 44/74/2477 84  mmHg Final    Stress Rate Pressure Product 10/18/2018 24,656  bpm*mmHg Final   Admission on 08/28/2018, Discharged on 09/01/2018   No results displayed because visit has over 200 results. .No results found for any visits on 11/15/18. Assessment / Plan:      ICD-10-CM ICD-9-CM    1. Essential hypertension I10 401.9 amLODIPine (NORVASC) 5 mg tablet      hydroCHLOROthiazide (HYDRODIURIL) 25 mg tablet     Hypertension-spoke to supervising provider about patient elevated blood pressure. We will go ahead and start patient on amlodipine 5 mg tablet and may have to increase to 10 mg to help control his blood pressure. Add hydrochlorothiazide 25 mg tablet  Patient will follow-up in 3 weeks for evaluation hypertension      Follow-up Disposition:  Return if symptoms worsen or fail to improve. I asked the patient if he  had any questions and answered his  questions. The patient stated that he understands the treatment plan and agrees with the treatment plan    Discussed the patient's BMI with him. The BMI follow up plan is as follows:     dietary management education, guidance, and counseling  encourage exercise  monitor weight  prescribed dietary intake    An After Visit Summary was printed and given to the patient.

## 2018-11-15 NOTE — PATIENT INSTRUCTIONS
Body Mass Index: Care Instructions  Your Care Instructions    Body mass index (BMI) can help you see if your weight is raising your risk for health problems. It uses a formula to compare how much you weigh with how tall you are. · A BMI lower than 18.5 is considered underweight. · A BMI between 18.5 and 24.9 is considered healthy. · A BMI between 25 and 29.9 is considered overweight. A BMI of 30 or higher is considered obese. If your BMI is in the normal range, it means that you have a lower risk for weight-related health problems. If your BMI is in the overweight or obese range, you may be at increased risk for weight-related health problems, such as high blood pressure, heart disease, stroke, arthritis or joint pain, and diabetes. If your BMI is in the underweight range, you may be at increased risk for health problems such as fatigue, lower protection (immunity) against illness, muscle loss, bone loss, hair loss, and hormone problems. BMI is just one measure of your risk for weight-related health problems. You may be at higher risk for health problems if you are not active, you eat an unhealthy diet, or you drink too much alcohol or use tobacco products. Follow-up care is a key part of your treatment and safety. Be sure to make and go to all appointments, and call your doctor if you are having problems. It's also a good idea to know your test results and keep a list of the medicines you take. How can you care for yourself at home? · Practice healthy eating habits. This includes eating plenty of fruits, vegetables, whole grains, lean protein, and low-fat dairy. · If your doctor recommends it, get more exercise. Walking is a good choice. Bit by bit, increase the amount you walk every day. Try for at least 30 minutes on most days of the week. · Do not smoke. Smoking can increase your risk for health problems. If you need help quitting, talk to your doctor about stop-smoking programs and medicines. These can increase your chances of quitting for good. · Limit alcohol to 2 drinks a day for men and 1 drink a day for women. Too much alcohol can cause health problems. If you have a BMI higher than 25  · Your doctor may do other tests to check your risk for weight-related health problems. This may include measuring the distance around your waist. A waist measurement of more than 40 inches in men or 35 inches in women can increase the risk of weight-related health problems. · Talk with your doctor about steps you can take to stay healthy or improve your health. You may need to make lifestyle changes to lose weight and stay healthy, such as changing your diet and getting regular exercise. If you have a BMI lower than 18.5  · Your doctor may do other tests to check your risk for health problems. · Talk with your doctor about steps you can take to stay healthy or improve your health. You may need to make lifestyle changes to gain or maintain weight and stay healthy, such as getting more healthy foods in your diet and doing exercises to build muscle. Where can you learn more? Go to http://davian-johnson.info/. Enter S176 in the search box to learn more about \"Body Mass Index: Care Instructions. \"  Current as of: October 13, 2016  Content Version: 11.4  © 1742-3955 [employer]. Care instructions adapted under license by [employer] (which disclaims liability or warranty for this information). If you have questions about a medical condition or this instruction, always ask your healthcare professional. [address] any warranty or liability for your use of this information.

## 2018-11-16 ENCOUNTER — HOSPITAL ENCOUNTER (OUTPATIENT)
Dept: CARDIAC REHAB | Age: 63
Discharge: HOME | End: 2018-11-16
Payer: COMMERCIAL

## 2018-11-16 VITALS — BODY MASS INDEX: 36.82 KG/M2 | WEIGHT: 264 LBS

## 2018-11-16 PROCEDURE — 93798 PHYS/QHP OP CAR RHAB W/ECG: CPT

## 2018-11-19 ENCOUNTER — HOSPITAL ENCOUNTER (OUTPATIENT)
Dept: CARDIAC REHAB | Age: 63
Discharge: HOME | End: 2018-11-19
Payer: COMMERCIAL

## 2018-11-19 VITALS — BODY MASS INDEX: 38.22 KG/M2 | WEIGHT: 274 LBS

## 2018-11-19 DIAGNOSIS — I10 ESSENTIAL HYPERTENSION: ICD-10-CM

## 2018-11-19 PROCEDURE — 93798 PHYS/QHP OP CAR RHAB W/ECG: CPT

## 2018-11-19 NOTE — TELEPHONE ENCOUNTER
Requested Prescriptions     Pending Prescriptions Disp Refills    allopurinol (ZYLOPRIM) 100 mg tablet 90 Tab 3    nitroglycerin (NITROSTAT) 0.4 mg SL tablet       Sig: by SubLINGual route every five (5) minutes as needed for Chest Pain.  cloNIDine (CATAPRES) 0.2 mg/24 hr ptwk 12 Patch 3     Patient said that Shelly Bess was going to increase his patch to a 3.  And he needed to know about the laxis

## 2018-11-20 RX ORDER — AMLODIPINE BESYLATE 10 MG/1
10 TABLET ORAL DAILY
Qty: 90 TAB | Refills: 1 | Status: SHIPPED | OUTPATIENT
Start: 2018-11-20 | End: 2019-02-25 | Stop reason: ALTCHOICE

## 2018-11-20 RX ORDER — NITROGLYCERIN 0.4 MG/1
0.4 TABLET SUBLINGUAL
Qty: 1 BOTTLE | Refills: 1 | Status: SHIPPED | OUTPATIENT
Start: 2018-11-20 | End: 2020-10-13 | Stop reason: SDUPTHER

## 2018-11-20 RX ORDER — CLONIDINE 0.2 MG/24H
PATCH, EXTENDED RELEASE TRANSDERMAL
Qty: 12 PATCH | Refills: 3 | Status: SHIPPED | OUTPATIENT
Start: 2018-11-20 | End: 2019-03-25 | Stop reason: DRUGHIGH

## 2018-11-20 RX ORDER — ALLOPURINOL 100 MG/1
TABLET ORAL
Qty: 90 TAB | Refills: 3 | Status: SHIPPED | OUTPATIENT
Start: 2018-11-20 | End: 2019-11-22 | Stop reason: SDUPTHER

## 2018-11-21 ENCOUNTER — HOSPITAL ENCOUNTER (OUTPATIENT)
Dept: CARDIAC REHAB | Age: 63
Discharge: HOME | End: 2018-11-21
Payer: COMMERCIAL

## 2018-11-21 VITALS — BODY MASS INDEX: 36.96 KG/M2 | WEIGHT: 265 LBS

## 2018-11-21 PROCEDURE — 93798 PHYS/QHP OP CAR RHAB W/ECG: CPT

## 2018-11-27 ENCOUNTER — HOSPITAL ENCOUNTER (OUTPATIENT)
Dept: CARDIAC REHAB | Age: 63
Discharge: HOME | End: 2018-11-27
Payer: COMMERCIAL

## 2018-11-27 VITALS — BODY MASS INDEX: 36.82 KG/M2 | WEIGHT: 264 LBS

## 2018-11-27 PROCEDURE — 93798 PHYS/QHP OP CAR RHAB W/ECG: CPT

## 2018-11-29 ENCOUNTER — HOSPITAL ENCOUNTER (OUTPATIENT)
Dept: CARDIAC REHAB | Age: 63
Discharge: HOME | End: 2018-11-29
Payer: COMMERCIAL

## 2018-11-29 ENCOUNTER — APPOINTMENT (OUTPATIENT)
Dept: CARDIAC REHAB | Age: 63
End: 2018-11-29
Payer: COMMERCIAL

## 2018-11-29 VITALS — WEIGHT: 264 LBS | BODY MASS INDEX: 36.82 KG/M2

## 2018-11-29 PROCEDURE — 93798 PHYS/QHP OP CAR RHAB W/ECG: CPT

## 2018-12-04 ENCOUNTER — HOSPITAL ENCOUNTER (OUTPATIENT)
Dept: CARDIAC REHAB | Age: 63
Discharge: HOME | End: 2018-12-04
Payer: COMMERCIAL

## 2018-12-04 VITALS — WEIGHT: 265 LBS | BODY MASS INDEX: 36.96 KG/M2

## 2018-12-04 PROCEDURE — 93798 PHYS/QHP OP CAR RHAB W/ECG: CPT

## 2018-12-05 ENCOUNTER — TELEPHONE (OUTPATIENT)
Dept: INTERNAL MEDICINE CLINIC | Age: 63
End: 2018-12-05

## 2018-12-05 DIAGNOSIS — Z13.220 ENCOUNTER FOR LIPID SCREENING FOR CARDIOVASCULAR DISEASE: ICD-10-CM

## 2018-12-05 DIAGNOSIS — I10 ESSENTIAL HYPERTENSION: Primary | ICD-10-CM

## 2018-12-05 DIAGNOSIS — E29.1 HYPOGONADISM MALE: ICD-10-CM

## 2018-12-05 DIAGNOSIS — Z13.6 ENCOUNTER FOR LIPID SCREENING FOR CARDIOVASCULAR DISEASE: ICD-10-CM

## 2018-12-05 DIAGNOSIS — E11.9 TYPE 2 DIABETES MELLITUS WITHOUT COMPLICATION, WITHOUT LONG-TERM CURRENT USE OF INSULIN (HCC): ICD-10-CM

## 2018-12-05 NOTE — TELEPHONE ENCOUNTER
Mr Mathis Setting contacted about office receiving a depression screening from [facility] and needing to continue with his appointment of the 13th of December, he expressed understanding and requested his routine lab work be ordered and he will have it drawn for his appointment, labs ordered per standing order from antionette GOETZ

## 2018-12-07 ENCOUNTER — APPOINTMENT (OUTPATIENT)
Dept: CARDIAC REHAB | Age: 63
End: 2018-12-07
Payer: COMMERCIAL

## 2018-12-11 ENCOUNTER — DOCUMENTATION ONLY (OUTPATIENT)
Dept: INTERNAL MEDICINE CLINIC | Age: 63
End: 2018-12-11

## 2018-12-11 ENCOUNTER — HOSPITAL ENCOUNTER (OUTPATIENT)
Dept: CARDIAC REHAB | Age: 63
Discharge: HOME | End: 2018-12-11
Payer: COMMERCIAL

## 2018-12-11 VITALS — WEIGHT: 259 LBS | BODY MASS INDEX: 36.12 KG/M2

## 2018-12-11 PROCEDURE — 93798 PHYS/QHP OP CAR RHAB W/ECG: CPT

## 2018-12-11 NOTE — PROGRESS NOTES
Pharmacy Note: Immunization Update    Patient: Dana Kowalski (2 y.o., 1955)     Patient's immunization history was updated to reflect information contained in the [place] and/or outside immunization/pharmacy records were reconciled within [facility]. Health Maintenance schedule updated when appropriate.     Current immunizations now reflect:       Immunization History   Administered Date(s) Administered    Influenza Vaccine 09/01/2016, 09/01/2018    Influenza Vaccine (Quad) PF 10/20/2016, 09/13/2017    Influenza Vaccine PF 10/02/2015    Pneumococcal Polysaccharide (PPSV-23) 02/03/2011    Zoster Recombinant 05/17/2018, 09/01/2018       Yomaira Mendez, LavernD, BCACP

## 2018-12-13 ENCOUNTER — OFFICE VISIT (OUTPATIENT)
Dept: INTERNAL MEDICINE CLINIC | Age: 63
End: 2018-12-13

## 2018-12-13 VITALS
RESPIRATION RATE: 16 BRPM | TEMPERATURE: 97.9 F | HEART RATE: 62 BPM | OXYGEN SATURATION: 94 % | WEIGHT: 260 LBS | HEIGHT: 71 IN | SYSTOLIC BLOOD PRESSURE: 150 MMHG | DIASTOLIC BLOOD PRESSURE: 86 MMHG | BODY MASS INDEX: 36.4 KG/M2

## 2018-12-13 DIAGNOSIS — E78.00 HYPERCHOLESTEROLEMIA: ICD-10-CM

## 2018-12-13 DIAGNOSIS — F32.A DEPRESSION, UNSPECIFIED DEPRESSION TYPE: Primary | ICD-10-CM

## 2018-12-13 DIAGNOSIS — I10 ESSENTIAL HYPERTENSION: ICD-10-CM

## 2018-12-13 DIAGNOSIS — E87.6 HYPOKALEMIA: ICD-10-CM

## 2018-12-13 RX ORDER — BISMUTH SUBSALICYLATE 262 MG
1 TABLET,CHEWABLE ORAL DAILY
COMMUNITY
End: 2022-01-10

## 2018-12-13 RX ORDER — SERTRALINE HYDROCHLORIDE 25 MG/1
25 TABLET, FILM COATED ORAL DAILY
Qty: 30 TAB | Refills: 2 | Status: SHIPPED | OUTPATIENT
Start: 2018-12-13 | End: 2019-02-08 | Stop reason: DRUGHIGH

## 2018-12-13 RX ORDER — POTASSIUM CHLORIDE 20 MEQ/1
20 TABLET, EXTENDED RELEASE ORAL DAILY
Qty: 2 TAB | Refills: 0 | Status: SHIPPED | OUTPATIENT
Start: 2018-12-13 | End: 2019-03-14 | Stop reason: SDUPTHER

## 2018-12-13 NOTE — PROGRESS NOTES
Chief Complaint   Patient presents with    Diabetes    Results       Pt preferred language for health care discussion is english. Is someone accompanying this pt? no    Is the patient using any DME equipment during OV? no    Depression Screening:  PHQ over the last two weeks 11/13/2018 10/29/2018 9/27/2018 5/17/2018 2/1/2018 10/12/2017 5/25/2017   PHQ Not Done - - - - Patient Decline - -   Little interest or pleasure in doing things Nearly every day More than half the days Not at all Not at all - Not at all Several days   Feeling down, depressed, irritable, or hopeless Not at all More than half the days Not at all Not at all - Not at all Several days   Total Score PHQ 2 3 4 0 0 - 0 2   Trouble falling or staying asleep, or sleeping too much More than half the days More than half the days - - - - -   Feeling tired or having little energy Nearly every day More than half the days - - - - -   Poor appetite, weight loss, or overeating More than half the days Not at all - - - - -   Feeling bad about yourself - or that you are a failure or have let yourself or your family down More than half the days More than half the days - - - - -   Trouble concentrating on things such as school, work, reading, or watching TV More than half the days Not at all - - - - -   Moving or speaking so slowly that other people could have noticed; or the opposite being so fidgety that others notice Not at all More than half the days - - - - -   Thoughts of being better off dead, or hurting yourself in some way Not at all Not at all - - - - -   PHQ 9 Score 14 12 - - - - -       Learning Assessment:  Learning Assessment 5/25/2017 9/6/2016   PRIMARY LEARNER Patient Patient   CO-LEARNER CAREGIVER No -   PRIMARY LANGUAGE ENGLISH ENGLISH   LEARNER PREFERENCE PRIMARY READING READING   ANSWERED BY patient Patient   RELATIONSHIP SELF SELF         Health Maintenance reviewed and discussed per provider. Yes        Advance Directive:  1.  Do you have an advance directive in place? Patient Reply:no2. If not, would you like material regarding how to put one in place? Patient Reply: no    Coordination of Care:  1. Have you been to the ER, urgent care clinic since your last visit? Hospitalized since your last visit? no    2. Have you seen or consulted any other health care providers outside of the [address] [place] [address] since your last visit? Include any pap smears or colon screening.  no

## 2018-12-13 NOTE — PROGRESS NOTES
Ruby Whitlock is a 66 y.o. male presenting today for Diabetes and Results  . HPI:  Ruby Whitlock presents to the office today for depression. He notes that he had lab results done prior to today's visit. Patient had a depression screening completed at a physician appointment and a message was sent to the practice regarding patient results. He admits that he is depressed due to family issues. He did not disclose any reasons. Patient notes that he is handling the situation. Patient also had fasting labs done prior to today's visit. Patient's hemoglobin A1c was 5.0, his CBC showed mildly anemic at 11.5, 35.4. Testosterone level was essentially normal.  [place] level is elevated and he is followed by the nephrologist for chronic kidney disease. He is complaining of feeling generalized fatigue. He does have a sleep apnea which she states he uses nightly. Review of Systems   Constitutional: Positive for malaise/fatigue. Respiratory: Negative for cough. Cardiovascular: Negative for chest pain and palpitations. Allergies   Allergen Reactions    Iodine Other (comments)     Eyes swell shut      Shellfish Containing Products Swelling       Current Outpatient Medications   Medication Sig Dispense Refill    multivitamin (ONE A DAY) tablet Take 1 Tab by mouth daily.  potassium chloride (K-DUR, KLOR-CON) 20 mEq tablet Take 1 Tab by mouth daily. 2 Tab 0    sertraline (ZOLOFT) 25 mg tablet Take 1 Tab by mouth daily. 30 Tab 2    allopurinol (ZYLOPRIM) 100 mg tablet TAKE 1 TAB BY MOUTH DAILY. 90 Tab 3    nitroglycerin (NITROSTAT) 0.4 mg SL tablet 1 Tab by SubLINGual route every five (5) minutes as needed for Chest Pain. 1 Bottle 1    cloNIDine (CATAPRES) 0.2 mg/24 hr ptwk APPLY 1 PATCH ONCE WEEKLY 12 Patch 3    amLODIPine (NORVASC) 10 mg tablet Take 1 Tab by mouth daily. 90 Tab 1    calcium carbonate (TUMS) 200 mg calcium (500 mg) chew Take 1 Tab by mouth daily.       hydroCHLOROthiazide (HYDRODIURIL) 25 mg tablet Take 1 Tab by mouth daily. 30 Tab 2    isosorbide mononitrate ER (IMDUR) 60 mg CR tablet TAKE 1 TABLET BY MOUTH DAILY 30 Tab 0    hydrALAZINE (APRESOLINE) 100 mg tablet TAKE 1 TABLET BY MOUTH TWICE DAILY 180 Tab 3    clopidogrel (PLAVIX) 75 mg tab Take 1 Tab by mouth daily. 90 Tab 3    atorvastatin (LIPITOR) 40 mg tablet Take 1 Tab by mouth nightly. 90 Tab 3    carvedilol (COREG) 12.5 mg tablet Take 1 Tab by mouth two (2) times daily (with meals). 180 Tab 1    omeprazole (PRILOSEC) 40 mg capsule Take 1 Cap by mouth daily. 30 Cap 0    aspirin 81 mg chewable tablet Take 1 Tab by mouth two (2) times a day.  61 Tab 0       Past Medical History:   Diagnosis Date    BPH without obstruction/lower urinary tract symptoms     Bursitis of right shoulder     CAD (coronary artery disease)     Chest pain     history of hospitalization with chest pain and a negative workup in 2008    Chronic edema     Constipation     die to medication    Coronary artery disease     mild, non obstructive/EF 65%    Dyspnea on exertion     Echocardiogram 12/16/08    IVC dilated; suboptimal endocardial border; EF 65%    ED (erectile dysfunction)     Elevated PSA     Frequency     GERD (gastroesophageal reflux disease)     Gout     H/O cystoscopy 06/20/2013    Hematuria, unspecified     Hypercholesterolemia     Hypertension     Hypertension      Hypogonadism male     Impotence of organic origin     Left ventricular diastolic dysfunction     Malignant neoplasm of prostate (HCC)     hx of t1a, izzy 6, 5 % of 1  core    Morbid obesity ([facility] [address]     Myocardial perfusion 09/05/08    Basal inferior defect c/w artifact; EF 63%    Overactive bladder     S/P cardiac cath 07/30/10    oD2-40%; pRCA-20-30%; EF 65%    Sleep apnea     Slowing of urinary stream     Type II or unspecified type diabetes mellitus without mention of complication, not stated as uncontrolled        Past Surgical History: Procedure Laterality Date    HX HEART CATHETERIZATION  7/30/10    HX OTHER SURGICAL  13    Prostate    HX TONSILLECTOMY      CT COLONOSCOPY FLX DX W/COLLJ SPEC WHEN PFRMD      CT I & D ABSC XTRAORAL SOFT TISS COMP         Social History     Socioeconomic History    Marital status: SINGLE     Spouse name: Not on file    Number of children: Not on file    Years of education: Not on file    Highest education level: Not on file   Social Needs    Financial resource strain: Not on file    Food insecurity - worry: Not on file    Food insecurity - inability: Not on file    Transportation needs - medical: Not on file   Roomster needs - non-medical: Not on file   Occupational History    Not on file   Tobacco Use    Smoking status: Former Smoker     Types: Cigars     Last attempt to quit: 10/4/1999     Years since quittin.2    Smokeless tobacco: Never Used   Substance and Sexual Activity    Alcohol use: No     Comment: socially    Drug use: No    Sexual activity: Not Currently   Other Topics Concern    Not on file   Social History Narrative    Not on file       Patient does not have an advanced directive on file    Vitals:    18 1627   BP: 150/86   Pulse: 62   Resp: 16   Temp: 97.9 °F (36.6 °C)   TempSrc: Tympanic   SpO2: 94%   Weight: 260 lb (117.9 kg)   Height: 5' 11\" (1.803 m)   PainSc:   7   PainLoc: Shoulder       Physical Exam   Constitutional: No distress. Cardiovascular: Normal rate, regular rhythm and normal heart sounds. Pulmonary/Chest: Effort normal and breath sounds normal.   Musculoskeletal: He exhibits edema ( +1 lower extremity). Lymphadenopathy:     He has no cervical adenopathy. Neurological: He is alert. Nursing note and vitals reviewed.       Orders Only on 2018   Component Date Value Ref Range Status    Triglyceride 2018 74  40 - 149 mg/dL Final    HDL Cholesterol 2018 51  40 - 59 mg/dL Final    Cholesterol, total 2018 144 110 - 200 mg/dL Final    CHOLESTEROL/HDL 12/11/2018 2.8  0.0 - 5.0 Final    LDL, calculated 12/11/2018 79  50 - 99 mg/dL Final    VLDL, calculated 12/11/2018 15  8 - 30 mg/dL Final    Comment: Test includes cholesterol, HDL cholesterol, triglycerides and LDL. Cholesterol Recommended NCEP guidelines in mg/dL:  Less than 200            Desirable  200 - 239                Borderline High  Greater than or  = 240   High  Please Note:  Total Chol/HDL Ratio                   Men     Women  1/2 Avg. Risk    3.4     3.3      Avg. Risk    5.0     4.4  2X  Avg. Risk    9.6     7.1  3X  Avg. Risk   23.4    11.0      Glucose 12/11/2018 91  70 - 99 mg/dL Final    BUN 12/11/2018 46* 6 - 22 mg/dL Final    Creatinine 12/11/2018 2.0* 0.8 - 1.6 mg/dL Final    Sodium 12/11/2018 145  133 - 145 mmol/L Final    Potassium 12/11/2018 3.3* 3.5 - 5.5 mmol/L Final    Chloride 12/11/2018 100  98 - 110 mmol/L Final    CO2 12/11/2018 30  20 - 32 mmol/L Final    AST (SGOT) 12/11/2018 10  10 - 37 U/L Final    ALT (SGPT) 12/11/2018 6  5 - 40 U/L Final    Alk. phosphatase 12/11/2018 76  40 - 125 U/L Final    Bilirubin, total 12/11/2018 0.3  0.2 - 1.2 mg/dL Final    Calcium 12/11/2018 9.0  8.4 - 10.4 mg/dL Final    Protein, total 12/11/2018 6.6  6.2 - 8.1 g/dL Final    Albumin 12/11/2018 3.9  3.5 - 5.0 g/dL Final    A-G Ratio 12/11/2018 1.4  1.1 - 2.6 ratio Final    Globulin 12/11/2018 2.7  2.0 - 4.0 g/dL Final    Anion gap 12/11/2018 15.0  mmol/L Final    Comment: Test includes Albumin, Alkaline Phosphatase, ALT, AST, BUN, Calcium, CO2,  Chloride, Creatinine, Glucose, Potassium, Sodium, Total Bilirubin and Total  Protein. Estimated GFR results are reported in mL/min/1.73 sq.m. by the MDRD equation. This eGFR is validated for stable chronic renal failure patients. This   equation  is unreliable in acute illness or patients with normal renal function.       GFRAA 12/11/2018 40.9* >60.0 Final    GFRNA 12/11/2018 33.7* >60.0 Final  % Free testosterone 12/11/2018 1.49* 1.50 - 4.2 % Final    Free testosterone (Direct) 12/11/2018 11.76  5.00 - 21 ng/dL Final    Testosterone 12/11/2018 789  264 - 916 ng/dL Final    Comment: Adult male reference interval is based on a population of  healthy nonobese males (BMI <30) between 23and 44years old. [address] [place] [address], [facility] [address] [id number]. PMID: [id number]. Performed at:  [address]  [id number]  : Ranjan Perez MD, Phone:  [phone]      WBC 12/11/2018 6.1  4.0 - 11.0 K/uL Final    RBC 12/11/2018 3.99  3.80 - 5.80 M/uL Final    HGB 12/11/2018 11.5* 13.1 - 17.2 g/dL Final    HCT 12/11/2018 35.4* 39.3 - 51.6 % Final    MCV 12/11/2018 89  80 - 95 fL Final    MCH 12/11/2018 29  26 - 34 pg Final    MCHC 12/11/2018 33  31 - 36 g/dL Final    RDW 12/11/2018 15.6* 10.0 - 15.5 % Final    PLATELET 85/00/7112 422  140 - 440 K/uL Final    MPV 12/11/2018 9.9  9.0 - 13.0 fL Final    NEUTROPHILS 12/11/2018 65  40 - 75 % Final    Lymphocytes 12/11/2018 24  20 - 45 % Final    MONOCYTES 12/11/2018 8  3 - 12 % Final    EOSINOPHILS 12/11/2018 3  0 - 6 % Final    BASOPHILS 12/11/2018 1  0 - 2 % Final    ABS. NEUTROPHILS 12/11/2018 3.9  1.8 - 7.7 K/uL Final    ABSOLUTE LYMPHOCYTE COUNT 12/11/2018 1.5  1.0 - 4.8 K/uL Final    ABS. MONOCYTES 12/11/2018 0.5  0.1 - 1.0 K/uL Final    ABS. EOSINOPHILS 12/11/2018 0.2  0.0 - 0.5 K/uL Final    ABS.  BASOPHILS 12/11/2018 0.0  0.0 - 0.2 K/uL Final    Hemoglobin A1c 12/11/2018 5.0  4.8 - 5.9 % Final    AVG GLU 12/11/2018 96  91 - 123 mg/dL Final   Admission on 11/09/2018, Discharged on 11/10/2018   Component Date Value Ref Range Status    WBC 11/09/2018 4.7  4.6 - 13.2 K/uL Final    RBC 11/09/2018 3.84* 4.70 - 5.50 M/uL Final    HGB 11/09/2018 10.5* 13.0 - 16.0 g/dL Final    HCT 11/09/2018 32.3* 36.0 - 48.0 % Final    MCV 11/09/2018 84.1  74.0 - 97.0 FL Final    MCH 11/09/2018 27.3  24.0 - 34.0 PG Final    MCHC 11/09/2018 32.5  31.0 - 37.0 g/dL Final    RDW 11/09/2018 16.0* 11.6 - 14.5 % Final    PLATELET 90/96/2919 435  135 - 420 K/uL Final    MPV 11/09/2018 9.5  9.2 - 11.8 FL Final    NEUTROPHILS 11/09/2018 54  40 - 73 % Final    LYMPHOCYTES 11/09/2018 36  21 - 52 % Final    MONOCYTES 11/09/2018 7  3 - 10 % Final    EOSINOPHILS 11/09/2018 3  0 - 5 % Final    BASOPHILS 11/09/2018 0  0 - 2 % Final    ABS. NEUTROPHILS 11/09/2018 2.6  1.8 - 8.0 K/UL Final    ABS. LYMPHOCYTES 11/09/2018 1.7  0.9 - 3.6 K/UL Final    ABS. MONOCYTES 11/09/2018 0.3  0.05 - 1.2 K/UL Final    ABS. EOSINOPHILS 11/09/2018 0.1  0.0 - 0.4 K/UL Final    ABS. BASOPHILS 11/09/2018 0.0  0.0 - 0.1 K/UL Final    DF 11/09/2018 AUTOMATED    Final    Sodium 11/09/2018 144  136 - 145 mmol/L Final    Potassium 11/09/2018 3.6  3.5 - 5.5 mmol/L Final    Chloride 11/09/2018 107  100 - 108 mmol/L Final    CO2 11/09/2018 34* 21 - 32 mmol/L Final    Anion gap 11/09/2018 3  3.0 - 18 mmol/L Final    Glucose 11/09/2018 91  74 - 99 mg/dL Final    BUN 11/09/2018 42* 7.0 - 18 MG/DL Final    Creatinine 11/09/2018 1.98* 0.6 - 1.3 MG/DL Final    BUN/Creatinine ratio 11/09/2018 21* 12 - 20   Final    GFR est AA 11/09/2018 42* >60 ml/min/1.73m2 Final    GFR est non-AA 11/09/2018 35* >60 ml/min/1.73m2 Final    Comment: (NOTE)  Estimated GFR is calculated using the Modification of Diet in Renal   Disease (MDRD) Study equation, reported for both  Americans   (GFRAA) and non- Americans (GFRNA), and normalized to 1.73m2   body surface area. The physician must decide which value applies to   the patient. The MDRD study equation should only be used in   individuals age 25 or older. It has not been validated for the   following: pregnant women, patients with serious comorbid conditions,   or on certain medications, or persons with extremes of body size,   muscle mass, or nutritional status.       Calcium 11/09/2018 8.2* 8.5 - 10.1 MG/DL Final    Bilirubin, total 11/09/2018 0.3  0.2 - 1.0 MG/DL Final    ALT (SGPT) 11/09/2018 15* 16 - 61 U/L Final    AST (SGOT) 11/09/2018 12* 15 - 37 U/L Final    Alk. phosphatase 11/09/2018 64  45 - 117 U/L Final    Protein, total 11/09/2018 6.2* 6.4 - 8.2 g/dL Final    Albumin 11/09/2018 3.3* 3.4 - 5.0 g/dL Final    Globulin 11/09/2018 2.9  2.0 - 4.0 g/dL Final    A-G Ratio 11/09/2018 1.1  0.8 - 1.7   Final    Prothrombin time 11/09/2018 13.4  11.5 - 15.2 sec Final    INR 11/09/2018 1.1  0.8 - 1.2   Final    Comment:            INR Therapeutic Ranges         (on stable oral anticoagulant):     INDICATION                INR  DVT/PE/Atrial Fib          2.0-3.0  MI/Mechanical Heart Valve  2.5-3.5      Color 11/09/2018 YELLOW    Final    Appearance 11/09/2018 CLEAR    Final    Specific gravity 11/09/2018 1.020  1.005 - 1.030   Final    pH (UA) 11/09/2018 5.0  5.0 - 8.0   Final    Protein 11/09/2018 TRACE* NEG mg/dL Final    Glucose 11/09/2018 NEGATIVE   NEG mg/dL Final    Ketone 11/09/2018 NEGATIVE   NEG mg/dL Final    Bilirubin 11/09/2018 NEGATIVE   NEG   Final    Blood 11/09/2018 NEGATIVE   NEG   Final    Urobilinogen 11/09/2018 1.0  0.2 - 1.0 EU/dL Final    Nitrites 11/09/2018 NEGATIVE   NEG   Final    Leukocyte Esterase 11/09/2018 MODERATE* NEG   Final    WBC 11/09/2018 15 to 25  0 - 4 /hpf Final    RBC 11/09/2018 NONE  0 - 5 /hpf Final    Epithelial cells 11/09/2018 2+  0 - 5 /lpf Final    Bacteria 11/09/2018 FEW* NEG /hpf Final    Mucus 11/09/2018 FEW* NEG /lpf Final    D DIMER 11/09/2018 1.09* <0.46 ug/ml(FEU) Final    Comment: (NOTE)  A D-Dimer result less than 0.5 ug/mL FEU combined with a low clinical   pretest probability of DVT and/or PE has a negative predictive value   of %. The positive predictive value is 50% or less.      Ancillary Procedure on 10/17/2018   Component Date Value Ref Range Status    Target HR 10/17/2018 135  bpm Final    Baseline HR 10/17/2018 57 bpm Final    Baseline BP 10/17/2018 186  mmHg Final    Percent HR 10/17/2018 71  % Final    Post peak HR 10/17/2018 96  bpm Final    Stress Base Diastolic BP 76/57/1906 74  mmHg Final    Stress Base Systolic BP 79/82/0064 854  mmHg Final    Stress Base Diastolic BP 14/17/8013 82  mmHg Final    Stress Rate Pressure Product 10/17/2018 18,816  bpm*mmHg Final    Stress Stage 1 HR 10/17/2018 83  bpm Final    Stress Stage 1 BP 10/17/2018 196/82  mmHg Final    Recovery Stage 1 HR 10/17/2018 83  bpm Final    Recovery Stage 1 BP 10/17/2018 190/74  mmHg Final    Recovery Stage 2 HR 10/17/2018 80  bpm Final    Recovery Stage 2 BP 10/17/2018 186/70  mmHg Final    Recovery Stage 3 HR 10/17/2018 73  bpm Final    Recovery Stage 3 BP 10/17/2018 180/68  mmHg Final    Recovery Stage 4 HR 10/17/2018 72  bpm Final    Recovery Stage 4 BP 10/17/2018 174/68  mmHg Final    TID 10/17/2018 0.92   Final    ST Elevation (mm) 10/17/2018 0  mm Final    ST Depression (mm) 10/17/2018 0  mm Final    Nuc Stress Diastolic Volume Index 98/45/6158 192.00  mL/m2 Final    Nuc Stress Systolic Volume Index 38/38/4704 115.00  mL/m2 Final    Nuc Rest EF 10/17/2018 40  % Final   Ancillary Procedure on 10/12/2018   Component Date Value Ref Range Status    Target HR 10/18/2018 135  bpm Final    Baseline HR 10/18/2018 74  bpm Final    Baseline BP 10/18/2018 153  mmHg Final    Stress Base Diastolic BP 30/45/4743 179  mmHg Final    Stress Stage 2 HR 10/18/2018 132  bpm Final    Recovery Stage 1 HR 10/18/2018 100  bpm Final    Recovery Stage 1 BP 10/18/2018 184/82  mmHg Final    Recovery Stage 2 HR 10/18/2018 90  bpm Final    Recovery Stage 2 BP 10/18/2018 174/80  mmHg Final    Recovery Stage 3 HR 10/18/2018 89  bpm Final    Recovery Stage 3 BP 10/18/2018 174/80  mmHg Final    Recovery Stage 4 HR 10/18/2018 88  bpm Final    Recovery Stage 4 BP 10/18/2018 168/84  mmHg Final    Stress Stage 1 HR 10/18/2018 123  bpm Final    Stress Stage 1 BP 10/18/2018 164/96  mmHg Final    Stress Stage 2 BP 10/18/2018 170/82  mmHg Final    Percent HR 10/18/2018 99  % Final    Estimated workload 10/18/2018 7.0  METS Final    Post peak HR 10/18/2018 134  bpm Final    ST Elevation (mm) 10/18/2018 0  mm Final    ST Depression (mm) 10/18/2018 0  mm Final    Angina Index 10/18/2018 2   Final    Exercise duration time 10/18/2018 6:24  min:sec Final    Stress Base Systolic BP 93/06/6483 806  mmHg Final    Stress Base Diastolic BP 76/17/3727 84  mmHg Final    Stress Rate Pressure Product 10/18/2018 24,656  bpm*mmHg Final       .No results found for any visits on 12/13/18. Assessment / Plan:      ICD-10-CM ICD-9-CM    1. Depression, unspecified depression type F32.9 311 sertraline (ZOLOFT) 25 mg tablet   2. Hypokalemia E87.6 276.8 potassium chloride (K-DUR, KLOR-CON) 20 mEq tablet   3. Essential hypertension I10 401.9    4. Hypercholesterolemia E78.00 272.0      Patient potassium mildly elevated, 3.3. We will give patient potassium 20 mEq x 2 days  Depression-Zoloft 25 mg tablet started   follow-up in 3 weeks for recheck for depression  Hypertension-blood pressure elevated 150/86. No change to current treatment plan. Patient is status post gastric bypass and is continuously losing weight. Will reevaluate blood pressure at next visit      Follow-up Disposition:  Return if symptoms worsen or fail to improve. I asked the patient if he  had any questions and answered his  questions.   The patient stated that he understands the treatment plan and agrees with the treatment plan

## 2018-12-14 ENCOUNTER — HOSPITAL ENCOUNTER (OUTPATIENT)
Dept: CARDIAC REHAB | Age: 63
Discharge: HOME | End: 2018-12-14
Payer: COMMERCIAL

## 2018-12-14 VITALS — WEIGHT: 262 LBS | BODY MASS INDEX: 36.54 KG/M2

## 2018-12-14 PROCEDURE — 93798 PHYS/QHP OP CAR RHAB W/ECG: CPT

## 2018-12-15 LAB
% FREE TESTOSTERONE: 1.49 %
A-G RATIO,AGRAT: 1.4 RATIO (ref 1.1–2.6)
ABSOLUTE LYMPHOCYTE COUNT, 10803: 1.5 K/UL (ref 1–4.8)
ALBUMIN SERPL-MCNC: 3.9 G/DL (ref 3.5–5)
ALP SERPL-CCNC: 76 U/L (ref 40–125)
ALT SERPL-CCNC: 6 U/L (ref 5–40)
ANION GAP SERPL CALC-SCNC: 15 MMOL/L
AST SERPL W P-5'-P-CCNC: 10 U/L (ref 10–37)
AVG GLU, 10930: 96 MG/DL (ref 91–123)
BASOPHILS # BLD: 0 K/UL (ref 0–0.2)
BASOPHILS NFR BLD: 1 % (ref 0–2)
BILIRUB SERPL-MCNC: 0.3 MG/DL (ref 0.2–1.2)
BUN SERPL-MCNC: 46 MG/DL (ref 6–22)
CALCIUM SERPL-MCNC: 9 MG/DL (ref 8.4–10.4)
CHLORIDE SERPL-SCNC: 100 MMOL/L (ref 98–110)
CHOLEST SERPL-MCNC: 144 MG/DL (ref 110–200)
CO2 SERPL-SCNC: 30 MMOL/L (ref 20–32)
CREAT SERPL-MCNC: 2 MG/DL (ref 0.8–1.6)
EOSINOPHIL # BLD: 0.2 K/UL (ref 0–0.5)
EOSINOPHIL NFR BLD: 3 % (ref 0–6)
ERYTHROCYTE [DISTWIDTH] IN BLOOD BY AUTOMATED COUNT: 15.6 % (ref 10–15.5)
FREE TESTOSTERONE,DIRECT, 144981: 11.76 NG/DL
GFRAA, 66117: 40.9
GFRNA, 66118: 33.7
GLOBULIN,GLOB: 2.7 G/DL (ref 2–4)
GLUCOSE SERPL-MCNC: 91 MG/DL (ref 70–99)
GRANULOCYTES,GRANS: 65 % (ref 40–75)
HBA1C MFR BLD HPLC: 5 % (ref 4.8–5.9)
HCT VFR BLD AUTO: 35.4 % (ref 39.3–51.6)
HDLC SERPL-MCNC: 2.8 MG/DL (ref 0–5)
HDLC SERPL-MCNC: 51 MG/DL (ref 40–59)
HGB BLD-MCNC: 11.5 G/DL (ref 13.1–17.2)
LDLC SERPL CALC-MCNC: 79 MG/DL (ref 50–99)
LYMPHOCYTES, LYMLT: 24 % (ref 20–45)
MCH RBC QN AUTO: 29 PG (ref 26–34)
MCHC RBC AUTO-ENTMCNC: 33 G/DL (ref 31–36)
MCV RBC AUTO: 89 FL (ref 80–95)
MONOCYTES # BLD: 0.5 K/UL (ref 0.1–1)
MONOCYTES NFR BLD: 8 % (ref 3–12)
NEUTROPHILS # BLD AUTO: 3.9 K/UL (ref 1.8–7.7)
PLATELET # BLD AUTO: 214 K/UL (ref 140–440)
PMV BLD AUTO: 9.9 FL (ref 9–13)
POTASSIUM SERPL-SCNC: 3.3 MMOL/L (ref 3.5–5.5)
PROT SERPL-MCNC: 6.6 G/DL (ref 6.2–8.1)
RBC # BLD AUTO: 3.99 M/UL (ref 3.8–5.8)
SODIUM SERPL-SCNC: 145 MMOL/L (ref 133–145)
TESTOSTERONE: 789 NG/DL
TRIGL SERPL-MCNC: 74 MG/DL (ref 40–149)
VLDLC SERPL CALC-MCNC: 15 MG/DL (ref 8–30)
WBC # BLD AUTO: 6.1 K/UL (ref 4–11)

## 2018-12-16 PROBLEM — F32.A DEPRESSION: Status: ACTIVE | Noted: 2018-12-16

## 2018-12-18 ENCOUNTER — HOSPITAL ENCOUNTER (OUTPATIENT)
Dept: CARDIAC REHAB | Age: 63
Discharge: HOME | End: 2018-12-18
Payer: COMMERCIAL

## 2018-12-18 VITALS — BODY MASS INDEX: 36.26 KG/M2 | WEIGHT: 260 LBS

## 2018-12-18 PROCEDURE — 93798 PHYS/QHP OP CAR RHAB W/ECG: CPT

## 2018-12-20 ENCOUNTER — HOSPITAL ENCOUNTER (OUTPATIENT)
Dept: CARDIAC REHAB | Age: 63
Discharge: HOME | End: 2018-12-20
Payer: COMMERCIAL

## 2018-12-20 VITALS — WEIGHT: 260 LBS | BODY MASS INDEX: 36.26 KG/M2

## 2018-12-20 PROCEDURE — 93798 PHYS/QHP OP CAR RHAB W/ECG: CPT

## 2018-12-27 ENCOUNTER — APPOINTMENT (OUTPATIENT)
Dept: CARDIAC REHAB | Age: 63
End: 2018-12-27
Payer: COMMERCIAL

## 2019-01-03 ENCOUNTER — HOSPITAL ENCOUNTER (OUTPATIENT)
Dept: CARDIAC REHAB | Age: 64
Discharge: HOME | End: 2019-01-03
Payer: COMMERCIAL

## 2019-01-03 VITALS — BODY MASS INDEX: 36.96 KG/M2 | WEIGHT: 265 LBS

## 2019-01-03 PROCEDURE — 93798 PHYS/QHP OP CAR RHAB W/ECG: CPT

## 2019-01-04 RX ORDER — ISOSORBIDE MONONITRATE 60 MG/1
TABLET, EXTENDED RELEASE ORAL
Qty: 30 TAB | Refills: 0 | Status: SHIPPED | OUTPATIENT
Start: 2019-01-04 | End: 2019-02-01 | Stop reason: SDUPTHER

## 2019-01-08 ENCOUNTER — HOSPITAL ENCOUNTER (OUTPATIENT)
Dept: CARDIAC REHAB | Age: 64
Discharge: HOME | End: 2019-01-08
Payer: COMMERCIAL

## 2019-01-08 VITALS — WEIGHT: 254 LBS | BODY MASS INDEX: 35.43 KG/M2

## 2019-01-08 PROCEDURE — 93798 PHYS/QHP OP CAR RHAB W/ECG: CPT

## 2019-01-15 ENCOUNTER — HOSPITAL ENCOUNTER (OUTPATIENT)
Dept: CARDIAC REHAB | Age: 64
Discharge: HOME | End: 2019-01-15
Payer: COMMERCIAL

## 2019-01-15 VITALS — WEIGHT: 251 LBS | BODY MASS INDEX: 35.01 KG/M2

## 2019-01-15 PROCEDURE — 93798 PHYS/QHP OP CAR RHAB W/ECG: CPT

## 2019-01-15 RX ORDER — OMEPRAZOLE 40 MG/1
CAPSULE, DELAYED RELEASE ORAL
Qty: 60 CAP | Refills: 3 | Status: SHIPPED | OUTPATIENT
Start: 2019-01-15 | End: 2019-05-23 | Stop reason: SDUPTHER

## 2019-01-17 ENCOUNTER — HOSPITAL ENCOUNTER (OUTPATIENT)
Dept: CARDIAC REHAB | Age: 64
Discharge: HOME | End: 2019-01-17
Payer: COMMERCIAL

## 2019-01-17 VITALS — WEIGHT: 250 LBS | BODY MASS INDEX: 34.87 KG/M2

## 2019-01-17 PROCEDURE — 93798 PHYS/QHP OP CAR RHAB W/ECG: CPT

## 2019-01-17 NOTE — PROGRESS NOTES
CR PHASE II from 1/17/2019 in [facility] Treatment Diagnosis 1 PCI  
PCI Date 08/31/18 Referral Date 10/02/18 Significant Cardiovascular History Previous myocardial infarction Co-morbidities Previous MI  
ITP Visit Type Re-Assessment ITP Next Review Date 01/16/18 Visit #/Total Visits 17/36 EF % 40 % Risk Stratification High ITP Exercise, Psychosocial, Tobacco, Nutrition, Education Stages of Change Pre-Contemplation DASI Total Score No data Test --  [using current exercise rx as re-assess tool] Mode Treadmill, Bike, Stepper, Ergometer Frequency per week 2 Duration per session 50 Intensity  METS       4.3  
RPE 11-15 Progression 6 METS at RPE of 13 Target Heart Rate  Resistance Training No  
Resting /79 Peak /76 Is BP WDL? No  [meds adjusted, will advise MDs as needed] Type walk Frequency 3xweek Duration 45 Resistance Training No  
Education Self pulse, Exercise safety, Signs/Symptoms to report, RPE scale, Equipment orientation, Warm up/Cool down, Physically active Target Goal(s) Individual exercise RX, BP < 140/90 or < 130/80, if DM or CKD, Aerobic activity 30 + minutes/day  5 days/week Patient Stated Exercise Goals get back into the gym Stages of Change Action [facility] Total Score No data Interventions Currently under treatment for depression Currently Taking Psychotropic Meds Yes Medication Changes Yes  [Zoloft added 12/13 by Dr. Juana Casarez Education Advanced directives, Benefits of CPR completion, Cardiac meds, Coping techniques, Environmental triggers, Impact self care behaviors on health, Relaxation techniques, Sexual activity, Signs/Symptoms of depression, Stress management class, Support groups, Tobacco dangers, Traveling with lung disease, Other (comment) Uses Stress Mgmt Techniques Yes Patient Stated Psychosocial Goals No issue Diabetes No  
Date Lipids Drawn 12/11/18 Total 144 HDL 51 LDL 79  
 Triglycerides 74 Lipid Med(s) Lipitor  [yes] Lipid Med Change(s) No  
Weight  113.9 kg (251 lb) Height  5' 11\" (1.803 m) BMI 35.08 Weight Goal 224 Waist Circumference  50 Alcohol Special  
Type --  [wine] Amount 1-2 Rate Your Plate Total Score No data Dietitian Consult No  
Nurse/Patient Discussion Yes Nutrition Class Yes  [will schedule] Diabetes Education Referral No  
Lipid Clinic Referral No  
Weight Management Referral No  
Education No data Target Goals LDL-C less than 70 for high risk, LDL-C less than 100, Non HDL-C less than 130, LDL-C 70 high risk, HbA1C less than 7 percent, BMI less than 25, Waist size less than 40 inches males and less than 35 inches for females Patient Stated Nutrition Goals maintain diet Learning Barrier Ready to learn Knowledge Test Score 6 Education CAD, Risk factors, Med Compliance, Cardiac A&P, Signs/Symptoms of Angina, Sexuality Hypertension Yes Hypertension Controlled No  
Is BP WDL? No  
Med(s) Change Yes BP Meds Carvidolol, HCTZ, Norvasc, Clonidine Target Goals Medication compliance, Risk factors, Understand target guidelines for lipids, Understand target guidelines for B/P Patient Stated Education Goals stress management

## 2019-01-22 ENCOUNTER — HOSPITAL ENCOUNTER (OUTPATIENT)
Dept: CARDIAC REHAB | Age: 64
Discharge: HOME | End: 2019-01-22
Payer: COMMERCIAL

## 2019-01-22 VITALS — WEIGHT: 250 LBS | BODY MASS INDEX: 34.87 KG/M2

## 2019-01-22 PROCEDURE — 93798 PHYS/QHP OP CAR RHAB W/ECG: CPT

## 2019-01-24 ENCOUNTER — HOSPITAL ENCOUNTER (OUTPATIENT)
Dept: CARDIAC REHAB | Age: 64
Discharge: HOME | End: 2019-01-24
Payer: COMMERCIAL

## 2019-01-24 VITALS — WEIGHT: 251 LBS | BODY MASS INDEX: 35.01 KG/M2

## 2019-01-24 PROCEDURE — 93798 PHYS/QHP OP CAR RHAB W/ECG: CPT

## 2019-01-29 ENCOUNTER — HOSPITAL ENCOUNTER (OUTPATIENT)
Dept: CARDIAC REHAB | Age: 64
Discharge: HOME | End: 2019-01-29
Payer: COMMERCIAL

## 2019-01-29 PROCEDURE — 93798 PHYS/QHP OP CAR RHAB W/ECG: CPT

## 2019-01-30 VITALS — WEIGHT: 246 LBS | BODY MASS INDEX: 34.31 KG/M2

## 2019-01-31 ENCOUNTER — HOSPITAL ENCOUNTER (OUTPATIENT)
Dept: CARDIAC REHAB | Age: 64
Discharge: HOME | End: 2019-01-31
Payer: COMMERCIAL

## 2019-01-31 VITALS — BODY MASS INDEX: 34.31 KG/M2 | WEIGHT: 246 LBS

## 2019-01-31 PROCEDURE — 93798 PHYS/QHP OP CAR RHAB W/ECG: CPT

## 2019-02-01 RX ORDER — ISOSORBIDE MONONITRATE 60 MG/1
TABLET, EXTENDED RELEASE ORAL
Qty: 30 TAB | Refills: 0 | Status: SHIPPED | OUTPATIENT
Start: 2019-02-01 | End: 2019-02-28 | Stop reason: SDUPTHER

## 2019-02-05 ENCOUNTER — HOSPITAL ENCOUNTER (OUTPATIENT)
Dept: CARDIAC REHAB | Age: 64
Discharge: HOME | End: 2019-02-05
Payer: COMMERCIAL

## 2019-02-05 VITALS — BODY MASS INDEX: 34.31 KG/M2 | WEIGHT: 246 LBS

## 2019-02-05 PROCEDURE — 93798 PHYS/QHP OP CAR RHAB W/ECG: CPT

## 2019-02-07 ENCOUNTER — HOSPITAL ENCOUNTER (OUTPATIENT)
Dept: CARDIAC REHAB | Age: 64
Discharge: HOME | End: 2019-02-07
Payer: COMMERCIAL

## 2019-02-07 VITALS — WEIGHT: 245 LBS | BODY MASS INDEX: 34.17 KG/M2

## 2019-02-07 PROCEDURE — 93798 PHYS/QHP OP CAR RHAB W/ECG: CPT

## 2019-02-08 ENCOUNTER — OFFICE VISIT (OUTPATIENT)
Dept: INTERNAL MEDICINE CLINIC | Age: 64
End: 2019-02-08

## 2019-02-08 VITALS
SYSTOLIC BLOOD PRESSURE: 126 MMHG | HEART RATE: 79 BPM | BODY MASS INDEX: 34.72 KG/M2 | RESPIRATION RATE: 18 BRPM | DIASTOLIC BLOOD PRESSURE: 62 MMHG | HEIGHT: 71 IN | WEIGHT: 248 LBS | TEMPERATURE: 98.8 F | OXYGEN SATURATION: 97 %

## 2019-02-08 DIAGNOSIS — F32.A DEPRESSION, UNSPECIFIED DEPRESSION TYPE: Primary | ICD-10-CM

## 2019-02-08 DIAGNOSIS — D17.0 LIPOMA OF FACE: ICD-10-CM

## 2019-02-08 RX ORDER — SERTRALINE HYDROCHLORIDE 50 MG/1
50 TABLET, FILM COATED ORAL DAILY
Qty: 90 TAB | Refills: 1 | Status: SHIPPED | OUTPATIENT
Start: 2019-02-08 | End: 2019-07-17

## 2019-02-08 NOTE — PROGRESS NOTES
Jose Reilly is a 60 y.o. male presenting today for Depression (1 month follow up)  . HPI:  Jose Reilly presents to the office today for depression follow-care. Patient reports he is feeling much better since starting Zoloft. He notes that the people in his job even noticed and so has a significant other. He feels like he is in a better mood and does not let little simple things bother him anymore. Patient is agreeable to increasing the Zoloft from 25 mg to 50 mg. He denies any anxiety. He is negative for chest pain, palpitation or dyspnea. Review of Systems   Respiratory: Negative for cough. Cardiovascular: Negative for chest pain and palpitations. Psychiatric/Behavioral: Positive for depression. Allergies   Allergen Reactions    Iodine Other (comments)     Eyes swell shut      Shellfish Containing Products Swelling       Current Outpatient Medications   Medication Sig Dispense Refill    sertraline (ZOLOFT) 50 mg tablet Take 1 Tab by mouth daily. 90 Tab 1    isosorbide mononitrate ER (IMDUR) 60 mg CR tablet TAKE 1 TABLET BY MOUTH DAILY 30 Tab 0    omeprazole (PRILOSEC) 40 mg capsule TAKE ONE CAPSULE BY MOUTH TWICE A DAY 60 Cap 3    multivitamin (ONE A DAY) tablet Take 1 Tab by mouth daily.  potassium chloride (K-DUR, KLOR-CON) 20 mEq tablet Take 1 Tab by mouth daily. 2 Tab 0    allopurinol (ZYLOPRIM) 100 mg tablet TAKE 1 TAB BY MOUTH DAILY. 90 Tab 3    nitroglycerin (NITROSTAT) 0.4 mg SL tablet 1 Tab by SubLINGual route every five (5) minutes as needed for Chest Pain. 1 Bottle 1    cloNIDine (CATAPRES) 0.2 mg/24 hr ptwk APPLY 1 PATCH ONCE WEEKLY 12 Patch 3    amLODIPine (NORVASC) 10 mg tablet Take 1 Tab by mouth daily. 90 Tab 1    calcium carbonate (TUMS) 200 mg calcium (500 mg) chew Take 1 Tab by mouth daily.  hydroCHLOROthiazide (HYDRODIURIL) 25 mg tablet Take 1 Tab by mouth daily.  (Patient taking differently: Take 12.5 mg by mouth daily.) 30 Tab 2    hydrALAZINE (APRESOLINE) 100 mg tablet TAKE 1 TABLET BY MOUTH TWICE DAILY 180 Tab 3    clopidogrel (PLAVIX) 75 mg tab Take 1 Tab by mouth daily. 90 Tab 3    atorvastatin (LIPITOR) 40 mg tablet Take 1 Tab by mouth nightly. 90 Tab 3    carvedilol (COREG) 12.5 mg tablet Take 1 Tab by mouth two (2) times daily (with meals). 180 Tab 1    aspirin 81 mg chewable tablet Take 1 Tab by mouth two (2) times a day. 60 Tab 0    omeprazole (PRILOSEC) 40 mg capsule Take 1 Cap by mouth daily.  27 Cap 0       Past Medical History:   Diagnosis Date    BPH without obstruction/lower urinary tract symptoms     Bursitis of right shoulder     CAD (coronary artery disease)     Chest pain     history of hospitalization with chest pain and a negative workup in 2008    Chronic edema     Constipation     die to medication    Coronary artery disease     mild, non obstructive/EF 65%    Dyspnea on exertion     Echocardiogram 12/16/08    IVC dilated; suboptimal endocardial border; EF 65%    ED (erectile dysfunction)     Elevated PSA     Frequency     GERD (gastroesophageal reflux disease)     Gout     H/O cystoscopy 06/20/2013    Hematuria, unspecified     Hypercholesterolemia     Hypertension     Hypertension      Hypogonadism male     Impotence of organic origin     Left ventricular diastolic dysfunction     Malignant neoplasm of prostate (HCC)     hx of t1a, izzy 6, 5 % of 1  core    Morbid obesity ([facility] [address]     Myocardial perfusion 09/05/08    Basal inferior defect c/w artifact; EF 63%    Overactive bladder     S/P cardiac cath 07/30/10    oD2-40%; pRCA-20-30%; EF 65%    Sleep apnea     Slowing of urinary stream     Type II or unspecified type diabetes mellitus without mention of complication, not stated as uncontrolled        Past Surgical History:   Procedure Laterality Date    HX HEART CATHETERIZATION  7/30/10    HX OTHER SURGICAL  06/27/13    Prostate    HX TONSILLECTOMY      TX COLONOSCOPY FLX DX W/COLLJ SPEC WHEN PFRMD      AK I & D ABSC XTRAORAL SOFT TISS COMP         Social History     Socioeconomic History    Marital status: SINGLE     Spouse name: Not on file    Number of children: Not on file    Years of education: Not on file    Highest education level: Not on file   Social Needs    Financial resource strain: Not on file    Food insecurity - worry: Not on file    Food insecurity - inability: Not on file    Transportation needs - medical: Not on file   Workube needs - non-medical: Not on file   Occupational History    Not on file   Tobacco Use    Smoking status: Former Smoker     Types: Cigars     Last attempt to quit: 10/4/1999     Years since quittin.3    Smokeless tobacco: Never Used   Substance and Sexual Activity    Alcohol use: No     Comment: socially    Drug use: No    Sexual activity: Not Currently   Other Topics Concern    Not on file   Social History Narrative    Not on file       Patient does not have an advanced directive on file    Vitals:    19 1554   BP: 126/62   Pulse: 79   Resp: 18   Temp: 98.8 °F (37.1 °C)   TempSrc: Tympanic   SpO2: 97%   Weight: 248 lb (112.5 kg)   Height: 5' 11\" (1.803 m)   PainSc:   0 - No pain       Physical Exam   Cardiovascular: Normal rate, regular rhythm and normal heart sounds. Pulmonary/Chest: Effort normal and breath sounds normal.   Skin:        Psychiatric: Mood and affect normal.   Nursing note and vitals reviewed. Orders Only on 2018   Component Date Value Ref Range Status    Triglyceride 2018 74  40 - 149 mg/dL Final    HDL Cholesterol 2018 51  40 - 59 mg/dL Final    Cholesterol, total 2018 144  110 - 200 mg/dL Final    CHOLESTEROL/HDL 2018 2.8  0.0 - 5.0 Final    LDL, calculated 2018 79  50 - 99 mg/dL Final    VLDL, calculated 2018 15  8 - 30 mg/dL Final    Comment: Test includes cholesterol, HDL cholesterol, triglycerides and LDL.   Cholesterol Recommended NCEP guidelines in mg/dL:  Less than 200            Desirable  200 - 239                Borderline High  Greater than or  = 240   High  Please Note:  Total Chol/HDL Ratio                   Men     Women  1/2 Avg. Risk    3.4     3.3      Avg. Risk    5.0     4.4  2X  Avg. Risk    9.6     7.1  3X  Avg. Risk   23.4    11.0      Glucose 12/11/2018 91  70 - 99 mg/dL Final    BUN 12/11/2018 46* 6 - 22 mg/dL Final    Creatinine 12/11/2018 2.0* 0.8 - 1.6 mg/dL Final    Sodium 12/11/2018 145  133 - 145 mmol/L Final    Potassium 12/11/2018 3.3* 3.5 - 5.5 mmol/L Final    Chloride 12/11/2018 100  98 - 110 mmol/L Final    CO2 12/11/2018 30  20 - 32 mmol/L Final    AST (SGOT) 12/11/2018 10  10 - 37 U/L Final    ALT (SGPT) 12/11/2018 6  5 - 40 U/L Final    Alk. phosphatase 12/11/2018 76  40 - 125 U/L Final    Bilirubin, total 12/11/2018 0.3  0.2 - 1.2 mg/dL Final    Calcium 12/11/2018 9.0  8.4 - 10.4 mg/dL Final    Protein, total 12/11/2018 6.6  6.2 - 8.1 g/dL Final    Albumin 12/11/2018 3.9  3.5 - 5.0 g/dL Final    A-G Ratio 12/11/2018 1.4  1.1 - 2.6 ratio Final    Globulin 12/11/2018 2.7  2.0 - 4.0 g/dL Final    Anion gap 12/11/2018 15.0  mmol/L Final    Comment: Test includes Albumin, Alkaline Phosphatase, ALT, AST, BUN, Calcium, CO2,  Chloride, Creatinine, Glucose, Potassium, Sodium, Total Bilirubin and Total  Protein. Estimated GFR results are reported in mL/min/1.73 sq.m. by the MDRD equation. This eGFR is validated for stable chronic renal failure patients. This   equation  is unreliable in acute illness or patients with normal renal function.       GFRAA 12/11/2018 40.9* >60.0 Final    GFRNA 12/11/2018 33.7* >60.0 Final    % Free testosterone 12/11/2018 1.49* 1.50 - 4.2 % Final    Free testosterone (Direct) 12/11/2018 11.76  5.00 - 21 ng/dL Final    Testosterone 12/11/2018 789  264 - 916 ng/dL Final    Comment: Adult male reference interval is based on a population of  healthy nonobese males (BMI <30) between 19 and 44years old. [facility] [place] [address] [id number]. PMID: [id number]. Performed at:  [address]  [id number]  : Fish Beavers MD, Phone:  [phone]      WBC 12/11/2018 6.1  4.0 - 11.0 K/uL Final    RBC 12/11/2018 3.99  3.80 - 5.80 M/uL Final    HGB 12/11/2018 11.5* 13.1 - 17.2 g/dL Final    HCT 12/11/2018 35.4* 39.3 - 51.6 % Final    MCV 12/11/2018 89  80 - 95 fL Final    MCH 12/11/2018 29  26 - 34 pg Final    MCHC 12/11/2018 33  31 - 36 g/dL Final    RDW 12/11/2018 15.6* 10.0 - 15.5 % Final    PLATELET 34/59/6815 953  140 - 440 K/uL Final    MPV 12/11/2018 9.9  9.0 - 13.0 fL Final    NEUTROPHILS 12/11/2018 65  40 - 75 % Final    Lymphocytes 12/11/2018 24  20 - 45 % Final    MONOCYTES 12/11/2018 8  3 - 12 % Final    EOSINOPHILS 12/11/2018 3  0 - 6 % Final    BASOPHILS 12/11/2018 1  0 - 2 % Final    ABS. NEUTROPHILS 12/11/2018 3.9  1.8 - 7.7 K/uL Final    ABSOLUTE LYMPHOCYTE COUNT 12/11/2018 1.5  1.0 - 4.8 K/uL Final    ABS. MONOCYTES 12/11/2018 0.5  0.1 - 1.0 K/uL Final    ABS. EOSINOPHILS 12/11/2018 0.2  0.0 - 0.5 K/uL Final    ABS.  BASOPHILS 12/11/2018 0.0  0.0 - 0.2 K/uL Final    Hemoglobin A1c 12/11/2018 5.0  4.8 - 5.9 % Final    AVG GLU 12/11/2018 96  91 - 123 mg/dL Final   Admission on 11/09/2018, Discharged on 11/10/2018   Component Date Value Ref Range Status    WBC 11/09/2018 4.7  4.6 - 13.2 K/uL Final    RBC 11/09/2018 3.84* 4.70 - 5.50 M/uL Final    HGB 11/09/2018 10.5* 13.0 - 16.0 g/dL Final    HCT 11/09/2018 32.3* 36.0 - 48.0 % Final    MCV 11/09/2018 84.1  74.0 - 97.0 FL Final    MCH 11/09/2018 27.3  24.0 - 34.0 PG Final    MCHC 11/09/2018 32.5  31.0 - 37.0 g/dL Final    RDW 11/09/2018 16.0* 11.6 - 14.5 % Final    PLATELET 59/40/5333 094  135 - 420 K/uL Final    MPV 11/09/2018 9.5  9.2 - 11.8 FL Final    NEUTROPHILS 11/09/2018 54  40 - 73 % Final    LYMPHOCYTES 11/09/2018 36  21 - 52 % Final    MONOCYTES 11/09/2018 7  3 - 10 % Final    EOSINOPHILS 11/09/2018 3  0 - 5 % Final    BASOPHILS 11/09/2018 0  0 - 2 % Final    ABS. NEUTROPHILS 11/09/2018 2.6  1.8 - 8.0 K/UL Final    ABS. LYMPHOCYTES 11/09/2018 1.7  0.9 - 3.6 K/UL Final    ABS. MONOCYTES 11/09/2018 0.3  0.05 - 1.2 K/UL Final    ABS. EOSINOPHILS 11/09/2018 0.1  0.0 - 0.4 K/UL Final    ABS. BASOPHILS 11/09/2018 0.0  0.0 - 0.1 K/UL Final    DF 11/09/2018 AUTOMATED    Final    Sodium 11/09/2018 144  136 - 145 mmol/L Final    Potassium 11/09/2018 3.6  3.5 - 5.5 mmol/L Final    Chloride 11/09/2018 107  100 - 108 mmol/L Final    CO2 11/09/2018 34* 21 - 32 mmol/L Final    Anion gap 11/09/2018 3  3.0 - 18 mmol/L Final    Glucose 11/09/2018 91  74 - 99 mg/dL Final    BUN 11/09/2018 42* 7.0 - 18 MG/DL Final    Creatinine 11/09/2018 1.98* 0.6 - 1.3 MG/DL Final    BUN/Creatinine ratio 11/09/2018 21* 12 - 20   Final    GFR est AA 11/09/2018 42* >60 ml/min/1.73m2 Final    GFR est non-AA 11/09/2018 35* >60 ml/min/1.73m2 Final    Comment: (NOTE)  Estimated GFR is calculated using the Modification of Diet in Renal   Disease (MDRD) Study equation, reported for both  Americans   (GFRAA) and non- Americans (GFRNA), and normalized to 1.73m2   body surface area. The physician must decide which value applies to   the patient. The MDRD study equation should only be used in   individuals age 25 or older. It has not been validated for the   following: pregnant women, patients with serious comorbid conditions,   or on certain medications, or persons with extremes of body size,   muscle mass, or nutritional status.  Calcium 11/09/2018 8.2* 8.5 - 10.1 MG/DL Final    Bilirubin, total 11/09/2018 0.3  0.2 - 1.0 MG/DL Final    ALT (SGPT) 11/09/2018 15* 16 - 61 U/L Final    AST (SGOT) 11/09/2018 12* 15 - 37 U/L Final    Alk.  phosphatase 11/09/2018 64  45 - 117 U/L Final    Protein, total 11/09/2018 6.2* 6.4 - 8.2 g/dL Final    Albumin 11/09/2018 3.3* 3.4 - 5.0 g/dL Final    Globulin 11/09/2018 2.9  2.0 - 4.0 g/dL Final    A-G Ratio 11/09/2018 1.1  0.8 - 1.7   Final    Prothrombin time 11/09/2018 13.4  11.5 - 15.2 sec Final    INR 11/09/2018 1.1  0.8 - 1.2   Final    Comment:            INR Therapeutic Ranges         (on stable oral anticoagulant):     INDICATION                INR  DVT/PE/Atrial Fib          2.0-3.0  MI/Mechanical Heart Valve  2.5-3.5      Color 11/09/2018 YELLOW    Final    Appearance 11/09/2018 CLEAR    Final    Specific gravity 11/09/2018 1.020  1.005 - 1.030   Final    pH (UA) 11/09/2018 5.0  5.0 - 8.0   Final    Protein 11/09/2018 TRACE* NEG mg/dL Final    Glucose 11/09/2018 NEGATIVE   NEG mg/dL Final    Ketone 11/09/2018 NEGATIVE   NEG mg/dL Final    Bilirubin 11/09/2018 NEGATIVE   NEG   Final    Blood 11/09/2018 NEGATIVE   NEG   Final    Urobilinogen 11/09/2018 1.0  0.2 - 1.0 EU/dL Final    Nitrites 11/09/2018 NEGATIVE   NEG   Final    Leukocyte Esterase 11/09/2018 MODERATE* NEG   Final    WBC 11/09/2018 15 to 25  0 - 4 /hpf Final    RBC 11/09/2018 NONE  0 - 5 /hpf Final    Epithelial cells 11/09/2018 2+  0 - 5 /lpf Final    Bacteria 11/09/2018 FEW* NEG /hpf Final    Mucus 11/09/2018 FEW* NEG /lpf Final    D DIMER 11/09/2018 1.09* <0.46 ug/ml(FEU) Final    Comment: (NOTE)  A D-Dimer result less than 0.5 ug/mL FEU combined with a low clinical   pretest probability of DVT and/or PE has a negative predictive value   of %. The positive predictive value is 50% or less. .No results found for any visits on 02/08/19. Assessment / Plan:      ICD-10-CM ICD-9-CM    1. Depression, unspecified depression type F32.9 311 sertraline (ZOLOFT) 50 mg tablet   2.  Lipoma of face D17.0 214.0 REFERRAL TO SURGERY       Depression-Zoloft increased to 50 mg daily  Referral to Dr. Jeff Wilder for possible removal frontal forehead lipoma  Keep scheduled appointment    Follow-up Disposition:  Return if symptoms worsen or fail to improve. I asked the patient if he  had any questions and answered his  questions.   The patient stated that he understands the treatment plan and agrees with the treatment plan

## 2019-02-12 ENCOUNTER — HOSPITAL ENCOUNTER (OUTPATIENT)
Dept: CARDIAC REHAB | Age: 64
Discharge: HOME | End: 2019-02-12
Payer: COMMERCIAL

## 2019-02-12 VITALS — BODY MASS INDEX: 34.03 KG/M2 | WEIGHT: 244 LBS

## 2019-02-12 PROCEDURE — 93798 PHYS/QHP OP CAR RHAB W/ECG: CPT

## 2019-02-12 NOTE — PROGRESS NOTES
Cardiac ITP  
 
 CR PHASE II from 2/12/2019 in [facility] Treatment Diagnosis 1 PCI  
PCI Date 08/31/18 Referral Date 10/02/18 Significant Cardiovascular History Previous myocardial infarction Co-morbidities Previous MI  
ITP Visit Type Re-Assessment ITP Next Review Date 01/16/18 Visit #/Total Visits 25/36 EF % 40 % Risk Stratification High ITP Exercise, Psychosocial, Tobacco, Nutrition, Education Stages of Change Action Test --  [using current exercise rx as re-assess tool] Mode Treadmill, Bike, Stepper, Ergometer Frequency per week 2 Duration per session 50 Intensity  METS       4.3  
RPE 11-15 Progression 6 METS at RPE of 13 Target Heart Rate  Resistance Training No  
Resting /69 Peak /63 Is BP WDL? Yes  [meds adjusted, will advise MDs as needed] Type walk Frequency 3xweek Duration 45 Resistance Training No  
Education Self pulse, Exercise safety, Signs/Symptoms to report, RPE scale, Equipment orientation, Warm up/Cool down, Physically active Target Goal(s) Individual exercise RX, BP < 140/90 or < 130/80, if DM or CKD, Aerobic activity 30 + minutes/day  5 days/week Patient Stated Exercise Goals get back into the gym Stages of Change Action Interventions No data Currently Taking Psychotropic Meds No data Medication Changes Yes  [Zoloft added 12/13 by Dr. Yosef Bentley Education Advanced directives, Benefits of CPR completion, Cardiac meds, Coping techniques, Environmental triggers, Impact self care behaviors on health, Relaxation techniques, Sexual activity, Signs/Symptoms of depression, Stress management class, Support groups, Tobacco dangers, Traveling with lung disease, Other (comment) Uses Stress Mgmt Techniques Yes Patient Stated Psychosocial Goals No issue Diabetes No  
Date Lipids Drawn 12/11/18 Total 144 HDL 51 LDL 79 Triglycerides 74 Lipid Med(s) Lipitor  [yes] Lipid Med Change(s) No  
 Weight  110.7 kg (244 lb) Height  5' 11\" (1.803 m) BMI 34.1 Weight Goal 224 Waist Circumference  50 Alcohol Special  
Type --  [wine] Amount 1-2 Dietitian Consult No  
Nurse/Patient Discussion Yes Nutrition Class Yes  [will schedule, has been unable to attend due to work schedul] Diabetes Education Referral No  
Lipid Clinic Referral No  
Weight Management Referral No  
Education Low fat & cholesterol diet, Carb-controlled diet, Low sodium diet, High fiber diet, Healthy eating Target Goals LDL-C less than 70 for high risk, LDL-C less than 100, Non HDL-C less than 130, LDL-C 70 high risk, HbA1C less than 7 percent, BMI less than 25, Waist size less than 40 inches males and less than 35 inches for females Patient Stated Nutrition Goals maintain diet Learning Barrier Ready to learn Knowledge Test Score 6 Education CAD, Risk factors, Med Compliance, Cardiac A&P, Signs/Symptoms of Angina, Sexuality Hypertension Yes Hypertension Controlled Yes Is BP WDL? Yes  
Med(s) Change No  
BP Meds Carvidolol, HCTZ, Norvasc, Clonidine Target Goals Medication compliance, Risk factors, Understand target guidelines for lipids, Understand target guidelines for B/P Patient Stated Education Goals stress management

## 2019-02-14 ENCOUNTER — HOSPITAL ENCOUNTER (OUTPATIENT)
Dept: CARDIAC REHAB | Age: 64
Discharge: HOME | End: 2019-02-14
Payer: COMMERCIAL

## 2019-02-14 VITALS — WEIGHT: 247 LBS | BODY MASS INDEX: 34.45 KG/M2

## 2019-02-14 PROCEDURE — 93798 PHYS/QHP OP CAR RHAB W/ECG: CPT

## 2019-02-15 ENCOUNTER — TELEPHONE (OUTPATIENT)
Dept: INTERNAL MEDICINE CLINIC | Age: 64
End: 2019-02-15

## 2019-02-15 NOTE — TELEPHONE ENCOUNTER
Patient called regarding blood pressure medicine. He thinks he may be taking too much. He has been light headed. Please call him at [phone].

## 2019-02-19 ENCOUNTER — HOSPITAL ENCOUNTER (OUTPATIENT)
Dept: CARDIAC REHAB | Age: 64
Discharge: HOME | End: 2019-02-19
Payer: COMMERCIAL

## 2019-02-19 VITALS — BODY MASS INDEX: 34.03 KG/M2 | WEIGHT: 244 LBS

## 2019-02-19 PROCEDURE — 93798 PHYS/QHP OP CAR RHAB W/ECG: CPT

## 2019-02-19 NOTE — TELEPHONE ENCOUNTER
I have attempted to contact this patient by phone with the following results: left message to return my call on answering machine.  Please schedule appointment for evaluation of blood pressure

## 2019-02-19 NOTE — TELEPHONE ENCOUNTER
Mr Ward Alejo presented to office and received message and scheduled an appointment with Dr Malcom Mcfarlane

## 2019-02-21 ENCOUNTER — HOSPITAL ENCOUNTER (OUTPATIENT)
Dept: CARDIAC REHAB | Age: 64
Discharge: HOME | End: 2019-02-21
Payer: COMMERCIAL

## 2019-02-21 VITALS — BODY MASS INDEX: 34.03 KG/M2 | WEIGHT: 244 LBS

## 2019-02-21 PROCEDURE — 93798 PHYS/QHP OP CAR RHAB W/ECG: CPT

## 2019-02-25 ENCOUNTER — OFFICE VISIT (OUTPATIENT)
Dept: INTERNAL MEDICINE CLINIC | Age: 64
End: 2019-02-25

## 2019-02-25 VITALS
TEMPERATURE: 97.6 F | WEIGHT: 238 LBS | DIASTOLIC BLOOD PRESSURE: 70 MMHG | HEIGHT: 71 IN | HEART RATE: 70 BPM | SYSTOLIC BLOOD PRESSURE: 106 MMHG | OXYGEN SATURATION: 96 % | BODY MASS INDEX: 33.32 KG/M2 | RESPIRATION RATE: 16 BRPM

## 2019-02-25 DIAGNOSIS — R42 DIZZINESS: ICD-10-CM

## 2019-02-25 DIAGNOSIS — E78.00 HYPERCHOLESTEROLEMIA: ICD-10-CM

## 2019-02-25 DIAGNOSIS — I10 ESSENTIAL HYPERTENSION: Primary | ICD-10-CM

## 2019-02-25 NOTE — PROGRESS NOTES
Chief Complaint   Patient presents with    Hypertension       Pt preferred language for health care discussion is english.     Is someone accompanying this pt? no    Is the patient using any DME equipment during OV? no    Depression Screening:  3 most recent PHQ Screens 1/17/2019 11/13/2018 10/29/2018 9/27/2018 5/17/2018 2/1/2018 10/12/2017   PHQ Not Done - - - - - Patient Decline -   Little interest or pleasure in doing things Several days Nearly every day More than half the days Not at all Not at all - Not at all   Feeling down, depressed, irritable, or hopeless Not at all Not at all More than half the days Not at all Not at all - Not at all   Total Score PHQ 2 1 3 4 0 0 - 0   Trouble falling or staying asleep, or sleeping too much Several days More than half the days More than half the days - - - -   Feeling tired or having little energy Several days Nearly every day More than half the days - - - -   Poor appetite, weight loss, or overeating Not at all More than half the days Not at all - - - -   Feeling bad about yourself - or that you are a failure or have let yourself or your family down Not at all More than half the days More than half the days - - - -   Trouble concentrating on things such as school, work, reading, or watching TV Not at all More than half the days Not at all - - - -   Moving or speaking so slowly that other people could have noticed; or the opposite being so fidgety that others notice Not at all Not at all More than half the days - - - -   Thoughts of being better off dead, or hurting yourself in some way Not at all Not at all Not at all - - - -   PHQ 9 Score 3 14 12 - - - -       Learning Assessment:  Learning Assessment 5/25/2017 9/6/2016   PRIMARY LEARNER Patient Patient   CO-LEARNER CAREGIVER No -   PRIMARY LANGUAGE ENGLISH ENGLISH   LEARNER PREFERENCE PRIMARY READING READING   ANSWERED BY patient Patient   Deborah Kathleen 54 Maintenance reviewed and discussed per provider. Yes        Advance Directive:  1. Do you have an advance directive in place? Patient Reply:no    2. If not, would you like material regarding how to put one in place? Patient Reply: yes    Coordination of Care:  1. Have you been to the ER, urgent care clinic since your last visit? Hospitalized since your last visit? no    2. Have you seen or consulted any other health care providers outside of the [address] [place] [address] since your last visit? Include any pap smears or colon screening.  no

## 2019-02-26 ENCOUNTER — HOSPITAL ENCOUNTER (OUTPATIENT)
Dept: CARDIAC REHAB | Age: 64
Discharge: HOME | End: 2019-02-26
Payer: COMMERCIAL

## 2019-02-26 VITALS — BODY MASS INDEX: 32.92 KG/M2 | WEIGHT: 236 LBS

## 2019-02-26 LAB
ANION GAP SERPL CALC-SCNC: 16 MMOL/L
BUN SERPL-MCNC: 38 MG/DL (ref 6–22)
CALCIUM SERPL-MCNC: 9.1 MG/DL (ref 8.4–10.4)
CHLORIDE SERPL-SCNC: 98 MMOL/L (ref 98–110)
CO2 SERPL-SCNC: 28 MMOL/L (ref 20–32)
CREAT SERPL-MCNC: 2.7 MG/DL (ref 0.8–1.6)
GFRAA, 66117: 29.2
GFRNA, 66118: 24.1
GLUCOSE SERPL-MCNC: 98 MG/DL (ref 70–99)
POTASSIUM SERPL-SCNC: 3.7 MMOL/L (ref 3.5–5.5)
SODIUM SERPL-SCNC: 142 MMOL/L (ref 133–145)

## 2019-02-26 PROCEDURE — 93798 PHYS/QHP OP CAR RHAB W/ECG: CPT

## 2019-02-26 NOTE — PROGRESS NOTES
The patient presents to the office today with the chief complaint of dizziness    HPI    The patient remains on Coreg, Hydralazine, Catapres Patch for hypertension. he is now off Norvasc. The patient continues to feel weak and light headed. The patient remains on Lipitor for hyperlipidemia. The patient is tolerating the statin well. Review of Systems   Respiratory: Negative for shortness of breath. Cardiovascular: Negative for chest pain and leg swelling. Neurological: Positive for dizziness and weakness. Allergies   Allergen Reactions    Iodine Other (comments)     Eyes swell shut      Shellfish Containing Products Swelling       Current Outpatient Medications   Medication Sig Dispense Refill    sertraline (ZOLOFT) 50 mg tablet Take 1 Tab by mouth daily. 90 Tab 1    isosorbide mononitrate ER (IMDUR) 60 mg CR tablet TAKE 1 TABLET BY MOUTH DAILY 30 Tab 0    omeprazole (PRILOSEC) 40 mg capsule TAKE ONE CAPSULE BY MOUTH TWICE A DAY 60 Cap 3    multivitamin (ONE A DAY) tablet Take 1 Tab by mouth daily.  potassium chloride (K-DUR, KLOR-CON) 20 mEq tablet Take 1 Tab by mouth daily. 2 Tab 0    allopurinol (ZYLOPRIM) 100 mg tablet TAKE 1 TAB BY MOUTH DAILY. 90 Tab 3    nitroglycerin (NITROSTAT) 0.4 mg SL tablet 1 Tab by SubLINGual route every five (5) minutes as needed for Chest Pain. 1 Bottle 1    cloNIDine (CATAPRES) 0.2 mg/24 hr ptwk APPLY 1 PATCH ONCE WEEKLY 12 Patch 3    calcium carbonate (TUMS) 200 mg calcium (500 mg) chew Take 1 Tab by mouth daily.  hydroCHLOROthiazide (HYDRODIURIL) 25 mg tablet Take 1 Tab by mouth daily. (Patient taking differently: Take 12.5 mg by mouth daily.) 30 Tab 2    hydrALAZINE (APRESOLINE) 100 mg tablet TAKE 1 TABLET BY MOUTH TWICE DAILY 180 Tab 3    clopidogrel (PLAVIX) 75 mg tab Take 1 Tab by mouth daily. 90 Tab 3    atorvastatin (LIPITOR) 40 mg tablet Take 1 Tab by mouth nightly.  90 Tab 3    carvedilol (COREG) 12.5 mg tablet Take 1 Tab by mouth two (2) times daily (with meals). 180 Tab 1    aspirin 81 mg chewable tablet Take 1 Tab by mouth two (2) times a day.  61 Tab 0       Past Medical History:   Diagnosis Date    BPH without obstruction/lower urinary tract symptoms     Bursitis of right shoulder     CAD (coronary artery disease)     Chest pain     history of hospitalization with chest pain and a negative workup in 2008    Chronic edema     Constipation     die to medication    Coronary artery disease     mild, non obstructive/EF 65%    Dyspnea on exertion     Echocardiogram 12/16/08    IVC dilated; suboptimal endocardial border; EF 65%    ED (erectile dysfunction)     Elevated PSA     Frequency     GERD (gastroesophageal reflux disease)     Gout     H/O cystoscopy 06/20/2013    Hematuria, unspecified     Hypercholesterolemia     Hypertension     Hypertension      Hypogonadism male     Impotence of organic origin     Left ventricular diastolic dysfunction     Malignant neoplasm of prostate (HCC)     hx of t1a, izzy 6, 5 % of 1  core    Morbid obesity ([facility] [address]     Myocardial perfusion 09/05/08    Basal inferior defect c/w artifact; EF 63%    Overactive bladder     S/P cardiac cath 07/30/10    oD2-40%; pRCA-20-30%; EF 65%    Sleep apnea     Slowing of urinary stream     Type II or unspecified type diabetes mellitus without mention of complication, not stated as uncontrolled        Past Surgical History:   Procedure Laterality Date    HX HEART CATHETERIZATION  7/30/10    HX OTHER SURGICAL  06/27/13    Prostate    HX TONSILLECTOMY      MO COLONOSCOPY FLX DX W/COLLJ SPEC WHEN PFRMD      MO I & D ABSC XTRAORAL SOFT TISS COMP         Social History     Socioeconomic History    Marital status: SINGLE     Spouse name: Not on file    Number of children: Not on file    Years of education: Not on file    Highest education level: Not on file   Social Needs    Financial resource strain: Not on file    Food insecurity - worry: Not on file    Food insecurity - inability: Not on file    Transportation needs - medical: Not on file   [employer] needs - non-medical: Not on file   Occupational History    Not on file   Tobacco Use    Smoking status: Former Smoker     Types: Cigars     Last attempt to quit: 10/4/1999     Years since quittin.4    Smokeless tobacco: Never Used   Substance and Sexual Activity    Alcohol use: No     Comment: socially    Drug use: No    Sexual activity: Not Currently   Other Topics Concern    Not on file   Social History Narrative    Not on file       Patient does not have an advanced directive on file    Visit Vitals  /70 (BP 1 Location: Right arm, BP Patient Position: Sitting)   Pulse 70   Temp 97.6 °F (36.4 °C) (Tympanic)   Resp 16   Ht 5' 11\" (1.803 m)   Wt 238 lb (108 kg)   SpO2 96%   BMI 33.19 kg/m²       Physical Exam    BMI:  BMI is high but it was not addressed during this visit due to acute medical problems      Orders Only on 2018   Component Date Value Ref Range Status    Triglyceride 2018 74  40 - 149 mg/dL Final    HDL Cholesterol 2018 51  40 - 59 mg/dL Final    Cholesterol, total 2018 144  110 - 200 mg/dL Final    CHOLESTEROL/HDL 2018 2.8  0.0 - 5.0 Final    LDL, calculated 2018 79  50 - 99 mg/dL Final    VLDL, calculated 2018 15  8 - 30 mg/dL Final    Comment: Test includes cholesterol, HDL cholesterol, triglycerides and LDL. Cholesterol Recommended NCEP guidelines in mg/dL:  Less than 200            Desirable  200 - 239                Borderline High  Greater than or  = 240   High  Please Note:  Total Chol/HDL Ratio                   Men     Women  1/2 Avg. Risk    3.4     3.3      Avg. Risk    5.0     4.4  2X  Avg. Risk    9.6     7.1  3X  Avg.  Risk   23.4    11.0      Glucose 2018 91  70 - 99 mg/dL Final    BUN 2018 46* 6 - 22 mg/dL Final    Creatinine 2018 2.0* 0.8 - 1.6 mg/dL Final    Sodium 12/11/2018 145  133 - 145 mmol/L Final    Potassium 12/11/2018 3.3* 3.5 - 5.5 mmol/L Final    Chloride 12/11/2018 100  98 - 110 mmol/L Final    CO2 12/11/2018 30  20 - 32 mmol/L Final    AST (SGOT) 12/11/2018 10  10 - 37 U/L Final    ALT (SGPT) 12/11/2018 6  5 - 40 U/L Final    Alk. phosphatase 12/11/2018 76  40 - 125 U/L Final    Bilirubin, total 12/11/2018 0.3  0.2 - 1.2 mg/dL Final    Calcium 12/11/2018 9.0  8.4 - 10.4 mg/dL Final    Protein, total 12/11/2018 6.6  6.2 - 8.1 g/dL Final    Albumin 12/11/2018 3.9  3.5 - 5.0 g/dL Final    A-G Ratio 12/11/2018 1.4  1.1 - 2.6 ratio Final    Globulin 12/11/2018 2.7  2.0 - 4.0 g/dL Final    Anion gap 12/11/2018 15.0  mmol/L Final    Comment: Test includes Albumin, Alkaline Phosphatase, ALT, AST, BUN, Calcium, CO2,  Chloride, Creatinine, Glucose, Potassium, Sodium, Total Bilirubin and Total  Protein. Estimated GFR results are reported in mL/min/1.73 sq.m. by the MDRD equation. This eGFR is validated for stable chronic renal failure patients. This   equation  is unreliable in acute illness or patients with normal renal function.  GFRAA 12/11/2018 40.9* >60.0 Final    GFRNA 12/11/2018 33.7* >60.0 Final    % Free testosterone 12/11/2018 1.49* 1.50 - 4.2 % Final    Free testosterone (Direct) 12/11/2018 11.76  5.00 - 21 ng/dL Final    Testosterone 12/11/2018 789  264 - 916 ng/dL Final    Comment: Adult male reference interval is based on a population of  healthy nonobese males (BMI <30) between 23and 44years old. [address] [place] [address], [address] [place] [address] [phone]. PMID: [id number].   Performed at:  [address] [address]  [id number]  : Asya Dukes MD, Phone:  [phone]      WBC 12/11/2018 6.1  4.0 - 11.0 K/uL Final    RBC 12/11/2018 3.99  3.80 - 5.80 M/uL Final    HGB 12/11/2018 11.5* 13.1 - 17.2 g/dL Final    HCT 12/11/2018 35.4* 39.3 - 51.6 % Final    MCV 12/11/2018 89  80 - 95 fL Final    [facility] 12/11/2018 29  26 - 34 pg Final    MCHC 12/11/2018 33  31 - 36 g/dL Final    RDW 12/11/2018 15.6* 10.0 - 15.5 % Final    PLATELET 46/17/0768 734  140 - 440 K/uL Final    MPV 12/11/2018 9.9  9.0 - 13.0 fL Final    NEUTROPHILS 12/11/2018 65  40 - 75 % Final    Lymphocytes 12/11/2018 24  20 - 45 % Final    MONOCYTES 12/11/2018 8  3 - 12 % Final    EOSINOPHILS 12/11/2018 3  0 - 6 % Final    BASOPHILS 12/11/2018 1  0 - 2 % Final    ABS. NEUTROPHILS 12/11/2018 3.9  1.8 - 7.7 K/uL Final    ABSOLUTE LYMPHOCYTE COUNT 12/11/2018 1.5  1.0 - 4.8 K/uL Final    ABS. MONOCYTES 12/11/2018 0.5  0.1 - 1.0 K/uL Final    ABS. EOSINOPHILS 12/11/2018 0.2  0.0 - 0.5 K/uL Final    ABS. BASOPHILS 12/11/2018 0.0  0.0 - 0.2 K/uL Final    Hemoglobin A1c 12/11/2018 5.0  4.8 - 5.9 % Final    AVG GLU 12/11/2018 96  91 - 123 mg/dL Final       .No results found for any visits on 02/25/19. Assessment / Plan      ICD-10-CM ICD-9-CM    1. Essential hypertension J02 011.6 METABOLIC PANEL, BASIC      METABOLIC PANEL, BASIC   2. Dizziness R27 238.0 METABOLIC PANEL, BASIC      METABOLIC PANEL, BASIC   3. Hypercholesterolemia E78.00 272.0        BMP  Remain off Norvasc  Decrease Hydralazine to 100 mg - 1/2 tab BID  he was advised to continue his maintenance medications      Follow-up Disposition:  Return in about 4 years (around 2/25/2023). I asked Hima Yu if he has any questions and I answered the questions.   Hima Yu states that he understands the treatment plan and agrees with the treatment plan

## 2019-02-28 ENCOUNTER — HOSPITAL ENCOUNTER (OUTPATIENT)
Dept: CARDIAC REHAB | Age: 64
Discharge: HOME | End: 2019-02-28
Payer: COMMERCIAL

## 2019-02-28 VITALS — BODY MASS INDEX: 33.19 KG/M2 | WEIGHT: 238 LBS

## 2019-02-28 DIAGNOSIS — I10 ESSENTIAL HYPERTENSION: ICD-10-CM

## 2019-02-28 PROCEDURE — 93798 PHYS/QHP OP CAR RHAB W/ECG: CPT

## 2019-02-28 RX ORDER — ISOSORBIDE MONONITRATE 60 MG/1
TABLET, EXTENDED RELEASE ORAL
Qty: 30 TAB | Refills: 0 | Status: SHIPPED | OUTPATIENT
Start: 2019-02-28 | End: 2019-03-31 | Stop reason: SDUPTHER

## 2019-03-01 ENCOUNTER — TELEPHONE (OUTPATIENT)
Dept: CARDIOLOGY CLINIC | Age: 64
End: 2019-03-01

## 2019-03-01 ENCOUNTER — OFFICE VISIT (OUTPATIENT)
Dept: INTERNAL MEDICINE CLINIC | Age: 64
End: 2019-03-01

## 2019-03-01 DIAGNOSIS — R42 DIZZINESS: ICD-10-CM

## 2019-03-01 DIAGNOSIS — I10 ESSENTIAL HYPERTENSION: Primary | ICD-10-CM

## 2019-03-01 RX ORDER — AMLODIPINE BESYLATE 5 MG/1
TABLET ORAL
Qty: 30 TAB | Refills: 1 | Status: SHIPPED | OUTPATIENT
Start: 2019-03-01 | End: 2019-05-05 | Stop reason: SDUPTHER

## 2019-03-01 NOTE — PROGRESS NOTES
Chief Complaint   Patient presents with    Hypertension    Leg Pain       Pt preferred language for health care discussion is english.     Is someone accompanying this pt? no    Is the patient using any DME equipment during OV? no    Depression Screening:  3 most recent PHQ Screens 1/17/2019 11/13/2018 10/29/2018 9/27/2018 5/17/2018 2/1/2018 10/12/2017   PHQ Not Done - - - - - Patient Decline -   Little interest or pleasure in doing things Several days Nearly every day More than half the days Not at all Not at all - Not at all   Feeling down, depressed, irritable, or hopeless Not at all Not at all More than half the days Not at all Not at all - Not at all   Total Score PHQ 2 1 3 4 0 0 - 0   Trouble falling or staying asleep, or sleeping too much Several days More than half the days More than half the days - - - -   Feeling tired or having little energy Several days Nearly every day More than half the days - - - -   Poor appetite, weight loss, or overeating Not at all More than half the days Not at all - - - -   Feeling bad about yourself - or that you are a failure or have let yourself or your family down Not at all More than half the days More than half the days - - - -   Trouble concentrating on things such as school, work, reading, or watching TV Not at all More than half the days Not at all - - - -   Moving or speaking so slowly that other people could have noticed; or the opposite being so fidgety that others notice Not at all Not at all More than half the days - - - -   Thoughts of being better off dead, or hurting yourself in some way Not at all Not at all Not at all - - - -   PHQ 9 Score 3 14 12 - - - -       Learning Assessment:  Learning Assessment 5/25/2017 9/6/2016   PRIMARY LEARNER Patient Patient   CO-LEARNER CAREGIVER No -   PRIMARY LANGUAGE ENGLISH ENGLISH   LEARNER PREFERENCE PRIMARY READING READING   ANSWERED BY patient Patient   Deborah Kathleen 54 Maintenance reviewed and discussed per provider. Yes        Advance Directive:  1. Do you have an advance directive in place? Patient Reply:no    2. If not, would you like material regarding how to put one in place? Patient Reply: no    Coordination of Care:  1. Have you been to the ER, urgent care clinic since your last visit? Hospitalized since your last visit? no    2. Have you seen or consulted any other health care providers outside of the [address] [place] [address] since your last visit? Include any pap smears or colon screening.  no    Provider prefers to do his own med reconciliation

## 2019-03-01 NOTE — TELEPHONE ENCOUNTER
Dr Jesus Alberto Ivy would like to know if patient can stop Plavix and Aspirin 3-5 days prior to removal of mass from the forehead under local anesthesia on 03.22.19.  Please advise

## 2019-03-01 NOTE — PROGRESS NOTES
Sha Rosales Completed phase II cardiac rehab and attended 30 of 30 sessions from 10/29/18 to 2/28/19. Sha Rosales is interested in maintaining optimal health and will work with Dr. Erwin Montoya. Sha Rosales has has improved his endurance and stamina through regular exercise, lost 26 lbs. Blood pressure is 120/68 and is WNL. He has also improved his [facility] Activity Status,  [facility] and depression scores and these were reviewed with patient. In fact, his PHQ 9 Depression score from 14 down to 6. Sha Rosales plans to continue exercising at home and at the gym, and has set revised goals that include cardio equipment, light weights and walking 3 to 5 times a week. Bharathi Seay RN 
3/1/2019

## 2019-03-01 NOTE — TELEPHONE ENCOUNTER
Would not recommend to stop aspirin or Plavix until August 2019 unless it is an urgent surgery. At that time Plavix can be held and aspirin should preferably be continued perioperatively if okay with surgeon.

## 2019-03-01 NOTE — PROGRESS NOTES
Cardiac ITP  
 
 CR PHASE II from 2/28/2019 in [facility] Treatment Diagnosis 1 PCI  
PCI Date 08/31/18 Referral Date 10/02/18 Significant Cardiovascular History Previous myocardial infarction Co-morbidities Previous MI  
ITP Visit Type Discharge, completed program  
ITP Next Review Date No data Visit #/Total Visits 30/30 EF % 40 % Risk Stratification High ITP Exercise, Psychosocial, Tobacco, Nutrition, Education Stages of Change Action [facility] Total Score 58.2 Test Six minute walk test  
Mode Treadmill, Bike, Stepper, Ergometer Frequency per week 2 Duration per session 50 Intensity  METS       4.3  
RPE 11-15 Progression 6 METS at RPE of 13 Target Heart Rate  Resistance Training No  
Resting /69 Peak /63 Is BP WDL? Yes  [meds adjusted, will advise MDs as needed] Type walk Frequency 3xweek Duration 45 Resistance Training No  
Education Self pulse, Exercise safety, Signs/Symptoms to report, RPE scale, Equipment orientation, Warm up/Cool down, Physically active Target Goal(s) Individual exercise RX, BP < 140/90 or < 130/80, if DM or CKD, Aerobic activity 30 + minutes/day  5 days/week Patient Stated Exercise Goals get back into the gym Stages of Change Action [facility] Total Score 17 Interventions Currently under treatment for depression Currently Taking Psychotropic Meds No data Medication Changes Yes  [Zoloft added 12/13 by Dr. Danyelle Horton Education Advanced directives, Benefits of CPR completion, Cardiac meds, Coping techniques, Environmental triggers, Impact self care behaviors on health, Relaxation techniques, Sexual activity, Signs/Symptoms of depression, Stress management class, Support groups, Tobacco dangers, Traveling with lung disease, Other (comment) Uses Stress Mgmt Techniques Yes Patient Stated Psychosocial Goals No issue Diabetes No  
Date Lipids Drawn 12/11/18 Total 144 HDL 51 LDL 79 Triglycerides 74 Lipid Med(s) Lipitor  [yes] Lipid Med Change(s) No  
Weight  108 kg (238 lb) Height  5' 11\" (1.803 m) BMI 33.26 Weight Goal 224 Waist Circumference  50 Alcohol Special  
Type --  [wine] Amount 1-2 Rate Your Plate Total Score 56 Dietitian Consult No  
Nurse/Patient Discussion Yes Nutrition Class No  [unable to attend due to work schedul] Diabetes Education Referral No  
Lipid Clinic Referral No  
Weight Management Referral No  
Education Signs/Symptoms Hypo/Hyperglycemia, DM & CAD relationship, Low fat & cholesterol diet, Carb-controlled diet, Low sodium diet, High fiber diet, Healthy eating, Special diet Target Goals LDL-C less than 70 for high risk, LDL-C less than 100, Non HDL-C less than 130, LDL-C 70 high risk, HbA1C less than 7 percent, BMI less than 25, Waist size less than 40 inches males and less than 35 inches for females Patient Stated Nutrition Goals maintain diet Learning Barrier Ready to learn Knowledge Test Score 6 Education CAD, Risk factors, Med Compliance, Cardiac A&P, Signs/Symptoms of Angina, Sexuality Hypertension Yes Hypertension Controlled Yes Is BP WDL? Yes  
Med(s) Change Yes  [To see  next week for BP med changes, may be too much now] BP Meds Carvidolol, HCTZ, Norvasc, Clonidine Target Goals Medication compliance, Risk factors, Understand target guidelines for lipids, Understand target guidelines for B/P Patient Stated Education Goals stress management

## 2019-03-03 VITALS
WEIGHT: 243 LBS | DIASTOLIC BLOOD PRESSURE: 68 MMHG | OXYGEN SATURATION: 95 % | SYSTOLIC BLOOD PRESSURE: 116 MMHG | RESPIRATION RATE: 16 BRPM | BODY MASS INDEX: 34.02 KG/M2 | TEMPERATURE: 98 F | HEART RATE: 62 BPM | HEIGHT: 71 IN

## 2019-03-03 NOTE — PROGRESS NOTES
The patient presents to the office today with the chief complaint of dizziness    HPI    The patient complains of orthostatic lightheadedness. The problem has been present for several months and it is worsening. The patient remains on medications for hypertension. Review of Systems   Respiratory: Negative for shortness of breath. Cardiovascular: Negative for chest pain and leg swelling. Neurological: Positive for dizziness. Allergies   Allergen Reactions    Iodine Other (comments)     Eyes swell shut      Shellfish Containing Products Swelling       Current Outpatient Medications   Medication Sig Dispense Refill    amLODIPine (NORVASC) 5 mg tablet TAKE 1 TABLET BY MOUTH EVERY DAY 30 Tab 1    isosorbide mononitrate ER (IMDUR) 60 mg CR tablet TAKE 1 TABLET BY MOUTH DAILY 30 Tab 0    sertraline (ZOLOFT) 50 mg tablet Take 1 Tab by mouth daily. 90 Tab 1    omeprazole (PRILOSEC) 40 mg capsule TAKE ONE CAPSULE BY MOUTH TWICE A DAY 60 Cap 3    multivitamin (ONE A DAY) tablet Take 1 Tab by mouth daily.  potassium chloride (K-DUR, KLOR-CON) 20 mEq tablet Take 1 Tab by mouth daily. 2 Tab 0    allopurinol (ZYLOPRIM) 100 mg tablet TAKE 1 TAB BY MOUTH DAILY. 90 Tab 3    nitroglycerin (NITROSTAT) 0.4 mg SL tablet 1 Tab by SubLINGual route every five (5) minutes as needed for Chest Pain. 1 Bottle 1    cloNIDine (CATAPRES) 0.2 mg/24 hr ptwk APPLY 1 PATCH ONCE WEEKLY 12 Patch 3    calcium carbonate (TUMS) 200 mg calcium (500 mg) chew Take 1 Tab by mouth daily.  hydroCHLOROthiazide (HYDRODIURIL) 25 mg tablet Take 1 Tab by mouth daily. (Patient taking differently: Take 12.5 mg by mouth daily.) 30 Tab 2    hydrALAZINE (APRESOLINE) 100 mg tablet TAKE 1 TABLET BY MOUTH TWICE DAILY 180 Tab 3    clopidogrel (PLAVIX) 75 mg tab Take 1 Tab by mouth daily. 90 Tab 3    atorvastatin (LIPITOR) 40 mg tablet Take 1 Tab by mouth nightly.  90 Tab 3    carvedilol (COREG) 12.5 mg tablet Take 1 Tab by mouth two (2) times daily (with meals). 180 Tab 1    aspirin 81 mg chewable tablet Take 1 Tab by mouth two (2) times a day.  61 Tab 0       Past Medical History:   Diagnosis Date    BPH without obstruction/lower urinary tract symptoms     Bursitis of right shoulder     CAD (coronary artery disease)     Chest pain     history of hospitalization with chest pain and a negative workup in 2008    Chronic edema     Constipation     die to medication    Coronary artery disease     mild, non obstructive/EF 65%    Dyspnea on exertion     Echocardiogram 12/16/08    IVC dilated; suboptimal endocardial border; EF 65%    ED (erectile dysfunction)     Elevated PSA     Frequency     GERD (gastroesophageal reflux disease)     Gout     H/O cystoscopy 06/20/2013    Hematuria, unspecified     Hypercholesterolemia     Hypertension     Hypertension      Hypogonadism male     Impotence of organic origin     Left ventricular diastolic dysfunction     Malignant neoplasm of prostate (HCC)     hx of t1a, izzy 6, 5 % of 1  core    Morbid obesity ([facility] [address]     Myocardial perfusion 09/05/08    Basal inferior defect c/w artifact; EF 63%    Overactive bladder     S/P cardiac cath 07/30/10    oD2-40%; pRCA-20-30%; EF 65%    Sleep apnea     Slowing of urinary stream     Type II or unspecified type diabetes mellitus without mention of complication, not stated as uncontrolled        Past Surgical History:   Procedure Laterality Date    HX HEART CATHETERIZATION  7/30/10    HX OTHER SURGICAL  06/27/13    Prostate    HX TONSILLECTOMY      [place] COLONOSCOPY FLX DX W/COLLJ SPEC WHEN PFRMD      GA I & D ABSC XTRAORAL SOFT TISS COMP         Social History     Socioeconomic History    Marital status: SINGLE     Spouse name: Not on file    Number of children: Not on file    Years of education: Not on file    Highest education level: Not on file   Social Needs    Financial resource strain: Not on file    Food insecurity - worry: Not on file    Food insecurity - inability: Not on file    Transportation needs - medical: Not on file   [employer] needs - non-medical: Not on file   Occupational History    Not on file   Tobacco Use    Smoking status: Former Smoker     Types: Cigars     Last attempt to quit: 10/4/1999     Years since quittin.4    Smokeless tobacco: Never Used   Substance and Sexual Activity    Alcohol use: No     Comment: socially    Drug use: No    Sexual activity: Not Currently   Other Topics Concern    Not on file   Social History Narrative    Not on file       Patient does not have an advanced directive on file    Visit Vitals  /68 (BP 1 Location: Right arm, BP Patient Position: Sitting)   Pulse 62   Temp 98 °F (36.7 °C) (Tympanic)   Resp 16   Ht 5' 11\" (1.803 m)   Wt 243 lb (110.2 kg)   SpO2 95%   BMI 33.89 kg/m²       Physical Exam  No Cervical Lymphadenopathy  No Supraclavicular Lymphadenopathy  Thyroid is Normal  Lungs are normal to percussion. Clear to auscultation   Heart:  S1 S2 are normal, No gallops, No murmurs  No Carotid Bruits  Abdomen:  Normal Bowel Sounds. No tenderness. No masses. No Hepatomegaly or Splenomegaly  LE:  Strong Pedal Pulses. No Edema      Office Visit on 2019   Component Date Value Ref Range Status    Glucose 2019 98  70 - 99 mg/dL Final    BUN 2019 38* 6 - 22 mg/dL Final    Creatinine 2019 2.7* 0.8 - 1.6 mg/dL Final    Sodium 2019 142  133 - 145 mmol/L Final    Potassium 2019 3.7  3.5 - 5.5 mmol/L Final    Chloride 2019 98  98 - 110 mmol/L Final    CO2 2019 28  20 - 32 mmol/L Final    Calcium 2019 9.1  8.4 - 10.4 mg/dL Final    Anion gap 2019 16.0  mmol/L Final    Comment: Test includes BUN, CO2, Chloride, Creatinine, Glucose, Potassium, Calcium and  Sodium. Estimated GFR results are reported in mL/min/1.73 sq.m. by the MDRD equation. This eGFR is validated for stable chronic renal failure patients. This   equation  is unreliable in acute illness or patients with normal renal function.  GFRAA 02/25/2019 29.2* >60.0 Final    GFRNA 02/25/2019 24.1* >60.0 Final   Orders Only on 12/11/2018   Component Date Value Ref Range Status    Triglyceride 12/11/2018 74  40 - 149 mg/dL Final    HDL Cholesterol 12/11/2018 51  40 - 59 mg/dL Final    Cholesterol, total 12/11/2018 144  110 - 200 mg/dL Final    CHOLESTEROL/HDL 12/11/2018 2.8  0.0 - 5.0 Final    LDL, calculated 12/11/2018 79  50 - 99 mg/dL Final    VLDL, calculated 12/11/2018 15  8 - 30 mg/dL Final    Comment: Test includes cholesterol, HDL cholesterol, triglycerides and LDL. Cholesterol Recommended NCEP guidelines in mg/dL:  Less than 200            Desirable  200 - 239                Borderline High  Greater than or  = 240   High  Please Note:  Total Chol/HDL Ratio                   Men     Women  1/2 Avg. Risk    3.4     3.3      Avg. Risk    5.0     4.4  2X  Avg. Risk    9.6     7.1  3X  Avg. Risk   23.4    11.0      Glucose 12/11/2018 91  70 - 99 mg/dL Final    BUN 12/11/2018 46* 6 - 22 mg/dL Final    Creatinine 12/11/2018 2.0* 0.8 - 1.6 mg/dL Final    Sodium 12/11/2018 145  133 - 145 mmol/L Final    Potassium 12/11/2018 3.3* 3.5 - 5.5 mmol/L Final    Chloride 12/11/2018 100  98 - 110 mmol/L Final    CO2 12/11/2018 30  20 - 32 mmol/L Final    AST (SGOT) 12/11/2018 10  10 - 37 U/L Final    ALT (SGPT) 12/11/2018 6  5 - 40 U/L Final    Alk.  phosphatase 12/11/2018 76  40 - 125 U/L Final    Bilirubin, total 12/11/2018 0.3  0.2 - 1.2 mg/dL Final    Calcium 12/11/2018 9.0  8.4 - 10.4 mg/dL Final    Protein, total 12/11/2018 6.6  6.2 - 8.1 g/dL Final    Albumin 12/11/2018 3.9  3.5 - 5.0 g/dL Final    A-G Ratio 12/11/2018 1.4  1.1 - 2.6 ratio Final    Globulin 12/11/2018 2.7  2.0 - 4.0 g/dL Final    Anion gap 12/11/2018 15.0  mmol/L Final    Comment: Test includes Albumin, Alkaline Phosphatase, ALT, AST, BUN, Calcium, CO2,  Chloride, Creatinine, Glucose, Potassium, Sodium, Total Bilirubin and Total  Protein. Estimated GFR results are reported in mL/min/1.73 sq.m. by the MDRD equation. This eGFR is validated for stable chronic renal failure patients. This   equation  is unreliable in acute illness or patients with normal renal function.  GFRAA 12/11/2018 40.9* >60.0 Final    GFRNA 12/11/2018 33.7* >60.0 Final    % Free testosterone 12/11/2018 1.49* 1.50 - 4.2 % Final    Free testosterone (Direct) 12/11/2018 11.76  5.00 - 21 ng/dL Final    Testosterone 12/11/2018 789  264 - 916 ng/dL Final    Comment: Adult male reference interval is based on a population of  healthy nonobese males (BMI <30) between 23and 44years old. [address] [place] [address], [address] [place] [address] [id number]. PMID: [id number]. Performed at:  [address]  [id number]  : Thelma Beltran MD, Phone:  [phone]      WBC 12/11/2018 6.1  4.0 - 11.0 K/uL Final    RBC 12/11/2018 3.99  3.80 - 5.80 M/uL Final    HGB 12/11/2018 11.5* 13.1 - 17.2 g/dL Final    HCT 12/11/2018 35.4* 39.3 - 51.6 % Final    MCV 12/11/2018 89  80 - 95 fL Final    MCH 12/11/2018 29  26 - 34 pg Final    MCHC 12/11/2018 33  31 - 36 g/dL Final    RDW 12/11/2018 15.6* 10.0 - 15.5 % Final    PLATELET 38/93/3317 769  140 - 440 K/uL Final    MPV 12/11/2018 9.9  9.0 - 13.0 fL Final    NEUTROPHILS 12/11/2018 65  40 - 75 % Final    Lymphocytes 12/11/2018 24  20 - 45 % Final    MONOCYTES 12/11/2018 8  3 - 12 % Final    EOSINOPHILS 12/11/2018 3  0 - 6 % Final    BASOPHILS 12/11/2018 1  0 - 2 % Final    ABS. NEUTROPHILS 12/11/2018 3.9  1.8 - 7.7 K/uL Final    ABSOLUTE LYMPHOCYTE COUNT 12/11/2018 1.5  1.0 - 4.8 K/uL Final    ABS. MONOCYTES 12/11/2018 0.5  0.1 - 1.0 K/uL Final    ABS. EOSINOPHILS 12/11/2018 0.2  0.0 - 0.5 K/uL Final    ABS.  BASOPHILS 12/11/2018 0.0  0.0 - 0.2 K/uL Final    Hemoglobin A1c 12/11/2018 5.0  4.8 - 5.9 % Final    AVG GLU 12/11/2018 96  91 - 123 mg/dL Final       .No results found for any visits on 03/01/19. Assessment / Plan      ICD-10-CM ICD-9-CM    1. Essential hypertension I10 401.9    2. Dizziness R42 780.4      Discontinue Hydralazine  he was advised to continue his maintenance medications  Follow symptoms      Follow-up Disposition:  Return in about 3 weeks (around 3/22/2019). I asked Vimla Yoo if he has any questions and I answered the questions.   Vilma Yoo states that he understands the treatment plan and agrees with the treatment plan

## 2019-03-05 ENCOUNTER — APPOINTMENT (OUTPATIENT)
Dept: CARDIAC REHAB | Age: 64
End: 2019-03-05

## 2019-03-05 ENCOUNTER — TELEPHONE (OUTPATIENT)
Dept: INTERNAL MEDICINE CLINIC | Age: 64
End: 2019-03-05

## 2019-03-05 DIAGNOSIS — D17.0 LIPOMA OF FACE: Primary | ICD-10-CM

## 2019-03-05 NOTE — TELEPHONE ENCOUNTER
Dr Ramirez's office contacted staff and informed them that they do not do any procedures on the face and they recommend we send Mr Groves Gone to see plastic surgery. Per Thomas Dawn NP's verbal order referral for plastic surgery written for lipoma on face.

## 2019-03-14 DIAGNOSIS — E87.6 HYPOKALEMIA: ICD-10-CM

## 2019-03-14 NOTE — TELEPHONE ENCOUNTER
Dr Megha Antonio spoke with Dr Digna Lopez personally on March 11, 2019 concerning plavix and aspirin per Arpita Postin at Dr Digna Lopez office

## 2019-03-14 NOTE — TELEPHONE ENCOUNTER
Requested Prescriptions     Pending Prescriptions Disp Refills    KLOR-CON M20 20 mEq tablet [Pharmacy Med Name: Sukhdeep Robert Tab 1     Sig: TAKE 1 TABLET BY MOUTH EVERY DAY        system checked--last filled n/a    Last office visit was March 1, 2019, Patient does have and upcoming appointment.  06/07/2019

## 2019-03-18 RX ORDER — POTASSIUM CHLORIDE 1500 MG/1
TABLET, EXTENDED RELEASE ORAL
Qty: 90 TAB | Refills: 1 | Status: SHIPPED | OUTPATIENT
Start: 2019-03-18 | End: 2019-11-08

## 2019-03-22 DIAGNOSIS — I10 ESSENTIAL HYPERTENSION: ICD-10-CM

## 2019-03-22 RX ORDER — CARVEDILOL 12.5 MG/1
TABLET ORAL
Qty: 180 TAB | Refills: 1 | Status: SHIPPED | OUTPATIENT
Start: 2019-03-22 | End: 2020-01-27

## 2019-03-25 ENCOUNTER — OFFICE VISIT (OUTPATIENT)
Dept: CARDIOLOGY CLINIC | Age: 64
End: 2019-03-25

## 2019-03-25 VITALS
WEIGHT: 243.2 LBS | HEART RATE: 67 BPM | SYSTOLIC BLOOD PRESSURE: 124 MMHG | DIASTOLIC BLOOD PRESSURE: 75 MMHG | HEIGHT: 71 IN | BODY MASS INDEX: 34.05 KG/M2

## 2019-03-25 DIAGNOSIS — E78.00 HYPERCHOLESTEROLEMIA: ICD-10-CM

## 2019-03-25 DIAGNOSIS — E66.9 OBESITY (BMI 30.0-34.9): ICD-10-CM

## 2019-03-25 DIAGNOSIS — I10 ESSENTIAL HYPERTENSION: ICD-10-CM

## 2019-03-25 DIAGNOSIS — Z98.61 POSTSURGICAL PERCUTANEOUS TRANSLUMINAL CORONARY ANGIOPLASTY STATUS: ICD-10-CM

## 2019-03-25 DIAGNOSIS — I25.10 ATHEROSCLEROSIS OF NATIVE CORONARY ARTERY OF NATIVE HEART WITHOUT ANGINA PECTORIS: Primary | ICD-10-CM

## 2019-03-25 RX ORDER — CLONIDINE 0.1 MG/24H
1 PATCH, EXTENDED RELEASE TRANSDERMAL
Qty: 12 PATCH | Refills: 1 | Status: SHIPPED | OUTPATIENT
Start: 2019-03-25 | End: 2019-09-09 | Stop reason: SDUPTHER

## 2019-03-25 NOTE — PROGRESS NOTES
1. Have you been to the ER, urgent care clinic since your last visit? Hospitalized since your last visit? Yes When: 11/2018 Where: [facility] Reason for visit: Right Leg Pain    2. Have you seen or consulted any other health care providers outside of the [address] [place] [address] since your last visit? Include any pap smears or colon screening. Yes When: 03/2019 Where: Plastic Surgeon Reason for visit: Tumor     3. Since your last visit, have you had any of the following symptoms?      dizziness. 4.  Have you had any blood work, X-rays or cardiac testing? Yes Where: PCP Reason for visit: Routine Labs     Requested: NO     In [facility]: YES    5. Where do you normally have your labs drawn? [facility]    6. Do you need any refills today?    No

## 2019-03-25 NOTE — PROGRESS NOTES
HISTORY OF PRESENT ILLNESS  Arnold Damico is a 60 y.o. male. Patient is seen today for Hypertension, Hyperlipidemia, Coronary artery disease, Obesity and status post coronary artery stenting. Patient has decided to follow here due to his convenience as opposed to previous cardiologist who was Dr. Hermelinda Rivera      Hypertension   The history is provided by the medical records and patient. This is a chronic problem. Pertinent negatives include no chest pain, no headaches and no shortness of breath. Cholesterol Problem   The history is provided by the medical records and patient. This is a chronic problem. Pertinent negatives include no chest pain, no headaches and no shortness of breath. Shortness of Breath   The history is provided by the patient. This is a new problem. The problem occurs intermittently. The current episode started more than 1 week ago. The problem has been resolved. Pertinent negatives include no fever, no headaches, no cough, no wheezing, no PND, no orthopnea, no chest pain, no vomiting, no rash, no leg swelling and no claudication. The problem's precipitants include exercise (1 flight steps fast). Chest Pain (Angina)    The history is provided by the patient. This is a new problem. The current episode started more than 1 week ago. The problem has been resolved. Duration of episode(s) is 1 hour. The problem occurs every several days (3/wk). The pain is associated with rest, normal activity and movement. The pain is present in the lateral region and left side. The quality of the pain is described as dull. The pain does not radiate. Pertinent negatives include no claudication, no cough, no diaphoresis, no dizziness, no fever, no headaches, no malaise/fatigue, no nausea, no orthopnea, no palpitations, no PND, no shortness of breath and no vomiting. He has tried nothing for the symptoms. Procedural history includes cardiac catheterization and cardiac stents.       Review of Systems   Constitutional: Negative for chills, diaphoresis, fever, malaise/fatigue and weight loss. HENT: Negative for nosebleeds. Eyes: Negative for discharge. Respiratory: Negative for cough, shortness of breath and wheezing. Cardiovascular: Negative for chest pain, palpitations, orthopnea, claudication, leg swelling and PND. Gastrointestinal: Negative for diarrhea, nausea and vomiting. Genitourinary: Negative for dysuria and hematuria. Musculoskeletal: Negative for joint pain. Skin: Negative for rash. Neurological: Negative for dizziness, seizures, loss of consciousness and headaches. Endo/Heme/Allergies: Negative for polydipsia. Does not bruise/bleed easily. Psychiatric/Behavioral: Negative for depression and substance abuse. The patient does not have insomnia.       Allergies   Allergen Reactions    Iodine Other (comments)     Eyes swell shut      Shellfish Containing Products Swelling       Past Medical History:   Diagnosis Date    BPH without obstruction/lower urinary tract symptoms     Bursitis of right shoulder     CAD (coronary artery disease)     Chest pain     history of hospitalization with chest pain and a negative workup in 2008    Chronic edema     Constipation     die to medication    Coronary artery disease     mild, non obstructive/EF 65%    Dyspnea on exertion     Echocardiogram 12/16/08    IVC dilated; suboptimal endocardial border; EF 65%    ED (erectile dysfunction)     Elevated PSA     Frequency     GERD (gastroesophageal reflux disease)     Gout     H/O cystoscopy 06/20/2013    Hematuria, unspecified     Hypercholesterolemia     Hypertension     Hypertension      Hypogonadism male     Impotence of organic origin     Left ventricular diastolic dysfunction     Malignant neoplasm of prostate (HCC)     hx of t1a, izzy 6, 5 % of 1  core    Morbid obesity ([facility] [address]     Myocardial perfusion 09/05/08    Basal inferior defect c/w artifact; EF 63%    Overactive bladder     S/P cardiac cath 07/30/10    oD2-40%; pRCA-20-30%; EF 65%    Sleep apnea     Slowing of urinary stream     Type II or unspecified type diabetes mellitus without mention of complication, not stated as uncontrolled        Family History   Problem Relation Age of Onset    Hypertension Mother     High Cholesterol Mother     Breast Cancer Mother     Diabetes Mother     Heart Disease Mother    [facility] Arthritis-osteo Mother     High Cholesterol Father     Hypertension Father     Diabetes Father     Heart Disease Father     Arthritis-osteo Father        Social History     Tobacco Use    Smoking status: Former Smoker     Types: Cigars     Last attempt to quit: 10/4/1999     Years since quittin.4    Smokeless tobacco: Never Used   Substance Use Topics    Alcohol use: Not Currently     Comment: socially    Drug use: No        Current Outpatient Medications   Medication Sig    carvedilol (COREG) 12.5 mg tablet TAKE 1 TABLET BY MOUTH TWICE A DAY WITH MEALS    KLOR-CON M20 20 mEq tablet TAKE 1 TABLET BY MOUTH EVERY DAY    amLODIPine (NORVASC) 5 mg tablet TAKE 1 TABLET BY MOUTH EVERY DAY    isosorbide mononitrate ER (IMDUR) 60 mg CR tablet TAKE 1 TABLET BY MOUTH DAILY    sertraline (ZOLOFT) 50 mg tablet Take 1 Tab by mouth daily.  omeprazole (PRILOSEC) 40 mg capsule TAKE ONE CAPSULE BY MOUTH TWICE A DAY    multivitamin (ONE A DAY) tablet Take 1 Tab by mouth daily.  allopurinol (ZYLOPRIM) 100 mg tablet TAKE 1 TAB BY MOUTH DAILY.  nitroglycerin (NITROSTAT) 0.4 mg SL tablet 1 Tab by SubLINGual route every five (5) minutes as needed for Chest Pain.  cloNIDine (CATAPRES) 0.2 mg/24 hr ptwk APPLY 1 PATCH ONCE WEEKLY    calcium carbonate (TUMS) 200 mg calcium (500 mg) chew Take 1 Tab by mouth daily.  hydroCHLOROthiazide (HYDRODIURIL) 25 mg tablet Take 1 Tab by mouth daily. (Patient taking differently: Take 12.5 mg by mouth daily.)    clopidogrel (PLAVIX) 75 mg tab Take 1 Tab by mouth daily.     atorvastatin (LIPITOR) 40 mg tablet Take 1 Tab by mouth nightly.  aspirin 81 mg chewable tablet Take 1 Tab by mouth two (2) times a day. No current facility-administered medications for this visit. Past Surgical History:   Procedure Laterality Date    HX HEART CATHETERIZATION  7/30/10    HX OTHER SURGICAL  06/27/13    Prostate    HX TONSILLECTOMY      [place] COLONOSCOPY FLX DX W/COLLJ SPEC WHEN PFRMD      [place] I & D ABSC XTRAORAL SOFT TISS COMP         Visit Vitals  /75   Pulse 67   Ht 5' 11\" (1.803 m)   Wt 110.3 kg (243 lb 3.2 oz)   BMI 33.92 kg/m²       Diagnostic Studies:  I have reviewed the relevant tests done on the patient and show as follows  EKG tracings reviewed by me today. No flowsheet data found. 8/18 Card Cath/PCI  Conclusion           · Three-vessel coronary disease with 50% mid right coronary stenosis, 70% ostial second diagonal stenosis and 90% mid circumflex stenosis  · Circumflex appears to be the culprit vessel for present non-STEMI  · Successful coronary intervention of mid circumflex deploying a drug-eluting stent with residual 0% stenosis. 8/18 ECHO  Interpretation Summary        · Definity contrast was given to enhance imaging. · Estimated left ventricular ejection fraction is 56 - 60%. Left ventricular mild concentric hypertrophy. Normal left ventricular wall motion, no regional wall motion abnormality noted. Moderate (grade 2) left ventricular diastolic dysfunction. · Right ventricular cavity size is mildly dilated. · Left atrial cavity size is moderately dilated. · Tricuspid regurgitation is inadequate for estimation of right ventricular systolic pressure. · Trace mitral valve regurgitation.             Mr. Josefina Benito has a reminder for a \"due or due soon\" health maintenance. I have asked that he contact his primary care provider for follow-up on this health maintenance. Physical Exam   Constitutional: He is oriented to person, place, and time.  He appears well-developed and well-nourished. No distress. obese   HENT:   Head: Normocephalic and atraumatic. Mouth/Throat: Normal dentition. Eyes: Right eye exhibits no discharge. Left eye exhibits no discharge. No scleral icterus. Neck: Neck supple. No JVD present. Carotid bruit is not present. No thyromegaly present. Cardiovascular: Normal rate, regular rhythm, S1 normal, S2 normal, normal heart sounds and intact distal pulses. Exam reveals no gallop and no friction rub. No murmur heard. Pulmonary/Chest: Effort normal and breath sounds normal. He has no wheezes. He has no rales. Abdominal: Soft. He exhibits no mass. There is no tenderness. Musculoskeletal: He exhibits edema (trace). Lymphadenopathy:        Right cervical: No superficial cervical adenopathy present. Left cervical: No superficial cervical adenopathy present. Neurological: He is alert and oriented to person, place, and time. Skin: Skin is warm and dry. No rash noted. Psychiatric: He has a normal mood and affect. His behavior is normal.       ASSESSMENT and PLAN    I have reviewed/discussed the above normal BMI with the patient. I have recommended the following interventions: dietary management education, guidance, and counseling, encourage exercise and monitor weight . Patient finished cardiac rehab. Chest pain and shortness of breath have resolved. He is losing weight and BP medicines are coming down. Will reduce clonidine patch to .1 mg today. Diagnoses and all orders for this visit:    1. Atherosclerosis of native coronary artery of native heart without angina pectoris    2. Essential hypertension  -     cloNIDine (CATAPRES) 0.1 mg/24 hr ptwk; 1 Patch by TransDERmal route every seven (7) days. 3. Postsurgical percutaneous transluminal coronary angioplasty status    4. Hypercholesterolemia    5. Obesity (BMI 30.0-34. 9)        Pertinent laboratory and test data reviewed and discussed with patient.   See patient instructions also for other medical advice given    Medications Discontinued During This Encounter   Medication Reason    hydrALAZINE (APRESOLINE) 100 mg tablet     cloNIDine (CATAPRES) 0.2 mg/24 hr ptwk Dose Adjustment       Follow-up and Dispositions    · Return in about 6 months (around 9/25/2019), or if symptoms worsen or fail to improve, for with ekg.

## 2019-03-25 NOTE — PATIENT INSTRUCTIONS
Medications Discontinued During This Encounter   Medication Reason    hydrALAZINE (APRESOLINE) 100 mg tablet     cloNIDine (CATAPRES) 0.2 mg/24 hr ptwk Dose Adjustment     After the recommended changes have been made in blood pressure medicines, patient advised to keep BP/HR(pulse rate) chart twice daily and bring us results in next 2 weeks or so. Patient may send the results via \"My Chart\" if desired. Please rest for 5-10 minutes before checking blood pressure       Body Mass Index: Care Instructions  Your Care Instructions    Body mass index (BMI) can help you see if your weight is raising your risk for health problems. It uses a formula to compare how much you weigh with how tall you are. · A BMI lower than 18.5 is considered underweight. · A BMI between 18.5 and 24.9 is considered healthy. · A BMI between 25 and 29.9 is considered overweight. A BMI of 30 or higher is considered obese. If your BMI is in the normal range, it means that you have a lower risk for weight-related health problems. If your BMI is in the overweight or obese range, you may be at increased risk for weight-related health problems, such as high blood pressure, heart disease, stroke, arthritis or joint pain, and diabetes. If your BMI is in the underweight range, you may be at increased risk for health problems such as fatigue, lower protection (immunity) against illness, muscle loss, bone loss, hair loss, and hormone problems. BMI is just one measure of your risk for weight-related health problems. You may be at higher risk for health problems if you are not active, you eat an unhealthy diet, or you drink too much alcohol or use tobacco products. Follow-up care is a key part of your treatment and safety. Be sure to make and go to all appointments, and call your doctor if you are having problems. It's also a good idea to know your test results and keep a list of the medicines you take.   How can you care for yourself at home?  · Practice healthy eating habits. This includes eating plenty of fruits, vegetables, whole grains, lean protein, and low-fat dairy. · If your doctor recommends it, get more exercise. Walking is a good choice. Bit by bit, increase the amount you walk every day. Try for at least 30 minutes on most days of the week. · Do not smoke. Smoking can increase your risk for health problems. If you need help quitting, talk to your doctor about stop-smoking programs and medicines. These can increase your chances of quitting for good. · Limit alcohol to 2 drinks a day for men and 1 drink a day for women. Too much alcohol can cause health problems. If you have a BMI higher than 25  · Your doctor may do other tests to check your risk for weight-related health problems. This may include measuring the distance around your waist. A waist measurement of more than 40 inches in men or 35 inches in women can increase the risk of weight-related health problems. · Talk with your doctor about steps you can take to stay healthy or improve your health. You may need to make lifestyle changes to lose weight and stay healthy, such as changing your diet and getting regular exercise. If you have a BMI lower than 18.5  · Your doctor may do other tests to check your risk for health problems. · Talk with your doctor about steps you can take to stay healthy or improve your health. You may need to make lifestyle changes to gain or maintain weight and stay healthy, such as getting more healthy foods in your diet and doing exercises to build muscle. Where can you learn more? Go to http://davian-johnson.info/. Enter S176 in the search box to learn more about \"Body Mass Index: Care Instructions. \"  Current as of: June 25, 2018  Content Version: 11.9  © 7413-0890 [employer]. Care instructions adapted under license by [employer] (which disclaims liability or warranty for this information).  If you have questions about a medical condition or this instruction, always ask your healthcare professional. [place] [address] any warranty or liability for your use of this information.

## 2019-04-01 RX ORDER — ISOSORBIDE MONONITRATE 60 MG/1
TABLET, EXTENDED RELEASE ORAL
Qty: 30 TAB | Refills: 0 | Status: SHIPPED | OUTPATIENT
Start: 2019-04-01 | End: 2019-05-06 | Stop reason: SDUPTHER

## 2019-05-05 DIAGNOSIS — I10 ESSENTIAL HYPERTENSION: ICD-10-CM

## 2019-05-06 RX ORDER — AMLODIPINE BESYLATE 5 MG/1
TABLET ORAL
Qty: 30 TAB | Refills: 1 | Status: SHIPPED | OUTPATIENT
Start: 2019-05-06 | End: 2020-02-13

## 2019-05-06 RX ORDER — ISOSORBIDE MONONITRATE 60 MG/1
TABLET, EXTENDED RELEASE ORAL
Qty: 30 TAB | Refills: 0 | Status: SHIPPED | OUTPATIENT
Start: 2019-05-06 | End: 2019-06-05 | Stop reason: SDUPTHER

## 2019-05-23 RX ORDER — OMEPRAZOLE 40 MG/1
CAPSULE, DELAYED RELEASE ORAL
Qty: 60 CAP | Refills: 2 | Status: SHIPPED | OUTPATIENT
Start: 2019-05-23 | End: 2019-07-28 | Stop reason: SDUPTHER

## 2019-06-05 RX ORDER — ISOSORBIDE MONONITRATE 60 MG/1
TABLET, EXTENDED RELEASE ORAL
Qty: 30 TAB | Refills: 0 | Status: SHIPPED | OUTPATIENT
Start: 2019-06-05 | End: 2019-09-03 | Stop reason: SDUPTHER

## 2019-06-07 ENCOUNTER — OFFICE VISIT (OUTPATIENT)
Dept: FAMILY MEDICINE CLINIC | Facility: CLINIC | Age: 64
End: 2019-06-07

## 2019-06-07 VITALS
WEIGHT: 255 LBS | BODY MASS INDEX: 35.7 KG/M2 | DIASTOLIC BLOOD PRESSURE: 82 MMHG | HEIGHT: 71 IN | OXYGEN SATURATION: 98 % | RESPIRATION RATE: 20 BRPM | TEMPERATURE: 98 F | HEART RATE: 76 BPM | SYSTOLIC BLOOD PRESSURE: 142 MMHG

## 2019-06-07 DIAGNOSIS — F32.A DEPRESSION, UNSPECIFIED DEPRESSION TYPE: ICD-10-CM

## 2019-06-07 DIAGNOSIS — E11.21 TYPE 2 DIABETES WITH NEPHROPATHY (HCC): Primary | ICD-10-CM

## 2019-06-07 DIAGNOSIS — I10 ESSENTIAL HYPERTENSION: ICD-10-CM

## 2019-06-07 DIAGNOSIS — E66.01 MORBID OBESITY (HCC): ICD-10-CM

## 2019-06-07 NOTE — PROGRESS NOTES
Patient is in the office today for HTN follow up. 1. Have you been to the ER, urgent care clinic since your last visit? Hospitalized since your last visit? No    2. Have you seen or consulted any other health care providers outside of the [address] [place] [address] since your last visit? Include any pap smears or colon screening.  No

## 2019-06-12 PROBLEM — I50.9 CONGESTIVE HEART FAILURE (HCC): Status: ACTIVE | Noted: 2019-06-12

## 2019-07-02 ENCOUNTER — TELEPHONE (OUTPATIENT)
Dept: CARDIOLOGY CLINIC | Age: 64
End: 2019-07-02

## 2019-07-02 NOTE — LETTER
2019 Dr Bairon Huddleston Dear Britta Codding: 
 
Re: Denver Jerry : 1955 Mr. Christiano Myers is cleared from a cardiac standpoint with to hold Plavix 5 days prior to renal biopsy and to restart post biopsy when okay with Dr Bairon Huddleston. If you have any questions or any further assistance is needed please contact our office. Sincerely, ASHLEY French 
 
 
cc:  Carolyn Crowe NP

## 2019-07-08 ENCOUNTER — TELEPHONE (OUTPATIENT)
Dept: CARDIOLOGY CLINIC | Age: 64
End: 2019-07-08

## 2019-07-08 NOTE — TELEPHONE ENCOUNTER
Pt was cleared to stop his Plavix 5 days prior to renal biopsy. They also need a note stating he can stop his Aspirin 81mg 5 days prior to procedure.  Fax note to [phone]

## 2019-07-17 ENCOUNTER — OFFICE VISIT (OUTPATIENT)
Dept: FAMILY MEDICINE CLINIC | Facility: CLINIC | Age: 64
End: 2019-07-17

## 2019-07-17 VITALS
BODY MASS INDEX: 35.42 KG/M2 | RESPIRATION RATE: 16 BRPM | HEIGHT: 71 IN | HEART RATE: 78 BPM | SYSTOLIC BLOOD PRESSURE: 160 MMHG | TEMPERATURE: 97.8 F | OXYGEN SATURATION: 98 % | DIASTOLIC BLOOD PRESSURE: 100 MMHG | WEIGHT: 253 LBS

## 2019-07-17 DIAGNOSIS — I10 ESSENTIAL HYPERTENSION: ICD-10-CM

## 2019-07-17 DIAGNOSIS — G47.00 INSOMNIA, UNSPECIFIED TYPE: ICD-10-CM

## 2019-07-17 DIAGNOSIS — F32.A DEPRESSION, UNSPECIFIED DEPRESSION TYPE: Primary | ICD-10-CM

## 2019-07-17 RX ORDER — HYDROCHLOROTHIAZIDE 25 MG/1
25 TABLET ORAL DAILY
Qty: 90 TAB | Refills: 0 | Status: ON HOLD | OUTPATIENT
Start: 2019-07-17 | End: 2019-09-17

## 2019-07-17 RX ORDER — MIRTAZAPINE 15 MG/1
15 TABLET, FILM COATED ORAL
Qty: 30 TAB | Refills: 2 | Status: ON HOLD | OUTPATIENT
Start: 2019-07-17 | End: 2019-09-17

## 2019-07-17 NOTE — PROGRESS NOTES
1. Have you been to the ER, urgent care clinic since your last visit? Hospitalized since your last visit? No    2. Have you seen or consulted any other health care providers outside of the [address] [place] [address] since your last visit? Include any pap smears or colon screening.  No        Chief Complaint   Patient presents with    Hypertension     Follow up

## 2019-07-17 NOTE — PROGRESS NOTES
Stanley Gregory is a 60 y.o. male presenting today for Hypertension (Follow up)    HPI:  Stanley Gregory presents to the office today for hypertension follow-up care. Patient presents to the practice today very upset as he had to go to court for spousal support. He notes he knows why his blood pressure is elevated because he is very angry. Patient also notes that this entire situation is made him depressed and frustrated. He is complaining of insomnia and notes he denies any related down To 1 AM in the morning. He has gained 10 pounds since his visit in March 2019 he notes that is because of stress eating. He has started going back to the gym most evenings he notes he would lose the weight. He is negative for any chest pain or palpitation. Review of Systems   Respiratory: Negative for cough. Cardiovascular: Negative for chest pain and palpitations. Gastrointestinal: Negative for abdominal pain, nausea and vomiting. Neurological: Negative for dizziness. Psychiatric/Behavioral: Positive for depression. Allergies   Allergen Reactions    Iodine Other (comments)     Eyes swell shut      Shellfish Containing Products Swelling       Current Outpatient Medications   Medication Sig Dispense Refill    mirtazapine (REMERON) 15 mg tablet Take 1 Tab by mouth nightly. 30 Tab 2    hydroCHLOROthiazide (HYDRODIURIL) 25 mg tablet Take 1 Tab by mouth daily. 90 Tab 0    isosorbide mononitrate ER (IMDUR) 60 mg CR tablet TAKE 1 TABLET BY MOUTH DAILY 30 Tab 0    omeprazole (PRILOSEC) 40 mg capsule TAKE ONE CAPSULE BY MOUTH TWICE A DAY 60 Cap 2    amLODIPine (NORVASC) 5 mg tablet TAKE 1 TABLET BY MOUTH EVERY DAY 30 Tab 1    carvedilol (COREG) 12.5 mg tablet TAKE 1 TABLET BY MOUTH TWICE A DAY WITH MEALS 180 Tab 1    allopurinol (ZYLOPRIM) 100 mg tablet TAKE 1 TAB BY MOUTH DAILY. 90 Tab 3    clopidogrel (PLAVIX) 75 mg tab Take 1 Tab by mouth daily.  90 Tab 3    aspirin 81 mg chewable tablet Take 1 Tab by mouth two (2) times a day. 60 Tab 0    cloNIDine (CATAPRES) 0.1 mg/24 hr ptwk 1 Patch by TransDERmal route every seven (7) days. 12 Patch 1    KLOR-CON M20 20 mEq tablet TAKE 1 TABLET BY MOUTH EVERY DAY 90 Tab 1    multivitamin (ONE A DAY) tablet Take 1 Tab by mouth daily.  nitroglycerin (NITROSTAT) 0.4 mg SL tablet 1 Tab by SubLINGual route every five (5) minutes as needed for Chest Pain. 1 Bottle 1    calcium carbonate (TUMS) 200 mg calcium (500 mg) chew Take 1 Tab by mouth daily.  atorvastatin (LIPITOR) 40 mg tablet Take 1 Tab by mouth nightly.  80 Tab 3       Past Medical History:   Diagnosis Date    BPH without obstruction/lower urinary tract symptoms     Bursitis of right shoulder     CAD (coronary artery disease)     Chest pain     history of hospitalization with chest pain and a negative workup in 2008    Chronic edema     Constipation     die to medication    Coronary artery disease     mild, non obstructive/EF 65%    Dyspnea on exertion     Echocardiogram 12/16/08    IVC dilated; suboptimal endocardial border; EF 65%    ED (erectile dysfunction)     Elevated PSA     Frequency     GERD (gastroesophageal reflux disease)     Gout     H/O cystoscopy 06/20/2013    Hematuria, unspecified     Hypercholesterolemia     Hypertension     Hypertension      Hypogonadism male     Impotence of organic origin     Left ventricular diastolic dysfunction     Malignant neoplasm of prostate (HCC)     hx of t1a, izzy 6, 5 % of 1  core    Morbid obesity ([facility] [address]     Myocardial perfusion 09/05/08    Basal inferior defect c/w artifact; EF 63%    Overactive bladder     S/P cardiac cath 07/30/10    oD2-40%; pRCA-20-30%; EF 65%    Sleep apnea     Slowing of urinary stream     Type II or unspecified type diabetes mellitus without mention of complication, not stated as uncontrolled        Past Surgical History:   Procedure Laterality Date    HX HEART CATHETERIZATION  7/30/10    HX OTHER SURGICAL 13    Prostate    HX TONSILLECTOMY      NC COLONOSCOPY FLX DX W/COLLJ SPEC WHEN PFRMD      NC I & D ABSC XTRAORAL SOFT TISS COMP         Social History     Socioeconomic History    Marital status: SINGLE     Spouse name: Not on file    Number of children: Not on file    Years of education: Not on file    Highest education level: Not on file   Occupational History    Not on file   Social Needs    Financial resource strain: Not on file    Food insecurity:     Worry: Not on file     Inability: Not on file    Transportation needs:     Medical: Not on file     Non-medical: Not on file   Tobacco Use    Smoking status: Former Smoker     Types: Cigars     Last attempt to quit: 10/4/1999     Years since quittin.8    Smokeless tobacco: Never Used   Substance and Sexual Activity    Alcohol use: Not Currently     Comment: socially    Drug use: No    Sexual activity: Not Currently   Lifestyle    Physical activity:     Days per week: Not on file     Minutes per session: Not on file    Stress: Not on file   Relationships    Social connections:     Talks on phone: Not on file     Gets together: Not on file     Attends Pent[employer]ostal service: Not on file     Active member of club or organization: Not on file     Attends meetings of clubs or organizations: Not on file     Relationship status: Not on file    Intimate partner violence:     Fear of current or ex partner: Not on file     Emotionally abused: Not on file     Physically abused: Not on file     Forced sexual activity: Not on file   Other Topics Concern    Not on file   Social History Narrative    Not on file       Patient does not have an advanced directive on file    Vitals:    19 1531   BP: (!) 160/100   Pulse: 78   Resp: 16   Temp: 97.8 °F (36.6 °C)   TempSrc: Oral   SpO2: 98%   Weight: 253 lb (114.8 kg)   Height: 5' 11\" (1.803 m)   PainSc:   0 - No pain       Physical Exam   Constitutional: No distress.    Cardiovascular: Normal rate, regular rhythm and normal heart sounds. Pulmonary/Chest: Effort normal and breath sounds normal.   Neurological: He is alert. Psychiatric: He exhibits a depressed mood. Nursing note and vitals reviewed. No visits with results within 3 Month(s) from this visit. Latest known visit with results is:   Office Visit on 02/25/2019   Component Date Value Ref Range Status    Glucose 02/25/2019 98  70 - 99 mg/dL Final    BUN 02/25/2019 38* 6 - 22 mg/dL Final    Creatinine 02/25/2019 2.7* 0.8 - 1.6 mg/dL Final    Sodium 02/25/2019 142  133 - 145 mmol/L Final    Potassium 02/25/2019 3.7  3.5 - 5.5 mmol/L Final    Chloride 02/25/2019 98  98 - 110 mmol/L Final    CO2 02/25/2019 28  20 - 32 mmol/L Final    Calcium 02/25/2019 9.1  8.4 - 10.4 mg/dL Final    Anion gap 02/25/2019 16.0  mmol/L Final    Comment: Test includes BUN, CO2, Chloride, Creatinine, Glucose, Potassium, Calcium and  Sodium. Estimated GFR results are reported in mL/min/1.73 sq.m. by the MDRD equation. This eGFR is validated for stable chronic renal failure patients. This   equation  is unreliable in acute illness or patients with normal renal function.  GFRAA 02/25/2019 29.2* >60.0 Final    GFRNA 02/25/2019 24.1* >60.0 Final       .No results found for any visits on 07/17/19. Assessment / Plan:      ICD-10-CM ICD-9-CM    1. Depression, unspecified depression type F32.9 311    2. Insomnia, unspecified type G47.00 780.52 mirtazapine (REMERON) 15 mg tablet   3. Essential hypertension I10 401.9 hydroCHLOROthiazide (HYDRODIURIL) 25 mg tablet     Patient must be treated for insomnia-Remeron 50 mg tablet given  Patient also notes that he has not had his hydrochlorothiazide for over 3 days. Patient notes his cardiologist usually prescribes this medication.   Patient given a prescription for hydrochlorothiazide and instructed to take his medication tonight  Patient instructed to follow-up and 1 month    Follow-up and Dispositions · Return in about 1 month (around 8/14/2019), or if symptoms worsen or fail to improve. I asked the patient if he  had any questions and answered his  questions. The patient stated that he understands the treatment plan and agrees with the treatment plan    This document was created with a voice activated dictation system and may contain transcription errors.

## 2019-07-29 RX ORDER — OMEPRAZOLE 40 MG/1
CAPSULE, DELAYED RELEASE ORAL
Qty: 60 CAP | Refills: 2 | Status: SHIPPED | OUTPATIENT
Start: 2019-07-29 | End: 2019-12-18

## 2019-08-01 ENCOUNTER — OFFICE VISIT (OUTPATIENT)
Dept: ORTHOPEDIC SURGERY | Facility: CLINIC | Age: 64
End: 2019-08-01

## 2019-08-01 VITALS
SYSTOLIC BLOOD PRESSURE: 114 MMHG | RESPIRATION RATE: 18 BRPM | DIASTOLIC BLOOD PRESSURE: 78 MMHG | HEIGHT: 71 IN | HEART RATE: 84 BPM | TEMPERATURE: 97.9 F | WEIGHT: 251 LBS | BODY MASS INDEX: 35.14 KG/M2 | OXYGEN SATURATION: 99 %

## 2019-08-01 DIAGNOSIS — M25.562 CHRONIC PAIN OF LEFT KNEE: ICD-10-CM

## 2019-08-01 DIAGNOSIS — M17.12 PRIMARY OSTEOARTHRITIS OF LEFT KNEE: Primary | ICD-10-CM

## 2019-08-01 DIAGNOSIS — G89.29 CHRONIC PAIN OF LEFT KNEE: ICD-10-CM

## 2019-08-01 RX ORDER — BETAMETHASONE SODIUM PHOSPHATE AND BETAMETHASONE ACETATE 3; 3 MG/ML; MG/ML
6 INJECTION, SUSPENSION INTRA-ARTICULAR; INTRALESIONAL; INTRAMUSCULAR; SOFT TISSUE ONCE
Qty: 0.5 ML | Refills: 0
Start: 2019-08-01 | End: 2019-08-01

## 2019-08-01 NOTE — PROGRESS NOTES
Patient: Sybil Gosselin                MRN: [id number]       SSN: [id number]  YOB: 1955        AGE: 60 y.o. SEX: male    PCP: Yeimi Bhat NP  08/01/19    Chief Complaint   Patient presents with    Knee Pain     Left     HISTORY:  Sybil Gosselin is a 60 y.o. male who is seen for increased left knee pain. He notes cracking and popping of his left knee. He reports increased left knee pain when he slipped on ice at home on 1/4/18. He was seen at [facility] on the same day where x-rays were negative for fracture per patient. He reports primarily anterior left knee pain. 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 He states he has a h/o of gout. He notes the right knee pain has been ongoing for several years. He notes at works he does a lot of climbing and walking, aggravating his knee. He notes increased pain at night. He reports buckling of his right knee at times. He responded to a previous right knee aspiration and cortisone injection. He notes increased swelling of his right knee recently. He completed a right knee Euflexxa series on 12/5/16 and 10/19/17. He was previously seen for low back pain. He reports low back pain for many years with no history of injury. He notes relief with Tylenol. He reports he uses massaging leg sleeves that help reduce the edema fluid in this legs and lower back. He reports increased leg muscle spasms recently. He was recently hospitalized for kidney problems. He says they took such good care of him he did not want to leave.     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 His mother passed away in 2018. He currently lives alone in [place]. He underwent gastric bypass in August.  He is decreasing at 275 pounds. He is 5'11\". He is hypertensive. He is no longer diabetic. He had a heart attack after his mother- Arlene Abreu-  from cancer in April and was out of work for a while. He had another heart attack 2.5 weeks ago requiring a stent. He has lost 35 pounds since his surgery. He has one daughter and 2 sons. He has 13 grandchildren. Last 3 Recorded Weights in this Encounter    19 1616   Weight: 251 lb (113.9 kg)     Body mass index is 35.01 kg/m². REVIEW OF SYSTEMS: All Below are Negative except: See HPI   Constitutional: negative for fever, chills, and weight loss. Cardiovascular: negative for chest pain, claudication, leg swelling, SOB, PETERSEN   Gastrointestinal: Negative for pain, N/V/C/D, Blood in stool or urine, dysuria,  hematuria, incontinence, pelvic pain. Musculoskeletal: See HPI   Neurological: Negative for dizziness and weakness. Negative for headaches, Visual changes, confusion, seizures   Phychiatric/Behavioral: Negative for depression, memory loss, substance  abuse. Extremities: Negative for hair changes, rash, or skin lesion changes. Hematologic: Negative for bleeding problems, bruising, pallor or swollen lymph  nodes   Peripheral Vascular: No calf pain, no circulation deficits.     Social History     Socioeconomic History    Marital status: SINGLE     Spouse name: Not on file    Number of children: Not on file    Years of education: Not on file    Highest education level: Not on file   Occupational History    Not on file   Social Needs    Financial resource strain: Not on file    Food insecurity:     Worry: Not on file     Inability: Not on file    Transportation needs:     Medical: Not on file     Non-medical: Not on file   Tobacco Use    Smoking status: Former Smoker     Types: Cigars     Last attempt to quit: 10/4/1999     Years since quittin.8    Smokeless tobacco: Never Used   Substance and Sexual Activity    Alcohol use: Not Currently     Comment: socially    Drug use: No    Sexual activity: Not Currently   Lifestyle    Physical activity:     Days per week: Not on file     Minutes per session: Not on file    Stress: Not on file   Relationships    Social connections:     Talks on phone: Not on file     Gets together: Not on file     Attends Scientology service: Not on file     Active member of club or organization: Not on file     Attends meetings of clubs or organizations: Not on file     Relationship status: Not on file    Intimate partner violence:     Fear of current or ex partner: Not on file     Emotionally abused: Not on file     Physically abused: Not on file     Forced sexual activity: Not on file   Other Topics Concern    Not on file   Social History Narrative    Not on file     Allergies   Allergen Reactions    Iodine Other (comments)     Eyes swell shut      Shellfish Containing Products Swelling     Current Outpatient Medications   Medication Sig    omeprazole (PRILOSEC) 40 mg capsule TAKE ONE CAPSULE BY MOUTH TWICE A DAY    mirtazapine (REMERON) 15 mg tablet Take 1 Tab by mouth nightly.  hydroCHLOROthiazide (HYDRODIURIL) 25 mg tablet Take 1 Tab by mouth daily.  isosorbide mononitrate ER (IMDUR) 60 mg CR tablet TAKE 1 TABLET BY MOUTH DAILY    amLODIPine (NORVASC) 5 mg tablet TAKE 1 TABLET BY MOUTH EVERY DAY    cloNIDine (CATAPRES) 0.1 mg/24 hr ptwk 1 Patch by TransDERmal route every seven (7) days.  carvedilol (COREG) 12.5 mg tablet TAKE 1 TABLET BY MOUTH TWICE A DAY WITH MEALS    multivitamin (ONE A DAY) tablet Take 1 Tab by mouth daily.  allopurinol (ZYLOPRIM) 100 mg tablet TAKE 1 TAB BY MOUTH DAILY.  calcium carbonate (TUMS) 200 mg calcium (500 mg) chew Take 1 Tab by mouth daily.     clopidogrel (PLAVIX) 75 mg tab Take 1 Tab by mouth daily.  aspirin 81 mg chewable tablet Take 1 Tab by mouth two (2) times a day.  KLOR-CON M20 20 mEq tablet TAKE 1 TABLET BY MOUTH EVERY DAY    nitroglycerin (NITROSTAT) 0.4 mg SL tablet 1 Tab by SubLINGual route every five (5) minutes as needed for Chest Pain.  atorvastatin (LIPITOR) 40 mg tablet Take 1 Tab by mouth nightly. No current facility-administered medications for this visit.       PHYSICAL EXAMINATION:  Visit Vitals  /78   Pulse 84   Temp 97.9 °F (36.6 °C) (Oral)   Resp 18   Ht 5' 11\" (1.803 m)   Wt 251 lb (113.9 kg)   SpO2 99%   BMI 35.01 kg/m²     ORTHO EXAMINATION:  Examination Lumbar Thoracic   Skin Intact Intact   Tenderness +, paralumbar -   Tightness +, paralumbar -   Lordosis Normal N/A   Kyphosis N/A Normal   Scoliosis - -   Flexion Fingertips to mid shin N/A   Extension 10 N/A   Knee reflexes Normal N/A   Ankle reflexes Normal N/A   Straight leg raise - N/A   Calf tenderness - N/A     Examination Right knee Left knee   Skin Intact Intact   Range of motion 90-10 100-0   Effusion 1+ 3-4+   Medial joint line tenderness + +   Lateral joint line tenderness - -   Popliteal tenderness - -   Osteophytes palpable + +   Lewiss - -   Patella crepitus + +   Anterior drawer - -   Lateral laxity - -   Medial laxity + -   Varus deformity + -   Valgus deformity - -   Pretibial edema 4+ pitting  4+   Calf tenderness - -     Chart reviewed for the following:   I, Kandy Rodriguez MD, have reviewed the History, Physical and updated the Allergic reactions for [place] performed immediately prior to start of procedure:  Fernando Coughlin MD, have performed the following reviews on [facility] prior to the start of the procedure:            * Patient was identified by name and date of birth   * Agreement on procedure being performed was verified  * Risks and Benefits explained to the patient  * Procedure site verified and marked as necessary  * Patient was positioned for comfort  * Consent was obtained     Time: 4:40 PM     Date of procedure: 8/1/2019    Procedure performed by:  Evie Morgan MD    Mr. David Harris tolerated the procedure well with no complications. RADIOGRAPHS:  XR LEFT KNEE 1/4/18  IMPRESSION: Osteoarthritis, particularly involving the patellofemoral joint, moderate joint effusion. No fracture evident. XR RIGHT KNEE 9/1/17  IMPRESSION:  Three views - No fractures, no effusion, complete medial joint R, moderate L space narrowing, medial and lateral osteophytes present. Varus deformity. XR RIGHT KNEE 12/22/15  IMPRESSION:  No fractures, no effusion, medial joint space narrowing, medial osteophytes present, varus deformity. IMPRESSION:      ICD-10-CM ICD-9-CM    1. Primary osteoarthritis of left knee M17.12 715.16 PROCEDURE AUTHORIZATION TO       betamethasone (CELESTONE SOLUSPAN) 6 mg/mL injection      BETAMETHASONE ACETATE & SODIUM PHOSPHATE INJECTION 3 MG EA.      DRAIN/INJECT LARGE JOINT/BURSA   2. Chronic pain of left knee M25.562 719.46 PROCEDURE AUTHORIZATION TO     G89.29 338.29 betamethasone (CELESTONE SOLUSPAN) 6 mg/mL injection      BETAMETHASONE ACETATE & SODIUM PHOSPHATE INJECTION 3 MG EA.      DRAIN/INJECT LARGE JOINT/BURSA     PLAN: After discussing treatment options, patient's left knee was injected with 4 cc Marcaine and 1/2 cc Celestone. We discussed possible need for right knee arthroplasty at some time in the future pending weight loss to 200# or less. Consider visco supplementation if pain continues. Dietary counseling provided today. Start weight loss with low carb diet and intermittent fasting. He will follow up as needed.       Scribed by Marcos Vega (Sheng Simpson) as dictated by Evie Morgan MD

## 2019-08-07 ENCOUNTER — OFFICE VISIT (OUTPATIENT)
Dept: FAMILY MEDICINE CLINIC | Facility: CLINIC | Age: 64
End: 2019-08-07

## 2019-08-07 VITALS
DIASTOLIC BLOOD PRESSURE: 86 MMHG | TEMPERATURE: 97.3 F | HEIGHT: 71 IN | SYSTOLIC BLOOD PRESSURE: 137 MMHG | BODY MASS INDEX: 36.4 KG/M2 | WEIGHT: 260 LBS | HEART RATE: 74 BPM | RESPIRATION RATE: 18 BRPM

## 2019-08-07 DIAGNOSIS — I10 ESSENTIAL HYPERTENSION: Primary | ICD-10-CM

## 2019-08-07 DIAGNOSIS — E11.9 CONTROLLED TYPE 2 DIABETES MELLITUS WITHOUT COMPLICATION, WITHOUT LONG-TERM CURRENT USE OF INSULIN (HCC): ICD-10-CM

## 2019-08-07 DIAGNOSIS — R59.9 ADENOPATHY: ICD-10-CM

## 2019-08-07 RX ORDER — HYDRALAZINE HYDROCHLORIDE 25 MG/1
25 TABLET, FILM COATED ORAL 2 TIMES DAILY
COMMUNITY
End: 2020-02-13

## 2019-08-07 NOTE — PROGRESS NOTES
Stanley Gregory is a  60 y.o. male presents today for office visit for routine follow up. 1. Have you been to the ER, urgent care clinic or hospitalized since your last visit? NO    2. Have you seen or consulted any other health care providers outside of the [address] [place] [address] since your last visit (Include any pap smears or colon screening)?  YES Kidney

## 2019-08-07 NOTE — PROGRESS NOTES
Paola Garcia is a 60 y.o. male presenting today for Hypertension and Other (F/U Kidney biosopy)    HPI:  Paola Garcia presents to the office today for hypertension follow-up care. Patient was seen in the practice on July 17, 2019 his blood pressure was 160/100. Per the office note the patient had gained 10 pounds since his visit in March, 2019 in which she was reported he was stress eating. Also during the visit patient reported he had not had his hydrochlorothiazide for over 3 days. Patient was asked to take his medication daily and follow-up in 1 month. He presents today his blood pressure controlled at 137/86. He reports he is taking his medication as prescribed he denies any chest pain, palpitation or lower extremity edema. Patient notes he had an elective renal  biopsy done on [facility] by Dr. Maria Guadalupe Reaves. Patient has a history of CKD and HTN. Review of Systems   Respiratory: Negative for cough. Cardiovascular: Negative for chest pain, palpitations and leg swelling. Gastrointestinal: Negative for abdominal pain, nausea and vomiting. Neurological: Negative for dizziness and headaches. Allergies   Allergen Reactions    Iodine Other (comments)     Eyes swell shut      Shellfish Containing Products Swelling       Current Outpatient Medications   Medication Sig Dispense Refill    hydrALAZINE (APRESOLINE) 25 mg tablet Take 25 mg by mouth three (3) times daily.  omeprazole (PRILOSEC) 40 mg capsule TAKE ONE CAPSULE BY MOUTH TWICE A DAY 60 Cap 2    hydroCHLOROthiazide (HYDRODIURIL) 25 mg tablet Take 1 Tab by mouth daily. 90 Tab 0    isosorbide mononitrate ER (IMDUR) 60 mg CR tablet TAKE 1 TABLET BY MOUTH DAILY 30 Tab 0    amLODIPine (NORVASC) 5 mg tablet TAKE 1 TABLET BY MOUTH EVERY DAY 30 Tab 1    cloNIDine (CATAPRES) 0.1 mg/24 hr ptwk 1 Patch by TransDERmal route every seven (7) days.  12 Patch 1    carvedilol (COREG) 12.5 mg tablet TAKE 1 TABLET BY MOUTH TWICE A DAY WITH MEALS 180 Tab 1    KLOR-CON M20 20 mEq tablet TAKE 1 TABLET BY MOUTH EVERY DAY 90 Tab 1    allopurinol (ZYLOPRIM) 100 mg tablet TAKE 1 TAB BY MOUTH DAILY. 90 Tab 3    nitroglycerin (NITROSTAT) 0.4 mg SL tablet 1 Tab by SubLINGual route every five (5) minutes as needed for Chest Pain. 1 Bottle 1    clopidogrel (PLAVIX) 75 mg tab Take 1 Tab by mouth daily. 90 Tab 3    atorvastatin (LIPITOR) 40 mg tablet Take 1 Tab by mouth nightly. 90 Tab 3    aspirin 81 mg chewable tablet Take 1 Tab by mouth two (2) times a day. 60 Tab 0    mirtazapine (REMERON) 15 mg tablet Take 1 Tab by mouth nightly. 30 Tab 2    multivitamin (ONE A DAY) tablet Take 1 Tab by mouth daily.  calcium carbonate (TUMS) 200 mg calcium (500 mg) chew Take 1 Tab by mouth daily.          Past Medical History:   Diagnosis Date    BPH without obstruction/lower urinary tract symptoms     Bursitis of right shoulder     CAD (coronary artery disease)     Chest pain     history of hospitalization with chest pain and a negative workup in 2008    Chronic edema     Constipation     die to medication    Coronary artery disease     mild, non obstructive/EF 65%    Dyspnea on exertion     Echocardiogram 12/16/08    IVC dilated; suboptimal endocardial border; EF 65%    ED (erectile dysfunction)     Elevated PSA     Frequency     GERD (gastroesophageal reflux disease)     Gout     H/O cystoscopy 06/20/2013    Hematuria, unspecified     Hypercholesterolemia     Hypertension     Hypertension      Hypogonadism male     Impotence of organic origin     Left ventricular diastolic dysfunction     Malignant neoplasm of prostate (HCC)     hx of t1a, izzy 6, 5 % of 1  core    Morbid obesity ([facility] [address]     Myocardial perfusion 09/05/08    Basal inferior defect c/w artifact; EF 63%    Overactive bladder     S/P cardiac cath 07/30/10    oD2-40%; pRCA-20-30%; EF 65%    Sleep apnea     Slowing of urinary stream     Type II or unspecified type diabetes mellitus without mention of complication, not stated as uncontrolled        Past Surgical History:   Procedure Laterality Date    HX HEART CATHETERIZATION  7/30/10    HX OTHER SURGICAL  13    Prostate    HX TONSILLECTOMY      MO COLONOSCOPY FLX DX W/COLLJ SPEC WHEN PFRMD      MO I & D ABSC XTRAORAL SOFT TISS COMP         Social History     Socioeconomic History    Marital status: SINGLE     Spouse name: Not on file    Number of children: Not on file    Years of education: Not on file    Highest education level: Not on file   Occupational History    Not on file   Social Needs    Financial resource strain: Not on file    Food insecurity:     Worry: Not on file     Inability: Not on file    Transportation needs:     Medical: Not on file     Non-medical: Not on file   Tobacco Use    Smoking status: Former Smoker     Types: Cigars     Last attempt to quit: 10/4/1999     Years since quittin.8    Smokeless tobacco: Never Used   Substance and Sexual Activity    Alcohol use: Not Currently     Comment: socially    Drug use: No    Sexual activity: Not Currently   Lifestyle    Physical activity:     Days per week: Not on file     Minutes per session: Not on file    Stress: Not on file   Relationships    Social connections:     Talks on phone: Not on file     Gets together: Not on file     Attends Latter-day service: Not on file     Active member of club or organization: Not on file     Attends meetings of clubs or organizations: Not on file     Relationship status: Not on file    Intimate partner violence:     Fear of current or ex partner: Not on file     Emotionally abused: Not on file     Physically abused: Not on file     Forced sexual activity: Not on file   Other Topics Concern    Not on file   Social History Narrative    Not on file       Patient does not have an advanced directive on file    Vitals:    19 1652   BP: 137/86   Pulse: 74   Resp: 18   Temp: 97.3 °F (36.3 °C) TempSrc: Oral   Weight: 260 lb (117.9 kg)   Height: 5' 11\" (1.803 m)       Physical Exam   Constitutional: No distress. HENT:   Head:       Cardiovascular: Normal rate, regular rhythm and normal heart sounds. Pulmonary/Chest: Effort normal and breath sounds normal.   Musculoskeletal: He exhibits edema. Lymphadenopathy:     He has cervical adenopathy. Nursing note and vitals reviewed. No visits with results within 3 Month(s) from this visit. Latest known visit with results is:   Office Visit on 02/25/2019   Component Date Value Ref Range Status    Glucose 02/25/2019 98  70 - 99 mg/dL Final    BUN 02/25/2019 38* 6 - 22 mg/dL Final    Creatinine 02/25/2019 2.7* 0.8 - 1.6 mg/dL Final    Sodium 02/25/2019 142  133 - 145 mmol/L Final    Potassium 02/25/2019 3.7  3.5 - 5.5 mmol/L Final    Chloride 02/25/2019 98  98 - 110 mmol/L Final    CO2 02/25/2019 28  20 - 32 mmol/L Final    Calcium 02/25/2019 9.1  8.4 - 10.4 mg/dL Final    Anion gap 02/25/2019 16.0  mmol/L Final    Comment: Test includes BUN, CO2, Chloride, Creatinine, Glucose, Potassium, Calcium and  Sodium. Estimated GFR results are reported in mL/min/1.73 sq.m. by the MDRD equation. This eGFR is validated for stable chronic renal failure patients. This   equation  is unreliable in acute illness or patients with normal renal function.  GFRAA 02/25/2019 29.2* >60.0 Final    GFRNA 02/25/2019 24.1* >60.0 Final       .No results found for any visits on 08/07/19. Assessment / Plan:      ICD-10-CM ICD-9-CM    1. Essential hypertension I10 401.9    2. Controlled type 2 diabetes mellitus without complication, without long-term current use of insulin (HCC) E11.9 250.00 AMB POC HEMOGLOBIN A1C   3. Adenopathy R59.1 785.6 US THYROID/PARATHYROID/SOFT TISS     HTN controlled  Hgb A1c- controlled  Adenopathy- US Soft tissue  F/u in 2 month    Follow-up and Dispositions    · Return in about 2 months (around 10/7/2019).          I asked the patient if he  had any questions and answered his  questions. The patient stated that he understands the treatment plan and agrees with the treatment plan    This document was created with a voice activated dictation system and may contain transcription errors.

## 2019-08-21 ENCOUNTER — OFFICE VISIT (OUTPATIENT)
Dept: FAMILY MEDICINE CLINIC | Facility: CLINIC | Age: 64
End: 2019-08-21

## 2019-08-21 VITALS
HEART RATE: 67 BPM | RESPIRATION RATE: 12 BRPM | HEIGHT: 71 IN | WEIGHT: 261.4 LBS | TEMPERATURE: 97.5 F | OXYGEN SATURATION: 95 % | SYSTOLIC BLOOD PRESSURE: 143 MMHG | DIASTOLIC BLOOD PRESSURE: 73 MMHG | BODY MASS INDEX: 36.6 KG/M2

## 2019-08-21 DIAGNOSIS — G47.00 INSOMNIA, UNSPECIFIED TYPE: Primary | ICD-10-CM

## 2019-08-21 DIAGNOSIS — R25.2 MUSCLE CRAMPS: ICD-10-CM

## 2019-08-21 DIAGNOSIS — I10 ESSENTIAL HYPERTENSION: ICD-10-CM

## 2019-08-21 NOTE — PROGRESS NOTES
Pardeep Franklin is a 60 y.o. male presenting today for Hypertension       Pardeep Franklin presents to the office today for insomnia and hypertension follow-up caare. He was seen in the practice 2 weeks ago and was complaining of insomnia. He was asked to follow-up today after starting Remeron for insomnia. He presents today noting his sleep pattern has improved. He notes he might cut the pill in half during the work week secondary to still feeling a small bit sleepy when waking up. He admits to getting a full night sleep with the medication. Patient also presents today complaining of nocturia muscle cramps. His last potasium level was 3.7. Review of Systems   Respiratory: Negative for cough. Cardiovascular: Negative for chest pain, palpitations and leg swelling. Neurological: Negative for dizziness and headaches. Psychiatric/Behavioral: Positive for depression. The patient has insomnia. Allergies   Allergen Reactions    Iodine Other (comments)     Eyes swell shut      Shellfish Containing Products Swelling       Current Outpatient Medications   Medication Sig Dispense Refill    hydrALAZINE (APRESOLINE) 25 mg tablet Take 25 mg by mouth three (3) times daily.  omeprazole (PRILOSEC) 40 mg capsule TAKE ONE CAPSULE BY MOUTH TWICE A DAY 60 Cap 2    mirtazapine (REMERON) 15 mg tablet Take 1 Tab by mouth nightly. 30 Tab 2    hydroCHLOROthiazide (HYDRODIURIL) 25 mg tablet Take 1 Tab by mouth daily. 90 Tab 0    isosorbide mononitrate ER (IMDUR) 60 mg CR tablet TAKE 1 TABLET BY MOUTH DAILY 30 Tab 0    amLODIPine (NORVASC) 5 mg tablet TAKE 1 TABLET BY MOUTH EVERY DAY 30 Tab 1    cloNIDine (CATAPRES) 0.1 mg/24 hr ptwk 1 Patch by TransDERmal route every seven (7) days. 12 Patch 1    carvedilol (COREG) 12.5 mg tablet TAKE 1 TABLET BY MOUTH TWICE A DAY WITH MEALS 180 Tab 1    multivitamin (ONE A DAY) tablet Take 1 Tab by mouth daily.  allopurinol (ZYLOPRIM) 100 mg tablet TAKE 1 TAB BY MOUTH DAILY. 90 Tab 3    calcium carbonate (TUMS) 200 mg calcium (500 mg) chew Take 1 Tab by mouth daily.  clopidogrel (PLAVIX) 75 mg tab Take 1 Tab by mouth daily. 90 Tab 3    aspirin 81 mg chewable tablet Take 1 Tab by mouth two (2) times a day. 60 Tab 0    KLOR-CON M20 20 mEq tablet TAKE 1 TABLET BY MOUTH EVERY DAY 90 Tab 1    nitroglycerin (NITROSTAT) 0.4 mg SL tablet 1 Tab by SubLINGual route every five (5) minutes as needed for Chest Pain. 1 Bottle 1    atorvastatin (LIPITOR) 40 mg tablet Take 1 Tab by mouth nightly.  80 Tab 3       Past Medical History:   Diagnosis Date    BPH without obstruction/lower urinary tract symptoms     Bursitis of right shoulder     CAD (coronary artery disease)     Chest pain     history of hospitalization with chest pain and a negative workup in 2008    Chronic edema     Constipation     die to medication    Coronary artery disease     mild, non obstructive/EF 65%    Dyspnea on exertion     Echocardiogram 12/16/08    IVC dilated; suboptimal endocardial border; EF 65%    ED (erectile dysfunction)     Elevated PSA     Frequency     GERD (gastroesophageal reflux disease)     Gout     H/O cystoscopy 06/20/2013    Hematuria, unspecified     Hypercholesterolemia     Hypertension     Hypertension      Hypogonadism male     Impotence of organic origin     Left ventricular diastolic dysfunction     Malignant neoplasm of prostate (HCC)     hx of t1a, izzy 6, 5 % of 1  core    Morbid obesity ([facility] [address]     Myocardial perfusion 09/05/08    Basal inferior defect c/w artifact; EF 63%    Overactive bladder     S/P cardiac cath 07/30/10    oD2-40%; pRCA-20-30%; EF 65%    Sleep apnea     Slowing of urinary stream     Type II or unspecified type diabetes mellitus without mention of complication, not stated as uncontrolled        Past Surgical History:   Procedure Laterality Date    HX HEART CATHETERIZATION  7/30/10    HX OTHER SURGICAL  06/27/13    Prostate    HX TONSILLECTOMY      AL COLONOSCOPY FLX DX W/COLLJ SPEC WHEN PFRMD      AL I & D ABSC XTRAORAL SOFT TISS COMP         Social History     Socioeconomic History    Marital status: SINGLE     Spouse name: Not on file    Number of children: Not on file    Years of education: Not on file    Highest education level: Not on file   Occupational History    Not on file   Social Needs    Financial resource strain: Not on file    Food insecurity:     Worry: Not on file     Inability: Not on file    Transportation needs:     Medical: Not on file     Non-medical: Not on file   Tobacco Use    Smoking status: Former Smoker     Types: Cigars     Last attempt to quit: 10/4/1999     Years since quittin.9    Smokeless tobacco: Never Used   Substance and Sexual Activity    Alcohol use: Yes     Comment: rarely    Drug use: No    Sexual activity: Not Currently   Lifestyle    Physical activity:     Days per week: Not on file     Minutes per session: Not on file    Stress: Not on file   Relationships    Social connections:     Talks on phone: Not on file     Gets together: Not on file     Attends Yarsani service: Not on file     Active member of club or organization: Not on file     Attends meetings of clubs or organizations: Not on file     Relationship status: Not on file    Intimate partner violence:     Fear of current or ex partner: Not on file     Emotionally abused: Not on file     Physically abused: Not on file     Forced sexual activity: Not on file   Other Topics Concern    Not on file   Social History Narrative    Not on file       Patient does not have an advanced directive on file    Vitals:    19 1719   BP: 143/73   Pulse: 67   Resp: 12   Temp: 97.5 °F (36.4 °C)   TempSrc: Oral   SpO2: 95%   Weight: 261 lb 6.4 oz (118.6 kg)   Height: 5' 11\" (1.803 m)   PainSc:   7   PainLoc: Knee       Physical Exam   Constitutional: No distress.    Cardiovascular: Normal rate, regular rhythm and normal heart sounds. Pulmonary/Chest: Effort normal.   Neurological: He is alert. Nursing note and vitals reviewed. Orders Only on 08/21/2019   Component Date Value Ref Range Status    Potassium 08/22/2019 3.4* 3.5 - 5.5 mmol/L Final       .  Results for orders placed or performed in visit on 08/21/19   POTASSIUM   Result Value Ref Range    Potassium 3.4 (L) 3.5 - 5.5 mmol/L    Narrative    Unless additionally indicated, test performed at: [facility], [address] [place] [address]. PH: [phone]. Assessment / Plan:      ICD-10-CM ICD-9-CM    1. Insomnia, unspecified type G47.00 780.52    2. Muscle cramps R25.2 729.82 POTASSIUM   3. Essential hypertension I10 401.9      Continue Remeron  HTN- controlled  Complains of muscle cramps- potassium ordered  F/u in 3 months      Follow-up and Dispositions    · Return in about 3 months (around 11/21/2019), or if symptoms worsen or fail to improve. I asked the patient if he  had any questions and answered his  questions. The patient stated that he understands the treatment plan and agrees with the treatment plan    This document was created with a voice activated dictation system and may contain transcription errors.

## 2019-08-21 NOTE — PROGRESS NOTES
ROOM # 1    Jeannette Dixon presents today for   Chief Complaint   Patient presents with    Hypertension       Jeannette Dixon preferred language for health care discussion is english/other.     Is someone accompanying this pt? no    Is the patient using any DME equipment during OV? no    Depression Screening:  3 most recent PHQ Screens 6/7/2019 3/1/2019 1/17/2019 11/13/2018 10/29/2018 9/27/2018 5/17/2018   PHQ Not Done - - - - - - -   Little interest or pleasure in doing things Not at all More than half the days Several days Nearly every day More than half the days Not at all Not at all   Feeling down, depressed, irritable, or hopeless Not at all More than half the days Not at all Not at all More than half the days Not at all Not at all   Total Score PHQ 2 0 4 1 3 4 0 0   Trouble falling or staying asleep, or sleeping too much - Several days Several days More than half the days More than half the days - -   Feeling tired or having little energy - Several days Several days Nearly every day More than half the days - -   Poor appetite, weight loss, or overeating - Not at all Not at all More than half the days Not at all - -   Feeling bad about yourself - or that you are a failure or have let yourself or your family down - Not at all Not at all More than half the days More than half the days - -   Trouble concentrating on things such as school, work, reading, or watching TV - Not at all Not at all More than half the days Not at all - -   Moving or speaking so slowly that other people could have noticed; or the opposite being so fidgety that others notice - Not at all Not at all Not at all More than half the days - -   Thoughts of being better off dead, or hurting yourself in some way - Not at all Not at all Not at all Not at all - -   PHQ 9 Score - 6 3 14 12 - -       Learning Assessment:  Learning Assessment 5/25/2017 9/6/2016   PRIMARY LEARNER Patient Patient   CO-LEARNER CAREGIVER Ronda Maria LEARNER PREFERENCE PRIMARY READING READING   ANSWERED BY patient Patient   RELATIONSHIP SELF SELF       Abuse Screening:  Abuse Screening Questionnaire 2/16/2016   Do you ever feel afraid of your partner? N   Are you in a relationship with someone who physically or mentally threatens you? N   Is it safe for you to go home? Y       Fall Risk  Fall Risk Assessment, last 12 mths 6/7/2019   Able to walk? Yes   Fall in past 12 months? No       Health Maintenance reviewed and discussed per provider. Yes    Hardy Joshua is due for   Health Maintenance Due   Topic Date Due    EYE EXAM RETINAL OR DILATED  01[birth year]08/1967    DTaP/Tdap/Td series (1 - Tdap) 01/08/1978    MICROALBUMIN Q1  06/29/2017    FOOT EXAM Q1  09/07/2017    FOBT Q 1 YEAR AGE 50-75  09/29/2017    HEMOGLOBIN A1C Q6M  06/11/2019    Influenza Age 9 to Adult  08/01/2019         Please order/place referral if appropriate. Advance Directive:  1. Do you have an advance directive in place? Patient Reply: no    2. If not, would you like material regarding how to put one in place? Patient Reply: no    Coordination of Care:  1. Have you been to the ER, urgent care clinic since your last visit? Hospitalized since your last visit? no    2. Have you seen or consulted any other health care providers outside of the [address] [place] [address] since your last visit? Include any pap smears or colon screening.  yes

## 2019-08-22 ENCOUNTER — HOSPITAL ENCOUNTER (OUTPATIENT)
Dept: ULTRASOUND IMAGING | Age: 64
Discharge: HOME | End: 2019-08-22
Attending: NURSE PRACTITIONER
Payer: COMMERCIAL

## 2019-08-22 ENCOUNTER — HOSPITAL ENCOUNTER (OUTPATIENT)
Dept: LAB | Age: 64
Discharge: HOME | End: 2019-08-22
Attending: NURSE PRACTITIONER
Payer: COMMERCIAL

## 2019-08-22 DIAGNOSIS — R59.9 ADENOPATHY: ICD-10-CM

## 2019-08-22 DIAGNOSIS — E07.9 SWELLING OF THYROID GLAND: ICD-10-CM

## 2019-08-22 LAB
AVG GLU, 10930: 109 MG/DL (ref 91–123)
HBA1C MFR BLD HPLC: 5.4 % (ref 4.8–5.9)
POTASSIUM SERPL-SCNC: 3.4 MMOL/L (ref 3.5–5.5)
SENTARA SPECIMEN COL,SENBCF: NORMAL

## 2019-08-22 PROCEDURE — 99001 SPECIMEN HANDLING PT-LAB: CPT

## 2019-08-22 PROCEDURE — 76536 US EXAM OF HEAD AND NECK: CPT

## 2019-08-28 NOTE — PROGRESS NOTES
Please let patient know the xray results showed a small solid nodule of the left thyroid lobe. Radiologist recommend repeat US in 24 months.

## 2019-09-03 RX ORDER — ISOSORBIDE MONONITRATE 60 MG/1
TABLET, EXTENDED RELEASE ORAL
Qty: 30 TAB | Refills: 0 | Status: SHIPPED | OUTPATIENT
Start: 2019-09-03 | End: 2019-10-06 | Stop reason: SDUPTHER

## 2019-09-09 DIAGNOSIS — I10 ESSENTIAL HYPERTENSION: ICD-10-CM

## 2019-09-09 RX ORDER — CLONIDINE 0.1 MG/24H
PATCH, EXTENDED RELEASE TRANSDERMAL
Qty: 12 PATCH | Refills: 1 | Status: SHIPPED | OUTPATIENT
Start: 2019-09-09 | End: 2020-06-05

## 2019-09-11 ENCOUNTER — TELEPHONE (OUTPATIENT)
Dept: FAMILY MEDICINE CLINIC | Facility: CLINIC | Age: 64
End: 2019-09-11

## 2019-09-11 NOTE — TELEPHONE ENCOUNTER
2 pt identifiers confirmed. Spoke with pt he received his x-ray results.  Pt verbalized his understanding to repeat his x-ray in months,

## 2019-09-15 DIAGNOSIS — Z98.61 POSTSURGICAL PERCUTANEOUS TRANSLUMINAL CORONARY ANGIOPLASTY STATUS: ICD-10-CM

## 2019-09-16 ENCOUNTER — ANESTHESIA EVENT (OUTPATIENT)
Dept: ENDOSCOPY | Age: 64
End: 2019-09-16
Payer: COMMERCIAL

## 2019-09-16 RX ORDER — CLOPIDOGREL BISULFATE 75 MG/1
TABLET ORAL
Qty: 90 TAB | Refills: 3 | Status: SHIPPED | OUTPATIENT
Start: 2019-09-16 | End: 2020-10-12

## 2019-09-17 ENCOUNTER — APPOINTMENT (OUTPATIENT)
Dept: GENERAL RADIOLOGY | Age: 64
End: 2019-09-17
Attending: INTERNAL MEDICINE
Payer: COMMERCIAL

## 2019-09-17 ENCOUNTER — ANESTHESIA EVENT (OUTPATIENT)
Dept: ENDOSCOPY | Age: 64
End: 2019-09-17
Payer: COMMERCIAL

## 2019-09-17 ENCOUNTER — APPOINTMENT (OUTPATIENT)
Dept: NON INVASIVE DIAGNOSTICS | Age: 64
End: 2019-09-17
Attending: INTERNAL MEDICINE
Payer: COMMERCIAL

## 2019-09-17 ENCOUNTER — ANESTHESIA (OUTPATIENT)
Dept: ENDOSCOPY | Age: 64
End: 2019-09-17
Payer: COMMERCIAL

## 2019-09-17 ENCOUNTER — HOSPITAL ENCOUNTER (OUTPATIENT)
Age: 64
Setting detail: OBSERVATION
Discharge: HOME | End: 2019-09-18
Attending: INTERNAL MEDICINE | Admitting: INTERNAL MEDICINE
Payer: COMMERCIAL

## 2019-09-17 PROBLEM — R07.9 CHEST PAIN: Status: ACTIVE | Noted: 2019-09-17

## 2019-09-17 LAB
ALBUMIN SERPL-MCNC: 3.3 G/DL (ref 3.4–5)
ALBUMIN/GLOB SERPL: 1.1 {RATIO} (ref 0.8–1.7)
ALP SERPL-CCNC: 72 U/L (ref 45–117)
ALT SERPL-CCNC: 14 U/L (ref 16–61)
ANION GAP SERPL CALC-SCNC: 5 MMOL/L (ref 3–18)
AST SERPL-CCNC: 14 U/L (ref 10–38)
ATRIAL RATE: 52 BPM
BILIRUB SERPL-MCNC: 0.3 MG/DL (ref 0.2–1)
BUN SERPL-MCNC: 38 MG/DL (ref 7–18)
BUN/CREAT SERPL: 19 (ref 12–20)
CALCIUM SERPL-MCNC: 8.4 MG/DL (ref 8.5–10.1)
CALCULATED P AXIS, ECG09: 43 DEGREES
CALCULATED R AXIS, ECG10: -3 DEGREES
CALCULATED T AXIS, ECG11: 141 DEGREES
CHLORIDE SERPL-SCNC: 108 MMOL/L (ref 100–111)
CK MB CFR SERPL CALC: 0.8 % (ref 0–4)
CK MB CFR SERPL CALC: 0.8 % (ref 0–4)
CK MB SERPL-MCNC: 1.9 NG/ML (ref 5–25)
CK MB SERPL-MCNC: 2 NG/ML (ref 5–25)
CK SERPL-CCNC: 234 U/L (ref 39–308)
CK SERPL-CCNC: 247 U/L (ref 39–308)
CO2 SERPL-SCNC: 31 MMOL/L (ref 21–32)
CREAT SERPL-MCNC: 2.02 MG/DL (ref 0.6–1.3)
DIAGNOSIS, 93000: NORMAL
GLOBULIN SER CALC-MCNC: 2.9 G/DL (ref 2–4)
GLUCOSE SERPL-MCNC: 91 MG/DL (ref 74–99)
LIPASE SERPL-CCNC: 109 U/L (ref 73–393)
P-R INTERVAL, ECG05: 204 MS
POTASSIUM SERPL-SCNC: 3.3 MMOL/L (ref 3.5–5.5)
PROT SERPL-MCNC: 6.2 G/DL (ref 6.4–8.2)
Q-T INTERVAL, ECG07: 450 MS
QRS DURATION, ECG06: 104 MS
QTC CALCULATION (BEZET), ECG08: 418 MS
SODIUM SERPL-SCNC: 144 MMOL/L (ref 136–145)
STRESS BASELINE DIAS BP: 71 MMHG
STRESS BASELINE HR: 58 BPM
STRESS BASELINE SYS BP: 131 MMHG
STRESS ESTIMATED WORKLOAD: 1 METS
STRESS EXERCISE DUR MIN: NORMAL
STRESS PEAK DIAS BP: 71 MMHG
STRESS PEAK SYS BP: 131 MMHG
STRESS PERCENT HR ACHIEVED: 61 %
STRESS POST PEAK HR: 96 BPM
STRESS RATE PRESSURE PRODUCT: NORMAL BPM*MMHG
STRESS ST DEPRESSION: 0 MM
STRESS ST ELEVATION: 0 MM
STRESS TARGET HR: 158 BPM
TROPONIN I SERPL-MCNC: 0.02 NG/ML (ref 0–0.04)
TROPONIN I SERPL-MCNC: 0.02 NG/ML (ref 0–0.04)
VENTRICULAR RATE, ECG03: 52 BPM

## 2019-09-17 PROCEDURE — 74011250636 HC RX REV CODE- 250/636: Performed by: NURSE ANESTHETIST, CERTIFIED REGISTERED

## 2019-09-17 PROCEDURE — 82550 ASSAY OF CK (CPK): CPT

## 2019-09-17 PROCEDURE — 78452 HT MUSCLE IMAGE SPECT MULT: CPT

## 2019-09-17 PROCEDURE — 80053 COMPREHEN METABOLIC PANEL: CPT

## 2019-09-17 PROCEDURE — 71046 X-RAY EXAM CHEST 2 VIEWS: CPT

## 2019-09-17 PROCEDURE — 74011250637 HC RX REV CODE- 250/637: Performed by: NURSE ANESTHETIST, CERTIFIED REGISTERED

## 2019-09-17 PROCEDURE — 74011250636 HC RX REV CODE- 250/636: Performed by: INTERNAL MEDICINE

## 2019-09-17 PROCEDURE — 93005 ELECTROCARDIOGRAM TRACING: CPT

## 2019-09-17 PROCEDURE — 99218 HC RM OBSERVATION: CPT

## 2019-09-17 PROCEDURE — 36415 COLL VENOUS BLD VENIPUNCTURE: CPT

## 2019-09-17 PROCEDURE — 83690 ASSAY OF LIPASE: CPT

## 2019-09-17 PROCEDURE — 74011250637 HC RX REV CODE- 250/637: Performed by: INTERNAL MEDICINE

## 2019-09-17 RX ORDER — CLONIDINE 0.1 MG/24H
1 PATCH, EXTENDED RELEASE TRANSDERMAL
Status: DISCONTINUED | OUTPATIENT
Start: 2019-09-17 | End: 2019-09-18 | Stop reason: HOSPADM

## 2019-09-17 RX ORDER — SODIUM CHLORIDE 0.9 % (FLUSH) 0.9 %
5-40 SYRINGE (ML) INJECTION AS NEEDED
Status: CANCELLED | OUTPATIENT
Start: 2019-09-17

## 2019-09-17 RX ORDER — AMLODIPINE BESYLATE 5 MG/1
5 TABLET ORAL DAILY
Status: DISCONTINUED | OUTPATIENT
Start: 2019-09-17 | End: 2019-09-18 | Stop reason: HOSPADM

## 2019-09-17 RX ORDER — SODIUM CHLORIDE 0.9 % (FLUSH) 0.9 %
5-40 SYRINGE (ML) INJECTION EVERY 8 HOURS
Status: DISCONTINUED | OUTPATIENT
Start: 2019-09-17 | End: 2019-09-17

## 2019-09-17 RX ORDER — ATORVASTATIN CALCIUM 40 MG/1
40 TABLET, FILM COATED ORAL
Status: DISCONTINUED | OUTPATIENT
Start: 2019-09-17 | End: 2019-09-18 | Stop reason: HOSPADM

## 2019-09-17 RX ORDER — SODIUM CHLORIDE 0.9 % (FLUSH) 0.9 %
5-40 SYRINGE (ML) INJECTION EVERY 8 HOURS
Status: CANCELLED | OUTPATIENT
Start: 2019-09-17

## 2019-09-17 RX ORDER — FAMOTIDINE 20 MG/1
20 TABLET, FILM COATED ORAL ONCE
Status: COMPLETED | OUTPATIENT
Start: 2019-09-17 | End: 2019-09-17

## 2019-09-17 RX ORDER — SODIUM CHLORIDE, SODIUM LACTATE, POTASSIUM CHLORIDE, CALCIUM CHLORIDE 600; 310; 30; 20 MG/100ML; MG/100ML; MG/100ML; MG/100ML
75 INJECTION, SOLUTION INTRAVENOUS CONTINUOUS
Status: CANCELLED | OUTPATIENT
Start: 2019-09-17 | End: 2019-09-18

## 2019-09-17 RX ORDER — SODIUM CHLORIDE, SODIUM LACTATE, POTASSIUM CHLORIDE, CALCIUM CHLORIDE 600; 310; 30; 20 MG/100ML; MG/100ML; MG/100ML; MG/100ML
25 INJECTION, SOLUTION INTRAVENOUS CONTINUOUS
Status: DISCONTINUED | OUTPATIENT
Start: 2019-09-17 | End: 2019-09-17

## 2019-09-17 RX ORDER — MIRTAZAPINE 15 MG/1
15 TABLET, FILM COATED ORAL
Status: DISCONTINUED | OUTPATIENT
Start: 2019-09-17 | End: 2019-09-18 | Stop reason: HOSPADM

## 2019-09-17 RX ORDER — HYDRALAZINE HYDROCHLORIDE 25 MG/1
25 TABLET, FILM COATED ORAL 3 TIMES DAILY
Status: DISCONTINUED | OUTPATIENT
Start: 2019-09-17 | End: 2019-09-18 | Stop reason: HOSPADM

## 2019-09-17 RX ORDER — GUAIFENESIN 100 MG/5ML
81 LIQUID (ML) ORAL 2 TIMES DAILY
Status: DISCONTINUED | OUTPATIENT
Start: 2019-09-17 | End: 2019-09-18 | Stop reason: HOSPADM

## 2019-09-17 RX ORDER — FAMOTIDINE 20 MG/1
20 TABLET, FILM COATED ORAL 2 TIMES DAILY
Status: DISCONTINUED | OUTPATIENT
Start: 2019-09-17 | End: 2019-09-18

## 2019-09-17 RX ORDER — SODIUM CHLORIDE 9 MG/ML
250 INJECTION, SOLUTION INTRAVENOUS ONCE
Status: COMPLETED | OUTPATIENT
Start: 2019-09-17 | End: 2019-09-17

## 2019-09-17 RX ORDER — ISOSORBIDE MONONITRATE 60 MG/1
60 TABLET, EXTENDED RELEASE ORAL DAILY
Status: DISCONTINUED | OUTPATIENT
Start: 2019-09-18 | End: 2019-09-18 | Stop reason: HOSPADM

## 2019-09-17 RX ORDER — INSULIN LISPRO 100 [IU]/ML
INJECTION, SOLUTION INTRAVENOUS; SUBCUTANEOUS ONCE
Status: CANCELLED | OUTPATIENT
Start: 2019-09-17 | End: 2019-09-18

## 2019-09-17 RX ORDER — CARVEDILOL 12.5 MG/1
12.5 TABLET ORAL 2 TIMES DAILY WITH MEALS
Status: DISCONTINUED | OUTPATIENT
Start: 2019-09-17 | End: 2019-09-18 | Stop reason: HOSPADM

## 2019-09-17 RX ORDER — LIDOCAINE HYDROCHLORIDE 10 MG/ML
0.1 INJECTION, SOLUTION EPIDURAL; INFILTRATION; INTRACAUDAL; PERINEURAL AS NEEDED
Status: DISCONTINUED | OUTPATIENT
Start: 2019-09-17 | End: 2019-09-17

## 2019-09-17 RX ORDER — SODIUM CHLORIDE 0.9 % (FLUSH) 0.9 %
5-40 SYRINGE (ML) INJECTION AS NEEDED
Status: DISCONTINUED | OUTPATIENT
Start: 2019-09-17 | End: 2019-09-17

## 2019-09-17 RX ADMIN — HYDRALAZINE HYDROCHLORIDE 25 MG: 25 TABLET, FILM COATED ORAL at 19:11

## 2019-09-17 RX ADMIN — CARVEDILOL 12.5 MG: 12.5 TABLET, FILM COATED ORAL at 19:11

## 2019-09-17 RX ADMIN — SODIUM CHLORIDE, SODIUM LACTATE, POTASSIUM CHLORIDE, AND CALCIUM CHLORIDE 25 ML/HR: 600; 310; 30; 20 INJECTION, SOLUTION INTRAVENOUS at 10:27

## 2019-09-17 RX ADMIN — FAMOTIDINE 20 MG: 20 TABLET ORAL at 10:27

## 2019-09-17 RX ADMIN — REGADENOSON 0.4 MG: 0.08 INJECTION, SOLUTION INTRAVENOUS at 13:10

## 2019-09-17 RX ADMIN — FAMOTIDINE 20 MG: 20 TABLET ORAL at 19:11

## 2019-09-17 RX ADMIN — AMLODIPINE BESYLATE 5 MG: 5 TABLET ORAL at 19:11

## 2019-09-17 RX ADMIN — ASPIRIN 81 MG 81 MG: 81 TABLET ORAL at 19:11

## 2019-09-17 RX ADMIN — SODIUM CHLORIDE 250 ML: 900 INJECTION, SOLUTION INTRAVENOUS at 13:15

## 2019-09-17 NOTE — H&P
[email or web address]  [phone]    Gastroenterology pre op History and Physical     Impression:   1. High risk colon cancer screening exam      Plan:     1. Colonoscopy      Chief Complaint: high risk colon cancer screening exam.      HPI:  Stanley Gregory is a 60 y.o. male who is being seen on consult for high risk colon cancer screening with colonoscopy.  There is a previous history of colon polyps, and the patient returns for a surveillence exam    PMH:   Past Medical History:   Diagnosis Date    BPH without obstruction/lower urinary tract symptoms     Bursitis of right shoulder     CAD (coronary artery disease)     Chest pain     history of hospitalization with chest pain and a negative workup in 2008    Chronic edema     Constipation     die to medication    Coronary artery disease     mild, non obstructive/EF 65%    Dyspnea on exertion     Echocardiogram 12/16/08    IVC dilated; suboptimal endocardial border; EF 65%    ED (erectile dysfunction)     Elevated PSA     Frequency     GERD (gastroesophageal reflux disease)     Gout     H/O cystoscopy 06/20/2013    Hematuria, unspecified     Hypercholesterolemia     Hypertension     Hypertension      Hypogonadism male     Impotence of organic origin     Left ventricular diastolic dysfunction     Malignant neoplasm of prostate (HCC)     hx of t1a, izzy 6, 5 % of 1  core    Morbid obesity ([address]     Myocardial perfusion 09/05/08    Basal inferior defect c/w artifact; EF 63%    Overactive bladder     S/P cardiac cath 07/30/10    oD2-40%; pRCA-20-30%; EF 65%    Sleep apnea     Slowing of urinary stream     Type II or unspecified type diabetes mellitus without mention of complication, not stated as uncontrolled        PSH:   Past Surgical History:   Procedure Laterality Date    HX HEART CATHETERIZATION  7/30/10    HX OTHER SURGICAL  06/27/13    Prostate    HX TONSILLECTOMY      OR COLONOSCOPY FLX DX W/COLLJ SPEC WHEN PFRMD      OR I & D ABSC XTRAORAL SOFT TISS COMP         Social HX:   Social History     Socioeconomic History    Marital status: SINGLE     Spouse name: Not on file    Number of children: Not on file    Years of education: Not on file    Highest education level: Not on file   Occupational History    Not on file   Social Needs    Financial resource strain: Not on file    Food insecurity:     Worry: Not on file     Inability: Not on file    Transportation needs:     Medical: Not on file     Non-medical: Not on file   Tobacco Use    Smoking status: Former Smoker     Types: Cigars     Last attempt to quit: 10/4/1999     Years since quittin.9    Smokeless tobacco: Never Used   Substance and Sexual Activity    Alcohol use: Yes     Comment: rarely    Drug use: No    Sexual activity: Not Currently   Lifestyle    Physical activity:     Days per week: Not on file     Minutes per session: Not on file    Stress: Not on file   Relationships    Social connections:     Talks on phone: Not on file     Gets together: Not on file     Attends Yarsanism service: Not on file     Active member of club or organization: Not on file     Attends meetings of clubs or organizations: Not on file     Relationship status: Not on file    Intimate partner violence:     Fear of current or ex partner: Not on file     Emotionally abused: Not on file     Physically abused: Not on file     Forced sexual activity: Not on file   Other Topics Concern    Not on file   Social History Narrative    Not on file       FHX:   Family History   Problem Relation Age of Onset    Hypertension Mother     High Cholesterol Mother     Breast Cancer Mother     Diabetes Mother     Heart Disease Mother     Arthritis-osteo Mother     High Cholesterol Father     Hypertension Father     Diabetes Father     Heart Disease Father     Arthritis-osteo Father        Allergy:   Allergies   Allergen Reactions    Iodine Other (comments)     Eyes swell shut      Shellfish Containing Products Swelling       Home Medications:     Medications Prior to Admission   Medication Sig    clopidogrel (PLAVIX) 75 mg tab TAKE 1 TABLET BY MOUTH EVERY DAY    cloNIDine (CATAPRES) 0.1 mg/24 hr ptwk APPLY 1 PATCH BY TRANSDERMAL ROUTE EVERY SEVEN (7) DAYS.  isosorbide mononitrate ER (IMDUR) 60 mg CR tablet TAKE 1 TABLET BY MOUTH DAILY    hydrALAZINE (APRESOLINE) 25 mg tablet Take 25 mg by mouth three (3) times daily.  omeprazole (PRILOSEC) 40 mg capsule TAKE ONE CAPSULE BY MOUTH TWICE A DAY    mirtazapine (REMERON) 15 mg tablet Take 1 Tab by mouth nightly.  hydroCHLOROthiazide (HYDRODIURIL) 25 mg tablet Take 1 Tab by mouth daily.  amLODIPine (NORVASC) 5 mg tablet TAKE 1 TABLET BY MOUTH EVERY DAY    carvedilol (COREG) 12.5 mg tablet TAKE 1 TABLET BY MOUTH TWICE A DAY WITH MEALS    KLOR-CON M20 20 mEq tablet TAKE 1 TABLET BY MOUTH EVERY DAY    multivitamin (ONE A DAY) tablet Take 1 Tab by mouth daily.  allopurinol (ZYLOPRIM) 100 mg tablet TAKE 1 TAB BY MOUTH DAILY.  nitroglycerin (NITROSTAT) 0.4 mg SL tablet 1 Tab by SubLINGual route every five (5) minutes as needed for Chest Pain.  calcium carbonate (TUMS) 200 mg calcium (500 mg) chew Take 1 Tab by mouth daily.  atorvastatin (LIPITOR) 40 mg tablet Take 1 Tab by mouth nightly.  aspirin 81 mg chewable tablet Take 1 Tab by mouth two (2) times a day. Review of Systems:     Constitutional: No fevers, chills, weight loss, fatigue. Skin: No rashes, pruritis, jaundice, ulcerations, erythema. HENT: No headaches, nosebleeds, sinus pressure, rhinorrhea, sore throat. Eyes: No visual changes, blurred vision, eye pain, photophobia, jaundice. Cardiovascular: No chest pain, heart palpitations. Respiratory: No cough, SOB, wheezing, chest discomfort, orthopnea. Gastrointestinal:    Genitourinary: No dysuria, bleeding, discharge, pyuria. Musculoskeletal: No weakness, arthralgias, wasting. Endo: No sweats. Heme: No bruising, easy bleeding. Allergies: As noted. Neurological: Cranial nerves intact. Alert and oriented. Gait not assessed. Psychiatric:  No anxiety, depression, hallucinations. Visit Vitals  Ht 5' 11\" (1.803 m)   Wt 117.9 kg (260 lb)   BMI 36.26 kg/m²       Physical Assessment:     constitutional: appearance: well developed, well nourished, normal habitus, no deformities, in no acute distress. skin: inspection: no rashes, ulcers, icterus or other lesions; no clubbing or telangiectasias. palpation: no induration or subcutaneos nodules. eyes: inspection: normal conjunctivae and lids; no jaundice pupils: symmetrical, normoreactive to light, normal accommodation and size. ENMT: mouth: normal oral mucosa,lips and gums; good dentition. oropharynx: normal tongue, hard and soft palate; posterior pharynx without erithema, exudate or lesions. neck: thyroid: normal size, consistency and position; no masses or tenderness. respiratory: effort: normal chest excursion; no intercostal retraction or accessory muscle use. cardiovascular: abdominal aorta: normal size and position; no bruits. palpation: PMI of normal size and position; normal rhythm; no thrill or murmurs. abdominal: abdomen: normal consistency; no tenderness or masses. hernias: no hernias appreciated. liver: normal size and consistency. spleen: not palpable. rectal: hemoccult/guaiac: not performed. musculoskeletal: digits and nails: no clubbing, cyanosis, petechiae or other inflammatory conditions. gait: normal gait and station head and neck: normal range of motion; no pain, crepitation or contracture. spine/ribs/pelvis: normal range of motion; no pain, deformity or contracture. lymphatic: axilae: not palpable. groin: not palpable. neck: within normal limits. other: not palpable. neurologic: cranial nerves: II-XII normal.   psychiatric: judgement/insight: within normal limits.  memory: within normal limits for recent and remote events. mood and affect: no evidence of depression, anxiety or agitation. orientation: oriented to time, space and person. Basic Metabolic Profile   No results for input(s): NA, K, CL, CO2, BUN, GLU, CA, MG, PHOS in the last 72 hours. No lab exists for component: CREAT      CBC w/Diff    No results for input(s): WBC, RBC, HGB, HCT, MCV, MCH, MCHC, RDW, PLT, HGBEXT, HCTEXT, PLTEXT in the last 72 hours. No lab exists for component: MPV No results for input(s): GRANS, LYMPH, EOS, PRO, MYELO, METAS, BLAST in the last 72 hours. No lab exists for component: MONO, BASO     Hepatic Function   No results for input(s): ALB, TP, TBILI, GPT, SGOT, AP, AML, LPSE in the last 72 hours. No lab exists for component: AZAEL Quintana MD  [facility], [address] [place] [address]  [email or web address]. [facility]
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
## 2019-09-17 NOTE — PROGRESS NOTES
Patient was given 10.0 milliCuries of 99mTc-Sestamibi for the resting images. Patient was also given 30.0 milliCuries of 99mTc-Sestamibi for the stress images. Injected with 0.4mg Lexiscan. Patient's armband was left on and sent back to room.

## 2019-09-17 NOTE — PROGRESS NOTES
Case cancelled by anesthesia due to pt's cardiac hx and chest pain. Took nitro yesterday as well. Denies pain now, some subtle EKG changes per anesthesia. Will contact Dr. Rabia Goncalves to discuss and perhaps keep him overnight to do cardiac clearance then scope tomorrow as he is prepped. Spoke w pt who is in agreement, spoke w  Dr. Rabia Goncalves who will arrange stress test today and I'll plan colo tomorrow if clear. Will contact hospitalist to assist w admission/observation.     Juan Mclaughlin MD  [phone]

## 2019-09-17 NOTE — PERIOP NOTES
TRANSFER - OUT REPORT:    Verbal report given to [facility]) on Keshawn Smith  being transferred to [facility](unit) for routine progression of care       Report consisted of patients Situation, Background, Assessment and   Recommendations(SBAR). Information from the following report(s) SBAR was reviewed with the receiving nurse. Opportunity for questions and clarification was provided.       Patient transported with:   [place]
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
## 2019-09-18 ENCOUNTER — ANESTHESIA (OUTPATIENT)
Dept: ENDOSCOPY | Age: 64
End: 2019-09-18
Payer: COMMERCIAL

## 2019-09-18 VITALS
RESPIRATION RATE: 18 BRPM | SYSTOLIC BLOOD PRESSURE: 134 MMHG | HEIGHT: 71 IN | OXYGEN SATURATION: 97 % | TEMPERATURE: 98 F | HEART RATE: 61 BPM | DIASTOLIC BLOOD PRESSURE: 80 MMHG | BODY MASS INDEX: 35.56 KG/M2 | WEIGHT: 254 LBS

## 2019-09-18 LAB
ERYTHROCYTE [DISTWIDTH] IN BLOOD BY AUTOMATED COUNT: 16.2 % (ref 11.6–14.5)
GLUCOSE BLD STRIP.AUTO-MCNC: 88 MG/DL (ref 70–110)
HCT VFR BLD AUTO: 29 % (ref 36–48)
HGB BLD-MCNC: 9.1 G/DL (ref 13–16)
MCH RBC QN AUTO: 25.3 PG (ref 24–34)
MCHC RBC AUTO-ENTMCNC: 31.4 G/DL (ref 31–37)
MCV RBC AUTO: 80.8 FL (ref 74–97)
PLATELET # BLD AUTO: 197 K/UL (ref 135–420)
PMV BLD AUTO: 9 FL (ref 9.2–11.8)
RBC # BLD AUTO: 3.59 M/UL (ref 4.7–5.5)
WBC # BLD AUTO: 4.3 K/UL (ref 4.6–13.2)

## 2019-09-18 PROCEDURE — 77030008565 HC TBNG SUC IRR ERBE -B: Performed by: INTERNAL MEDICINE

## 2019-09-18 PROCEDURE — 77030018846 HC SOL IRR STRL H20 ICUM -A: Performed by: INTERNAL MEDICINE

## 2019-09-18 PROCEDURE — 82962 GLUCOSE BLOOD TEST: CPT

## 2019-09-18 PROCEDURE — 76040000019: Performed by: INTERNAL MEDICINE

## 2019-09-18 PROCEDURE — 99218 HC RM OBSERVATION: CPT

## 2019-09-18 PROCEDURE — 74011250636 HC RX REV CODE- 250/636: Performed by: ANESTHESIOLOGY

## 2019-09-18 PROCEDURE — 76060000031 HC ANESTHESIA FIRST 0.5 HR: Performed by: INTERNAL MEDICINE

## 2019-09-18 PROCEDURE — 74011250636 HC RX REV CODE- 250/636

## 2019-09-18 PROCEDURE — 74011000250 HC RX REV CODE- 250

## 2019-09-18 PROCEDURE — 74011250637 HC RX REV CODE- 250/637: Performed by: INTERNAL MEDICINE

## 2019-09-18 PROCEDURE — 85027 COMPLETE CBC AUTOMATED: CPT

## 2019-09-18 PROCEDURE — 36415 COLL VENOUS BLD VENIPUNCTURE: CPT

## 2019-09-18 RX ORDER — FAMOTIDINE 20 MG/1
20 TABLET, FILM COATED ORAL DAILY
Status: DISCONTINUED | OUTPATIENT
Start: 2019-09-19 | End: 2019-09-18 | Stop reason: HOSPADM

## 2019-09-18 RX ORDER — PROPOFOL 10 MG/ML
INJECTION, EMULSION INTRAVENOUS AS NEEDED
Status: DISCONTINUED | OUTPATIENT
Start: 2019-09-18 | End: 2019-09-18 | Stop reason: HOSPADM

## 2019-09-18 RX ORDER — SODIUM CHLORIDE 0.9 % (FLUSH) 0.9 %
5-40 SYRINGE (ML) INJECTION AS NEEDED
Status: CANCELLED | OUTPATIENT
Start: 2019-09-18

## 2019-09-18 RX ORDER — SODIUM CHLORIDE, SODIUM LACTATE, POTASSIUM CHLORIDE, CALCIUM CHLORIDE 600; 310; 30; 20 MG/100ML; MG/100ML; MG/100ML; MG/100ML
25 INJECTION, SOLUTION INTRAVENOUS CONTINUOUS
Status: DISCONTINUED | OUTPATIENT
Start: 2019-09-18 | End: 2019-09-18 | Stop reason: HOSPADM

## 2019-09-18 RX ORDER — MAGNESIUM SULFATE 100 %
4 CRYSTALS MISCELLANEOUS AS NEEDED
Status: CANCELLED | OUTPATIENT
Start: 2019-09-18

## 2019-09-18 RX ORDER — DEXTROSE 50 % IN WATER (D50W) INTRAVENOUS SYRINGE
25-50 AS NEEDED
Status: CANCELLED | OUTPATIENT
Start: 2019-09-18

## 2019-09-18 RX ORDER — LIDOCAINE HYDROCHLORIDE 20 MG/ML
INJECTION, SOLUTION EPIDURAL; INFILTRATION; INTRACAUDAL; PERINEURAL AS NEEDED
Status: DISCONTINUED | OUTPATIENT
Start: 2019-09-18 | End: 2019-09-18 | Stop reason: HOSPADM

## 2019-09-18 RX ORDER — SODIUM CHLORIDE, SODIUM LACTATE, POTASSIUM CHLORIDE, CALCIUM CHLORIDE 600; 310; 30; 20 MG/100ML; MG/100ML; MG/100ML; MG/100ML
50 INJECTION, SOLUTION INTRAVENOUS CONTINUOUS
Status: CANCELLED | OUTPATIENT
Start: 2019-09-18

## 2019-09-18 RX ORDER — SODIUM CHLORIDE 0.9 % (FLUSH) 0.9 %
5-40 SYRINGE (ML) INJECTION EVERY 8 HOURS
Status: CANCELLED | OUTPATIENT
Start: 2019-09-18

## 2019-09-18 RX ORDER — INSULIN LISPRO 100 [IU]/ML
INJECTION, SOLUTION INTRAVENOUS; SUBCUTANEOUS ONCE
Status: CANCELLED | OUTPATIENT
Start: 2019-09-18 | End: 2019-09-18

## 2019-09-18 RX ADMIN — PROPOFOL 100 MG: 10 INJECTION, EMULSION INTRAVENOUS at 10:46

## 2019-09-18 RX ADMIN — PROPOFOL 50 MG: 10 INJECTION, EMULSION INTRAVENOUS at 10:54

## 2019-09-18 RX ADMIN — HYDRALAZINE HYDROCHLORIDE 25 MG: 25 TABLET, FILM COATED ORAL at 08:51

## 2019-09-18 RX ADMIN — ASPIRIN 81 MG 81 MG: 81 TABLET ORAL at 08:51

## 2019-09-18 RX ADMIN — FAMOTIDINE 20 MG: 20 TABLET ORAL at 08:51

## 2019-09-18 RX ADMIN — LIDOCAINE HYDROCHLORIDE 100 MG: 20 INJECTION, SOLUTION EPIDURAL; INFILTRATION; INTRACAUDAL; PERINEURAL at 10:46

## 2019-09-18 RX ADMIN — ISOSORBIDE MONONITRATE 60 MG: 60 TABLET, EXTENDED RELEASE ORAL at 08:51

## 2019-09-18 RX ADMIN — SODIUM CHLORIDE, SODIUM LACTATE, POTASSIUM CHLORIDE, AND CALCIUM CHLORIDE 25 ML/HR: 600; 310; 30; 20 INJECTION, SOLUTION INTRAVENOUS at 09:35

## 2019-09-18 RX ADMIN — PROPOFOL 50 MG: 10 INJECTION, EMULSION INTRAVENOUS at 10:58

## 2019-09-18 RX ADMIN — PROPOFOL 50 MG: 10 INJECTION, EMULSION INTRAVENOUS at 10:50

## 2019-09-18 RX ADMIN — CARVEDILOL 12.5 MG: 12.5 TABLET, FILM COATED ORAL at 08:51

## 2019-09-18 RX ADMIN — AMLODIPINE BESYLATE 5 MG: 5 TABLET ORAL at 08:51

## 2019-09-18 NOTE — ROUTINE PROCESS
Bedside and Verbal shift change report given to Carmon Prader (oncoming nurse) by Mary Lou snyder. Report included the following information SBAR, Kardex and MAR.

## 2019-09-18 NOTE — ROUTINE PROCESS
Assumed patient care. Bedside shift report received from Patient in bed, awake, alert and oriented x3. Denies pain or discomforts at this time. Call light placed within reach. Bed in low position. 10:03 PM - offered to give due night medications but declined. Denies needs at this time. updated of current plan of care, verbalized understanding. 7:28 AM - Bedside shift change report given to [employer]  (oncoming nurse) by Veverly Nine RN (offgoing nurse). Report given with SBAR, Kardex, Intake/Output, MAR and Recent Results.

## 2019-09-18 NOTE — ROUTINE PROCESS
TRANSFER - OUT REPORT: 
 
Verbal report given to Svetlana(name) on Tyesha [place]  being transferred to [facility] for routine progression of care Report consisted of patients Situation, Background, Assessment and  
Recommendations(SBAR). Information from the following report(s) SBAR, OR Summary and Procedure Summary was reviewed with the receiving nurse. Lines:  
Peripheral IV 09/17/19 Left Antecubital (Active) Site Assessment Clean, dry, & intact 9/18/2019 11:08 AM  
Phlebitis Assessment 0 9/18/2019 11:08 AM  
Infiltration Assessment 0 9/18/2019 11:08 AM  
Dressing Status Clean, dry, & intact 9/18/2019 11:08 AM  
Dressing Type Transparent 9/18/2019 11:08 AM  
Hub Color/Line Status Pink;Patent 9/18/2019 11:08 AM  
Alcohol Cap Used Yes 9/17/2019  8:00 PM  
  
 
Opportunity for questions and clarification was provided. Patient transported with: 
 [employer]

## 2019-09-18 NOTE — DISCHARGE INSTRUCTIONS
Discharge Instructions    Patient: Stanley Gregory MRN: [id number]  CSN: [id number]    YOB: 1955  Age: 60 y.o. Sex: male    DOA: 2019 LOS:  LOS: 0 days   Discharge Date:      DIET:  Cardiac Diet    ACTIVITY: Activity as tolerated    ADDITIONAL INFORMATION: If you experience any of the following symptoms but not limited to Fever, chills, nausea, vomiting, diarrhea, change in mentation, falling, bleeding, shortness of breath, chest pain, please call your primary care physician or return to the emergency room if you cannot get hold of your doctor:     FOLLOW UP CARE:  Dalton Zaragoza in 5-7 days Dr. Negar Mahan in 4 weeks    Jose Mckinney NP  2019 2:47 PM    Zenops Activation    Thank you for requesting access to [address] Please follow the instructions below to securely access and download your online medical record. [employer] allows you to send messages to your doctor, view your test results, renew your prescriptions, schedule appointments, and more. How Do I Sign Up? 1. In your internet browser, go to [email or web address]  2. Click on the First Time User? Click Here link in the Sign In box. You will be redirect to the New Member Sign Up page. 3. Enter your Zenops Access Code exactly as it appears below. You will not need to use this code after youve completed the sign-up process. If you do not sign up before the expiration date, you must request a new code. Zenops Access Code: [email or web address]  Expires: 2019  5:00 PM (This is the date your Zenops access code will )    4. Enter the last four digits of your Social Security Number (xxxx) and Date of Birth (mm/dd/yyyy) as indicated and click Submit. You will be taken to the next sign-up page. 5. Create a Zenops ID. This will be your Zenops login ID and cannot be changed, so think of one that is secure and easy to remember. 6. Create a Zenops password. You can change your password at any time.   7. Enter your Password Reset Question and Answer. This can be used at a later time if you forget your password. 8. Enter your e-mail address. You will receive e-mail notification when new information is available in [address] 9. Click Sign Up. You can now view and download portions of your medical record. 10. Click the Download Summary menu link to download a portable copy of your medical information. Additional Information    If you have questions, please visit the Frequently Asked Questions section of the [employer] website at [email or web address] Remember, [employer] is NOT to be used for urgent needs. For medical emergencies, dial 911. Colonoscopy: What to Expect at [address] [facility]  After you have a colonoscopy, you will stay at the clinic for 1 to 2 hours until the medicines wear off. Then you can go home. But you will need to arrange for a ride. Your doctor will tell you when you can eat and do your other usual activities. Your doctor will talk to you about when you will need your next colonoscopy. Your doctor can help you decide how often you need to be checked. This will depend on the results of your test and your risk for colorectal cancer. After the test, you may be bloated or have gas pains. You may need to pass gas. If a biopsy was done or a polyp was removed, you may have streaks of blood in your stool (feces) for a few days. Problems such as heavy rectal bleeding may not occur until several weeks after the test. This isn't common. But it can happen after polyps are removed. This care sheet gives you a general idea about how long it will take for you to recover. But each person recovers at a different pace. Follow the steps below to get better as quickly as possible. How can you care for yourself at home? Activity    · Rest when you feel tired.     · You can do your normal activities when it feels okay to do so.    Diet    · Follow your doctor's directions for eating.     · Unless your doctor has told you not to, drink plenty of fluids. This helps to replace the fluids that were lost during the colon prep.     · Do not drink alcohol. Medicines    · Your doctor will tell you if and when you can restart your medicines. He or she will also give you instructions about taking any new medicines.     · If you take blood thinners, such as warfarin (Coumadin), clopidogrel (Plavix), or aspirin, be sure to talk to your doctor. He or she will tell you if and when to start taking those medicines again. Make sure that you understand exactly what your doctor wants you to do.     · If polyps were removed or a biopsy was done during the test, your doctor may tell you not to take aspirin or other anti-inflammatory medicines for a few days. These include ibuprofen (Advil, Motrin) and naproxen (Aleve). Other instructions    · For your safety, do not drive or operate machinery until the medicine wears off and you can think clearly. Your doctor may tell you not to drive or operate machinery until the day after your test.     · Do not sign legal documents or make major decisions until the medicine wears off and you can think clearly. The anesthesia can make it hard for you to fully understand what you are agreeing to. Follow-up care is a key part of your treatment and safety. Be sure to make and go to all appointments, and call your doctor if you are having problems. It's also a good idea to know your test results and keep a list of the medicines you take. When should you call for help? Call 911 anytime you think you may need emergency care.  For example, call if:    · You passed out (lost consciousness).     · You pass maroon or bloody stools.     · You have trouble breathing.    Call your doctor now or seek immediate medical care if:    · You have pain that does not get better after you take pain medicine.     · You are sick to your stomach or cannot drink fluids.     · You have new or worse belly pain.     · You have blood in your stools.     · You have a fever.     · You cannot pass stools or gas.    Watch closely for changes in your health, and be sure to contact your doctor if you have any problems. Where can you learn more? Go to http://davian-johnson.info/. Enter E264 in the search box to learn more about \"Colonoscopy: What to Expect at Home. \"  Current as of: December 19, 2018  Content Version: 12.1  © 7588-3546 [employer]. Care instructions adapted under license by [employer] (which disclaims liability or warranty for this information). If you have questions about a medical condition or this instruction, always ask your healthcare professional. [address] any warranty or liability for your use of this information. Chest Pain: Care Instructions  Your Care Instructions    There are many things that can cause chest pain. Some are not serious and will get better on their own in a few days. But some kinds of chest pain need more testing and treatment. Your doctor may have recommended a follow-up visit in the next 8 to 12 hours. If you are not getting better, you may need more tests or treatment. Even though your doctor has released you, you still need to watch for any problems. The doctor carefully checked you, but sometimes problems can develop later. If you have new symptoms or if your symptoms do not get better, get medical care right away. If you have worse or different chest pain or pressure that lasts more than 5 minutes or you passed out (lost consciousness), call 911 or seek other emergency help right away. A medical visit is only one step in your treatment. Even if you feel better, you still need to do what your doctor recommends, such as going to all suggested follow-up appointments and taking medicines exactly as directed. This will help you recover and help prevent future problems.   How can you care for yourself at home? · Rest until you feel better. · Take your medicine exactly as prescribed. Call your doctor if you think you are having a problem with your medicine. · Do not drive after taking a prescription pain medicine. When should you call for help? Call 911 if:    · You passed out (lost consciousness).     · You have severe difficulty breathing.     · You have symptoms of a heart attack. These may include:  ? Chest pain or pressure, or a strange feeling in your chest.  ? Sweating. ? Shortness of breath. ? Nausea or vomiting. ? Pain, pressure, or a strange feeling in your back, neck, jaw, or upper belly or in one or both shoulders or arms. ? Lightheadedness or sudden weakness. ? A fast or irregular heartbeat. After you call 911, the  may tell you to chew 1 adult-strength or 2 to 4 low-dose aspirin. Wait for an ambulance. Do not try to drive yourself.    Call your doctor today if:    · You have any trouble breathing.     · Your chest pain gets worse.     · You are dizzy or lightheaded, or you feel like you may faint.     · You are not getting better as expected.     · You are having new or different chest pain. Where can you learn more? Go to http://davian-johnson.info/. Enter A120 in the search box to learn more about \"Chest Pain: Care Instructions. \"  Current as of: September 23, 2018  Content Version: 12.1  © 2938-5800 [employer]. Care instructions adapted under license by [employer] (which disclaims liability or warranty for this information). If you have questions about a medical condition or this instruction, always ask your healthcare professional. [place] [address] any warranty or liability for your use of this information.   Patient armband removed and shredded      DISCHARGE SUMMARY from Nurse    PATIENT INSTRUCTIONS:    After general anesthesia or intravenous sedation, for 24 hours or while taking prescription Narcotics:  · Limit your activities  · Do not drive and operate hazardous machinery  · Do not make important personal or business decisions  · Do  not drink alcoholic beverages  · If you have not urinated within 8 hours after discharge, please contact your surgeon on call. Report the following to your surgeon:  · Excessive pain, swelling, redness or odor of or around the surgical area  · Temperature over 100.5  · Nausea and vomiting lasting longer than 4 hours or if unable to take medications  · Any signs of decreased circulation or nerve impairment to extremity: change in color, persistent  numbness, tingling, coldness or increase pain  · Any questions    What to do at Home:  Recommended activity: Activity as tolerated,     If you experience any of the following symptoms chest pain, shortness of breath, pain in jaw, left arm or back, please follow up with 911. *  Please give a list of your current medications to your Primary Care Provider. *  Please update this list whenever your medications are discontinued, doses are      changed, or new medications (including over-the-counter products) are added. *  Please carry medication information at all times in case of emergency situations. These are general instructions for a healthy lifestyle:    No smoking/ No tobacco products/ Avoid exposure to second hand smoke  Surgeon General's Warning:  Quitting smoking now greatly reduces serious risk to your health. Obesity, smoking, and sedentary lifestyle greatly increases your risk for illness    A healthy diet, regular physical exercise & weight monitoring are important for maintaining a healthy lifestyle    You may be retaining fluid if you have a history of heart failure or if you experience any of the following symptoms:  Weight gain of 3 pounds or more overnight or 5 pounds in a week, increased swelling in our hands or feet or shortness of breath while lying flat in bed.   Please call your doctor as soon as you notice any of these symptoms; do not wait until your next office visit. The discharge information has been reviewed with the patient. The patient verbalized understanding. Discharge medications reviewed with the patient  Patient Education        Learning About Coronary Artery Disease (CAD)  What is coronary artery disease? Coronary artery disease (CAD) occurs when plaque builds up in the arteries that bring oxygen-rich blood to your heart. Plaque is a fatty substance made of cholesterol, calcium, and other substances in the blood. This process is called hardening of the arteries, or atherosclerosis. What happens when you have coronary artery disease? · Plaque may narrow the coronary arteries. Narrowed arteries cause poor blood flow. This can lead to angina symptoms such as chest pain or discomfort. If blood flow is completely blocked, you could have a heart attack. · You can slow CAD and reduce the risk of future problems by making changes in your lifestyle. These include quitting smoking and eating heart-healthy foods. · Treatments for CAD, along with changes in your lifestyle, can help you live a longer and healthier life. How can you prevent coronary artery disease? · Do not smoke. It may be the best thing you can do to prevent heart disease. If you need help quitting, talk to your doctor about stop-smoking programs and medicines. These can increase your chances of quitting for good. · Be active. Get at least 30 minutes of exercise on most days of the week. Walking is a good choice. You also may want to do other activities, such as running, swimming, cycling, or playing tennis or team sports. · Eat heart-healthy foods. Eat more fruits and vegetables and less foods that contain saturated and trans fats. Limit alcohol, sodium, and sweets. · Stay at a healthy weight. Lose weight if you need to. · Manage other health problems such as diabetes, high blood pressure, and high cholesterol.   · If you have talked about it with your doctor, take a low-dose aspirin every day. Aspirin can help certain people lower their risk of a heart attack or stroke. But taking aspirin isn't right for everyone, because it can cause serious bleeding. Do not start taking daily aspirin unless your doctor knows about it. How is coronary artery disease treated? · Your doctor will suggest that you make lifestyle changes. For example, your doctor may ask you to eat healthy foods, quit smoking, lose extra weight, and be more active. · You will have to take medicines. · Your doctor may suggest a procedure to open narrowed or blocked arteries. This is called angioplasty. Or your doctor may suggest using healthy blood vessels to create detours around narrowed or blocked arteries. This is called bypass surgery. Follow-up care is a key part of your treatment and safety. Be sure to make and go to all appointments, and call your doctor if you are having problems. It's also a good idea to know your test results and keep a list of the medicines you take. Where can you learn more? Go to http://davian-johnson.info/. Enter [phone] in the search box to learn more about \"Learning About Coronary Artery Disease (CAD). \"  Current as of: July 22, 2018  Content Version: 12.1  © 3672-3186 Healthwise[employer] Incorporated. Care instructions adapted under license by [employer] (which disclaims liability or warranty for this information). If you have questions about a medical condition or this instruction, always ask your healthcare professional. [place] [address] any warranty or liability for your use of this information. Patient Education        Chest Pain: Care Instructions  Your Care Instructions    There are many things that can cause chest pain. Some are not serious and will get better on their own in a few days. But some kinds of chest pain need more testing and treatment.  Your doctor may have recommended a follow-up visit in the next 8 to 12 hours. If you are not getting better, you may need more tests or treatment. Even though your doctor has released you, you still need to watch for any problems. The doctor carefully checked you, but sometimes problems can develop later. If you have new symptoms or if your symptoms do not get better, get medical care right away. If you have worse or different chest pain or pressure that lasts more than 5 minutes or you passed out (lost consciousness), call 911 or seek other emergency help right away. A medical visit is only one step in your treatment. Even if you feel better, you still need to do what your doctor recommends, such as going to all suggested follow-up appointments and taking medicines exactly as directed. This will help you recover and help prevent future problems. How can you care for yourself at home? · Rest until you feel better. · Take your medicine exactly as prescribed. Call your doctor if you think you are having a problem with your medicine. · Do not drive after taking a prescription pain medicine. When should you call for help? Call 911 if:    · You passed out (lost consciousness).     · You have severe difficulty breathing.     · You have symptoms of a heart attack. These may include:  ? Chest pain or pressure, or a strange feeling in your chest.  ? Sweating. ? Shortness of breath. ? Nausea or vomiting. ? Pain, pressure, or a strange feeling in your back, neck, jaw, or upper belly or in one or both shoulders or arms. ? Lightheadedness or sudden weakness. ? A fast or irregular heartbeat. After you call 911, the  may tell you to chew 1 adult-strength or 2 to 4 low-dose aspirin. Wait for an ambulance.  Do not try to drive yourself.    Call your doctor today if:    · You have any trouble breathing.     · Your chest pain gets worse.     · You are dizzy or lightheaded, or you feel like you may faint.     · You are not getting better as expected.     · You are having new or different chest pain. Where can you learn more? Go to http://davian-johnson.info/. Enter A120 in the search box to learn more about \"Chest Pain: Care Instructions. \"  Current as of: September 23, 2018  Content Version: 12.1  © 7001-5634 Healthwise, Incorporated. Care instructions adapted under license by [employer] (which disclaims liability or warranty for this information). If you have questions about a medical condition or this instruction, always ask your healthcare professional. [address] any warranty or liability for your use of this information. and appropriate educational materials and side effects teaching were provided.   ___________________________________________________________________________________________________________________________________

## 2019-09-18 NOTE — PROGRESS NOTES
Observation notice provided in writing to patient and/or caregiver as well as verbal explanation of the policy. Patients who are in outpatient status also receive the Observation notice          Reason for Admission:  Chest pain [R07.9]                 RRAT Score:    11          Plan for utilizing home health:    No                       Likelihood of Readmission:   LOW                         Transition of Care Plan:              Initial assessment completed with patient. Cognitive status of patient: oriented to time, place, person and situation. Face sheet information confirmed:  yes. The patient participates in his discharge plan and  receive any needed information. This patient lives in a  home with girlfriend. Patient is able to navigate steps as needed. Prior to hospitalization, patient was considered to be independent with ADLs/IADLS : yes . Patient has a current ACP document on file: no  The sister and girlfriend will be available to transport patient home upon discharge. The patient already has no medical equipment available in the home. Patient is not currently active with home health. Patient has not stayed in a skilled nursing facility or rehab. Was  stay within last 60 days : no. This patient is on dialysis :no Currently, the discharge plan is Home. The patient states that he can obtain his medications from the pharmacy, and take his medications as directed. Patient's current insurance is [employer]       Care Management Interventions  PCP Verified by CM: Yes(per pt, he saw his pcp last month)  Palliative Care Criteria Met (RRAT>21 & CHF Dx)?: No  Mode of Transport at Discharge: Other (see comment)(family)  Transition of Care Consult (CM Consult): Discharge Planning  Physical Therapy Consult: No  Occupational Therapy Consult: No  Speech Therapy Consult: No  Current Support Network:  Other(lives with his girlfriend)  Confirm Follow Up Transport: Self  Plan discussed with Pt/Family/Caregiver: Yes  Discharge Location  Discharge Placement: Home        NATHANAEL SaavedraN RN  Care Management  Pager: [phone]

## 2019-09-18 NOTE — ANESTHESIA POSTPROCEDURE EVALUATION
Procedure(s):  COLONOSCOPY. MAC    Anesthesia Post Evaluation      Multimodal analgesia: multimodal analgesia used between 6 hours prior to anesthesia start to PACU discharge  Patient location during evaluation: bedside  Patient participation: complete - patient participated  Level of consciousness: awake  Pain management: adequate  Airway patency: patent  Anesthetic complications: no  Cardiovascular status: stable  Respiratory status: acceptable  Hydration status: acceptable  Post anesthesia nausea and vomiting:  controlled      Vitals Value Taken Time   /83 9/18/2019 11:17 AM   Temp 36.8 °C (98.3 °F) 9/18/2019 11:09 AM   Pulse 63 9/18/2019 11:27 AM   Resp 16 9/18/2019 11:27 AM   SpO2 100 % 9/18/2019 11:27 AM   Vitals shown include unvalidated device data.
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
## 2019-09-18 NOTE — PROGRESS NOTES
Problem: Unstable angina/NSTEMI: Day of Admission/Day 1  Goal: Off Pathway (Use only if patient is Off Pathway)  Outcome: Progressing Towards Goal     Problem: Hypertension  Goal: *Blood pressure within specified parameters  Outcome: Progressing Towards Goal

## 2019-09-18 NOTE — PROGRESS NOTES
conducted an initial consultation, Spiritual Assessment and  Loss pastoral care encounter for Aldo Newell, who is a 60 y.o. According to the patients EMR Evangelical Affiliation is: [facility].     Name for baby:     Location of pastoral care encounter was: [] Day Surgery [] ED []  L&D [] 2A  [] Other:    Type of Loss:  [] Miscarriage(<19 Weeks)     [] Stillborn (>20 Weeks)     [] Live Birth resulting in Death       [] Ectopic     [] Blighted Ovum     [] Molar     []      [] SIDS    Estimated Gestastional Age was       weeks. Patient is open to more information about Share Services:  [] Yes  []  No  Email Address: Mother's Address and Phone Number are:  [unfilled]  [email or web address]    Affiliated with a [employer]   [] Yes  [] No (If Yes, include name below). The  provided the following Interventions:  Initiated a relationship of care and support. Explored issues of dania, belief, spirituality and Mu-ism/ritual needs while hospitalized. Listened empathically.  provided assurance of continued prayers on patients behalf. Chart reviewed. Discussed grief issues. Memorabilia was given to mother [] Yes  [] No    The following outcomes where achieved:  Patient processed feeling about current loss. Patient expressed gratitude for pastoral care visit. Assessment:  Patient does not have any further Mu-ism or cultural needs that will affect patients preferences in health care. Patient/Family are experiencing appropriate grief and loss over this child. Plan:  Chaplains will continue to follow and will provide pastoral care on an as needed/requested basis.  recommends bedside caregivers page  on duty if patient shows prolonged signs of acute spiritual or emotional distress which cannot be consoled.        [address] [facility]  [phone]

## 2019-09-18 NOTE — ROUTINE PROCESS
TRANSFER - IN REPORT:    Verbal report received from Krys RN(name) on [facility]  being received from [facility](unit) for ordered procedure      Report consisted of patients Situation, Background, Assessment and   Recommendations(SBAR). Information from the following report(s) SBAR, Kardex and Pre Procedure Checklist was reviewed with the receiving nurse. Opportunity for questions and clarification was provided. Assessment completed upon patients arrival to unit and care assumed.

## 2019-09-18 NOTE — PROGRESS NOTES
[facility]      CARDIOLOGY PROGRESS NOTE  RECS:    1. CAD- s/p LHC in 8/19  3 CAD with 50% mid right coronary stenosis, 70% ostial second diagonal stenosis and 90% mid circumflex stenosis. S/p PCI  of mid circumflex with PETER - patient was on Plavix and asa at home. 2. Chest pain- possible angina- 10/18 tread mill stress test showed Normalization of T waves with exercise which may be sometimes pseudonormalization due to ischemia- recent NUC 9/19 -this showed old MI without any reversible ischemia. 3. Hypertension- stable will continue Imdur, Norvasc. Will monitor    4. Hx of PE Hospitalization, 04/30/2017 [facility] - possible pulmonary embolism based on medium risk VQ scan. 5. Family history of cardiac disease. 6. Hx of  sleeve gastrectomy- laparoscopy on 8/20/19 Dr Cezar Higuera   7. Hyperlipidemia- continue statin   8. CKD-Creatinine levels elevated but stable   9. Cardiac clearance-patient with hx CAD s/p PCI mid Cx on 8/18. Stress test yesterday showed old MI without any reversible ischemia. Stable cardiac status. No further work-up needed. Discharge per medicine/GI. Follow-up with me in the office in 1 to 2 months or as scheduled.     EKG Results     Procedure 720 Value Units Date/Time    EKG, 12 LEAD, INITIAL [[id number]] Collected:  09/17/19 1031    Order Status:  Completed Updated:  09/17/19 1829     Ventricular Rate 52 BPM      Atrial Rate 52 BPM      P-R Interval 204 ms      QRS Duration 104 ms      Q-T Interval 450 ms      QTC Calculation (Bezet) 418 ms      Calculated P Axis 43 degrees      Calculated R Axis -3 degrees      Calculated T Axis 141 degrees      Diagnosis --     Sinus bradycardia  T wave abnormality, consider lateral ischemia  Abnormal ECG  When compared with ECG of 01-SEP-2018 04:58,  No significant change was found  QT has shortened  Confirmed by Toni Bradley MD, ----- ([id number]) on 9/17/2019 6:29:18 PM          XR Results (most recent):  Results from [place] encounter on 09/17/19   XR CHEST PA LAT    Narrative XR CHEST PA LAT    Indication: chest pain    Comparison: 8/28/2018    Findings: The lungs are clear. No effusion or pneumothorax. Cardiomediastinal silhouette  is normal in size. Impression Impression:  1. No acute cardiopulmonary process. 08/28/18   ECHO ADULT COMPLETE 08/30/2018 8/30/2018    Narrative · Definity contrast was given to enhance imaging. · Estimated left ventricular ejection fraction is 56 - 60%. Left   ventricular mild concentric hypertrophy. Normal left ventricular wall   motion, no regional wall motion abnormality noted. Moderate (grade 2) left   ventricular diastolic dysfunction. · Right ventricular cavity size is mildly dilated. · Left atrial cavity size is moderately dilated. · Tricuspid regurgitation is inadequate for estimation of right   ventricular systolic pressure. · Trace mitral valve regurgitation. Signed by: Geoffrey Maldonado MD       09/17/19   NUCLEAR CARDIAC STRESS TEST 09/17/2019 9/17/2019    Narrative · Baseline ECG: Sinus bradycardia, occasional PVCs, non-specific ST-T wave   abnormalitiesT wave abnormality - inflat leads. · Gated SPECT: Left ventricular function post-stress was abnormal.   Calculated ejection fraction is 44%. There is no evidence of transient   ischemic dilation (TID). The TID ratio is 1.02.  · Negative stress test.  · Left ventricular perfusion is abnormal.  · Myocardial perfusion imaging defect 1: There is a defect that is   moderate in size with a moderate reduction in uptake present in the   basal-mid inferior and inferolateral location(s) that is non-reversible. There is normal wall motion in the defect area. Viability in the area is   good. The defect appears to be infarction. Perfusion defect was visually   present without quantitative evidence. · Positive myocardial perfusion imaging.  Myocardial perfusion imaging   supports an intermediate risk stress test.        Signed by: Apolonia Orellana MD     08/28/18   CARDIAC PROCEDURE 08/31/2018 8/31/2018    Narrative · Three-vessel coronary disease with 50% mid right coronary stenosis, 70%   ostial second diagonal stenosis and 90% mid circumflex stenosis  · Circumflex appears to be the culprit vessel for present non-STEMI  · Successful coronary intervention of mid circumflex deploying a   drug-eluting stent with residual 0% stenosis. Aggressive risk factor modification and medical treatment. Aspirin and Plavix will be used considering recent surgery for gastric   bypass. If possible, will use liquid aspirin rather than tablets. Signed by: Apolonia Orellana MD         ASSESSMENT:  Hospital Problems  Date Reviewed: 8/1/2019          Codes Class Noted POA    Chest pain ICD-10-CM: R07.9  ICD-9-CM: 786.50  9/17/2019 Yes        Hypokalemia ICD-10-CM: E87.6  ICD-9-CM: 276.8  8/28/2018 Yes        Essential hypertension ICD-10-CM: I10  ICD-9-CM: 401.9  2/17/2016 Yes        Morbid obesity ([facility] [address] ICD-10-CM: E66.01  ICD-9-CM: 278.01  5/14/2012 Yes        Coronary artery disease ICD-10-CM: I25.10  ICD-9-CM: 414.01  Unknown Yes    Overview Signed 4/12/2011  3:49 PM by Nat FARFAN     mild, non obstructive/EF 65%                     SUBJECTIVE:  No CP  No SOB    OBJECTIVE:    VS:   Visit Vitals  /76 (BP 1 Location: Right arm, BP Patient Position: Supine)   Pulse 60   Temp 97.5 °F (36.4 °C)   Resp 17   Ht 5' 11\" (1.803 m)   Wt 115.2 kg (254 lb)   SpO2 100%   BMI 35.43 kg/m²         Intake/Output Summary (Last 24 hours) at 9/18/2019 1554  Last data filed at 9/18/2019 1101  Gross per 24 hour   Intake 1220 ml   Output 1300 ml   Net -80 ml     TELE: normal sinus rhythm    General: alert and in no apparent distress  HENT: Normocephalic, atraumatic. Normal external eye.   Neck :  no bruit, no JVD  Cardiac:  regular rate and rhythm, S1, S2 normal, no murmur, click, rub or gallop  Chest/Lungs:chest clear, no wheezing, rales, normal symmetric air entry  Abdomen: Soft, nontender, no masses  Extremities:  No c/c/edema, peripheral pulses present      Labs: Results:       Chemistry Recent Labs     09/17/19  1130   GLU 91      K 3.3*      CO2 31   BUN 38*   CREA 2.02*   CA 8.4*   AGAP 5   BUCR 19   AP 72   TP 6.2*   ALB 3.3*   GLOB 2.9   AGRAT 1.1      CBC w/Diff Recent Labs     09/18/19  0355   WBC 4.3*   RBC 3.59*   HGB 9.1*   HCT 29.0*         Cardiac Enzymes Recent Labs     09/17/19  2115 09/17/19  1130    247   CKND1 0.8 0.8      Coagulation No results for input(s): PTP, INR, APTT in the last 72 hours. No lab exists for component: INREXT    Lipid Panel Lab Results   Component Value Date/Time    Cholesterol, total 144 12/11/2018 07:59 AM    HDL Cholesterol 51 12/11/2018 07:59 AM    LDL, calculated 79 12/11/2018 07:59 AM    VLDL, calculated 15 12/11/2018 07:59 AM    Triglyceride 74 12/11/2018 07:59 AM    CHOL/HDL Ratio 3.7 08/29/2018 08:30 AM      BNP No results for input(s): BNPP in the last 72 hours.    Liver Enzymes Recent Labs     09/17/19  1130   TP 6.2*   ALB 3.3*   AP 72   SGOT 14      Digoxin    Thyroid Studies Lab Results   Component Value Date/Time    TSH 0.80 06/29/2018 12:15 PM              Violette Jay MD   Pager # [phone]

## 2019-09-19 ENCOUNTER — TELEPHONE (OUTPATIENT)
Dept: CARDIOLOGY CLINIC | Age: 64
End: 2019-09-19

## 2019-09-19 NOTE — LETTER
NOTIFICATION RETURN TO WORK / SCHOOL 
 
2019 10:28 AM 
 
 
To Whom It May Concern: 
 
 
Ref:   Mr. Denver Jerry (: 1955) Denver Jerry is currently under the care of [facility]  from 19 to 19. He will return to work on 19 with full-time work. If there are questions or concerns please have the patient contact our office. Sincerely, Blair Pacheco MD

## 2019-09-19 NOTE — TELEPHONE ENCOUNTER
PT d/c from [place] yesterday and wants to know if we can give him note for work stating cardiac wise OK to return. PT operates a crane. FAX to [phone].

## 2019-10-02 ENCOUNTER — OFFICE VISIT (OUTPATIENT)
Dept: FAMILY MEDICINE CLINIC | Facility: CLINIC | Age: 64
End: 2019-10-02

## 2019-10-02 VITALS
RESPIRATION RATE: 12 BRPM | BODY MASS INDEX: 35.56 KG/M2 | HEART RATE: 85 BPM | HEIGHT: 71 IN | OXYGEN SATURATION: 98 % | WEIGHT: 254 LBS | SYSTOLIC BLOOD PRESSURE: 126 MMHG | DIASTOLIC BLOOD PRESSURE: 80 MMHG | TEMPERATURE: 97.9 F

## 2019-10-02 DIAGNOSIS — Z87.898 HISTORY OF CHEST PAIN: ICD-10-CM

## 2019-10-02 DIAGNOSIS — Z23 ENCOUNTER FOR IMMUNIZATION: ICD-10-CM

## 2019-10-02 DIAGNOSIS — I10 ESSENTIAL HYPERTENSION: Primary | ICD-10-CM
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
## 2019-10-02 NOTE — PROGRESS NOTES
Visit Vitals  /80   Pulse 85   Temp 97.9 °F (36.6 °C) (Oral)   Resp 12   Ht 5' 11\" (1.803 m)   Wt 254 lb (115.2 kg)   SpO2 98%   BMI 35.43 kg/m²     [employer] presents today for   Chief Complaint   Patient presents with   [facility] ED Follow-up       Is someone accompanying this pt? no    Is the patient using any DME equipment during OV? no    Depression Screening:  3 most recent PHQ Screens 10/2/2019   PHQ Not Done -   Little interest or pleasure in doing things Nearly every day   Feeling down, depressed, irritable, or hopeless Not at all   Total Score PHQ 2 3   Trouble falling or staying asleep, or sleeping too much More than half the days   Feeling tired or having little energy -   Poor appetite, weight loss, or overeating More than half the days   Feeling bad about yourself - or that you are a failure or have let yourself or your family down Not at all   Trouble concentrating on things such as school, work, reading, or watching TV Not at all   Moving or speaking so slowly that other people could have noticed; or the opposite being so fidgety that others notice Not at all   Thoughts of being better off dead, or hurting yourself in some way Not at all   PHQ 9 Score -   How difficult have these problems made it for you to do your work, take care of your home and get along with others Not difficult at all       Learning Assessment:  Learning Assessment 5/25/2017   PRIMARY LEARNER Patient   [address] CAREGIVER No   PRIMARY LANGUAGE ENGLISH   LEARNER PREFERENCE PRIMARY READING   ANSWERED BY patient   RELATIONSHIP SELF       Abuse Screening:  Abuse Screening Questionnaire 2/16/2016   Do you ever feel afraid of your partner? N   Are you in a relationship with someone who physically or mentally threatens you? N   Is it safe for you to go home? Y       Fall Risk  Fall Risk Assessment, last 12 mths 6/7/2019   Able to walk? Yes   Fall in past 12 months?  No       Health Maintenance reviewed and discussed and ordered per Provider. Health Maintenance Due   Topic Date Due    EYE EXAM RETINAL OR DILATED  01/08/1967    DTaP/Tdap/Td series (1 - Tdap) 01/08/1978    MICROALBUMIN Q1  06/29/2017    FOOT EXAM Q1  09/07/2017    FOBT Q 1 YEAR AGE 50-75  09/29/2017    Influenza Age 9 to Adult  08/01/2019           Coordination of Care:  1. Have you been to the ER, urgent care clinic since your last visit? Hospitalized since your last visit? no    2. Have you seen or consulted any other health care providers outside of the [address] [place] [address] since your last visit? Include any pap smears or colon screening.  no

## 2019-10-07 RX ORDER — ISOSORBIDE MONONITRATE 60 MG/1
TABLET, EXTENDED RELEASE ORAL
Qty: 30 TAB | Refills: 0 | Status: SHIPPED | OUTPATIENT
Start: 2019-10-07 | End: 2019-11-07 | Stop reason: SDUPTHER

## 2019-10-28 ENCOUNTER — OFFICE VISIT (OUTPATIENT)
Dept: ORTHOPEDIC SURGERY | Facility: CLINIC | Age: 64
End: 2019-10-28

## 2019-10-28 VITALS
HEART RATE: 67 BPM | TEMPERATURE: 97.9 F | HEIGHT: 71 IN | WEIGHT: 262.2 LBS | BODY MASS INDEX: 36.71 KG/M2 | RESPIRATION RATE: 18 BRPM | DIASTOLIC BLOOD PRESSURE: 76 MMHG | SYSTOLIC BLOOD PRESSURE: 141 MMHG | OXYGEN SATURATION: 99 %

## 2019-10-28 DIAGNOSIS — G89.29 CHRONIC PAIN OF LEFT KNEE: ICD-10-CM

## 2019-10-28 DIAGNOSIS — R60.9 PERIPHERAL EDEMA: ICD-10-CM

## 2019-10-28 DIAGNOSIS — M54.50 LUMBAR PAIN: ICD-10-CM

## 2019-10-28 DIAGNOSIS — M25.562 CHRONIC PAIN OF LEFT KNEE: ICD-10-CM

## 2019-10-28 DIAGNOSIS — M25.462 EFFUSION OF LEFT KNEE: ICD-10-CM

## 2019-10-28 DIAGNOSIS — M17.12 PRIMARY OSTEOARTHRITIS OF LEFT KNEE: ICD-10-CM

## 2019-10-28 DIAGNOSIS — M51.36 DDD (DEGENERATIVE DISC DISEASE), LUMBAR: Primary | ICD-10-CM

## 2019-10-28 RX ORDER — HYALURONATE SODIUM 10 MG/ML
2 SYRINGE (ML) INTRAARTICULAR ONCE
Qty: 2 ML | Refills: 0
Start: 2019-10-28 | End: 2019-10-28

## 2019-10-28 NOTE — PROGRESS NOTES
Patient: Jocelyne Alicea                MRN: [id number]       SSN: [id number]  YOB: 1955        AGE: 60 y.o. SEX: male    PCP: Berta Romero NP  10/28/19    CC: LEFT KNEE AND BACK PAIN, LEFT KNEE SWELLING    HISTORY:  Jocelyne Alicea is a 60 y.o. male who is seen for increased left knee and back pain. He reports low back pain for many years with no history of injury. He notes relief with Tylenol. He reports he uses massaging leg sleeves that help reduce the edema fluid in this legs and lower back. He reports increased leg muscle spasms recently. He is also seen for left knee pain and swelling. He notes cracking and popping of his left knee. He reports increased left knee pain when he slipped on ice at home on 1/4/18. He was seen at [facility] on the same day where x-rays were negative for fracture per patient. He reports primarily anterior left knee pain. 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 He states he has a h/o of gout. He notes the right knee pain has been ongoing for several years. He notes at works he does a lot of climbing and walking, aggravating his knee. He notes increased pain at night. He reports buckling of his right knee at times. He responded to a previous right knee aspiration and cortisone injection. He notes increased swelling of his right knee recently. He completed a right knee Euflexxa series on 12/5/16 and 10/19/17. He was recently hospitalized for kidney problems. He says they took such good care of him he did not want to leave.      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 His mother passed away in 2018. He currently lives alone in [place]. He underwent gastric bypass in August.  He is decreasing at 275 pounds. He is 5'11\". He is hypertensive. He is no longer diabetic. He had a heart attack after his mother- Raymond Garcia-  from cancer in April and was out of work for a while. He had another heart attack 2.5 weeks ago requiring a stent. He has lost 35 pounds since his surgery. He has one daughter and 2 sons. He has 13 grandchildren. Last 3 Recorded Weights in this Encounter    10/28/19 1618   Weight: 262 lb 3.2 oz (118.9 kg)     Body mass index is 36.57 kg/m². REVIEW OF SYSTEMS: All Below are Negative except: See HPI   Constitutional: negative for fever, chills, and weight loss. Cardiovascular: negative for chest pain, claudication, leg swelling, SOB, PETERSEN   Gastrointestinal: Negative for pain, N/V/C/D, Blood in stool or urine, dysuria,  hematuria, incontinence, pelvic pain. Musculoskeletal: See HPI   Neurological: Negative for dizziness and weakness. Negative for headaches, Visual changes, confusion, seizures   Phychiatric/Behavioral: Negative for depression, memory loss, substance  abuse. Extremities: Negative for hair changes, rash, or skin lesion changes. Hematologic: Negative for bleeding problems, bruising, pallor or swollen lymph  nodes   Peripheral Vascular: No calf pain, no circulation deficits.     Social History     Socioeconomic History    Marital status: SINGLE     Spouse name: Not on file    Number of children: Not on file    Years of education: Not on file    Highest education level: Not on file   Occupational History    Not on file   Social Needs    Financial resource strain: Not on file    Food insecurity:     Worry: Not on file     Inability: Not on file    Transportation needs:     Medical: Not on file     Non-medical: Not on file   Tobacco Use  Smoking status: Former Smoker     Types: Cigars     Last attempt to quit: 10/4/1999     Years since quittin.0    Smokeless tobacco: Never Used   Substance and Sexual Activity    Alcohol use: Yes     Comment: rarely    Drug use: No    Sexual activity: Not Currently   Lifestyle    Physical activity:     Days per week: Not on file     Minutes per session: Not on file    Stress: Not on file   Relationships    Social connections:     Talks on phone: Not on file     Gets together: Not on file     Attends [employer] service: Not on file     Active member of club or organization: Not on file     Attends meetings of clubs or organizations: Not on file     Relationship status: Not on file    Intimate partner violence:     Fear of current or ex partner: Not on file     Emotionally abused: Not on file     Physically abused: Not on file     Forced sexual activity: Not on file   Other Topics Concern    Not on file   Social History Narrative    Not on file     Allergies   Allergen Reactions    Iodine Other (comments)     Eyes swell shut      Shellfish Containing Products Swelling     Current Outpatient Medications   Medication Sig    isosorbide mononitrate ER (IMDUR) 60 mg CR tablet TAKE 1 TABLET BY MOUTH DAILY    clopidogrel (PLAVIX) 75 mg tab TAKE 1 TABLET BY MOUTH EVERY DAY    cloNIDine (CATAPRES) 0.1 mg/24 hr ptwk APPLY 1 PATCH BY TRANSDERMAL ROUTE EVERY SEVEN (7) DAYS.  hydrALAZINE (APRESOLINE) 25 mg tablet Take 25 mg by mouth three (3) times daily.  omeprazole (PRILOSEC) 40 mg capsule TAKE ONE CAPSULE BY MOUTH TWICE A DAY    amLODIPine (NORVASC) 5 mg tablet TAKE 1 TABLET BY MOUTH EVERY DAY    carvedilol (COREG) 12.5 mg tablet TAKE 1 TABLET BY MOUTH TWICE A DAY WITH MEALS    KLOR-CON M20 20 mEq tablet TAKE 1 TABLET BY MOUTH EVERY DAY    multivitamin (ONE A DAY) tablet Take 1 Tab by mouth daily.  allopurinol (ZYLOPRIM) 100 mg tablet TAKE 1 TAB BY MOUTH DAILY.     nitroglycerin (NITROSTAT) 0.4 mg SL tablet 1 Tab by SubLINGual route every five (5) minutes as needed for Chest Pain.  calcium carbonate (TUMS) 200 mg calcium (500 mg) chew Take 1 Tab by mouth daily.  aspirin 81 mg chewable tablet Take 1 Tab by mouth two (2) times a day.  atorvastatin (LIPITOR) 40 mg tablet Take 1 Tab by mouth nightly. No current facility-administered medications for this visit.       PHYSICAL EXAMINATION:  Visit Vitals  /76   Pulse 67   Temp 97.9 °F (36.6 °C) (Oral)   Resp 18   Ht 5' 11\" (1.803 m)   Wt 262 lb 3.2 oz (118.9 kg)   SpO2 99%   BMI 36.57 kg/m²     ORTHO EXAMINATION:  Examination Lumbar Thoracic   Skin Intact Intact   Tenderness +, paralumbar -   Tightness +, paralumbar -   Lordosis Normal N/A   Kyphosis N/A Normal   Scoliosis - -   Flexion Fingertips to mid shin N/A   Extension 10 N/A   Knee reflexes Normal N/A   Ankle reflexes Normal N/A   Straight leg raise - N/A   Calf tenderness - N/A     Examination Right knee Left knee   Skin Intact Intact   Range of motion 90-10 100-0   Effusion 1+ 3+   Medial joint line tenderness + +   Lateral joint line tenderness - -   Popliteal tenderness - -   Osteophytes palpable + +   Lewiss - -   Patella crepitus + +   Anterior drawer - -   Lateral laxity - -   Medial laxity + -   Varus deformity + -   Valgus deformity - -   Pretibial edema 4+ pitting  4+   Calf tenderness - -     Chart reviewed for the following:   IConstance MD, have reviewed the History, Physical and updated the Allergic reactions for [place] performed immediately prior to start of procedure:  Amber Harman MD, have performed the following reviews on Chey Breeze prior to the start of the procedure:            * Patient was identified by name and date of birth   * Agreement on procedure being performed was verified  * Risks and Benefits explained to the patient  * Procedure site verified and marked as necessary  * Patient was positioned for comfort  * Consent was obtained     Time: 5:03 PM    Date of procedure: 10/28/2019    Procedure performed by:  Du Balderas MD    Mr. Sussy Guevara tolerated the procedure well with no complications. RADIOGRAPHS:  XR LEFT KNEE 1/4/18  IMPRESSION: Osteoarthritis, particularly involving the patellofemoral joint, moderate joint effusion. No fracture evident. XR RIGHT KNEE 9/1/17  IMPRESSION:  Three views - No fractures, no effusion, complete medial joint R, moderate L space narrowing, medial and lateral osteophytes present. Varus deformity. XR RIGHT KNEE 12/22/15  IMPRESSION:  No fractures, no effusion, medial joint space narrowing, medial osteophytes present, varus deformity. IMPRESSION:      ICD-10-CM ICD-9-CM    1. DDD (degenerative disc disease), lumbar M51.36 722.52 REFERRAL TO SPINE SURGERY   2. Primary osteoarthritis of left knee M17.12 715.16 IA DRAIN/INJECT LARGE JOINT/BURSA      EUFLEXXA INJECTION PER DOSE      sodium hyaluronate (SUPARTZ FX/HYALGAN/GENIVSC) 10 mg/mL syrg injection   3. Chronic pain of left knee M25.562 719.46 IA DRAIN/INJECT LARGE JOINT/BURSA    G89.29 338.29 EUFLEXXA INJECTION PER DOSE      sodium hyaluronate (SUPARTZ FX/HYALGAN/GENIVSC) 10 mg/mL syrg injection   4. Lumbar pain M54.5 724.2 REFERRAL TO SPINE SURGERY   5. Effusion of left knee M25.462 719.06    6. Peripheral edema R60.9 782.3      PLAN: After timeout and under sterile conditions, left knee aspirated 50 cc of light yellow fluid. The fluid was discarded. After discussing treatment options, patient's left knee was injected with 2 cc Euflexxa. We discussed possible need for right knee arthroplasty at some time in the future pending weight loss to 200# or less. Dietary counseling provided today. Start weight loss with low carb diet and intermittent fasting. He will wear compression stockings he already owns. He will follow up in one week for Euflexxa #2. He will follow up at the spine center.       Scribed by Toni Holden (Silke Stack) as dictated by El Walters MD

## 2019-11-04 ENCOUNTER — OFFICE VISIT (OUTPATIENT)
Dept: ORTHOPEDIC SURGERY | Facility: CLINIC | Age: 64
End: 2019-11-04

## 2019-11-04 VITALS
TEMPERATURE: 98.8 F | HEART RATE: 81 BPM | WEIGHT: 267 LBS | SYSTOLIC BLOOD PRESSURE: 147 MMHG | BODY MASS INDEX: 37.38 KG/M2 | RESPIRATION RATE: 16 BRPM | DIASTOLIC BLOOD PRESSURE: 74 MMHG | HEIGHT: 71 IN

## 2019-11-04 DIAGNOSIS — M25.562 CHRONIC PAIN OF LEFT KNEE: ICD-10-CM

## 2019-11-04 DIAGNOSIS — M17.12 PRIMARY OSTEOARTHRITIS OF LEFT KNEE: Primary | ICD-10-CM

## 2019-11-04 DIAGNOSIS — G89.29 CHRONIC PAIN OF LEFT KNEE: ICD-10-CM

## 2019-11-04 RX ORDER — HYALURONATE SODIUM 10 MG/ML
2 SYRINGE (ML) INTRAARTICULAR ONCE
Qty: 2 ML | Refills: 0
Start: 2019-11-04 | End: 2019-11-04

## 2019-11-04 NOTE — PROGRESS NOTES
Patient: Clayton Gr                MRN: [id number]       SSN: [id number]  YOB: 1955        AGE: 60 y.o. SEX: male  Body mass index is 37.24 kg/m². PCP: Sheng Middleton NP  11/04/19    Chief Complaint   Patient presents with    Knee Pain     left knee pain, f/u appt. HISTORY:  Clayton Gr is a 60 y.o. male who is seen for left knee pain. ICD-10-CM ICD-9-CM    1. Primary osteoarthritis of left knee M17.12 715.16 MA DRAIN/INJECT LARGE JOINT/BURSA      EUFLEXXA INJECTION PER DOSE      sodium hyaluronate (SUPARTZ FX/HYALGAN/GENIVSC) 10 mg/mL syrg injection   2. Chronic pain of left knee M25.562 719.46 MA DRAIN/INJECT LARGE JOINT/BURSA    G89.29 338.29 EUFLEXXA INJECTION PER DOSE      sodium hyaluronate (SUPARTZ FX/HYALGAN/GENIVSC) 10 mg/mL syrg injection       Chart reviewed for the following:   Kiley Moon MD, have reviewed the History, Physical and updated the Allergic reactions for [address] [place], [place] [address] performed immediately prior to start of procedure:  Kiley Moon MD, have performed the following reviews on Clayton Gr prior to the start of the procedure:            * Patient was identified by name and date of birth   * Agreement on procedure being performed was verified  * Risks and Benefits explained to the patient  * Procedure site verified and marked as necessary  * Patient was positioned for comfort  * Consent was obtained     Time: 3:53 PM     Date of procedure: 11/4/2019    Procedure performed by:  Bita Corrigan MD    Mr. Ema Ellis tolerated the procedure well with no complications. PLAN:  After discussing treatment options, patient's left knee was injected with 2 cc of Euflexxa. Mr. Ema Ellis will follow up in one week to continue his visco supplementation injection series.      Scribed by Farzana Majano ([address]) as dictated by Bita Corrigan MD

## 2019-11-04 NOTE — PROGRESS NOTES
1. Have you been to the ER, urgent care clinic since your last visit? Hospitalized since your last visit? NO    2. Have you seen or consulted any other health care providers outside of the [address] [place] [address] since your last visit? Include any pap smears or colon screening.   NO

## 2019-11-07 RX ORDER — ISOSORBIDE MONONITRATE 60 MG/1
TABLET, EXTENDED RELEASE ORAL
Qty: 30 TAB | Refills: 0 | Status: SHIPPED | OUTPATIENT
Start: 2019-11-07 | End: 2019-12-06 | Stop reason: SDUPTHER

## 2019-11-08 ENCOUNTER — OFFICE VISIT (OUTPATIENT)
Dept: CARDIOLOGY CLINIC | Age: 64
End: 2019-11-08

## 2019-11-08 VITALS
DIASTOLIC BLOOD PRESSURE: 74 MMHG | SYSTOLIC BLOOD PRESSURE: 131 MMHG | HEART RATE: 75 BPM | HEIGHT: 71 IN | BODY MASS INDEX: 37.38 KG/M2 | WEIGHT: 267 LBS

## 2019-11-08 DIAGNOSIS — R07.89 OTHER CHEST PAIN: ICD-10-CM

## 2019-11-08 DIAGNOSIS — Z98.61 POSTSURGICAL PERCUTANEOUS TRANSLUMINAL CORONARY ANGIOPLASTY STATUS: ICD-10-CM

## 2019-11-08 DIAGNOSIS — I10 ESSENTIAL HYPERTENSION: ICD-10-CM

## 2019-11-08 DIAGNOSIS — E78.00 HYPERCHOLESTEROLEMIA: ICD-10-CM

## 2019-11-08 DIAGNOSIS — I25.10 ATHEROSCLEROSIS OF NATIVE CORONARY ARTERY OF NATIVE HEART WITHOUT ANGINA PECTORIS: ICD-10-CM

## 2019-11-08 DIAGNOSIS — I50.32 CHRONIC DIASTOLIC CONGESTIVE HEART FAILURE (HCC): Primary | ICD-10-CM

## 2019-11-08 DIAGNOSIS — E66.01 SEVERE OBESITY (BMI 35.0-39.9) WITH COMORBIDITY (HCC): ICD-10-CM

## 2019-11-08 RX ORDER — HYDROCHLOROTHIAZIDE 25 MG/1
25 TABLET ORAL DAILY
COMMUNITY
End: 2021-11-19 | Stop reason: SDUPTHER

## 2019-11-08 RX ORDER — MAG HYDROX/ALUMINUM HYD/SIMETH 400-400-40
SUSPENSION, ORAL (FINAL DOSE FORM) ORAL
COMMUNITY
End: 2020-12-04

## 2019-11-08 RX ORDER — ATORVASTATIN CALCIUM 40 MG/1
40 TABLET, FILM COATED ORAL
Qty: 90 TAB | Refills: 3 | Status: SHIPPED | OUTPATIENT
Start: 2019-11-08 | End: 2020-11-08
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
## 2019-11-08 NOTE — PATIENT INSTRUCTIONS
Medications Discontinued During This Encounter   Medication Reason    atorvastatin (LIPITOR) 40 mg tablet     KLOR-CON M20 20 mEq tablet           Body Mass Index: Care Instructions  Your Care Instructions    Body mass index (BMI) can help you see if your weight is raising your risk for health problems. It uses a formula to compare how much you weigh with how tall you are. · A BMI lower than 18.5 is considered underweight. · A BMI between 18.5 and 24.9 is considered healthy. · A BMI between 25 and 29.9 is considered overweight. A BMI of 30 or higher is considered obese. If your BMI is in the normal range, it means that you have a lower risk for weight-related health problems. If your BMI is in the overweight or obese range, you may be at increased risk for weight-related health problems, such as high blood pressure, heart disease, stroke, arthritis or joint pain, and diabetes. If your BMI is in the underweight range, you may be at increased risk for health problems such as fatigue, lower protection (immunity) against illness, muscle loss, bone loss, hair loss, and hormone problems. BMI is just one measure of your risk for weight-related health problems. You may be at higher risk for health problems if you are not active, you eat an unhealthy diet, or you drink too much alcohol or use tobacco products. Follow-up care is a key part of your treatment and safety. Be sure to make and go to all appointments, and call your doctor if you are having problems. It's also a good idea to know your test results and keep a list of the medicines you take. How can you care for yourself at home? · Practice healthy eating habits. This includes eating plenty of fruits, vegetables, whole grains, lean protein, and low-fat dairy. · If your doctor recommends it, get more exercise. Walking is a good choice. Bit by bit, increase the amount you walk every day. Try for at least 30 minutes on most days of the week. · Do not smoke. Smoking can increase your risk for health problems. If you need help quitting, talk to your doctor about stop-smoking programs and medicines. These can increase your chances of quitting for good. · Limit alcohol to 2 drinks a day for men and 1 drink a day for women. Too much alcohol can cause health problems. If you have a BMI higher than 25  · Your doctor may do other tests to check your risk for weight-related health problems. This may include measuring the distance around your waist. A waist measurement of more than 40 inches in men or 35 inches in women can increase the risk of weight-related health problems. · Talk with your doctor about steps you can take to stay healthy or improve your health. You may need to make lifestyle changes to lose weight and stay healthy, such as changing your diet and getting regular exercise. If you have a BMI lower than 18.5  · Your doctor may do other tests to check your risk for health problems. · Talk with your doctor about steps you can take to stay healthy or improve your health. You may need to make lifestyle changes to gain or maintain weight and stay healthy, such as getting more healthy foods in your diet and doing exercises to build muscle. Where can you learn more? Go to http://davian-johnson.info/. Enter S176 in the search box to learn more about \"Body Mass Index: Care Instructions. \"  Current as of: March 28, 2019  Content Version: 12.2  © 0729-1965 [employer]. Care instructions adapted under license by [employer] (which disclaims liability or warranty for this information). If you have questions about a medical condition or this instruction, always ask your healthcare professional. [address] any warranty or liability for your use of this information.

## 2019-11-08 NOTE — PROGRESS NOTES
HISTORY OF PRESENT ILLNESS Sangeeta Samayoa is a 60 y.o. male. Patient is seen today for Hypertension, Hyperlipidemia, Coronary artery disease, Obesity and status post coronary artery stenting. Patient has decided to follow here due to his convenience as opposed to previous cardiologist who was Dr. Karine Jeff Hypertension The history is provided by the medical records and patient. This is a chronic problem. Pertinent negatives include no chest pain, no headaches and no shortness of breath. Cholesterol Problem The history is provided by the medical records and patient. This is a chronic problem. Pertinent negatives include no chest pain, no headaches and no shortness of breath. Shortness of Breath The history is provided by the patient. This is a new problem. The problem occurs intermittently. The current episode started more than 1 week ago. The problem has been resolved. Pertinent negatives include no fever, no headaches, no cough, no wheezing, no PND, no orthopnea, no chest pain, no vomiting, no rash, no leg swelling and no claudication. The problem's precipitants include exercise (1 flight steps fast). Chest Pain (Angina) The history is provided by the patient. This is a new problem. The current episode started more than 1 week ago. The problem has been resolved. Duration of episode(s) is 1 hour. The problem occurs every several days (3/wk). The pain is associated with rest, normal activity and movement. The pain is present in the lateral region and left side. The quality of the pain is described as dull. The pain does not radiate. Pertinent negatives include no claudication, no cough, no diaphoresis, no dizziness, no fever, no headaches, no malaise/fatigue, no nausea, no orthopnea, no palpitations, no PND, no shortness of breath and no vomiting. He has tried nothing for the symptoms. Procedural history includes cardiac catheterization and cardiac stents.  
Hospital Follow Up  
 Pertinent negatives include no chest pain, no headaches and no shortness of breath. Review of Systems Constitutional: Negative for chills, diaphoresis, fever, malaise/fatigue and weight loss. HENT: Negative for nosebleeds. Eyes: Negative for discharge. Respiratory: Negative for cough, shortness of breath and wheezing. Cardiovascular: Negative for chest pain, palpitations, orthopnea, claudication, leg swelling and PND. Gastrointestinal: Negative for diarrhea, nausea and vomiting. Genitourinary: Negative for dysuria and hematuria. Musculoskeletal: Negative for joint pain. Skin: Negative for rash. Neurological: Negative for dizziness, seizures, loss of consciousness and headaches. Endo/Heme/Allergies: Negative for polydipsia. Does not bruise/bleed easily. Psychiatric/Behavioral: Negative for depression and substance abuse. The patient does not have insomnia. Allergies Allergen Reactions  Iodine Other (comments) Eyes swell shut  Shellfish Containing Products Swelling Past Medical History:  
Diagnosis Date  Atypical chest pain/mild nonobstructive CAD/EF 65% 4/12/2011  BPH without obstruction/lower urinary tract symptoms  Bursitis of right shoulder  CAD (coronary artery disease)  Chest pain   
 history of hospitalization with chest pain and a negative workup in 2008  Chronic edema  Constipation   
 die to medication  Coronary artery disease   
 mild, non obstructive/EF 65%  Depression 12/16/2018  Dyspnea on exertion  Echocardiogram 12/16/08 IVC dilated; suboptimal endocardial border; EF 65%  ED (erectile dysfunction)  Elevated PSA  Frequency  GERD (gastroesophageal reflux disease)  Gout  H/O cystoscopy 06/20/2013  Hematuria, unspecified  Hypercholesterolemia  Hypertension  Hypertension  Hypogonadism male  Impotence of organic origin  Left ventricular diastolic dysfunction  Malignant neoplasm of prostate (HCC)   
 hx of t1a, izzy 6, 5 % of 1  core  [facility]  Myocardial perfusion 08 Basal inferior defect c/w artifact; EF 63%  Overactive bladder  Prostate cancer ([facility] 2017  S/P cardiac cath 07/30/10  
 oD2-40%; pRCA-20-30%; EF 65%  S/P gastric surgery 2018  Sleep apnea  Slowing of urinary stream   
 Type 2 diabetes with nephropathy ([facility] 2018  Type II or unspecified type diabetes mellitus without mention of complication, not stated as uncontrolled Family History Problem Relation Age of Onset  Hypertension Mother  High Cholesterol Mother  Breast Cancer Mother  Diabetes Mother  Heart Disease Mother [facility] Arthritis-osteo Mother  High Cholesterol Father  Hypertension Father  Diabetes Father  Heart Disease Father  Arthritis-osteo Father Social History Tobacco Use  Smoking status: Former Smoker Types: Cigars Last attempt to quit: 10/4/1999 Years since quittin.1  Smokeless tobacco: Never Used Substance Use Topics  Alcohol use: Yes Comment: rarely  Drug use: No  
  
 
Current Outpatient Medications Medication Sig  
 isosorbide mononitrate ER (IMDUR) 60 mg CR tablet TAKE 1 TABLET BY MOUTH DAILY  clopidogrel (PLAVIX) 75 mg tab TAKE 1 TABLET BY MOUTH EVERY DAY  cloNIDine (CATAPRES) 0.1 mg/24 hr ptwk APPLY 1 PATCH BY TRANSDERMAL ROUTE EVERY SEVEN (7) DAYS.  hydrALAZINE (APRESOLINE) 25 mg tablet Take 25 mg by mouth three (3) times daily.  omeprazole (PRILOSEC) 40 mg capsule TAKE ONE CAPSULE BY MOUTH TWICE A DAY  amLODIPine (NORVASC) 5 mg tablet TAKE 1 TABLET BY MOUTH EVERY DAY  carvedilol (COREG) 12.5 mg tablet TAKE 1 TABLET BY MOUTH TWICE A DAY WITH MEALS  KLOR-CON M20 20 mEq tablet TAKE 1 TABLET BY MOUTH EVERY DAY  multivitamin (ONE A DAY) tablet Take 1 Tab by mouth daily.  allopurinol (ZYLOPRIM) 100 mg tablet TAKE 1 TAB BY MOUTH DAILY.  nitroglycerin (NITROSTAT) 0.4 mg SL tablet 1 Tab by SubLINGual route every five (5) minutes as needed for Chest Pain.  calcium carbonate (TUMS) 200 mg calcium (500 mg) chew Take 1 Tab by mouth daily.  atorvastatin (LIPITOR) 40 mg tablet Take 1 Tab by mouth nightly.  aspirin 81 mg chewable tablet Take 1 Tab by mouth two (2) times a day. No current facility-administered medications for this visit. Past Surgical History:  
Procedure Laterality Date  COLONOSCOPY N/A 9/18/2019 COLONOSCOPY performed by Kinza Neves MD at [address]  7/30/10  HX OTHER SURGICAL  06/27/13 Prostate  HX TONSILLECTOMY  MD COLONOSCOPY FLX DX W/COLLJ SPEC WHEN PFRMD    
 MD I & D ABSC XTRAORAL SOFT TISS COMP Visit Vitals Ht 5' 11\" (1.803 m) BMI 37.24 kg/m² Diagnostic Studies: 
I have reviewed the relevant tests done on the patient and show as follows EKG tracings reviewed by me today. No flowsheet data found. 8/18 Card Cath/PCI Conclusion     
    
· Three-vessel coronary disease with 50% mid right coronary stenosis, 70% ostial second diagonal stenosis and 90% mid circumflex stenosis · Circumflex appears to be the culprit vessel for present non-STEMI 
· Successful coronary intervention of mid circumflex deploying a drug-eluting stent with residual 0% stenosis. 8/18 ECHO Interpretation Summary     
  · Definity contrast was given to enhance imaging. · Estimated left ventricular ejection fraction is 56 - 60%. Left ventricular mild concentric hypertrophy. Normal left ventricular wall motion, no regional wall motion abnormality noted. Moderate (grade 2) left ventricular diastolic dysfunction. · Right ventricular cavity size is mildly dilated. · Left atrial cavity size is moderately dilated.  
· Tricuspid regurgitation is inadequate for estimation of right ventricular systolic pressure. · Trace mitral valve regurgitation. 
    
 
 
 
Mr. Anne Marie Kraus has a reminder for a \"due or due soon\" health maintenance. I have asked that he contact his primary care provider for follow-up on this health maintenance. Physical Exam  
Constitutional: He is oriented to person, place, and time. He appears well-developed and well-nourished. No distress. obese HENT:  
Head: Normocephalic and atraumatic. Mouth/Throat: Normal dentition. Eyes: Right eye exhibits no discharge. Left eye exhibits no discharge. No scleral icterus. Neck: Neck supple. No JVD present. Carotid bruit is not present. No thyromegaly present. Cardiovascular: Normal rate, regular rhythm, S1 normal, S2 normal, normal heart sounds and intact distal pulses. Exam reveals no gallop and no friction rub. No murmur heard. Pulmonary/Chest: Effort normal and breath sounds normal. He has no wheezes. He has no rales. Abdominal: Soft. He exhibits no mass. There is no tenderness. Musculoskeletal: He exhibits edema (trace). Lymphadenopathy:  
     Right cervical: No superficial cervical adenopathy present. Left cervical: No superficial cervical adenopathy present. Neurological: He is alert and oriented to person, place, and time. Skin: Skin is warm and dry. No rash noted. Psychiatric: He has a normal mood and affect. His behavior is normal.  
 
 
ASSESSMENT and PLAN I have reviewed/discussed the above normal BMI with the patient. I have recommended the following interventions: dietary management education, guidance, and counseling, encourage exercise and monitor weight . Patient finished cardiac rehab. Chest pain and shortness of breath have resolved. He is losing weight and BP medicines are coming down. Will reduce clonidine patch to .1 mg today. {There are no diagnoses linked to this encounter. (Refresh or delete this SmartLink)} Pertinent laboratory and test data reviewed and discussed with patient. See patient instructions also for other medical advice given There are no discontinued medications.

## 2019-11-11 ENCOUNTER — OFFICE VISIT (OUTPATIENT)
Dept: ORTHOPEDIC SURGERY | Facility: CLINIC | Age: 64
End: 2019-11-11

## 2019-11-11 VITALS
BODY MASS INDEX: 36.68 KG/M2 | TEMPERATURE: 98.7 F | SYSTOLIC BLOOD PRESSURE: 150 MMHG | RESPIRATION RATE: 16 BRPM | HEIGHT: 71 IN | HEART RATE: 85 BPM | OXYGEN SATURATION: 96 % | WEIGHT: 262 LBS | DIASTOLIC BLOOD PRESSURE: 82 MMHG

## 2019-11-11 DIAGNOSIS — M17.12 PRIMARY OSTEOARTHRITIS OF LEFT KNEE: Primary | ICD-10-CM

## 2019-11-11 DIAGNOSIS — M25.562 CHRONIC PAIN OF LEFT KNEE: ICD-10-CM

## 2019-11-11 DIAGNOSIS — G89.29 CHRONIC PAIN OF LEFT KNEE: ICD-10-CM

## 2019-11-11 RX ORDER — HYALURONATE SODIUM 10 MG/ML
2 SYRINGE (ML) INTRAARTICULAR ONCE
Qty: 2 ML | Refills: 0
Start: 2019-11-11 | End: 2019-11-11

## 2019-11-11 NOTE — PROGRESS NOTES
1. Have you been to the ER, urgent care clinic since your last visit? Hospitalized since your last visit? NO    2. Have you seen or consulted any other health care providers outside of the [address] [place] [address] since your last visit? Include any pap smears or colon screening.    NO

## 2019-11-11 NOTE — PROGRESS NOTES
Patient: Jenny Graves                MRN: [id number]       SSN: [id number]  YOB: 1955        AGE: 60 y.o. SEX: male  Body mass index is 36.54 kg/m². PCP: Roxane Tran NP  11/11/19    Chief Complaint   Patient presents with    Knee Pain     left knee pain, f/u appt. HISTORY:  Jenny Graves is a 60 y.o. male who is seen for left knee knee pain. TIME OUT performed immediately prior to start of procedure:  Micah Byrd MD, have performed the following reviews on Jenny Graves prior to the start of the procedure:            * Patient was identified by name and date of birth   * Agreement on procedure being performed was verified  * Risks and Benefits explained to the patient  * Procedure site verified and marked as necessary  * Patient was positioned for comfort  * Consent was obtained     Time: 4:10 PM     Date of procedure: 11/11/2019    Procedure performed by:  Analia Grossman MD    Mr. Zackary Ortiz tolerated the procedure well with no complications      [id number] ICD-9-CM    1. Primary osteoarthritis of left knee M17.12 715.16 NV DRAIN/INJECT LARGE JOINT/BURSA      EUFLEXXA INJECTION PER DOSE      sodium hyaluronate (SUPARTZ FX/HYALGAN/GENIVSC) 10 mg/mL syrg injection   2. Chronic pain of left knee M25.562 719.46 NV DRAIN/INJECT LARGE JOINT/BURSA    G89.29 338.29 EUFLEXXA INJECTION PER DOSE      sodium hyaluronate (SUPARTZ FX/HYALGAN/GENIVSC) 10 mg/mL syrg injection     PLAN:  After discussing treatment options, patient's left knee was injected with 2 cc of Euflexxa. Mr. Zackary Ortiz will follow up PRN now that he has completed his visco supplementation injection series.       Scribed by Darci Kramer ([address]) as dictated by Analia Grossman MD

## 2019-11-22 RX ORDER — ALLOPURINOL 100 MG/1
TABLET ORAL
Qty: 90 TAB | Refills: 3 | Status: SHIPPED | OUTPATIENT
Start: 2019-11-22 | End: 2022-02-02 | Stop reason: SDUPTHER

## 2019-11-25 ENCOUNTER — OFFICE VISIT (OUTPATIENT)
Dept: FAMILY MEDICINE CLINIC | Facility: CLINIC | Age: 64
End: 2019-11-25

## 2019-11-25 VITALS
WEIGHT: 261 LBS | DIASTOLIC BLOOD PRESSURE: 73 MMHG | HEART RATE: 67 BPM | OXYGEN SATURATION: 97 % | HEIGHT: 71 IN | TEMPERATURE: 98 F | RESPIRATION RATE: 12 BRPM | SYSTOLIC BLOOD PRESSURE: 122 MMHG | BODY MASS INDEX: 36.54 KG/M2

## 2019-11-25 DIAGNOSIS — E11.9 TYPE 2 DIABETES MELLITUS WITHOUT COMPLICATION, WITHOUT LONG-TERM CURRENT USE OF INSULIN (HCC): Primary | ICD-10-CM

## 2019-11-25 DIAGNOSIS — D64.9 ANEMIA, UNSPECIFIED TYPE: ICD-10-CM

## 2019-11-25 DIAGNOSIS — Z12.5 SCREENING PSA (PROSTATE SPECIFIC ANTIGEN): ICD-10-CM

## 2019-11-25 LAB — HBA1C MFR BLD HPLC: 5.1 %

## 2019-11-25 NOTE — PROGRESS NOTES
Xena Mtz is a 60 y.o. male presenting today for Diabetes (follow-up)    HPI:  Xena Mtz presents to the office today for anemia. He had labs done by his employer, [employer] and his H&H is 8.1 and 26.5. He was also told that he had kidney disease. He is followed by Dr. Vesna Marte and is scheduled to see the nephrologist on December 6, 2019. He feels well and denies any shortness of breath. He does have fatigue symptoms but denies any chest pain or palpitation    Review of Systems   Constitutional: Positive for malaise/fatigue. Respiratory: Negative for shortness of breath. Cardiovascular: Negative for chest pain and palpitations. Neurological: Negative for dizziness and headaches. Allergies   Allergen Reactions    Iodine Other (comments)     Eyes swell shut      Shellfish Containing Products Swelling       Current Outpatient Medications   Medication Sig Dispense Refill    allopurinol (ZYLOPRIM) 100 mg tablet TAKE 1 TABLET BY MOUTH EVERY DAY 90 Tab 3    saw palmetto fruit 450 mg cap Take  by mouth.  hydroCHLOROthiazide (HYDRODIURIL) 25 mg tablet Take 25 mg by mouth daily.  atorvastatin (LIPITOR) 40 mg tablet Take 1 Tab by mouth nightly. 90 Tab 3    isosorbide mononitrate ER (IMDUR) 60 mg CR tablet TAKE 1 TABLET BY MOUTH DAILY 30 Tab 0    clopidogrel (PLAVIX) 75 mg tab TAKE 1 TABLET BY MOUTH EVERY DAY 90 Tab 3    cloNIDine (CATAPRES) 0.1 mg/24 hr ptwk APPLY 1 PATCH BY TRANSDERMAL ROUTE EVERY SEVEN (7) DAYS. 12 Patch 1    hydrALAZINE (APRESOLINE) 25 mg tablet Take 25 mg by mouth two (2) times a day.  omeprazole (PRILOSEC) 40 mg capsule TAKE ONE CAPSULE BY MOUTH TWICE A DAY 60 Cap 2    amLODIPine (NORVASC) 5 mg tablet TAKE 1 TABLET BY MOUTH EVERY DAY 30 Tab 1    carvedilol (COREG) 12.5 mg tablet TAKE 1 TABLET BY MOUTH TWICE A DAY WITH MEALS 180 Tab 1    multivitamin (ONE A DAY) tablet Take 1 Tab by mouth daily.       nitroglycerin (NITROSTAT) 0.4 mg SL tablet 1 Tab by SubLINGual route every five (5) minutes as needed for Chest Pain. 1 Bottle 1    calcium carbonate (TUMS) 200 mg calcium (500 mg) chew Take 1 Tab by mouth daily.  aspirin 81 mg chewable tablet Take 1 Tab by mouth two (2) times a day.  61 Tab 0       Past Medical History:   Diagnosis Date    Atypical chest pain/mild nonobstructive CAD/EF 65% 4/12/2011    BPH without obstruction/lower urinary tract symptoms     Bursitis of right shoulder     CAD (coronary artery disease)     Chest pain     history of hospitalization with chest pain and a negative workup in 2008    Chronic edema     Constipation     die to medication    Coronary artery disease     mild, non obstructive/EF 65%    Depression 12/16/2018    Dyspnea on exertion     Echocardiogram 12/16/08    IVC dilated; suboptimal endocardial border; EF 65%    ED (erectile dysfunction)     Elevated PSA     Frequency     GERD (gastroesophageal reflux disease)     Gout     H/O cystoscopy 06/20/2013    Hematuria, unspecified     Hypercholesterolemia     Hypertension     Hypertension      Hypogonadism male     Impotence of organic origin     Left ventricular diastolic dysfunction     Malignant neoplasm of prostate (HCC)     hx of t1a, izzy 6, 5 % of 1  core    Morbid obesity ([address]     Myocardial perfusion 09/05/08    Basal inferior defect c/w artifact; EF 63%    Overactive bladder     Prostate cancer ([address] 2/9/2017    S/P cardiac cath 07/30/10    oD2-40%; pRCA-20-30%; EF 65%    S/P gastric surgery 8/28/2018    Sleep apnea     Slowing of urinary stream     Type 2 diabetes with nephropathy ([address] 9/27/2018    Type II or unspecified type diabetes mellitus without mention of complication, not stated as uncontrolled        Past Surgical History:   Procedure Laterality Date    COLONOSCOPY N/A 9/18/2019    COLONOSCOPY performed by Kerrie Castillo MD at [address] [place] [address]  7/30/10    HX OTHER SURGICAL  06/27/13 Prostate    HX TONSILLECTOMY      [facility] COLONOSCOPY FLX DX W/COLLJ SPEC WHEN PFRMD      [place] I & D ABSC XTRAORAL SOFT TISS COMP         Social History     Socioeconomic History    Marital status: SINGLE     Spouse name: Not on file    Number of children: Not on file    Years of education: Not on file    Highest education level: Not on file   Occupational History    Not on file   Social Needs    Financial resource strain: Not on file    Food insecurity:     Worry: Not on file     Inability: Not on file    Transportation needs:     Medical: Not on file     Non-medical: Not on file   Tobacco Use    Smoking status: Former Smoker     Types: Cigars     Last attempt to quit: 10/4/1999     Years since quittin.1    Smokeless tobacco: Never Used   Substance and Sexual Activity    Alcohol use: Never     Frequency: Never     Comment: rarely    Drug use: No    Sexual activity: Not Currently   Lifestyle    Physical activity:     Days per week: Not on file     Minutes per session: Not on file    Stress: Not on file   Relationships    Social connections:     Talks on phone: Not on file     Gets together: Not on file     Attends Mosque service: Not on file     Active member of club or organization: Not on file     Attends meetings of clubs or organizations: Not on file     Relationship status: Not on file    Intimate partner violence:     Fear of current or ex partner: Not on file     Emotionally abused: Not on file     Physically abused: Not on file     Forced sexual activity: Not on file   Other Topics Concern    Not on file   Social History Narrative    Not on file       Patient does not have an advanced directive on file    Vitals:    19 1159   BP: 122/73   Pulse: 67   Resp: 12   Temp: 98 °F (36.7 °C)   TempSrc: Oral   SpO2: 97%   Weight: 261 lb (118.4 kg)   Height: 5' 11\" (1.803 m)   PainSc:   0 - No pain       Physical Exam  Cardiovascular:      Rate and Rhythm: Normal rate and regular rhythm. Pulses: Normal pulses. Heart sounds: Normal heart sounds. Pulmonary:      Effort: Pulmonary effort is normal.      Breath sounds: Normal breath sounds. Skin:     General: Skin is warm.          Office Visit on 11/25/2019   Component Date Value Ref Range Status    Hemoglobin A1c (POC) 11/25/2019 5.1  % Final    Prostate Specific Ag 11/25/2019 0.795  <=4.100 ng/mL Final    Ferritin 11/25/2019 11* 22 - 322 ng/mL Final   Admission on 09/17/2019, Discharged on 09/18/2019   Component Date Value Ref Range Status    Ventricular Rate 09/17/2019 52  BPM Final    Atrial Rate 09/17/2019 52  BPM Final    P-R Interval 09/17/2019 204  ms Final    QRS Duration 09/17/2019 104  ms Final    Q-T Interval 09/17/2019 450  ms Final    QTC Calculation (Bezet) 09/17/2019 418  ms Final    Calculated P Axis 09/17/2019 43  degrees Final    Calculated R Axis 09/17/2019 -3  degrees Final    Calculated T Axis 09/17/2019 141  degrees Final    Diagnosis 09/17/2019    Final                    Value:Sinus bradycardia  T wave abnormality, consider lateral ischemia  Abnormal ECG  When compared with ECG of 01-SEP-2018 04:58,  No significant change was found  QT has shortened  Confirmed by Addy Saravia MD, ----- ([id number]) on 9/17/2019 6:29:18 PM      Sodium 09/17/2019 144  136 - 145 mmol/L Final    Potassium 09/17/2019 3.3* 3.5 - 5.5 mmol/L Final    Chloride 09/17/2019 108  100 - 111 mmol/L Final    CO2 09/17/2019 31  21 - 32 mmol/L Final    Anion gap 09/17/2019 5  3.0 - 18 mmol/L Final    Glucose 09/17/2019 91  74 - 99 mg/dL Final    BUN 09/17/2019 38* 7.0 - 18 MG/DL Final    Creatinine 09/17/2019 2.02* 0.6 - 1.3 MG/DL Final    BUN/Creatinine ratio 09/17/2019 19  12 - 20   Final    GFR est AA 09/17/2019 41* >60 ml/min/1.73m2 Final    GFR est non-AA 09/17/2019 34* >60 ml/min/1.73m2 Final    Comment: (NOTE)  Estimated GFR is calculated using the Modification of Diet in Renal   Disease (MDRD) Study equation, reported for both  Americans   (GFRAA) and non- Americans (GFRNA), and normalized to 1.73m2   body surface area. The physician must decide which value applies to   the patient. The MDRD study equation should only be used in   individuals age 25 or older. It has not been validated for the   following: pregnant women, patients with serious comorbid conditions,   or on certain medications, or persons with extremes of body size,   muscle mass, or nutritional status.  Calcium 09/17/2019 8.4* 8.5 - 10.1 MG/DL Final    Bilirubin, total 09/17/2019 0.3  0.2 - 1.0 MG/DL Final    ALT (SGPT) 09/17/2019 14* 16 - 61 U/L Final    AST (SGOT) 09/17/2019 14  10 - 38 U/L Final    Alk. phosphatase 09/17/2019 72  45 - 117 U/L Final    Protein, total 09/17/2019 6.2* 6.4 - 8.2 g/dL Final    Albumin 09/17/2019 3.3* 3.4 - 5.0 g/dL Final    Globulin 09/17/2019 2.9  2.0 - 4.0 g/dL Final    A-G Ratio 09/17/2019 1.1  0.8 - 1.7   Final    Lipase 09/17/2019 109  73 - 393 U/L Final    CK 09/17/2019 247  39 - 308 U/L Final    CK - MB 09/17/2019 2.0  <3.6 ng/ml Final    CK-MB Index 09/17/2019 0.8  0.0 - 4.0 % Final    Troponin-I, QT 09/17/2019 0.02  0.0 - 0.045 NG/ML Final    Comment: The presence of detectable troponin above the reference range indicates myocardial injury which may be due to ischemia, myocarditis, trauma, etc.  Clinical correlation is necessary to establish the significance of this finding. Sequential testing is recommended to determine if the typical rise and fall of cTnI is demonstrated. Note:  Cardiac troponin I has a relatively long half life and may be present well after the CK MB has returned to baseline. The reference range is based on the 99th percentile of the referent population.       Target HR 09/17/2019 158  bpm Final    Exercise duration time 09/17/2019 00:04:00   Final    Stress Base Systolic BP 96/45/5026 130  mmHg Final    Stress Base Diastolic BP 54/55/5473 71  mmHg Final    Post peak HR 09/17/2019 96  BPM Final    Baseline HR 09/17/2019 58  BPM Final    Estimated workload 09/17/2019 1.0  METS Final    Percent HR 09/17/2019 61  % Final    ST Elevation (mm) 09/17/2019 0  mm Final    ST Depression (mm) 09/17/2019 0  mm Final    Stress Rate Pressure Product 09/17/2019 12,576  BPM*mmHg Final    Baseline BP 09/17/2019 131  mmHg Final    Stress Base Diastolic BP 04/80/4940 71  mmHg Final    CK 09/17/2019 234  39 - 308 U/L Final    CK - MB 09/17/2019 1.9  <3.6 ng/ml Final    CK-MB Index 09/17/2019 0.8  0.0 - 4.0 % Final    Troponin-I, QT 09/17/2019 0.02  0.0 - 0.045 NG/ML Final    Comment: The presence of detectable troponin above the reference range indicates myocardial injury which may be due to ischemia, myocarditis, trauma, etc.  Clinical correlation is necessary to establish the significance of this finding. Sequential testing is recommended to determine if the typical rise and fall of cTnI is demonstrated. Note:  Cardiac troponin I has a relatively long half life and may be present well after the CK MB has returned to baseline. The reference range is based on the 99th percentile of the referent population.  WBC 09/18/2019 4.3* 4.6 - 13.2 K/uL Final    RBC 09/18/2019 3.59* 4.70 - 5.50 M/uL Final    HGB 09/18/2019 9.1* 13.0 - 16.0 g/dL Final    HCT 09/18/2019 29.0* 36.0 - 48.0 % Final    MCV 09/18/2019 80.8  74.0 - 97.0 FL Final    MCH 09/18/2019 25.3  24.0 - 34.0 PG Final    MCHC 09/18/2019 31.4  31.0 - 37.0 g/dL Final    RDW 09/18/2019 16.2* 11.6 - 14.5 % Final    PLATELET 78/97/0732 238  135 - 420 K/uL Final    MPV 09/18/2019 9.0* 9.2 - 11.8 FL Final    Glucose (POC) 09/18/2019 88  70 - 110 mg/dL Final    Comment: (NOTE)  The FDA has indicated that no capillary point of care blood glucose   monitoring systems are approved for use in \"critically ill\" patients,   however they have not defined this population.  The College of   American Pathologists has recommended that these devices should not   be used in cases such as severe hypotension, dehydration, shock, and   hyperglycemic-hyperosmolar state, amongst others. Venous or arterial   collection is the recommended specimen for testing these patients. .  Results for orders placed or performed in visit on 11/25/19   PSA SCREENING (SCREENING)   Result Value Ref Range    Prostate Specific Ag 0.795 <=4.100 ng/mL    Narrative    Unless additionally indicated, test performed at: [address]. PH: [phone]. FERRITIN   Result Value Ref Range    Ferritin 11 (L) 22 - 322 ng/mL    Narrative    Unless additionally indicated, test performed at: [address] PH: [phone]. AMB POC HEMOGLOBIN A1C   Result Value Ref Range    Hemoglobin A1c (POC) 5.1 %       Assessment / Plan:      ICD-10-CM ICD-9-CM    1. Type 2 diabetes mellitus without complication, without long-term current use of insulin (HCC) E11.9 250.00 AMB POC HEMOGLOBIN A1C   2. Anemia, unspecified type D64.9 285.9 REFERRAL TO ONCOLOGY      FERRITIN      FERRITIN   3. Screening PSA (prostate specific antigen) Z12.5 V76.44 PSA SCREENING (SCREENING)      PSA SCREENING (SCREENING)     Diabetes mellitus-hemoglobin A1c 5.1  Referral to hematologist for anemia  Screening for PSA ordered    Follow-up and Dispositions    · Return in about 3 months (around 2/25/2020). I asked the patient if he  had any questions and answered his  questions. The patient stated that he understands the treatment plan and agrees with the treatment plan    This document was created with a voice activated dictation system and may contain transcription errors.

## 2019-11-25 NOTE — PROGRESS NOTES
Visit Vitals  /73   Pulse 67   Temp 98 °F (36.7 °C) (Oral)   Resp 12   Ht 5' 11\" (1.803 m)   Wt 261 lb (118.4 kg)   SpO2 97%   BMI 36.40 kg/m²     Joaquín Oseguera presents today for   Chief Complaint   Patient presents with    Diabetes     follow-up       Is someone accompanying this pt? no    Is the patient using any DME equipment during OV? no    Depression Screening:  3 most recent PHQ Screens 11/25/2019   PHQ Not Done -   Little interest or pleasure in doing things Not at all   Feeling down, depressed, irritable, or hopeless Not at all   Total Score PHQ 2 0   Trouble falling or staying asleep, or sleeping too much -   Feeling tired or having little energy -   Poor appetite, weight loss, or overeating -   Feeling bad about yourself - or that you are a failure or have let yourself or your family down -   Trouble concentrating on things such as school, work, reading, or watching TV -   Moving or speaking so slowly that other people could have noticed; or the opposite being so fidgety that others notice -   Thoughts of being better off dead, or hurting yourself in some way -   PHQ 9 Score -   How difficult have these problems made it for you to do your work, take care of your home and get along with others -       Learning Assessment:  Learning Assessment 5/25/2017   PRIMARY LEARNER Patient   CO-LEARNER CAREGIVER No   PRIMARY LANGUAGE ENGLISH   LEARNER PREFERENCE PRIMARY READING   ANSWERED BY patient   RELATIONSHIP SELF       Abuse Screening:  Abuse Screening Questionnaire 2/16/2016   Do you ever feel afraid of your partner? N   Are you in a relationship with someone who physically or mentally threatens you? N   Is it safe for you to go home? Y       Fall Risk  Fall Risk Assessment, last 12 mths 6/7/2019   Able to walk? Yes   Fall in past 12 months? No       Health Maintenance reviewed and discussed and ordered per Provider.     Health Maintenance Due   Topic Date Due    EYE EXAM RETINAL OR DILATED 01[birth year]08/1967    DTaP/Tdap/Td series (1 - Tdap) 01/08/1968    MICROALBUMIN Q1  06/29/2017    FOOT EXAM Q1  09/07/2017    FOBT Q 1 YEAR AGE 50-75  09/29/2017    LIPID PANEL Q1  12/11/2019           Coordination of Care:  1. Have you been to the ER, urgent care clinic since your last visit? Hospitalized since your last visit? no    2. Have you seen or consulted any other health care providers outside of the [address] [place] [address] since your last visit? Include any pap smears or colon screening.  no

## 2019-11-26 LAB
FERRITIN SERPL-MCNC: 11 NG/ML (ref 22–322)
PSA SERPL-MCNC: 0.8 NG/ML

## 2019-12-18 RX ORDER — OMEPRAZOLE 40 MG/1
CAPSULE, DELAYED RELEASE ORAL
Qty: 180 CAP | Refills: 0 | Status: SHIPPED | OUTPATIENT
Start: 2019-12-18 | End: 2020-06-17 | Stop reason: SDUPTHER

## 2020-01-25 DIAGNOSIS — I10 ESSENTIAL HYPERTENSION: ICD-10-CM

## 2020-01-27 RX ORDER — CARVEDILOL 12.5 MG/1
TABLET ORAL
Qty: 180 TAB | Refills: 1 | Status: SHIPPED | OUTPATIENT
Start: 2020-01-27 | End: 2021-06-18 | Stop reason: SDUPTHER

## 2020-02-10 ENCOUNTER — TELEPHONE (OUTPATIENT)
Dept: FAMILY MEDICINE CLINIC | Facility: CLINIC | Age: 65
End: 2020-02-10

## 2020-02-10 NOTE — TELEPHONE ENCOUNTER
Patient states he saw his nephrologist, Dr. Monserrat Foster, and was told he will need a kidney transplant within next 2 years. He would like to discuss with Janet Carney.

## 2020-02-25 NOTE — TELEPHONE ENCOUNTER
Spoke with he stated that he will keep his appointment that he have in March. Patient stated that he can not come in any sooner due to his employment.

## 2020-03-04 ENCOUNTER — OFFICE VISIT (OUTPATIENT)
Dept: FAMILY MEDICINE CLINIC | Facility: CLINIC | Age: 65
End: 2020-03-04

## 2020-03-04 ENCOUNTER — HOSPITAL ENCOUNTER (OUTPATIENT)
Dept: LAB | Age: 65
Discharge: HOME | End: 2020-03-04

## 2020-03-04 VITALS
WEIGHT: 262 LBS | BODY MASS INDEX: 36.68 KG/M2 | HEIGHT: 71 IN | TEMPERATURE: 98.6 F | SYSTOLIC BLOOD PRESSURE: 152 MMHG | OXYGEN SATURATION: 97 % | RESPIRATION RATE: 12 BRPM | DIASTOLIC BLOOD PRESSURE: 88 MMHG | HEART RATE: 76 BPM

## 2020-03-04 DIAGNOSIS — I10 ESSENTIAL HYPERTENSION: ICD-10-CM

## 2020-03-04 DIAGNOSIS — R79.89 ELEVATED SERUM CREATININE: Primary | ICD-10-CM

## 2020-03-04 DIAGNOSIS — Z72.51 UNPROTECTED SEXUAL INTERCOURSE: ICD-10-CM

## 2020-03-04 LAB — SENTARA SPECIMEN COL,SENBCF: NORMAL

## 2020-03-04 PROCEDURE — 99001 SPECIMEN HANDLING PT-LAB: CPT

## 2020-03-04 NOTE — PROGRESS NOTES
Visit Vitals  /88   Pulse 76   Temp 98.6 °F (37 °C) (Oral)   Resp 12   Ht 5' 11\" (1.803 m)   Wt 262 lb (118.8 kg)   SpO2 97%   BMI 36.54 kg/m²     [facility] presents today for   Chief Complaint   Patient presents with    Follow-up     kidney concerns       Is someone accompanying this pt? no    Is the patient using any DME equipment during OV? no    Depression Screening:  3 most recent PHQ Screens 3/4/2020   PHQ Not Done -   Little interest or pleasure in doing things Not at all   Feeling down, depressed, irritable, or hopeless Not at all   Total Score PHQ 2 0   Trouble falling or staying asleep, or sleeping too much -   Feeling tired or having little energy -   Poor appetite, weight loss, or overeating -   Feeling bad about yourself - or that you are a failure or have let yourself or your family down -   Trouble concentrating on things such as school, work, reading, or watching TV -   Moving or speaking so slowly that other people could have noticed; or the opposite being so fidgety that others notice -   Thoughts of being better off dead, or hurting yourself in some way -   PHQ 9 Score -   How difficult have these problems made it for you to do your work, take care of your home and get along with others -       Learning Assessment:  Learning Assessment 5/25/2017   PRIMARY LEARNER Patient   CO-LEARNER CAREGIVER No   PRIMARY LANGUAGE ENGLISH   LEARNER PREFERENCE PRIMARY READING   ANSWERED BY patient   RELATIONSHIP SELF       Abuse Screening:  Abuse Screening Questionnaire 2/16/2016   Do you ever feel afraid of your partner? N   Are you in a relationship with someone who physically or mentally threatens you? N   Is it safe for you to go home? Y       Fall Risk  Fall Risk Assessment, last 12 mths 6/7/2019   Able to walk? Yes   Fall in past 12 months? No       Health Maintenance reviewed and discussed and ordered per Provider.     Health Maintenance Due   Topic Date Due    Eye Exam Retinal or Dilated 01/08/1967    DTaP/Tdap/Td series (1 - Tdap) 01/08/1968    MICROALBUMIN Q1  06/29/2017    Foot Exam Q1  09/07/2017    FOBT Q1Y Age 50-75  09/29/2017           Coordination of Care:  1. Have you been to the ER, urgent care clinic since your last visit? Hospitalized since your last visit? no    2. Have you seen or consulted any other health care providers outside of the [address] [place] [address] since your last visit? Include any pap smears or colon screening.  no

## 2020-03-04 NOTE — PROGRESS NOTES
Regino Fitzpatrick is a 63 y.o. male presenting today for Follow-up (kidney concerns)    HPI:  Regino Fitzpatrick presents to the office today for second opionin. Erict was seen by the Nephrologist and informed he will eventually need dialysis. Per the patient he was referred to Vascular for evaluation of an access port. He is upset and would like a referral for a second opinion. He would like additional information regarding what he can do to avoid dialysis. He had a retroperitoneal US on January 29, 2020 which showed CKD, Stage 3 Chronic,   He also presents to the practice requesting STD testing. He was informed by a sexual partner that she contracted Chlamydia from him. He denies any dysuria or penile discharge. Review of Systems   Respiratory: Negative for cough, hemoptysis and sputum production. Cardiovascular: Negative for chest pain and palpitations. Genitourinary: Negative for dysuria and urgency. Neurological: Negative for dizziness and headaches. Allergies   Allergen Reactions    Iodine Other (comments)     Eyes swell shut      Shellfish Containing Products Swelling       Current Outpatient Medications   Medication Sig Dispense Refill    amLODIPine (NORVASC) 10 mg tablet Take  by mouth daily.  FERROUS FUMARATE PO Take  by mouth.  carvediloL (COREG) 12.5 mg tablet TAKE 1 TABLET BY MOUTH TWICE A DAY WITH MEALS 180 Tab 1    omeprazole (PRILOSEC) 40 mg capsule TAKE ONE CAPSULE BY MOUTH TWICE A  Cap 0    isosorbide mononitrate ER (IMDUR) 60 mg CR tablet TAKE 1 TABLET BY MOUTH DAILY 30 Tab 5    allopurinol (ZYLOPRIM) 100 mg tablet TAKE 1 TABLET BY MOUTH EVERY DAY 90 Tab 3    hydroCHLOROthiazide (HYDRODIURIL) 25 mg tablet Take 25 mg by mouth daily.  atorvastatin (LIPITOR) 40 mg tablet Take 1 Tab by mouth nightly.  90 Tab 3    clopidogrel (PLAVIX) 75 mg tab TAKE 1 TABLET BY MOUTH EVERY DAY 90 Tab 3    cloNIDine (CATAPRES) 0.1 mg/24 hr ptwk APPLY 1 PATCH BY TRANSDERMAL ROUTE EVERY SEVEN (7) DAYS. 12 Patch 1    multivitamin (ONE A DAY) tablet Take 1 Tab by mouth daily.  nitroglycerin (NITROSTAT) 0.4 mg SL tablet 1 Tab by SubLINGual route every five (5) minutes as needed for Chest Pain. 1 Bottle 1    calcium carbonate (TUMS) 200 mg calcium (500 mg) chew Take 1 Tab by mouth daily.  aspirin 81 mg chewable tablet Take 1 Tab by mouth two (2) times a day. 60 Tab 0    saw palmetto fruit 450 mg cap Take  by mouth.          Past Medical History:   Diagnosis Date    Atypical chest pain/mild nonobstructive CAD/EF 65% 4/12/2011    BPH without obstruction/lower urinary tract symptoms     Bursitis of right shoulder     CAD (coronary artery disease)     Chest pain     history of hospitalization with chest pain and a negative workup in 2008    Chronic edema     Constipation     die to medication    Coronary artery disease     mild, non obstructive/EF 65%    Depression 12/16/2018    Dyspnea on exertion     Echocardiogram 12/16/08    IVC dilated; suboptimal endocardial border; EF 65%    ED (erectile dysfunction)     Elevated PSA     Frequency     GERD (gastroesophageal reflux disease)     Gout     H/O cystoscopy 06/20/2013    Hematuria, unspecified     Hypercholesterolemia     Hypertension     Hypertension      Hypogonadism male     Impotence of organic origin     Left ventricular diastolic dysfunction     Malignant neoplasm of prostate (HCC)     hx of t1a, izzy 6, 5 % of 1  core    Morbid obesity ([address]     Myocardial perfusion 09/05/08    Basal inferior defect c/w artifact; EF 63%    Overactive bladder     Prostate cancer ([address] 2/9/2017    S/P cardiac cath 07/30/10    oD2-40%; pRCA-20-30%; EF 65%    S/P gastric surgery 8/28/2018    Sleep apnea     Slowing of urinary stream     Type 2 diabetes with nephropathy ([address] 9/27/2018    Type II or unspecified type diabetes mellitus without mention of complication, not stated as uncontrolled Past Surgical History:   Procedure Laterality Date    COLONOSCOPY N/A 2019    COLONOSCOPY performed by Reanna Santana MD at [address] [place] [address]  7/30/10    HX OTHER SURGICAL  13    Prostate    HX TONSILLECTOMY      NE COLONOSCOPY FLX DX W/COLLJ SPEC WHEN PFRMD      NE I & D ABSC XTRAORAL SOFT TISS COMP         Social History     Socioeconomic History    Marital status: SINGLE     Spouse name: Not on file    Number of children: Not on file    Years of education: Not on file    Highest education level: Not on file   Occupational History    Not on file   Social Needs    Financial resource strain: Not on file    Food insecurity     Worry: Not on file     Inability: Not on file    Transportation needs     Medical: Not on file     Non-medical: Not on file   Tobacco Use    Smoking status: Former Smoker     Types: Cigars     Last attempt to quit: 10/4/1999     Years since quittin.4    Smokeless tobacco: Never Used   Substance and Sexual Activity    Alcohol use: Never     Frequency: Never     Comment: rarely    Drug use: No    Sexual activity: Not Currently   Lifestyle    Physical activity     Days per week: Not on file     Minutes per session: Not on file    Stress: Not on file   Relationships    Social connections     Talks on phone: Not on file     Gets together: Not on file     Attends Sikh service: Not on file     Active member of club or organization: Not on file     Attends meetings of clubs or organizations: Not on file     Relationship status: Not on file    Intimate partner violence     Fear of current or ex partner: Not on file     Emotionally abused: Not on file     Physically abused: Not on file     Forced sexual activity: Not on file   Other Topics Concern    Not on file   Social History Narrative    Not on file       Patient does not have an advanced directive on file    Vitals:    20 1518   BP: 152/88   Pulse: 76   Resp: 12 Temp: 98.6 °F (37 °C)   TempSrc: Oral   SpO2: 97%   Weight: 262 lb (118.8 kg)   Height: 5' 11\" (1.803 m)   PainSc:   8       Physical Exam  Vitals signs and nursing note reviewed. Cardiovascular:      Rate and Rhythm: Normal rate and regular rhythm. Pulses: Normal pulses. Heart sounds: Normal heart sounds. Pulmonary:      Effort: Pulmonary effort is normal.      Breath sounds: Normal breath sounds. Skin:     General: Skin is warm. Hospital Outpatient Visit on 03/04/2020   Component Date Value Ref Range Status    [facility] 03/04/2020 Specimens collected/sent to [facility]    Final   Office Visit on 03/04/2020   Component Date Value Ref Range Status    Chlamydia amplified urine 03/04/2020 Negative  Negative Final    GC Amplified urine 03/04/2020 Negative  Negative Final   Office Visit on 02/13/2020   Component Date Value Ref Range Status    Color (UA POC) 02/13/2020 Yellow   Final    Clarity (UA POC) 02/13/2020 Clear   Final    Glucose (UA POC) 02/13/2020 Negative  Negative Final    Bilirubin (UA POC) 02/13/2020 1+  Negative Final    Ketones (UA POC) 02/13/2020 Trace  Negative Final    Specific gravity (UA POC) 02/13/2020 1.020  1.001 - 1.035 Final    Blood (UA POC) 02/13/2020 Negative  Negative Final    pH (UA POC) 02/13/2020 5.5  4.6 - 8.0 Final    Protein (UA POC) 02/13/2020 Negative  Negative Final    Urobilinogen (UA POC) 02/13/2020 0.2 mg/dL  0.2 - 1 Final    Nitrites (UA POC) 02/13/2020 Negative  Negative Final    Leukocyte esterase (UA POC) 02/13/2020 1+  Negative Final    PVR 02/13/2020 0  cc Final       .  Results for orders placed or performed during the hospital encounter of 03/04/20   [facility] [address]    Result Value Ref Range    [facility] Specimens collected/sent to [facility]     Results for orders placed or performed in visit on 03/04/20   CHLAMYDIA/GC AMPLIFIED URINE   Result Value Ref Range    Chlamydia amplified urine Negative Negative GC Amplified urine Negative Negative    Narrative    Unless additionally indicated, test performed at: [facility], [address] [place] [address]. PH: [phone]. Assessment / Plan:      ICD-10-CM ICD-9-CM    1. Elevated serum creatinine R79.89 790.99 REFERRAL TO NEPHROLOGY   2. Unprotected sexual intercourse Z72.51 V69.2 CHLAMYDIA/NEISSERIA AMPLIFICATION   3. Essential hypertension I10 401.9    HTN- blood pressure elevated. Will continue to monitor  STD testing ordered  Referral to Nephrology    Follow-up and Dispositions    · Return in about 4 months (around 7/4/2020). I asked the patient if he  had any questions and answered his  questions. The patient stated that he understands the treatment plan and agrees with the treatment plan    This document was created with a voice activated dictation system and may contain transcription errors.

## 2020-03-05 LAB
CHLAMYDIA AMPLIFIED URINE: NEGATIVE
GC AMPLIFIED URINE,919: NEGATIVE

## 2020-05-22 ENCOUNTER — VIRTUAL VISIT (OUTPATIENT)
Dept: CARDIOLOGY CLINIC | Age: 65
End: 2020-05-22

## 2020-05-22 VITALS — BODY MASS INDEX: 35.98 KG/M2 | WEIGHT: 257 LBS | HEIGHT: 71 IN

## 2020-05-22 DIAGNOSIS — E66.01 SEVERE OBESITY (BMI 35.0-39.9) WITH COMORBIDITY (HCC): ICD-10-CM

## 2020-05-22 DIAGNOSIS — I10 ESSENTIAL HYPERTENSION: ICD-10-CM

## 2020-05-22 DIAGNOSIS — I50.32 CHRONIC DIASTOLIC CONGESTIVE HEART FAILURE (HCC): ICD-10-CM

## 2020-05-22 DIAGNOSIS — Z98.61 POSTSURGICAL PERCUTANEOUS TRANSLUMINAL CORONARY ANGIOPLASTY STATUS: ICD-10-CM

## 2020-05-22 DIAGNOSIS — I25.10 ATHEROSCLEROSIS OF NATIVE CORONARY ARTERY OF NATIVE HEART WITHOUT ANGINA PECTORIS: Primary | ICD-10-CM

## 2020-05-22 DIAGNOSIS — E78.00 HYPERCHOLESTEROLEMIA: ICD-10-CM

## 2020-05-22 RX ORDER — CHOLECALCIFEROL (VITAMIN D3) 50 MCG
CAPSULE ORAL
COMMUNITY
End: 2021-06-18 | Stop reason: ALTCHOICE

## 2020-05-22 RX ORDER — ACETAMINOPHEN 500 MG
TABLET ORAL
COMMUNITY

## 2020-05-22 NOTE — PROGRESS NOTES
HISTORY OF PRESENT ILLNESS  Gato Sharma is a 63 y.o. male. Gato Sharma is a 63 y.o. male who was seen by synchronous (real-time) audio-video technology on 5/22/2020. Consent:  He and/or his healthcare decision maker is aware that this patient-initiated Telehealth encounter is a billable service, with coverage as determined by his insurance carrier. He is aware that he may receive a bill and has provided verbal consent to proceed: Yes    I was in the office while conducting this encounter. Patient is seen today for Hypertension, Hyperlipidemia, Coronary artery disease, Obesity and status post coronary artery stenting. Patient has decided to follow here due to his convenience as opposed to previous cardiologist who was Dr. Anthony Allen    5/20 Patient denies significant chest pain, SOB, palpitations, dizziness      Hypertension   The history is provided by the medical records and patient. This is a chronic problem. Pertinent negatives include no chest pain, no headaches and no shortness of breath. Cholesterol Problem   The history is provided by the medical records and patient. This is a chronic problem. Pertinent negatives include no chest pain, no headaches and no shortness of breath. CHF   The history is provided by the medical records. This is a chronic problem. Pertinent negatives include no chest pain, no headaches and no shortness of breath. Leg Swelling   The history is provided by the patient. This is a chronic problem. The current episode started more than 1 week ago. The problem occurs every several days. Pertinent negatives include no chest pain, no headaches and no shortness of breath. The symptoms are aggravated by standing. The symptoms are relieved by sleep. Review of Systems   Constitutional: Negative for chills, diaphoresis, fever, malaise/fatigue and weight loss. HENT: Negative for nosebleeds. Eyes: Negative for discharge.    Respiratory: Negative for cough, shortness of breath and wheezing. Cardiovascular: Positive for leg swelling. Negative for chest pain, palpitations, orthopnea, claudication and PND. Gastrointestinal: Negative for diarrhea, nausea and vomiting. Genitourinary: Negative for dysuria and hematuria. Musculoskeletal: Negative for joint pain. Skin: Negative for rash. Neurological: Negative for dizziness, seizures, loss of consciousness and headaches. Endo/Heme/Allergies: Negative for polydipsia. Does not bruise/bleed easily. Psychiatric/Behavioral: Negative for depression and substance abuse. The patient does not have insomnia.       Allergies   Allergen Reactions    Iodine Other (comments)     Eyes swell shut      Shellfish Containing Products Swelling       Past Medical History:   Diagnosis Date    Atypical chest pain/mild nonobstructive CAD/EF 65% 4/12/2011    BPH without obstruction/lower urinary tract symptoms     Bursitis of right shoulder     CAD (coronary artery disease)     Chest pain     history of hospitalization with chest pain and a negative workup in 2008    Chronic edema     Constipation     die to medication    Coronary artery disease     mild, non obstructive/EF 65%    Depression 12/16/2018    Dyspnea on exertion     Echocardiogram 12/16/08    IVC dilated; suboptimal endocardial border; EF 65%    ED (erectile dysfunction)     Elevated PSA     Frequency     GERD (gastroesophageal reflux disease)     Gout     H/O cystoscopy 06/20/2013    Hematuria, unspecified     Hypercholesterolemia     Hypertension     Hypertension      Hypogonadism male     Impotence of organic origin     Left ventricular diastolic dysfunction     Malignant neoplasm of prostate (HCC)     hx of t1a, izzy 6, 5 % of 1  core    Morbid obesity ([address]     Myocardial perfusion 09/05/08    Basal inferior defect c/w artifact; EF 63%    Overactive bladder     Prostate cancer ([address] 2/9/2017    S/P cardiac cath 07/30/10    oD2-40%; pRCA-20-30%; EF 65%    S/P gastric surgery 2018    Sleep apnea     Slowing of urinary stream     Type 2 diabetes with nephropathy [facility] 2018    Type II or unspecified type diabetes mellitus without mention of complication, not stated as uncontrolled        Family History   Problem Relation Age of Onset    Hypertension Mother     High Cholesterol Mother     Breast Cancer Mother     Diabetes Mother     Heart Disease Mother     Arthritis-osteo Mother     High Cholesterol Father     Hypertension Father     Diabetes Father     Heart Disease Father     Arthritis-osteo Father        Social History     Tobacco Use    Smoking status: Former Smoker     Types: Cigars     Last attempt to quit: 10/4/1999     Years since quittin.6    Smokeless tobacco: Never Used   Substance Use Topics    Alcohol use: Never     Frequency: Never     Comment: rarely    Drug use: No        Current Outpatient Medications   Medication Sig    omega 3-dha-epa-fish oil (Fish Oil) 100-160-1,000 mg cap Take  by mouth.  acetaminophen (Acetaminophen Extra Strength) 500 mg tablet Take  by mouth every six (6) hours as needed for Pain.  amLODIPine (NORVASC) 10 mg tablet Take  by mouth daily.  FERROUS FUMARATE PO Take  by mouth.  carvediloL (COREG) 12.5 mg tablet TAKE 1 TABLET BY MOUTH TWICE A DAY WITH MEALS    omeprazole (PRILOSEC) 40 mg capsule TAKE ONE CAPSULE BY MOUTH TWICE A DAY    isosorbide mononitrate ER (IMDUR) 60 mg CR tablet TAKE 1 TABLET BY MOUTH DAILY    allopurinol (ZYLOPRIM) 100 mg tablet TAKE 1 TABLET BY MOUTH EVERY DAY    saw palmetto fruit 450 mg cap Take  by mouth.  hydroCHLOROthiazide (HYDRODIURIL) 25 mg tablet Take 25 mg by mouth daily.  atorvastatin (LIPITOR) 40 mg tablet Take 1 Tab by mouth nightly.  clopidogrel (PLAVIX) 75 mg tab TAKE 1 TABLET BY MOUTH EVERY DAY    cloNIDine (CATAPRES) 0.1 mg/24 hr ptwk APPLY 1 PATCH BY TRANSDERMAL ROUTE EVERY SEVEN (7) DAYS.     multivitamin (ONE A DAY) tablet Take 1 Tab by mouth daily.  nitroglycerin (NITROSTAT) 0.4 mg SL tablet 1 Tab by SubLINGual route every five (5) minutes as needed for Chest Pain.  calcium carbonate (TUMS) 200 mg calcium (500 mg) chew Take 1 Tab by mouth daily.  aspirin 81 mg chewable tablet Take 1 Tab by mouth two (2) times a day. No current facility-administered medications for this visit. Past Surgical History:   Procedure Laterality Date    COLONOSCOPY N/A 9/18/2019    COLONOSCOPY performed by Anival Silva MD at [address] [place] [address]  7/30/10    HX OTHER SURGICAL  06/27/13    Prostate    HX TONSILLECTOMY      OH COLONOSCOPY FLX DX W/COLLJ SPEC WHEN PFRMD      OH I & D ABSC XTRAORAL SOFT TISS COMP         Visit Vitals  Ht 5' 11\" (1.803 m)   Wt 116.6 kg (257 lb)   BMI 35.84 kg/m²       Diagnostic Studies:  I have reviewed the relevant tests done on the patient and show as follows  EKG tracings reviewed by me today. No flowsheet data found. 8/18 Card Cath/PCI  Conclusion           · Three-vessel coronary disease with 50% mid right coronary stenosis, 70% ostial second diagonal stenosis and 90% mid circumflex stenosis  · Circumflex appears to be the culprit vessel for present non-STEMI  · Successful coronary intervention of mid circumflex deploying a drug-eluting stent with residual 0% stenosis. 8/18 ECHO  Interpretation Summary        · Definity contrast was given to enhance imaging. · Estimated left ventricular ejection fraction is 56 - 60%. Left ventricular mild concentric hypertrophy. Normal left ventricular wall motion, no regional wall motion abnormality noted. Moderate (grade 2) left ventricular diastolic dysfunction. · Right ventricular cavity size is mildly dilated. · Left atrial cavity size is moderately dilated. · Tricuspid regurgitation is inadequate for estimation of right ventricular systolic pressure. · Trace mitral valve regurgitation.             Mr. Xochilt Malloy has a reminder for a \"due or due soon\" health maintenance. I have asked that he contact his primary care provider for follow-up on this health maintenance. Physical Exam   Constitutional: He is oriented to person, place, and time. He appears well-developed and well-nourished. No distress. HENT:   Head: Normocephalic and atraumatic. Nose: Nose normal.   Eyes: Conjunctivae and EOM are normal. Right eye exhibits no discharge. Left eye exhibits no discharge. No scleral icterus. Neck: Normal range of motion. No JVD present. No thyromegaly present. Pulmonary/Chest: Effort normal. No accessory muscle usage or stridor. Abdominal: Soft. He exhibits no distension. Musculoskeletal: Normal range of motion. General: Edema (mild) present. Neurological: He is alert and oriented to person, place, and time. Gait normal.   No obvious facial asymmetry   Skin: No rash noted. No erythema. Psychiatric: He has a normal mood and affect. His behavior is normal.       ASSESSMENT and PLAN    I have reviewed/discussed the above normal BMI with the patient. I have recommended the following interventions: dietary management education, guidance, and counseling, encourage exercise and monitor weight . History of gastric sleeve surgery: July 2018  History of PCI: Coronary stent OM1 PETER 8/18; Patient has significant edema despite taking a good dose of     Pertinent laboratory and test data reviewed and discussed with patient. See patient instructions also for other medical advice given    There are no discontinued medications. HCTZ. This is likely related to CKD. Since there is no significant dyspnea, I did not add or increase any diuretics. CAD stable. Labs as ordered. Dialysis has been discontinued and he follows with a nephrologist.      Diagnoses and all orders for this visit:    1. Atherosclerosis of native coronary artery of native heart without angina pectoris    2.  Chronic diastolic congestive heart failure ([facility]  -     IRON PROFILE; Future    3. Essential hypertension    4. Hypercholesterolemia  -     LIPID PANEL; Future  -     HEPATIC FUNCTION PANEL; Future    5. Postsurgical percutaneous transluminal coronary angioplasty status    6. Severe obesity (BMI 35.0-39. 9) with comorbidity [facility])        Pertinent laboratory and test data reviewed and discussed with patient. See patient instructions also for other medical advice given    There are no discontinued medications. Follow-up and Dispositions    · Return in about 6 months (around 11/22/2020), or if symptoms worsen or fail to improve, for post test.         Vital Signs: (As obtained by patient/caregiver at home)  Visit Vitals  Ht 5' 11\" (1.803 m)   Wt 116.6 kg (257 lb)   BMI 35.84 kg/m²          Other pertinent observable physical exam findings:-      We discussed the expected course, resolution and complications of the diagnosis(es) in detail. Medication risks, benefits, costs, interactions, and alternatives were discussed as indicated. I advised him to contact the office if his condition worsens, changes or fails to improve as anticipated. He expressed understanding with the diagnosis(es) and plan. Pursuant to the emergency declaration under the [facility] waiver authority and the [employer] General Act, this Virtual  Visit was conducted, with patient's consent, to reduce the patient's risk of exposure to COVID-19 and provide continuity of care for an established patient. Services were provided through a video synchronous discussion virtually to substitute for in-person clinic visit.     Timmy Alvarez MD

## 2020-05-22 NOTE — PROGRESS NOTES
1. Have you been to the ER, urgent care clinic since your last visit? Hospitalized since your last visit? NO    2. Have you seen or consulted any other health care providers outside of the [address] [place] [address] since your last visit? Include any pap smears or colon screening. Yes Where: [facility] knee pain     3. Since your last visit, have you had any of the following symptoms? No    4. Have you had any blood work, X-rays or cardiac testing? Yes Where: Nephrology      Requested: NO     In [facility]: NO    5. Where do you normally have your labs drawn? [facility]    6. Do you need any refills today?    No

## 2020-05-22 NOTE — PATIENT INSTRUCTIONS
There are no discontinued medications. Body Mass Index: Care Instructions Your Care Instructions Body mass index (BMI) can help you see if your weight is raising your risk for health problems. It uses a formula to compare how much you weigh with how tall you are. · A BMI lower than 18.5 is considered underweight. · A BMI between 18.5 and 24.9 is considered healthy. · A BMI between 25 and 29.9 is considered overweight. A BMI of 30 or higher is considered obese. If your BMI is in the normal range, it means that you have a lower risk for weight-related health problems. If your BMI is in the overweight or obese range, you may be at increased risk for weight-related health problems, such as high blood pressure, heart disease, stroke, arthritis or joint pain, and diabetes. If your BMI is in the underweight range, you may be at increased risk for health problems such as fatigue, lower protection (immunity) against illness, muscle loss, bone loss, hair loss, and hormone problems. BMI is just one measure of your risk for weight-related health problems. You may be at higher risk for health problems if you are not active, you eat an unhealthy diet, or you drink too much alcohol or use tobacco products. Follow-up care is a key part of your treatment and safety. Be sure to make and go to all appointments, and call your doctor if you are having problems. It's also a good idea to know your test results and keep a list of the medicines you take. How can you care for yourself at home? · Practice healthy eating habits. This includes eating plenty of fruits, vegetables, whole grains, lean protein, and low-fat dairy. · If your doctor recommends it, get more exercise. Walking is a good choice. Bit by bit, increase the amount you walk every day. Try for at least 30 minutes on most days of the week. · Do not smoke. Smoking can increase your risk for health problems.  If you need help quitting, talk to your doctor about stop-smoking programs and medicines. These can increase your chances of quitting for good. · Limit alcohol to 2 drinks a day for men and 1 drink a day for women. Too much alcohol can cause health problems. If you have a BMI higher than 25 · Your doctor may do other tests to check your risk for weight-related health problems. This may include measuring the distance around your waist. A waist measurement of more than 40 inches in men or 35 inches in women can increase the risk of weight-related health problems. · Talk with your doctor about steps you can take to stay healthy or improve your health. You may need to make lifestyle changes to lose weight and stay healthy, such as changing your diet and getting regular exercise. If you have a BMI lower than 18.5 · Your doctor may do other tests to check your risk for health problems. · Talk with your doctor about steps you can take to stay healthy or improve your health. You may need to make lifestyle changes to gain or maintain weight and stay healthy, such as getting more healthy foods in your diet and doing exercises to build muscle. Where can you learn more? Go to http://davian-johnson.info/ Enter S176 in the search box to learn more about \"Body Mass Index: Care Instructions. \" Current as of: December 10, 2019Content Version: 12.4 © 2184-9998 [employer]. Care instructions adapted under license by [employer] (which disclaims liability or warranty for this information). If you have questions about a medical condition or this instruction, always ask your healthcare professional. [address] any warranty or liability for your use of this information.

## 2020-06-05 DIAGNOSIS — I10 ESSENTIAL HYPERTENSION: ICD-10-CM

## 2020-06-05 RX ORDER — CLONIDINE 0.1 MG/24H
PATCH, EXTENDED RELEASE TRANSDERMAL
Qty: 12 PATCH | Refills: 1 | Status: SHIPPED | OUTPATIENT
Start: 2020-06-05 | End: 2020-12-22

## 2020-06-10 ENCOUNTER — HOSPITAL ENCOUNTER (OUTPATIENT)
Dept: LAB | Age: 65
Discharge: HOME | End: 2020-06-10

## 2020-06-10 LAB — SENTARA SPECIMEN COL,SENBCF: NORMAL

## 2020-06-10 PROCEDURE — 99001 SPECIMEN HANDLING PT-LAB: CPT

## 2020-06-16 RX ORDER — ISOSORBIDE MONONITRATE 60 MG/1
TABLET, EXTENDED RELEASE ORAL
Qty: 90 TAB | Refills: 1 | Status: SHIPPED | OUTPATIENT
Start: 2020-06-16 | End: 2020-12-17

## 2020-06-17 RX ORDER — OMEPRAZOLE 40 MG/1
CAPSULE, DELAYED RELEASE ORAL
Qty: 180 CAP | Refills: 0 | Status: SHIPPED | OUTPATIENT
Start: 2020-06-17 | End: 2020-09-11

## 2020-06-17 NOTE — TELEPHONE ENCOUNTER
Patient has appt on 7/14 for routine follow up.     Requested Prescriptions     Pending Prescriptions Disp Refills    omeprazole (PRILOSEC) 40 mg capsule 180 Cap 0

## 2020-07-14 ENCOUNTER — TELEPHONE (OUTPATIENT)
Dept: FAMILY MEDICINE CLINIC | Facility: CLINIC | Age: 65
End: 2020-07-14

## 2020-07-14 ENCOUNTER — CLINICAL SUPPORT (OUTPATIENT)
Dept: FAMILY MEDICINE CLINIC | Facility: CLINIC | Age: 65
End: 2020-07-14

## 2020-07-14 ENCOUNTER — VIRTUAL VISIT (OUTPATIENT)
Dept: FAMILY MEDICINE CLINIC | Facility: CLINIC | Age: 65
End: 2020-07-14

## 2020-07-14 DIAGNOSIS — Z20.822 SUSPECTED COVID-19 VIRUS INFECTION: Primary | ICD-10-CM

## 2020-07-14 DIAGNOSIS — I10 ESSENTIAL HYPERTENSION: ICD-10-CM

## 2020-07-14 DIAGNOSIS — Z20.822 SUSPECTED COVID-19 VIRUS INFECTION: ICD-10-CM

## 2020-07-14 NOTE — TELEPHONE ENCOUNTER
Patient had covid test today and request letter stating he was tested today and recommendation for returning to work. He is off on Wednesday and would like to  letter at that time. Please advise.

## 2020-07-14 NOTE — PROGRESS NOTES
Joaquín Oseguera is a 63 y.o. male who was seen by synchronous (real-time) audio-video technology on 7/14/2020 for No chief complaint on file. Assessment & Plan:   Diagnoses and all orders for this visit:    1. Suspected COVID-19 virus infection  -     NOVEL CORONAVIRUS (COVID-19); Future    2. Essential hypertension            Subjective: The patient presents for an Audio-visual teleconference appointment for routine follow-up care. He has a medical history for hypertension, hyperlipidemia, and GERD. Notes he feels well today. He denies mayra chest pain, palpitation, cough, dyspnea or fever. Notes he was recently seen by Cardiologist and Nephrologist.  He wanted a second opinion regarding his kidney disease and ecently switched to a Nephrologist in [address] [place] [address]. \He works at [employer] and notes one of his co-workers tested positive for [employer]. Notes he was working with the co-worker yesterday. Prior to Admission medications    Medication Sig Start Date End Date Taking? Authorizing Provider   omeprazole (PRILOSEC) 40 mg capsule TAKE ONE CAPSULE BY MOUTH TWICE A DAY 6/17/20   Connie Simental NP   isosorbide mononitrate ER (IMDUR) 60 mg CR tablet TAKE 1 TABLET BY MOUTH DAILY 6/16/20   Shannan Camacho NP   cloNIDine (CATAPRES) 0.1 mg/24 hr ptwk APPLY 1 PATCH BY TRANSDERMAL ROUTE EVERY SEVEN (7) DAYS. 6/5/20   Gordon Clark MD   omega 7-eqv-ycn-fish oil (Fish Oil) 100-160-1,000 mg cap Take  by mouth. Provider, Historical   acetaminophen (Acetaminophen Extra Strength) 500 mg tablet Take  by mouth every six (6) hours as needed for Pain. Provider, Historical   amLODIPine (NORVASC) 10 mg tablet Take  by mouth daily. Provider, Historical   FERROUS FUMARATE PO Take  by mouth.     Provider, Historical   carvediloL (COREG) 12.5 mg tablet TAKE 1 TABLET BY MOUTH TWICE A DAY WITH MEALS 1/27/20   Gordno Clark MD   allopurinol (ZYLOPRIM) 100 mg tablet TAKE 1 TABLET BY MOUTH EVERY DAY 11/22/19   Connie Simental NP   saw palmetto fruit 450 mg cap Take  by mouth. Provider, Historical   hydroCHLOROthiazide (HYDRODIURIL) 25 mg tablet Take 25 mg by mouth daily. Provider, Historical   atorvastatin (LIPITOR) 40 mg tablet Take 1 Tab by mouth nightly. 11/8/19   Gordon Clark MD   clopidogrel (PLAVIX) 75 mg tab TAKE 1 TABLET BY MOUTH EVERY DAY 9/16/19   Gordon Clark MD   multivitamin (ONE A DAY) tablet Take 1 Tab by mouth daily. Provider, Historical   nitroglycerin (NITROSTAT) 0.4 mg SL tablet 1 Tab by SubLINGual route every five (5) minutes as needed for Chest Pain. 11/20/18   Connie Simental NP   calcium carbonate (TUMS) 200 mg calcium (500 mg) chew Take 1 Tab by mouth daily. Provider, Historical   aspirin 81 mg chewable tablet Take 1 Tab by mouth two (2) times a day. 9/1/18   Gregory Samuels MD     Patient Active Problem List   Diagnosis Code    Precordial pain R07.2    Hypercholesterolemia E78.00    Sleep apnea G47.30    Left ventricular diastolic dysfunction J89.1    Atherosclerosis of native coronary artery of native heart without angina pectoris I25.10    Atypical chest pain/mild nonobstructive CAD/EF 65% R07.89    Hypogonadism male E29.1    Morbid obesity ([address] E66.01    Elevated PSA R97.20    Overactive bladder N32.81    Impotence of organic origin N52.9    Slowing of urinary stream R39.198    Frequency [id number]    Gout M10.9    Type 2 diabetes mellitus without complication (HCC) T94.7    Essential hypertension I10    Prostate cancer ([address] C61    Hypokalemia E87.6    Congestive heart failure (HCC) I50.9    Chest pain R07.9    Severe obesity (BMI 35.0-39. 9) with comorbidity (HCC) E66.01    Postsurgical percutaneous transluminal coronary angioplasty status Z98.61     Patient Active Problem List    Diagnosis Date Noted    Severe obesity (BMI 35.0-39. 9) with comorbidity ([address] 11/08/2019    Postsurgical percutaneous transluminal coronary angioplasty status 11/08/2019    Chest pain 09/17/2019    Congestive heart failure ([facility] 06/12/2019    Hypokalemia 08/28/2018    Prostate cancer ([facility] 02/09/2017    Essential hypertension 02/17/2016    Type 2 diabetes mellitus without complication ([facility]     Hypogonadism male 05/14/2012    Morbid obesity ([facility] 05/14/2012    Elevated PSA 05/14/2012    Overactive bladder 05/14/2012    Impotence of organic origin 05/14/2012    Slowing of urinary stream 05/14/2012    Frequency 05/14/2012    Gout 05/14/2012    Atypical chest pain/mild nonobstructive CAD/EF 65% 04/12/2011    Left ventricular diastolic dysfunction     Atherosclerosis of native coronary artery of native heart without angina pectoris     Precordial pain     Hypercholesterolemia     Sleep apnea      Current Outpatient Medications   Medication Sig Dispense Refill    omeprazole (PRILOSEC) 40 mg capsule TAKE ONE CAPSULE BY MOUTH TWICE A  Cap 0    isosorbide mononitrate ER (IMDUR) 60 mg CR tablet TAKE 1 TABLET BY MOUTH DAILY 90 Tab 1    cloNIDine (CATAPRES) 0.1 mg/24 hr ptwk APPLY 1 PATCH BY TRANSDERMAL ROUTE EVERY SEVEN (7) DAYS. 12 Patch 1    omega 3-dha-epa-fish oil (Fish Oil) 100-160-1,000 mg cap Take  by mouth.  acetaminophen (Acetaminophen Extra Strength) 500 mg tablet Take  by mouth every six (6) hours as needed for Pain.  amLODIPine (NORVASC) 10 mg tablet Take  by mouth daily.  FERROUS FUMARATE PO Take  by mouth.  carvediloL (COREG) 12.5 mg tablet TAKE 1 TABLET BY MOUTH TWICE A DAY WITH MEALS 180 Tab 1    allopurinol (ZYLOPRIM) 100 mg tablet TAKE 1 TABLET BY MOUTH EVERY DAY 90 Tab 3    saw palmetto fruit 450 mg cap Take  by mouth.  hydroCHLOROthiazide (HYDRODIURIL) 25 mg tablet Take 25 mg by mouth daily.  atorvastatin (LIPITOR) 40 mg tablet Take 1 Tab by mouth nightly.  90 Tab 3    clopidogrel (PLAVIX) 75 mg tab TAKE 1 TABLET BY MOUTH EVERY DAY 90 Tab 3    multivitamin (ONE A DAY) tablet Take 1 Tab by mouth daily.  nitroglycerin (NITROSTAT) 0.4 mg SL tablet 1 Tab by SubLINGual route every five (5) minutes as needed for Chest Pain. 1 Bottle 1    calcium carbonate (TUMS) 200 mg calcium (500 mg) chew Take 1 Tab by mouth daily.  aspirin 81 mg chewable tablet Take 1 Tab by mouth two (2) times a day.  60 Tab 0     Allergies   Allergen Reactions    Iodine Other (comments)     Eyes swell shut      Shellfish Containing Products Swelling     Past Medical History:   Diagnosis Date    Atypical chest pain/mild nonobstructive CAD/EF 65% 4/12/2011    BPH without obstruction/lower urinary tract symptoms     Bursitis of right shoulder     CAD (coronary artery disease)     Chest pain     history of hospitalization with chest pain and a negative workup in 2008    Chronic edema     Constipation     die to medication    Coronary artery disease     mild, non obstructive/EF 65%    Depression 12/16/2018    Dyspnea on exertion     Echocardiogram 12/16/08    IVC dilated; suboptimal endocardial border; EF 65%    ED (erectile dysfunction)     Elevated PSA     Frequency     GERD (gastroesophageal reflux disease)     Gout     H/O cystoscopy 06/20/2013    Hematuria, unspecified     Hypercholesterolemia     Hypertension     Hypertension      Hypogonadism male     Impotence of organic origin     Left ventricular diastolic dysfunction     Malignant neoplasm of prostate (HCC)     hx of t1a, izzy 6, 5 % of 1  core    Morbid obesity ([address]     Myocardial perfusion 09/05/08    Basal inferior defect c/w artifact; EF 63%    Overactive bladder     Prostate cancer ([address] 2/9/2017    S/P cardiac cath 07/30/10    oD2-40%; pRCA-20-30%; EF 65%    S/P gastric surgery 8/28/2018    Sleep apnea     Slowing of urinary stream     Type 2 diabetes with nephropathy ([address] 9/27/2018    Type II or unspecified type diabetes mellitus without mention of complication, not stated as uncontrolled ROS    Constitutional: No apparent distress noted  General- negative for fever, chills or fatigue  Eyes- negative visual changes  CV- denies chest pain, palpitation  Pul: negative cough or SOB  GI: negative nausea, flank pain, diarrhea, constipation  Urinary:- No dysuria or polyuria  MS- negative myalgia, negative joint pain  Neuro- negative headache, dizziness or weakness  Skin- negative for rashes or lesions. Psych- denies any anxiety or depression    Objective:   No flowsheet data found.      [INSTRUCTIONS:  \"[x]\" Indicates a positive item  \"[]\" Indicates a negative item  -- DELETE ALL ITEMS NOT EXAMINED]    Constitutional: [x] Appears well-developed and well-nourished [x] No apparent distress      [] Abnormal -     Mental status: [x] Alert and awake  [x] Oriented to person/place/time [x] Able to follow commands    [] Abnormal -     Eyes:   EOM    [x]  Normal    [] Abnormal -   Sclera  [x]  Normal    [] Abnormal -          Discharge [x]  None visible   [] Abnormal -     HENT: [x] Normocephalic, atraumatic  [] Abnormal -   [x] Mouth/Throat: Mucous membranes are moist    External Ears [x] Normal  [] Abnormal -    Neck: [x] No visualized mass [] Abnormal -     Pulmonary/Chest: [x] Respiratory effort normal   [x] No visualized signs of difficulty breathing or respiratory distress        [] Abnormal -      Musculoskeletal:   [x] Normal gait with no signs of ataxia         [x] Normal range of motion of neck        [] Abnormal -     Neurological:        [x] No Facial Asymmetry (Cranial nerve 7 motor function) (limited exam due to video visit)          [x] No gaze palsy        [] Abnormal -          Skin:        [x] No significant exanthematous lesions or discoloration noted on facial skin         [] Abnormal -            Psychiatric:       [x] Normal Affect [] Abnormal -        [x] No Hallucinations    Other pertinent observable physical exam findings:-        We discussed the expected course, resolution and complications of the diagnosis(es) in detail. Medication risks, benefits, costs, interactions, and alternatives were discussed as indicated. I advised him to contact the office if his condition worsens, changes or fails to improve as anticipated. He expressed understanding with the diagnosis(es) and plan. Laura Soto, who was evaluated through a patient-initiated, synchronous (real-time) audio-video encounter, and/or his healthcare decision maker, is aware that it is a billable service, with coverage as determined by his insurance carrier. He provided verbal consent to proceed: Yes, and patient identification was verified. It was conducted pursuant to the emergency declaration under the [facility], [address] [place] [address] authority and the [employer] Act. A caregiver was present when appropriate. Ability to conduct physical exam was limited. I was at home. The patient was at home.       Augusta Shin NP

## 2020-07-15 LAB — SARS-COV-2, COV2NT: NOT DETECTED

## 2020-07-16 NOTE — TELEPHONE ENCOUNTER
Spoke with patient informed patient that he needs to quarantine for 14 days and stay away from any family members that's in the home. Patient verbalized his understanding.

## 2020-07-17 ENCOUNTER — TELEPHONE (OUTPATIENT)
Dept: FAMILY MEDICINE CLINIC | Facility: CLINIC | Age: 65
End: 2020-07-17

## 2020-07-17 NOTE — TELEPHONE ENCOUNTER
Patient would like results on his covid test.  He says his girlfriend has a doctors appointment and they will not see her until his results are in. Please advise.

## 2020-07-17 NOTE — TELEPHONE ENCOUNTER
Spoke with pt her received his results. Patient stated he needs a work note and a couple of his results.

## 2020-08-05 NOTE — PROGRESS NOTES
conducted an initial consultation and Spiritual Assessment for Tessa Gibbs, who is a 66 y.o.,male. Patients Primary Language is: [place]. According to the patients EMR Congregation Affiliation is: [employer]isa Ortiz.  
 
The reason the Patient came to the hospital is:  
Patient Active Problem List  
 Diagnosis Date Noted  Hypokalemia 08/28/2018  ACS (acute coronary syndrome) ([facility] 08/28/2018  AWA (acute kidney injury) ([facility] 08/28/2018  Acute renal failure superimposed on stage 3 chronic kidney disease ([address] 08/28/2018  S/P gastric surgery 08/28/2018  Prostate cancer ([facility] 02/09/2017  Morbid (severe) obesity due to excess calories ([facility] 02/09/2017  Essential hypertension 02/17/2016  Type 2 diabetes mellitus without complication ([facility]  Chronic edema  Sleep apnea 01/25/2013  Hypogonadism male 05/14/2012  Morbid obesity ([facility] 05/14/2012  Elevated PSA 05/14/2012  Overactive bladder 05/14/2012  Impotence of organic origin 05/14/2012  Slowing of urinary stream 05/14/2012  Frequency 05/14/2012  Gout 05/14/2012  Atypical chest pain/mild nonobstructive CAD/EF 65% 04/12/2011  Left ventricular diastolic dysfunction  Coronary artery disease  Chest pain  Hypercholesterolemia  Sleep apnea The  provided the following Interventions: 
Initiated a relationship of care and support. Explored issues of dania, belief, spirituality and Buddhism/ritual needs while hospitalized. Listened empathically. Provided chaplaincy education. Provided information about Spiritual Care Services. Offered prayer and assurance of continued prayers on patient's behalf. Chart reviewed. The following outcomes where achieved: 
Patient shared limited information about both their medical narrative and spiritual journey/beliefs.  confirmed Patient's Congregation Affiliation. Patient processed feeling about current hospitalization. Patient admitted from the ED on a 201 with DX of depression  Cooperative with contraband check  Showered upon arrival  Presents with flat affect, depressed mood  Mood did improve throughout admission process  States he had a big fight with his family today because they thought he was doing drugs again like "meth" and states he has been clean 2 years only smokes pot  Reports they called the  and he ran  Has superficial scratches on legs  Unsure if any charges were filed  Denied any current legal issues  Denies any current SI/HI/AH/VH  Does states this is first adult in patient admission but has had 2 adolescent in patients  Denies any prior SA's  Reports his father and brother had mental health issues  Pleasant and cooperative throughout admission process  Prn trazodone given , oriented to unit and went right to bed  Q 7 minute checks initiated  Patient expressed gratitude for 's visit. Assessment: 
Patient does not have any Mandaen/cultural needs that will affect patients preferences in health care. There are no spiritual or Mandaen issues which require intervention at this time. Plan: 
Chaplains will continue to follow and will provide pastoral care on an as needed/requested basis.  recommends bedside caregivers page  on duty if patient shows signs of acute spiritual or emotional distress. Chaplain Resident [facility]  
[phone]

## 2020-09-11 RX ORDER — OMEPRAZOLE 40 MG/1
CAPSULE, DELAYED RELEASE ORAL
Qty: 180 CAP | Refills: 0 | Status: SHIPPED | OUTPATIENT
Start: 2020-09-11 | End: 2021-08-30

## 2020-10-08 ENCOUNTER — VIRTUAL VISIT (OUTPATIENT)
Dept: FAMILY MEDICINE CLINIC | Age: 65
End: 2020-10-08
Payer: COMMERCIAL

## 2020-10-08 DIAGNOSIS — E78.2 MIXED HYPERLIPIDEMIA: ICD-10-CM

## 2020-10-08 DIAGNOSIS — E11.22 CONTROLLED TYPE 2 DIABETES MELLITUS WITH CHRONIC KIDNEY DISEASE, WITHOUT LONG-TERM CURRENT USE OF INSULIN, UNSPECIFIED CKD STAGE (HCC): ICD-10-CM

## 2020-10-08 DIAGNOSIS — I10 ESSENTIAL HYPERTENSION: Primary | ICD-10-CM

## 2020-10-08 DIAGNOSIS — I10 ESSENTIAL HYPERTENSION: ICD-10-CM

## 2020-10-08 PROCEDURE — 99442 PR PHYS/QHP TELEPHONE EVALUATION 11-20 MIN: CPT | Performed by: NURSE PRACTITIONER

## 2020-10-08 RX ORDER — NITROGLYCERIN 0.4 MG/1
0.4 TABLET SUBLINGUAL
Qty: 1 BOTTLE | Refills: 1 | Status: CANCELLED | OUTPATIENT
Start: 2020-10-08

## 2020-10-08 NOTE — PROGRESS NOTES
Filipe Felix presents today for   Chief Complaint   Patient presents with    Hypertension     3 month follow-up       Virtual/telephone visit    Depression Screening:  3 most recent PHQ Screens 10/8/2020   PHQ Not Done -   Little interest or pleasure in doing things Not at all   Feeling down, depressed, irritable, or hopeless Not at all   Total Score PHQ 2 0   Trouble falling or staying asleep, or sleeping too much -   Feeling tired or having little energy -   Poor appetite, weight loss, or overeating -   Feeling bad about yourself - or that you are a failure or have let yourself or your family down -   Trouble concentrating on things such as school, work, reading, or watching TV -   Moving or speaking so slowly that other people could have noticed; or the opposite being so fidgety that others notice -   Thoughts of being better off dead, or hurting yourself in some way -   PHQ 9 Score -   How difficult have these problems made it for you to do your work, take care of your home and get along with others -       Learning Assessment:  Learning Assessment 5/25/2017   PRIMARY LEARNER Patient   CO-LEARNER CAREGIVER No   PRIMARY LANGUAGE ENGLISH   LEARNER PREFERENCE PRIMARY READING   ANSWERED BY patient   RELATIONSHIP SELF       Abuse Screening:  Abuse Screening Questionnaire 10/8/2020   Do you ever feel afraid of your partner? N   Are you in a relationship with someone who physically or mentally threatens you? N   Is it safe for you to go home? Y       Fall Risk  Fall Risk Assessment, last 12 mths 6/7/2019   Able to walk? Yes   Fall in past 12 months? No         Health Maintenance reviewed and discussed and ordered per Provider. Health Maintenance Due   Topic Date Due    Eye Exam Retinal or Dilated  01/08/1965    DTaP/Tdap/Td series (1 - Tdap) 01/08/1978    MICROALBUMIN Q1  06/29/2017    Foot Exam Q1  09/07/2017    FOBT Q1Y Age 50-75  09/29/2017    Flu Vaccine (1) 09/01/2020   .       Coordination of Care:  1. Have you been to the ER, urgent care clinic since your last visit? Hospitalized since your last visit? no    2. Have you seen or consulted any other health care providers outside of the [address] [place] [address] since your last visit? Include any pap smears or colon screening.  no

## 2020-10-08 NOTE — PROGRESS NOTES
Kassidy Sarabia is a 63 y.o. male, evaluated via audio-only technology on 10/8/2020 for Hypertension (3 month follow-up)  . Assessment & Plan:   Diagnoses and all orders for this visit:    1. Essential hypertension  -     CBC WITH AUTOMATED DIFF; Future  -     METABOLIC PANEL, COMPREHENSIVE; Future    2. Mixed hyperlipidemia  -     LIPID PANEL; Future    3. Controlled type 2 diabetes mellitus with chronic kidney disease, without long-term current use of insulin, unspecified CKD stage (HCC)  -     HEMOGLOBIN A1C WITH EAG; Future  -     MICROALBUMIN, UR, RAND W/ MICROALB/CREAT RATIO; Future      Fasting labs ordered  Follow-up in 4 months 12  Subjective: The patient presents for an Audio-visual teleconference appointment for routine follow-up care. Patient has a medical history for hypertension, hyperlipidemia and diabetes mellitus. He is status post gastric bypass and his diabetes has been controlled. His last hemoglobin A1c in November 2019 was 5.1. He denies any polyuria, polydipsia or polyphagia. He notes he is continuing to lose weight and has purchased multiple belts secondary to his weight loss. Hypertension-he notes he has had normal BP readings. He works at [employer] and is able to get his blood pressure check for free. He denies any chest pain or palpitation. Hyperlipidemia-no recent lipid panels in the medical record. He is prescribed a statin daily. Prior to Admission medications    Medication Sig Start Date End Date Taking?  Authorizing Provider   omeprazole (PRILOSEC) 40 mg capsule TAKE 1 CAPSULE BY MOUTH TWICE A DAY 9/11/20  Yes Linda Seay NP   isosorbide mononitrate ER (IMDUR) 60 mg CR tablet TAKE 1 TABLET BY MOUTH DAILY 6/16/20  Yes Shannan Camacho NP   cloNIDine (CATAPRES) 0.1 mg/24 hr ptwk APPLY 1 PATCH BY TRANSDERMAL ROUTE EVERY SEVEN (7) DAYS. 6/5/20  Yes Fabian Parson MD   acetaminophen (Acetaminophen Extra Strength) 500 mg tablet Take  by mouth every six (6) hours as needed for Pain. Yes Provider, Historical   amLODIPine (NORVASC) 10 mg tablet Take  by mouth daily. Yes Provider, Historical   carvediloL (COREG) 12.5 mg tablet TAKE 1 TABLET BY MOUTH TWICE A DAY WITH MEALS 1/27/20  Yes Netta German MD   allopurinol (ZYLOPRIM) 100 mg tablet TAKE 1 TABLET BY MOUTH EVERY DAY 11/22/19  Yes Randall Saul NP   atorvastatin (LIPITOR) 40 mg tablet Take 1 Tab by mouth nightly. 11/8/19  Yes Netta German MD   clopidogrel (PLAVIX) 75 mg tab TAKE 1 TABLET BY MOUTH EVERY DAY 9/16/19  Yes Netta German MD   multivitamin (ONE A DAY) tablet Take 1 Tab by mouth daily. Yes Provider, Historical   nitroglycerin (NITROSTAT) 0.4 mg SL tablet 1 Tab by SubLINGual route every five (5) minutes as needed for Chest Pain. 11/20/18  Yes Randall Saul NP   calcium carbonate (TUMS) 200 mg calcium (500 mg) chew Take 1 Tab by mouth daily. Yes Provider, Historical   aspirin 81 mg chewable tablet Take 1 Tab by mouth two (2) times a day. 9/1/18  Yes Luanne Higgins MD   omega 3-dha-epa-fish oil (Fish Oil) 100-160-1,000 mg cap Take  by mouth. Provider, Historical   FERROUS FUMARATE PO Take  by mouth. Provider, Historical   saw palmetto fruit 450 mg cap Take  by mouth. Provider, Historical   hydroCHLOROthiazide (HYDRODIURIL) 25 mg tablet Take 25 mg by mouth daily.     Provider, Historical     Patient Active Problem List   Diagnosis Code    Precordial pain R07.2    Hypercholesterolemia E78.00    Sleep apnea G47.30    Left ventricular diastolic dysfunction J03.1    Atherosclerosis of native coronary artery of native heart without angina pectoris I25.10    Atypical chest pain/mild nonobstructive CAD/EF 65% R07.89    Hypogonadism male E29.1    Morbid obesity ([facility] E66.01    Elevated PSA R97.20    Overactive bladder N32.81    Impotence of organic origin N52.9    Slowing of urinary stream R39.198    Frequency [id number]    Gout M10.9    Type 2 diabetes mellitus without complication ([facility]) W40.6    Essential hypertension I10    Prostate cancer ([facility] C61    Hypokalemia E87.6    Congestive heart failure ([facility]) I50.9    Chest pain R07.9    Severe obesity (BMI 35.0-39. 9) with comorbidity ([facility]) E66.01    Postsurgical percutaneous transluminal coronary angioplasty status Z98.61     Patient Active Problem List    Diagnosis Date Noted    Severe obesity (BMI 35.0-39. 9) with comorbidity ([facility] 11/08/2019    Postsurgical percutaneous transluminal coronary angioplasty status 11/08/2019    Chest pain 09/17/2019    Congestive heart failure ([address] 06/12/2019    Hypokalemia 08/28/2018    Prostate cancer ([facility] 02/09/2017    Essential hypertension 02/17/2016    Type 2 diabetes mellitus without complication ([facility] [address]     Hypogonadism male 05/14/2012    Morbid obesity ([facility] [address] [facility] 05/14/2012    Elevated PSA 05/14/2012    Overactive bladder 05/14/2012    Impotence of organic origin 05/14/2012    Slowing of urinary stream 05/14/2012    Frequency 05/14/2012    Gout 05/14/2012    Atypical chest pain/mild nonobstructive CAD/EF 65% 04/12/2011    Left ventricular diastolic dysfunction     Atherosclerosis of native coronary artery of native heart without angina pectoris     Precordial pain     Hypercholesterolemia     Sleep apnea      Current Outpatient Medications   Medication Sig Dispense Refill    omeprazole (PRILOSEC) 40 mg capsule TAKE 1 CAPSULE BY MOUTH TWICE A  Cap 0    isosorbide mononitrate ER (IMDUR) 60 mg CR tablet TAKE 1 TABLET BY MOUTH DAILY 90 Tab 1    cloNIDine (CATAPRES) 0.1 mg/24 hr ptwk APPLY 1 PATCH BY TRANSDERMAL ROUTE EVERY SEVEN (7) DAYS. 12 Patch 1    acetaminophen (Acetaminophen Extra Strength) 500 mg tablet Take  by mouth every six (6) hours as needed for Pain.  amLODIPine (NORVASC) 10 mg tablet Take  by mouth daily.       carvediloL (COREG) 12.5 mg tablet TAKE 1 TABLET BY MOUTH TWICE A DAY WITH MEALS 180 Tab 1    allopurinol (ZYLOPRIM) 100 mg tablet TAKE 1 TABLET BY MOUTH EVERY DAY 90 Tab 3    atorvastatin (LIPITOR) 40 mg tablet Take 1 Tab by mouth nightly. 90 Tab 3    clopidogrel (PLAVIX) 75 mg tab TAKE 1 TABLET BY MOUTH EVERY DAY 90 Tab 3    multivitamin (ONE A DAY) tablet Take 1 Tab by mouth daily.  nitroglycerin (NITROSTAT) 0.4 mg SL tablet 1 Tab by SubLINGual route every five (5) minutes as needed for Chest Pain. 1 Bottle 1    calcium carbonate (TUMS) 200 mg calcium (500 mg) chew Take 1 Tab by mouth daily.  aspirin 81 mg chewable tablet Take 1 Tab by mouth two (2) times a day. 60 Tab 0    omega 3-dha-epa-fish oil (Fish Oil) 100-160-1,000 mg cap Take  by mouth.  FERROUS FUMARATE PO Take  by mouth.  saw palmetto fruit 450 mg cap Take  by mouth.  hydroCHLOROthiazide (HYDRODIURIL) 25 mg tablet Take 25 mg by mouth daily.        Allergies   Allergen Reactions    Iodine Other (comments)     Eyes swell shut      Shellfish Containing Products Swelling     Past Medical History:   Diagnosis Date    Atypical chest pain/mild nonobstructive CAD/EF 65% 4/12/2011    BPH without obstruction/lower urinary tract symptoms     Bursitis of right shoulder     CAD (coronary artery disease)     Chest pain     history of hospitalization with chest pain and a negative workup in 2008    Chronic edema     Constipation     die to medication    Coronary artery disease     mild, non obstructive/EF 65%    Depression 12/16/2018    Dyspnea on exertion     Echocardiogram 12/16/08    IVC dilated; suboptimal endocardial border; EF 65%    ED (erectile dysfunction)     Elevated PSA     Frequency     GERD (gastroesophageal reflux disease)     Gout     H/O cystoscopy 06/20/2013    Hematuria, unspecified     Hypercholesterolemia     Hypertension     Hypertension      Hypogonadism male     Impotence of organic origin     Left ventricular diastolic dysfunction     Malignant neoplasm of prostate (HCC)     hx of t1a, izzy 6, 5 % of 1  core    Morbid obesity (HCC)     Myocardial perfusion 09/05/08    Basal inferior defect c/w artifact; EF 63%    Overactive bladder     Prostate cancer ([facility] 2/9/2017    S/P cardiac cath 07/30/10    oD2-40%; pRCA-20-30%; EF 65%    S/P gastric surgery 8/28/2018    Sleep apnea     Slowing of urinary stream     Type 2 diabetes with nephropathy ([facility] [address] 9/27/2018    Type II or unspecified type diabetes mellitus without mention of complication, not stated as uncontrolled        ROS    Constitutional: No apparent distress noted  General- negative for fever, chills or fatigue  Eyes- negative visual changes  CV- denies chest pain, palpitation  Pul: negative cough or SOB  GI: negative nausea, flank pain, diarrhea, constipation  Urinary:- No dysuria or polyuria  MS- negative myalgia, negative joint pain  Neuro- negative headache, dizziness or weakness  Skin- negative for rashes or lesions. Psych- denies any anxiety or depression    No flowsheet data found. David Cale, who was evaluated through a patient-initiated, synchronous (real-time) audio only encounter, and/or her healthcare decision maker, is aware that it is a billable service, with coverage as determined by his insurance carrier. He provided verbal consent to proceed: Yes. He has not had a related appointment within my department in the past 7 days or scheduled within the next 24 hours.       Total Time: minutes: 11-20 minutes    Kai Simmons NP

## 2020-10-09 ENCOUNTER — TELEPHONE (OUTPATIENT)
Dept: FAMILY MEDICINE CLINIC | Age: 65
End: 2020-10-09

## 2020-10-09 ENCOUNTER — HOSPITAL ENCOUNTER (OUTPATIENT)
Dept: LAB | Age: 65
Discharge: HOME | End: 2020-10-09

## 2020-10-09 DIAGNOSIS — M10.072 ACUTE IDIOPATHIC GOUT OF LEFT FOOT: Primary | ICD-10-CM

## 2020-10-09 DIAGNOSIS — M25.562 CHRONIC PAIN OF BOTH KNEES: Primary | ICD-10-CM

## 2020-10-09 DIAGNOSIS — M25.561 CHRONIC PAIN OF BOTH KNEES: Primary | ICD-10-CM

## 2020-10-09 DIAGNOSIS — G89.29 CHRONIC PAIN OF BOTH KNEES: Primary | ICD-10-CM

## 2020-10-09 LAB
A-G RATIO,AGRAT: 1.7 RATIO (ref 1.1–2.6)
ABSOLUTE LYMPHOCYTE COUNT, 10803: 1.5 K/UL (ref 1–4.8)
ALBUMIN SERPL-MCNC: 4.3 G/DL (ref 3.5–5)
ALP SERPL-CCNC: 92 U/L (ref 40–125)
ALT SERPL-CCNC: 10 U/L (ref 5–40)
ANION GAP SERPL CALC-SCNC: 9.6 MMOL/L (ref 3–15)
AST SERPL W P-5'-P-CCNC: 15 U/L (ref 10–37)
BASOPHILS # BLD: 0 K/UL (ref 0–0.2)
BASOPHILS NFR BLD: 0 % (ref 0–2)
BILIRUB SERPL-MCNC: 0.3 MG/DL (ref 0.2–1.2)
BUN SERPL-MCNC: 32 MG/DL (ref 6–22)
CALCIUM SERPL-MCNC: 9.2 MG/DL (ref 8.4–10.5)
CHLORIDE SERPL-SCNC: 104 MMOL/L (ref 98–110)
CHOLEST SERPL-MCNC: 146 MG/DL (ref 110–200)
CO2 SERPL-SCNC: 31 MMOL/L (ref 20–32)
CREAT SERPL-MCNC: 1.6 MG/DL (ref 0.8–1.6)
CREATININE, URINE: 71 MG/DL
EOSINOPHIL # BLD: 0.1 K/UL (ref 0–0.5)
EOSINOPHIL NFR BLD: 2 % (ref 0–6)
ERYTHROCYTE [DISTWIDTH] IN BLOOD BY AUTOMATED COUNT: 13.2 % (ref 10–15.5)
GFRAA, 66117: 52
GFRNA, 66118: 42.9
GLOBULIN,GLOB: 2.5 G/DL (ref 2–4)
GLUCOSE SERPL-MCNC: 98 MG/DL (ref 70–99)
GRANULOCYTES,GRANS: 57 % (ref 40–75)
HCT VFR BLD AUTO: 40.3 % (ref 39.3–51.6)
HDLC SERPL-MCNC: 2.7 MG/DL (ref 0–5)
HDLC SERPL-MCNC: 55 MG/DL
HGB BLD-MCNC: 13 G/DL (ref 13.1–17.2)
LDL/HDL RATIO,LDHD: 1.4
LDLC SERPL CALC-MCNC: 77 MG/DL (ref 50–99)
LYMPHOCYTES, LYMLT: 32 % (ref 20–45)
MCH RBC QN AUTO: 29 PG (ref 26–34)
MCHC RBC AUTO-ENTMCNC: 32 G/DL (ref 31–36)
MCV RBC AUTO: 90 FL (ref 80–95)
MICROALB/CREAT RATIO, 140286: 90.1 (ref 0–30)
MICROALBUMIN,URINE RANDOM 140054: 64 MG/L (ref 0.1–17)
MONOCYTES # BLD: 0.4 K/UL (ref 0.1–1)
MONOCYTES NFR BLD: 9 % (ref 3–12)
NEUTROPHILS # BLD AUTO: 2.7 K/UL (ref 1.8–7.7)
NON-HDL CHOLESTEROL, 011976: 91 MG/DL
PLATELET # BLD AUTO: 209 K/UL (ref 140–440)
PMV BLD AUTO: 10.2 FL (ref 9–13)
POTASSIUM SERPL-SCNC: 3.4 MMOL/L (ref 3.5–5.5)
PROT SERPL-MCNC: 6.8 G/DL (ref 6.2–8.1)
RBC # BLD AUTO: 4.5 M/UL (ref 3.8–5.8)
SENTARA SPECIMEN COL,SENBCF: NORMAL
SODIUM SERPL-SCNC: 145 MMOL/L (ref 133–145)
TRIGL SERPL-MCNC: 66 MG/DL (ref 40–149)
VLDLC SERPL CALC-MCNC: 13 MG/DL (ref 8–30)
WBC # BLD AUTO: 4.8 K/UL (ref 4–11)

## 2020-10-09 PROCEDURE — 99001 SPECIMEN HANDLING PT-LAB: CPT

## 2020-10-09 RX ORDER — PREDNISONE 20 MG/1
40 TABLET ORAL DAILY
Qty: 8 TAB | Refills: 0 | Status: SHIPPED | OUTPATIENT
Start: 2020-10-09 | End: 2020-10-13

## 2020-10-09 NOTE — TELEPHONE ENCOUNTER
Patient states he has gout in left foot. He says he is usually prescribed medication for this and request it sent to [facility].

## 2020-10-12 DIAGNOSIS — Z98.61 POSTSURGICAL PERCUTANEOUS TRANSLUMINAL CORONARY ANGIOPLASTY STATUS: ICD-10-CM

## 2020-10-12 RX ORDER — CLOPIDOGREL BISULFATE 75 MG/1
TABLET ORAL
Qty: 90 TAB | Refills: 3 | Status: SHIPPED | OUTPATIENT
Start: 2020-10-12 | End: 2021-08-26 | Stop reason: SDUPTHER

## 2020-10-13 RX ORDER — NITROGLYCERIN 0.4 MG/1
0.4 TABLET SUBLINGUAL
Qty: 1 BOTTLE | Refills: 1 | Status: SHIPPED | OUTPATIENT
Start: 2020-10-13 | End: 2021-12-13

## 2020-10-14 DIAGNOSIS — E87.6 HYPOKALEMIA: Primary | ICD-10-CM

## 2020-10-14 DIAGNOSIS — R80.9 MICROALBUMINURIA: Primary | ICD-10-CM

## 2020-10-14 RX ORDER — RAMIPRIL 2.5 MG/1
2.5 CAPSULE ORAL DAILY
Qty: 90 CAP | Refills: 1 | Status: SHIPPED | OUTPATIENT
Start: 2020-10-14 | End: 2021-04-18

## 2020-10-14 RX ORDER — POTASSIUM CHLORIDE 20 MEQ/1
20 TABLET, EXTENDED RELEASE ORAL DAILY
Qty: 2 TAB | Refills: 0 | Status: SHIPPED | OUTPATIENT
Start: 2020-10-14 | End: 2020-10-16

## 2020-10-14 NOTE — PROGRESS NOTES
Please notify patient that lab results were reviewed and the results ok except he had a mildly low Potassium level and his microalbuminuria ratio was elevated. His he taking any diurectic? I sent a rx for Potassium x 2 days. I also sent in a very low prescription for the microalbuminuria to help protect his kidneys.

## 2020-10-26 ENCOUNTER — TELEPHONE (OUTPATIENT)
Dept: FAMILY MEDICINE CLINIC | Age: 65
End: 2020-10-26

## 2020-10-26 NOTE — TELEPHONE ENCOUNTER
Patient called asking for a call back in reference to leg swelling  Please call him back at [phone]

## 2020-10-27 ENCOUNTER — HOSPITAL ENCOUNTER (OUTPATIENT)
Dept: LAB | Age: 65
Discharge: HOME | End: 2020-10-27

## 2020-10-27 LAB — SENTARA SPECIMEN COL,SENBCF: NORMAL

## 2020-10-27 PROCEDURE — 99001 SPECIMEN HANDLING PT-LAB: CPT

## 2020-10-29 NOTE — TELEPHONE ENCOUNTER
TC to patient. Informed patient he needs a virtual appointment. Number was given to patient to call the office. Patient verbalized his understanding.

## 2020-11-08 DIAGNOSIS — E78.00 HYPERCHOLESTEROLEMIA: ICD-10-CM

## 2020-11-08 RX ORDER — ATORVASTATIN CALCIUM 40 MG/1
TABLET, FILM COATED ORAL
Qty: 90 TAB | Refills: 3 | Status: SHIPPED | OUTPATIENT
Start: 2020-11-08 | End: 2020-12-04 | Stop reason: SDUPTHER

## 2020-12-04 ENCOUNTER — OFFICE VISIT (OUTPATIENT)
Dept: CARDIOLOGY CLINIC | Age: 65
End: 2020-12-04
Payer: COMMERCIAL

## 2020-12-04 VITALS
HEART RATE: 70 BPM | HEIGHT: 71 IN | TEMPERATURE: 97.2 F | SYSTOLIC BLOOD PRESSURE: 124 MMHG | WEIGHT: 261.2 LBS | OXYGEN SATURATION: 95 % | BODY MASS INDEX: 36.57 KG/M2 | DIASTOLIC BLOOD PRESSURE: 71 MMHG

## 2020-12-04 DIAGNOSIS — Z98.61 POSTSURGICAL PERCUTANEOUS TRANSLUMINAL CORONARY ANGIOPLASTY STATUS: ICD-10-CM

## 2020-12-04 DIAGNOSIS — I10 ESSENTIAL HYPERTENSION: ICD-10-CM

## 2020-12-04 DIAGNOSIS — E78.00 HYPERCHOLESTEROLEMIA: ICD-10-CM

## 2020-12-04 DIAGNOSIS — I25.10 ATHEROSCLEROSIS OF NATIVE CORONARY ARTERY OF NATIVE HEART WITHOUT ANGINA PECTORIS: Primary | ICD-10-CM

## 2020-12-04 DIAGNOSIS — M10.072 ACUTE IDIOPATHIC GOUT OF LEFT FOOT: ICD-10-CM

## 2020-12-04 DIAGNOSIS — E66.01 SEVERE OBESITY (BMI 35.0-39.9) WITH COMORBIDITY (HCC): ICD-10-CM

## 2020-12-04 PROCEDURE — 99214 OFFICE O/P EST MOD 30 MIN: CPT | Performed by: INTERNAL MEDICINE

## 2020-12-04 PROCEDURE — 93000 ELECTROCARDIOGRAM COMPLETE: CPT | Performed by: INTERNAL MEDICINE

## 2020-12-04 RX ORDER — BUMETANIDE 0.5 MG/1
TABLET ORAL
COMMUNITY
Start: 2020-11-03 | End: 2021-06-18

## 2020-12-04 RX ORDER — ATORVASTATIN CALCIUM 40 MG/1
40 TABLET, FILM COATED ORAL DAILY
Qty: 90 TAB | Refills: 3 | Status: SHIPPED | OUTPATIENT
Start: 2020-12-04 | End: 2021-08-26 | Stop reason: SDUPTHER
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
## 2020-12-04 NOTE — TELEPHONE ENCOUNTER
Patient has flare up with gout. Requested Prescriptions     Pending Prescriptions Disp Refills    predniSONE (DELTASONE) 20 mg tablet 8 Tab 0     Sig: Take 40 mg by mouth daily for 4 days.

## 2020-12-04 NOTE — PATIENT INSTRUCTIONS
Medications Discontinued During This Encounter   Medication Reason    saw palmetto fruit 450 mg cap     atorvastatin (LIPITOR) 40 mg tablet REORDER          Learning About the Mediterranean Diet  What is the Mediterranean diet? The Mediterranean diet is a style of eating rather than a diet plan. It features foods eaten in [place], [place], [place] and [place], and other countries along the [facility]. It emphasizes eating foods like fish, fruits, vegetables, beans, high-fiber breads and whole grains, nuts, and olive oil. This style of eating includes limited red meat, cheese, and sweets. Why choose the Mediterranean diet? A Mediterranean-style diet may improve heart health. It contains more fat than other heart-healthy diets. But the fats are mainly from nuts, unsaturated oils (such as fish oils and olive oil), and certain nut or seed oils (such as canola, soybean, or flaxseed oil). These fats may help protect the heart and blood vessels. How can you get started on the Mediterranean diet? Here are some things you can do to switch to a more Mediterranean way of eating. What to eat  · Eat a variety of fruits and vegetables each day, such as grapes, blueberries, tomatoes, broccoli, peppers, figs, olives, spinach, eggplant, beans, lentils, and chickpeas. · Eat a variety of whole-grain foods each day, such as oats, brown rice, and whole wheat bread, pasta, and couscous. · Eat fish at least 2 times a week. Try tuna, salmon, mackerel, lake trout, herring, or sardines. · Eat moderate amounts of low-fat dairy products, such as milk, cheese, or yogurt. · Eat moderate amounts of poultry and eggs. · Choose healthy (unsaturated) fats, such as nuts, olive oil, and certain nut or seed oils like canola, soybean, and flaxseed. · Limit unhealthy (saturated) fats, such as butter, palm oil, and coconut oil. And limit fats found in animal products, such as meat and dairy products made with whole milk.  Try to eat red meat only a few times a month in very small amounts. · Limit sweets and desserts to only a few times a week. This includes sugar-sweetened drinks like soda. The Mediterranean diet may also include red wine with your meal1 glass each day for women and up to 2 glasses a day for men. Tips for eating at home  · Use herbs, spices, garlic, lemon zest, and citrus juice instead of salt to add flavor to foods. · Add avocado slices to your sandwich instead of colindres. · Have fish for lunch or dinner instead of red meat. Brush the fish with olive oil, and broil or grill it. · Sprinkle your salad with seeds or nuts instead of cheese. · Cook with olive or canola oil instead of butter or oils that are high in saturated fat. · Switch from 2% milk or whole milk to 1% or fat-free milk. · Dip raw vegetables in a vinaigrette dressing or hummus instead of dips made from mayonnaise or sour cream.  · Have a piece of fruit for dessert instead of a piece of cake. Try baked apples, or have some dried fruit. Tips for eating out  · Try broiled, grilled, baked, or poached fish instead of having it fried or breaded. · Ask your  to have your meals prepared with olive oil instead of butter. · Order dishes made with marinara sauce or sauces made from olive oil. Avoid sauces made from cream or mayonnaise. · Choose whole-grain breads, whole wheat pasta and pizza crust, brown rice, beans, and lentils. · Cut back on butter or margarine on bread. Instead, you can dip your bread in a small amount of olive oil. · Ask for a side salad or grilled vegetables instead of french fries or chips. Where can you learn more? Go to http://www.[employer].com/  Enter O407 in the search box to learn more about \"Learning About the Mediterranean Diet. \"  Current as of: August 22, 2019               Content Version: 12.6  © 9442-1009 [employer].    Care instructions adapted under license by [employer] (which disclaims liability or warranty for this information). If you have questions about a medical condition or this instruction, always ask your healthcare professional. [address] any warranty or liability for your use of this information.

## 2020-12-06 RX ORDER — PREDNISONE 20 MG/1
40 TABLET ORAL DAILY
Qty: 8 TAB | Refills: 0 | OUTPATIENT
Start: 2020-12-06 | End: 2020-12-10

## 2020-12-09 ENCOUNTER — OFFICE VISIT (OUTPATIENT)
Dept: FAMILY MEDICINE CLINIC | Age: 65
End: 2020-12-09
Payer: COMMERCIAL

## 2020-12-09 VITALS
HEIGHT: 71 IN | DIASTOLIC BLOOD PRESSURE: 87 MMHG | BODY MASS INDEX: 36.4 KG/M2 | WEIGHT: 260 LBS | TEMPERATURE: 97 F | HEART RATE: 74 BPM | SYSTOLIC BLOOD PRESSURE: 147 MMHG | OXYGEN SATURATION: 97 % | RESPIRATION RATE: 16 BRPM

## 2020-12-09 DIAGNOSIS — M10.072 ACUTE IDIOPATHIC GOUT OF LEFT FOOT: Primary | ICD-10-CM

## 2020-12-09 DIAGNOSIS — E11.21 TYPE 2 DIABETES WITH NEPHROPATHY (HCC): ICD-10-CM

## 2020-12-09 DIAGNOSIS — Z76.89 ENCOUNTER FOR ASSESSMENT OF STD EXPOSURE: ICD-10-CM

## 2020-12-09 PROCEDURE — 99213 OFFICE O/P EST LOW 20 MIN: CPT | Performed by: NURSE PRACTITIONER

## 2020-12-09 NOTE — PROGRESS NOTES
Narendra Denney is a 63 y.o. male presenting today for Diabetes (follow-up) and Hypertension  . HPI:  Narendra Denney presents to the office today for gout flare-up. Patent has a history of gout but has not had an attack for greater than 3 years. He notes approximattely 2 weeks ago he had an attack. He admits he had beer on thanksgiving. Per the patient his gout symptoms have resolved. He also presents today requesting STD testing. Patient notes that his significant other is requesting that he get tested for all sexual diseases. ROS    Constitutional: No apparent distress noted  General- negative for fever, chills or fatigue  Eyes- negative visual changes  CV- denies chest pain, palpitation  Pul: negative cough or SOB  GI: negative nausea, flank pain, diarrhea, constipation  Urinary:- No dysuria or polyuria  MS- lower extremity edema (improved)  Neuro- negative headache, dizziness or weakness  Skin- negative for rashes or lesions. Psych- denies any anxiety or depression    Allergies   Allergen Reactions    Iodine Other (comments)     Eyes swell shut      Shellfish Containing Products Swelling       Current Outpatient Medications   Medication Sig Dispense Refill    bumetanide (BUMEX) 0.5 mg tablet TAKE 1 TABLET BY MOUTH EVERY DAY      atorvastatin (LIPITOR) 40 mg tablet Take 1 Tab by mouth daily. 90 Tab 3    ramipriL (ALTACE) 2.5 mg capsule Take 1 Cap by mouth daily. 90 Cap 1    nitroglycerin (NITROSTAT) 0.4 mg SL tablet 1 Tab by SubLINGual route every five (5) minutes as needed for Chest Pain for up to 3 doses. 1 Bottle 1    clopidogreL (PLAVIX) 75 mg tab TAKE 1 TABLET BY MOUTH EVERY DAY 90 Tab 3    omeprazole (PRILOSEC) 40 mg capsule TAKE 1 CAPSULE BY MOUTH TWICE A  Cap 0    cloNIDine (CATAPRES) 0.1 mg/24 hr ptwk APPLY 1 PATCH BY TRANSDERMAL ROUTE EVERY SEVEN (7) DAYS. 12 Patch 1    omega 3-dha-epa-fish oil (Fish Oil) 100-160-1,000 mg cap Take  by mouth.       acetaminophen (Acetaminophen Extra Strength) 500 mg tablet Take  by mouth every six (6) hours as needed for Pain.  amLODIPine (NORVASC) 10 mg tablet Take  by mouth daily.  carvediloL (COREG) 12.5 mg tablet TAKE 1 TABLET BY MOUTH TWICE A DAY WITH MEALS 180 Tab 1    allopurinol (ZYLOPRIM) 100 mg tablet TAKE 1 TABLET BY MOUTH EVERY DAY (Patient taking differently: Take 200 mg by mouth daily. TAKE 2 TABLET BY MOUTH EVERY DAY) 90 Tab 3    hydroCHLOROthiazide (HYDRODIURIL) 25 mg tablet Take 25 mg by mouth daily.  multivitamin (ONE A DAY) tablet Take 1 Tab by mouth daily.  calcium carbonate (TUMS) 200 mg calcium (500 mg) chew Take 1 Tab by mouth daily. As needed      aspirin 81 mg chewable tablet Take 1 Tab by mouth two (2) times a day. 60 Tab 0    isosorbide mononitrate ER (IMDUR) 60 mg CR tablet TAKE 1 TABLET BY MOUTH EVERY DAY 90 Tab 1    FERROUS FUMARATE PO Take  by mouth.  As needed         Past Medical History:   Diagnosis Date    Atypical chest pain/mild nonobstructive CAD/EF 65% 4/12/2011    BPH without obstruction/lower urinary tract symptoms     Bursitis of right shoulder     CAD (coronary artery disease)     Chest pain     history of hospitalization with chest pain and a negative workup in 2008    Chronic edema     Constipation     die to medication    Coronary artery disease     mild, non obstructive/EF 65%    Depression 12/16/2018    Dyspnea on exertion     Echocardiogram 12/16/08    IVC dilated; suboptimal endocardial border; EF 65%    ED (erectile dysfunction)     Elevated PSA     Frequency     GERD (gastroesophageal reflux disease)     Gout     H/O cystoscopy 06/20/2013    Hematuria, unspecified     Hypercholesterolemia     Hypertension     Hypertension      Hypogonadism male     Impotence of organic origin     Left ventricular diastolic dysfunction     Malignant neoplasm of prostate (HCC)     hx of t1a, izzy 6, 5 % of 1  core    [facility]bid obesity [facility] [address]     Myocardial perfusion 09/05/08 Basal inferior defect c/w artifact; EF 63%    Overactive bladder     Prostate cancer ([facility] [place] [address] 2017    S/P cardiac cath 07/30/10    oD2-40%; pRCA-20-30%; EF 65%    S/P gastric surgery 2018    Sleep apnea     Slowing of urinary stream     Type 2 diabetes with nephropathy ([facility] [place] [address] 2018    Type II or unspecified type diabetes mellitus without mention of complication, not stated as uncontrolled        Past Surgical History:   Procedure Laterality Date    COLONOSCOPY N/A 2019    COLONOSCOPY performed by Maribell Grossman MD at [address] [place] [address]  7/30/10    HX OTHER SURGICAL  13    Prostate    HX TONSILLECTOMY      [facility] COLONOSCOPY FLX DX W/COLLJ SPEC WHEN PFRMD      [place] I & D ABSC XTRAORAL SOFT TISS COMP         Social History     Socioeconomic History    Marital status: SINGLE     Spouse name: Not on file    Number of children: Not on file    Years of education: Not on file    Highest education level: Not on file   Occupational History    Not on file   Social Needs    Financial resource strain: Not on file    Food insecurity     Worry: Not on file     Inability: Not on file    Transportation needs     Medical: Not on file     Non-medical: Not on file   Tobacco Use    Smoking status: Former Smoker     Types: Cigars     Quit date: 10/4/1999     Years since quittin.2    Smokeless tobacco: Never Used   Substance and Sexual Activity    Alcohol use: Never     Frequency: Never     Comment: rarely    Drug use: No    Sexual activity: Not Currently   Lifestyle    Physical activity     Days per week: Not on file     Minutes per session: Not on file    Stress: Not on file   Relationships    Social connections     Talks on phone: Not on file     Gets together: Not on file     Attends Restorationism service: Not on file     Active member of club or organization: Not on file     Attends meetings of clubs or organizations: Not on file     Relationship status: Not on file    Intimate partner violence     Fear of current or ex partner: Not on file     Emotionally abused: Not on file     Physically abused: Not on file     Forced sexual activity: Not on file   Other Topics Concern    Not on file   Social History Narrative    Not on file          Vitals:    12/09/20 1411   BP: (!) 147/87   Pulse: 74   Resp: 16   Temp: 97 °F (36.1 °C)   TempSrc: Oral   SpO2: 97%   Weight: 260 lb (117.9 kg)   Height: 5' 11\" (1.803 m)   PainSc:   0 - No pain       Physical Exam  Constitutional:       Appearance: He is obese. HENT:      Head: Normocephalic and atraumatic. Neck:      Musculoskeletal: Neck supple. Cardiovascular:      Pulses: Normal pulses. Heart sounds: Normal heart sounds. Pulmonary:      Effort: Pulmonary effort is normal.      Breath sounds: Normal breath sounds. Abdominal:      General: Bowel sounds are normal.      Palpations: Abdomen is soft. Musculoskeletal:      Right lower leg: Edema present. Left lower leg: Edema present. Skin:     General: Skin is warm and dry. Neurological:      Mental Status: He is alert. Psychiatric:         Mood and Affect: Mood normal.         Office Visit on 12/09/2020   Component Date Value Ref Range Status    HIV -1/0/2 AG/AB WITH REFLEX 12/09/2020 Non Reactive  Non Reacti Final    HIV INTERPRETATION 12/09/2020 HIV-1 antigen and HIV 1/2 antibodies not detected. No laboratory evidence of   Final    Comment: HIV infection. If acute HIV infection is suspected, consider testing for   HIV-1  RNA by PCR.  Chlamydia amplified urine 12/09/2020 Negative  Negative Final    GC Amplified urine 12/09/2020 Negative  Negative Final    TRICH NUCU 12/09/2020 Negative  Negative Final    Comment: This assay was developed, and its performance characteristics were determined  by the [facility]ology [facility] of [facility]. The test is not  FDA-approved for male or female urines for  Trichomonas vaginalis testing.   These specimen types were validated in the Serology Department. This  assay/method meets or exceeds the  standards established by  CLIA. Documentation is on file at [facility]. It  should not be regarded as investigational or for research. Hospital Outpatient Visit on 10/27/2020   Component Date Value Ref Range Status    [facility] SPE[facility] 10/27/2020 Specimens collected/sent to [facility] Outpatient Visit on 10/09/2020   Component Date Value Ref Range Status    [facility] 10/09/2020 Specimens collected/sent to [facility]    Final   Orders Only on 10/08/2020   Component Date Value Ref Range Status    WBC 10/09/2020 4.8  4.0 - 11.0 K/uL Final    RBC 10/09/2020 4.50  3.80 - 5.80 M/uL Final    HGB 10/09/2020 13.0* 13.1 - 17.2 g/dL Final    HCT 10/09/2020 40.3  39.3 - 51.6 % Final    MCV 10/09/2020 90  80 - 95 fL Final    MCH 10/09/2020 29  26 - 34 pg Final    MCHC 10/09/2020 32  31 - 36 g/dL Final    RDW 10/09/2020 13.2  10.0 - 15.5 % Final    PLATELET 74/51/7101 756  140 - 440 K/uL Final    MPV 10/09/2020 10.2  9.0 - 13.0 fL Final    NEUTROPHILS 10/09/2020 57  40 - 75 % Final    Lymphocytes 10/09/2020 32  20 - 45 % Final    MONOCYTES 10/09/2020 9  3 - 12 % Final    EOSINOPHILS 10/09/2020 2  0 - 6 % Final    BASOPHILS 10/09/2020 0  0 - 2 % Final    ABS. NEUTROPHILS 10/09/2020 2.7  1.8 - 7.7 K/uL Final    ABSOLUTE LYMPHOCYTE COUNT 10/09/2020 1.5  1.0 - 4.8 K/uL Final    ABS. MONOCYTES 10/09/2020 0.4  0.1 - 1.0 K/uL Final    ABS. EOSINOPHILS 10/09/2020 0.1  0.0 - 0.5 K/uL Final    ABS.  BASOPHILS 10/09/2020 0.0  0.0 - 0.2 K/uL Final    Triglyceride 10/09/2020 66  40 - 149 mg/dL Final    HDL Cholesterol 10/09/2020 55  >=40 mg/dL Final    Cholesterol, total 10/09/2020 146  110 - 200 mg/dL Final    CHOLESTEROL/HDL 10/09/2020 2.7  0.0 - 5.0 Final    Non-HDL Cholesterol 10/09/2020 91  <130 mg/dL Final    LDL, calculated 10/09/2020 77  50 - 99 mg/dL Final    VLDL, calculated 10/09/2020 13  8 - 30 mg/dL Final    LDL/HDL Ratio 10/09/2020 1.4   Final    Comment: Test includes cholesterol, HDL cholesterol, triglycerides and LDL. Cholesterol Recommended NCEP guidelines in mg/dL:  Less than 200            Desirable  200 - 239                Borderline High  Greater than or  = 240   High  Please Note:  Total Chol/HDL Ratio                   Men     Women  1/2 Avg. Risk    3.4     3.3      Avg. Risk    5.0     4.4  2X  Avg. Risk    9.6     7.1  3X  Avg. Risk   23.4    11.0      Glucose 10/09/2020 98  70 - 99 mg/dL Final    BUN 10/09/2020 32* 6 - 22 mg/dL Final    Creatinine 10/09/2020 1.6  0.8 - 1.6 mg/dL Final    Sodium 10/09/2020 145  133 - 145 mmol/L Final    Potassium 10/09/2020 3.4* 3.5 - 5.5 mmol/L Final    Chloride 10/09/2020 104  98 - 110 mmol/L Final    CO2 10/09/2020 31  20 - 32 mmol/L Final    AST (SGOT) 10/09/2020 15  10 - 37 U/L Final    ALT (SGPT) 10/09/2020 10  5 - 40 U/L Final    Alk. phosphatase 10/09/2020 92  40 - 125 U/L Final    Bilirubin, total 10/09/2020 0.3  0.2 - 1.2 mg/dL Final    Calcium 10/09/2020 9.2  8.4 - 10.5 mg/dL Final    Protein, total 10/09/2020 6.8  6.2 - 8.1 g/dL Final    Albumin 10/09/2020 4.3  3.5 - 5.0 g/dL Final    A-G Ratio 10/09/2020 1.7  1.1 - 2.6 ratio Final    Globulin 10/09/2020 2.5  2.0 - 4.0 g/dL Final    Anion gap 10/09/2020 9.6  3.0 - 15.0 mmol/L Final    Comment: Anion Gap calculation based on electrolyte reference ranges. Test includes Albumin, Alkaline Phosphatase, ALT, AST, BUN, Calcium, CO2,  Chloride, Creatinine, Glucose, Potassium, Sodium, Total Bilirubin and Total  Protein. Estimated GFR results are reported in mL/min/1.73 sq.m. by the MDRD equation. This eGFR is validated for stable chronic renal failure patients. This   equation  is unreliable in acute illness or patients with normal renal function.       GFRAA 10/09/2020 52.0* >60.0 Final    GFRNA 10/09/2020 42.9* >60.0 Final    Creatinine, urine 10/09/2020 71  mg/dL Final    Microalbumin, urine 10/09/2020 64.0* 0.1 - 17.0 mg/L Final    Microalb/Creat ratio (ug/mg creat.) 10/09/2020 90.1* 0.0 - 30.0 Final       .  Results for orders placed or performed in visit on 12/09/20   HIV 1/2 AG/AB, 4TH GENERATION,W RFLX CONFIRM   Result Value Ref Range    HIV -1/0/2 AG/AB WITH REFLEX Non Reactive Non Reacti    HIV INTERPRETATION       HIV-1 antigen and HIV 1/2 antibodies not detected. No laboratory evidence of    Narrative    Unless additionally indicated, test performed at: [address]. PH: [phone]. CHLAMYDIA/GC AMPLIFIED URINE   Result Value Ref Range    Chlamydia amplified urine Negative Negative    GC Amplified urine Negative Negative    Narrative    Unless additionally indicated, test performed at: [address] PH: [phone]. TRICHOMONAS NUC AMP-URINE   Result Value Ref Range    TRICH NUCU Negative Negative    Narrative    Unless additionally indicated, test performed at: [address]. PH: [phone]. Assessment / Plan:      ICD-10-CM ICD-9-CM    1. Acute idiopathic gout of left foot  M10.072 274.01    2. Encounter for assessment of STD exposure  Z76.89 V72.85 HIV 1/2 AG/AB, 4TH GENERATION,W RFLX CONFIRM      CHLAMYDIA/NEISSERIA AMPLIFICATION      TRICHOMONAS AMPLIFICATION      TRICHOMONAS AMPLIFICATION      CHLAMYDIA/NEISSERIA AMPLIFICATION      HIV 1/2 AG/AB, 4TH GENERATION,W RFLX CONFIRM   3. Type 2 diabetes with nephropathy (HCC)  E11.21 250.40      583.81      Gout symptoms-resolved  STD testing today  Follow-up and Dispositions    · Return if symptoms worsen or fail to improve. I asked the patient if he  had any questions and answered his  questions.   The patient stated that he understands the treatment plan and agrees with the treatment plan    This document was created with a voice activated dictation system and may contain transcription errors.

## 2020-12-09 NOTE — PROGRESS NOTES
Ricki Anaya presents today for   Chief Complaint   Patient presents with    Diabetes     follow-up    Hypertension       Is someone accompanying this pt? nio    Is the patient using any DME equipment during OV? no    Depression Screening:  3 most recent PHQ Screens 12/9/2020   PHQ Not Done -   Little interest or pleasure in doing things Not at all   Feeling down, depressed, irritable, or hopeless Not at all   Total Score PHQ 2 0   Trouble falling or staying asleep, or sleeping too much -   Feeling tired or having little energy -   Poor appetite, weight loss, or overeating -   Feeling bad about yourself - or that you are a failure or have let yourself or your family down -   Trouble concentrating on things such as school, work, reading, or watching TV -   Moving or speaking so slowly that other people could have noticed; or the opposite being so fidgety that others notice -   Thoughts of being better off dead, or hurting yourself in some way -   PHQ 9 Score -   How difficult have these problems made it for you to do your work, take care of your home and get along with others -       Learning Assessment:  Learning Assessment 5/25/2017   PRIMARY LEARNER Patient   CO-LEARNER CAREGIVER No   PRIMARY LANGUAGE ENGLISH   LEARNER PREFERENCE PRIMARY READING   ANSWERED BY patient   RELATIONSHIP SELF       Abuse Screening:  Abuse Screening Questionnaire 10/8/2020   Do you ever feel afraid of your partner? N   Are you in a relationship with someone who physically or mentally threatens you? N   Is it safe for you to go home? Y       Fall Risk  Fall Risk Assessment, last 12 mths 6/7/2019   Able to walk? Yes   Fall in past 12 months? No       Health Maintenance reviewed and discussed and ordered per Provider. Health Maintenance Due   Topic Date Due    Eye Exam Retinal or Dilated  01/08/1965    DTaP/Tdap/Td series (1 - Tdap) 01/08/1978    Foot Exam Q1  09/07/2017    A1C test (Diabetic or Prediabetic)  11/25/2020   . Coordination of Care:  1. Have you been to the ER, urgent care clinic since your last visit? Hospitalized since your last visit? no    2. Have you seen or consulted any other health care providers outside of the [address] [place] [address] since your last visit? Include any pap smears or colon screening.  no

## 2020-12-11 LAB
CHLAMYDIA AMPLIFIED URINE: NEGATIVE
GC AMPLIFIED URINE,919: NEGATIVE
HIV -1/0/2 AG/AB WITH REFLEX, 13463: NON REACTIVE
HIV 1 & 2 AB SER-IMP: NORMAL
TRICH NUCU, 17621: NEGATIVE

## 2020-12-17 RX ORDER — ISOSORBIDE MONONITRATE 60 MG/1
TABLET, EXTENDED RELEASE ORAL
Qty: 90 TAB | Refills: 1 | Status: SHIPPED | OUTPATIENT
Start: 2020-12-17 | End: 2021-06-21

## 2020-12-22 DIAGNOSIS — I10 ESSENTIAL HYPERTENSION: ICD-10-CM

## 2020-12-22 RX ORDER — CLONIDINE 0.1 MG/24H
PATCH, EXTENDED RELEASE TRANSDERMAL
Qty: 12 PATCH | Refills: 1 | Status: SHIPPED | OUTPATIENT
Start: 2020-12-22 | End: 2021-06-17

## 2021-02-11 ENCOUNTER — TELEPHONE (OUTPATIENT)
Dept: FAMILY MEDICINE CLINIC | Age: 66
End: 2021-02-11

## 2021-02-12 DIAGNOSIS — U07.1 COVID-19: Primary | ICD-10-CM

## 2021-02-24 ENCOUNTER — TELEPHONE (OUTPATIENT)
Dept: FAMILY MEDICINE CLINIC | Age: 66
End: 2021-02-24

## 2021-02-25 ENCOUNTER — VIRTUAL VISIT (OUTPATIENT)
Dept: FAMILY MEDICINE CLINIC | Age: 66
End: 2021-02-25
Payer: COMMERCIAL

## 2021-02-25 DIAGNOSIS — Z12.5 PROSTATE CANCER SCREENING: Primary | ICD-10-CM

## 2021-02-25 DIAGNOSIS — E78.2 MIXED HYPERLIPIDEMIA: ICD-10-CM

## 2021-02-25 DIAGNOSIS — I10 ESSENTIAL HYPERTENSION: ICD-10-CM

## 2021-02-25 DIAGNOSIS — E11.9 TYPE 2 DIABETES MELLITUS WITHOUT COMPLICATION, WITHOUT LONG-TERM CURRENT USE OF INSULIN (HCC): ICD-10-CM

## 2021-02-25 DIAGNOSIS — U07.1 COVID-19: ICD-10-CM

## 2021-02-25 PROCEDURE — 99214 OFFICE O/P EST MOD 30 MIN: CPT | Performed by: NURSE PRACTITIONER

## 2021-02-25 NOTE — PROGRESS NOTES
Sheryl Faria is a 61 y.o. male who was seen by synchronous (real-time) audio-video technology on 2/25/2021 for Hypertension  and recent COVID 19    Assessment & Plan:   Diagnoses and all orders for this visit:    1. Prostate cancer screening  -     PSA SCREENING (SCREENING); Future    2. Type 2 diabetes mellitus without complication, without long-term current use of insulin (HCC)  -     HEMOGLOBIN A1C WITH EAG; Future    3. Essential hypertension  -     CBC WITH AUTOMATED DIFF; Future  -     METABOLIC PANEL, COMPREHENSIVE; Future    4. Mixed hyperlipidemia  -     LIPID PANEL; Future    5. COVID-19      Negative Covid retest  HLD- continue current treatment plan  HTN- fasting labs. No change to medication plan  Screen PSA      Subjective: The patient presents for an Audio-visual teleconference appointment for COVID 19 infection. Per the patient he tested positive for COVID 19 on 2/3/2021 and Karthik,  He retested on February 13, 2021 and the results were negative. He notes he has remain asymptomatic. He works at [employer] and believes he contracted the virus from there. He notes it was discovered that a couple of co-workers tested positive for COVID 19. HTN- patient is not UTD with blood work. He is negative for chest pain or palpitations. He is compliant with the treatment plan. DM-negative for polyuria or polydipsia. Previous lab in October, 2020. S/p gastric bypass and DM managed with diet. HLD- prescribed Atorvastatin daily. Negative for myalgia. Prior to Admission medications    Medication Sig Start Date End Date Taking? Authorizing Provider   cloNIDine (CATAPRES) 0.1 mg/24 hr ptwk APPLY 1 PATCH BY TRANSDERMAL ROUTE EVERY SEVEN (7) DAYS.  12/22/20   Shannan Camacho NP   isosorbide mononitrate ER (IMDUR) 60 mg CR tablet TAKE 1 TABLET BY MOUTH EVERY DAY 12/17/20   Shannan Camacho NP   bumetanide (BUMEX) 0.5 mg tablet TAKE 1 TABLET BY MOUTH EVERY DAY 11/3/20   Provider, Historical   atorvastatin (LIPITOR) 40 mg tablet Take 1 Tab by mouth daily. 12/4/20   Cristino Sweet MD   ramipriL (ALTACE) 2.5 mg capsule Take 1 Cap by mouth daily. 10/14/20   Lacey Singer NP   nitroglycerin (NITROSTAT) 0.4 mg SL tablet 1 Tab by SubLINGual route every five (5) minutes as needed for Chest Pain for up to 3 doses. 10/13/20   Pily Oliveira NP   clopidogreL (PLAVIX) 75 mg tab TAKE 1 TABLET BY MOUTH EVERY DAY 10/12/20   Cristino Sweet MD   omeprazole (PRILOSEC) 40 mg capsule TAKE 1 CAPSULE BY MOUTH TWICE A DAY 9/11/20   Lacey Singer NP   omega 3-dha-epa-fish oil (Fish Oil) 100-160-1,000 mg cap Take  by mouth. Provider, Historical   acetaminophen (Acetaminophen Extra Strength) 500 mg tablet Take  by mouth every six (6) hours as needed for Pain. Provider, Historical   amLODIPine (NORVASC) 10 mg tablet Take  by mouth daily. Provider, Historical   FERROUS FUMARATE PO Take  by mouth. As needed    Provider, Historical   carvediloL (COREG) 12.5 mg tablet TAKE 1 TABLET BY MOUTH TWICE A DAY WITH MEALS 1/27/20   Cristino Sweet MD   allopurinol (ZYLOPRIM) 100 mg tablet TAKE 1 TABLET BY MOUTH EVERY DAY  Patient taking differently: Take 200 mg by mouth daily. TAKE 2 TABLET BY MOUTH EVERY DAY 11/22/19   Lacey Singer NP   hydroCHLOROthiazide (HYDRODIURIL) 25 mg tablet Take 25 mg by mouth daily. Provider, Historical   multivitamin (ONE A DAY) tablet Take 1 Tab by mouth daily. Provider, Historical   calcium carbonate (TUMS) 200 mg calcium (500 mg) chew Take 1 Tab by mouth daily. As needed    Provider, Historical   aspirin 81 mg chewable tablet Take 1 Tab by mouth two (2) times a day.  9/1/18   Tanya Perez MD     Patient Active Problem List   Diagnosis Code    Precordial pain R07.2    Hypercholesterolemia E78.00    Sleep apnea G47.30    Left ventricular diastolic dysfunction B22.7    Atherosclerosis of native coronary artery of native heart without angina pectoris I25.10    Atypical chest pain/mild nonobstructive CAD/EF 65% R07.89    Hypogonadism male E29.1    Morbid obesity ([facility]) E66.01    Elevated PSA R97.20    Overactive bladder N32.81    Impotence of organic origin N52.9    Slowing of urinary stream R39.198    Frequency [id number]    Gout M10.9    Type 2 diabetes mellitus without complication ([facility]) I71.7    Essential hypertension I10    Prostate cancer ([facility] C61    Hypokalemia E87.6    Congestive heart failure ([facility]) I50.9    Chest pain R07.9    Severe obesity (BMI 35.0-39. 9) with comorbidity ([facility]) E66.01    Postsurgical percutaneous transluminal coronary angioplasty status Z98.61    Type 2 diabetes with nephropathy ([facility] E11.21     Patient Active Problem List    Diagnosis Date Noted    Type 2 diabetes with nephropathy ([facility] 12/09/2020    Severe obesity (BMI 35.0-39. 9) with comorbidity ([facility] 11/08/2019    Postsurgical percutaneous transluminal coronary angioplasty status 11/08/2019    Chest pain 09/17/2019    Congestive heart failure ([address] 06/12/2019    Hypokalemia 08/28/2018    Prostate cancer ([facility] 02/09/2017    Essential hypertension 02/17/2016    Type 2 diabetes mellitus without complication ([facility]     Hypogonadism male 05/14/2012    Morbid obesity ([address] 05/14/2012    Elevated PSA 05/14/2012    Overactive bladder 05/14/2012    Impotence of organic origin 05/14/2012    Slowing of urinary stream 05/14/2012    Frequency 05/14/2012    Gout 05/14/2012    Atypical chest pain/mild nonobstructive CAD/EF 65% 04/12/2011    Left ventricular diastolic dysfunction     Atherosclerosis of native coronary artery of native heart without angina pectoris     Precordial pain     Hypercholesterolemia     Sleep apnea      Current Outpatient Medications   Medication Sig Dispense Refill    cloNIDine (CATAPRES) 0.1 mg/24 hr ptwk APPLY 1 PATCH BY TRANSDERMAL ROUTE EVERY SEVEN (7) DAYS.  12 Patch 1    isosorbide mononitrate ER (IMDUR) 60 mg CR tablet TAKE 1 TABLET BY MOUTH EVERY DAY 90 Tab 1    bumetanide (BUMEX) 0.5 mg tablet TAKE 1 TABLET BY MOUTH EVERY DAY      atorvastatin (LIPITOR) 40 mg tablet Take 1 Tab by mouth daily. 90 Tab 3    ramipriL (ALTACE) 2.5 mg capsule Take 1 Cap by mouth daily. 90 Cap 1    nitroglycerin (NITROSTAT) 0.4 mg SL tablet 1 Tab by SubLINGual route every five (5) minutes as needed for Chest Pain for up to 3 doses. 1 Bottle 1    clopidogreL (PLAVIX) 75 mg tab TAKE 1 TABLET BY MOUTH EVERY DAY 90 Tab 3    omeprazole (PRILOSEC) 40 mg capsule TAKE 1 CAPSULE BY MOUTH TWICE A  Cap 0    omega 3-dha-epa-fish oil (Fish Oil) 100-160-1,000 mg cap Take  by mouth.  acetaminophen (Acetaminophen Extra Strength) 500 mg tablet Take  by mouth every six (6) hours as needed for Pain.  amLODIPine (NORVASC) 10 mg tablet Take  by mouth daily.  FERROUS FUMARATE PO Take  by mouth. As needed      carvediloL (COREG) 12.5 mg tablet TAKE 1 TABLET BY MOUTH TWICE A DAY WITH MEALS 180 Tab 1    allopurinol (ZYLOPRIM) 100 mg tablet TAKE 1 TABLET BY MOUTH EVERY DAY (Patient taking differently: Take 200 mg by mouth daily. TAKE 2 TABLET BY MOUTH EVERY DAY) 90 Tab 3    hydroCHLOROthiazide (HYDRODIURIL) 25 mg tablet Take 25 mg by mouth daily.  multivitamin (ONE A DAY) tablet Take 1 Tab by mouth daily.  calcium carbonate (TUMS) 200 mg calcium (500 mg) chew Take 1 Tab by mouth daily. As needed      aspirin 81 mg chewable tablet Take 1 Tab by mouth two (2) times a day.  60 Tab 0     Allergies   Allergen Reactions    Iodine Other (comments)     Eyes swell shut      Shellfish Containing Products Swelling     Past Medical History:   Diagnosis Date    Atypical chest pain/mild nonobstructive CAD/EF 65% 4/12/2011    BPH without obstruction/lower urinary tract symptoms     Bursitis of right shoulder     CAD (coronary artery disease)     Chest pain     history of hospitalization with chest pain and a negative workup in 2008    Chronic edema     Constipation     die to medication    Coronary artery disease     mild, non obstructive/EF 65%    Depression 12/16/2018    Dyspnea on exertion     Echocardiogram 12/16/08    IVC dilated; suboptimal endocardial border; EF 65%    ED (erectile dysfunction)     Elevated PSA     Frequency     GERD (gastroesophageal reflux disease)     Gout     H/O cystoscopy 06/20/2013    Hematuria, unspecified     Hypercholesterolemia     Hypertension     Hypertension      Hypogonadism male     Impotence of organic origin     Left ventricular diastolic dysfunction     Malignant neoplasm of prostate (HCC)     hx of t1a, izzy 6, 5 % of 1  core    Morbid obesity ([facility]     Myocardial perfusion 09/05/08    Basal inferior defect c/w artifact; EF 63%    Overactive bladder     Prostate cancer ([facility] 2/9/2017    S/P cardiac cath 07/30/10    oD2-40%; pRCA-20-30%; EF 65%    S/P gastric surgery 8/28/2018    Sleep apnea     Slowing of urinary stream     Type 2 diabetes with nephropathy ([facility] 9/27/2018    Type II or unspecified type diabetes mellitus without mention of complication, not stated as uncontrolled        ROS    ROS:  History obtained from the patient intake forms which are reviewed with the patient  · General: negative for - chills, fever, weight changes or malaise  · HEENT: no sore throat, nasal congestion, vision problems or ear problems  · Respiratory: no cough, shortness of breath, or wheezing  · Cardiovascular: no chest pain, palpitations, or dyspnea on exertion  · Gastrointestinal: no abdominal pain, N/V, change in bowel habits, or black or bloody stools  · Musculoskeletal: no back pain, joint pain, joint stiffness, muscle pain or muscle weakness  · Neurological: no numbness, tingling, headache or dizziness  · Endo:  No polyuria or polydipsia. · : no hematuria, dysuria, frequency, hesitancy, or nocturia.     · Psychological: negative for - anxiety, depression, sleep disturbances, suicidal or homicidal ideations    Objective:   No flowsheet data found. [INSTRUCTIONS:  \"[x]\" Indicates a positive item  \"[]\" Indicates a negative item  -- DELETE ALL ITEMS NOT EXAMINED]    Constitutional: [x] Appears well-developed and well-nourished [x] No apparent distress      [] Abnormal -     Mental status: [x] Alert and awake  [x] Oriented to person/place/time [x] Able to follow commands    [] Abnormal -     Eyes:   EOM    [x]  Normal    [] Abnormal -   Sclera  [x]  Normal    [] Abnormal -          Discharge [x]  None visible   [] Abnormal -     HENT: [x] Normocephalic, atraumatic  [] Abnormal -   [x] Mouth/Throat: Mucous membranes are moist    External Ears [x] Normal  [] Abnormal -    Neck: [x] No visualized mass [] Abnormal -     Pulmonary/Chest: [x] Respiratory effort normal   [x] No visualized signs of difficulty breathing or respiratory distress        [] Abnormal -      Musculoskeletal:   [x] Normal gait with no signs of ataxia         [x] Normal range of motion of neck        [] Abnormal -     Neurological:        [x] No Facial Asymmetry (Cranial nerve 7 motor function) (limited exam due to video visit)          [x] No gaze palsy        [] Abnormal -          Skin:        [x] No significant exanthematous lesions or discoloration noted on facial skin         [] Abnormal -            Psychiatric:       [x] Normal Affect [] Abnormal -        [x] No Hallucinations    Other pertinent observable physical exam findings:-        We discussed the expected course, resolution and complications of the diagnosis(es) in detail. Medication risks, benefits, costs, interactions, and alternatives were discussed as indicated. I advised him to contact the office if his condition worsens, changes or fails to improve as anticipated. He expressed understanding with the diagnosis(es) and plan.        Rocio Norris, who was evaluated through a patient-initiated, synchronous (real-time) audio-video encounter, and/or his healthcare decision maker, is aware that it is a billable service, with coverage as determined by his insurance carrier. He provided verbal consent to proceed: Yes, and patient identification was verified. It was conducted pursuant to the emergency declaration under the [facility], [address] [place] [address] authority and the [employer] General Act. A caregiver was present when appropriate. Ability to conduct physical exam was limited. I was at home. The patient was at home.       Roni Gifford NP

## 2021-03-12 ENCOUNTER — OFFICE VISIT (OUTPATIENT)
Dept: FAMILY MEDICINE CLINIC | Age: 66
End: 2021-03-12
Payer: COMMERCIAL

## 2021-03-12 VITALS
OXYGEN SATURATION: 95 % | TEMPERATURE: 98.2 F | HEIGHT: 71 IN | RESPIRATION RATE: 16 BRPM | DIASTOLIC BLOOD PRESSURE: 104 MMHG | HEART RATE: 75 BPM | SYSTOLIC BLOOD PRESSURE: 170 MMHG | BODY MASS INDEX: 36.68 KG/M2 | WEIGHT: 262 LBS

## 2021-03-12 DIAGNOSIS — E11.9 TYPE 2 DIABETES MELLITUS WITHOUT COMPLICATION, WITHOUT LONG-TERM CURRENT USE OF INSULIN (HCC): Primary | ICD-10-CM

## 2021-03-12 DIAGNOSIS — I10 ESSENTIAL HYPERTENSION: ICD-10-CM

## 2021-03-12 LAB — HBA1C MFR BLD HPLC: 5.7 %

## 2021-03-12 PROCEDURE — 83036 HEMOGLOBIN GLYCOSYLATED A1C: CPT | Performed by: NURSE PRACTITIONER

## 2021-03-12 PROCEDURE — 99214 OFFICE O/P EST MOD 30 MIN: CPT | Performed by: NURSE PRACTITIONER

## 2021-03-12 NOTE — PROGRESS NOTES
Soraida Arora is a 61 y.o. male presenting today for Form Completion and Hypertension    HPI:  Soraida Arora presents to the office today for hypertension form completion. Patient was diagnosed with COVID-19 on February 3, 2021 at [employer]. He retested again on February 13, 2021 and the results were negative. He notes that he has/still remains asymptomatic to his quarantine. Notes he contracted the virus from work. Patient presents today with short-term disability paperwork to be completed. Hypertension-his BP is elevated today. Patient unsure if he is taking all of his medications as prescribed. He notes that he has another \"bucket at home\" with additional medication. His blood pressure today is 170/104. He denies any chest pain or palpitation. He is negative for cough or wheezing. Diabetes mellitus-patient has type 2 diabetes with neuropathy. He is status post gastric bypass surgery and i has been able to control his blood glucose levels with diet. His hemoglobin A1c today is 5.7. He denies any polyuria. ROS  ROS:  History obtained from the patient intake forms which are reviewed with the patient  · General: negative for - chills, fever, weight changes or malaise  · HEENT: no sore throat, nasal congestion, vision problems or ear problems  · Respiratory: no cough, shortness of breath, or wheezing  · Cardiovascular: no chest pain, palpitations, or dyspnea on exertion  · Gastrointestinal: no abdominal pain, N/V, change in bowel habits, or black or bloody stools  · Musculoskeletal: no back pain, joint pain, joint stiffness, muscle pain or muscle weakness  · Neurological: no numbness, tingling, headache or dizziness  · Endo:  No polyuria or polydipsia. · : no hematuria, dysuria, frequency, hesitancy, or nocturia.     · Psychological: negative for - anxiety, depression, sleep disturbances, suicidal or homicidal ideations    Allergies   Allergen Reactions    Iodine Other (comments)     Eyes swell shut  Shellfish Containing Products Swelling       Current Outpatient Medications   Medication Sig Dispense Refill    cloNIDine (CATAPRES) 0.1 mg/24 hr ptwk APPLY 1 PATCH BY TRANSDERMAL ROUTE EVERY SEVEN (7) DAYS. 12 Patch 1    isosorbide mononitrate ER (IMDUR) 60 mg CR tablet TAKE 1 TABLET BY MOUTH EVERY DAY 90 Tab 1    bumetanide (BUMEX) 0.5 mg tablet TAKE 1 TABLET BY MOUTH EVERY DAY      atorvastatin (LIPITOR) 40 mg tablet Take 1 Tab by mouth daily. 90 Tab 3    ramipriL (ALTACE) 2.5 mg capsule Take 1 Cap by mouth daily. 90 Cap 1    nitroglycerin (NITROSTAT) 0.4 mg SL tablet 1 Tab by SubLINGual route every five (5) minutes as needed for Chest Pain for up to 3 doses. 1 Bottle 1    clopidogreL (PLAVIX) 75 mg tab TAKE 1 TABLET BY MOUTH EVERY DAY 90 Tab 3    omeprazole (PRILOSEC) 40 mg capsule TAKE 1 CAPSULE BY MOUTH TWICE A  Cap 0    acetaminophen (Acetaminophen Extra Strength) 500 mg tablet Take  by mouth every six (6) hours as needed for Pain.  allopurinol (ZYLOPRIM) 100 mg tablet TAKE 1 TABLET BY MOUTH EVERY DAY (Patient taking differently: Take 200 mg by mouth daily. TAKE 2 TABLET BY MOUTH EVERY DAY) 90 Tab 3    hydroCHLOROthiazide (HYDRODIURIL) 25 mg tablet Take 25 mg by mouth daily.  multivitamin (ONE A DAY) tablet Take 1 Tab by mouth daily.  calcium carbonate (TUMS) 200 mg calcium (500 mg) chew Take 1 Tab by mouth daily. As needed      aspirin 81 mg chewable tablet Take 1 Tab by mouth two (2) times a day. 60 Tab 0    omega 3-dha-epa-fish oil (Fish Oil) 100-160-1,000 mg cap Take  by mouth.  amLODIPine (NORVASC) 10 mg tablet Take  by mouth daily.  FERROUS FUMARATE PO Take  by mouth.  As needed      carvediloL (COREG) 12.5 mg tablet TAKE 1 TABLET BY MOUTH TWICE A DAY WITH MEALS 180 Tab 1       Past Medical History:   Diagnosis Date    Atypical chest pain/mild nonobstructive CAD/EF 65% 4/12/2011    BPH without obstruction/lower urinary tract symptoms     Bursitis of right shoulder     CAD (coronary artery disease)     Chest pain     history of hospitalization with chest pain and a negative workup in 2008    Chronic edema     Constipation     die to medication    Coronary artery disease     mild, non obstructive/EF 65%    Depression 12/16/2018    Dyspnea on exertion     Echocardiogram 12/16/08    IVC dilated; suboptimal endocardial border; EF 65%    ED (erectile dysfunction)     Elevated PSA     Frequency     GERD (gastroesophageal reflux disease)     Gout     H/O cystoscopy 06/20/2013    Hematuria, unspecified     Hypercholesterolemia     Hypertension     Hypertension      Hypogonadism male     Impotence of organic origin     Left ventricular diastolic dysfunction     Malignant neoplasm of prostate (HCC)     hx of t1a, izzy 6, 5 % of 1  core    Morbid obesity ([facility]     Myocardial perfusion 09/05/08    Basal inferior defect c/w artifact; EF 63%    Overactive bladder     Prostate cancer ([facility] 2/9/2017    S/P cardiac cath 07/30/10    oD2-40%; pRCA-20-30%; EF 65%    S/P gastric surgery 8/28/2018    Sleep apnea     Slowing of urinary stream     Type 2 diabetes with nephropathy ([facility] 9/27/2018    Type II or unspecified type diabetes mellitus without mention of complication, not stated as uncontrolled        Past Surgical History:   Procedure Laterality Date    COLONOSCOPY N/A 9/18/2019    COLONOSCOPY performed by Gladies Kocher, MD at [address] [place] [address]  7/30/10    HX OTHER SURGICAL  06/27/13    Prostate    HX TONSILLECTOMY      [place] COLONOSCOPY FLX DX W/COLLJ SPEC WHEN PFRMD      [place] I & D ABSC XTRAORAL SOFT TISS COMP         Social History     Socioeconomic History    Marital status: SINGLE     Spouse name: Not on file    Number of children: Not on file    Years of education: Not on file    Highest education level: Not on file   Occupational History    Not on file   Social Needs    Financial resource strain: Not on file    Food insecurity     Worry: Not on file     Inability: Not on file    Transportation needs     Medical: Not on file     Non-medical: Not on file   Tobacco Use    Smoking status: Former Smoker     Types: Cigars     Quit date: 10/4/1999     Years since quittin.4    Smokeless tobacco: Never Used   Substance and Sexual Activity    Alcohol use: Never     Frequency: Never     Comment: rarely    Drug use: No    Sexual activity: Not Currently   Lifestyle    Physical activity     Days per week: Not on file     Minutes per session: Not on file    Stress: Not on file   Relationships    Social connections     Talks on phone: Not on file     Gets together: Not on file     Attends Buddhist service: Not on file     Active member of club or organization: Not on file     Attends meetings of clubs or organizations: Not on file     Relationship status: Not on file    Intimate partner violence     Fear of current or ex partner: Not on file     Emotionally abused: Not on file     Physically abused: Not on file     Forced sexual activity: Not on file   Other Topics Concern    Not on file   Social History Narrative    Not on file         Vitals:    21 0841 21 0907   BP: (!) 180/100 (!) 170/104   Pulse: 75    Resp: 16    Temp: 98.2 °F (36.8 °C)    TempSrc: Oral    SpO2: 95%    Weight: 262 lb (118.8 kg)    Height: 5' 11\" (1.803 m)    PainSc:   8        Physical Exam  Neck:      Musculoskeletal: Normal range of motion and neck supple. Cardiovascular:      Rate and Rhythm: Normal rate and regular rhythm. Pulses: Normal pulses. Heart sounds: Normal heart sounds. Pulmonary:      Effort: Pulmonary effort is normal.      Breath sounds: Normal breath sounds. Musculoskeletal:      Comments: Complaints of left knee pain is managed by orthopedic   Skin:     General: Skin is warm and dry.          Office Visit on 2021   Component Date Value Ref Range Status    Hemoglobin A1c (POC) 2021 5.7  % Final       .  Results for orders placed or performed in visit on 03/12/21   AMB POC HEMOGLOBIN A1C   Result Value Ref Range    Hemoglobin A1c (POC) 5.7 %       Assessment / Plan:      ICD-10-CM ICD-9-CM    1. Type 2 diabetes mellitus without complication, without long-term current use of insulin (HCC)  E11.9 250.00 AMB POC HEMOGLOBIN A1C   2. Essential hypertension  I10 401.9      Hypertension-continue current treatment plan for follow-up in 3 weeks for reevaluation. He did not take his medication prior to the office visit this morning. Diabetes mellitus-discussed diet and exercise. Hemoglobin A1c today 5.7. Follow-up and Dispositions    · Return in about 3 weeks (around 4/2/2021). I asked the patient if he  had any questions and answered his  questions. The patient stated that he understands the treatment plan and agrees with the treatment plan    This document was created with a voice activated dictation system and may contain transcription errors.

## 2021-03-12 NOTE — PROGRESS NOTES
Laura Russell presents today for   Chief Complaint   Patient presents with    Form Completion       Is someone accompanying this pt? no    Is the patient using any DME equipment during OV? no    Depression Screening:  3 most recent PHQ Screens 3/12/2021   PHQ Not Done -   Little interest or pleasure in doing things Not at all   Feeling down, depressed, irritable, or hopeless Not at all   Total Score PHQ 2 0   Trouble falling or staying asleep, or sleeping too much -   Feeling tired or having little energy -   Poor appetite, weight loss, or overeating -   Feeling bad about yourself - or that you are a failure or have let yourself or your family down -   Trouble concentrating on things such as school, work, reading, or watching TV -   Moving or speaking so slowly that other people could have noticed; or the opposite being so fidgety that others notice -   Thoughts of being better off dead, or hurting yourself in some way -   PHQ 9 Score -   How difficult have these problems made it for you to do your work, take care of your home and get along with others -       Learning Assessment:  Learning Assessment 5/25/2017   PRIMARY LEARNER Patient   CO-LEARNER CAREGIVER No   PRIMARY LANGUAGE ENGLISH   LEARNER PREFERENCE PRIMARY READING   ANSWERED BY patient   RELATIONSHIP SELF       Abuse Screening:  Abuse Screening Questionnaire 10/8/2020   Do you ever feel afraid of your partner? N   Are you in a relationship with someone who physically or mentally threatens you? N   Is it safe for you to go home? Y       Fall Risk  Fall Risk Assessment, last 12 mths 6/7/2019   Able to walk? Yes   Fall in past 12 months? No       Health Maintenance reviewed and discussed and ordered per Provider. Health Maintenance Due   Topic Date Due    Eye Exam Retinal or Dilated  Never done    DTaP/Tdap/Td series (1 - Tdap) Never done    Foot Exam Q1  09/07/2017    A1C test (Diabetic or Prediabetic)  11/25/2020   . Coordination of Care:  1. Have you been to the ER, urgent care clinic since your last visit? Hospitalized since your last visit? no    2. Have you seen or consulted any other health care providers outside of the [address] [place] [address] since your last visit? Include any pap smears or colon screening.  no

## 2021-03-18 ENCOUNTER — OFFICE VISIT (OUTPATIENT)
Dept: ORTHOPEDIC SURGERY | Age: 66
End: 2021-03-18
Payer: COMMERCIAL

## 2021-03-18 VITALS
HEART RATE: 68 BPM | WEIGHT: 258 LBS | RESPIRATION RATE: 16 BRPM | DIASTOLIC BLOOD PRESSURE: 93 MMHG | OXYGEN SATURATION: 99 % | TEMPERATURE: 97.7 F | SYSTOLIC BLOOD PRESSURE: 174 MMHG | HEIGHT: 71 IN | BODY MASS INDEX: 36.12 KG/M2

## 2021-03-18 DIAGNOSIS — M17.12 PRIMARY OSTEOARTHRITIS OF LEFT KNEE: Primary | ICD-10-CM

## 2021-03-18 DIAGNOSIS — M25.562 CHRONIC PAIN OF LEFT KNEE: ICD-10-CM

## 2021-03-18 DIAGNOSIS — G89.29 CHRONIC PAIN OF LEFT KNEE: ICD-10-CM

## 2021-03-18 PROCEDURE — 20610 DRAIN/INJ JOINT/BURSA W/O US: CPT | Performed by: SPECIALIST

## 2021-03-18 PROCEDURE — 99213 OFFICE O/P EST LOW 20 MIN: CPT | Performed by: SPECIALIST

## 2021-03-18 RX ORDER — BETAMETHASONE SODIUM PHOSPHATE AND BETAMETHASONE ACETATE 3; 3 MG/ML; MG/ML
3 INJECTION, SUSPENSION INTRA-ARTICULAR; INTRALESIONAL; INTRAMUSCULAR; SOFT TISSUE ONCE
Status: COMPLETED | OUTPATIENT
Start: 2021-03-18 | End: 2021-03-18

## 2021-03-18 RX ADMIN — BETAMETHASONE SODIUM PHOSPHATE AND BETAMETHASONE ACETATE 3 MG: 3; 3 INJECTION, SUSPENSION INTRA-ARTICULAR; INTRALESIONAL; INTRAMUSCULAR; SOFT TISSUE at 14:13

## 2021-03-18 NOTE — PROGRESS NOTES
Patient: Silas Hall                MRN: [id number]       SSN: [id number]  YOB: 1955        AGE: 61 y.o. SEX: male    PCP: Lien Whittaker NP  21    Chief Complaint   Patient presents with    Knee Pain     left knee      HISTORY:  Silas Hall is a 61 y.o. male who is seen for increased left knee pain. He takes Tylenol for pain. He notes cracking and popping of his left knee. He reports no recent injury. He did injury his knee on 18 when he slipped on ice at home He was seen at [facility] on the same day where x-rays were negative for fracture. He reports primarily anterior left knee pain. He has a h/o of gout. He does a lot of climbing and walking at work. He responded to a previous right knee aspiration and cortisone injection. He notes increased swelling of his right knee recently. He completed a right knee Euflexxa series on 16, 10/19/17 and 19. He was previously seen for back pain. Tylenol helps. 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 He currently lives alone in [facility]. He underwent gastric bypass in August. His highest weight was 330# He is decreasing at 258 pounds. He has gained 20 pounds recently. He is 5'11\". He is hypertensive. He is no longer diabetic. He had a heart attack after his mother- Tanner Romero-  from cancer in 2018. He has lost 35 pounds since his surgery.  He has one daughter and 2 sons. He has 13 grandchildren. Last 3 Recorded Weights in this Encounter    21 1353   Weight: 258 lb (117 kg)     Body mass index is 35.98 kg/m². REVIEW OF SYSTEMS: All Below are Negative except: See HPI   Constitutional: negative for fever, chills, and weight loss. Cardiovascular: negative for chest pain, claudication, leg swelling, SOB, PETERSEN   Gastrointestinal: Negative for pain, N/V/C/D, Blood in stool or urine, dysuria,  hematuria, incontinence, pelvic pain. Musculoskeletal: See HPI   Neurological: Negative for dizziness and weakness. Negative for headaches, Visual changes, confusion, seizures   Phychiatric/Behavioral: Negative for depression, memory loss, substance  abuse. Extremities: Negative for hair changes, rash, or skin lesion changes. Hematologic: Negative for bleeding problems, bruising, pallor or swollen lymph  nodes   Peripheral Vascular: No calf pain, no circulation deficits.     Social History     Socioeconomic History    Marital status: SINGLE     Spouse name: Not on file    Number of children: Not on file    Years of education: Not on file    Highest education level: Not on file   Occupational History    Not on file   Social Needs    Financial resource strain: Not on file    Food insecurity     Worry: Not on file     Inability: Not on file    Transportation needs     Medical: Not on file     Non-medical: Not on file   Tobacco Use    Smoking status: Former Smoker     Types: Cigars     Quit date: 10/4/1999     Years since quittin.4    Smokeless tobacco: Never Used   Substance and Sexual Activity    Alcohol use: Never     Frequency: Never     Comment: rarely    Drug use: No    Sexual activity: Not Currently   Lifestyle    Physical activity     Days per week: Not on file     Minutes per session: Not on file    Stress: Not on file   Relationships    Social connections     Talks on phone: Not on file     Gets together: Not on file     Attends Tenriism service: Not on file     Active member of club or organization: Not on file     Attends meetings of clubs or organizations: Not on file     Relationship status: Not on file    Intimate partner violence     Fear of current or ex partner: Not on file     Emotionally abused: Not on file     Physically abused: Not on file     Forced sexual activity: Not on file   Other Topics Concern    Not on file   Social History Narrative    Not on file     Allergies   Allergen Reactions    Iodine Other (comments)     Eyes swell shut      Shellfish Containing Products Swelling     Current Outpatient Medications   Medication Sig    cloNIDine (CATAPRES) 0.1 mg/24 hr ptwk APPLY 1 PATCH BY TRANSDERMAL ROUTE EVERY SEVEN (7) DAYS.  isosorbide mononitrate ER (IMDUR) 60 mg CR tablet TAKE 1 TABLET BY MOUTH EVERY DAY    bumetanide (BUMEX) 0.5 mg tablet TAKE 1 TABLET BY MOUTH EVERY DAY    atorvastatin (LIPITOR) 40 mg tablet Take 1 Tab by mouth daily.  ramipriL (ALTACE) 2.5 mg capsule Take 1 Cap by mouth daily.  nitroglycerin (NITROSTAT) 0.4 mg SL tablet 1 Tab by SubLINGual route every five (5) minutes as needed for Chest Pain for up to 3 doses.  clopidogreL (PLAVIX) 75 mg tab TAKE 1 TABLET BY MOUTH EVERY DAY    omeprazole (PRILOSEC) 40 mg capsule TAKE 1 CAPSULE BY MOUTH TWICE A DAY    omega 3-dha-epa-fish oil (Fish Oil) 100-160-1,000 mg cap Take  by mouth.  acetaminophen (Acetaminophen Extra Strength) 500 mg tablet Take  by mouth every six (6) hours as needed for Pain.  amLODIPine (NORVASC) 10 mg tablet Take  by mouth daily.  FERROUS FUMARATE PO Take  by mouth. As needed    carvediloL (COREG) 12.5 mg tablet TAKE 1 TABLET BY MOUTH TWICE A DAY WITH MEALS    allopurinol (ZYLOPRIM) 100 mg tablet TAKE 1 TABLET BY MOUTH EVERY DAY (Patient taking differently: Take 200 mg by mouth daily. TAKE 2 TABLET BY MOUTH EVERY DAY)    hydroCHLOROthiazide (HYDRODIURIL) 25 mg tablet Take 25 mg by mouth daily.     multivitamin (ONE A DAY) tablet Take 1 Tab by mouth daily.  calcium carbonate (TUMS) 200 mg calcium (500 mg) chew Take 1 Tab by mouth daily. As needed    aspirin 81 mg chewable tablet Take 1 Tab by mouth two (2) times a day. No current facility-administered medications for this visit. PHYSICAL EXAMINATION:  Visit Vitals  BP (!) 174/93 (BP 1 Location: Left upper arm, BP Patient Position: Sitting, BP Cuff Size: Large adult) Comment: patient states he hasnt had his bp meds   Pulse 68   Temp 97.7 °F (36.5 °C) (Temporal)   Resp 16   Ht 5' 11\" (1.803 m)   Wt 258 lb (117 kg)   SpO2 99%   BMI 35.98 kg/m²     ORTHO EXAMINATION:  Examination Right knee Left knee   Skin Intact Intact   Range of motion 90-10 100-0   Effusion 1+ -   Medial joint line tenderness + +   Lateral joint line tenderness - -   Popliteal tenderness - -   Osteophytes palpable + +   Lewiss - -   Patella crepitus + +   Anterior drawer - -   Lateral laxity - -   Medial laxity + -   Varus deformity + -   Valgus deformity - -   Pretibial edema 4+ pitting  4+   Calf tenderness - -     Chart reviewed for the following:   I, Homar Barron MD, have reviewed the History, Physical and updated the Allergic reactions for [address] performed immediately prior to start of procedure:  Kedric Nageotte, MD, have performed the following reviews on [facility] prior to the start of the procedure:            * Patient was identified by name and date of birth   * Agreement on procedure being performed was verified  * Risks and Benefits explained to the patient  * Procedure site verified and marked as necessary  * Patient was positioned for comfort  * Consent was obtained     Time: 2:05 PM     Date of procedure: 3/18/2021    Procedure performed by:  Homar Barron MD    Mr. Freddi Skiff tolerated the procedure well with no complications.     RADIOGRAPHS:  XR LEFT KNEE 1/4/18  IMPRESSION: Osteoarthritis, particularly involving the patellofemoral joint, moderate joint effusion. No fracture evident. XR RIGHT KNEE 9/1/17  IMPRESSION:  Three views - No fractures, no effusion, complete medial joint R, moderate L space narrowing, medial and lateral osteophytes present. Varus deformity. XR RIGHT KNEE 12/22/15  IMPRESSION:  No fractures, no effusion, medial joint space narrowing, medial osteophytes present, varus deformity. IMPRESSION:      ICD-10-CM ICD-9-CM    1. Primary osteoarthritis of left knee  M17.12 715.16 betamethasone (CELESTONE) injection 3 mg      DRAIN/INJECT LARGE JOINT/BURSA      PROCEDURE AUTHORIZATION TO    2. Chronic pain of left knee  M25.562 719.46 betamethasone (CELESTONE) injection 3 mg    G89.29 338.29 DRAIN/INJECT LARGE JOINT/BURSA      PROCEDURE AUTHORIZATION TO      PLAN: Consider visco supplementation if pain continues. After discussing treatment options, patient's left knee was injected with 4 cc Marcaine and 1/2 cc Celestone. We discussed possible need for right knee arthroplasty at some time in the future pending weight loss to 200# or less. He will follow up as needed.     Scribed by Brian Oneill ([address]) as dictated by Blossom Romberg, MD

## 2021-03-23 ENCOUNTER — DOCUMENTATION ONLY (OUTPATIENT)
Dept: ORTHOPEDIC SURGERY | Age: 66
End: 2021-03-23

## 2021-04-16 DIAGNOSIS — R80.9 MICROALBUMINURIA: ICD-10-CM

## 2021-04-18 RX ORDER — RAMIPRIL 2.5 MG/1
CAPSULE ORAL
Qty: 90 CAP | Refills: 1 | Status: SHIPPED | OUTPATIENT
Start: 2021-04-18 | End: 2021-06-18

## 2021-05-10 ENCOUNTER — OFFICE VISIT (OUTPATIENT)
Dept: ORTHOPEDIC SURGERY | Age: 66
End: 2021-05-10
Payer: COMMERCIAL

## 2021-05-10 VITALS
RESPIRATION RATE: 16 BRPM | HEART RATE: 72 BPM | TEMPERATURE: 97.5 F | BODY MASS INDEX: 36.46 KG/M2 | OXYGEN SATURATION: 98 % | WEIGHT: 260.4 LBS | HEIGHT: 71 IN

## 2021-05-10 DIAGNOSIS — M25.562 CHRONIC PAIN OF LEFT KNEE: ICD-10-CM

## 2021-05-10 DIAGNOSIS — M17.12 PRIMARY OSTEOARTHRITIS OF LEFT KNEE: Primary | ICD-10-CM

## 2021-05-10 DIAGNOSIS — G89.29 CHRONIC PAIN OF LEFT KNEE: ICD-10-CM

## 2021-05-10 PROCEDURE — 99213 OFFICE O/P EST LOW 20 MIN: CPT | Performed by: SPECIALIST

## 2021-05-10 PROCEDURE — 20610 DRAIN/INJ JOINT/BURSA W/O US: CPT | Performed by: SPECIALIST

## 2021-05-10 RX ORDER — DICLOFENAC SODIUM 10 MG/G
4 GEL TOPICAL 4 TIMES DAILY
Qty: 5 EACH | Refills: 5 | Status: SHIPPED | OUTPATIENT
Start: 2021-05-10 | End: 2021-12-17 | Stop reason: ALTCHOICE

## 2021-05-10 NOTE — PROGRESS NOTES
Patient: Yocasta Hyatt                MRN: [id number]       SSN: [id number]  YOB: 1955        AGE: 61 y.o. SEX: male    PCP: Aristides Tobias NP  05/10/21    CC: LEFT KNEE AND BACK PAIN    HISTORY:  Yocasta Hyatt is a 61 y.o. male who is seen for increased left knee pain. He takes Tylenol for pain. He notes cracking and popping of his left knee. He reports no recent injury. He did injury his knee on 18 when he slipped on ice at home He was seen at [facility] on the same day where x-rays were negative for fracture. He reports primarily anterior left knee pain. He has a h/o of gout. He does a lot of climbing and walking at work. He responded to a previous right knee aspiration and cortisone injection. He notes increased swelling of his right knee recently. He completed a right knee Euflexxa series on 16, 10/19/17 and 19. He is also seen for chronic back pain. Tylenol and [facility] helps. 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 He currently lives alone in [place]. He underwent gastric bypass in August. His highest weight was 330# He is decreasing at 260 pounds. He has gained 20 pounds recently. He is 5'11\". He is hypertensive. He is no longer diabetic. He had a heart attack after his mother- Lucy Mitchell-  from cancer in 2018. He has lost 35 pounds since his surgery.  He has one daughter and 2 sons. He has 13 grandchildren. Last 3 Recorded Weights in this Encounter    05/10/21 1024   Weight: 260 lb 6.4 oz (118.1 kg)     Body mass index is 36.32 kg/m². REVIEW OF SYSTEMS: All Below are Negative except: See HPI   Constitutional: negative for fever, chills, and weight loss. Cardiovascular: negative for chest pain, claudication, leg swelling, SOB, PETERSEN   Gastrointestinal: Negative for pain, N/V/C/D, Blood in stool or urine, dysuria,  hematuria, incontinence, pelvic pain. Musculoskeletal: See HPI   Neurological: Negative for dizziness and weakness. Negative for headaches, Visual changes, confusion, seizures   Phychiatric/Behavioral: Negative for depression, memory loss, substance  abuse. Extremities: Negative for hair changes, rash, or skin lesion changes. Hematologic: Negative for bleeding problems, bruising, pallor or swollen lymph  nodes   Peripheral Vascular: No calf pain, no circulation deficits.     Social History     Socioeconomic History    Marital status: SINGLE     Spouse name: Not on file    Number of children: Not on file    Years of education: Not on file    Highest education level: Not on file   Occupational History    Not on file   Social Needs    Financial resource strain: Not on file    Food insecurity     Worry: Not on file     Inability: Not on file    Transportation needs     Medical: Not on file     Non-medical: Not on file   Tobacco Use    Smoking status: Former Smoker     Types: Cigars     Quit date: 10/4/1999     Years since quittin.6    Smokeless tobacco: Never Used   Substance and Sexual Activity    Alcohol use: Never     Frequency: Never     Comment: rarely    Drug use: No    Sexual activity: Not Currently   Lifestyle    Physical activity     Days per week: Not on file     Minutes per session: Not on file    Stress: Not on file   Relationships    Social connections     Talks on phone: Not on file     Gets together: Not on file     Attends Judaism service: Not on file     Active member of club or organization: Not on file     Attends meetings of clubs or organizations: Not on file     Relationship status: Not on file    Intimate partner violence     Fear of current or ex partner: Not on file     Emotionally abused: Not on file     Physically abused: Not on file     Forced sexual activity: Not on file   Other Topics Concern    Not on file   Social History Narrative    Not on file     Allergies   Allergen Reactions    Iodine Other (comments)     Eyes swell shut      Shellfish Containing Products Swelling     Current Outpatient Medications   Medication Sig    ramipriL (ALTACE) 2.5 mg capsule TAKE 1 CAPSULE BY MOUTH EVERY DAY    hydrALAZINE (APRESOLINE) 25 mg tablet hydralazine 25 mg tablet   TAKE 1 TABLET BY MOUTH EVERY 12 HOURS    ferrous sulfate 325 mg (65 mg iron) tablet Take  by mouth.  magnesium 250 mg tab Take  by mouth.  potassium chloride SA (KLOR-CON M15) 15 mEq tablet Take  by mouth two (2) times a day.  mirabegron ER (Myrbetriq) 50 mg ER tablet Take 1 Tab by mouth daily.  cloNIDine (CATAPRES) 0.1 mg/24 hr ptwk APPLY 1 PATCH BY TRANSDERMAL ROUTE EVERY SEVEN (7) DAYS.  isosorbide mononitrate ER (IMDUR) 60 mg CR tablet TAKE 1 TABLET BY MOUTH EVERY DAY    bumetanide (BUMEX) 0.5 mg tablet TAKE 1 TABLET BY MOUTH EVERY DAY    atorvastatin (LIPITOR) 40 mg tablet Take 1 Tab by mouth daily.  nitroglycerin (NITROSTAT) 0.4 mg SL tablet 1 Tab by SubLINGual route every five (5) minutes as needed for Chest Pain for up to 3 doses.  clopidogreL (PLAVIX) 75 mg tab TAKE 1 TABLET BY MOUTH EVERY DAY    omeprazole (PRILOSEC) 40 mg capsule TAKE 1 CAPSULE BY MOUTH TWICE A DAY    omega 3-dha-epa-fish oil (Fish Oil) 100-160-1,000 mg cap Take  by mouth.  acetaminophen (Acetaminophen Extra Strength) 500 mg tablet Take  by mouth every six (6) hours as needed for Pain.  amLODIPine (NORVASC) 10 mg tablet Take  by mouth daily.     FERROUS FUMARATE PO Take  by mouth. As needed    carvediloL (COREG) 12.5 mg tablet TAKE 1 TABLET BY MOUTH TWICE A DAY WITH MEALS    allopurinol (ZYLOPRIM) 100 mg tablet TAKE 1 TABLET BY MOUTH EVERY DAY (Patient taking differently: Take 200 mg by mouth daily. TAKE 2 TABLET BY MOUTH EVERY DAY)    hydroCHLOROthiazide (HYDRODIURIL) 25 mg tablet Take 25 mg by mouth daily.  multivitamin (ONE A DAY) tablet Take 1 Tab by mouth daily.  calcium carbonate (TUMS) 200 mg calcium (500 mg) chew Take 1 Tab by mouth daily. As needed    aspirin 81 mg chewable tablet Take 1 Tab by mouth two (2) times a day. No current facility-administered medications for this visit.       PHYSICAL EXAMINATION:  Visit Vitals  Pulse 72   Temp 97.5 °F (36.4 °C) (Temporal)   Resp 16   Ht 5' 11\" (1.803 m)   Wt 260 lb 6.4 oz (118.1 kg)   SpO2 98%   BMI 36.32 kg/m²     ORTHO EXAMINATION:  Examination Right knee Left knee   Skin Intact Intact   Range of motion 90-10 100-0   Effusion 1+ -   Medial joint line tenderness + +   Lateral joint line tenderness - -   Popliteal tenderness - -   Osteophytes palpable + +   Lewiss - -   Patella crepitus + +   Anterior drawer - -   Lateral laxity - -   Medial laxity + -   Varus deformity + -   Valgus deformity - -   Pretibial edema 4+ pitting  4+   Calf tenderness - -     Examination Lumbar Thoracic   Skin Intact Intact   Tenderness + -   Tightness - -   Lordosis Normal N/A   Kyphosis N/A Normal   Scoliosis - -   Flexion Fingertips to ankle N/A   Extension 10 N/A   Knee reflexes Normal N/A   Ankle reflexes Normal N/A   Straight leg raise - N/A   Calf tenderness - N/A     Chart reviewed for the following:   I, Rocael Robert MD, have reviewed the History, Physical and updated the Allergic reactions for [address] performed immediately prior to start of procedure:  Tavia Nickerson MD, have performed the following reviews on Rene Hunter prior to the start of the procedure:            * Patient was identified by name and date of birth   * Agreement on procedure being performed was verified  * Risks and Benefits explained to the patient  * Procedure site verified and marked as necessary  * Patient was positioned for comfort  * Consent was obtained     Time: 10:50 AM     Date of procedure: 5/10/2021    Procedure performed by:  Serena Stein MD    Mr. Mahad Talbot tolerated the procedure well with no complications. RADIOGRAPHS:  XR LEFT KNEE 1/4/18  IMPRESSION: Osteoarthritis, particularly involving the patellofemoral joint, moderate joint effusion. No fracture evident. XR RIGHT KNEE 9/1/17  IMPRESSION:  Three views - No fractures, no effusion, complete medial joint R, moderate L space narrowing, medial and lateral osteophytes present. Varus deformity. XR RIGHT KNEE 12/22/15  IMPRESSION:  No fractures, no effusion, medial joint space narrowing, medial osteophytes present, varus deformity. IMPRESSION:      ICD-10-CM ICD-9-CM    1. Primary osteoarthritis of left knee  M17.12 715.16 sodium hyaluronate (SUPARTZ FX/EUFLEXXA/HYALGAN) 10 mg/mL injection syrg 20 mg      DRAIN/INJECT LARGE JOINT/BURSA      diclofenac (Voltaren) 1 % gel   2. Chronic pain of left knee  M25.562 719.46 sodium hyaluronate (SUPARTZ FX/EUFLEXXA/HYALGAN) 10 mg/mL injection syrg 20 mg    G89.29 338.29 DRAIN/INJECT LARGE JOINT/BURSA     PLAN:   Voltaren Gel Rx provided. After discussing treatment options, patient's left knee was injected with  2 cc Euflexxa. We discussed possible need for right knee arthroplasty at some time in the future pending weight loss to 200# or less. He will follow up in 1 week for Euflexxa #2.     Scribed by Christopher Nguyen ([address]) as dictated by Serena Stein MD

## 2021-05-17 ENCOUNTER — OFFICE VISIT (OUTPATIENT)
Dept: ORTHOPEDIC SURGERY | Age: 66
End: 2021-05-17
Payer: COMMERCIAL

## 2021-05-17 VITALS
HEART RATE: 83 BPM | RESPIRATION RATE: 16 BRPM | HEIGHT: 71 IN | TEMPERATURE: 96.9 F | OXYGEN SATURATION: 99 % | BODY MASS INDEX: 36.43 KG/M2 | WEIGHT: 260.2 LBS

## 2021-05-17 DIAGNOSIS — G89.29 CHRONIC PAIN OF LEFT KNEE: ICD-10-CM

## 2021-05-17 DIAGNOSIS — M25.562 CHRONIC PAIN OF LEFT KNEE: ICD-10-CM

## 2021-05-17 DIAGNOSIS — M17.12 PRIMARY OSTEOARTHRITIS OF LEFT KNEE: Primary | ICD-10-CM

## 2021-05-17 PROCEDURE — 20610 DRAIN/INJ JOINT/BURSA W/O US: CPT | Performed by: SPECIALIST

## 2021-05-17 NOTE — PROGRESS NOTES
Patient: Alexsandra Rebollar                MRN: [id number]       SSN: [id number]  YOB: 1955        AGE: 61 y.o. SEX: male  Body mass index is 36.29 kg/m². PCP: Chetan Antunez NP  05/17/21    Chief Complaint   Patient presents with    Knee Pain     left knee pain     HISTORY:  Alexsandra Rebollar is a 61 y.o. male who is seen for left knee pain. ICD-10-CM ICD-9-CM    1. Primary osteoarthritis of left knee  M17.12 715.16 sodium hyaluronate (SUPARTZ FX/EUFLEXXA/HYALGAN) 10 mg/mL injection syrg 20 mg      DRAIN/INJECT LARGE JOINT/BURSA   2. Chronic pain of left knee  M25.562 719.46 sodium hyaluronate (SUPARTZ FX/EUFLEXXA/HYALGAN) 10 mg/mL injection syrg 20 mg    G89.29 338.29 DRAIN/INJECT LARGE JOINT/BURSA       Chart reviewed for the following:   Amaya Joe MD, have reviewed the History, Physical and updated the Allergic reactions for [address] performed immediately prior to start of procedure:  Amaya Joe MD, have performed the following reviews on Alexsandra Rebollar prior to the start of the procedure:            * Patient was identified by name and date of birth   * Agreement on procedure being performed was verified  * Risks and Benefits explained to the patient  * Procedure site verified and marked as necessary  * Patient was positioned for comfort  * Consent was obtained     Time: 11:07 AM     Date of procedure: 5/17/2021    Procedure performed by:  Nina Simpson MD    Mr. Duane Bernard tolerated the procedure well with no complications. PLAN:  After discussing treatment options, patient's left knee was injected with 2 cc of Euflexxa. Mr. Duane Bernard will follow up in one week to complete his visco supplementation injection series.       Scribed by Zulma Kent ([address]) as dictated by Nina Simpson MD

## 2021-05-24 ENCOUNTER — OFFICE VISIT (OUTPATIENT)
Dept: ORTHOPEDIC SURGERY | Age: 66
End: 2021-05-24
Payer: COMMERCIAL

## 2021-05-24 VITALS
OXYGEN SATURATION: 98 % | HEIGHT: 71 IN | TEMPERATURE: 97 F | WEIGHT: 257 LBS | BODY MASS INDEX: 35.98 KG/M2 | HEART RATE: 80 BPM

## 2021-05-24 DIAGNOSIS — M25.562 CHRONIC PAIN OF LEFT KNEE: ICD-10-CM

## 2021-05-24 DIAGNOSIS — G89.29 CHRONIC PAIN OF LEFT KNEE: ICD-10-CM

## 2021-05-24 DIAGNOSIS — M17.11 PRIMARY OSTEOARTHRITIS OF RIGHT KNEE: ICD-10-CM

## 2021-05-24 DIAGNOSIS — M17.12 PRIMARY OSTEOARTHRITIS OF LEFT KNEE: Primary | ICD-10-CM

## 2021-05-24 PROCEDURE — 20610 DRAIN/INJ JOINT/BURSA W/O US: CPT | Performed by: SPECIALIST

## 2021-05-24 NOTE — PROGRESS NOTES
Patient: Anum Olivo                MRN: [id number]       SSN: [id number]  YOB: 1955        AGE: 61 y.o. SEX: male  Body mass index is 35.84 kg/m². PCP: Gogo Worthington NP  05/24/21    Chief Complaint   Patient presents with    Knee Pain     left knee     HISTORY:  Anum Olivo is a 61 y.o. male who is seen for left knee pain. TIME OUT performed immediately prior to start of procedure:  David Aden MD, have performed the following reviews on Anum Olivo prior to the start of the procedure:            * Patient was identified by name and date of birth   * Agreement on procedure being performed was verified  * Risks and Benefits explained to the patient  * Procedure site verified and marked as necessary  * Patient was positioned for comfort  * Consent was obtained     Time: 10:47 AM      Date of procedure: 5/24/2021    Procedure performed by:  Livia Carvalho MD    Mr. aCrol Gr tolerated the procedure well with no complications      [id number] ICD-9-CM    1. Primary osteoarthritis of left knee  M17.12 715.16 sodium hyaluronate (SUPARTZ FX/EUFLEXXA/HYALGAN) 10 mg/mL injection syrg 20 mg      DRAIN/INJECT LARGE JOINT/BURSA   2. Chronic pain of left knee  M25.562 719.46 sodium hyaluronate (SUPARTZ FX/EUFLEXXA/HYALGAN) 10 mg/mL injection syrg 20 mg    G89.29 338.29 DRAIN/INJECT LARGE JOINT/BURSA   3. Primary osteoarthritis of right knee  M17.11 715.16 PROCEDURE AUTHORIZATION TO      PLAN: Consider visco supplementation if pain continues. After discussing treatment options, patient's left knee was injected with 2 cc of Euflexxa. Mr. Carol Gr will follow up PRN now that he has completed his visco supplementation injection series.       Scribed by Yi Cordero ([facility]) as dictated by Livia Carvalho MD

## 2021-06-07 ENCOUNTER — DOCUMENTATION ONLY (OUTPATIENT)
Dept: ORTHOPEDIC SURGERY | Age: 66
End: 2021-06-07

## 2021-06-17 DIAGNOSIS — I10 ESSENTIAL HYPERTENSION: ICD-10-CM

## 2021-06-17 RX ORDER — CLONIDINE 0.1 MG/24H
PATCH, EXTENDED RELEASE TRANSDERMAL
Qty: 12 PATCH | Refills: 1 | Status: SHIPPED | OUTPATIENT
Start: 2021-06-17 | End: 2021-10-11

## 2021-06-18 ENCOUNTER — OFFICE VISIT (OUTPATIENT)
Dept: CARDIOLOGY CLINIC | Age: 66
End: 2021-06-18
Payer: COMMERCIAL

## 2021-06-18 VITALS
TEMPERATURE: 98.8 F | OXYGEN SATURATION: 98 % | HEART RATE: 77 BPM | HEIGHT: 71 IN | SYSTOLIC BLOOD PRESSURE: 148 MMHG | WEIGHT: 264 LBS | DIASTOLIC BLOOD PRESSURE: 85 MMHG | BODY MASS INDEX: 36.96 KG/M2

## 2021-06-18 DIAGNOSIS — I50.32 CHRONIC DIASTOLIC CONGESTIVE HEART FAILURE (HCC): ICD-10-CM

## 2021-06-18 DIAGNOSIS — I25.10 ATHEROSCLEROSIS OF NATIVE CORONARY ARTERY OF NATIVE HEART WITHOUT ANGINA PECTORIS: Primary | ICD-10-CM

## 2021-06-18 DIAGNOSIS — R80.9 MICROALBUMINURIA: ICD-10-CM

## 2021-06-18 DIAGNOSIS — I10 ESSENTIAL HYPERTENSION: ICD-10-CM

## 2021-06-18 DIAGNOSIS — E66.01 SEVERE OBESITY (BMI 35.0-39.9) WITH COMORBIDITY (HCC): ICD-10-CM

## 2021-06-18 DIAGNOSIS — Z98.61 POSTSURGICAL PERCUTANEOUS TRANSLUMINAL CORONARY ANGIOPLASTY STATUS: ICD-10-CM

## 2021-06-18 DIAGNOSIS — E78.00 HYPERCHOLESTEROLEMIA: ICD-10-CM

## 2021-06-18 PROCEDURE — 99214 OFFICE O/P EST MOD 30 MIN: CPT | Performed by: INTERNAL MEDICINE

## 2021-06-18 RX ORDER — RAMIPRIL 5 MG/1
5 CAPSULE ORAL DAILY
Qty: 90 CAPSULE | Refills: 1 | Status: SHIPPED | OUTPATIENT
Start: 2021-06-18 | End: 2021-11-19 | Stop reason: SDUPTHER

## 2021-06-18 RX ORDER — CARVEDILOL 12.5 MG/1
12.5 TABLET ORAL 2 TIMES DAILY WITH MEALS
Qty: 180 TABLET | Refills: 1 | Status: SHIPPED | OUTPATIENT
Start: 2021-06-18 | End: 2021-11-22

## 2021-06-18 NOTE — PROGRESS NOTES
1. Have you been to the ER, urgent care clinic since your last visit? Hospitalized since your last visit? No    2. Have you seen or consulted any other health care providers outside of the [address] [place] [address] since your last visit? Include any pap smears or colon screening. No     3. Since your last visit, have you had any of the following symptoms? No     4. Have you had any blood work, X-rays or cardiac testing? No    5. Where do you normally have your labs drawn? [facility]       6. Do you need any refills today?    No

## 2021-06-18 NOTE — PATIENT INSTRUCTIONS
Medications Discontinued During This Encounter Medication Reason  bumetanide (BUMEX) 0.5 mg tablet Not A Current Medication  FERROUS FUMARATE PO Therapy Completed  ferrous sulfate 325 mg (65 mg iron) tablet Therapy Completed  hydrALAZINE (APRESOLINE) 25 mg tablet Not A Current Medication  magnesium 250 mg tab Therapy Completed  mirabegron ER (Myrbetriq) 50 mg ER tablet Therapy Completed  omega 3-dha-epa-fish oil (Fish Oil) 100-160-1,000 mg cap Therapy Completed  potassium chloride SA (KLOR-CON M15) 15 mEq tablet Therapy Completed  carvediloL (COREG) 12.5 mg tablet REORDER  
 ramipriL (ALTACE) 2.5 mg capsule After the recommended changes have been made in blood pressure medicines, patient advised to keep BP/HR(pulse rate) chart twice daily and bring us results in next 4 to 5 days. Patient may send the results via \"My Chart\" if desired. Please rest for 5-10 minutes before checking blood pressure. Sit on a comfortable chair without crossing the legs and put your arm on a table. We recommend that you use an upper arm cuff. Check the blood pressure 3 times each time you check the blood pressure and record the lowest reading. If you check the blood pressure in both arms, use the higher reading. A Healthy Heart: Care Instructions Your Care Instructions Coronary artery disease, also called heart disease, occurs when a substance called plaque builds up in the vessels that supply oxygen-rich blood to your heart muscle. This can narrow the blood vessels and reduce blood flow. A heart attack happens when blood flow is completely blocked. A high-fat diet, smoking, and other factors increase the risk of heart disease. Your doctor has found that you have a chance of having heart disease. You can do lots of things to keep your heart healthy. It may not be easy, but you can change your diet, exercise more, and quit smoking.  These steps really work to lower your chance of heart disease. Follow-up care is a key part of your treatment and safety. Be sure to make and go to all appointments, and call your doctor if you are having problems. It's also a good idea to know your test results and keep a list of the medicines you take. How can you care for yourself at home? Diet 
  · Use less salt when you cook and eat. This helps lower your blood pressure. Taste food before salting. Add only a little salt when you think you need it. With time, your taste buds will adjust to less salt.  
  · Eat fewer snack items, fast foods, canned soups, and other high-salt, high-fat, processed foods.  
  · Read food labels and try to avoid saturated and trans fats. They increase your risk of heart disease by raising cholesterol levels.  
  · Limit the amount of solid fat-butter, margarine, and shortening-you eat. Use olive, peanut, or canola oil when you cook. Bake, broil, and steam foods instead of frying them.  
  · Eat a variety of fruit and vegetables every day. Dark green, deep orange, red, or yellow fruits and vegetables are especially good for you. Examples include spinach, carrots, peaches, and berries.  
  · Foods high in fiber can reduce your cholesterol and provide important vitamins and minerals. High-fiber foods include whole-grain cereals and breads, oatmeal, beans, brown rice, citrus fruits, and apples.  
  · Eat lean proteins. Heart-healthy proteins include seafood, lean meats and poultry, eggs, beans, peas, nuts, seeds, and soy products.  
  · Limit drinks and foods with added sugar. These include candy, desserts, and soda pop. Lifestyle changes 
  · If your doctor recommends it, get more exercise. Walking is a good choice. Bit by bit, increase the amount you walk every day. Try for at least 30 minutes on most days of the week. You also may want to swim, bike, or do other activities.  
  · Do not smoke. If you need help quitting, talk to your doctor about stop-smoking programs and medicines. These can increase your chances of quitting for good. Quitting smoking may be the most important step you can take to protect your heart. It is never too late to quit.  
  · Limit alcohol to 2 drinks a day for men and 1 drink a day for women. Too much alcohol can cause health problems.  
  · Manage other health problems such as diabetes, high blood pressure, and high cholesterol. If you think you may have a problem with alcohol or drug use, talk to your doctor. Medicines 
  · Take your medicines exactly as prescribed. Call your doctor if you think you are having a problem with your medicine.  
  · If your doctor recommends aspirin, take the amount directed each day. Make sure you take aspirin and not another kind of pain reliever, such as acetaminophen (Tylenol). When should you call for help? Call 911 if you have symptoms of a heart attack. These may include: 
  · Chest pain or pressure, or a strange feeling in the chest.  
  · Sweating.  
  · Shortness of breath.  
  · Pain, pressure, or a strange feeling in the back, neck, jaw, or upper belly or in one or both shoulders or arms.  
  · Lightheadedness or sudden weakness.  
  · A fast or irregular heartbeat. After you call 911, the  may tell you to chew 1 adult-strength or 2 to 4 low-dose aspirin. Wait for an ambulance. Do not try to drive yourself. Watch closely for changes in your health, and be sure to contact your doctor if you have any problems. Where can you learn more? Go to http://www.gray.com/ Enter K835 in the search box to learn more about \"A Healthy Heart: Care Instructions. \" Current as of: August 31, 2020               Content Version: 12.8 © 8116-9193 [employer]. Care instructions adapted under license by [employer] (which disclaims liability or warranty for this information).  If you have questions about a medical condition or this instruction, always ask your healthcare professional. [address] any warranty or liability for your use of this information.
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
## 2021-06-21 RX ORDER — ISOSORBIDE MONONITRATE 60 MG/1
TABLET, EXTENDED RELEASE ORAL
Qty: 90 TABLET | Refills: 1 | Status: SHIPPED | OUTPATIENT
Start: 2021-06-21 | End: 2021-10-18

## 2021-06-25 DIAGNOSIS — I10 ESSENTIAL HYPERTENSION: ICD-10-CM

## 2021-06-25 DIAGNOSIS — E78.00 HYPERCHOLESTEROLEMIA: ICD-10-CM

## 2021-08-26 DIAGNOSIS — E78.00 HYPERCHOLESTEROLEMIA: ICD-10-CM

## 2021-08-26 DIAGNOSIS — Z98.61 POSTSURGICAL PERCUTANEOUS TRANSLUMINAL CORONARY ANGIOPLASTY STATUS: ICD-10-CM

## 2021-08-26 RX ORDER — ATORVASTATIN CALCIUM 40 MG/1
40 TABLET, FILM COATED ORAL DAILY
Qty: 90 TABLET | Refills: 2 | Status: SHIPPED | OUTPATIENT
Start: 2021-08-26 | End: 2022-01-04 | Stop reason: SDUPTHER

## 2021-08-26 RX ORDER — CLOPIDOGREL BISULFATE 75 MG/1
75 TABLET ORAL DAILY
Qty: 90 TABLET | Refills: 2 | Status: SHIPPED | OUTPATIENT
Start: 2021-08-26 | End: 2022-03-29

## 2021-08-26 NOTE — TELEPHONE ENCOUNTER
Needs refills on plavix 75 mg one daily #30, Lipitor 40 mg one daily # 30 to [facility]. Pt states both bottles fell in the toilet and couldn't be saved

## 2021-08-30 RX ORDER — OMEPRAZOLE 40 MG/1
CAPSULE, DELAYED RELEASE ORAL
Qty: 180 CAPSULE | Refills: 0 | Status: SHIPPED | OUTPATIENT
Start: 2021-08-30 | End: 2021-12-06

## 2021-10-09 DIAGNOSIS — I10 ESSENTIAL HYPERTENSION: ICD-10-CM

## 2021-10-11 RX ORDER — CLONIDINE 0.1 MG/24H
PATCH, EXTENDED RELEASE TRANSDERMAL
Qty: 12 PATCH | Refills: 1 | Status: SHIPPED | OUTPATIENT
Start: 2021-10-11 | End: 2021-12-17 | Stop reason: ALTCHOICE

## 2021-10-18 RX ORDER — ISOSORBIDE MONONITRATE 60 MG/1
TABLET, EXTENDED RELEASE ORAL
Qty: 90 TABLET | Refills: 1 | Status: SHIPPED | OUTPATIENT
Start: 2021-10-18 | End: 2021-11-19 | Stop reason: SDUPTHER

## 2021-11-18 ENCOUNTER — TELEPHONE (OUTPATIENT)
Dept: FAMILY MEDICINE CLINIC | Age: 66
End: 2021-11-18

## 2021-11-18 DIAGNOSIS — E04.1 THYROID NODULE: Primary | ICD-10-CM

## 2021-11-19 ENCOUNTER — OFFICE VISIT (OUTPATIENT)
Dept: FAMILY MEDICINE CLINIC | Age: 66
End: 2021-11-19
Payer: COMMERCIAL

## 2021-11-19 VITALS
WEIGHT: 259 LBS | DIASTOLIC BLOOD PRESSURE: 108 MMHG | BODY MASS INDEX: 36.26 KG/M2 | OXYGEN SATURATION: 99 % | RESPIRATION RATE: 16 BRPM | SYSTOLIC BLOOD PRESSURE: 170 MMHG | HEART RATE: 82 BPM | HEIGHT: 71 IN | TEMPERATURE: 97 F

## 2021-11-19 DIAGNOSIS — R80.9 MICROALBUMINURIA: ICD-10-CM

## 2021-11-19 DIAGNOSIS — E78.00 HYPERCHOLESTEROLEMIA: ICD-10-CM

## 2021-11-19 DIAGNOSIS — Z23 NEEDS FLU SHOT: Primary | ICD-10-CM

## 2021-11-19 DIAGNOSIS — I10 ESSENTIAL HYPERTENSION: ICD-10-CM

## 2021-11-19 PROCEDURE — 99213 OFFICE O/P EST LOW 20 MIN: CPT | Performed by: NURSE PRACTITIONER

## 2021-11-19 PROCEDURE — 90686 IIV4 VACC NO PRSV 0.5 ML IM: CPT | Performed by: NURSE PRACTITIONER

## 2021-11-19 RX ORDER — HYDROCHLOROTHIAZIDE 25 MG/1
25 TABLET ORAL DAILY
Qty: 90 TABLET | Refills: 1 | Status: SHIPPED | OUTPATIENT
Start: 2021-11-19 | End: 2021-12-17 | Stop reason: ALTCHOICE

## 2021-11-19 RX ORDER — AMLODIPINE BESYLATE 10 MG/1
10 TABLET ORAL DAILY
Qty: 90 TABLET | Refills: 1 | Status: SHIPPED | OUTPATIENT
Start: 2021-11-19 | End: 2022-01-04 | Stop reason: SDUPTHER

## 2021-11-19 RX ORDER — ISOSORBIDE MONONITRATE 60 MG/1
60 TABLET, EXTENDED RELEASE ORAL DAILY
Qty: 90 TABLET | Refills: 1 | Status: SHIPPED | OUTPATIENT
Start: 2021-11-19 | End: 2021-12-17 | Stop reason: SDUPTHER

## 2021-11-19 RX ORDER — RAMIPRIL 5 MG/1
5 CAPSULE ORAL DAILY
Qty: 90 CAPSULE | Refills: 1 | Status: SHIPPED | OUTPATIENT
Start: 2021-11-19 | End: 2021-11-29

## 2021-11-19 NOTE — PROGRESS NOTES
Екатерина Watts presents today for   Chief Complaint   Patient presents with    Hypertension    Letter for School/Work     patient needs letter to return to work due to elevated BP       Is someone accompanying this pt? no    Is the patient using any DME equipment during [address]? no    Depression Screening:  3 most recent PHQ Screens 11/19/2021   PHQ Not Done -   Little interest or pleasure in doing things Not at all   Feeling down, depressed, irritable, or hopeless Not at all   Total Score PHQ 2 0   Trouble falling or staying asleep, or sleeping too much -   Feeling tired or having little energy -   Poor appetite, weight loss, or overeating -   Feeling bad about yourself - or that you are a failure or have let yourself or your family down -   Trouble concentrating on things such as school, work, reading, or watching TV -   Moving or speaking so slowly that other people could have noticed; or the opposite being so fidgety that others notice -   Thoughts of being better off dead, or hurting yourself in some way -   PHQ 9 Score -   How difficult have these problems made it for you to do your work, take care of your home and get along with others -       Learning Assessment:  Learning Assessment 5/25/2017   PRIMARY LEARNER Patient   CO-LEARNER CAREGIVER No   PRIMARY LANGUAGE ENGLISH   LEARNER PREFERENCE PRIMARY READING   ANSWERED BY patient   RELATIONSHIP SELF       Abuse Screening:  Abuse Screening Questionnaire 11/19/2021   Do you ever feel afraid of your partner? N   Are you in a relationship with someone who physically or mentally threatens you? N   Is it safe for you to go home? Y       Fall Risk  Fall Risk Assessment, last 12 mths 3/18/2021   Able to walk? Yes   Fall in past 12 months? 0   Do you feel unsteady? 0   Are you worried about falling 0       Health Maintenance reviewed and discussed and ordered per Provider.     Health Maintenance Due   Topic Date Due    Eye Exam Retinal or Dilated  Never done   Amena Estrada DTaP/Tdap/Td series (1 - Tdap) Never done    Foot Exam Q1  09/07/2017    Flu Vaccine (1) 09/01/2021    Lipid Screen  10/09/2021    MICROALBUMIN Q1  10/27/2021   . Coordination of Care:  1. Have you been to the ER, urgent care clinic since your last visit? Hospitalized since your last visit? no    2. Have you seen or consulted any other health care providers outside of the [address] [place] [address] since your last visit? Include any pap smears or colon screening.  no

## 2021-11-19 NOTE — PROGRESS NOTES
Kai Cowden is a 61 y.o. male presenting today for Hypertension and Letter for School/Work (patient needs letter to return to work due to elevated BP)  . Chief Complaint   Patient presents with    Hypertension    Letter for School/Work     patient needs letter to return to work due to elevated BP       HPI:  Kai Cowden presents to the office today for hypertension follow-up care. Patient and his work physical had elevated BP readings of 180s over 100sx3 readings. His initial BP reading today was 192/104 and repeat 170/108. Patient admits that he has not had 3 of his BP medications since August, 2021. He has not had his amlodipine, hydrochlorothiazide or isosorbide. He denies any chest pain or palpitation. ROS    ROS:  History obtained from the patient intake forms which are reviewed with the patient  · General: negative for - chills, fever, weight changes or malaise  · HEENT: no sore throat, nasal congestion, vision problems or ear problems  · Respiratory: no cough, shortness of breath, or wheezing  · Cardiovascular: no chest pain, palpitations, or dyspnea on exertion  · Gastrointestinal: no abdominal pain, N/V, change in bowel habits, or black or bloody stools  · Musculoskeletal: no back pain, joint pain, joint stiffness, muscle pain or muscle weakness  · Neurological: no numbness, tingling, headache or dizziness  · Endo:  No polyuria or polydipsia. · : no hematuria, dysuria, frequency, hesitancy, or nocturia.     · Psychological: negative for - anxiety, depression, sleep disturbances, suicidal or homicidal ideations    Allergies   Allergen Reactions    Iodine Other (comments)     Eyes swell shut      Shellfish Containing Products Swelling       PHQ Screening   3 most recent PHQ Screens 11/19/2021   PHQ Not Done -   Little interest or pleasure in doing things Not at all   Feeling down, depressed, irritable, or hopeless Not at all   Total Score PHQ 2 0   Trouble falling or staying asleep, or sleeping too much -   Feeling tired or having little energy -   Poor appetite, weight loss, or overeating -   Feeling bad about yourself - or that you are a failure or have let yourself or your family down -   Trouble concentrating on things such as school, work, reading, or watching TV -   Moving or speaking so slowly that other people could have noticed; or the opposite being so fidgety that others notice -   Thoughts of being better off dead, or hurting yourself in some way -   PHQ 9 Score -   How difficult have these problems made it for you to do your work, take care of your home and get along with others -       History  Past Medical History:   Diagnosis Date    Atypical chest pain/mild nonobstructive CAD/EF 65% 4/12/2011    BPH without obstruction/lower urinary tract symptoms     Bursitis of right shoulder     CAD (coronary artery disease)     Chest pain     history of hospitalization with chest pain and a negative workup in 2008    Chronic edema     Constipation     die to medication    Coronary artery disease     mild, non obstructive/EF 65%    Depression 12/16/2018    Dyspnea on exertion     Echocardiogram 12/16/08    IVC dilated; suboptimal endocardial border; EF 65%    ED (erectile dysfunction)     Elevated PSA     Frequency     GERD (gastroesophageal reflux disease)     Gout     H/O cystoscopy 06/20/2013    Hematuria, unspecified     Hypercholesterolemia     Hypertension     Hypertension      Hypogonadism male     Impotence of organic origin     Left ventricular diastolic dysfunction     Malignant neoplasm of prostate ([address]     hx of t1a, izzy 6, 5 % of 1  core    Morbid obesity ([address]     Myocardial perfusion 09/05/08    Basal inferior defect c/w artifact; EF 63%    Overactive bladder     Prostate cancer ([address] 2/9/2017    S/P cardiac cath 07/30/10    oD2-40%; pRCA-20-30%; EF 65%    S/P gastric surgery 8/28/2018    Sleep apnea     Slowing of urinary stream     Type 2 diabetes with nephropathy ([facility] 2018    Type II or unspecified type diabetes mellitus without mention of complication, not stated as uncontrolled        Past Surgical History:   Procedure Laterality Date    COLONOSCOPY N/A 2019    COLONOSCOPY performed by Bassam Cruz MD at [address] [place]  7/30/10    HX OTHER SURGICAL  13    Prostate    HX TONSILLECTOMY      [place] COLONOSCOPY FLX DX W/COLLJ SPEC WHEN PFRMD      [place] I & D ABSC XTRAORAL SOFT TISS COMP         Social History     Socioeconomic History    Marital status: SINGLE     Spouse name: Not on file    Number of children: Not on file    Years of education: Not on file    Highest education level: Not on file   Occupational History    Not on file   Tobacco Use    Smoking status: Former Smoker     Types: Cigars     Quit date: 10/4/1999     Years since quittin.1    Smokeless tobacco: Never Used   Vaping Use    Vaping Use: Never used   Substance and Sexual Activity    Alcohol use: Never     Comment: rarely    Drug use: No    Sexual activity: Not Currently   Other Topics Concern    Not on file   Social History Narrative    Not on file     Social Determinants of Health     Financial Resource Strain:     Difficulty of Paying Living Expenses: Not on file   Food Insecurity:     Worried About [address] in the Last Year: Not on file    [address] in the Last Year: Not on file   Transportation Needs:     Lack of Transportation (Medical): Not on file    Lack of Transportation (Non-Medical):  Not on file   Physical Activity:     Days of Exercise per Week: Not on file    Minutes of Exercise per Session: Not on file   Stress:     Feeling of Stress : Not on file   Social Connections:     Frequency of Communication with Friends and Family: Not on file    Frequency of Social Gatherings with Friends and Family: Not on file    Attends Evangelical Services: Not on file   [employer] or Organizations: Not on file    Attends Club or Organization Meetings: Not on file    Marital Status: Not on file   Intimate Partner Violence:     Fear of Current or Ex-Partner: Not on file    Emotionally Abused: Not on file    Physically Abused: Not on file    Sexually Abused: Not on file   Housing Stability:     Unable to Pay for Housing in the Last Year: Not on file    Number of Steve in the Last Year: Not on file    Unstable Housing in the Last Year: Not on file       Current Outpatient Medications   Medication Sig Dispense Refill    ramipriL (ALTACE) 5 mg capsule Take 1 Capsule by mouth daily. 90 Capsule 1    isosorbide mononitrate ER (IMDUR) 60 mg CR tablet Take 1 Tablet by mouth daily. 90 Tablet 1    hydroCHLOROthiazide (HYDRODIURIL) 25 mg tablet Take 1 Tablet by mouth daily. 90 Tablet 1    amLODIPine (NORVASC) 10 mg tablet Take 1 Tablet by mouth daily. 90 Tablet 1    cloNIDine (CATAPRES) 0.1 mg/24 hr ptwk APPLY 1 PATCH BY TRANSDERMAL ROUTE EVERY SEVEN (7) DAYS. 12 Patch 1    omeprazole (PRILOSEC) 40 mg capsule TAKE 1 CAPSULE BY MOUTH TWICE A  Capsule 0    atorvastatin (LIPITOR) 40 mg tablet Take 1 Tablet by mouth daily. 90 Tablet 2    clopidogreL (PLAVIX) 75 mg tab Take 1 Tablet by mouth daily. 90 Tablet 2    carvediloL (COREG) 12.5 mg tablet Take 1 Tablet by mouth two (2) times daily (with meals). 180 Tablet 1    acetaminophen (Acetaminophen Extra Strength) 500 mg tablet Take  by mouth every six (6) hours as needed for Pain.  allopurinol (ZYLOPRIM) 100 mg tablet TAKE 1 TABLET BY MOUTH EVERY DAY (Patient taking differently: Take 200 mg by mouth daily. TAKE 2 TABLET BY MOUTH EVERY DAY) 90 Tab 3    multivitamin (ONE A DAY) tablet Take 1 Tab by mouth daily.  calcium carbonate (TUMS) 200 mg calcium (500 mg) chew Take 1 Tablet by mouth daily. As needed      aspirin 81 mg chewable tablet Take 1 Tab by mouth two (2) times a day.  (Patient taking differently: Take 81 mg by mouth daily.) 60 Tab 0    diclofenac (Voltaren) 1 % gel Apply 4 g to affected area four (4) times daily. (Patient not taking: Reported on 10/22/2021) 5 Each 5    nitroglycerin (NITROSTAT) 0.4 mg SL tablet 1 Tab by SubLINGual route every five (5) minutes as needed for Chest Pain for up to 3 doses. (Patient not taking: Reported on 10/22/2021) 1 Bottle 1         Vitals:    11/19/21 1334 11/19/21 1421   BP: (!) 192/104 (!) 170/108   Pulse: 82    Resp: 16    Temp: 97 °F (36.1 °C)    TempSrc: Oral    SpO2: 99%    Weight: 259 lb (117.5 kg)    Height: 5' 11\" (1.803 m)    PainSc:   0 - No pain        Physical Exam  Vitals and nursing note reviewed. Constitutional:       Appearance: Normal appearance. Cardiovascular:      Rate and Rhythm: Normal rate and regular rhythm. Pulses: Normal pulses. Heart sounds: Normal heart sounds. Pulmonary:      Effort: Pulmonary effort is normal.      Breath sounds: Normal breath sounds. Skin:     General: Skin is warm and dry. Neurological:      Mental Status: He is alert. No visits with results within 3 Month(s) from this visit. Latest known visit with results is:   Office Visit on 05/17/2021   Component Date Value Ref Range Status    Color (UA POC) 05/17/2021 Yellow   Final    Clarity (UA POC) 05/17/2021 Clear   Final    Glucose (UA POC) 05/17/2021 Negative  Negative Final    Bilirubin (UA POC) 05/17/2021 Negative  Negative Final    Ketones (UA POC) 05/17/2021 Negative  Negative Final    Specific gravity (UA POC) 05/17/2021 1.020  1.001 - 1.035 Final    Blood (UA POC) 05/17/2021 Trace  Negative Final    pH (UA POC) 05/17/2021 5.0  4.6 - 8.0 Final    Protein (UA POC) 05/17/2021 Negative  Negative Final    Urobilinogen (UA POC) 05/17/2021 0.2 mg/dL  0.2 - 1 Final    Nitrites (UA POC) 05/17/2021 Negative  Negative Final    Leukocyte esterase (UA POC) 05/17/2021 1+  Negative Final       No results found for any visits on 11/19/21.     Patient Care Team:  Patient Care Team:  Ace Marmolejo NP as PCP - General (Nurse Practitioner)  Ace Marmolejo NP as PCP - [facility] Provider  Mehul Nguyen (Physician Assistant)  Rudolph Tadeo MD (Neurology)  Edward Tarango MD as Consulting Provider (Gastroenterology)      Assessment / Plan:      ICD-10-CM ICD-9-CM    1. Microalbuminuria  R80.9 791.0 ramipriL (ALTACE) 5 mg capsule   2. Essential hypertension  I10 401.9 isosorbide mononitrate ER (IMDUR) 60 mg CR tablet      hydroCHLOROthiazide (HYDRODIURIL) 25 mg tablet      amLODIPine (NORVASC) 10 mg tablet   3. Hypercholesterolemia  E78.00 272.0      Patient refills  Follow-up in 14 to 21 days. Negative for side effects or adverse reactions. Follow-up and Dispositions    · Return in about 4 weeks (around 12/17/2021). I asked the patient if he  had any questions and answered his  questions. The patient stated that he understands the treatment plan and agrees with the treatment plan    This document was created with a voice activated dictation system and may contain transcription errors.

## 2021-11-22 DIAGNOSIS — I10 ESSENTIAL HYPERTENSION: ICD-10-CM

## 2021-11-22 RX ORDER — CARVEDILOL 12.5 MG/1
TABLET ORAL
Qty: 180 TABLET | Refills: 1 | Status: SHIPPED | OUTPATIENT
Start: 2021-11-22 | End: 2021-11-29

## 2021-11-23 NOTE — PROGRESS NOTES
Kai Cowden is a 61 y.o. male who presents for routine immunizations. He denies any symptoms , reactions or allergies that would exclude them from being immunized today. Risks and adverse reactions were discussed and the VIS was given to them. All questions were addressed. He refused to stay for observation. There were no reaction (s) observed when patient left clinic.

## 2021-11-29 ENCOUNTER — TELEPHONE (OUTPATIENT)
Dept: CARDIOLOGY CLINIC | Age: 66
End: 2021-11-29

## 2021-11-29 DIAGNOSIS — I25.10 ATHEROSCLEROSIS OF NATIVE CORONARY ARTERY OF NATIVE HEART WITHOUT ANGINA PECTORIS: Primary | ICD-10-CM

## 2021-11-29 DIAGNOSIS — I10 ESSENTIAL HYPERTENSION: ICD-10-CM

## 2021-11-29 DIAGNOSIS — R80.9 MICROALBUMINURIA: ICD-10-CM

## 2021-11-29 RX ORDER — RAMIPRIL 10 MG/1
10 CAPSULE ORAL DAILY
Qty: 90 CAPSULE | Refills: 1 | Status: SHIPPED | OUTPATIENT
Start: 2021-11-29 | End: 2021-12-17 | Stop reason: ALTCHOICE

## 2021-11-29 RX ORDER — CARVEDILOL 25 MG/1
25 TABLET ORAL 2 TIMES DAILY WITH MEALS
Qty: 180 TABLET | Refills: 1 | Status: SHIPPED | OUTPATIENT
Start: 2021-11-29 | End: 2022-01-10 | Stop reason: SDUPTHER

## 2021-11-29 NOTE — TELEPHONE ENCOUNTER
Patient has follow up 12/17/21 [facility] has requested a recent Echo due to increase BP but last echo in chart was 2018. Can you put a order in for this?

## 2021-12-06 RX ORDER — OMEPRAZOLE 40 MG/1
CAPSULE, DELAYED RELEASE ORAL
Qty: 180 CAPSULE | Refills: 0 | Status: SHIPPED | OUTPATIENT
Start: 2021-12-06 | End: 2022-03-07

## 2021-12-09 ENCOUNTER — OFFICE VISIT (OUTPATIENT)
Dept: FAMILY MEDICINE CLINIC | Age: 66
End: 2021-12-09
Payer: COMMERCIAL

## 2021-12-09 VITALS
BODY MASS INDEX: 35.7 KG/M2 | WEIGHT: 255 LBS | HEIGHT: 71 IN | OXYGEN SATURATION: 95 % | RESPIRATION RATE: 16 BRPM | HEART RATE: 91 BPM | SYSTOLIC BLOOD PRESSURE: 151 MMHG | DIASTOLIC BLOOD PRESSURE: 78 MMHG | TEMPERATURE: 98.2 F

## 2021-12-09 DIAGNOSIS — I50.9 CONGESTIVE HEART FAILURE, UNSPECIFIED HF CHRONICITY, UNSPECIFIED HEART FAILURE TYPE (HCC): ICD-10-CM

## 2021-12-09 DIAGNOSIS — Z09 HOSPITAL DISCHARGE FOLLOW-UP: Primary | ICD-10-CM

## 2021-12-09 DIAGNOSIS — N18.30 STAGE 3 CHRONIC KIDNEY DISEASE, UNSPECIFIED WHETHER STAGE 3A OR 3B CKD (HCC): ICD-10-CM

## 2021-12-09 DIAGNOSIS — I10 ESSENTIAL HYPERTENSION: ICD-10-CM

## 2021-12-09 PROCEDURE — 99213 OFFICE O/P EST LOW 20 MIN: CPT | Performed by: NURSE PRACTITIONER

## 2021-12-09 NOTE — PROGRESS NOTES
Naty Wilder is a 61 y.o. male that is here for a   Chief Complaint   Patient presents with    Follow-up     HTN         1. Have you been to the ER, urgent care clinic since your last visit? Hospitalized since your last visit? yes    2. Have you seen or consulted any other health care providers outside of the [address] [place] [address] since your last visit? Include any pap smears or colon screening.  no      Health Maintenance reviewed - yes      Upcoming Appts  no      VORB: No orders of the defined types were placed in this encounter.   Felisa Quiles NP/ Lencho Gallegos MA

## 2021-12-09 NOTE — PROGRESS NOTES
Gautam Currie is a 61 y.o. male presenting today for Follow-up (HTN)  . Chief Complaint   Patient presents with    Follow-up     HTN       HPI:  Gautam Currie presents to the office today for Hospital follow-up. He was admitted to [facility] on November 30, 2021 and discharged on December 8, 2021. His primary discharge diagnosis was coronary artery disease. At discharge he was instructed to follow-up with nephrology and cardiology. Patient does have a history of cardiac disease status post stent many years ago. Per the EMR patient was driving and developed chest pain that was sharp and lasted about 1 minute and then improved but came back. Associated symptoms included shortness of breath, lightheadedness but denied any sweating, nausea vomiting, abdominal pain. He does have chronic leg edema. Per EMR patient was diuresed while in hospital with furosemide. Per EMR he was over diuresed but his volume status improved and he was instructed to continue the furosemide, beta-blocker, and vasodilators hydralazine and isosorbide. His ACE inhibitor and ARB was discontinued in the setting of his renal disease. He was instructed to follow-up with nephrology as an outpatient secondary to his chronic kidney disease, stage III. He is scheduled to follow-up with Cardiology December 17, 2021  Patient presents to the practice today reporting he feels much better. He continues to have generalized weakness but denies any dyspnea. He notes his lower extremity edema has improved. He is compliant with the medication treatment and denies any chest pain or palpitation. He is scheduled to follow-up with cardiology on January 4, 2022. Hypertension- Patient was seen in the practice approximately 3 weeks ago and asked to follow-up today for reevaluation. He currently is prescribed amlodipine, hydrochlorothiazide or isosorbide. He denies any chest pain or palpitation.     ROS    ROS:  History obtained from the patient intake forms which are reviewed with the patient  · General: negative for - chills, fever, weight changes or malaise  · HEENT: no sore throat, nasal congestion, vision problems or ear problems  · Respiratory: no cough, shortness of breath, or wheezing  · Cardiovascular: no chest pain, palpitations, or dyspnea on exertion  · Gastrointestinal: no abdominal pain, N/V, change in bowel habits, or black or bloody stools  · Musculoskeletal: no back pain, joint pain, joint stiffness, muscle pain or muscle weakness  · Neurological: no numbness, tingling, headache or dizziness  · Endo:  No polyuria or polydipsia. · : no hematuria, dysuria, frequency, hesitancy, or nocturia.     · Psychological: negative for - anxiety, depression, sleep disturbances, suicidal or homicidal ideations    Allergies   Allergen Reactions    Iodine Other (comments)     Eyes swell shut      Shellfish Containing Products Swelling       PHQ Screening   3 most recent PHQ Screens 12/9/2021   PHQ Not Done -   Little interest or pleasure in doing things Not at all   Feeling down, depressed, irritable, or hopeless Not at all   Total Score PHQ 2 0   Trouble falling or staying asleep, or sleeping too much -   Feeling tired or having little energy -   Poor appetite, weight loss, or overeating -   Feeling bad about yourself - or that you are a failure or have let yourself or your family down -   Trouble concentrating on things such as school, work, reading, or watching TV -   Moving or speaking so slowly that other people could have noticed; or the opposite being so fidgety that others notice -   Thoughts of being better off dead, or hurting yourself in some way -   PHQ 9 Score -   How difficult have these problems made it for you to do your work, take care of your home and get along with others -       History  Past Medical History:   Diagnosis Date    Atypical chest pain/mild nonobstructive CAD/EF 65% 4/12/2011    BPH without obstruction/lower urinary tract symptoms     Bursitis of right shoulder     CAD (coronary artery disease)     Chest pain     history of hospitalization with chest pain and a negative workup in 2008    Chronic edema     Constipation     die to medication    Coronary artery disease     mild, non obstructive/EF 65%    Depression 12/16/2018    Dyspnea on exertion     Echocardiogram 12/16/08    IVC dilated; suboptimal endocardial border; EF 65%    ED (erectile dysfunction)     Elevated PSA     Frequency     GERD (gastroesophageal reflux disease)     Gout     H/O cystoscopy 06/20/2013    Hematuria, unspecified     Hypercholesterolemia     Hypertension     Hypertension      Hypogonadism male     Impotence of organic origin     Left ventricular diastolic dysfunction     Malignant neoplasm of prostate (HCC)     hx of t1a, izzy 6, 5 % of 1  core    Morbid obesity ([facility] [address]     Myocardial perfusion 09/05/08    Basal inferior defect c/w artifact; EF 63%    Overactive bladder     Prostate cancer ([facility] [address] 2/9/2017    S/P cardiac cath 07/30/10    oD2-40%; pRCA-20-30%; EF 65%    S/P gastric surgery 8/28/2018    Sleep apnea     Slowing of urinary stream     Type 2 diabetes with nephropathy ([facility] [address] 9/27/2018    Type II or unspecified type diabetes mellitus without mention of complication, not stated as uncontrolled        Past Surgical History:   Procedure Laterality Date    COLONOSCOPY N/A 9/18/2019    COLONOSCOPY performed by Alisia Gonzalez MD at [address]  7/30/10    HX OTHER SURGICAL  06/27/13    Prostate    HX TONSILLECTOMY      MN COLONOSCOPY FLX DX W/COLLJ SPEC WHEN PFRMD      MN I & D ABSC XTRAORAL SOFT TISS COMP         Social History     Socioeconomic History    Marital status: SINGLE     Spouse name: Not on file    Number of children: Not on file    Years of education: Not on file    Highest education level: Not on file   Occupational History    Not on file   Tobacco Use    Smoking status: Former Smoker     Types: Cigars     Quit date: 10/4/1999     Years since quittin.2    Smokeless tobacco: Never Used   Vaping Use    Vaping Use: Never used   Substance and Sexual Activity    Alcohol use: Never     Comment: rarely    Drug use: No    Sexual activity: Not Currently   Other Topics Concern    Not on file   Social History Narrative    Not on file     Social Determinants of Health     Financial Resource Strain:     Difficulty of Paying Living Expenses: Not on file   Food Insecurity:     Worried About Running Out of Food in the Last Year: Not on file    Avani of Food in the Last Year: Not on file   Transportation Needs:     Lack of Transportation (Medical): Not on file    Lack of Transportation (Non-Medical): Not on file   Physical Activity:     Days of Exercise per Week: Not on file    Minutes of Exercise per Session: Not on file   Stress:     Feeling of Stress : Not on file   Social Connections:     Frequency of Communication with Friends and Family: Not on file    Frequency of Social Gatherings with Friends and Family: Not on file    Attends Jew Services: Not on file    Active Member of [address] [place] [address] or Organizations: Not on file    Attends Club or Organization Meetings: Not on file    Marital Status: Not on file   Intimate Partner Violence:     Fear of Current or Ex-Partner: Not on file    Emotionally Abused: Not on file    Physically Abused: Not on file    Sexually Abused: Not on file   Housing Stability:     Unable to Pay for Housing in the Last Year: Not on file    Number of [place] in the Last Year: Not on file    Unstable Housing in the Last Year: Not on file       Current Outpatient Medications   Medication Sig Dispense Refill    omeprazole (PRILOSEC) 40 mg capsule TAKE 1 CAPSULE BY MOUTH TWICE A  Capsule 0    carvediloL (COREG) 25 mg tablet Take 1 Tablet by mouth two (2) times daily (with meals).  (Patient taking differently: Take 12.5 mg by mouth two (2) times daily (with meals). ) 180 Tablet 1    amLODIPine (NORVASC) 10 mg tablet Take 1 Tablet by mouth daily. (Patient taking differently: Take 5 mg by mouth daily.) 90 Tablet 1    atorvastatin (LIPITOR) 40 mg tablet Take 1 Tablet by mouth daily. 90 Tablet 2    clopidogreL (PLAVIX) 75 mg tab Take 1 Tablet by mouth daily. 90 Tablet 2    acetaminophen (Acetaminophen Extra Strength) 500 mg tablet Take  by mouth every six (6) hours as needed for Pain.  allopurinol (ZYLOPRIM) 100 mg tablet TAKE 1 TABLET BY MOUTH EVERY DAY (Patient taking differently: Take 200 mg by mouth daily. TAKE 2 TABLET BY MOUTH EVERY DAY) 90 Tab 3    multivitamin (ONE A DAY) tablet Take 1 Tab by mouth daily.  calcium carbonate (TUMS) 200 mg calcium (500 mg) chew Take 1 Tablet by mouth daily. As needed      aspirin 81 mg chewable tablet Take 1 Tab by mouth two (2) times a day. (Patient taking differently: Take 81 mg by mouth daily.) 60 Tab 0    trospium (SANCTURA) 20 mg tablet Take 1 Tablet by mouth daily. 60 Tablet 0    furosemide (LASIX) 40 mg tablet Use daily as needed  Indications: accumulation of fluid resulting from chronic heart failure 30 Tablet 1    isosorbide mononitrate ER (IMDUR) 60 mg CR tablet Take 1 Tablet by mouth daily. 90 Tablet 1    hydrALAZINE (APRESOLINE) 100 mg tablet Take 1 Tablet by mouth three (3) times daily. 270 Tablet 1    dapagliflozin (Farxiga) 10 mg tab tablet Take 1 Tablet by mouth daily. 30 Tablet 3    nitroglycerin (NITROSTAT) 0.4 mg SL tablet 1 TAB BY SUBLINGUAL ROUTE EVERY FIVE (5) MINUTES AS NEEDED FOR CHEST PAIN FOR UP TO 3 DOSES. 25 Tablet 1         Vitals:    12/09/21 1431   BP: (!) 151/78   Pulse: 91   Resp: 16   Temp: 98.2 °F (36.8 °C)   TempSrc: Temporal   SpO2: 95%   Weight: 255 lb (115.7 kg)   Height: 5' 11\" (1.803 m)   PainSc:   6   PainLoc: Generalized       Physical Exam  Vitals and nursing note reviewed. Constitutional:       Appearance: Normal appearance. Cardiovascular:      Rate and Rhythm: Normal rate and regular rhythm. Pulses: Normal pulses. Heart sounds: Normal heart sounds. Pulmonary:      Effort: Pulmonary effort is normal.      Breath sounds: Normal breath sounds. Skin:     General: Skin is warm and dry. Neurological:      Mental Status: He is alert. No visits with results within 3 Month(s) from this visit. Latest known visit with results is:   Office Visit on 05/17/2021   Component Date Value Ref Range Status    Color (UA POC) 05/17/2021 Yellow   Final    Clarity (UA POC) 05/17/2021 Clear   Final    Glucose (UA POC) 05/17/2021 Negative  Negative Final    Bilirubin (UA POC) 05/17/2021 Negative  Negative Final    Ketones (UA POC) 05/17/2021 Negative  Negative Final    Specific gravity (UA POC) 05/17/2021 1.020  1.001 - 1.035 Final    Blood (UA POC) 05/17/2021 Trace  Negative Final    pH (UA POC) 05/17/2021 5.0  4.6 - 8.0 Final    Protein (UA POC) 05/17/2021 Negative  Negative Final    Urobilinogen (UA POC) 05/17/2021 0.2 mg/dL  0.2 - 1 Final    Nitrites (UA POC) 05/17/2021 Negative  Negative Final    Leukocyte esterase (UA POC) 05/17/2021 1+  Negative Final       No results found for any visits on 12/09/21. Patient Care Team:  Patient Care Team:  Hodan Edouard NP as PCP - General (Nurse Practitioner)  Hodan Edouard NP as PCP - [place], [place] (Physician Assistant)  Prema Torres MD (Neurology)  Hugh Craig MD as Consulting Provider (Gastroenterology)      Assessment / Plan:      ICD-10-CM ICD-9-CM    1. Hospital discharge follow-up  Z09 V67.59    2. Essential hypertension  I10 401.9    3. Stage 3 chronic kidney disease, unspecified whether stage 3a or 3b CKD (HCC)  N18.30 585.3    4. Congestive heart failure, unspecified HF chronicity, unspecified heart failure type ([facility] [address]  I50.9 428.0      Patient refills  Hospital follow-up- continue current treatment plan.  Advised to schedule an appointment with Nephrologist post hospitalization  HTN- remain elevated. Will evaluate in 1 month. Improved from previous visit. Negative for side effects or adverse reactions. I asked the patient if he  had any questions and answered his  questions. The patient stated that he understands the treatment plan and agrees with the treatment plan    This document was created with a voice activated dictation system and may contain transcription errors.

## 2021-12-13 RX ORDER — NITROGLYCERIN 0.4 MG/1
0.4 TABLET SUBLINGUAL
Qty: 25 TABLET | Refills: 1 | Status: SHIPPED | OUTPATIENT
Start: 2021-12-13

## 2021-12-15 ENCOUNTER — HOSPITAL ENCOUNTER (OUTPATIENT)
Dept: ULTRASOUND IMAGING | Age: 66
Discharge: HOME | End: 2021-12-15
Attending: NURSE PRACTITIONER
Payer: COMMERCIAL

## 2021-12-15 ENCOUNTER — HOSPITAL ENCOUNTER (OUTPATIENT)
Dept: LAB | Age: 66
Discharge: HOME | End: 2021-12-15

## 2021-12-15 DIAGNOSIS — E04.1 THYROID NODULE: ICD-10-CM

## 2021-12-15 LAB — SENTARA SPECIMEN COL,SENBCF: NORMAL

## 2021-12-15 PROCEDURE — 99001 SPECIMEN HANDLING PT-LAB: CPT

## 2021-12-15 PROCEDURE — 76536 US EXAM OF HEAD AND NECK: CPT

## 2021-12-16 LAB
A-G RATIO,AGRAT: 1.6 RATIO (ref 1.1–2.6)
ABSOLUTE LYMPHOCYTE COUNT, 10803: 1.6 K/UL (ref 1–4.8)
ALBUMIN SERPL-MCNC: 4 G/DL (ref 3.5–5)
ALP SERPL-CCNC: 73 U/L (ref 40–125)
ALT SERPL-CCNC: 8 U/L (ref 5–40)
ANION GAP SERPL CALC-SCNC: 9 MMOL/L (ref 3–15)
AST SERPL W P-5'-P-CCNC: 13 U/L (ref 10–37)
AVG GLU, 10930: 112 MG/DL (ref 91–123)
BASOPHILS # BLD: 0 K/UL (ref 0–0.2)
BASOPHILS NFR BLD: 1 % (ref 0–2)
BILIRUB SERPL-MCNC: 0.2 MG/DL (ref 0.2–1.2)
BUN SERPL-MCNC: 34 MG/DL (ref 6–22)
CALCIUM SERPL-MCNC: 9.2 MG/DL (ref 8.4–10.5)
CHLORIDE SERPL-SCNC: 107 MMOL/L (ref 98–110)
CHOLEST SERPL-MCNC: 166 MG/DL (ref 110–200)
CO2 SERPL-SCNC: 28 MMOL/L (ref 20–32)
CREAT SERPL-MCNC: 2 MG/DL (ref 0.8–1.6)
EOSINOPHIL # BLD: 0.1 K/UL (ref 0–0.5)
EOSINOPHIL NFR BLD: 3 % (ref 0–6)
ERYTHROCYTE [DISTWIDTH] IN BLOOD BY AUTOMATED COUNT: 17.5 % (ref 10–15.5)
GFRAA, 66117: 40.5
GFRNA, 66118: 33.4
GLOBULIN,GLOB: 2.5 G/DL (ref 2–4)
GLUCOSE SERPL-MCNC: 72 MG/DL (ref 70–99)
GRANULOCYTES,GRANS: 53 % (ref 40–75)
HBA1C MFR BLD HPLC: 5.5 % (ref 4.8–5.6)
HCT VFR BLD AUTO: 35 % (ref 39.3–51.6)
HDLC SERPL-MCNC: 2.5 MG/DL (ref 0–5)
HDLC SERPL-MCNC: 66 MG/DL
HGB BLD-MCNC: 10.3 G/DL (ref 13.1–17.2)
LDL/HDL RATIO,LDHD: 1.4
LDLC SERPL CALC-MCNC: 91 MG/DL (ref 50–99)
LYMPHOCYTES, LYMLT: 33 % (ref 20–45)
MCH RBC QN AUTO: 25 PG (ref 26–34)
MCHC RBC AUTO-ENTMCNC: 29 G/DL (ref 31–36)
MCV RBC AUTO: 85 FL (ref 80–95)
MONOCYTES # BLD: 0.5 K/UL (ref 0.1–1)
MONOCYTES NFR BLD: 10 % (ref 3–12)
NEUTROPHILS # BLD AUTO: 2.6 K/UL (ref 1.8–7.7)
NON-HDL CHOLESTEROL, 011976: 100 MG/DL
PLATELET # BLD AUTO: 244 K/UL (ref 140–440)
PMV BLD AUTO: 10.2 FL (ref 9–13)
POTASSIUM SERPL-SCNC: 4.1 MMOL/L (ref 3.5–5.5)
PROT SERPL-MCNC: 6.5 G/DL (ref 6.2–8.1)
PSA SERPL-MCNC: 0.51 NG/ML
PTH INTACT,IPTH: 90 PG/ML (ref 15–65)
RBC # BLD AUTO: 4.11 M/UL (ref 3.8–5.8)
SODIUM SERPL-SCNC: 144 MMOL/L (ref 133–145)
TRIGL SERPL-MCNC: 44 MG/DL (ref 40–149)
VLDLC SERPL CALC-MCNC: 9 MG/DL (ref 8–30)
WBC # BLD AUTO: 4.8 K/UL (ref 4–11)

## 2021-12-17 ENCOUNTER — OFFICE VISIT (OUTPATIENT)
Dept: CARDIOLOGY CLINIC | Age: 66
End: 2021-12-17
Payer: COMMERCIAL

## 2021-12-17 VITALS
HEART RATE: 74 BPM | OXYGEN SATURATION: 95 % | DIASTOLIC BLOOD PRESSURE: 77 MMHG | BODY MASS INDEX: 35.7 KG/M2 | SYSTOLIC BLOOD PRESSURE: 144 MMHG | HEIGHT: 71 IN | WEIGHT: 255 LBS

## 2021-12-17 DIAGNOSIS — I25.10 ATHEROSCLEROSIS OF NATIVE CORONARY ARTERY OF NATIVE HEART WITHOUT ANGINA PECTORIS: Primary | ICD-10-CM

## 2021-12-17 DIAGNOSIS — E66.01 SEVERE OBESITY (BMI 35.0-39.9) WITH COMORBIDITY (HCC): ICD-10-CM

## 2021-12-17 DIAGNOSIS — I50.32 CHRONIC DIASTOLIC CONGESTIVE HEART FAILURE (HCC): ICD-10-CM

## 2021-12-17 DIAGNOSIS — I10 ESSENTIAL HYPERTENSION: ICD-10-CM

## 2021-12-17 DIAGNOSIS — N18.9 CHRONIC KIDNEY DISEASE, UNSPECIFIED CKD STAGE: ICD-10-CM

## 2021-12-17 DIAGNOSIS — E78.00 HYPERCHOLESTEROLEMIA: ICD-10-CM

## 2021-12-17 DIAGNOSIS — Z98.61 POSTSURGICAL PERCUTANEOUS TRANSLUMINAL CORONARY ANGIOPLASTY STATUS: ICD-10-CM

## 2021-12-17 PROCEDURE — 99214 OFFICE O/P EST MOD 30 MIN: CPT | Performed by: INTERNAL MEDICINE

## 2021-12-17 RX ORDER — ISOSORBIDE MONONITRATE 60 MG/1
60 TABLET, EXTENDED RELEASE ORAL DAILY
Qty: 90 TABLET | Refills: 1 | Status: SHIPPED | OUTPATIENT
Start: 2021-12-17

## 2021-12-17 RX ORDER — HYDRALAZINE HYDROCHLORIDE 100 MG/1
100 TABLET, FILM COATED ORAL 3 TIMES DAILY
Qty: 270 TABLET | Refills: 1 | Status: SHIPPED | OUTPATIENT
Start: 2021-12-17 | End: 2022-03-28

## 2021-12-17 RX ORDER — HYDRALAZINE HYDROCHLORIDE 50 MG/1
50 TABLET, FILM COATED ORAL 3 TIMES DAILY
COMMUNITY
Start: 2021-12-08 | End: 2021-12-17 | Stop reason: SDUPTHER

## 2021-12-17 RX ORDER — FUROSEMIDE 20 MG/1
20 TABLET ORAL 2 TIMES DAILY
COMMUNITY
Start: 2021-12-08 | End: 2021-12-23
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
## 2021-12-17 NOTE — PATIENT INSTRUCTIONS
Medications Discontinued During This Encounter   Medication Reason    cloNIDine (CATAPRES) 0.1 mg/24 hr ptwk DISCONTINUED BY ANOTHER CLINICIAN    diclofenac (Voltaren) 1 % gel Therapy Completed    hydroCHLOROthiazide (HYDRODIURIL) 25 mg tablet DISCONTINUED BY ANOTHER CLINICIAN    ramipriL (ALTACE) 10 mg capsule DISCONTINUED BY ANOTHER CLINICIAN    isosorbide mononitrate ER (IMDUR) 60 mg CR tablet REORDER    hydrALAZINE (APRESOLINE) 50 mg tablet REORDER     After the recommended changes have been made in blood pressure medicines, patient advised to keep BP/HR(pulse rate) chart twice daily and bring us results in next 4 to 5 days. Patient may send the results via \"My Chart\" if desired. Please rest for 5-10 minutes before checking blood pressure. Sit on a comfortable chair without crossing the legs and put your arm on a table. We recommend that you use an upper arm cuff. Check the blood pressure 3 times each time you check the blood pressure and record the lowest reading. If you check the blood pressure in both arms, use the higher reading. Learning About the [facility] Diet  What is the Mediterranean diet? The Mediterranean diet is a style of eating rather than a diet plan. It features foods eaten in [place], [place], [place] and [place], and other countries along the [place]. It emphasizes eating foods like fish, fruits, vegetables, beans, high-fiber breads and whole grains, nuts, and olive oil. This style of eating includes limited red meat, cheese, and sweets. Why choose the Mediterranean diet? A Mediterranean-style diet may improve heart health. It contains more fat than other heart-healthy diets. But the fats are mainly from nuts, unsaturated oils (such as fish oils and olive oil), and certain nut or seed oils (such as canola, soybean, or flaxseed oil). These fats may help protect the heart and blood vessels. How can you get started on the Mediterranean diet?   Here are some things you can do to switch to a more Mediterranean way of eating. What to eat  · Eat a variety of fruits and vegetables each day, such as grapes, blueberries, tomatoes, broccoli, peppers, figs, olives, spinach, eggplant, beans, lentils, and chickpeas. · Eat a variety of whole-grain foods each day, such as oats, brown rice, and whole wheat bread, pasta, and couscous. · Eat fish at least 2 times a week. Try tuna, salmon, mackerel, lake trout, herring, or sardines. · Eat moderate amounts of low-fat dairy products, such as milk, cheese, or yogurt. · Eat moderate amounts of poultry and eggs. · Choose healthy (unsaturated) fats, such as nuts, olive oil, and certain nut or seed oils like canola, soybean, and flaxseed. · Limit unhealthy (saturated) fats, such as butter, palm oil, and coconut oil. And limit fats found in animal products, such as meat and dairy products made with whole milk. Try to eat red meat only a few times a month in very small amounts. · Limit sweets and desserts to only a few times a week. This includes sugar-sweetened drinks like soda. The Mediterranean diet may also include red wine with your meal--1 glass each day for women and up to 2 glasses a day for men. Tips for eating at home  · Use herbs, spices, garlic, lemon zest, and citrus juice instead of salt to add flavor to foods. · Add avocado slices to your sandwich instead of colindres. · Have fish for lunch or dinner instead of red meat. Brush the fish with olive oil, and broil or grill it. · Sprinkle your salad with seeds or nuts instead of cheese. · Cook with olive or canola oil instead of butter or oils that are high in saturated fat. · Switch from 2% milk or whole milk to 1% or fat-free milk. · Dip raw vegetables in a vinaigrette dressing or hummus instead of dips made from mayonnaise or sour cream.  · Have a piece of fruit for dessert instead of a piece of cake. Try baked apples, or have some dried fruit.   Tips for eating out  · Try broiled, grilled, baked, or poached fish instead of having it fried or breaded. · Ask your  to have your meals prepared with olive oil instead of butter. · Order dishes made with marinara sauce or sauces made from olive oil. Avoid sauces made from cream or mayonnaise. · Choose whole-grain breads, whole wheat pasta and pizza crust, brown rice, beans, and lentils. · Cut back on butter or margarine on bread. Instead, you can dip your bread in a small amount of olive oil. · Ask for a side salad or grilled vegetables instead of french fries or chips. Where can you learn more? Go to http://www.sarabia.com/  Enter O407 in the search box to learn more about \"Learning About the Mediterranean Diet. \"  Current as of: December 17, 2020               Content Version: 13.0  © 3372-8381 Healthwise, [facility]. Care instructions adapted under license by [employer] (which disclaims liability or warranty for this information). If you have questions about a medical condition or this instruction, always ask your healthcare professional. [place] [address] any warranty or liability for your use of this information.

## 2021-12-20 ENCOUNTER — TELEPHONE (OUTPATIENT)
Dept: FAMILY MEDICINE CLINIC | Age: 66
End: 2021-12-20

## 2021-12-20 NOTE — TELEPHONE ENCOUNTER
Patient called to let Sunita Bazzi know that his cardio doctor sent him back to work and would like for [place] to please give him a call .  Please advise  [phone]

## 2021-12-21 NOTE — TELEPHONE ENCOUNTER
TC to patient he stated that he have been experiencing shortness of breath, dizziness, swelling, and headaches. He stated he just do not feel like himself and he feels like cardiologist do not address his concerns. He is schedule for in office appointment with Dr. Cj Pearson on 12/23/2021.

## 2021-12-23 ENCOUNTER — OFFICE VISIT (OUTPATIENT)
Dept: FAMILY MEDICINE CLINIC | Age: 66
End: 2021-12-23
Payer: COMMERCIAL

## 2021-12-23 VITALS
RESPIRATION RATE: 16 BRPM | HEIGHT: 71 IN | SYSTOLIC BLOOD PRESSURE: 150 MMHG | WEIGHT: 251 LBS | TEMPERATURE: 96.3 F | HEART RATE: 82 BPM | DIASTOLIC BLOOD PRESSURE: 87 MMHG | OXYGEN SATURATION: 95 % | BODY MASS INDEX: 35.14 KG/M2

## 2021-12-23 DIAGNOSIS — R07.9 CHEST PAIN, UNSPECIFIED TYPE: ICD-10-CM

## 2021-12-23 DIAGNOSIS — N18.32 STAGE 3B CHRONIC KIDNEY DISEASE (HCC): ICD-10-CM

## 2021-12-23 DIAGNOSIS — I50.42 CHRONIC COMBINED SYSTOLIC AND DIASTOLIC CONGESTIVE HEART FAILURE (HCC): ICD-10-CM

## 2021-12-23 DIAGNOSIS — I10 ESSENTIAL HYPERTENSION: Primary | ICD-10-CM

## 2021-12-23 PROBLEM — K92.1 GASTROINTESTINAL HEMORRHAGE WITH MELENA: Status: ACTIVE | Noted: 2017-05-08

## 2021-12-23 PROBLEM — R06.00 DYSPNEA: Status: ACTIVE | Noted: 2020-03-18

## 2021-12-23 PROBLEM — N18.30 CKD (CHRONIC KIDNEY DISEASE) STAGE 3, GFR 30-59 ML/MIN (HCC): Status: ACTIVE | Noted: 2017-05-01

## 2021-12-23 PROBLEM — Z95.5 STENTED CORONARY ARTERY: Status: ACTIVE | Noted: 2020-03-18

## 2021-12-23 PROCEDURE — 99204 OFFICE O/P NEW MOD 45 MIN: CPT | Performed by: STUDENT IN AN ORGANIZED HEALTH CARE EDUCATION/TRAINING PROGRAM

## 2021-12-23 RX ORDER — FUROSEMIDE 40 MG/1
TABLET ORAL
Qty: 30 TABLET | Refills: 1 | Status: SHIPPED | OUTPATIENT
Start: 2021-12-23 | End: 2022-01-26 | Stop reason: SDUPTHER

## 2021-12-23 NOTE — PROGRESS NOTES
Laura Russell is a 61 y.o. male presenting today for Follow-up (Chest pain Dizzines right leg pain pt see Cardiology Dr. Emily Suarez )  . Chief Complaint   Patient presents with    Follow-up     Chest pain Dizzines right leg pain pt see Cardiology Dr. Emily Suarez        HPI:  Laura Russell presents to the office today for Chest pain. Patient has a PMHx of CAD s/p PETER to mid-circumflex, HTN, HLD, Obesity, diastolic CHF, CKD. He was admitted at [facility] from 11/30/21 to 12/8/21 due to chest pain. He was found to be in acute CHF and was diuresed with Lasix. Echo revealed a low normal EF: 50%. He had an abnormal nuclear stress test which showed small to moderate area of inferolateral mixed defect predominantly infarction with minimal ischemia. No plans were made for cardiac catheterization due to AWA on CKD post-diuresis with lasix. Patient was advised to follow up with his cardiologist and nephrologist.     Patient saw his cardiologist on 12/17/21 due to worsening leg swelling and shortness of breath. He was noted to have elevated BP and was prescribed Imdur, hydralazine was increased to 100 mg tid. He was also started on dapagliflozin. Pt still has to  the 2 new medications. Per cardiology note: 'Given absence of chest pain and mostly fixed defect on stress test' decision was made to hold off on cardiac cath for now. Today, Patient is complaining of chest pain and shortness of breath. Patient had chest pain this morning, yesterday and 2 days ago. The pain is Sharp and sub-sternal with no radiation. Chest pain occurs mostly on exertion. Feels like 'pressure - squeezing his chest.'    He still continues to feel SOB on minimal exertion and has increased LE swelling since the past week. He is currently not using any diuretic.      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     Review of Systems   Constitutional: Negative for chills, fever and weight loss. HENT: Negative for congestion, ear discharge, ear pain, hearing loss, nosebleeds, sinus pain, sore throat and tinnitus. Eyes: Negative for blurred vision, double vision and photophobia. Respiratory: Positive for shortness of breath. Negative for cough, sputum production, wheezing and stridor. Cardiovascular: Positive for chest pain, orthopnea and leg swelling. Negative for palpitations and claudication. Gastrointestinal: Negative for abdominal pain, constipation, diarrhea, heartburn, nausea and vomiting. Genitourinary: Negative for dysuria, flank pain, frequency, hematuria and urgency. Musculoskeletal: Positive for joint pain. Negative for back pain, myalgias and neck pain. Skin: Negative for rash. Neurological: Positive for weakness. Negative for tingling, tremors, sensory change, speech change, focal weakness, seizures and headaches. Psychiatric/Behavioral: Negative for depression. The patient is not nervous/anxious. All other systems reviewed and are negative.       Allergies   Allergen Reactions    Iodine Other (comments)     Eyes swell shut      Shellfish Containing Products Swelling       PHQ Screening   3 most recent PHQ Screens 12/9/2021   PHQ Not Done -   Little interest or pleasure in doing things Not at all   Feeling down, depressed, irritable, or hopeless Not at all   Total Score PHQ 2 0   Trouble falling or staying asleep, or sleeping too much -   Feeling tired or having little energy -   Poor appetite, weight loss, or overeating -   Feeling bad about yourself - or that you are a failure or have let yourself or your family down -   Trouble concentrating on things such as school, work, reading, or watching TV -   Moving or speaking so slowly that other people could have noticed; or the opposite being so fidgety that others notice -   Thoughts of being better off dead, or hurting yourself in some way -   PHQ 9 Score -   How difficult have these problems made it for you to do your work, take care of your home and get along with others -       History  Past Medical History:   Diagnosis Date    Atypical chest pain/mild nonobstructive CAD/EF 65% 4/12/2011    BPH without obstruction/lower urinary tract symptoms     Bursitis of right shoulder     CAD (coronary artery disease)     Chest pain     history of hospitalization with chest pain and a negative workup in 2008    Chronic edema     Constipation     die to medication    Coronary artery disease     mild, non obstructive/EF 65%    Depression 12/16/2018    Dyspnea on exertion     Echocardiogram 12/16/08    IVC dilated; suboptimal endocardial border; EF 65%    ED (erectile dysfunction)     Elevated PSA     Frequency     GERD (gastroesophageal reflux disease)     Gout     H/O cystoscopy 06/20/2013    Hematuria, unspecified     Hypercholesterolemia     Hypertension     Hypertension      Hypogonadism male     Impotence of organic origin     Left ventricular diastolic dysfunction     Malignant neoplasm of prostate (HCC)     hx of t1a, izzy 6, 5 % of 1  core    Morbid obesity ([address]     Myocardial perfusion 09/05/08    Basal inferior defect c/w artifact; EF 63%    Overactive bladder     Prostate cancer ([address] 2/9/2017    S/P cardiac cath 07/30/10    oD2-40%; pRCA-20-30%; EF 65%    S/P gastric surgery 8/28/2018    Sleep apnea     Slowing of urinary stream     Type 2 diabetes with nephropathy ([address] 9/27/2018    Type II or unspecified type diabetes mellitus without mention of complication, not stated as uncontrolled        Past Surgical History:   Procedure Laterality Date    COLONOSCOPY N/A 9/18/2019    COLONOSCOPY performed by Mary Torres MD at [address] [place] [address]  7/30/10    HX OTHER SURGICAL  06/27/13    Prostate    HX TONSILLECTOMY      GA COLONOSCOPY FLX DX W/COLLJ SPEC WHEN PFRMD      GA I & D ABSC XTRAORAL SOFT TISS COMP         Social History     Socioeconomic History    Marital status: SINGLE     Spouse name: Not on file    Number of children: Not on file    Years of education: Not on file    Highest education level: Not on file   Occupational History    Not on file   Tobacco Use    Smoking status: Former Smoker     Types: Cigars     Quit date: 10/4/1999     Years since quittin.2    Smokeless tobacco: Never Used   Vaping Use    Vaping Use: Never used   Substance and Sexual Activity    Alcohol use: Never     Comment: rarely    Drug use: No    Sexual activity: Not Currently   Other Topics Concern    Not on file   Social History Narrative    Not on file     Social Determinants of Health     Financial Resource Strain:     Difficulty of Paying Living Expenses: Not on file   Food Insecurity:     Worried About [address] in the Last Year: Not on file    [address] [employer] in the Last Year: Not on file   Transportation Needs:     Lack of Transportation (Medical): Not on file    Lack of Transportation (Non-Medical):  Not on file   Physical Activity:     Days of Exercise per Week: Not on file    Minutes of Exercise per Session: Not on file   Stress:     Feeling of Stress : Not on file   Social Connections:     Frequency of Communication with Friends and Family: Not on file    Frequency of Social Gatherings with Friends and Family: Not on file    Attends Evangelical Services: Not on file    Active Member of Clubs or Organizations: Not on file    Attends Club or Organization Meetings: Not on file    Marital Status: Not on file   Intimate Partner Violence:     Fear of Current or Ex-Partner: Not on file    Emotionally Abused: Not on file    Physically Abused: Not on file    Sexually Abused: Not on file   Housing Stability:     Unable to Pay for Housing in the Last Year: Not on file    Number of [place] in the Last Year: Not on file    Unstable Housing in the Last Year: Not on file       Current Outpatient Medications   Medication Sig Dispense Refill    isosorbide mononitrate ER (IMDUR) 60 mg CR tablet Take 1 Tablet by mouth daily. 90 Tablet 1    hydrALAZINE (APRESOLINE) 100 mg tablet Take 1 Tablet by mouth three (3) times daily. 270 Tablet 1    dapagliflozin (Farxiga) 10 mg tab tablet Take 1 Tablet by mouth daily. 30 Tablet 3    nitroglycerin (NITROSTAT) 0.4 mg SL tablet 1 TAB BY SUBLINGUAL ROUTE EVERY FIVE (5) MINUTES AS NEEDED FOR CHEST PAIN FOR UP TO 3 DOSES. 25 Tablet 1    omeprazole (PRILOSEC) 40 mg capsule TAKE 1 CAPSULE BY MOUTH TWICE A  Capsule 0    carvediloL (COREG) 25 mg tablet Take 1 Tablet by mouth two (2) times daily (with meals). (Patient taking differently: Take 12.5 mg by mouth two (2) times daily (with meals). ) 180 Tablet 1    amLODIPine (NORVASC) 10 mg tablet Take 1 Tablet by mouth daily. (Patient taking differently: Take 5 mg by mouth daily.) 90 Tablet 1    atorvastatin (LIPITOR) 40 mg tablet Take 1 Tablet by mouth daily. 90 Tablet 2    clopidogreL (PLAVIX) 75 mg tab Take 1 Tablet by mouth daily. 90 Tablet 2    acetaminophen (Acetaminophen Extra Strength) 500 mg tablet Take  by mouth every six (6) hours as needed for Pain.  allopurinol (ZYLOPRIM) 100 mg tablet TAKE 1 TABLET BY MOUTH EVERY DAY (Patient taking differently: Take 200 mg by mouth daily. TAKE 2 TABLET BY MOUTH EVERY DAY) 90 Tab 3    multivitamin (ONE A DAY) tablet Take 1 Tab by mouth daily.  calcium carbonate (TUMS) 200 mg calcium (500 mg) chew Take 1 Tablet by mouth daily. As needed      aspirin 81 mg chewable tablet Take 1 Tab by mouth two (2) times a day.  (Patient taking differently: Take 81 mg by mouth daily.) 60 Tab 0         Vitals:    12/23/21 1326   BP: (!) 150/87   Pulse: 82   Resp: 16   Temp: (!) 96.3 °F (35.7 °C)   TempSrc: Temporal   SpO2: 95%   Weight: 251 lb (113.9 kg)   Height: 5' 11\" (1.803 m)   PainSc:   4   PainLoc: Chest       Physical Exam  Vitals and nursing note reviewed. Constitutional:       General: He is not in acute distress. Appearance: Normal appearance. He is obese. He is not ill-appearing, toxic-appearing or diaphoretic. HENT:      Head: Normocephalic and atraumatic. Nose: Nose normal. No rhinorrhea. Mouth/Throat:      Mouth: Mucous membranes are moist.      Pharynx: Oropharynx is clear. No oropharyngeal exudate or posterior oropharyngeal erythema. Eyes:      General: No scleral icterus. Extraocular Movements: Extraocular movements intact. Conjunctiva/sclera: Conjunctivae normal.      Pupils: Pupils are equal, round, and reactive to light. Cardiovascular:      Rate and Rhythm: Normal rate and regular rhythm. Heart sounds: No murmur heard. Pulmonary:      Effort: No respiratory distress. Breath sounds: Rales present. No wheezing. Comments: Got short of breath while walking a few steps from chair to the examination table. Musculoskeletal:         General: Swelling present. No tenderness. Normal range of motion. Cervical back: Normal range of motion and neck supple. Right lower leg: Edema present. Left lower leg: Edema present. Skin:     General: Skin is warm and dry. Coloration: Skin is not jaundiced or pale. Neurological:      General: No focal deficit present. Mental Status: He is alert and oriented to person, place, and time. Mental status is at baseline. Cranial Nerves: No cranial nerve deficit. Psychiatric:         Mood and Affect: Mood normal.         Behavior: Behavior normal.         Thought Content:  Thought content normal.         Judgment: Judgment normal.         Orders Only on 12/15/2021   Component Date Value Ref Range Status    Triglyceride 12/15/2021 44  40 - 149 mg/dL Final    HDL Cholesterol 12/15/2021 66  >=40 mg/dL Final    Cholesterol, total 12/15/2021 166  110 - 200 mg/dL Final    CHOLESTEROL/HDL 12/15/2021 2.5  0.0 - 5.0 Final    Non-HDL Cholesterol 12/15/2021 100  <130 mg/dL Final    LDL, calculated 12/15/2021 91  50 - 99 mg/dL Final    VLDL, calculated 12/15/2021 9  8 - 30 mg/dL Final    LDL/HDL Ratio 12/15/2021 1.4   Final    Comment: Test includes cholesterol, HDL cholesterol, triglycerides and LDL. Cholesterol Recommended NCEP guidelines in mg/dL:    Less than 200            Desirable  200 - 239                Borderline High  Greater than or  = 240   High      Please Note:  Total Chol/HDL Ratio                     Men     Women  1/2 Avg. Risk    3.4     3.3      Avg. Risk    5.0     4.4  2X  Avg. Risk    9.6     7.1  3X  Avg. Risk   23.4    11.0            Glucose 12/15/2021 72  70 - 99 mg/dL Final    BUN 12/15/2021 34* 6 - 22 mg/dL Final    Creatinine 12/15/2021 2.0* 0.8 - 1.6 mg/dL Final    Sodium 12/15/2021 144  133 - 145 mmol/L Final    Potassium 12/15/2021 4.1  3.5 - 5.5 mmol/L Final    Chloride 12/15/2021 107  98 - 110 mmol/L Final    CO2 12/15/2021 28  20 - 32 mmol/L Final    AST (SGOT) 12/15/2021 13  10 - 37 U/L Final    ALT (SGPT) 12/15/2021 8  5 - 40 U/L Final    Alk. phosphatase 12/15/2021 73  40 - 125 U/L Final    Bilirubin, total 12/15/2021 0.2  0.2 - 1.2 mg/dL Final    Calcium 12/15/2021 9.2  8.4 - 10.5 mg/dL Final    Protein, total 12/15/2021 6.5  6.2 - 8.1 g/dL Final    Albumin 12/15/2021 4.0  3.5 - 5.0 g/dL Final    A-G Ratio 12/15/2021 1.6  1.1 - 2.6 ratio Final    Globulin 12/15/2021 2.5  2.0 - 4.0 g/dL Final    Anion gap 12/15/2021 9.0  3.0 - 15.0 mmol/L Final    Comment: Anion Gap calculation based on electrolyte reference ranges. Test includes Albumin, Alkaline Phosphatase, ALT, AST, BUN, Calcium, CO2,  Chloride, Creatinine, Glucose, Potassium, Sodium, Total Bilirubin and Total  Protein. Estimated GFR results are reported in mL/min/1.73 sq.m. by the MDRD equation. This eGFR is validated for stable chronic renal failure patients.  This   equation  is unreliable in acute illness or patients with normal renal function.  GFRAA 12/15/2021 40.5* >60.0 Final    GFRNA 12/15/2021 33.4* >60.0 Final    Prostate Specific Ag 12/15/2021 0.510  <=4.100 ng/mL Final    PTH, Intact 12/15/2021 90* 15 - 65 pg/mL Final    WBC 12/15/2021 4.8  4.0 - 11.0 K/uL Final    RBC 12/15/2021 4.11  3.80 - 5.80 M/uL Final    HGB 12/15/2021 10.3* 13.1 - 17.2 g/dL Final    HCT 12/15/2021 35.0* 39.3 - 51.6 % Final    MCV 12/15/2021 85  80 - 95 fL Final    MCH 12/15/2021 25* 26 - 34 pg Final    MCHC 12/15/2021 29* 31 - 36 g/dL Final    RDW 12/15/2021 17.5* 10.0 - 15.5 % Final    PLATELET 54/37/2716 635  140 - 440 K/uL Final    MPV 12/15/2021 10.2  9.0 - 13.0 fL Final    NEUTROPHILS 12/15/2021 53  40 - 75 % Final    Lymphocytes 12/15/2021 33  20 - 45 % Final    MONOCYTES 12/15/2021 10  3 - 12 % Final    EOSINOPHILS 12/15/2021 3  0 - 6 % Final    BASOPHILS 12/15/2021 1  0 - 2 % Final    ABS. NEUTROPHILS 12/15/2021 2.6  1.8 - 7.7 K/uL Final    ABSOLUTE LYMPHOCYTE COUNT 12/15/2021 1.6  1.0 - 4.8 K/uL Final    ABS. MONOCYTES 12/15/2021 0.5  0.1 - 1.0 K/uL Final    ABS. EOSINOPHILS 12/15/2021 0.1  0.0 - 0.5 K/uL Final    ABS. BASOPHILS 12/15/2021 0.0  0.0 - 0.2 K/uL Final    Hemoglobin A1c 12/15/2021 5.5  4.8 - 5.6 % Final    AVG GLU 12/15/2021 112  91 - 123 mg/dL Final    Comment: 5.7 - 6.4% is indicative of prediabetes. > 6.4% is indicative of diabetes. Hospital Outpatient Visit on 12/15/2021   Component Date Value Ref Range Status    [facility] SPECIMEN COL 12/15/2021 Specimens collected/sent to [facility]    Final       No results found for any visits on 12/23/21.     Patient Care Team:  Patient Care Team:  [place] Sender, NP as PCP - General (Nurse Practitioner)  Jacob Sender, NP as PCP - [facility] Zeeshan Cortesled Provider  Crystal De La Cruz [place] (Physician Assistant)  Mac Seaman MD (Neurology)  Caitlyn Vizcaino MD as Consulting Provider (Gastroenterology)      Assessment / Plan:      ICD-10-CM ICD-9-CM    1. Essential hypertension  I10 401.9    2. Chronic combined systolic and diastolic congestive heart failure (HCC)  I50.42 428.42      428.0    3. Chest pain, unspecified type  R07.9 786.50    4. Stage 3b chronic kidney disease ([facility]  N18.32 585. 3      Chest pain: Patient is having exertional chest pain which clearly appears cardiac in origin. I advised that he needs to call his cardiologist and schedule an appt as soon as possible. Pt did have a positive stress test and echo showing decrease in cardiac function compared to prior echo. Pt concerned that his cardiac cath was delayed due to his renal function. I discussed how the dye used for the cath can have a detrimental effect on his kidneys and that he needs to have a conversation with his cardiologist and nephrologist to decide how to best proceed. Patient expressed understanding. CHF: Pt experiencing worsening shortness of breath and leg swelling. He is currently not on any diuretic. I will start him on Lasix prn. Once again, advised him to discuss with his cardiologist and nephrologist. He is yet to start on the SGLT2 inhibitor - plans to pick it up from pharmacy today. HTN: Remains uncontrolled. Continue hydralazine 100 mg tid. He plans to start on Imdur today. Home BP monitor ordered. Pt works as a  - requiring him to climb greater than a 100 feet. With  having exertional chest pain and shortness of breath with only a few steps in my office, I advised him that he should not return to work or perform any strenuous activity till he sees the cardiologist and is cleared. I asked the patient if he  had any questions and answered his  questions.   The patient stated that he understands the treatment plan and agrees with the treatment plan

## 2021-12-23 NOTE — LETTER
NOTIFICATION RETURN TO WORK / SCHOOL    12/23/2021 2:06 PM    Mr. Silas Hall  [address]      To Whom It May Concern:    Silas Hall is currently under the care of [address]. Please excuse Mr. Jens Briones from work till he visits his cardiologist and is cleared to work from him. If there are questions or concerns please have the patient contact our office.         Sincerely,      Tamara Marsh MD

## 2021-12-28 DIAGNOSIS — E04.1 THYROID NODULE: ICD-10-CM

## 2021-12-28 DIAGNOSIS — E34.9 INCREASED PTH LEVEL: Primary | ICD-10-CM

## 2022-01-04 ENCOUNTER — OFFICE VISIT (OUTPATIENT)
Dept: CARDIOLOGY CLINIC | Age: 67
End: 2022-01-04
Payer: COMMERCIAL

## 2022-01-04 VITALS
DIASTOLIC BLOOD PRESSURE: 73 MMHG | OXYGEN SATURATION: 95 % | SYSTOLIC BLOOD PRESSURE: 132 MMHG | BODY MASS INDEX: 36.08 KG/M2 | WEIGHT: 252 LBS | HEART RATE: 80 BPM | HEIGHT: 70 IN

## 2022-01-04 DIAGNOSIS — E66.01 SEVERE OBESITY (BMI 35.0-39.9) WITH COMORBIDITY (HCC): ICD-10-CM

## 2022-01-04 DIAGNOSIS — I10 ESSENTIAL HYPERTENSION: ICD-10-CM

## 2022-01-04 DIAGNOSIS — E78.00 HYPERCHOLESTEROLEMIA: ICD-10-CM

## 2022-01-04 DIAGNOSIS — Z98.61 POSTSURGICAL PERCUTANEOUS TRANSLUMINAL CORONARY ANGIOPLASTY STATUS: ICD-10-CM

## 2022-01-04 DIAGNOSIS — N18.9 CHRONIC KIDNEY DISEASE, UNSPECIFIED CKD STAGE: ICD-10-CM

## 2022-01-04 DIAGNOSIS — I25.10 ATHEROSCLEROSIS OF NATIVE CORONARY ARTERY OF NATIVE HEART WITHOUT ANGINA PECTORIS: ICD-10-CM

## 2022-01-04 DIAGNOSIS — I50.32 CHRONIC DIASTOLIC CONGESTIVE HEART FAILURE (HCC): Primary | ICD-10-CM

## 2022-01-04 PROCEDURE — 99214 OFFICE O/P EST MOD 30 MIN: CPT | Performed by: NURSE PRACTITIONER

## 2022-01-04 RX ORDER — ATORVASTATIN CALCIUM 40 MG/1
40 TABLET, FILM COATED ORAL DAILY
Qty: 90 TABLET | Refills: 2 | Status: SHIPPED | OUTPATIENT
Start: 2022-01-04 | End: 2022-01-10

## 2022-01-04 RX ORDER — AMLODIPINE BESYLATE 5 MG/1
5 TABLET ORAL DAILY
Qty: 90 TABLET | Refills: 2 | Status: SHIPPED | OUTPATIENT
Start: 2022-01-04 | End: 2022-09-14
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
## 2022-01-04 NOTE — LETTER
NOTIFICATION RETURN TO WORK / SCHOOL    2022 2:27 PM      To Whom It May Concern:      Ref:   Mr. Cyril Fierro (: 1955)                  Cyril Fierro is currently under the care of [facility]  from 2022 to 2022. He will return to work after his follow up with his speciality doctor on 22 depending providers suggestion. If there are questions or concerns please have the patient contact our office.         Sincerely,    Signed By: ASHLEY Kaiser    2022         ASHLEY Kaiser

## 2022-01-04 NOTE — PATIENT INSTRUCTIONS

## 2022-01-04 NOTE — PROGRESS NOTES
1. Have you been to the ER, urgent care clinic since your last visit? Hospitalized since your last visit? Yes [facility], chest pain, fluids    2. Have you seen or consulted any other health care providers outside of the [address] [place] [address] since your last visit? Include any pap smears or colon screening.  Yes Where: Dr. Aristeo Acosta PCP for [facility] visit

## 2022-01-06 ENCOUNTER — HOSPITAL ENCOUNTER (OUTPATIENT)
Dept: LAB | Age: 67
Discharge: HOME | End: 2022-01-06

## 2022-01-06 LAB — SENTARA SPECIMEN COL,SENBCF: NORMAL

## 2022-01-06 PROCEDURE — 99001 SPECIMEN HANDLING PT-LAB: CPT

## 2022-01-10 ENCOUNTER — OFFICE VISIT (OUTPATIENT)
Dept: CARDIOLOGY CLINIC | Age: 67
End: 2022-01-10
Payer: COMMERCIAL

## 2022-01-10 VITALS
OXYGEN SATURATION: 97 % | HEART RATE: 91 BPM | BODY MASS INDEX: 35.5 KG/M2 | HEIGHT: 70 IN | SYSTOLIC BLOOD PRESSURE: 135 MMHG | DIASTOLIC BLOOD PRESSURE: 83 MMHG | WEIGHT: 248 LBS

## 2022-01-10 DIAGNOSIS — I25.10 ATHEROSCLEROSIS OF NATIVE CORONARY ARTERY OF NATIVE HEART WITHOUT ANGINA PECTORIS: Primary | ICD-10-CM

## 2022-01-10 DIAGNOSIS — E66.01 SEVERE OBESITY (BMI 35.0-39.9) WITH COMORBIDITY (HCC): ICD-10-CM

## 2022-01-10 DIAGNOSIS — I10 ESSENTIAL HYPERTENSION: ICD-10-CM

## 2022-01-10 DIAGNOSIS — Z98.61 POSTSURGICAL PERCUTANEOUS TRANSLUMINAL CORONARY ANGIOPLASTY STATUS: ICD-10-CM

## 2022-01-10 DIAGNOSIS — I50.32 CHRONIC DIASTOLIC CONGESTIVE HEART FAILURE (HCC): ICD-10-CM

## 2022-01-10 DIAGNOSIS — R42 DIZZINESS: ICD-10-CM

## 2022-01-10 DIAGNOSIS — E78.00 HYPERCHOLESTEROLEMIA: ICD-10-CM

## 2022-01-10 PROCEDURE — 99214 OFFICE O/P EST MOD 30 MIN: CPT | Performed by: INTERNAL MEDICINE

## 2022-01-10 RX ORDER — CARVEDILOL 12.5 MG/1
12.5 TABLET ORAL 2 TIMES DAILY WITH MEALS
Qty: 180 TABLET | Refills: 2 | Status: SHIPPED | OUTPATIENT
Start: 2022-01-10 | End: 2022-11-03

## 2022-01-10 RX ORDER — ATORVASTATIN CALCIUM 80 MG/1
80 TABLET, FILM COATED ORAL DAILY
Qty: 90 TABLET | Refills: 1 | Status: SHIPPED | OUTPATIENT
Start: 2022-01-10 | End: 2022-06-27

## 2022-01-10 RX ORDER — ACETAMINOPHEN 500 MG
1 TABLET ORAL AS NEEDED
Qty: 1 KIT | Refills: 0 | Status: SHIPPED | OUTPATIENT
Start: 2022-01-10
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
## 2022-01-10 NOTE — PATIENT INSTRUCTIONS
Medications Discontinued During This Encounter   Medication Reason    multivitamin (ONE A DAY) tablet Not A Current Medication    carvediloL (COREG) 25 mg tablet REORDER        Learning About the Mediterranean Diet  What is the Mediterranean diet? The Mediterranean diet is a style of eating rather than a diet plan. It features foods eaten in [place], [place], [place] and [place], and other countries along the [facility]. It emphasizes eating foods like fish, fruits, vegetables, beans, high-fiber breads and whole grains, nuts, and olive oil. This style of eating includes limited red meat, cheese, and sweets. Why choose the Mediterranean diet? A Mediterranean-style diet may improve heart health. It contains more fat than other heart-healthy diets. But the fats are mainly from nuts, unsaturated oils (such as fish oils and olive oil), and certain nut or seed oils (such as canola, soybean, or flaxseed oil). These fats may help protect the heart and blood vessels. How can you get started on the Mediterranean diet? Here are some things you can do to switch to a more Mediterranean way of eating. What to eat  · Eat a variety of fruits and vegetables each day, such as grapes, blueberries, tomatoes, broccoli, peppers, figs, olives, spinach, eggplant, beans, lentils, and chickpeas. · Eat a variety of whole-grain foods each day, such as oats, brown rice, and whole wheat bread, pasta, and couscous. · Eat fish at least 2 times a week. Try tuna, salmon, mackerel, lake trout, herring, or sardines. · Eat moderate amounts of low-fat dairy products, such as milk, cheese, or yogurt. · Eat moderate amounts of poultry and eggs. · Choose healthy (unsaturated) fats, such as nuts, olive oil, and certain nut or seed oils like canola, soybean, and flaxseed. · Limit unhealthy (saturated) fats, such as butter, palm oil, and coconut oil.  And limit fats found in animal products, such as meat and dairy products made with whole milk. Try to eat red meat only a few times a month in very small amounts. · Limit sweets and desserts to only a few times a week. This includes sugar-sweetened drinks like soda. The Mediterranean diet may also include red wine with your meal--1 glass each day for women and up to 2 glasses a day for men. Tips for eating at home  · Use herbs, spices, garlic, lemon zest, and citrus juice instead of salt to add flavor to foods. · Add avocado slices to your sandwich instead of colindres. · Have fish for lunch or dinner instead of red meat. Brush the fish with olive oil, and broil or grill it. · Sprinkle your salad with seeds or nuts instead of cheese. · Cook with olive or canola oil instead of butter or oils that are high in saturated fat. · Switch from 2% milk or whole milk to 1% or fat-free milk. · Dip raw vegetables in a vinaigrette dressing or hummus instead of dips made from mayonnaise or sour cream.  · Have a piece of fruit for dessert instead of a piece of cake. Try baked apples, or have some dried fruit. Tips for eating out  · Try broiled, grilled, baked, or poached fish instead of having it fried or breaded. · Ask your  to have your meals prepared with olive oil instead of butter. · Order dishes made with marinara sauce or sauces made from olive oil. Avoid sauces made from cream or mayonnaise. · Choose whole-grain breads, whole wheat pasta and pizza crust, brown rice, beans, and lentils. · Cut back on butter or margarine on bread. Instead, you can dip your bread in a small amount of olive oil. · Ask for a side salad or grilled vegetables instead of french fries or chips. Where can you learn more? Go to http://www.sarabia.com/  Enter O407 in the search box to learn more about \"Learning About the Mediterranean Diet. \"  Current as of: December 17, 2020               Content Version: 13.0  © 4163-7106 [employer], Inc[employer].    Care instructions adapted under license by [employer] (which disclaims liability or warranty for this information). If you have questions about a medical condition or this instruction, always ask your healthcare professional. [address] any warranty or liability for your use of this information.

## 2022-01-10 NOTE — PROGRESS NOTES
1. Have you been to the ER, urgent care clinic since your last visit? Hospitalized since your last visit? No    2. Have you seen or consulted any other health care providers outside of the [address] [place] [address] since your last visit? Include any pap smears or colon screening. No    3. Since your last visit, have you had any of the following symptoms? chest pains, shortness of breath, dizziness and swelling in legs/arms. 4.  Have you had any blood work, X-rays or cardiac testing? Yes Where: [facility]     Requested: NO     In [facility]: YES    5. Where do you normally have your labs drawn? [facility]    6. Do you need any refills today?    Yes

## 2022-01-11 DIAGNOSIS — I50.32 CHRONIC DIASTOLIC CONGESTIVE HEART FAILURE (HCC): ICD-10-CM

## 2022-01-12 ENCOUNTER — OFFICE VISIT (OUTPATIENT)
Dept: FAMILY MEDICINE CLINIC | Age: 67
End: 2022-01-12
Payer: COMMERCIAL

## 2022-01-12 ENCOUNTER — TELEPHONE (OUTPATIENT)
Dept: FAMILY MEDICINE CLINIC | Age: 67
End: 2022-01-12

## 2022-01-12 VITALS
HEART RATE: 106 BPM | DIASTOLIC BLOOD PRESSURE: 90 MMHG | HEIGHT: 71 IN | TEMPERATURE: 98 F | RESPIRATION RATE: 16 BRPM | SYSTOLIC BLOOD PRESSURE: 160 MMHG | BODY MASS INDEX: 35 KG/M2 | OXYGEN SATURATION: 98 % | WEIGHT: 250 LBS

## 2022-01-12 DIAGNOSIS — R60.0 LOWER EXTREMITY EDEMA: ICD-10-CM

## 2022-01-12 DIAGNOSIS — R07.9 CHEST PAIN, UNSPECIFIED TYPE: ICD-10-CM

## 2022-01-12 DIAGNOSIS — I10 ESSENTIAL HYPERTENSION: Primary | ICD-10-CM

## 2022-01-12 DIAGNOSIS — R06.09 DYSPNEA ON EXERTION: ICD-10-CM

## 2022-01-12 PROCEDURE — 99213 OFFICE O/P EST LOW 20 MIN: CPT | Performed by: NURSE PRACTITIONER

## 2022-01-12 NOTE — LETTER
NOTIFICATION RETURN TO WORK / SCHOOL    1/12/2022     Mr. Maria L Nagy [id number]  [address] [facility]      To Whom It May Concern:    Maria L Nagy is currently under the care of [facility]ie Christian. Patient needs to remain out of work until evaluated by Cardiologist for any work restrictions. If there are questions or concerns please have the patient contact our office.         Sincerely,        Hamzah Johnson NP

## 2022-01-12 NOTE — PROGRESS NOTES
Elias Corbett is a 74 y.o. male presenting today for Form Completion (return to work)  . Chief Complaint   Patient presents with    Form Completion     return to work       HPI:  Elias Corbett presents to the office today for hypertension follow-up care. Patient presents today continuing to complain of dyspnea, dyspnea on exertion, intermittent chest pain with lower extremity edema. He also has dizziness and headaches daily. He is requesting the provided pleated return to work note form. He is a  and must climb 200 feet in the air, as well as walk significant distance from parking lot at [employer]  Is managed by cardiology for his coronary artery disease and is status post coronary arteries stenting. He admits to dyspnea on exertion with simple tasks such as putting a garbage walking less than a half a block. Review of Systems   Constitutional: Negative for malaise/fatigue. Respiratory: Positive for shortness of breath. Negative for cough. Cardiovascular: Positive for chest pain and leg swelling. Negative for palpitations. Gastrointestinal: Negative for abdominal pain, nausea and vomiting. Genitourinary: Negative for dysuria. Musculoskeletal: Negative for back pain and myalgias. Neurological: Positive for dizziness and headaches.          Allergies   Allergen Reactions    Iodine Other (comments)     Eyes swell shut      Shellfish Containing Products Swelling       PHQ Screening   3 most recent PHQ Screens 1/12/2022   PHQ Not Done -   Little interest or pleasure in doing things Not at all   Feeling down, depressed, irritable, or hopeless Not at all   Total Score PHQ 2 0   Trouble falling or staying asleep, or sleeping too much -   Feeling tired or having little energy -   Poor appetite, weight loss, or overeating -   Feeling bad about yourself - or that you are a failure or have let yourself or your family down -   Trouble concentrating on things such as school, work, reading, or watching TV -   Moving or speaking so slowly that other people could have noticed; or the opposite being so fidgety that others notice -   Thoughts of being better off dead, or hurting yourself in some way -   PHQ 9 Score -   How difficult have these problems made it for you to do your work, take care of your home and get along with others -       History  Past Medical History:   Diagnosis Date    Atypical chest pain/mild nonobstructive CAD/EF 65% 4/12/2011    BPH without obstruction/lower urinary tract symptoms     Bursitis of right shoulder     CAD (coronary artery disease)     Chest pain     history of hospitalization with chest pain and a negative workup in 2008    Chronic edema     Constipation     die to medication    Coronary artery disease     mild, non obstructive/EF 65%    Depression 12/16/2018    Dyspnea on exertion     Echocardiogram 12/16/08    IVC dilated; suboptimal endocardial border; EF 65%    ED (erectile dysfunction)     Elevated PSA     Frequency     GERD (gastroesophageal reflux disease)     Gout     H/O cystoscopy 06/20/2013    Hematuria, unspecified     Hypercholesterolemia     Hypertension     Hypertension      Hypogonadism male     Impotence of organic origin     Left ventricular diastolic dysfunction     Malignant neoplasm of prostate ([address]     hx of t1a, izzy 6, 5 % of 1  core    Morbid obesity ([address]     Myocardial perfusion 09/05/08    Basal inferior defect c/w artifact; EF 63%    Overactive bladder     Prostate cancer ([address] 2/9/2017    S/P cardiac cath 07/30/10    oD2-40%; pRCA-20-30%; EF 65%    S/P gastric surgery 8/28/2018    Sleep apnea     Slowing of urinary stream     Type 2 diabetes with nephropathy ([address] 9/27/2018    Type II or unspecified type diabetes mellitus without mention of complication, not stated as uncontrolled        Past Surgical History:   Procedure Laterality Date    COLONOSCOPY N/A 9/18/2019    COLONOSCOPY performed by Kathryn Burrell Sam Villareal MD at [address] [place] [address]  7/30/10    HX OTHER SURGICAL  13    Prostate    HX TONSILLECTOMY      SD COLONOSCOPY FLX DX W/COLLJ SPEC WHEN PFRMD      SD I & D ABSC XTRAORAL SOFT TISS COMP         Social History     Socioeconomic History    Marital status: SINGLE     Spouse name: Not on file    Number of children: Not on file    Years of education: Not on file    Highest education level: Not on file   Occupational History    Not on file   Tobacco Use    Smoking status: Former Smoker     Types: Cigars     Quit date: 10/4/1999     Years since quittin.3    Smokeless tobacco: Never Used   Vaping Use    Vaping Use: Never used   Substance and Sexual Activity    Alcohol use: Never     Comment: rarely    Drug use: No    Sexual activity: Not Currently   Other Topics Concern    Not on file   Social History Narrative    Not on file     Social Determinants of Health     Financial Resource Strain:     Difficulty of Paying Living Expenses: Not on file   Food Insecurity:     Worried About [address] in the Last Year: Not on file    [facility] in the Last Year: Not on file   Transportation Needs:     Lack of Transportation (Medical): Not on file    Lack of Transportation (Non-Medical):  Not on file   Physical Activity:     Days of Exercise per Week: Not on file    Minutes of Exercise per Session: Not on file   Stress:     Feeling of Stress : Not on file   Social Connections:     Frequency of Communication with Friends and Family: Not on file    Frequency of Social Gatherings with Friends and Family: Not on file    Attends Mosque Services: Not on file    Active Member of Clubs or Organizations: Not on file    Attends Club or Organization Meetings: Not on file    Marital Status: Not on file   Intimate Partner Violence:     Fear of Current or Ex-Partner: Not on file    Emotionally Abused: Not on file    Physically Abused: Not on file   Ahn Sexually Abused: Not on file   Housing Stability:     Unable to Pay for Housing in the Last Year: Not on file    Number of Steve in the Last Year: Not on file    Unstable Housing in the Last Year: Not on file       Current Outpatient Medications   Medication Sig Dispense Refill    carvediloL (COREG) 12.5 mg tablet Take 1 Tablet by mouth two (2) times daily (with meals). 180 Tablet 2    Blood Pressure Monitor (Blood Pressure Kit) kit 1 Device by Does Not Apply route as needed (htn). 1 Kit 0    atorvastatin (LIPITOR) 80 mg tablet Take 1 Tablet by mouth daily. 90 Tablet 1    comprs. stocking,thigh,long,med (Comp. Stocking,Thigh,Long,Med.) misc 2 Packages by Does Not Apply route daily as needed (edema). Remove when lying down 2 Each 0    amLODIPine (NORVASC) 5 mg tablet Take 1 Tablet by mouth daily. 90 Tablet 2    trospium (SANCTURA) 20 mg tablet Take 1 Tablet by mouth daily. 60 Tablet 0    furosemide (LASIX) 40 mg tablet Use daily as needed  Indications: accumulation of fluid resulting from chronic heart failure (Patient taking differently: Take 40 mg by mouth every fourty-eight (48) hours. Use daily as needed  Indications: accumulation of fluid resulting from chronic heart failure) 30 Tablet 1    isosorbide mononitrate ER (IMDUR) 60 mg CR tablet Take 1 Tablet by mouth daily. 90 Tablet 1    hydrALAZINE (APRESOLINE) 100 mg tablet Take 1 Tablet by mouth three (3) times daily. 270 Tablet 1    dapagliflozin (Farxiga) 10 mg tab tablet Take 1 Tablet by mouth daily. 30 Tablet 3    nitroglycerin (NITROSTAT) 0.4 mg SL tablet 1 TAB BY SUBLINGUAL ROUTE EVERY FIVE (5) MINUTES AS NEEDED FOR CHEST PAIN FOR UP TO 3 DOSES. 25 Tablet 1    omeprazole (PRILOSEC) 40 mg capsule TAKE 1 CAPSULE BY MOUTH TWICE A  Capsule 0    clopidogreL (PLAVIX) 75 mg tab Take 1 Tablet by mouth daily. 90 Tablet 2    acetaminophen (Acetaminophen Extra Strength) 500 mg tablet Take  by mouth every six (6) hours as needed for Pain.       allopurinol (ZYLOPRIM) 100 mg tablet TAKE 1 TABLET BY MOUTH EVERY DAY (Patient taking differently: Take 200 mg by mouth daily. TAKE 2 TABLET BY MOUTH EVERY DAY) 90 Tab 3    calcium carbonate (TUMS) 200 mg calcium (500 mg) chew Take 1 Tablet by mouth daily. As needed      aspirin 81 mg chewable tablet Take 1 Tab by mouth two (2) times a day. (Patient taking differently: Take 81 mg by mouth daily.) 60 Tab 0         Vitals:    01/12/22 0711 01/12/22 0739   BP: (!) 156/88 (!) 160/90   Pulse: (!) 120 (!) 106   Resp: 16    Temp: 98 °F (36.7 °C)    TempSrc: Oral    SpO2: 98%    Weight: 250 lb (113.4 kg)    Height: 5' 11\" (1.803 m)    PainSc:   0 - No pain        Physical Exam  Vitals and nursing note reviewed. Constitutional:       Appearance: Normal appearance. Cardiovascular:      Rate and Rhythm: Regular rhythm. Tachycardia present. Pulses: Normal pulses. Pulmonary:      Effort: Pulmonary effort is normal.      Breath sounds: Normal breath sounds. Musculoskeletal:      Right lower leg: Edema present. Left lower leg: Edema present. Neurological:      Mental Status: He is alert.          Hospital Outpatient Visit on 01/06/2022   Component Date Value Ref Range Status    [facility] SPE[facility] COL 01/06/2022 Specimens collected/sent to [facility]    Final   Office Visit on 01/03/2022   Component Date Value Ref Range Status    Color (UA POC) 01/03/2022 Yellow   Final    Clarity (UA POC) 01/03/2022 Clear   Final    Glucose (UA POC) 01/03/2022 2+  Negative Corrected    Bilirubin (UA POC) 01/03/2022 Negative  Negative Final    Ketones (UA POC) 01/03/2022 Negative  Negative Final    Specific gravity (UA POC) 01/03/2022 1.025  1.001 - 1.035 Corrected    Blood (UA POC) 01/03/2022 Negative  Negative Final    pH (UA POC) 01/03/2022 5.5  4.6 - 8.0 Corrected    Protein (UA POC) 01/03/2022 1+  Negative Corrected    Urobilinogen (UA POC) 01/03/2022 0.2 mg/dL  0.2 - 1 Final    Nitrites (UA POC) 01/03/2022 Negative Negative Final    Leukocyte esterase (UA POC) 01/03/2022 Negative  Negative Final   Orders Only on 12/15/2021   Component Date Value Ref Range Status    Triglyceride 12/15/2021 44  40 - 149 mg/dL Final    HDL Cholesterol 12/15/2021 66  >=40 mg/dL Final    Cholesterol, total 12/15/2021 166  110 - 200 mg/dL Final    CHOLESTEROL/HDL 12/15/2021 2.5  0.0 - 5.0 Final    Non-HDL Cholesterol 12/15/2021 100  <130 mg/dL Final    LDL, calculated 12/15/2021 91  50 - 99 mg/dL Final    VLDL, calculated 12/15/2021 9  8 - 30 mg/dL Final    LDL/HDL Ratio 12/15/2021 1.4   Final    Comment: Test includes cholesterol, HDL cholesterol, triglycerides and LDL. Cholesterol Recommended NCEP guidelines in mg/dL:    Less than 200            Desirable  200 - 239                Borderline High  Greater than or  = 240   High      Please Note:  Total Chol/HDL Ratio                     Men     Women  1/2 Avg. Risk    3.4     3.3      Avg. Risk    5.0     4.4  2X  Avg. Risk    9.6     7.1  3X  Avg. Risk   23.4    11.0            Glucose 12/15/2021 72  70 - 99 mg/dL Final    BUN 12/15/2021 34* 6 - 22 mg/dL Final    Creatinine 12/15/2021 2.0* 0.8 - 1.6 mg/dL Final    Sodium 12/15/2021 144  133 - 145 mmol/L Final    Potassium 12/15/2021 4.1  3.5 - 5.5 mmol/L Final    Chloride 12/15/2021 107  98 - 110 mmol/L Final    CO2 12/15/2021 28  20 - 32 mmol/L Final    AST (SGOT) 12/15/2021 13  10 - 37 U/L Final    ALT (SGPT) 12/15/2021 8  5 - 40 U/L Final    Alk.  phosphatase 12/15/2021 73  40 - 125 U/L Final    Bilirubin, total 12/15/2021 0.2  0.2 - 1.2 mg/dL Final    Calcium 12/15/2021 9.2  8.4 - 10.5 mg/dL Final    Protein, total 12/15/2021 6.5  6.2 - 8.1 g/dL Final    Albumin 12/15/2021 4.0  3.5 - 5.0 g/dL Final    A-G Ratio 12/15/2021 1.6  1.1 - 2.6 ratio Final    Globulin 12/15/2021 2.5  2.0 - 4.0 g/dL Final    Anion gap 12/15/2021 9.0  3.0 - 15.0 mmol/L Final    Comment: Anion Gap calculation based on electrolyte reference ranges. Test includes Albumin, Alkaline Phosphatase, ALT, AST, BUN, Calcium, CO2,  Chloride, Creatinine, Glucose, Potassium, Sodium, Total Bilirubin and Total  Protein. Estimated GFR results are reported in mL/min/1.73 sq.m. by the MDRD equation. This eGFR is validated for stable chronic renal failure patients. This   equation  is unreliable in acute illness or patients with normal renal function.  GFRAA 12/15/2021 40.5* >60.0 Final    GFRNA 12/15/2021 33.4* >60.0 Final    Prostate Specific Ag 12/15/2021 0.510  <=4.100 ng/mL Final    PTH, Intact 12/15/2021 90* 15 - 65 pg/mL Final    WBC 12/15/2021 4.8  4.0 - 11.0 K/uL Final    RBC 12/15/2021 4.11  3.80 - 5.80 M/uL Final    HGB 12/15/2021 10.3* 13.1 - 17.2 g/dL Final    HCT 12/15/2021 35.0* 39.3 - 51.6 % Final    MCV 12/15/2021 85  80 - 95 fL Final    MCH 12/15/2021 25* 26 - 34 pg Final    MCHC 12/15/2021 29* 31 - 36 g/dL Final    RDW 12/15/2021 17.5* 10.0 - 15.5 % Final    PLATELET 78/57/8834 701  140 - 440 K/uL Final    MPV 12/15/2021 10.2  9.0 - 13.0 fL Final    NEUTROPHILS 12/15/2021 53  40 - 75 % Final    Lymphocytes 12/15/2021 33  20 - 45 % Final    MONOCYTES 12/15/2021 10  3 - 12 % Final    EOSINOPHILS 12/15/2021 3  0 - 6 % Final    BASOPHILS 12/15/2021 1  0 - 2 % Final    ABS. NEUTROPHILS 12/15/2021 2.6  1.8 - 7.7 K/uL Final    ABSOLUTE LYMPHOCYTE COUNT 12/15/2021 1.6  1.0 - 4.8 K/uL Final    ABS. MONOCYTES 12/15/2021 0.5  0.1 - 1.0 K/uL Final    ABS. EOSINOPHILS 12/15/2021 0.1  0.0 - 0.5 K/uL Final    ABS. BASOPHILS 12/15/2021 0.0  0.0 - 0.2 K/uL Final    Hemoglobin A1c 12/15/2021 5.5  4.8 - 5.6 % Final    AVG GLU 12/15/2021 112  91 - 123 mg/dL Final    Comment: 5.7 - 6.4% is indicative of prediabetes. > 6.4% is indicative of diabetes.          Hospital Outpatient Visit on 12/15/2021   Component Date Value Ref Range Status    [facility] SPE[facility]IM[facility] COL 12/15/2021 Specimens collected/sent to [facility]    Final       No results found for any visits on 01/12/22. Patient Care Team:  Patient Care Team:  Ruby Byers NP as PCP - General (Nurse Practitioner)  Ruby Byers NP as PCP - [facility]  Dipak Hernandez, [address] (Physician Assistant)  Guille Farooq MD (Neurology)  Chicho Arce MD as Consulting Provider (Gastroenterology)      Assessment / Plan:      ICD-10-CM ICD-9-CM    1. Essential hypertension  I10 401.9    2. Chest pain, unspecified type  R07.9 786.50    3. Dyspnea on exertion  R06.00 786.09    4. Lower extremity edema  R60.0 782. 3      Patient is likely he will need continual follow-up with cardiology and pulmonology  Provider is not willing to allow the patient return to work with his current symptoms  Follow-up and Dispositions    · Return in about 2 months (around 3/12/2022). No change in current treatment plan  Hypertension-BP mildly elevated. Will continue to monitor  I asked the patient if he  had any questions and answered his  questions. The patient stated that he understands the treatment plan and agrees with the treatment plan    This document was created with a voice activated dictation system and may contain transcription errors.

## 2022-01-12 NOTE — PROGRESS NOTES
Cyril Fierro presents today for   Chief Complaint   Patient presents with    Form Completion     return to work       Is someone accompanying this pt? girlfriend    Is the patient using any DME equipment during [address]? no    Depression Screening:  3 most recent PHQ Screens 1/12/2022   PHQ Not Done -   Little interest or pleasure in doing things Not at all   Feeling down, depressed, irritable, or hopeless Not at all   Total Score PHQ 2 0   Trouble falling or staying asleep, or sleeping too much -   Feeling tired or having little energy -   Poor appetite, weight loss, or overeating -   Feeling bad about yourself - or that you are a failure or have let yourself or your family down -   Trouble concentrating on things such as school, work, reading, or watching TV -   Moving or speaking so slowly that other people could have noticed; or the opposite being so fidgety that others notice -   Thoughts of being better off dead, or hurting yourself in some way -   PHQ 9 Score -   How difficult have these problems made it for you to do your work, take care of your home and get along with others -       Learning Assessment:  Learning Assessment 5/25/2017   PRIMARY LEARNER Patient   CO-LEARNER CAREGIVER No   PRIMARY LANGUAGE ENGLISH   LEARNER PREFERENCE PRIMARY READING   ANSWERED BY patient   RELATIONSHIP SELF       Abuse Screening:  Abuse Screening Questionnaire 11/19/2021   Do you ever feel afraid of your partner? N   Are you in a relationship with someone who physically or mentally threatens you? N   Is it safe for you to go home? Y       Fall Risk  Fall Risk Assessment, last 12 mths 1/12/2022   Able to walk? Yes   Fall in past 12 months? 0   Do you feel unsteady? 0   Are you worried about falling 0       Health Maintenance reviewed and discussed and ordered per Provider.     Health Maintenance Due   Topic Date Due    Eye Exam Retinal or Dilated  Never done    DTaP/Tdap/Td series (1 - Tdap) Never done    Foot Exam Q1  09/07/2017  MICROALBUMIN Q1  10/27/2021    COVID-19 Vaccine (3 - Booster for Pfizer series) 12/10/2021    Pneumococcal 65+ years (1 of 1 - PPSV23) 01/08/2022   . Coordination of Care:  1. Have you been to the ER, urgent care clinic since your last visit? Hospitalized since your last visit? no    2. Have you seen or consulted any other health care providers outside of the [address] [place] [address] since your last visit? Include any pap smears or colon screening.  no

## 2022-01-12 NOTE — TELEPHONE ENCOUNTER
TC to patient, he was informed he can not return to work until clear by his Cardiologist. Patient verbalized his understanding.

## 2022-01-25 DIAGNOSIS — D63.8 ANEMIA IN OTHER CHRONIC DISEASES CLASSIFIED ELSEWHERE: Primary | ICD-10-CM

## 2022-01-25 DIAGNOSIS — I10 ESSENTIAL HYPERTENSION: ICD-10-CM

## 2022-01-25 DIAGNOSIS — I10 ESSENTIAL HYPERTENSION: Primary | ICD-10-CM

## 2022-01-26 DIAGNOSIS — I50.42 CHRONIC COMBINED SYSTOLIC AND DIASTOLIC CONGESTIVE HEART FAILURE (HCC): ICD-10-CM

## 2022-01-27 ENCOUNTER — TELEPHONE (OUTPATIENT)
Dept: FAMILY MEDICINE CLINIC | Age: 67
End: 2022-01-27

## 2022-01-27 NOTE — TELEPHONE ENCOUNTER
Patient called checking on his short term disability paperwork. He stated that yolanda was going to have them done on his last visit please advise please give him a call thanks

## 2022-02-01 ENCOUNTER — HOSPITAL ENCOUNTER (OUTPATIENT)
Dept: LAB | Age: 67
Discharge: HOME | End: 2022-02-01

## 2022-02-01 LAB — SENTARA SPECIMEN COL,SENBCF: NORMAL

## 2022-02-01 PROCEDURE — 99001 SPECIMEN HANDLING PT-LAB: CPT

## 2022-02-01 RX ORDER — FUROSEMIDE 40 MG/1
TABLET ORAL
Qty: 30 TABLET | Refills: 1 | Status: SHIPPED | OUTPATIENT
Start: 2022-02-01 | End: 2022-02-22

## 2022-02-02 ENCOUNTER — OFFICE VISIT (OUTPATIENT)
Dept: FAMILY MEDICINE CLINIC | Age: 67
End: 2022-02-02
Payer: COMMERCIAL

## 2022-02-02 VITALS
BODY MASS INDEX: 34.16 KG/M2 | DIASTOLIC BLOOD PRESSURE: 82 MMHG | WEIGHT: 244 LBS | RESPIRATION RATE: 16 BRPM | SYSTOLIC BLOOD PRESSURE: 136 MMHG | HEIGHT: 71 IN | HEART RATE: 98 BPM | OXYGEN SATURATION: 97 % | TEMPERATURE: 98 F

## 2022-02-02 DIAGNOSIS — I10 ESSENTIAL HYPERTENSION: ICD-10-CM

## 2022-02-02 DIAGNOSIS — M10.00 IDIOPATHIC GOUT, UNSPECIFIED CHRONICITY, UNSPECIFIED SITE: Primary | ICD-10-CM

## 2022-02-02 LAB
A-G RATIO,AGRAT: 1.6 RATIO (ref 1.1–2.6)
ABSOLUTE LYMPHOCYTE COUNT, 10803: 1.4 K/UL (ref 1–4.8)
ALBUMIN SERPL-MCNC: 4.1 G/DL (ref 3.5–5)
ALP SERPL-CCNC: 92 U/L (ref 40–125)
ALT SERPL-CCNC: 8 U/L (ref 5–40)
ANION GAP SERPL CALC-SCNC: 12 MMOL/L (ref 3–15)
AST SERPL W P-5'-P-CCNC: 12 U/L (ref 10–37)
BASOPHILS # BLD: 0 K/UL (ref 0–0.2)
BASOPHILS NFR BLD: 0 % (ref 0–2)
BILIRUB SERPL-MCNC: 0.2 MG/DL (ref 0.2–1.2)
BUN SERPL-MCNC: 27 MG/DL (ref 6–22)
CALCIUM SERPL-MCNC: 9 MG/DL (ref 8.4–10.5)
CHLORIDE SERPL-SCNC: 106 MMOL/L (ref 98–110)
CO2 SERPL-SCNC: 27 MMOL/L (ref 20–32)
CREAT SERPL-MCNC: 1.9 MG/DL (ref 0.8–1.6)
EOSINOPHIL # BLD: 0.1 K/UL (ref 0–0.5)
EOSINOPHIL NFR BLD: 2 % (ref 0–6)
ERYTHROCYTE [DISTWIDTH] IN BLOOD BY AUTOMATED COUNT: 19.2 % (ref 10–15.5)
GFRAA, 66117: 42.8
GFRNA, 66118: 35.3
GLOBULIN,GLOB: 2.6 G/DL (ref 2–4)
GLUCOSE SERPL-MCNC: 83 MG/DL (ref 70–99)
GRANULOCYTES,GRANS: 63 % (ref 40–75)
HCT VFR BLD AUTO: 33.8 % (ref 37.8–52.2)
HGB BLD-MCNC: 9.9 G/DL (ref 12.6–17.1)
LYMPHOCYTES, LYMLT: 27 % (ref 20–45)
MCH RBC QN AUTO: 25 PG (ref 26–34)
MCHC RBC AUTO-ENTMCNC: 29 G/DL (ref 31–36)
MCV RBC AUTO: 85 FL (ref 80–95)
MONOCYTES # BLD: 0.4 K/UL (ref 0.1–1)
MONOCYTES NFR BLD: 7 % (ref 3–12)
NEUTROPHILS # BLD AUTO: 3.3 K/UL (ref 1.8–7.7)
PLATELET # BLD AUTO: 246 K/UL (ref 140–440)
PMV BLD AUTO: 10 FL (ref 9–13)
POTASSIUM SERPL-SCNC: 4 MMOL/L (ref 3.5–5.5)
PROT SERPL-MCNC: 6.7 G/DL (ref 6.2–8.1)
RBC # BLD AUTO: 3.98 M/UL (ref 3.8–5.8)
SODIUM SERPL-SCNC: 145 MMOL/L (ref 133–145)
WBC # BLD AUTO: 5.2 K/UL (ref 4–11)

## 2022-02-02 PROCEDURE — 99214 OFFICE O/P EST MOD 30 MIN: CPT | Performed by: NURSE PRACTITIONER

## 2022-02-02 RX ORDER — ALLOPURINOL 100 MG/1
TABLET ORAL
Qty: 90 TABLET | Refills: 3 | Status: SHIPPED | OUTPATIENT
Start: 2022-02-02

## 2022-02-02 NOTE — LETTER
NOTIFICATION RETURN TO WORK / SCHOOL    2/2/2022 2:35 PM    Mr. Makeda Avina  [address] [facility]      To Whom It May Concern:    Makeda Avina is currently under the care of Liam S Margie Christian. 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 If there are questions or concerns please have the patient contact our office.         Sincerely,      Cecilio Bo NP

## 2022-02-02 NOTE — PROGRESS NOTES
Burgess Blount is a 74 y.o. male presenting today for Hypertension (follow-up) and Letter for School/Work  . Chief Complaint   Patient presents with    Hypertension     follow-up    Letter for School/Work       HPI:  Burgess Blount presents to the office today for hypertension follow-up care. He was seen in the practice 3 weeks ago and was asked to follow-up today for re-evaluation. He denies any chest pain, palpitation, headache, or dizziness. His lower extremity edema has improved. He is compliant with the treatment plan and his BP today is 136/82. Patient is also requesting return to work note. Patient received a note from cardiology but he is requesting for further clarification. Patient would like to return to work on Monday, February 7, 2022. Review of Systems   Constitutional: Negative for malaise/fatigue. Respiratory: Negative for cough and shortness of breath. Cardiovascular: Negative for chest pain, palpitations and leg swelling. Gastrointestinal: Negative for abdominal pain, nausea and vomiting. Genitourinary: Negative for dysuria. Musculoskeletal: Negative for back pain and myalgias. Neurological: Negative for dizziness and headaches.          Allergies   Allergen Reactions    Iodine Other (comments)     Eyes swell shut      Shellfish Containing Products Swelling       PHQ Screening   3 most recent PHQ Screens 1/12/2022   PHQ Not Done -   Little interest or pleasure in doing things Not at all   Feeling down, depressed, irritable, or hopeless Not at all   Total Score PHQ 2 0   Trouble falling or staying asleep, or sleeping too much -   Feeling tired or having little energy -   Poor appetite, weight loss, or overeating -   Feeling bad about yourself - or that you are a failure or have let yourself or your family down -   Trouble concentrating on things such as school, work, reading, or watching TV -   Moving or speaking so slowly that other people could have noticed; or the opposite being so fidgety that others notice -   Thoughts of being better off dead, or hurting yourself in some way -   PHQ 9 Score -   How difficult have these problems made it for you to do your work, take care of your home and get along with others -       History  Past Medical History:   Diagnosis Date    Atypical chest pain/mild nonobstructive CAD/EF 65% 4/12/2011    BPH without obstruction/lower urinary tract symptoms     Bursitis of right shoulder     CAD (coronary artery disease)     Chest pain     history of hospitalization with chest pain and a negative workup in 2008    Chronic edema     Constipation     die to medication    Coronary artery disease     mild, non obstructive/EF 65%    Depression 12/16/2018    Dyspnea on exertion     Echocardiogram 12/16/08    IVC dilated; suboptimal endocardial border; EF 65%    ED (erectile dysfunction)     Elevated PSA     Frequency     GERD (gastroesophageal reflux disease)     Gout     H/O cystoscopy 06/20/2013    Hematuria, unspecified     Hypercholesterolemia     Hypertension     Hypertension      Hypogonadism male     Impotence of organic origin     Left ventricular diastolic dysfunction     Malignant neoplasm of prostate ([address]     hx of t1a, izzy 6, 5 % of 1  core    Morbid obesity ([address]     Myocardial perfusion 09/05/08    Basal inferior defect c/w artifact; EF 63%    Overactive bladder     Prostate cancer ([address] 2/9/2017    S/P cardiac cath 07/30/10    oD2-40%; pRCA-20-30%; EF 65%    S/P gastric surgery 8/28/2018    Sleep apnea     Slowing of urinary stream     Type 2 diabetes with nephropathy ([address] 9/27/2018    Type II or unspecified type diabetes mellitus without mention of complication, not stated as uncontrolled        Past Surgical History:   Procedure Laterality Date    COLONOSCOPY N/A 9/18/2019    COLONOSCOPY performed by Dixie Floyd MD at [address] [place] [address]  7/30/10    HX OTHER SURGICAL  06/27/13 Prostate    HX TONSILLECTOMY      [facility] COLONOSCOPY FLX DX W/COLLJ SPEC WHEN PFRMD      [place] I & D ABSC XTRAORAL SOFT TISS COMP         Social History     Socioeconomic History    Marital status: SINGLE     Spouse name: Not on file    Number of children: Not on file    Years of education: Not on file    Highest education level: Not on file   Occupational History    Not on file   Tobacco Use    Smoking status: Former Smoker     Types: Cigars     Quit date: 10/4/1999     Years since quittin.4    Smokeless tobacco: Never Used   Vaping Use    Vaping Use: Never used   Substance and Sexual Activity    Alcohol use: Never     Comment: rarely    Drug use: No    Sexual activity: Not Currently   Other Topics Concern    Not on file   Social History Narrative    Not on file     Social Determinants of Health     Financial Resource Strain:     Difficulty of Paying Living Expenses: Not on file   Food Insecurity:     Worried About [address] [employer] in the Last Year: Not on file    [address] in the Last Year: Not on file   Transportation Needs:     Lack of Transportation (Medical): Not on file    Lack of Transportation (Non-Medical):  Not on file   Physical Activity:     Days of Exercise per Week: Not on file    Minutes of Exercise per Session: Not on file   Stress:     Feeling of Stress : Not on file   Social Connections:     Frequency of Communication with Friends and Family: Not on file    Frequency of Social Gatherings with Friends and Family: Not on file    Attends Anabaptism Services: Not on file    Active Member of Clubs or Organizations: Not on file    Attends Club or Organization Meetings: Not on file    Marital Status: Not on file   Intimate Partner Violence:     Fear of Current or Ex-Partner: Not on file    Emotionally Abused: Not on file    Physically Abused: Not on file    Sexually Abused: Not on file   Housing Stability:     Unable to Pay for Housing in the Last Year: Not on file    Number of Places Lived in the Last Year: Not on file    Unstable Housing in the Last Year: Not on file       Current Outpatient Medications   Medication Sig Dispense Refill    allopurinoL (ZYLOPRIM) 100 mg tablet TAKE 1 TABLET BY MOUTH EVERY DAY 90 Tablet 3    carvediloL (COREG) 12.5 mg tablet Take 1 Tablet by mouth two (2) times daily (with meals). 180 Tablet 2    Blood Pressure Monitor (Blood Pressure Kit) kit 1 Device by Does Not Apply route as needed (htn). 1 Kit 0    atorvastatin (LIPITOR) 80 mg tablet Take 1 Tablet by mouth daily. 90 Tablet 1    comprs. stocking,thigh,long,med (Comp. Stocking,Thigh,Long,Med.) misc 2 Packages by Does Not Apply route daily as needed (edema). Remove when lying down 2 Each 0    amLODIPine (NORVASC) 5 mg tablet Take 1 Tablet by mouth daily. 90 Tablet 2    trospium (SANCTURA) 20 mg tablet Take 1 Tablet by mouth daily. 60 Tablet 0    isosorbide mononitrate ER (IMDUR) 60 mg CR tablet Take 1 Tablet by mouth daily. 90 Tablet 1    hydrALAZINE (APRESOLINE) 100 mg tablet Take 1 Tablet by mouth three (3) times daily. 270 Tablet 1    dapagliflozin (Farxiga) 10 mg tab tablet Take 1 Tablet by mouth daily. 30 Tablet 3    nitroglycerin (NITROSTAT) 0.4 mg SL tablet 1 TAB BY SUBLINGUAL ROUTE EVERY FIVE (5) MINUTES AS NEEDED FOR CHEST PAIN FOR UP TO 3 DOSES. 25 Tablet 1    omeprazole (PRILOSEC) 40 mg capsule TAKE 1 CAPSULE BY MOUTH TWICE A  Capsule 0    clopidogreL (PLAVIX) 75 mg tab Take 1 Tablet by mouth daily. 90 Tablet 2    acetaminophen (Acetaminophen Extra Strength) 500 mg tablet Take  by mouth every six (6) hours as needed for Pain.  calcium carbonate (TUMS) 200 mg calcium (500 mg) chew Take 1 Tablet by mouth daily. As needed      aspirin 81 mg chewable tablet Take 1 Tab by mouth two (2) times a day.  (Patient taking differently: Take 81 mg by mouth daily.) 60 Tab 0    furosemide (LASIX) 40 mg tablet USE DAILY AS NEEDED INDICATIONS: ACCUMULATION OF FLUID RESULTING FROM CHRONIC HEART FAILURE 30 Tablet 1    predniSONE (DELTASONE) 20 mg tablet Take 2 tabs by mouth x 3 days 6 Tablet 0         Vitals:    02/02/22 1354   BP: 136/82   Pulse: 98   Resp: 16   Temp: 98 °F (36.7 °C)   TempSrc: Oral   SpO2: 97%   Weight: 244 lb (110.7 kg)   Height: 5' 11\" (1.803 m)   PainSc:   0 - No pain       Physical Exam  Vitals and nursing note reviewed. Constitutional:       Appearance: Normal appearance. Cardiovascular:      Rate and Rhythm: Normal rate and regular rhythm. Pulses: Normal pulses. Heart sounds: Normal heart sounds. Pulmonary:      Effort: Pulmonary effort is normal.      Breath sounds: Normal breath sounds. Skin:     General: Skin is warm and dry. Neurological:      Mental Status: He is alert. Hospital Outpatient Visit on 02/01/2022   Component Date Value Ref Range Status    [facility] 02/01/2022 Specimens [facility]    Final   Orders Only on 01/25/2022   Component Date Value Ref Range Status    Glucose 02/01/2022 83  70 - 99 mg/dL Final    BUN 02/01/2022 27* 6 - 22 mg/dL Final    Creatinine 02/01/2022 1.9* 0.8 - 1.6 mg/dL Final    Sodium 02/01/2022 145  133 - 145 mmol/L Final    Potassium 02/01/2022 4.0  3.5 - 5.5 mmol/L Final    Chloride 02/01/2022 106  98 - 110 mmol/L Final    CO2 02/01/2022 27  20 - 32 mmol/L Final    AST (SGOT) 02/01/2022 12  10 - 37 U/L Final    ALT (SGPT) 02/01/2022 8  5 - 40 U/L Final    Alk. phosphatase 02/01/2022 92  40 - 125 U/L Final    Bilirubin, total 02/01/2022 0.2  0.2 - 1.2 mg/dL Final    Calcium 02/01/2022 9.0  8.4 - 10.5 mg/dL Final    Protein, total 02/01/2022 6.7  6.2 - 8.1 g/dL Final    Albumin 02/01/2022 4.1  3.5 - 5.0 g/dL Final    A-G Ratio 02/01/2022 1.6  1.1 - 2.6 ratio Final    Globulin 02/01/2022 2.6  2.0 - 4.0 g/dL Final    Anion gap 02/01/2022 12.0  3.0 - 15.0 mmol/L Final    Comment: Anion Gap calculation based on electrolyte reference ranges.   Test includes Albumin, Alkaline Phosphatase, ALT, AST, BUN, Calcium, CO2,  Chloride, Creatinine, Glucose, Potassium, Sodium, Total Bilirubin and Total  Protein. Estimated GFR results are reported in mL/min/1.73 sq.m. by the MDRD equation. This eGFR is validated for stable chronic renal failure patients. This   equation  is unreliable in acute illness or patients with normal renal function.  GFRAA 02/01/2022 42.8* >60.0 Final    GFRNA 02/01/2022 35.3* >60.0 Final    WBC 02/01/2022 5.2  4.0 - 11.0 K/uL Final    RBC 02/01/2022 3.98  3.80 - 5.80 M/uL Final    HGB 02/01/2022 9.9* 12.6 - 17.1 g/dL Final    HCT 02/01/2022 33.8* 37.8 - 52.2 % Final    MCV 02/01/2022 85  80 - 95 fL Final    MCH 02/01/2022 25* 26 - 34 pg Final    MCHC 02/01/2022 29* 31 - 36 g/dL Final    RDW 02/01/2022 19.2* 10.0 - 15.5 % Final    PLATELET 99/30/0705 990  140 - 440 K/uL Final    MPV 02/01/2022 10.0  9.0 - 13.0 fL Final    NEUTROPHILS 02/01/2022 63  40 - 75 % Final    Lymphocytes 02/01/2022 27  20 - 45 % Final    MONOCYTES 02/01/2022 7  3 - 12 % Final    EOSINOPHILS 02/01/2022 2  0 - 6 % Final    BASOPHILS 02/01/2022 0  0 - 2 % Final    ABS. NEUTROPHILS 02/01/2022 3.3  1.8 - 7.7 K/uL Final    ABSOLUTE LYMPHOCYTE COUNT 02/01/2022 1.4  1.0 - 4.8 K/uL Final    ABS. MONOCYTES 02/01/2022 0.4  0.1 - 1.0 K/uL Final    ABS. EOSINOPHILS 02/01/2022 0.1  0.0 - 0.5 K/uL Final    ABS.  BASOPHILS 02/01/2022 0.0  0.0 - 0.2 K/uL Final   Hospital Outpatient Visit on 01/06/2022   Component Date Value Ref Range Status    [facility] SPE[facility] 01/06/2022 Specimens collected/sent to [facility]    Final   Office Visit on 01/03/2022   Component Date Value Ref Range Status    Color (UA POC) 01/03/2022 Yellow   Final    Clarity (UA POC) 01/03/2022 Clear   Final    Glucose (UA POC) 01/03/2022 2+  Negative Corrected    Bilirubin (UA POC) 01/03/2022 Negative  Negative Final    Ketones (UA POC) 01/03/2022 Negative  Negative Final    Specific gravity (UA POC) 01/03/2022 1.025  1.001 - 1.035 Corrected    Blood (UA POC) 01/03/2022 Negative  Negative Final    pH (UA POC) 01/03/2022 5.5  4.6 - 8.0 Corrected    Protein (UA POC) 01/03/2022 1+  Negative Corrected    Urobilinogen (UA POC) 01/03/2022 0.2 mg/dL  0.2 - 1 Final    Nitrites (UA POC) 01/03/2022 Negative  Negative Final    Leukocyte esterase (UA POC) 01/03/2022 Negative  Negative Final   Orders Only on 12/15/2021   Component Date Value Ref Range Status    Triglyceride 12/15/2021 44  40 - 149 mg/dL Final    HDL Cholesterol 12/15/2021 66  >=40 mg/dL Final    Cholesterol, total 12/15/2021 166  110 - 200 mg/dL Final    CHOLESTEROL/HDL 12/15/2021 2.5  0.0 - 5.0 Final    Non-HDL Cholesterol 12/15/2021 100  <130 mg/dL Final    LDL, calculated 12/15/2021 91  50 - 99 mg/dL Final    VLDL, calculated 12/15/2021 9  8 - 30 mg/dL Final    LDL/HDL Ratio 12/15/2021 1.4   Final    Comment: Test includes cholesterol, HDL cholesterol, triglycerides and LDL. Cholesterol Recommended NCEP guidelines in mg/dL:    Less than 200            Desirable  200 - 239                Borderline High  Greater than or  = 240   High      Please Note:  Total Chol/HDL Ratio                     Men     Women  1/2 Avg. Risk    3.4     3.3      Avg. Risk    5.0     4.4  2X  Avg. Risk    9.6     7.1  3X  Avg. Risk   23.4    11.0            Glucose 12/15/2021 72  70 - 99 mg/dL Final    BUN 12/15/2021 34* 6 - 22 mg/dL Final    Creatinine 12/15/2021 2.0* 0.8 - 1.6 mg/dL Final    Sodium 12/15/2021 144  133 - 145 mmol/L Final    Potassium 12/15/2021 4.1  3.5 - 5.5 mmol/L Final    Chloride 12/15/2021 107  98 - 110 mmol/L Final    CO2 12/15/2021 28  20 - 32 mmol/L Final    AST (SGOT) 12/15/2021 13  10 - 37 U/L Final    ALT (SGPT) 12/15/2021 8  5 - 40 U/L Final    Alk.  phosphatase 12/15/2021 73  40 - 125 U/L Final    Bilirubin, total 12/15/2021 0.2  0.2 - 1.2 mg/dL Final    Calcium 12/15/2021 9.2  8.4 - 10.5 mg/dL Final    Protein, total 12/15/2021 6.5  6.2 - 8.1 g/dL Final    Albumin 12/15/2021 4.0  3.5 - 5.0 g/dL Final    A-G Ratio 12/15/2021 1.6  1.1 - 2.6 ratio Final    Globulin 12/15/2021 2.5  2.0 - 4.0 g/dL Final    Anion gap 12/15/2021 9.0  3.0 - 15.0 mmol/L Final    Comment: Anion Gap calculation based on electrolyte reference ranges. Test includes Albumin, Alkaline Phosphatase, ALT, AST, BUN, Calcium, CO2,  Chloride, Creatinine, Glucose, Potassium, Sodium, Total Bilirubin and Total  Protein. Estimated GFR results are reported in mL/min/1.73 sq.m. by the MDRD equation. This eGFR is validated for stable chronic renal failure patients. This   equation  is unreliable in acute illness or patients with normal renal function.  GFRAA 12/15/2021 40.5* >60.0 Final    GFRNA 12/15/2021 33.4* >60.0 Final    Prostate Specific Ag 12/15/2021 0.510  <=4.100 ng/mL Final    PTH, Intact 12/15/2021 90* 15 - 65 pg/mL Final    WBC 12/15/2021 4.8  4.0 - 11.0 K/uL Final    RBC 12/15/2021 4.11  3.80 - 5.80 M/uL Final    HGB 12/15/2021 10.3* 13.1 - 17.2 g/dL Final    HCT 12/15/2021 35.0* 39.3 - 51.6 % Final    MCV 12/15/2021 85  80 - 95 fL Final    MCH 12/15/2021 25* 26 - 34 pg Final    MCHC 12/15/2021 29* 31 - 36 g/dL Final    RDW 12/15/2021 17.5* 10.0 - 15.5 % Final    PLATELET 43/80/2555 323  140 - 440 K/uL Final    MPV 12/15/2021 10.2  9.0 - 13.0 fL Final    NEUTROPHILS 12/15/2021 53  40 - 75 % Final    Lymphocytes 12/15/2021 33  20 - 45 % Final    MONOCYTES 12/15/2021 10  3 - 12 % Final    EOSINOPHILS 12/15/2021 3  0 - 6 % Final    BASOPHILS 12/15/2021 1  0 - 2 % Final    ABS. NEUTROPHILS 12/15/2021 2.6  1.8 - 7.7 K/uL Final    ABSOLUTE LYMPHOCYTE COUNT 12/15/2021 1.6  1.0 - 4.8 K/uL Final    ABS. MONOCYTES 12/15/2021 0.5  0.1 - 1.0 K/uL Final    ABS. EOSINOPHILS 12/15/2021 0.1  0.0 - 0.5 K/uL Final    ABS.  BASOPHILS 12/15/2021 0.0  0.0 - 0.2 K/uL Final    Hemoglobin A1c 12/15/2021 5.5  4.8 - 5.6 % Final    AVG GLU 12/15/2021 112  91 - 123 mg/dL Final    Comment: 5.7 - 6.4% is indicative of prediabetes. > 6.4% is indicative of diabetes. Hospital Outpatient Visit on 12/15/2021   Component Date Value Ref Range Status    [facility] SPE[facility]IM[facility] COL 12/15/2021 Specimens collected/sent to [facility]    Final       No results found for any visits on 02/02/22. Patient Care Team:  Patient Care Team:  Hodan Edouard NP as PCP - General (Nurse Practitioner)  Hodan Edouard NP as PCP - [address] [place] [address] [address] Provider  Evelio Stout, [address] (Physician Assistant)  Prema Torres MD (Neurology)  Hugh Craig MD as Consulting Provider (Gastroenterology)      Assessment / Plan:      ICD-10-CM ICD-9-CM    1. Idiopathic gout, unspecified chronicity, unspecified site  M10.00 274.9 allopurinoL (ZYLOPRIM) 100 mg tablet   2. Essential hypertension  I10 401.9      Hypertension-BP improved. Patient given note to return to work with light duty. Medication refill for gout  Negative for side effects or adverse reaction to the treatment plan. Follow-up and Dispositions    · Return in about 3 months (around 5/2/2022). I asked the patient if he  had any questions and answered his  questions. The patient stated that he understands the treatment plan and agrees with the treatment plan    This document was created with a voice activated dictation system and may contain transcription errors.

## 2022-02-02 NOTE — PROGRESS NOTES
Dustin No presents today for   Chief Complaint   Patient presents with    Hypertension     follow-up    Letter for School/Work       Is someone accompanying this pt? no  Is the patient using any DME equipment during OV? no    Depression Screening:  3 most recent PHQ Screens 1/12/2022   PHQ Not Done -   Little interest or pleasure in doing things Not at all   Feeling down, depressed, irritable, or hopeless Not at all   Total Score PHQ 2 0   Trouble falling or staying asleep, or sleeping too much -   Feeling tired or having little energy -   Poor appetite, weight loss, or overeating -   Feeling bad about yourself - or that you are a failure or have let yourself or your family down -   Trouble concentrating on things such as school, work, reading, or watching TV -   Moving or speaking so slowly that other people could have noticed; or the opposite being so fidgety that others notice -   Thoughts of being better off dead, or hurting yourself in some way -   PHQ 9 Score -   How difficult have these problems made it for you to do your work, take care of your home and get along with others -       Learning Assessment:  Learning Assessment 5/25/2017   PRIMARY LEARNER Patient   CO-LEARNER CAREGIVER No   PRIMARY LANGUAGE ENGLISH   LEARNER PREFERENCE PRIMARY READING   ANSWERED BY patient   RELATIONSHIP SELF       Abuse Screening:  Abuse Screening Questionnaire 11/19/2021   Do you ever feel afraid of your partner? N   Are you in a relationship with someone who physically or mentally threatens you? N   Is it safe for you to go home? Y       Fall Risk  Fall Risk Assessment, last 12 mths 1/12/2022   Able to walk? Yes   Fall in past 12 months? 0   Do you feel unsteady? 0   Are you worried about falling 0       Health Maintenance reviewed and discussed and ordered per Provider.     Health Maintenance Due   Topic Date Due    Eye Exam Retinal or Dilated  Never done    DTaP/Tdap/Td series (1 - Tdap) Never done    Foot Exam Q1 09/07/2017    MICROALBUMIN Q1  10/27/2021    Pneumococcal 65+ years (1 of 1 - PPSV23) 01/08/2022   . Coordination of Care:  1. Have you been to the ER, urgent care clinic since your last visit? Hospitalized since your last visit? no    2. Have you seen or consulted any other health care providers outside of the [address] [place] [address] since your last visit? Include any pap smears or colon screening.  no

## 2022-02-04 ENCOUNTER — TELEPHONE (OUTPATIENT)
Dept: FAMILY MEDICINE CLINIC | Age: 67
End: 2022-02-04

## 2022-02-04 NOTE — TELEPHONE ENCOUNTER
----- Message from Tevin Salinas sent at 2/4/2022 10:54 AM EST -----  Subject: Message to Provider    QUESTIONS  Information for Provider? Patience Vela From [address] disability is calling   requesting Notes for PT from Appt on 1/12/22, for pts Short term   disability. Please fax to [phone] Phone # [phone]  ---------------------------------------------------------------------------  --------------  [place] INFO  What is the best way for the office to contact you? OK to leave message on   voicemail  Preferred Call Back Phone Number? [phone]  ---------------------------------------------------------------------------  --------------  SCRIPT ANSWERS  Relationship to Patient? Third Party  Representative Name?  [address] [place] [address]

## 2022-02-07 DIAGNOSIS — M10.39 GOUT OF MULTIPLE SITES DUE TO RENAL IMPAIRMENT, UNSPECIFIED CHRONICITY: Primary | ICD-10-CM

## 2022-02-07 NOTE — TELEPHONE ENCOUNTER
Patient is asking for something for gout please advise          Requested Prescriptions     Pending Prescriptions Disp Refills    predniSONE (DELTASONE) 20 mg tablet

## 2022-02-09 RX ORDER — PREDNISONE 20 MG/1
TABLET ORAL
Qty: 6 TABLET | Refills: 0 | Status: SHIPPED | OUTPATIENT
Start: 2022-02-09 | End: 2022-08-18

## 2022-02-14 ENCOUNTER — TRANSCRIBE ORDER (OUTPATIENT)
Dept: SCHEDULING | Age: 67
End: 2022-02-14

## 2022-02-14 DIAGNOSIS — E21.3 HYPERPARATHYROIDISM, UNSPECIFIED (HCC): Primary | ICD-10-CM

## 2022-02-21 DIAGNOSIS — D63.8 ANEMIA IN OTHER CHRONIC DISEASES CLASSIFIED ELSEWHERE: ICD-10-CM

## 2022-02-22 DIAGNOSIS — I50.42 CHRONIC COMBINED SYSTOLIC AND DIASTOLIC CONGESTIVE HEART FAILURE (HCC): ICD-10-CM

## 2022-02-22 RX ORDER — FUROSEMIDE 40 MG/1
TABLET ORAL
Qty: 30 TABLET | Refills: 1 | Status: SHIPPED | OUTPATIENT
Start: 2022-02-22 | End: 2022-04-15

## 2022-02-24 ENCOUNTER — HOSPITAL ENCOUNTER (OUTPATIENT)
Dept: NUCLEAR MEDICINE | Age: 67
Discharge: HOME | End: 2022-02-24
Attending: NURSE PRACTITIONER
Payer: COMMERCIAL

## 2022-02-24 DIAGNOSIS — E21.3 HYPERPARATHYROIDISM, UNSPECIFIED (HCC): ICD-10-CM

## 2022-02-24 PROCEDURE — 78072 PARATHYRD PLANAR W/SPECT&CT: CPT

## 2022-02-24 RX ORDER — TETRAKIS(2-METHOXYISOBUTYLISOCYANIDE)COPPER(I) TETRAFLUOROBORATE 1 MG/ML
26 INJECTION, POWDER, LYOPHILIZED, FOR SOLUTION INTRAVENOUS
Status: COMPLETED | OUTPATIENT
Start: 2022-02-24 | End: 2022-02-24

## 2022-02-24 RX ADMIN — TETRAKIS(2-METHOXYISOBUTYLISOCYANIDE)COPPER(I) TETRAFLUOROBORATE 26 MILLICURIE: 1 INJECTION, POWDER, LYOPHILIZED, FOR SOLUTION INTRAVENOUS at 10:25

## 2022-03-07 RX ORDER — OMEPRAZOLE 40 MG/1
CAPSULE, DELAYED RELEASE ORAL
Qty: 180 CAPSULE | Refills: 0 | Status: SHIPPED | OUTPATIENT
Start: 2022-03-07

## 2022-03-19 PROBLEM — C61 PROSTATE CANCER (HCC): Status: ACTIVE | Noted: 2017-02-09

## 2022-03-19 PROBLEM — K92.1 GASTROINTESTINAL HEMORRHAGE WITH MELENA: Status: ACTIVE | Noted: 2017-05-08

## 2022-03-19 PROBLEM — R06.00 DYSPNEA: Status: ACTIVE | Noted: 2020-03-18

## 2022-03-19 PROBLEM — E87.6 HYPOKALEMIA: Status: ACTIVE | Noted: 2018-08-28

## 2022-03-19 PROBLEM — Z95.5 STENTED CORONARY ARTERY: Status: ACTIVE | Noted: 2020-03-18

## 2022-03-19 PROBLEM — E11.21 TYPE 2 DIABETES WITH NEPHROPATHY (HCC): Status: ACTIVE | Noted: 2020-12-09

## 2022-03-19 PROBLEM — R07.9 CHEST PAIN: Status: ACTIVE | Noted: 2019-09-17

## 2022-03-19 PROBLEM — I50.9 CONGESTIVE HEART FAILURE (HCC): Status: ACTIVE | Noted: 2019-06-12

## 2022-03-19 PROBLEM — E66.01 SEVERE OBESITY (BMI 35.0-39.9) WITH COMORBIDITY (HCC): Status: ACTIVE | Noted: 2019-11-08

## 2022-03-20 PROBLEM — N18.30 CKD (CHRONIC KIDNEY DISEASE) STAGE 3, GFR 30-59 ML/MIN (HCC): Status: ACTIVE | Noted: 2017-05-01

## 2022-03-20 PROBLEM — Z98.61 POSTSURGICAL PERCUTANEOUS TRANSLUMINAL CORONARY ANGIOPLASTY STATUS: Status: ACTIVE | Noted: 2019-11-08

## 2022-03-21 DIAGNOSIS — E78.00 HYPERCHOLESTEROLEMIA: ICD-10-CM

## 2022-03-28 DIAGNOSIS — I10 ESSENTIAL HYPERTENSION: ICD-10-CM

## 2022-03-28 DIAGNOSIS — Z98.61 POSTSURGICAL PERCUTANEOUS TRANSLUMINAL CORONARY ANGIOPLASTY STATUS: ICD-10-CM

## 2022-03-28 RX ORDER — HYDRALAZINE HYDROCHLORIDE 100 MG/1
TABLET, FILM COATED ORAL
Qty: 270 TABLET | Refills: 1 | Status: SHIPPED | OUTPATIENT
Start: 2022-03-28

## 2022-03-29 RX ORDER — CLOPIDOGREL BISULFATE 75 MG/1
TABLET ORAL
Qty: 90 TABLET | Refills: 2 | Status: SHIPPED | OUTPATIENT
Start: 2022-03-29 | End: 2022-09-21 | Stop reason: ALTCHOICE

## 2022-04-06 ENCOUNTER — HOSPITAL ENCOUNTER (OUTPATIENT)
Dept: LAB | Age: 67
Discharge: HOME | End: 2022-04-06

## 2022-04-06 LAB — SENTARA SPECIMEN COL,SENBCF: NORMAL

## 2022-04-06 PROCEDURE — 99001 SPECIMEN HANDLING PT-LAB: CPT

## 2022-05-01 DIAGNOSIS — I50.32 CHRONIC DIASTOLIC CONGESTIVE HEART FAILURE (HCC): ICD-10-CM

## 2022-05-01 DIAGNOSIS — N18.9 CHRONIC KIDNEY DISEASE, UNSPECIFIED CKD STAGE: ICD-10-CM

## 2022-05-02 RX ORDER — DAPAGLIFLOZIN 10 MG/1
TABLET, FILM COATED ORAL
Qty: 30 TABLET | Refills: 3 | Status: SHIPPED | OUTPATIENT
Start: 2022-05-02 | End: 2022-09-06

## 2022-05-05 ENCOUNTER — OFFICE VISIT (OUTPATIENT)
Dept: ORTHOPEDIC SURGERY | Age: 67
End: 2022-05-05
Payer: COMMERCIAL

## 2022-05-05 VITALS
RESPIRATION RATE: 18 BRPM | OXYGEN SATURATION: 97 % | BODY MASS INDEX: 37.8 KG/M2 | HEART RATE: 82 BPM | HEIGHT: 70 IN | TEMPERATURE: 97.1 F | WEIGHT: 264 LBS

## 2022-05-05 DIAGNOSIS — G89.29 CHRONIC PAIN OF RIGHT KNEE: ICD-10-CM

## 2022-05-05 DIAGNOSIS — M25.562 CHRONIC PAIN OF LEFT KNEE: ICD-10-CM

## 2022-05-05 DIAGNOSIS — G89.29 CHRONIC PAIN OF LEFT KNEE: ICD-10-CM

## 2022-05-05 DIAGNOSIS — M17.12 PRIMARY OSTEOARTHRITIS OF LEFT KNEE: Primary | ICD-10-CM

## 2022-05-05 DIAGNOSIS — M17.11 PRIMARY OSTEOARTHRITIS OF RIGHT KNEE: ICD-10-CM

## 2022-05-05 DIAGNOSIS — M25.561 CHRONIC PAIN OF RIGHT KNEE: ICD-10-CM

## 2022-05-05 PROCEDURE — 20610 DRAIN/INJ JOINT/BURSA W/O US: CPT | Performed by: SPECIALIST

## 2022-05-05 PROCEDURE — 99213 OFFICE O/P EST LOW 20 MIN: CPT | Performed by: SPECIALIST

## 2022-05-05 RX ORDER — BETAMETHASONE SODIUM PHOSPHATE AND BETAMETHASONE ACETATE 3; 3 MG/ML; MG/ML
6 INJECTION, SUSPENSION INTRA-ARTICULAR; INTRALESIONAL; INTRAMUSCULAR; SOFT TISSUE ONCE
Status: COMPLETED | OUTPATIENT
Start: 2022-05-05 | End: 2022-05-05

## 2022-05-05 RX ADMIN — BETAMETHASONE SODIUM PHOSPHATE AND BETAMETHASONE ACETATE 6 MG: 3; 3 INJECTION, SUSPENSION INTRA-ARTICULAR; INTRALESIONAL; INTRAMUSCULAR; SOFT TISSUE at 16:24

## 2022-05-05 NOTE — PROGRESS NOTES
Patient: Su Hernandez                MRN: [id number]       SSN: [id number]  YOB: 1955        AGE: 74 y.o. SEX: male    PCP: Sima Dsouza NP  05/05/22    CC: BILATERAL KNEE PAIN    HISTORY:  Su Hernandez is a 74 y.o. male who is seen for bilateral knee pain L>R. He responded to a left knee Euflexxa series on 5/24/21 but his pains have returned. He takes Tylenol for pain. He notes cracking and popping of his left knee. He reports no recent injury. 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 He did injury his knee on 1/4/18 when he slipped on ice at home He was seen at [facility] on the same day where x-rays were negative for fracture. He reports primarily anterior left knee pain. He has a h/o of gout. He does a lot of climbing and walking at work. He responded to a previous right knee aspiration and cortisone injection. He notes increased swelling of his right knee recently. He completed a right knee Euflexxa series on 12/5/16, 10/19/17, and 11/11/19. He had an acute MI on 11/30/21. He underwent a heart catheretization at [facility]. He takes Plavix. He was previously seen for chronic back pain. Tylenol and [facility] helps. 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 He currently lives alone in [place]. He underwent gastric bypass in 2015. He went from 330 to 245#. He now weighs 264 lbs.  He is 5'11\". He is hypertensive. He is no longer diabetic. He had a heart attack after his mother- Mamie Thornton-  from cancer in 2018. He has lost 35 pounds since his surgery. He has one daughter and 2 sons. He has 13 grandchildren. Last 3 Recorded Weights in this Encounter    22 1559   Weight: 264 lb (119.7 kg)     Body mass index is 37.88 kg/m². REVIEW OF SYSTEMS: All Below are Negative except: See HPI   Constitutional: negative for fever, chills, and weight loss. Cardiovascular: negative for chest pain, claudication, leg swelling, SOB, PETERSEN   Gastrointestinal: Negative for pain, N/V/C/D, Blood in stool or urine, dysuria,  hematuria, incontinence, pelvic pain. Musculoskeletal: See HPI   Neurological: Negative for dizziness and weakness. Negative for headaches, Visual changes, confusion, seizures   Phychiatric/Behavioral: Negative for depression, memory loss, substance  abuse. Extremities: Negative for hair changes, rash, or skin lesion changes. Hematologic: Negative for bleeding problems, bruising, pallor or swollen lymph  nodes   Peripheral Vascular: No calf pain, no circulation deficits.     Social History     Socioeconomic History    Marital status: SINGLE     Spouse name: Not on file    Number of children: Not on file    Years of education: Not on file    Highest education level: Not on file   Occupational History    Not on file   Tobacco Use    Smoking status: Former Smoker     Types: Cigars     Quit date: 10/4/1999     Years since quittin.6    Smokeless tobacco: Never Used   Vaping Use    Vaping Use: Never used   Substance and Sexual Activity    Alcohol use: Never     Comment: rarely    Drug use: No    Sexual activity: Not Currently   Other Topics Concern    Not on file   Social History Narrative    Not on file     Social Determinants of Health     Financial Resource Strain:     Difficulty of Paying Living Expenses: Not on file   Food Insecurity:     Worried About Running Out of Food in the Last Year: Not on file    Ran Out of Food in the Last Year: Not on file   Transportation Needs:     Lack of Transportation (Medical): Not on file    Lack of Transportation (Non-Medical):  Not on file   Physical Activity:     Days of Exercise per Week: Not on file    Minutes of Exercise per Session: Not on file   Stress:     Feeling of Stress : Not on file   Social Connections:     Frequency of Communication with Friends and Family: Not on file    Frequency of Social Gatherings with Friends and Family: Not on file    Attends Zoroastrianism Services: Not on file    Active Member of Clubs or Organizations: Not on file    Attends Club or Organization Meetings: Not on file    Marital Status: Not on file   Intimate Partner Violence:     Fear of Current or Ex-Partner: Not on file    Emotionally Abused: Not on file    Physically Abused: Not on file    Sexually Abused: Not on file   Housing Stability:     Unable to Pay for Housing in the Last Year: Not on file    Number of Places Lived in the Last Year: Not on file    Unstable Housing in the Last Year: Not on file     Allergies   Allergen Reactions    Iodine Other (comments)     Eyes swell shut      Shellfish Containing Products Swelling     Current Outpatient Medications   Medication Sig    Farxiga 10 mg tab tablet TAKE 1 TABLET BY MOUTH EVERY DAY    furosemide (LASIX) 40 mg tablet USE DAILY AS NEEDED INDICATIONS: ACCUMULATION OF FLUID RESULTING FROM CHRONIC HEART FAILURE    clopidogreL (PLAVIX) 75 mg tab TAKE 1 TABLET BY MOUTH EVERY DAY    hydrALAZINE (APRESOLINE) 100 mg tablet TAKE 1 TABLET BY MOUTH THREE TIMES A DAY    omeprazole (PRILOSEC) 40 mg capsule TAKE 1 CAPSULE BY MOUTH TWICE A DAY    trospium (SANCTURA) 20 mg tablet TAKE 1 TABLET BY MOUTH EVERY DAY    predniSONE (DELTASONE) 20 mg tablet Take 2 tabs by mouth x 3 days    allopurinoL (ZYLOPRIM) 100 mg tablet TAKE 1 TABLET BY MOUTH EVERY DAY    carvediloL (COREG) 12.5 mg tablet Take 1 Tablet by mouth two (2) times daily (with meals).  Blood Pressure Monitor (Blood Pressure Kit) kit 1 Device by Does Not Apply route as needed (htn).  atorvastatin (LIPITOR) 80 mg tablet Take 1 Tablet by mouth daily.  comprs. stocking,thigh,long,med (Comp. Stocking,Thigh,Long,Med.) misc 2 Packages by Does Not Apply route daily as needed (edema). Remove when lying down    amLODIPine (NORVASC) 5 mg tablet Take 1 Tablet by mouth daily.  isosorbide mononitrate ER (IMDUR) 60 mg CR tablet Take 1 Tablet by mouth daily.  nitroglycerin (NITROSTAT) 0.4 mg SL tablet 1 TAB BY SUBLINGUAL ROUTE EVERY FIVE (5) MINUTES AS NEEDED FOR CHEST PAIN FOR UP TO 3 DOSES.  acetaminophen (Acetaminophen Extra Strength) 500 mg tablet Take  by mouth every six (6) hours as needed for Pain.  calcium carbonate (TUMS) 200 mg calcium (500 mg) chew Take 1 Tablet by mouth daily. As needed    aspirin 81 mg chewable tablet Take 1 Tab by mouth two (2) times a day. (Patient taking differently: Take 81 mg by mouth daily.)     No current facility-administered medications for this visit.      PHYSICAL EXAMINATION:  Visit Vitals  Pulse 82   Temp 97.1 °F (36.2 °C) (Temporal)   Resp 18   Ht 5' 10\" (1.778 m)   Wt 264 lb (119.7 kg)   SpO2 97%   BMI 37.88 kg/m²     ORTHO EXAMINATION:  Examination Right knee Left knee   Skin Intact Intact   Range of motion 90-10 100-0   Effusion 1+ -   Medial joint line tenderness + +   Lateral joint line tenderness - -   Popliteal tenderness - -   Osteophytes palpable + +   Lewiss - -   Patella crepitus + +   Anterior drawer - -   Lateral laxity - -   Medial laxity + -   Varus deformity + -   Valgus deformity - -   Pretibial edema 4+ pitting  4+   Calf tenderness - -     Chart reviewed for the following:   IMiriam MD, have reviewed the History, Physical and updated the Allergic reactions for [address] performed immediately prior to start of procedure:  I Rebecca Adams MD, have performed the following reviews on Flakita Liang prior to the start of the procedure:            * Patient was identified by name and date of birth   * Agreement on procedure being performed was verified  * Risks and Benefits explained to the patient  * Procedure site verified and marked as necessary  * Patient was positioned for comfort  * Consent was obtained     Time: 4:18 PM     Date of procedure: 5/5/2022    Procedure performed by:  Rebecca Adams MD    Mr. Marisela Maya tolerated the procedure well with no complications. RADIOGRAPHS:  XR LEFT KNEE 1/4/18  IMPRESSION: Osteoarthritis, particularly involving the patellofemoral joint, moderate joint effusion. No fracture evident. XR RIGHT KNEE 9/1/17  IMPRESSION:  Three views - No fractures, no effusion, complete medial joint R, moderate L space narrowing, medial and lateral osteophytes present. Varus deformity. XR RIGHT KNEE 12/22/15  IMPRESSION:  No fractures, no effusion, medial joint space narrowing, medial osteophytes present, varus deformity. IMPRESSION:      ICD-10-CM ICD-9-CM    1. Primary osteoarthritis of left knee  M17.12 715.16 betamethasone (CELESTONE) injection 6 mg      KS DRAIN/INJECT LARGE JOINT/BURSA      PROCEDURE AUTHORIZATION TO    2. Chronic pain of left knee  M25.562 719.46 betamethasone (CELESTONE) injection 6 mg    G89.29 338.29 [place] DRAIN/INJECT LARGE JOINT/BURSA      PROCEDURE AUTHORIZATION TO    3. Primary osteoarthritis of right knee  M17.11 715.16 betamethasone (CELESTONE) injection 6 mg      KS DRAIN/INJECT LARGE JOINT/BURSA      PROCEDURE AUTHORIZATION TO    4. Chronic pain of right knee  M25.561 719.46 betamethasone (CELESTONE) injection 6 mg    G89.29 338.29 [place] DRAIN/INJECT LARGE JOINT/BURSA      PROCEDURE AUTHORIZATION TO      PLAN:  Consider visco supplementation if pain continues.  After discussing treatment options, patient's left knee was injected with 4 cc Marcaine and 1/2 cc Celestone. We discussed possible need for a left knee arthroplasty at some time in the future if pain continues, pending weight loss. He will follow up as needed.       Scribed by Rex Avendano MD [address]) as dictated by Rex Avendano MD

## 2022-05-18 ENCOUNTER — OFFICE VISIT (OUTPATIENT)
Dept: FAMILY MEDICINE CLINIC | Age: 67
End: 2022-05-18
Payer: COMMERCIAL

## 2022-05-18 VITALS
OXYGEN SATURATION: 95 % | BODY MASS INDEX: 37.94 KG/M2 | RESPIRATION RATE: 16 BRPM | HEART RATE: 91 BPM | DIASTOLIC BLOOD PRESSURE: 78 MMHG | SYSTOLIC BLOOD PRESSURE: 142 MMHG | WEIGHT: 265 LBS | HEIGHT: 70 IN | TEMPERATURE: 97.4 F

## 2022-05-18 DIAGNOSIS — E78.2 MIXED HYPERLIPIDEMIA: ICD-10-CM

## 2022-05-18 DIAGNOSIS — I10 ESSENTIAL HYPERTENSION: Primary | ICD-10-CM

## 2022-05-18 DIAGNOSIS — M10.39 GOUT OF MULTIPLE SITES DUE TO RENAL IMPAIRMENT, UNSPECIFIED CHRONICITY: ICD-10-CM

## 2022-05-18 PROCEDURE — 99213 OFFICE O/P EST LOW 20 MIN: CPT | Performed by: NURSE PRACTITIONER

## 2022-05-18 RX ORDER — PREDNISONE 20 MG/1
TABLET ORAL
Qty: 6 TABLET | Refills: 0 | Status: CANCELLED | OUTPATIENT
Start: 2022-05-18

## 2022-05-18 NOTE — PROGRESS NOTES
Satish Leong is a 74 y.o. male that is here for a   Chief Complaint   Patient presents with    Follow Up Chronic Condition    Medication Refill         1. Have you been to the ER, urgent care clinic since your last visit? Hospitalized since your last visit?no    2. Have you seen or consulted any other health care providers outside of the [address] [place] [address] since your last visit? Include any pap smears or colon screening.  no      Health Maintenance reviewed - yes      Upcoming Appts  no      VORB: No orders of the defined types were placed in this encounter.   Shayna Myers NP/ Christine Rodriguez MA

## 2022-05-20 NOTE — PROGRESS NOTES
Tere Christian is a 74 y.o. male presenting today for Follow Up Chronic Condition and Medication Refill  . Chief Complaint   Patient presents with    Follow Up Chronic Condition    Medication Refill       Follow Up Chronic Condition  Pertinent negatives include no chest pain, no abdominal pain, no headaches and no shortness of breath. Medication Refill  Pertinent negatives include no chest pain, no abdominal pain, no headaches and no shortness of breath.   :  Tere Christian presents to the office today for hypertension follow-up care. Hypertension-BP mildly elevated (142/78). He is compliant with the treatment plan. He is negative for any chest pain, palpitation, headache or dizziness. Also present today for follow-up for hyperlipidemia. He is compliant with the treatment plan as prescribed statin therapy daily. Lipid panel completed on 4/6/2022 and his cholesterol is 139, triglycerides of 70, LDL is 67 and HDL is 58. .    Review of Systems   Constitutional: Negative for malaise/fatigue. Respiratory: Negative for cough and shortness of breath. Cardiovascular: Negative for chest pain, palpitations and leg swelling. Gastrointestinal: Negative for abdominal pain, nausea and vomiting. Genitourinary: Negative for dysuria. Musculoskeletal: Negative for back pain and myalgias. Neurological: Negative for dizziness and headaches.          Allergies   Allergen Reactions    Iodine Other (comments)     Eyes swell shut      Shellfish Containing Products Swelling       PHQ Screening   3 most recent PHQ Screens 5/18/2022   PHQ Not Done -   Little interest or pleasure in doing things Not at all   Feeling down, depressed, irritable, or hopeless Not at all   Total Score PHQ 2 0   Trouble falling or staying asleep, or sleeping too much -   Feeling tired or having little energy -   Poor appetite, weight loss, or overeating -   Feeling bad about yourself - or that you are a failure or have let yourself or your family down -   Trouble concentrating on things such as school, work, reading, or watching TV -   Moving or speaking so slowly that other people could have noticed; or the opposite being so fidgety that others notice -   Thoughts of being better off dead, or hurting yourself in some way -   PHQ 9 Score -   How difficult have these problems made it for you to do your work, take care of your home and get along with others -       History  Past Medical History:   Diagnosis Date    Atypical chest pain/mild nonobstructive CAD/EF 65% 4/12/2011    BPH without obstruction/lower urinary tract symptoms     Bursitis of right shoulder     CAD (coronary artery disease)     Chest pain     history of hospitalization with chest pain and a negative workup in 2008    Chronic edema     Constipation     die to medication    Coronary artery disease     mild, non obstructive/EF 65%    Depression 12/16/2018    Dyspnea on exertion     Echocardiogram 12/16/08    IVC dilated; suboptimal endocardial border; EF 65%    ED (erectile dysfunction)     Elevated PSA     Frequency     GERD (gastroesophageal reflux disease)     Gout     H/O cystoscopy 06/20/2013    Hematuria, unspecified     Hypercholesterolemia     Hypertension     Hypertension      Hypogonadism male     Impotence of organic origin     Left ventricular diastolic dysfunction     Malignant neoplasm of prostate ([address]     hx of t1a, izzy 6, 5 % of 1  core    Morbid obesity ([address]     Myocardial perfusion 09/05/08    Basal inferior defect c/w artifact; EF 63%    Overactive bladder     Prostate cancer ([address] 2/9/2017    S/P cardiac cath 07/30/10    oD2-40%; pRCA-20-30%; EF 65%    S/P gastric surgery 8/28/2018    Sleep apnea     Slowing of urinary stream     Type 2 diabetes with nephropathy ([address] 9/27/2018    Type II or unspecified type diabetes mellitus without mention of complication, not stated as uncontrolled        Past Surgical History:   Procedure Laterality Date    COLONOSCOPY N/A 2019    COLONOSCOPY performed by Xenia Weber MD at [address] [place] [address]  7/30/10    HX OTHER SURGICAL  13    Prostate    HX TONSILLECTOMY      MA COLONOSCOPY FLX DX W/COLLJ SPEC WHEN PFRMD      MA I & D ABSC XTRAORAL SOFT TISS COMP         Social History     Socioeconomic History    Marital status: SINGLE     Spouse name: Not on file    Number of children: Not on file    Years of education: Not on file    Highest education level: Not on file   Occupational History    Not on file   Tobacco Use    Smoking status: Former Smoker     Types: Cigars     Quit date: 10/4/1999     Years since quittin.6    Smokeless tobacco: Never Used   Vaping Use    Vaping Use: Never used   Substance and Sexual Activity    Alcohol use: Never     Comment: rarely    Drug use: No    Sexual activity: Not Currently   Other Topics Concern    Not on file   Social History Narrative    Not on file     Social Determinants of Health     Financial Resource Strain:     Difficulty of Paying Living Expenses: Not on file   Food Insecurity:     Worried About [address] in the Last Year: Not on file    [address] in the Last Year: Not on file   Transportation Needs:     Lack of Transportation (Medical): Not on file    Lack of Transportation (Non-Medical):  Not on file   Physical Activity:     Days of Exercise per Week: Not on file    Minutes of Exercise per Session: Not on file   Stress:     Feeling of Stress : Not on file   Social Connections:     Frequency of Communication with Friends and Family: Not on file    Frequency of Social Gatherings with Friends and Family: Not on file    Attends Episcopalian Services: Not on file    Active Member of Clubs or Organizations: Not on file    Attends Club or Organization Meetings: Not on file    Marital Status: Not on file   Intimate Partner Violence:     Fear of Current or Ex-Partner: Not on file  Emotionally Abused: Not on file    Physically Abused: Not on file    Sexually Abused: Not on file   Housing Stability:     Unable to Pay for Housing in the Last Year: Not on file    Number of Places Lived in the Last Year: Not on file    Unstable Housing in the Last Year: Not on file       Current Outpatient Medications   Medication Sig Dispense Refill    trospium (SANCTURA) 20 mg tablet TAKE 1 TABLET BY MOUTH EVERY DAY 60 Tablet 0    Farxiga 10 mg tab tablet TAKE 1 TABLET BY MOUTH EVERY DAY 30 Tablet 3    furosemide (LASIX) 40 mg tablet USE DAILY AS NEEDED INDICATIONS: ACCUMULATION OF FLUID RESULTING FROM CHRONIC HEART FAILURE 90 Tablet 1    clopidogreL (PLAVIX) 75 mg tab TAKE 1 TABLET BY MOUTH EVERY DAY 90 Tablet 2    hydrALAZINE (APRESOLINE) 100 mg tablet TAKE 1 TABLET BY MOUTH THREE TIMES A  Tablet 1    omeprazole (PRILOSEC) 40 mg capsule TAKE 1 CAPSULE BY MOUTH TWICE A  Capsule 0    predniSONE (DELTASONE) 20 mg tablet Take 2 tabs by mouth x 3 days 6 Tablet 0    allopurinoL (ZYLOPRIM) 100 mg tablet TAKE 1 TABLET BY MOUTH EVERY DAY 90 Tablet 3    carvediloL (COREG) 12.5 mg tablet Take 1 Tablet by mouth two (2) times daily (with meals). 180 Tablet 2    atorvastatin (LIPITOR) 80 mg tablet Take 1 Tablet by mouth daily. 90 Tablet 1    comprs. stocking,thigh,long,med (Comp. Stocking,Thigh,Long,Med.) misc 2 Packages by Does Not Apply route daily as needed (edema). Remove when lying down 2 Each 0    isosorbide mononitrate ER (IMDUR) 60 mg CR tablet Take 1 Tablet by mouth daily. 90 Tablet 1    nitroglycerin (NITROSTAT) 0.4 mg SL tablet 1 TAB BY SUBLINGUAL ROUTE EVERY FIVE (5) MINUTES AS NEEDED FOR CHEST PAIN FOR UP TO 3 DOSES. 25 Tablet 1    aspirin 81 mg chewable tablet Take 1 Tab by mouth two (2) times a day. (Patient taking differently: Take 81 mg by mouth daily.) 60 Tab 0    Blood Pressure Monitor (Blood Pressure Kit) kit 1 Device by Does Not Apply route as needed (htn).  1 Kit 0  amLODIPine (NORVASC) 5 mg tablet Take 1 Tablet by mouth daily. 90 Tablet 2    acetaminophen (Acetaminophen Extra Strength) 500 mg tablet Take  by mouth every six (6) hours as needed for Pain.  calcium carbonate (TUMS) 200 mg calcium (500 mg) chew Take 1 Tablet by mouth daily. As needed           Vitals:    05/18/22 0920 05/18/22 1000   BP: (!) 149/88 (!) 142/78   Pulse: 91    Resp: 16    Temp: 97.4 °F (36.3 °C)    TempSrc: Temporal    SpO2: 95%    Weight: 265 lb (120.2 kg)    Height: 5' 10\" (1.778 m)    PainSc:   0 - No pain        Physical Exam  Vitals and nursing note reviewed. Constitutional:       Appearance: Normal appearance. Cardiovascular:      Rate and Rhythm: Normal rate and regular rhythm. Pulses: Normal pulses. Heart sounds: Normal heart sounds. Pulmonary:      Effort: Pulmonary effort is normal.      Breath sounds: Normal breath sounds. Skin:     General: Skin is warm and dry. Neurological:      Mental Status: He is alert. Hospital Outpatient Visit on 04/06/2022   Component Date Value Ref Range Status    [facility] SPECIMEN COL 04/06/2022 Specimens collected/[facility]    Final       No results found for any visits on 05/18/22. Patient Care Team:  Patient Care Team:  Gilbert Greene NP as PCP - General (Nurse Practitioner)  Gilbert Greene NP as PCP - [facility] [place], [place] (Physician Assistant)  Lilli Mckeon MD (Neurology)  Crystal Phillips MD as Consulting Provider (Gastroenterology)      Assessment / Plan:      ICD-10-CM ICD-9-CM    1. Essential hypertension  I10 401.9    2. Gout of multiple sites due to renal impairment, unspecified chronicity  M10.39 274.10    3. Mixed hyperlipidemia  E78.2 272.2      Hypertension-BP mildly elevated. We will continue to monitor  Hyperlipidemia continue current treatment plan  Medication refill for gout  Negative for side effects or adverse reaction to the treatment plan.       I asked the patient if he  had any questions and answered his  questions. The patient stated that he understands the treatment plan and agrees with the treatment plan    This document was created with a voice activated dictation system and may contain transcription errors.

## 2022-06-21 ENCOUNTER — TELEPHONE (OUTPATIENT)
Dept: FAMILY MEDICINE CLINIC | Age: 67
End: 2022-06-21

## 2022-06-21 ENCOUNTER — DOCUMENTATION ONLY (OUTPATIENT)
Dept: ORTHOPEDIC SURGERY | Age: 67
End: 2022-06-21

## 2022-06-21 DIAGNOSIS — Z11.3 SCREENING FOR STDS (SEXUALLY TRANSMITTED DISEASES): Primary | ICD-10-CM

## 2022-06-21 DIAGNOSIS — Z11.3 SCREENING FOR STDS (SEXUALLY TRANSMITTED DISEASES): ICD-10-CM

## 2022-06-27 DIAGNOSIS — E78.00 HYPERCHOLESTEROLEMIA: ICD-10-CM

## 2022-06-27 RX ORDER — ATORVASTATIN CALCIUM 80 MG/1
TABLET, FILM COATED ORAL
Qty: 90 TABLET | Refills: 1 | Status: SHIPPED | OUTPATIENT
Start: 2022-06-27

## 2022-06-29 ENCOUNTER — HOSPITAL ENCOUNTER (OUTPATIENT)
Dept: LAB | Age: 67
Discharge: HOME | End: 2022-06-29

## 2022-06-29 LAB — SENTARA SPECIMEN COL,SENBCF: NORMAL

## 2022-06-29 PROCEDURE — 99001 SPECIMEN HANDLING PT-LAB: CPT

## 2022-06-30 LAB
CHLAMYDIA AMPLIFIED URINE: NEGATIVE
GC AMPLIFIED URINE,919: NEGATIVE
HIV -1/0/2 AG/AB WITH REFLEX, 13463: NON REACTIVE
HIV 1 & 2 AB SER-IMP: NORMAL

## 2022-07-26 DIAGNOSIS — M10.39 GOUT OF MULTIPLE SITES DUE TO RENAL IMPAIRMENT, UNSPECIFIED CHRONICITY: ICD-10-CM

## 2022-07-26 RX ORDER — PREDNISONE 20 MG/1
TABLET ORAL
Qty: 6 TABLET | Refills: 0 | Status: CANCELLED | OUTPATIENT
Start: 2022-07-26

## 2022-08-18 ENCOUNTER — OFFICE VISIT (OUTPATIENT)
Dept: FAMILY MEDICINE CLINIC | Age: 67
End: 2022-08-18
Payer: COMMERCIAL

## 2022-08-18 VITALS
TEMPERATURE: 97.2 F | OXYGEN SATURATION: 93 % | HEART RATE: 84 BPM | SYSTOLIC BLOOD PRESSURE: 121 MMHG | BODY MASS INDEX: 36.51 KG/M2 | WEIGHT: 255 LBS | RESPIRATION RATE: 16 BRPM | DIASTOLIC BLOOD PRESSURE: 73 MMHG | HEIGHT: 70 IN

## 2022-08-18 DIAGNOSIS — E78.2 MIXED HYPERLIPIDEMIA: ICD-10-CM

## 2022-08-18 DIAGNOSIS — R73.03 PREDIABETES: ICD-10-CM

## 2022-08-18 DIAGNOSIS — N18.32 STAGE 3B CHRONIC KIDNEY DISEASE (HCC): ICD-10-CM

## 2022-08-18 DIAGNOSIS — I50.9 CONGESTIVE HEART FAILURE, UNSPECIFIED HF CHRONICITY, UNSPECIFIED HEART FAILURE TYPE (HCC): ICD-10-CM

## 2022-08-18 DIAGNOSIS — Z23 ENCOUNTER FOR IMMUNIZATION: ICD-10-CM

## 2022-08-18 DIAGNOSIS — M10.00 IDIOPATHIC GOUT, UNSPECIFIED CHRONICITY, UNSPECIFIED SITE: ICD-10-CM

## 2022-08-18 DIAGNOSIS — C61 MALIGNANT NEOPLASM OF PROSTATE (HCC): ICD-10-CM

## 2022-08-18 DIAGNOSIS — I10 ESSENTIAL HYPERTENSION: Primary | ICD-10-CM

## 2022-08-18 DIAGNOSIS — Z00.00 ENCOUNTER FOR MEDICAL EXAMINATION TO ESTABLISH CARE: ICD-10-CM

## 2022-08-18 LAB — HBA1C MFR BLD HPLC: 5.4 %

## 2022-08-18 PROCEDURE — 83036 HEMOGLOBIN GLYCOSYLATED A1C: CPT | Performed by: STUDENT IN AN ORGANIZED HEALTH CARE EDUCATION/TRAINING PROGRAM

## 2022-08-18 PROCEDURE — 90677 PCV20 VACCINE IM: CPT | Performed by: STUDENT IN AN ORGANIZED HEALTH CARE EDUCATION/TRAINING PROGRAM

## 2022-08-18 PROCEDURE — 99214 OFFICE O/P EST MOD 30 MIN: CPT | Performed by: STUDENT IN AN ORGANIZED HEALTH CARE EDUCATION/TRAINING PROGRAM

## 2022-08-18 PROCEDURE — 1123F ACP DISCUSS/DSCN MKR DOCD: CPT | Performed by: STUDENT IN AN ORGANIZED HEALTH CARE EDUCATION/TRAINING PROGRAM

## 2022-08-18 NOTE — PROGRESS NOTES
Berna Lazar is a 74 y.o. male presenting today for Establish Care  . Chief Complaint   Patient presents with    Establish Care       HPI:  Berna Lazar presents to the office today to establish care. Patient has a past medical history of CAD s/p stent, CHF, HTN, HLD, gout. CAD/CHF: Patient follows with cardiology-Dr. Lindy Hills. He is on Lasix every day. Echo in 12/21 showed LVEF 50%    HLD: Controlled on statin. Lipid panel in 4/22 showed LDL 67, HDL 58, triglycerides 70. HTN: BP is well controlled on Coreg, Imdur, hydralazine, Coreg    CKD 3: Last BMP in 4/22 showed creatinine 1.9, BUN 28. He is following with nephrology. He was started on [place] last year. Gout: Patient is on allopurinol. Last month he had a gout attack precipitated by drinking. He has beers weekly with his friends. Patient is compliant with his medications. Reports intermittent shortness of breath and leg swelling. Denies any chest pain. Review of Systems   Constitutional:  Negative for chills, diaphoresis, fever, malaise/fatigue and weight loss. HENT:  Negative for congestion, ear discharge, ear pain, hearing loss, nosebleeds, sinus pain, sore throat and tinnitus. Eyes:  Negative for blurred vision, double vision and photophobia. Respiratory:  Positive for shortness of breath. Negative for cough, sputum production, wheezing and stridor. Cardiovascular:  Positive for leg swelling. Negative for chest pain, palpitations, orthopnea and claudication. Gastrointestinal:  Negative for abdominal pain, constipation, diarrhea, heartburn, nausea and vomiting. Genitourinary:  Negative for dysuria, flank pain, frequency, hematuria and urgency. Musculoskeletal:  Positive for joint pain. Negative for back pain, myalgias and neck pain. Skin:  Negative for rash. Neurological:  Negative for tingling, tremors, sensory change, speech change, focal weakness, seizures, weakness and headaches.    Psychiatric/Behavioral: Negative for depression. The patient is not nervous/anxious. All other systems reviewed and are negative.     Allergies   Allergen Reactions    Iodine Other (comments)     Eyes swell shut      Shellfish Containing Products Swelling       PHQ Screening   3 most recent PHQ Screens 8/18/2022   PHQ Not Done -   Little interest or pleasure in doing things Not at all   Feeling down, depressed, irritable, or hopeless Not at all   Total Score PHQ 2 0   Trouble falling or staying asleep, or sleeping too much -   Feeling tired or having little energy -   Poor appetite, weight loss, or overeating -   Feeling bad about yourself - or that you are a failure or have let yourself or your family down -   Trouble concentrating on things such as school, work, reading, or watching TV -   Moving or speaking so slowly that other people could have noticed; or the opposite being so fidgety that others notice -   Thoughts of being better off dead, or hurting yourself in some way -   PHQ 9 Score -   How difficult have these problems made it for you to do your work, take care of your home and get along with others -       History  Past Medical History:   Diagnosis Date    Atypical chest pain/mild nonobstructive CAD/EF 65% 4/12/2011    BPH without obstruction/lower urinary tract symptoms     Bursitis of right shoulder     CAD (coronary artery disease)     Chest pain     history of hospitalization with chest pain and a negative workup in 2008    Chronic edema     Constipation     die to medication    Coronary artery disease     mild, non obstructive/EF 65%    Depression 12/16/2018    Dyspnea on exertion     Echocardiogram 12/16/08    IVC dilated; suboptimal endocardial border; EF 65%    ED (erectile dysfunction)     Elevated PSA     Frequency     GERD (gastroesophageal reflux disease)     Gout     H/O cystoscopy 06/20/2013    Hematuria, unspecified     Hypercholesterolemia     Hypertension     Hypertension      Hypogonadism male     Impotence of organic origin     Left ventricular diastolic dysfunction     Malignant neoplasm of prostate (HCC)     hx of t1a, izzy 6, 5 % of 1  core    Morbid obesity (HCC)     Myocardial perfusion 08    Basal inferior defect c/w artifact; EF 63%    Overactive bladder     Prostate cancer ([facility] 2017    S/P cardiac cath 07/30/10    oD2-40%; pRCA-20-30%; EF 65%    S/P gastric surgery 2018    Sleep apnea     Slowing of urinary stream     Type 2 diabetes with nephropathy ([facility] 2018    Type II or unspecified type diabetes mellitus without mention of complication, not stated as uncontrolled        Past Surgical History:   Procedure Laterality Date    COLONOSCOPY N/A 2019    COLONOSCOPY performed by Finesse Ng MD at [facility]    [address] [facility] [address]  7/30/10    HX OTHER SURGICAL  13    Prostate    HX TONSILLECTOMY      CA COLONOSCOPY FLX DX W/COLLJ SPEC WHEN PFRMD      CA I & D ABSC XTRAORAL SOFT TISS COMP         Social History     Socioeconomic History    Marital status: SINGLE     Spouse name: Not on file    Number of children: Not on file    Years of education: Not on file    Highest education level: Not on file   Occupational History    Not on file   Tobacco Use    Smoking status: Former     Types: Cigars     Quit date: 10/4/1999     Years since quittin.8    Smokeless tobacco: Never   Vaping Use    Vaping Use: Never used   Substance and Sexual Activity    Alcohol use: Never     Comment: rarely    Drug use: No    Sexual activity: Not Currently   Other Topics Concern    Not on file   Social History Narrative    Not on file     Social Determinants of Health     Financial Resource Strain: Not on file   Food Insecurity: Not on file   Transportation Needs: Not on file   Physical Activity: Not on file   Stress: Not on file   Social Connections: Not on file   Intimate Partner Violence: Not on file   Housing Stability: Not on file       Current Outpatient Medications   Medication Sig Dispense Refill    trospium (SANCTURA) 20 mg tablet TAKE 1 TABLET BY MOUTH EVERY DAY 60 Tablet 0    atorvastatin (LIPITOR) 80 mg tablet TAKE 1 TABLET BY MOUTH EVERY DAY 90 Tablet 1    Farxiga 10 mg tab tablet TAKE 1 TABLET BY MOUTH EVERY DAY 30 Tablet 3    furosemide (LASIX) 40 mg tablet USE DAILY AS NEEDED INDICATIONS: ACCUMULATION OF FLUID RESULTING FROM CHRONIC HEART FAILURE 90 Tablet 1    clopidogreL (PLAVIX) 75 mg tab TAKE 1 TABLET BY MOUTH EVERY DAY 90 Tablet 2    hydrALAZINE (APRESOLINE) 100 mg tablet TAKE 1 TABLET BY MOUTH THREE TIMES A  Tablet 1    omeprazole (PRILOSEC) 40 mg capsule TAKE 1 CAPSULE BY MOUTH TWICE A  Capsule 0    allopurinoL (ZYLOPRIM) 100 mg tablet TAKE 1 TABLET BY MOUTH EVERY DAY 90 Tablet 3    carvediloL (COREG) 12.5 mg tablet Take 1 Tablet by mouth two (2) times daily (with meals). 180 Tablet 2    amLODIPine (NORVASC) 5 mg tablet Take 1 Tablet by mouth daily. 90 Tablet 2    isosorbide mononitrate ER (IMDUR) 60 mg CR tablet Take 1 Tablet by mouth daily. 90 Tablet 1    nitroglycerin (NITROSTAT) 0.4 mg SL tablet 1 TAB BY SUBLINGUAL ROUTE EVERY FIVE (5) MINUTES AS NEEDED FOR CHEST PAIN FOR UP TO 3 DOSES. 25 Tablet 1    calcium carbonate (TUMS) 200 mg calcium (500 mg) chew Take 1 Tablet by mouth daily. As needed      aspirin 81 mg chewable tablet Take 1 Tab by mouth two (2) times a day. (Patient taking differently: Take 81 mg by mouth in the morning.) 60 Tab 0    Blood Pressure Monitor (Blood Pressure Kit) kit 1 Device by Does Not Apply route as needed (htn). 1 Kit 0    comprs. stocking,thigh,long,med (Comp. Stocking,Thigh,Long,Med.) misc 2 Packages by Does Not Apply route daily as needed (edema). Remove when lying down 2 Each 0    acetaminophen (Acetaminophen Extra Strength) 500 mg tablet Take  by mouth every six (6) hours as needed for Pain.            Vitals:    08/18/22 1136   BP: 121/73   Pulse: 84   Resp: 16   Temp: 97.2 °F (36.2 °C)   TempSrc: Temporal   SpO2: 93%   Weight: 255 lb (115.7 kg)   Height: 5' 10\" (1.778 m)   PainSc:   5       Physical Exam  Vitals and nursing note reviewed. Constitutional:       General: He is not in acute distress. Appearance: Normal appearance. He is obese. He is not ill-appearing, toxic-appearing or diaphoretic. HENT:      Head: Normocephalic and atraumatic. Eyes:      General: No scleral icterus. Extraocular Movements: Extraocular movements intact. Conjunctiva/sclera: Conjunctivae normal.      Pupils: Pupils are equal, round, and reactive to light. Cardiovascular:      Rate and Rhythm: Normal rate and regular rhythm. Pulses: Normal pulses. Heart sounds: Normal heart sounds. No murmur heard. Pulmonary:      Effort: Pulmonary effort is normal. No respiratory distress. Breath sounds: Normal breath sounds. No wheezing or rales. Musculoskeletal:         General: No swelling or tenderness. Normal range of motion. Cervical back: Normal range of motion and neck supple. Right lower leg: Edema present. Left lower leg: Edema present. Skin:     General: Skin is warm and dry. Coloration: Skin is not jaundiced or pale. Neurological:      General: No focal deficit present. Mental Status: He is alert and oriented to person, place, and time. Mental status is at baseline. Cranial Nerves: No cranial nerve deficit. Psychiatric:         Mood and Affect: Mood normal.         Behavior: Behavior normal.         Thought Content:  Thought content normal.         Judgment: Judgment normal.       Office Visit on 08/18/2022   Component Date Value Ref Range Status    Hemoglobin A1c (POC) 08/18/2022 5.4  % Final   Office Visit on 07/28/2022   Component Date Value Ref Range Status    Color (UA POC) 07/28/2022 Dark Yellow   Final    Clarity (UA POC) 07/28/2022 Clear   Final    Glucose (UA POC) 07/28/2022 2+  Negative Final    Bilirubin (UA POC) 07/28/2022 Negative  Negative Final    Ketones (UA POC) 07/28/2022 Negative  Negative Final    Specific gravity (UA POC) 07/28/2022 1.015  1.001 - 1.035 Final    Blood (UA POC) 07/28/2022 Negative  Negative Final    pH (UA POC) 07/28/2022 5.5  4.6 - 8.0 Final    Protein (UA POC) 07/28/2022 Trace  Negative Final    Urobilinogen (UA POC) 07/28/2022 0.2 mg/dL  0.2 - 1 Final    Nitrites (UA POC) 07/28/2022 Negative  Negative Final    Leukocyte esterase (UA POC) 07/28/2022 Negative  Negative Final   Orders Only on 06/29/2022   Component Date Value Ref Range Status    Chlamydia amplified,urine 06/29/2022 Negative  Negative Final    GC Amplified urine 06/29/2022 Negative  Negative Final   Hospital Outpatient Visit on 06/29/2022   Component Date Value Ref Range Status    [facility] SPECIMEN COL 06/29/2022 Specimens collected/sent to [facility]    Final   Orders Only on 06/21/2022   Component Date Value Ref Range Status    HIV -1/0/2 AG/AB WITH REFLEX 06/29/2022 Non Reactive  Non Reacti Final    HIV INTERPRETATION 06/29/2022 HIV-1 antigen and HIV 1/2 antibodies not detected. No laboratory evidence of   Final    Comment: HIV infection. If acute HIV infection is suspected, consider testing for   HIV-1  RNA by PCR. Results for orders placed or performed in visit on 08/18/22   AMB POC HEMOGLOBIN A1C   Result Value Ref Range    Hemoglobin A1c (POC) 5.4 %       Patient Care Team:  Patient Care Team:  Courtney Ashraf MD as PCP - General (Internal Medicine Physician)  Corutney Ashraf MD as PCP - [address] [place] [address] Provider  [place], [place] (Physician Assistant)  Jessica Roldan MD (Neurology)  Marek Reyna MD as Consulting Provider (Gastroenterology)      Assessment / Plan:      ICD-10-CM ICD-9-CM    1. Essential hypertension  I10 401.9       2. Encounter for immunization  Z23 V03.89 PNEUMOCOCCAL, PCV20, PREVNAR 20, (AGE 18 YRS+), IM, PF      3. Prediabetes  R73.03 790.29 AMB POC HEMOGLOBIN A1C      4. Malignant neoplasm of prostate ([facility]  C61 185       5.  Stage 3b chronic kidney disease ([facility] N18.32 585.3       6. Idiopathic gout, unspecified chronicity, unspecified site  M10.00 274.9       7. Mixed hyperlipidemia  E78.2 272.2       8. Congestive heart failure, unspecified HF chronicity, unspecified heart failure type (HCC)  I50.9 428.0       9. Encounter for medical examination to establish care  Z00.00 V70.9         Labs reviewed. HTN: BP is at goal.  Continue amlodipine, hydralazine, Coreg, Imdur. CAD/CHF: Continue aspirin, Plavix and statin. He has a follow-up appointment with cardiology coming up. Currently taking Lasix daily. CKD3: Following with nephrology. Continue Farxiga. HLD: Continue Lipitor. LDL is at goal.    Gout: Continue allopurinol. Prediabetes: HbA1c is 5.4%    Provided with Note for work restrictions. Received PCV 20 vaccine today. I asked the patient if he  had any questions and answered his  questions. The patient stated that he understands the treatment plan and agrees with the treatment plan    This document was created with a voice activated dictation system and may contain transcription errors.

## 2022-08-18 NOTE — LETTER
NOTIFICATION FOR WORK    8/18/2022 12:06 PM    Mr. Roselia Fraga  [address] [facility]      To Whom It May Concern:    Roselia Fraga is currently under the care of Liam S Margie Christian. Patient to continue with work restrictions due to his chronic medical conditions indefinitely. 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 He is cleared to work 48 hours per week. If there are questions or concerns please have the patient contact our office.         Sincerely,      Ge Rowe MD

## 2022-08-25 NOTE — ANESTHESIA PREPROCEDURE EVALUATION
Relevant Problems   No relevant active problems       Anesthetic History               Review of Systems / Medical History  Patient summary reviewed and pertinent labs reviewed    Pulmonary        Sleep apnea (Does not use CPAP regularly)           Neuro/Psych         Psychiatric history (Depression)     Cardiovascular    Hypertension: well controlled    Angina: with exertion      CAD and cardiac stents (1 stent placed last year)    Exercise tolerance: >4 METS     GI/Hepatic/Renal     GERD: well controlled           Endo/Other    Diabetes: well controlled, type 2    Morbid obesity     Other Findings              Physical Exam    Airway  Mallampati: II  TM Distance: 4 - 6 cm  Neck ROM: normal range of motion   Mouth opening: Normal     Cardiovascular  Regular rate and rhythm,  S1 and S2 normal,  no murmur, click, rub, or gallop             Dental  No notable dental hx       Pulmonary  Breath sounds clear to auscultation               Abdominal         Other Findings            Anesthetic Plan    ASA: 3  Anesthesia type: MAC          Induction: Intravenous  Anesthetic plan and risks discussed with: Patient
89.4

## 2022-09-03 DIAGNOSIS — I50.32 CHRONIC DIASTOLIC CONGESTIVE HEART FAILURE (HCC): ICD-10-CM

## 2022-09-03 DIAGNOSIS — N18.9 CHRONIC KIDNEY DISEASE, UNSPECIFIED CKD STAGE: ICD-10-CM

## 2022-09-06 RX ORDER — DAPAGLIFLOZIN 10 MG/1
TABLET, FILM COATED ORAL
Qty: 30 TABLET | Refills: 3 | Status: SHIPPED | OUTPATIENT
Start: 2022-09-06

## 2022-09-14 DIAGNOSIS — I10 ESSENTIAL HYPERTENSION: ICD-10-CM

## 2022-09-14 RX ORDER — AMLODIPINE BESYLATE 5 MG/1
TABLET ORAL
Qty: 90 TABLET | Refills: 2 | Status: SHIPPED | OUTPATIENT
Start: 2022-09-14

## 2022-09-15 DIAGNOSIS — I50.42 CHRONIC COMBINED SYSTOLIC AND DIASTOLIC CONGESTIVE HEART FAILURE (HCC): ICD-10-CM

## 2022-09-15 RX ORDER — FUROSEMIDE 40 MG/1
TABLET ORAL
Qty: 90 TABLET | Refills: 1 | Status: SHIPPED | OUTPATIENT
Start: 2022-09-15

## 2022-09-15 NOTE — TELEPHONE ENCOUNTER
Requested Prescriptions     Pending Prescriptions Disp Refills    furosemide (LASIX) 40 mg tablet 90 Tablet 1     Sig: Indications: accumulation of fluid resulting from chronic heart failure

## 2022-09-21 ENCOUNTER — OFFICE VISIT (OUTPATIENT)
Dept: CARDIOLOGY CLINIC | Age: 67
End: 2022-09-21
Payer: COMMERCIAL

## 2022-09-21 VITALS
SYSTOLIC BLOOD PRESSURE: 137 MMHG | WEIGHT: 259 LBS | HEART RATE: 86 BPM | DIASTOLIC BLOOD PRESSURE: 89 MMHG | OXYGEN SATURATION: 95 % | HEIGHT: 70 IN | BODY MASS INDEX: 37.08 KG/M2

## 2022-09-21 DIAGNOSIS — R07.89 OTHER CHEST PAIN: ICD-10-CM

## 2022-09-21 DIAGNOSIS — E66.01 SEVERE OBESITY (BMI 35.0-39.9) WITH COMORBIDITY (HCC): ICD-10-CM

## 2022-09-21 DIAGNOSIS — I25.118 ATHEROSCLEROSIS OF NATIVE CORONARY ARTERY OF NATIVE HEART WITH STABLE ANGINA PECTORIS (HCC): Primary | ICD-10-CM

## 2022-09-21 DIAGNOSIS — I10 ESSENTIAL HYPERTENSION: ICD-10-CM

## 2022-09-21 DIAGNOSIS — I50.32 CHRONIC DIASTOLIC CONGESTIVE HEART FAILURE (HCC): ICD-10-CM

## 2022-09-21 PROCEDURE — 99214 OFFICE O/P EST MOD 30 MIN: CPT | Performed by: INTERNAL MEDICINE

## 2022-09-21 PROCEDURE — 93000 ELECTROCARDIOGRAM COMPLETE: CPT | Performed by: INTERNAL MEDICINE

## 2022-09-21 PROCEDURE — 1123F ACP DISCUSS/DSCN MKR DOCD: CPT | Performed by: INTERNAL MEDICINE

## 2022-09-21 NOTE — PATIENT INSTRUCTIONS
Medications Discontinued During This Encounter   Medication Reason    clopidogreL (PLAVIX) 75 mg tab Therapy Completed         A Healthy Heart: Care Instructions  Your Care Instructions     Coronary artery disease, also called heart disease, occurs when a substance called plaque builds up in the vessels that supply oxygen-rich blood to your heart muscle. This can narrow the blood vessels and reduce blood flow. A heart attack happens when blood flow is completely blocked. A high-fat diet, smoking, and other factors increase the risk of heart disease. Your doctor has found that you have a chance of having heart disease. You can do lots of things to keep your heart healthy. It may not be easy, but you can change your diet, exercise more, and quit smoking. These steps really work to lower your chance of heart disease. Follow-up care is a key part of your treatment and safety. Be sure to make and go to all appointments, and call your doctor if you are having problems. It's also a good idea to know your test results and keep a list of the medicines you take. How can you care for yourself at home? Diet    Use less salt when you cook and eat. This helps lower your blood pressure. Taste food before salting. Add only a little salt when you think you need it. With time, your taste buds will adjust to less salt. Eat fewer snack items, fast foods, canned soups, and other high-salt, high-fat, processed foods. Read food labels and try to avoid saturated and trans fats. They increase your risk of heart disease by raising cholesterol levels. Limit the amount of solid fat-butter, margarine, and shortening-you eat. Use olive, peanut, or canola oil when you cook. Bake, broil, and steam foods instead of frying them. Eat a variety of fruit and vegetables every day. Dark green, deep orange, red, or yellow fruits and vegetables are especially good for you. Examples include spinach, carrots, peaches, and berries. Foods high in fiber can reduce your cholesterol and provide important vitamins and minerals. High-fiber foods include whole-grain cereals and breads, oatmeal, beans, brown rice, citrus fruits, and apples. Eat lean proteins. Heart-healthy proteins include seafood, lean meats and poultry, eggs, beans, peas, nuts, seeds, and soy products. Limit drinks and foods with added sugar. These include candy, desserts, and soda pop. Lifestyle changes    If your doctor recommends it, get more exercise. Walking is a good choice. Bit by bit, increase the amount you walk every day. Try for at least 30 minutes on most days of the week. You also may want to swim, bike, or do other activities. Do not smoke. If you need help quitting, talk to your doctor about stop-smoking programs and medicines. These can increase your chances of quitting for good. Quitting smoking may be the most important step you can take to protect your heart. It is never too late to quit. Limit alcohol to 2 drinks a day for men and 1 drink a day for women. Too much alcohol can cause health problems. Manage other health problems such as diabetes, high blood pressure, and high cholesterol. If you think you may have a problem with alcohol or drug use, talk to your doctor. Medicines    Take your medicines exactly as prescribed. Call your doctor if you think you are having a problem with your medicine. If your doctor recommends aspirin, take the amount directed each day. Make sure you take aspirin and not another kind of pain reliever, such as acetaminophen (Tylenol). When should you call for help? Call 911 if you have symptoms of a heart attack. These may include:    Chest pain or pressure, or a strange feeling in the chest.     Sweating. Shortness of breath. Pain, pressure, or a strange feeling in the back, neck, jaw, or upper belly or in one or both shoulders or arms. Lightheadedness or sudden weakness.      A fast or irregular heartbeat. After you call 911, the  may tell you to chew 1 adult-strength or 2 to 4 low-dose aspirin. Wait for an ambulance. Do not try to drive yourself. Watch closely for changes in your health, and be sure to contact your doctor if you have any problems. Where can you learn more? Go to http://www.sarabia.com/  Enter F075 in the search box to learn more about \"A Healthy Heart: Care Instructions. \"  Current as of: January 10, 2022               Content Version: 13.2  © 8330-9584 [employer]. Care instructions adapted under license by [employer] (which disclaims liability or warranty for this information). If you have questions about a medical condition or this instruction, always ask your healthcare professional. [address] any warranty or liability for your use of this information.
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
## 2022-09-21 NOTE — PROGRESS NOTES
1. Have you been to the ER, urgent care clinic since your last visit? Hospitalized since your last visit?     no    2. Have you seen or consulted any other health care providers outside of the [address] [place] [address] since your last visit? Include any pap smears or colon screening. No     3. Since your last visit, have you had any of the following symptoms? chest pains and shortness of breath,leg swelling         4. Have you had any blood work, X-rays or cardiac testing? No      5. Where do you normally have your labs drawn? 6. Do you need any refills today?    no

## 2022-09-28 ENCOUNTER — TELEPHONE (OUTPATIENT)
Dept: ORTHOPEDIC SURGERY | Age: 67
End: 2022-09-28

## 2022-09-28 ENCOUNTER — OFFICE VISIT (OUTPATIENT)
Dept: ORTHOPEDIC SURGERY | Age: 67
End: 2022-09-28
Payer: COMMERCIAL

## 2022-09-28 VITALS
WEIGHT: 261 LBS | BODY MASS INDEX: 36.54 KG/M2 | HEIGHT: 71 IN | OXYGEN SATURATION: 95 % | TEMPERATURE: 98.2 F | HEART RATE: 71 BPM

## 2022-09-28 DIAGNOSIS — M25.662 DECREASED RANGE OF MOTION OF LEFT KNEE: ICD-10-CM

## 2022-09-28 DIAGNOSIS — M25.561 CHRONIC PAIN OF RIGHT KNEE: ICD-10-CM

## 2022-09-28 DIAGNOSIS — M17.11 PRIMARY OSTEOARTHRITIS OF RIGHT KNEE: Primary | ICD-10-CM

## 2022-09-28 DIAGNOSIS — M25.562 ACUTE PAIN OF LEFT KNEE: ICD-10-CM

## 2022-09-28 DIAGNOSIS — G89.29 CHRONIC PAIN OF RIGHT KNEE: ICD-10-CM

## 2022-09-28 DIAGNOSIS — M17.12 PRIMARY OSTEOARTHRITIS OF LEFT KNEE: ICD-10-CM

## 2022-09-28 DIAGNOSIS — M25.462 EFFUSION, LEFT KNEE: ICD-10-CM

## 2022-09-28 DIAGNOSIS — M23.8X2 KNEE CREPITUS, LEFT: ICD-10-CM

## 2022-09-28 PROCEDURE — 73562 X-RAY EXAM OF KNEE 3: CPT | Performed by: PHYSICIAN ASSISTANT

## 2022-09-28 PROCEDURE — 1123F ACP DISCUSS/DSCN MKR DOCD: CPT | Performed by: PHYSICIAN ASSISTANT

## 2022-09-28 PROCEDURE — 99214 OFFICE O/P EST MOD 30 MIN: CPT | Performed by: PHYSICIAN ASSISTANT

## 2022-09-28 PROCEDURE — 20611 DRAIN/INJ JOINT/BURSA W/US: CPT | Performed by: PHYSICIAN ASSISTANT

## 2022-09-28 RX ORDER — TRIAMCINOLONE ACETONIDE 40 MG/ML
40 INJECTION, SUSPENSION INTRA-ARTICULAR; INTRAMUSCULAR ONCE
Status: COMPLETED | OUTPATIENT
Start: 2022-09-28 | End: 2022-09-28

## 2022-09-28 RX ORDER — DICLOFENAC SODIUM 75 MG/1
75 TABLET, DELAYED RELEASE ORAL 2 TIMES DAILY
Qty: 60 TABLET | Refills: 0 | Status: SHIPPED | OUTPATIENT
Start: 2022-09-28

## 2022-09-28 RX ADMIN — TRIAMCINOLONE ACETONIDE 40 MG: 40 INJECTION, SUSPENSION INTRA-ARTICULAR; INTRAMUSCULAR at 10:07

## 2022-09-28 NOTE — TELEPHONE ENCOUNTER
Patient called and said he saw ARAMIS Sweet today for his left knee. Patient said that ARAMIS Sweet was going to call in some pain medication for his Left Knee, but that nothing was called into his pharmacy. [facility] pharmacy on [address]  Tel [phone]. Patient tel [phone].

## 2022-09-28 NOTE — PROGRESS NOTES
Patient: Syd Draper                MRN: [id number]       SSN: [id number]  YOB: 1955        AGE: 74 y.o. SEX: male          PCP: Sharonda Schmidt MD  09/28/22    Chief Complaint   Patient presents with    Knee Pain     Bilateral           HISTORY:  Syd Draper is a 74 y.o. male presents to the office with a complaint of bilateral knee pain left greater than right. Patient has been seen in the past by Dr. Nina Cowan and received cortisone injections as well as viscosupplementation. Today he inquires about reauthorizing viscosupplementation. He currently is at a weight of 261 pounds and is a height of 5 feet 11 inches. His BMI is 36.40. He is actively managing his sugars as well as decreasing carbohydrates and calories in an effort to decrease his overall weight. His pain in both knees limit his ability to stand for only short periods of time and walk short distances. Pain is present when he sits from a standing position and stands from a sitting position. Pain is often severe in the overnight hours of recent localized to the left knee greater than the right. He has swelling on the left knee today. His last x-rays were 2 years ago. Pain Assessment  9/28/2022   Location of Pain Knee   Location Modifiers Left;Right   Severity of Pain 10   Quality of Pain Sharp;Burning; Other (Comment)   Quality of Pain Comment fluid   Duration of Pain Persistent   Duration of Pain Comment -   Frequency of Pain Constant   Frequency of Pain Comment -   Date Pain First Started (No Data)   Date Pain First Started Comment f/u   Aggravating Factors Bending;Stairs; Walking   Aggravating Factors Comment -   Limiting Behavior Yes   Relieving Factors Rest;Other (Comment)   Relieving Factors Comment icy hot and tylenol arthritis   Result of Injury No   Work-Related Injury -   Type of Injury -           Lab Results   Component Value Date/Time    Hemoglobin A1c 5.5 12/15/2021 01:33 PM    Hemoglobin A1c (POC) 5.4 08/18/2022 01:02 PM     Weight Metrics 9/28/2022 9/21/2022 8/18/2022 7/28/2022 5/18/2022 5/5/2022 2/2/2022   Weight 261 lb 259 lb 255 lb 260 lb 265 lb 264 lb 244 lb   BMI 36.4 kg/m2 37.16 kg/m2 36.59 kg/m2 37.31 kg/m2 38.02 kg/m2 37.88 kg/m2 34.03 kg/m2            Problem List Items Addressed This Visit    None  Visit Diagnoses       Primary osteoarthritis of right knee    -  Primary    Relevant Orders    AMB POC X-RAY KNEE 3 VIEW    AMB POC X-RAY KNEE 3 VIEW    Primary osteoarthritis of left knee        Relevant Orders    AMB POC X-RAY KNEE 3 VIEW    AMB POC X-RAY KNEE 3 VIEW    Effusion, left knee        Decreased range of motion of left knee        Knee crepitus, left        Body mass index (BMI) of 36.0-36.9 in adult        Chronic pain of right knee        Acute pain of left knee                PAST MEDICAL HISTORY:   Past Medical History:   Diagnosis Date    Atypical chest pain/mild nonobstructive CAD/EF 65% 4/12/2011    BPH without obstruction/lower urinary tract symptoms     Bursitis of right shoulder     CAD (coronary artery disease)     Chest pain     history of hospitalization with chest pain and a negative workup in 2008    Chronic edema     Constipation     die to medication    Coronary artery disease     mild, non obstructive/EF 65%    Depression 12/16/2018    Dyspnea on exertion     Echocardiogram 12/16/08    IVC dilated; suboptimal endocardial border; EF 65%    ED (erectile dysfunction)     Elevated PSA     Frequency     GERD (gastroesophageal reflux disease)     Gout     H/O cystoscopy 06/20/2013    Hematuria, unspecified     Hypercholesterolemia     Hypertension     Hypertension      Hypogonadism male     Impotence of organic origin     Left ventricular diastolic dysfunction     Malignant neoplasm of prostate (HCC)     hx of t1a, izzy 6, 5 % of 1  core    Morbid obesity ([facility] [address]     Myocardial perfusion 09/05/08    Basal inferior defect c/w artifact; EF 63%    Overactive bladder     Prostate cancer ([facility] [address] 2/9/2017    S/P cardiac cath 07/30/10    oD2-40%; pRCA-20-30%; EF 65%    S/P gastric surgery 8/28/2018    Sleep apnea     Slowing of urinary stream     Type 2 diabetes with nephropathy ([facility] [address] 9/27/2018    Type II or unspecified type diabetes mellitus without mention of complication, not stated as uncontrolled        PAST SURGICAL HISTORY:   Past Surgical History:   Procedure Laterality Date    COLONOSCOPY N/A 9/18/2019    COLONOSCOPY performed by Bassam Cruz MD at [facility]    [id number]  7/30/10    HX OTHER SURGICAL  06/27/13    Prostate    HX TONSILLECTOMY      KS COLONOSCOPY FLX DX W/COLLJ SPEC WHEN PFRMD      KS I & D ABSC XTRAORAL SOFT TISS COMP         ALLERGIES:   Allergies   Allergen Reactions    Iodine Other (comments)     Eyes swell shut      Shellfish Containing Products Swelling        CURRENT MEDICATIONS:  A list of medications prior to the time of admission include:  Prior to Admission medications    Medication Sig Start Date End Date Taking?  Authorizing Provider   furosemide (LASIX) 40 mg tablet USE DAILY AS NEEDED INDICATIONS: ACCUMULATION OF FLUID RESULTING FROM CHRONIC HEART FAILURE  Indications: accumulation of fluid resulting from chronic heart failure 9/15/22   Jessenia Maya MD   amLODIPine (NORVASC) 5 mg tablet TAKE 1 TABLET BY MOUTH EVERY DAY 9/14/22   Shannan Camacho NP   Farxiga 10 mg tab tablet TAKE 1 TABLET BY MOUTH EVERY DAY 9/6/22   Shannan Camacho NP   trospium (SANCTURA) 20 mg tablet TAKE 1 TABLET BY MOUTH EVERY DAY 9/2/22   Caleb Hilton MD   atorvastatin (LIPITOR) 80 mg tablet TAKE 1 TABLET BY MOUTH EVERY DAY 6/27/22   Latia Miller MD   hydrALAZINE (APRESOLINE) 100 mg tablet TAKE 1 TABLET BY MOUTH THREE TIMES A DAY 3/28/22   Latia Miller MD   omeprazole (PRILOSEC) 40 mg capsule TAKE 1 CAPSULE BY MOUTH TWICE A DAY 3/7/22   Cesia Pascual NP   allopurinoL (ZYLOPRIM) 100 mg tablet TAKE 1 TABLET BY MOUTH EVERY DAY 22   Brant Allen NP   carvediloL (COREG) 12.5 mg tablet Take 1 Tablet by mouth two (2) times daily (with meals). 1/10/22   Roxane Shea MD   Blood Pressure Monitor (Blood Pressure Kit) kit 1 Device by Does Not Apply route as needed (htn). 1/10/22   Roxane Shea MD   comprs. stocking,thigh,long,med (Comp. Stocking,Thigh,Long,Med.) misc 2 Packages by Does Not Apply route daily as needed (edema). Remove when lying down 1/10/22   Roxane Shea MD   isosorbide mononitrate ER (IMDUR) 60 mg CR tablet Take 1 Tablet by mouth daily. 21   Roxane Shea MD   nitroglycerin (NITROSTAT) 0.4 mg SL tablet 1 TAB BY SUBLINGUAL ROUTE EVERY FIVE (5) MINUTES AS NEEDED FOR CHEST PAIN FOR UP TO 3 DOSES. 21   Roxane Shea MD   acetaminophen (Acetaminophen Extra Strength) 500 mg tablet Take  by mouth every six (6) hours as needed for Pain. Provider, Historical   calcium carbonate (TUMS) 200 mg calcium (500 mg) chew Take 1 Tablet by mouth daily. As needed    Provider, Historical   aspirin 81 mg chewable tablet Take 1 Tab by mouth two (2) times a day. Patient taking differently: Take 81 mg by mouth daily.  18   Reny Zarate MD       FAMILY HISTORY:   Family History   Problem Relation Age of Onset    Hypertension Mother     High Cholesterol Mother     Breast Cancer Mother     Diabetes Mother     Heart Disease Mother     OSTEOARTHRITIS Mother     High Cholesterol Father     Hypertension Father     Diabetes Father     Heart Disease Father     OSTEOARTHRITIS Father        SOCIAL HISTORY:   Social History     Socioeconomic History    Marital status: SINGLE   Tobacco Use    Smoking status: Former     Types: Cigars     Quit date: 10/4/1999     Years since quittin.0    Smokeless tobacco: Never   Vaping Use    Vaping Use: Never used   Substance and Sexual Activity    Alcohol use: Never     Comment: rarely    Drug use: No    Sexual activity: Not Currently ROS:No CP, No SOB, No fever/chills nor night sweats. No headaches, vision abnormalities to include double and or loss of vision. No dizziness. No hearing abnormalities. No Chest Pain nor Shortness of breath. Pt denies h/o spinal surgery, injections, or PT/chiropractor. Patient has attempted self treatment with less than adequate relief on oral and topical analgesic / anti inflammatory medications . Pt denies change in bowel or bladder habits. No saddle paresthesia / anesthesia. Pt denies fever, unplanned weight loss / weight gains, and no skin changes. Musculoskeletal pain per HPI. Pain is exacerbated positionally. PHYSICAL EXAM:    Visit Vitals  Pulse 71   Temp 98.2 °F (36.8 °C) (Temporal)   Ht 5' 11\" (1.803 m)   Wt 261 lb (118.4 kg)   SpO2 95%   BMI 36.40 kg/m²       Constitutional: Appears well-developed and well-nourished. No distress. Sitting comfortably in the exam room, interacting with conversation with pleasant affect. Gait appears steady and patient exhibits no evidence of ataxia. Patient is able to ambulate with caution. No focal neurological deficit noted. No facial droop, slurred speech, or evidence of altered mentation noted on exam.   Skin: Skin over the head, neck, bilateral limbs, and trunk is warm and dry. No rash or erythema noted. Cranial Nerves II-XII grossly intact  HENT: NC/AT. Normal symmetry, bulk and tone of facial and neck musculature. Trachea midline. No discernible thyromegaly or masses. No involuntary movements. Lymphatic: No preauricular, submandibuar, anterior or posterior cervical lymphadenopathy. Psychiatric: The patient is awake, alert, and oriented to person, place and time. Behavior is normal. Thought content normal.   Cardiovascular: No clubbing, cyanosis. No edema bilateral lower extremities. Pulmonary: No tripoding nor accessory muscle recruitment. Breathing normally, no distress, no audible wheezing.        Distal cap refill intact at 2/2 Ferdinand UE / LE.  Neuro intact Ferdinand UE/LE to noxious stimuli        Ortho Specific exam:    Reveals skin intact. No warmth, erythema, edema, or ecchymosis. There is a 1+ effusion. With patient supine his range of motion is guarded at 95 degrees of flexion -5 degrees to full extension. He has a varus deformity in the extension plane. MCL LCL intact with no laxity pain or giveaway    ACL PCL intact no laxity pain or giveaway    Patella tracks midline with crepitation. No quad or patellar tendon tenderness nor palpable defect noted. X-ray: [facility] 9/28/2022 space 3 view of the bilateral knees reveals advanced tricompartmental osteoarthritis greatest of the medial joint space and patellofemoral in both knees. Moderate narrowing of the lateral joint spaces. No lytic or blastic lesions. No soft tissue ossifications. No fracture deformities. IMPRESSION:      ICD-10-CM ICD-9-CM    1. Primary osteoarthritis of right knee  M17.11 715.16 AMB POC X-RAY KNEE 3 VIEW      AMB POC X-RAY KNEE 3 VIEW      2. Primary osteoarthritis of left knee  M17.12 715.16 AMB POC X-RAY KNEE 3 VIEW      AMB POC X-RAY KNEE 3 VIEW      3. Effusion, left knee  M25.462 719.06       4. Decreased range of motion of left knee  M25.662 719.56       5. Knee crepitus, left  M23.8X2 719.66       6. Body mass index (BMI) of 36.0-36.9 in adult  Z68.36 V85.36       7. Chronic pain of right knee  M25.561 719.46     G89.29 338.29       8. Acute pain of left knee  M25.562 719.46            PLAN: Today we discussed alternatives to care to include but not limited to a aspiration with following cortisone injection to the left knee. X-rays were updated as above. Patient will continue aggressive dietary modifications to include decreasing sugars carbohydrates and calories. He will follow back with our office in about 2 weeks for initiation of hyaluronic acid therapies. Patient agreed with plan of care.   Consideration for aspiration injection of the right knee if patient develops change in his current presentation. Procedural: Using sterile technique and verbal and written consent were obtained appropriate timeout formed patient laying on exam room table left knee flexed 20 degrees on a bolster 5 cc of 2% Polocaine used anesthetize a superior lateral interarticular approach. To follow 2 cc of straw-colored blood tendon aspirate removed from the same portal.  Fluid was discarded. To follow 1 cc of Kenalog at 40 mg/mL mixed with 7 mils of Polocaine 2% injected there were no complications. Patient tolerated the procedure well. Chart reviewed for the following:   Felix Oconnor PA-C, have reviewed the History, Physical and updated the Allergic reactions for [address] performed immediately prior to start of procedure:   IArun PA-C, have performed the following reviews on Emy Bernal prior to the start of the procedure:            * Patient was identified by name and date of birth   * Agreement on procedure being performed was verified  * Risks and Benefits explained to the patient  * Procedure site verified and marked as necessary  * Patient was positioned for comfort  * Consent was signed and verified             Date of procedure: 09/28/22    Time: 10:07 AM    Procedure performed by:  Johnny Suresh PA-C    Provider assisted by: None     Patient assisted by: self    How tolerated by patient: tolerated the procedure well with no complications    Comments: none    The patient is asked to continue to rest the area for a few more days before resuming regular activities. It may be more painful for the first 1-2 days. Watch for fever, or increased swelling or persistent pain in the joint. Call or return to clinic prn if such symptoms occur or there is failure to improve as anticipated.  Please Note:        [address] using a frequency of 10MHz with a 12L-RS transducer head was used to confirm needle placement. Ultrasound images captured using [address] [place] [address] Loop Ultrasound machine and scanned into patient's chart. Additionally today we discussed the diagnosis of obesity and the importance of weight management for both cardiovascular health. The patient was recommended to decrease carbohydrate and sugar intake. Patient recommended a formal dietary consult which they will consider and return a call to our office. In light of the patient's osteoarthritic findings I am making a recommendation for aerobic exercise to include but not limited to stationary bicycle, elliptical, therapeutic walking with good shoes and or swimming. Patient should avoid any running or jumping. If using the treadmill then recommendation for no elevation and no running or jogging. Care plan outlined and precautions discussed. Results were reviewed with the patient. All medications were reviewed with the patient. All of pt's questions and concerns were addressed. Alarm symptoms and return precautions associated with chief complaint and evaluation were reviewed with the patient in detail. The patient demonstrated adequate understanding. The patient expresses willing compliance with the treatment plan. Special note: Medication management discussed in detail all patient's questions answered to their satisfaction. No Narcotic indicated today. Patient given pain medication for short term acute pain relief. Goal is to treat patient according to above plan and to ultimately have patient off all pain medications once appropriate. If chronic pain management is required beyond what is expected for current orthopedic problem, will refer patient to pain management.  was reviewed and will be reviewed with every medication refill request.         Patient provided a reminder for a \"due or due soon\" health maintenance.  I have asked the patient to schedule an appointment with their primary care provider for follow-up on general health maintenance concerns. Today all the patient's questions were answered to their satisfaction. Copies of x-rays reviewed if obtained this visit, and provided to patient. Dictation disclaimer:  Please note that this dictation was completed with [employer], the computer voice recognition software. Quite often unanticipated grammatical, syntax, homophones, and other interpretive errors are inadvertently transcribed by the computer software. Please disregard these errors. Please excuse any errors that have escaped final proofreading. Jose Enrique MICHELLE, APC, MPAS, PA-C  [facility]

## 2022-10-10 ENCOUNTER — OFFICE VISIT (OUTPATIENT)
Dept: ORTHOPEDIC SURGERY | Age: 67
End: 2022-10-10
Payer: COMMERCIAL

## 2022-10-10 VITALS
OXYGEN SATURATION: 95 % | BODY MASS INDEX: 37.38 KG/M2 | HEIGHT: 71 IN | HEART RATE: 105 BPM | WEIGHT: 267 LBS | TEMPERATURE: 98.9 F

## 2022-10-10 DIAGNOSIS — M17.12 PRIMARY OSTEOARTHRITIS OF LEFT KNEE: Primary | ICD-10-CM

## 2022-10-10 PROCEDURE — 20611 DRAIN/INJ JOINT/BURSA W/US: CPT | Performed by: PHYSICIAN ASSISTANT

## 2022-10-10 NOTE — PROGRESS NOTES
Patient: Elissa Santos                MRN: [id number]       SSN: [id number]  YOB: 1955        AGE: 74 y.o. SEX: male          PCP: Rodriguez Villalobos MD  10/10/22          HISTORY:  Elissa Santos is a 74 y.o. male returns the office for continuity of care regarding his left knee. Previously was seen for left knee pain with osteoarthritis found radiographically and is status post effusion with placement of cortisone following. Patient generally is doing well up. He has only pain in his left knee when he stands for lengthy periods of time and walks distance. He does notice some pain when he stands from a sitting position and sits from a standing position. He is approved for Euflexxa associated with the left knee and would like to proceed with dosing today. Pain Assessment  9/28/2022   Location of Pain Knee   Location Modifiers Left;Right   Severity of Pain 10   Quality of Pain Sharp;Burning; Other (Comment)   Quality of Pain Comment fluid   Duration of Pain Persistent   Duration of Pain Comment -   Frequency of Pain Constant   Frequency of Pain Comment -   Date Pain First Started (No Data)   Date Pain First Started Comment f/u   Aggravating Factors Bending;Stairs; Walking   Aggravating Factors Comment -   Limiting Behavior Yes   Relieving Factors Rest;Other (Comment)   Relieving Factors Comment icy hot and tylenol arthritis   Result of Injury No   Work-Related Injury -   Type of Injury -           Lab Results   Component Value Date/Time    Hemoglobin A1c 5.5 12/15/2021 01:33 PM    Hemoglobin A1c (POC) 5.4 08/18/2022 01:02 PM     Weight Metrics 9/28/2022 9/21/2022 8/18/2022 7/28/2022 5/18/2022 5/5/2022 2/2/2022   Weight 261 lb 259 lb 255 lb 260 lb 265 lb 264 lb 244 lb   BMI 36.4 kg/m2 37.16 kg/m2 36.59 kg/m2 37.31 kg/m2 38.02 kg/m2 37.88 kg/m2 34.03 kg/m2            Problem List Items Addressed This Visit    None  Visit Diagnoses       Primary osteoarthritis of left knee    -  Primary    Relevant Medications    sodium hyaluronate (SUPARTZ FX/EUFLEXXA/HYALGAN) 10 mg/mL injection syrg 20 mg (Start on 10/10/2022 11:00 AM)    Other Relevant Orders    ARTHROCENTESIS ASPIR&/INJ MAJOR JT/BURSA W/US            PAST MEDICAL HISTORY:   Past Medical History:   Diagnosis Date    Atypical chest pain/mild nonobstructive CAD/EF 65% 4/12/2011    BPH without obstruction/lower urinary tract symptoms     Bursitis of right shoulder     CAD (coronary artery disease)     Chest pain     history of hospitalization with chest pain and a negative workup in 2008    Chronic edema     Constipation     die to medication    Coronary artery disease     mild, non obstructive/EF 65%    Depression 12/16/2018    Dyspnea on exertion     Echocardiogram 12/16/08    IVC dilated; suboptimal endocardial border; EF 65%    ED (erectile dysfunction)     Elevated PSA     Frequency     GERD (gastroesophageal reflux disease)     Gout     H/O cystoscopy 06/20/2013    Hematuria, unspecified     Hypercholesterolemia     Hypertension     Hypertension      Hypogonadism male     Impotence of organic origin     Left ventricular diastolic dysfunction     Malignant neoplasm of prostate (HCC)     hx of t1a, izzy 6, 5 % of 1  core    Morbid obesity ([address]     Myocardial perfusion 09/05/08    Basal inferior defect c/w artifact; EF 63%    Overactive bladder     Prostate cancer ([address] 2/9/2017    S/P cardiac cath 07/30/10    oD2-40%; pRCA-20-30%; EF 65%    S/P gastric surgery 8/28/2018    Sleep apnea     Slowing of urinary stream     Type 2 diabetes with nephropathy ([address] 9/27/2018    Type II or unspecified type diabetes mellitus without mention of complication, not stated as uncontrolled        PAST SURGICAL HISTORY:   Past Surgical History:   Procedure Laterality Date    COLONOSCOPY N/A 9/18/2019    COLONOSCOPY performed by Queen Salma MD at [address]  7/30/10    HX OTHER SURGICAL  06/27/13    Prostate    HX TONSILLECTOMY      [place] COLONOSCOPY FLX DX W/COLLJ SPEC WHEN PFRMD      [place] I & D ABSC XTRAORAL SOFT TISS COMP         ALLERGIES:   Allergies   Allergen Reactions    Iodine Other (comments)     Eyes swell shut      Shellfish Containing Products Swelling        CURRENT MEDICATIONS:  A list of medications prior to the time of admission include:  Prior to Admission medications    Medication Sig Start Date End Date Taking? Authorizing Provider   diclofenac EC (VOLTAREN) 75 mg EC tablet Take 1 Tablet by mouth two (2) times a day. 9/28/22   Ligia Oconnor PA-C   furosemide (LASIX) 40 mg tablet USE DAILY AS NEEDED INDICATIONS: ACCUMULATION OF FLUID RESULTING FROM CHRONIC HEART FAILURE  Indications: accumulation of fluid resulting from chronic heart failure 9/15/22   Mary Maya MD   amLODIPine (NORVASC) 5 mg tablet TAKE 1 TABLET BY MOUTH EVERY DAY 9/14/22   Shannan Camacho NP   Farxiga 10 mg tab tablet TAKE 1 TABLET BY MOUTH EVERY DAY 9/6/22   Shannan Camacho NP   trospium (SANCTURA) 20 mg tablet TAKE 1 TABLET BY MOUTH EVERY DAY 9/2/22   Vinayak Page MD   atorvastatin (LIPITOR) 80 mg tablet TAKE 1 TABLET BY MOUTH EVERY DAY 6/27/22   Swathi Wolf MD   hydrALAZINE (APRESOLINE) 100 mg tablet TAKE 1 TABLET BY MOUTH THREE TIMES A DAY 3/28/22   Swathi Wolf MD   omeprazole (PRILOSEC) 40 mg capsule TAKE 1 CAPSULE BY MOUTH TWICE A DAY 3/7/22   Nicci Rodas NP   allopurinoL (ZYLOPRIM) 100 mg tablet TAKE 1 TABLET BY MOUTH EVERY DAY 2/2/22   Nicci Rodas NP   carvediloL (COREG) 12.5 mg tablet Take 1 Tablet by mouth two (2) times daily (with meals). 1/10/22   Swathi Wolf MD   Blood Pressure Monitor (Blood Pressure Kit) kit 1 Device by Does Not Apply route as needed (htn). 1/10/22   Swathi Wolf MD   comprs. stocking,thigh,long,med (Comp. Stocking,Thigh,Long,Med.) misc 2 Packages by Does Not Apply route daily as needed (edema).  Remove when lying down 1/10/22   Juvenal Germain MD   isosorbide mononitrate ER (IMDUR) 60 mg CR tablet Take 1 Tablet by mouth daily. 21   Juvenal Germain MD   nitroglycerin (NITROSTAT) 0.4 mg SL tablet 1 TAB BY SUBLINGUAL ROUTE EVERY FIVE (5) MINUTES AS NEEDED FOR CHEST PAIN FOR UP TO 3 DOSES. 21   Juvenal Germain MD   acetaminophen (Acetaminophen Extra Strength) 500 mg tablet Take  by mouth every six (6) hours as needed for Pain. Provider, Historical   calcium carbonate (TUMS) 200 mg calcium (500 mg) chew Take 1 Tablet by mouth daily. As needed    Provider, Historical   aspirin 81 mg chewable tablet Take 1 Tab by mouth two (2) times a day. Patient taking differently: Take 81 mg by mouth daily. 18   Nacho Shah MD       FAMILY HISTORY:   Family History   Problem Relation Age of Onset    Hypertension Mother     High Cholesterol Mother     Breast Cancer Mother     Diabetes Mother     Heart Disease Mother     OSTEOARTHRITIS Mother     High Cholesterol Father     Hypertension Father     Diabetes Father     Heart Disease Father     OSTEOARTHRITIS Father        SOCIAL HISTORY:   Social History     Socioeconomic History    Marital status: SINGLE   Tobacco Use    Smoking status: Former     Types: Cigars     Quit date: 10/4/1999     Years since quittin.0    Smokeless tobacco: Never   Vaping Use    Vaping Use: Never used   Substance and Sexual Activity    Alcohol use: Never     Comment: rarely    Drug use: No    Sexual activity: Not Currently       ROS:No CP, No SOB, No fever/chills nor night sweats. No headaches, vision abnormalities to include double and or loss of vision. No dizziness. No hearing abnormalities. No Chest Pain nor Shortness of breath. Pt denies h/o spinal surgery, injections, or PT/chiropractor. Patient has attempted self treatment with less than adequate relief on oral and topical analgesic / anti inflammatory medications . Pt denies change in bowel or bladder habits.  No saddle paresthesia / anesthesia. Pt denies fever, unplanned weight loss / weight gains, and no skin changes. Musculoskeletal pain per HPI. Pain is exacerbated positionally. PHYSICAL EXAM:        Constitutional: Appears well-developed and well-nourished. No distress. Sitting comfortably in the exam room, interacting with conversation with pleasant affect. Gait appears steady and patient exhibits no evidence of ataxia. Patient is able to ambulate with caution. No focal neurological deficit noted. No facial droop, slurred speech, or evidence of altered mentation noted on exam.   Skin: Skin over the head, neck, bilateral limbs, and trunk is warm and dry. No rash or erythema noted. Cranial Nerves II-XII grossly intact  HENT: NC/AT. Normal symmetry, bulk and tone of facial and neck musculature. Trachea midline. No discernible thyromegaly or masses. No involuntary movements. Lymphatic: No preauricular, submandibuar, anterior or posterior cervical lymphadenopathy. Psychiatric: The patient is awake, alert, and oriented to person, place and time. Behavior is normal. Thought content normal.   Cardiovascular: No clubbing, cyanosis. No edema bilateral lower extremities. Pulmonary: No tripoding nor accessory muscle recruitment. Breathing normally, no distress, no audible wheezing. Distal cap refill intact at 2/2 Ferdinand UE / LE. Neuro intact Ferdinand UE/LE to noxious stimuli        Ortho Specific exam:    Left knee reveals a trace effusion. No warmth, erythema, edema, or ecchymosis. Active range of motion 100 degrees -10. He has a varus deformity in extension plane. MCL LCL intact no laxity pain or giveaway    ACL PCL intact no laxity pain or giveaway. X-ray: [facility] historical tricompartmental osteoarthritis of the left knee                        IMPRESSION:      ICD-10-CM ICD-9-CM    1.  Primary osteoarthritis of left knee  M17.12 715.16 sodium hyaluronate (SUPARTZ FX/EUFLEXXA/HYALGAN) 10 mg/mL injection syrg 20 mg      ARTHROCENTESIS ASPIR&/INJ MAJOR JT/BURSA W/US           PLAN: Today we discussed alternatives care to include but not limited to initiation of viscosupplementation in the form of Euflexxa to the left knee. Patient agreed with plan of care. Patient return to the office in 1 week for continuity of care. Procedural: Using sterile technique and verbal and written consent were obtained and appropriate timeout formed patient laying supine left knee flexed at 20 degrees on a bolster 2 cc of Euflexxa injected using superior lateral interarticular approach. There were no complications. Patient tolerated the procedure well. Please Note:        [address] using a frequency of 10MHz with a 12L-RS transducer head was used to confirm needle placement. Ultrasound images captured using [address] Ultrasound machine and scanned into patient's chart. Chart reviewed for the following:   Yrn Oconnor PA-C, have reviewed the History, Physical and updated the Allergic reactions for [address] performed immediately prior to start of procedure:   I, Kody Oconnor PA-C, have performed the following reviews on Charley Chandler prior to the start of the procedure:            * Patient was identified by name and date of birth   * Agreement on procedure being performed was verified  * Risks and Benefits explained to the patient  * Procedure site verified and marked as necessary  * Patient was positioned for comfort  * Consent was signed and verified             Date of procedure: 10/10/22    Time: 10:11 AM    Procedure performed by:  John Branham PA-C    Provider assisted by: None     Patient assisted by: self    How tolerated by patient: tolerated the procedure well with no complications    Comments: none    The patient is asked to continue to rest the area for a few more days before resuming regular activities. It may be more painful for the first 1-2 days.   Watch for fever, or increased swelling or persistent pain in the joint. Call or return to clinic prn if such symptoms occur or there is failure to improve as anticipated. Chart reviewed for the following:   Yrn Oconnor PA-C, have reviewed the History, Physical and updated the Allergic reactions for [address] performed immediately prior to start of procedure:   I Beauelijah Oconnor PA-C, have performed the following reviews on Charley Chandler prior to the start of the procedure:            * Patient was identified by name and date of birth   * Agreement on procedure being performed was verified  * Risks and Benefits explained to the patient  * Procedure site verified and marked as necessary  * Patient was positioned for comfort  * Consent was signed and verified             Date of procedure: 10/10/22    Time: 10:11 AM    Procedure performed by:  John Branham PA-C    Provider assisted by: None     Patient assisted by: self    How tolerated by patient: tolerated the procedure well with no complications    Comments: none    The patient is asked to continue to rest the area for a few more days before resuming regular activities. It may be more painful for the first 1-2 days. Watch for fever, or increased swelling or persistent pain in the joint. Call or return to clinic prn if such symptoms occur or there is failure to improve as anticipated. Additionally today we discussed the diagnosis of obesity and the importance of weight management for both cardiovascular health. The patient was recommended to decrease carbohydrate and sugar intake. Patient recommended a formal dietary consult which they will consider and return a call to our office. In light of the patient's osteoarthritic findings I am making a recommendation for aerobic exercise to include but not limited to stationary bicycle, elliptical, therapeutic walking with good shoes and or swimming. Patient should avoid any running or jumping.   If using the treadmill then recommendation for no elevation and no running or jogging. Care plan outlined and precautions discussed. Results were reviewed with the patient. All medications were reviewed with the patient. All of pt's questions and concerns were addressed. Alarm symptoms and return precautions associated with chief complaint and evaluation were reviewed with the patient in detail. The patient demonstrated adequate understanding. The patient expresses willing compliance with the treatment plan. Special note: Medication management discussed in detail all patient's questions answered to their satisfaction. No Narcotic indicated today. Patient given pain medication for short term acute pain relief. Goal is to treat patient according to above plan and to ultimately have patient off all pain medications once appropriate. If chronic pain management is required beyond what is expected for current orthopedic problem, will refer patient to pain management.  was reviewed and will be reviewed with every medication refill request.         Patient provided a reminder for a \"due or due soon\" health maintenance. I have asked the patient to schedule an appointment with their primary care provider for follow-up on general health maintenance concerns. Today all the patient's questions were answered to their satisfaction. Copies of x-rays reviewed if obtained this visit, and provided to patient. Dictation disclaimer:  Please note that this dictation was completed with [id number], the computer voice recognition software. Quite often unanticipated grammatical, syntax, homophones, and other interpretive errors are inadvertently transcribed by the computer software. Please disregard these errors. Please excuse any errors that have escaped final proofreading. Slick MICHELLE, EMMA, MPAS, PA-C  [facility]

## 2022-10-17 ENCOUNTER — TELEPHONE (OUTPATIENT)
Dept: CARDIOLOGY CLINIC | Age: 67
End: 2022-10-17

## 2022-10-17 ENCOUNTER — OFFICE VISIT (OUTPATIENT)
Dept: ORTHOPEDIC SURGERY | Age: 67
End: 2022-10-17
Payer: COMMERCIAL

## 2022-10-17 VITALS — WEIGHT: 267 LBS | RESPIRATION RATE: 16 BRPM | HEIGHT: 71 IN | BODY MASS INDEX: 37.38 KG/M2

## 2022-10-17 DIAGNOSIS — M17.11 PRIMARY OSTEOARTHRITIS OF RIGHT KNEE: ICD-10-CM

## 2022-10-17 DIAGNOSIS — M17.12 PRIMARY OSTEOARTHRITIS OF LEFT KNEE: Primary | ICD-10-CM

## 2022-10-17 PROCEDURE — 20611 DRAIN/INJ JOINT/BURSA W/US: CPT | Performed by: PHYSICIAN ASSISTANT

## 2022-10-17 NOTE — TELEPHONE ENCOUNTER
Cp left upper side, around heart goes away with NTG. Was taken off plavix last visit and was told if cp comes back will need to restart. Please advise.

## 2022-10-17 NOTE — PROGRESS NOTES
Patient: Rubi Conrad                MRN: [id number]       SSN: [id number]  YOB: 1955        AGE: 74 y.o. SEX: male          PCP: Mela Nageotte, MD  10/17/22    10/17/2022: Patient returns to the office for continuation of Euflexxa to his left knee. He was approved for his right knee but wished to hold off starting injection at his last visit. Today he has had some pain in his right knee and would like to begin Euflexxa viscosupplementation. HISTORY:  Rubi Conrad is a 74 y.o. male returns the office for continuity of care regarding his left knee. Previously was seen for left knee pain with osteoarthritis found radiographically and is status post effusion with placement of cortisone following. Patient generally is doing well up. He has only pain in his left knee when he stands for lengthy periods of time and walks distance. He does notice some pain when he stands from a sitting position and sits from a standing position. He is approved for Euflexxa associated with the left knee and would like to proceed with dosing today.       Pain Assessment  10/10/2022   Location of Pain Knee   Location Modifiers Left   Severity of Pain 0   Quality of Pain -   Quality of Pain Comment -   Duration of Pain -   Duration of Pain Comment -   Frequency of Pain Intermittent   Frequency of Pain Comment -   Date Pain First Started (No Data)   Date Pain First Started Comment f/u   Aggravating Factors Walking;Standing   Aggravating Factors Comment -   Limiting Behavior Some   Relieving Factors Rest   Relieving Factors Comment -   Result of Injury No   Work-Related Injury -   Type of Injury -           Lab Results   Component Value Date/Time    Hemoglobin A1c 5.5 12/15/2021 01:33 PM    Hemoglobin A1c (POC) 5.4 08/18/2022 01:02 PM     Weight Metrics 10/17/2022 10/10/2022 9/28/2022 9/21/2022 8/18/2022 7/28/2022 5/18/2022   Weight 267 lb 267 lb 261 lb 259 lb 255 lb 260 lb 265 lb   BMI 37.24 kg/m2 37.24 kg/m2 36.4 kg/m2 37.16 kg/m2 36.59 kg/m2 37.31 kg/m2 38.02 kg/m2            Problem List Items Addressed This Visit    None  Visit Diagnoses       Primary osteoarthritis of left knee    -  Primary    Relevant Medications    sodium hyaluronate (SUPARTZ FX/EUFLEXXA/HYALGAN) 10 mg/mL injection syrg 20 mg (Start on 10/17/2022  3:00 PM)    Other Relevant Orders    ARTHROCENTESIS ASPIR&/INJ MAJOR JT/BURSA W/US    Primary osteoarthritis of right knee        Relevant Medications    sodium hyaluronate (SUPARTZ FX/EUFLEXXA/HYALGAN) 10 mg/mL injection syrg 20 mg (Start on 10/17/2022  3:00 PM)    Other Relevant Orders    ARTHROCENTESIS ASPIR&/INJ MAJOR JT/BURSA W/US            PAST MEDICAL HISTORY:   Past Medical History:   Diagnosis Date    Atypical chest pain/mild nonobstructive CAD/EF 65% 4/12/2011    BPH without obstruction/lower urinary tract symptoms     Bursitis of right shoulder     CAD (coronary artery disease)     Chest pain     history of hospitalization with chest pain and a negative workup in 2008    Chronic edema     Constipation     die to medication    Coronary artery disease     mild, non obstructive/EF 65%    Depression 12/16/2018    Dyspnea on exertion     Echocardiogram 12/16/08    IVC dilated; suboptimal endocardial border; EF 65%    ED (erectile dysfunction)     Elevated PSA     Frequency     GERD (gastroesophageal reflux disease)     Gout     H/O cystoscopy 06/20/2013    Hematuria, unspecified     Hypercholesterolemia     Hypertension     Hypertension      Hypogonadism male     Impotence of organic origin     Left ventricular diastolic dysfunction     Malignant neoplasm of prostate (HCC)     hx of t1a, izzy 6, 5 % of 1  core    Morbid obesity ([address]     Myocardial perfusion 09/05/08    Basal inferior defect c/w artifact; EF 63%    Overactive bladder     Prostate cancer ([address] 2/9/2017    S/P cardiac cath 07/30/10    oD2-40%; pRCA-20-30%; EF 65%    S/P gastric surgery 8/28/2018    Sleep apnea     Slowing of urinary stream     Type 2 diabetes with nephropathy ([facility] 9/27/2018    Type II or unspecified type diabetes mellitus without mention of complication, not stated as uncontrolled        PAST SURGICAL HISTORY:   Past Surgical History:   Procedure Laterality Date    COLONOSCOPY N/A 9/18/2019    COLONOSCOPY performed by Ke Boyce MD at [facility]    [id number]  7/30/10    HX OTHER SURGICAL  06/27/13    Prostate    HX TONSILLECTOMY      NM COLONOSCOPY FLX DX W/COLLJ SPEC WHEN PFRMD      NM I & D ABSC XTRAORAL SOFT TISS COMP         ALLERGIES:   Allergies   Allergen Reactions    Iodine Other (comments)     Eyes swell shut      Shellfish Containing Products Swelling        CURRENT MEDICATIONS:  A list of medications prior to the time of admission include:  Prior to Admission medications    Medication Sig Start Date End Date Taking? Authorizing Provider   diclofenac EC (VOLTAREN) 75 mg EC tablet Take 1 Tablet by mouth two (2) times a day.  9/28/22   Ligia Oconnor PA-C   furosemide (LASIX) 40 mg tablet USE DAILY AS NEEDED INDICATIONS: ACCUMULATION OF FLUID RESULTING FROM CHRONIC HEART FAILURE  Indications: accumulation of fluid resulting from chronic heart failure 9/15/22   Mary Maya MD   amLODIPine (NORVASC) 5 mg tablet TAKE 1 TABLET BY MOUTH EVERY DAY 9/14/22   Shannan Camacho NP   Farxiga 10 mg tab tablet TAKE 1 TABLET BY MOUTH EVERY DAY 9/6/22   Shannan Camacho NP   trospium (SANCTURA) 20 mg tablet TAKE 1 TABLET BY MOUTH EVERY DAY 9/2/22   Vinayak Page MD   atorvastatin (LIPITOR) 80 mg tablet TAKE 1 TABLET BY MOUTH EVERY DAY 6/27/22   Swathi Wolf MD   hydrALAZINE (APRESOLINE) 100 mg tablet TAKE 1 TABLET BY MOUTH THREE TIMES A DAY 3/28/22   Swathi Wolf MD   omeprazole (PRILOSEC) 40 mg capsule TAKE 1 CAPSULE BY MOUTH TWICE A DAY 3/7/22   Nicci Rodas NP   allopurinoL (ZYLOPRIM) 100 mg tablet TAKE 1 TABLET BY MOUTH EVERY DAY 22   Kain Box NP   carvediloL (COREG) 12.5 mg tablet Take 1 Tablet by mouth two (2) times daily (with meals). 1/10/22   Genny Torres MD   Blood Pressure Monitor (Blood Pressure Kit) kit 1 Device by Does Not Apply route as needed (htn). 1/10/22   Genny Torres MD   comprs. stocking,thigh,long,med (Comp. Stocking,Thigh,Long,Med.) misc 2 Packages by Does Not Apply route daily as needed (edema). Remove when lying down 1/10/22   Genny Torres MD   isosorbide mononitrate ER (IMDUR) 60 mg CR tablet Take 1 Tablet by mouth daily. 21   Genny Torres MD   nitroglycerin (NITROSTAT) 0.4 mg SL tablet 1 TAB BY SUBLINGUAL ROUTE EVERY FIVE (5) MINUTES AS NEEDED FOR CHEST PAIN FOR UP TO 3 DOSES. 21   Genny Torres MD   acetaminophen (Acetaminophen Extra Strength) 500 mg tablet Take  by mouth every six (6) hours as needed for Pain. Provider, Historical   calcium carbonate (TUMS) 200 mg calcium (500 mg) chew Take 1 Tablet by mouth daily. As needed    Provider, Historical   aspirin 81 mg chewable tablet Take 1 Tab by mouth two (2) times a day. Patient taking differently: Take 81 mg by mouth daily.  18   Aida Navarro MD       FAMILY HISTORY:   Family History   Problem Relation Age of Onset    Hypertension Mother     High Cholesterol Mother     Breast Cancer Mother     Diabetes Mother     Heart Disease Mother     OSTEOARTHRITIS Mother     High Cholesterol Father     Hypertension Father     Diabetes Father     Heart Disease Father     OSTEOARTHRITIS Father        SOCIAL HISTORY:   Social History     Socioeconomic History    Marital status: SINGLE   Tobacco Use    Smoking status: Former     Types: Cigars     Quit date: 10/4/1999     Years since quittin.0    Smokeless tobacco: Never   Vaping Use    Vaping Use: Never used   Substance and Sexual Activity    Alcohol use: Never     Comment: rarely    Drug use: No    Sexual activity: Not Currently       ROS:No CP, No SOB, No fever/chills nor night sweats. No headaches, vision abnormalities to include double and or loss of vision. No dizziness. No hearing abnormalities. No Chest Pain nor Shortness of breath. Pt denies h/o spinal surgery, injections, or PT/chiropractor. Patient has attempted self treatment with less than adequate relief on oral and topical analgesic / anti inflammatory medications . Pt denies change in bowel or bladder habits. No saddle paresthesia / anesthesia. Pt denies fever, unplanned weight loss / weight gains, and no skin changes. Musculoskeletal pain per HPI. Pain is exacerbated positionally. PHYSICAL EXAM:        Constitutional: Appears well-developed and well-nourished. No distress. Sitting comfortably in the exam room, interacting with conversation with pleasant affect. Gait appears steady and patient exhibits no evidence of ataxia. Patient is able to ambulate with caution. No focal neurological deficit noted. No facial droop, slurred speech, or evidence of altered mentation noted on exam.   Skin: Skin over the head, neck, bilateral limbs, and trunk is warm and dry. No rash or erythema noted. Cranial Nerves II-XII grossly intact  HENT: NC/AT. Normal symmetry, bulk and tone of facial and neck musculature. Trachea midline. No discernible thyromegaly or masses. No involuntary movements. Lymphatic: No preauricular, submandibuar, anterior or posterior cervical lymphadenopathy. Psychiatric: The patient is awake, alert, and oriented to person, place and time. Behavior is normal. Thought content normal.   Cardiovascular: No clubbing, cyanosis. No edema bilateral lower extremities. Pulmonary: No tripoding nor accessory muscle recruitment. Breathing normally, no distress, no audible wheezing. Distal cap refill intact at 2/2 Ferdinand UE / LE. Neuro intact Ferdinand UE/LE to noxious stimuli        Ortho Specific exam:    Left knee reveals a trace effusion.   No warmth, erythema, edema, or ecchymosis. Active range of motion 100 degrees -10. He has a varus deformity in extension plane. MCL LCL intact no laxity pain or giveaway    ACL PCL intact no laxity pain or giveaway. X-ray: [facility] historical tricompartmental osteoarthritis of the left knee      Right knee reveals skin intact. No warmth, erythema, edema, or ecchymosis. Trace effusion. Active range of motion 110 degrees -5. No instability on varus valgus stressing. ACL PCL intact no laxity pain or giveaway. IMPRESSION:      ICD-10-CM ICD-9-CM    1. Primary osteoarthritis of left knee  M17.12 715.16 ARTHROCENTESIS ASPIR&/INJ MAJOR JT/BURSA W/US      sodium hyaluronate (SUPARTZ FX/EUFLEXXA/HYALGAN) 10 mg/mL injection syrg 20 mg      2. Primary osteoarthritis of right knee  M17.11 715.16 ARTHROCENTESIS ASPIR&/INJ MAJOR JT/BURSA W/US      sodium hyaluronate (SUPARTZ FX/EUFLEXXA/HYALGAN) 10 mg/mL injection syrg 20 mg           PLAN: Today we discussed alternatives care to include but not limited to continuation of viscosupplementation in the form of Euflexxa to the left knee and initiation of care with Euflexxa to his right knee. Patient agreed with plan of care. Patient return to the office in 1 week for continuity of care. Procedural: Using sterile technique and verbal and written consent were obtained and appropriate timeout formed patient laying supine left knee flexed at 20 degrees on a bolster 2 cc of Euflexxa injected using superior lateral interarticular approach. The patient was then repositioned with his right knee flexed at 20 degrees on a bolster 2 cc Euflexxa was injected using superior lateral interarticular approach. There were no complications. Patient tolerated the procedure well. Please Note:        [employer] using a frequency of 10MHz with a 12L-RS transducer head was used to confirm needle placement.   Ultrasound images captured using [employer] Ultrasound machine and scanned into patient's chart. Chart reviewed for the following:   Sisi Oconnor PA-C, have reviewed the History, Physical and updated the Allergic reactions for [address] performed immediately prior to start of procedure:   Erendira JENSEN PA-C, have performed the following reviews on [place] prior to the start of the procedure:            * Patient was identified by name and date of birth   * Agreement on procedure being performed was verified  * Risks and Benefits explained to the patient  * Procedure site verified and marked as necessary  * Patient was positioned for comfort  * Consent was signed and verified             Date of procedure: 10/17/22    Time: 2 PM    Procedure performed by:  Destini Schmidt PA-C    Provider assisted by: None     Patient assisted by: self    How tolerated by patient: tolerated the procedure well with no complications    Comments: none    The patient is asked to continue to rest the area for a few more days before resuming regular activities. It may be more painful for the first 1-2 days. Watch for fever, or increased swelling or persistent pain in the joint. Call or return to clinic prn if such symptoms occur or there is failure to improve as anticipated. Chart reviewed for the following:   Sisi Oconnor PA-C, have reviewed the History, Physical and updated the Allergic reactions for [address] performed immediately prior to start of procedure:   Erendira JENSEN PA-C, have performed the following reviews on [place] prior to the start of the procedure:            * Patient was identified by name and date of birth   * Agreement on procedure being performed was verified  * Risks and Benefits explained to the patient  * Procedure site verified and marked as necessary  * Patient was positioned for comfort  * Consent was signed and verified             Date of procedure: 10/17/22    Time: 2 PM    Procedure performed by:  Anika Guillen SILVIA Oconnor    Provider assisted by: None     Patient assisted by: self    How tolerated by patient: tolerated the procedure well with no complications    Comments: none    The patient is asked to continue to rest the area for a few more days before resuming regular activities. It may be more painful for the first 1-2 days. Watch for fever, or increased swelling or persistent pain in the joint. Call or return to clinic prn if such symptoms occur or there is failure to improve as anticipated. Additionally today we discussed the diagnosis of obesity and the importance of weight management for both cardiovascular health. The patient was recommended to decrease carbohydrate and sugar intake. Patient recommended a formal dietary consult which they will consider and return a call to our office. In light of the patient's osteoarthritic findings I am making a recommendation for aerobic exercise to include but not limited to stationary bicycle, elliptical, therapeutic walking with good shoes and or swimming. Patient should avoid any running or jumping. If using the treadmill then recommendation for no elevation and no running or jogging. Care plan outlined and precautions discussed. Results were reviewed with the patient. All medications were reviewed with the patient. All of pt's questions and concerns were addressed. Alarm symptoms and return precautions associated with chief complaint and evaluation were reviewed with the patient in detail. The patient demonstrated adequate understanding. The patient expresses willing compliance with the treatment plan. Special note: Medication management discussed in detail all patient's questions answered to their satisfaction. No Narcotic indicated today. Patient given pain medication for short term acute pain relief. Goal is to treat patient according to above plan and to ultimately have patient off all pain medications once appropriate.  If chronic pain management is required beyond what is expected for current orthopedic problem, will refer patient to pain management.  was reviewed and will be reviewed with every medication refill request.         Patient provided a reminder for a \"due or due soon\" health maintenance. I have asked the patient to schedule an appointment with their primary care provider for follow-up on general health maintenance concerns. Today all the patient's questions were answered to their satisfaction. Copies of x-rays reviewed if obtained this visit, and provided to patient. Dictation disclaimer:  Please note that this dictation was completed with [employer], the computer voice recognition software. Quite often unanticipated grammatical, syntax, homophones, and other interpretive errors are inadvertently transcribed by the computer software. Please disregard these errors. Please excuse any errors that have escaped final proofreading. Pablo MICHELLE, APC, MPAS, PA-C  [facility]

## 2022-10-25 ENCOUNTER — OFFICE VISIT (OUTPATIENT)
Dept: ORTHOPEDIC SURGERY | Age: 67
End: 2022-10-25
Payer: COMMERCIAL

## 2022-10-25 VITALS
WEIGHT: 270 LBS | HEART RATE: 91 BPM | TEMPERATURE: 98 F | OXYGEN SATURATION: 96 % | HEIGHT: 71 IN | BODY MASS INDEX: 37.8 KG/M2

## 2022-10-25 DIAGNOSIS — M17.11 PRIMARY OSTEOARTHRITIS OF RIGHT KNEE: Primary | ICD-10-CM

## 2022-10-25 DIAGNOSIS — M17.12 PRIMARY OSTEOARTHRITIS OF LEFT KNEE: ICD-10-CM

## 2022-10-25 PROCEDURE — 20611 DRAIN/INJ JOINT/BURSA W/US: CPT | Performed by: PHYSICIAN ASSISTANT

## 2022-10-25 NOTE — PROGRESS NOTES
Patient: Argelia Rios                MRN: [id number]       SSN: [id number]  YOB: 1955        AGE: 74 y.o. SEX: male          PCP: Felix Wooten MD  10/25/22    10/25/2022: Patient returns the office for third Euflexxa for his left knee and second for his right. Generally he is done well since receiving both injections last week. 10/17/2022: Patient returns to the office for continuation of Euflexxa to his left knee. He was approved for his right knee but wished to hold off starting injection at his last visit. Today he has had some pain in his right knee and would like to begin Euflexxa viscosupplementation. HISTORY:  Argelia Rios is a 74 y.o. male returns the office for continuity of care regarding his left knee. Previously was seen for left knee pain with osteoarthritis found radiographically and is status post effusion with placement of cortisone following. Patient generally is doing well up. He has only pain in his left knee when he stands for lengthy periods of time and walks distance. He does notice some pain when he stands from a sitting position and sits from a standing position. He is approved for Euflexxa associated with the left knee and would like to proceed with dosing today.       Pain Assessment  10/25/2022   Location of Pain Knee   Location Modifiers Left;Right   Severity of Pain 2   Quality of Pain Aching   Quality of Pain Comment -   Duration of Pain -   Duration of Pain Comment -   Frequency of Pain Intermittent   Frequency of Pain Comment -   Date Pain First Started (No Data)   Date Pain First Started Comment f/u   Aggravating Factors -   Aggravating Factors Comment -   Limiting Behavior No   Relieving Factors Rest   Relieving Factors Comment -   Result of Injury No   Work-Related Injury -   Type of Injury -           Lab Results   Component Value Date/Time    Hemoglobin A1c 5.5 12/15/2021 01:33 PM    Hemoglobin A1c (POC) 5.4 08/18/2022 01:02 PM     Weight Metrics 10/25/2022 10/17/2022 10/10/2022 9/28/2022 9/21/2022 8/18/2022 7/28/2022   Weight 270 lb 267 lb 267 lb 261 lb 259 lb 255 lb 260 lb   BMI 37.66 kg/m2 37.24 kg/m2 37.24 kg/m2 36.4 kg/m2 37.16 kg/m2 36.59 kg/m2 37.31 kg/m2            Problem List Items Addressed This Visit    None  Visit Diagnoses       Primary osteoarthritis of right knee    -  Primary    Relevant Medications    sodium hyaluronate (SUPARTZ FX/EUFLEXXA/HYALGAN) 10 mg/mL injection syrg 20 mg (Start on 10/25/2022  4:00 PM)    Other Relevant Orders    ARTHROCENTESIS ASPIR&/INJ MAJOR JT/BURSA W/US    Primary osteoarthritis of left knee        Relevant Medications    sodium hyaluronate (SUPARTZ FX/EUFLEXXA/HYALGAN) 10 mg/mL injection syrg 20 mg (Start on 10/25/2022  4:00 PM)    Other Relevant Orders    ARTHROCENTESIS ASPIR&/INJ MAJOR JT/BURSA W/US            PAST MEDICAL HISTORY:   Past Medical History:   Diagnosis Date    Atypical chest pain/mild nonobstructive CAD/EF 65% 4/12/2011    BPH without obstruction/lower urinary tract symptoms     Bursitis of right shoulder     CAD (coronary artery disease)     Chest pain     history of hospitalization with chest pain and a negative workup in 2008    Chronic edema     Constipation     die to medication    Coronary artery disease     mild, non obstructive/EF 65%    Depression 12/16/2018    Dyspnea on exertion     Echocardiogram 12/16/08    IVC dilated; suboptimal endocardial border; EF 65%    ED (erectile dysfunction)     Elevated PSA     Frequency     GERD (gastroesophageal reflux disease)     Gout     H/O cystoscopy 06/20/2013    Hematuria, unspecified     Hypercholesterolemia     Hypertension     Hypertension      Hypogonadism male     Impotence of organic origin     Left ventricular diastolic dysfunction     Malignant neoplasm of prostate (HCC)     hx of t1a, izzy 6, 5 % of 1  core    Morbid obesity ([facility] [address]     Myocardial perfusion 09/05/08    Basal inferior defect c/w artifact; EF 63%    Overactive bladder     Prostate cancer ([facility] [address] 2/9/2017    S/P cardiac cath 07/30/10    oD2-40%; pRCA-20-30%; EF 65%    S/P gastric surgery 8/28/2018    Sleep apnea     Slowing of urinary stream     Type 2 diabetes with nephropathy ([facility] [address] 9/27/2018    Type II or unspecified type diabetes mellitus without mention of complication, not stated as uncontrolled        PAST SURGICAL HISTORY:   Past Surgical History:   Procedure Laterality Date    COLONOSCOPY N/A 9/18/2019    COLONOSCOPY performed by Nicolasa Rubi MD at [facility]    [id number]  7/30/10    HX OTHER SURGICAL  06/27/13    Prostate    HX TONSILLECTOMY      CT COLONOSCOPY FLX DX W/COLLJ SPEC WHEN PFRMD      CT I & D ABSC XTRAORAL SOFT TISS COMP         ALLERGIES:   Allergies   Allergen Reactions    Iodine Other (comments)     Eyes swell shut      Shellfish Containing Products Swelling        CURRENT MEDICATIONS:  A list of medications prior to the time of admission include:  Prior to Admission medications    Medication Sig Start Date End Date Taking? Authorizing Provider   diclofenac EC (VOLTAREN) 75 mg EC tablet Take 1 Tablet by mouth two (2) times a day.  9/28/22   Ke Oconnor PA-C   furosemide (LASIX) 40 mg tablet USE DAILY AS NEEDED INDICATIONS: ACCUMULATION OF FLUID RESULTING FROM CHRONIC HEART FAILURE  Indications: accumulation of fluid resulting from chronic heart failure 9/15/22   Anatoly Maya MD   amLODIPine (NORVASC) 5 mg tablet TAKE 1 TABLET BY MOUTH EVERY DAY 9/14/22   Shannan Camacho NP   Farxiga 10 mg tab tablet TAKE 1 TABLET BY MOUTH EVERY DAY 9/6/22   Shannan Camacho NP   trospium (SANCTURA) 20 mg tablet TAKE 1 TABLET BY MOUTH EVERY DAY 9/2/22   Maddie Feldman MD   atorvastatin (LIPITOR) 80 mg tablet TAKE 1 TABLET BY MOUTH EVERY DAY 6/27/22   Juvenal Germain MD   hydrALAZINE (APRESOLINE) 100 mg tablet TAKE 1 TABLET BY MOUTH THREE TIMES A DAY 3/28/22   Juvenal Germain MD omeprazole (PRILOSEC) 40 mg capsule TAKE 1 CAPSULE BY MOUTH TWICE A DAY 3/7/22   Shivani Apple, NP   allopurinoL (ZYLOPRIM) 100 mg tablet TAKE 1 TABLET BY MOUTH EVERY DAY 22   Shivani Apple, NP   carvediloL (COREG) 12.5 mg tablet Take 1 Tablet by mouth two (2) times daily (with meals). 1/10/22   Kyele Bashir MD   Blood Pressure Monitor (Blood Pressure Kit) kit 1 Device by Does Not Apply route as needed (htn). 1/10/22   Kylee Bashir MD   comprs. stocking,thigh,long,med (Comp. Stocking,Thigh,Long,Med.) misc 2 Packages by Does Not Apply route daily as needed (edema). Remove when lying down 1/10/22   Kylee Bashir MD   isosorbide mononitrate ER (IMDUR) 60 mg CR tablet Take 1 Tablet by mouth daily. 21   Kylee Bashir MD   nitroglycerin (NITROSTAT) 0.4 mg SL tablet 1 TAB BY SUBLINGUAL ROUTE EVERY FIVE (5) MINUTES AS NEEDED FOR CHEST PAIN FOR UP TO 3 DOSES. 21   Kylee Bashir MD   acetaminophen (Acetaminophen Extra Strength) 500 mg tablet Take  by mouth every six (6) hours as needed for Pain. Provider, Historical   calcium carbonate (TUMS) 200 mg calcium (500 mg) chew Take 1 Tablet by mouth daily. As needed    Provider, Historical   aspirin 81 mg chewable tablet Take 1 Tab by mouth two (2) times a day. Patient taking differently: Take 81 mg by mouth daily.  18   Pearl Whitney MD       FAMILY HISTORY:   Family History   Problem Relation Age of Onset    Hypertension Mother     High Cholesterol Mother     Breast Cancer Mother     Diabetes Mother     Heart Disease Mother     OSTEOARTHRITIS Mother     High Cholesterol Father     Hypertension Father     Diabetes Father     Heart Disease Father     OSTEOARTHRITIS Father        SOCIAL HISTORY:   Social History     Socioeconomic History    Marital status: SINGLE   Tobacco Use    Smoking status: Former     Types: Cigars     Quit date: 10/4/1999     Years since quittin.0    Smokeless tobacco: Never   Vaping Use    Vaping Use: Never used   Substance and Sexual Activity    Alcohol use: Never     Comment: rarely    Drug use: No    Sexual activity: Not Currently       ROS:No CP, No SOB, No fever/chills nor night sweats. No headaches, vision abnormalities to include double and or loss of vision. No dizziness. No hearing abnormalities. No Chest Pain nor Shortness of breath. Pt denies h/o spinal surgery, injections, or PT/chiropractor. Patient has attempted self treatment with less than adequate relief on oral and topical analgesic / anti inflammatory medications . Pt denies change in bowel or bladder habits. No saddle paresthesia / anesthesia. Pt denies fever, unplanned weight loss / weight gains, and no skin changes. Musculoskeletal pain per HPI. Pain is exacerbated positionally. PHYSICAL EXAM:        Constitutional: Appears well-developed and well-nourished. No distress. Sitting comfortably in the exam room, interacting with conversation with pleasant affect. Gait appears steady and patient exhibits no evidence of ataxia. Patient is able to ambulate with caution. No focal neurological deficit noted. No facial droop, slurred speech, or evidence of altered mentation noted on exam.   Skin: Skin over the head, neck, bilateral limbs, and trunk is warm and dry. No rash or erythema noted. Cranial Nerves II-XII grossly intact  HENT: NC/AT. Normal symmetry, bulk and tone of facial and neck musculature. Trachea midline. No discernible thyromegaly or masses. No involuntary movements. Lymphatic: No preauricular, submandibuar, anterior or posterior cervical lymphadenopathy. Psychiatric: The patient is awake, alert, and oriented to person, place and time. Behavior is normal. Thought content normal.   Cardiovascular: No clubbing, cyanosis. No edema bilateral lower extremities. Pulmonary: No tripoding nor accessory muscle recruitment. Breathing normally, no distress, no audible wheezing.        Distal cap refill intact at 2/2 Ferdinand UE / LE. Neuro intact Ferdinand UE/LE to noxious stimuli        Ortho Specific exam:    Left knee reveals a trace effusion. No warmth, erythema, edema, or ecchymosis. Active range of motion 100 degrees -10. He has a varus deformity in extension plane. MCL LCL intact no laxity pain or giveaway    ACL PCL intact no laxity pain or giveaway. X-ray: [facility] historical tricompartmental osteoarthritis of the left knee      Right knee reveals skin intact. No warmth, erythema, edema, or ecchymosis. Trace effusion. Active range of motion 110 degrees -5. No instability on varus valgus stressing. ACL PCL intact no laxity pain or giveaway. IMPRESSION:      ICD-10-CM ICD-9-CM    1. Primary osteoarthritis of right knee  M17.11 715.16 sodium hyaluronate (SUPARTZ FX/EUFLEXXA/HYALGAN) 10 mg/mL injection syrg 20 mg      ARTHROCENTESIS ASPIR&/INJ MAJOR JT/BURSA W/US      2. Primary osteoarthritis of left knee  M17.12 715.16 sodium hyaluronate (SUPARTZ FX/EUFLEXXA/HYALGAN) 10 mg/mL injection syrg 20 mg      ARTHROCENTESIS ASPIR&/INJ MAJOR JT/BURSA W/US           PLAN: Today we discussed alternatives care to include but not limited to of Euflexxa to his right knee. Patient agreed with plan of care. Patient return to the office in 1 week for continuity of care. Procedural: Using sterile technique and verbal and written consent were obtained appropriate timeout formed patient sitting at bedside both knees flexed at 90 degrees using the anterior medial interarticular approaches 2 cc of Euflexxa injected with no complications. Please Note:        [address] using a frequency of 10MHz with a 12L-RS transducer head was used to confirm needle placement. Ultrasound images captured using [address] [employer] [facility] Loop Ultrasound machine and scanned into patient's chart. Chart reviewed for the following:   Paula Oconnor PA-C, have reviewed the History, Physical and updated the Allergic reactions for [address] performed immediately prior to start of procedure:   Héctor JENSEN PA-C, have performed the following reviews on Peggye Buys prior to the start of the procedure:            * Patient was identified by name and date of birth   * Agreement on procedure being performed was verified  * Risks and Benefits explained to the patient  * Procedure site verified and marked as necessary  * Patient was positioned for comfort  * Consent was signed and verified             Date of procedure: 10/25/22    Time: 3 PM    Procedure performed by:  Lu Rivera PA-C    Provider assisted by: None     Patient assisted by: self    How tolerated by patient: tolerated the procedure well with no complications    Comments: none    The patient is asked to continue to rest the area for a few more days before resuming regular activities. It may be more painful for the first 1-2 days. Watch for fever, or increased swelling or persistent pain in the joint. Call or return to clinic prn if such symptoms occur or there is failure to improve as anticipated. Chart reviewed for the following:   Francesca Oconnor PA-C, have reviewed the History, Physical and updated the Allergic reactions for [address] performed immediately prior to start of procedure:   Héctor JENSEN PA-C, have performed the following reviews on Peggye Buys prior to the start of the procedure:            * Patient was identified by name and date of birth   * Agreement on procedure being performed was verified  * Risks and Benefits explained to the patient  * Procedure site verified and marked as necessary  * Patient was positioned for comfort  * Consent was signed and verified             Date of procedure: 10/25/22    Time: 2 PM    Procedure performed by:  Lu Rivera PA-C    Provider assisted by: None     Patient assisted by: self    How tolerated by patient: tolerated the procedure well with no complications    Comments: none    The patient is asked to continue to rest the area for a few more days before resuming regular activities. It may be more painful for the first 1-2 days. Watch for fever, or increased swelling or persistent pain in the joint. Call or return to clinic prn if such symptoms occur or there is failure to improve as anticipated. Additionally today we discussed the diagnosis of obesity and the importance of weight management for both cardiovascular health. The patient was recommended to decrease carbohydrate and sugar intake. Patient recommended a formal dietary consult which they will consider and return a call to our office. In light of the patient's osteoarthritic findings I am making a recommendation for aerobic exercise to include but not limited to stationary bicycle, elliptical, therapeutic walking with good shoes and or swimming. Patient should avoid any running or jumping. If using the treadmill then recommendation for no elevation and no running or jogging. Care plan outlined and precautions discussed. Results were reviewed with the patient. All medications were reviewed with the patient. All of pt's questions and concerns were addressed. Alarm symptoms and return precautions associated with chief complaint and evaluation were reviewed with the patient in detail. The patient demonstrated adequate understanding. The patient expresses willing compliance with the treatment plan. Special note: Medication management discussed in detail all patient's questions answered to their satisfaction. No Narcotic indicated today. Patient given pain medication for short term acute pain relief. Goal is to treat patient according to above plan and to ultimately have patient off all pain medications once appropriate. If chronic pain management is required beyond what is expected for current orthopedic problem, will refer patient to pain management.   was reviewed and will be reviewed with every medication refill request.         Patient provided a reminder for a \"due or due soon\" health maintenance. I have asked the patient to schedule an appointment with their primary care provider for follow-up on general health maintenance concerns. Today all the patient's questions were answered to their satisfaction. Copies of x-rays reviewed if obtained this visit, and provided to patient. Dictation disclaimer:  Please note that this dictation was completed with [employer], the computer voice recognition software. Quite often unanticipated grammatical, syntax, homophones, and other interpretive errors are inadvertently transcribed by the computer software. Please disregard these errors. Please excuse any errors that have escaped final proofreading. Isidro MICHELLE, APC, MPAS, PA-C  [facility]

## 2022-10-31 ENCOUNTER — OFFICE VISIT (OUTPATIENT)
Dept: ORTHOPEDIC SURGERY | Age: 67
End: 2022-10-31
Payer: COMMERCIAL

## 2022-10-31 VITALS
BODY MASS INDEX: 38.22 KG/M2 | WEIGHT: 273 LBS | HEART RATE: 98 BPM | TEMPERATURE: 98.9 F | HEIGHT: 71 IN | OXYGEN SATURATION: 94 %

## 2022-10-31 DIAGNOSIS — M17.11 PRIMARY OSTEOARTHRITIS OF RIGHT KNEE: Primary | ICD-10-CM

## 2022-10-31 DIAGNOSIS — M17.11 ARTHRITIS OF KNEE, RIGHT: ICD-10-CM

## 2022-10-31 PROCEDURE — 20611 DRAIN/INJ JOINT/BURSA W/US: CPT | Performed by: PHYSICIAN ASSISTANT

## 2022-10-31 NOTE — PROGRESS NOTES
Patient: Stephy Johnson                MRN: [id number]       SSN: [id number]  YOB: 1955        AGE: 74 y.o. SEX: male          PCP: Vasyl Tinsley MD  10/31/22    10/31/2022: Patient returns the office for third in a series of Euflexxa injections to his right knee. 10/25/2022: Patient returns the office for third Euflexxa for his left knee and second for his right. Generally he is done well since receiving both injections last week. 10/17/2022: Patient returns to the office for continuation of Euflexxa to his left knee. He was approved for his right knee but wished to hold off starting injection at his last visit. Today he has had some pain in his right knee and would like to begin Euflexxa viscosupplementation. HISTORY:  Stephy Johnson is a 74 y.o. male returns the office for continuity of care regarding his left knee. Previously was seen for left knee pain with osteoarthritis found radiographically and is status post effusion with placement of cortisone following. Patient generally is doing well up. He has only pain in his left knee when he stands for lengthy periods of time and walks distance. He does notice some pain when he stands from a sitting position and sits from a standing position. He is approved for Euflexxa associated with the left knee and would like to proceed with dosing today.       Pain Assessment  10/25/2022   Location of Pain Knee   Location Modifiers Left;Right   Severity of Pain 2   Quality of Pain Aching   Quality of Pain Comment -   Duration of Pain -   Duration of Pain Comment -   Frequency of Pain Intermittent   Frequency of Pain Comment -   Date Pain First Started (No Data)   Date Pain First Started Comment f/u   Aggravating Factors -   Aggravating Factors Comment -   Limiting Behavior No   Relieving Factors Rest   Relieving Factors Comment -   Result of Injury No   Work-Related Injury -   Type of Injury - Lab Results   Component Value Date/Time    Hemoglobin A1c 5.5 12/15/2021 01:33 PM    Hemoglobin A1c (POC) 5.4 08/18/2022 01:02 PM     Weight Metrics 10/25/2022 10/17/2022 10/10/2022 9/28/2022 9/21/2022 8/18/2022 7/28/2022   Weight 270 lb 267 lb 267 lb 261 lb 259 lb 255 lb 260 lb   BMI 37.66 kg/m2 37.24 kg/m2 37.24 kg/m2 36.4 kg/m2 37.16 kg/m2 36.59 kg/m2 37.31 kg/m2            Problem List Items Addressed This Visit    None      PAST MEDICAL HISTORY:   Past Medical History:   Diagnosis Date    Atypical chest pain/mild nonobstructive CAD/EF 65% 4/12/2011    BPH without obstruction/lower urinary tract symptoms     Bursitis of right shoulder     CAD (coronary artery disease)     Chest pain     history of hospitalization with chest pain and a negative workup in 2008    Chronic edema     Constipation     die to medication    Coronary artery disease     mild, non obstructive/EF 65%    Depression 12/16/2018    Dyspnea on exertion     Echocardiogram 12/16/08    IVC dilated; suboptimal endocardial border; EF 65%    ED (erectile dysfunction)     Elevated PSA     Frequency     GERD (gastroesophageal reflux disease)     Gout     H/O cystoscopy 06/20/2013    Hematuria, unspecified     Hypercholesterolemia     Hypertension     Hypertension      Hypogonadism male     Impotence of organic origin     Left ventricular diastolic dysfunction     Malignant neoplasm of prostate (HCC)     hx of t1a, izzy 6, 5 % of 1  core    Morbid obesity ([address]     Myocardial perfusion 09/05/08    Basal inferior defect c/w artifact; EF 63%    Overactive bladder     Prostate cancer ([address] 2/9/2017    S/P cardiac cath 07/30/10    oD2-40%; pRCA-20-30%; EF 65%    S/P gastric surgery 8/28/2018    Sleep apnea     Slowing of urinary stream     Type 2 diabetes with nephropathy ([address] 9/27/2018    Type II or unspecified type diabetes mellitus without mention of complication, not stated as uncontrolled        PAST SURGICAL HISTORY:   Past Surgical History:   Procedure Laterality Date    COLONOSCOPY N/A 9/18/2019    COLONOSCOPY performed by Keo Pickering MD at [address]  7/30/10    HX OTHER SURGICAL  06/27/13    Prostate    HX TONSILLECTOMY      [place] COLONOSCOPY FLX DX W/COLLJ SPEC WHEN PFRMD      [place] I & D ABSC XTRAORAL SOFT TISS COMP         ALLERGIES:   Allergies   Allergen Reactions    Iodine Other (comments)     Eyes swell shut      Shellfish Containing Products Swelling        CURRENT MEDICATIONS:  A list of medications prior to the time of admission include:  Prior to Admission medications    Medication Sig Start Date End Date Taking? Authorizing Provider   diclofenac EC (VOLTAREN) 75 mg EC tablet Take 1 Tablet by mouth two (2) times a day. 9/28/22   Christal Oconnor PA-C   furosemide (LASIX) 40 mg tablet USE DAILY AS NEEDED INDICATIONS: ACCUMULATION OF FLUID RESULTING FROM CHRONIC HEART FAILURE  Indications: accumulation of fluid resulting from chronic heart failure 9/15/22   Micky Maya MD   amLODIPine (NORVASC) 5 mg tablet TAKE 1 TABLET BY MOUTH EVERY DAY 9/14/22   Shannan Camacho NP   Farxiga 10 mg tab tablet TAKE 1 TABLET BY MOUTH EVERY DAY 9/6/22   Shannan Camacho NP   trospium (SANCTURA) 20 mg tablet TAKE 1 TABLET BY MOUTH EVERY DAY 9/2/22   Chriss Dolan MD   atorvastatin (LIPITOR) 80 mg tablet TAKE 1 TABLET BY MOUTH EVERY DAY 6/27/22   Linda Meraz MD   hydrALAZINE (APRESOLINE) 100 mg tablet TAKE 1 TABLET BY MOUTH THREE TIMES A DAY 3/28/22   Linda Meraz MD   omeprazole (PRILOSEC) 40 mg capsule TAKE 1 CAPSULE BY MOUTH TWICE A DAY 3/7/22   Juan Manuel Mann NP   allopurinoL (ZYLOPRIM) 100 mg tablet TAKE 1 TABLET BY MOUTH EVERY DAY 2/2/22   Juan Manuel Mann NP   carvediloL (COREG) 12.5 mg tablet Take 1 Tablet by mouth two (2) times daily (with meals). 1/10/22   Linda Meraz MD   Blood Pressure Monitor (Blood Pressure Kit) kit 1 Device by Does Not Apply route as needed (htn). 1/10/22   Genny Torres MD   comprs. stocking,thigh,long,med (Comp. Stocking,Thigh,Long,Med.) misc 2 Packages by Does Not Apply route daily as needed (edema). Remove when lying down 1/10/22   Genny Torres MD   isosorbide mononitrate ER (IMDUR) 60 mg CR tablet Take 1 Tablet by mouth daily. 21   Genny Torres MD   nitroglycerin (NITROSTAT) 0.4 mg SL tablet 1 TAB BY SUBLINGUAL ROUTE EVERY FIVE (5) MINUTES AS NEEDED FOR CHEST PAIN FOR UP TO 3 DOSES. 21   Genny Torres MD   acetaminophen (Acetaminophen Extra Strength) 500 mg tablet Take  by mouth every six (6) hours as needed for Pain. Provider, Historical   calcium carbonate (TUMS) 200 mg calcium (500 mg) chew Take 1 Tablet by mouth daily. As needed    Provider, Historical   aspirin 81 mg chewable tablet Take 1 Tab by mouth two (2) times a day. Patient taking differently: Take 81 mg by mouth daily. 18   Aida Navarro MD       FAMILY HISTORY:   Family History   Problem Relation Age of Onset    Hypertension Mother     High Cholesterol Mother     Breast Cancer Mother     Diabetes Mother     Heart Disease Mother     OSTEOARTHRITIS Mother     High Cholesterol Father     Hypertension Father     Diabetes Father     Heart Disease Father     OSTEOARTHRITIS Father        SOCIAL HISTORY:   Social History     Socioeconomic History    Marital status: SINGLE   Tobacco Use    Smoking status: Former     Types: Cigars     Quit date: 10/4/1999     Years since quittin.0    Smokeless tobacco: Never   Vaping Use    Vaping Use: Never used   Substance and Sexual Activity    Alcohol use: Never     Comment: rarely    Drug use: No    Sexual activity: Not Currently       ROS:No CP, No SOB, No fever/chills nor night sweats. No headaches, vision abnormalities to include double and or loss of vision. No dizziness. No hearing abnormalities. No Chest Pain nor Shortness of breath. Pt denies h/o spinal surgery, injections, or PT/chiropractor.  Patient has attempted self treatment with less than adequate relief on oral and topical analgesic / anti inflammatory medications . Pt denies change in bowel or bladder habits. No saddle paresthesia / anesthesia. Pt denies fever, unplanned weight loss / weight gains, and no skin changes. Musculoskeletal pain per HPI. Pain is exacerbated positionally. PHYSICAL EXAM:        Constitutional: Appears well-developed and well-nourished. No distress. Sitting comfortably in the exam room, interacting with conversation with pleasant affect. Gait appears steady and patient exhibits no evidence of ataxia. Patient is able to ambulate with caution. No focal neurological deficit noted. No facial droop, slurred speech, or evidence of altered mentation noted on exam.   Skin: Skin over the head, neck, bilateral limbs, and trunk is warm and dry. No rash or erythema noted. Cranial Nerves II-XII grossly intact  HENT: NC/AT. Normal symmetry, bulk and tone of facial and neck musculature. Trachea midline. No discernible thyromegaly or masses. No involuntary movements. Lymphatic: No preauricular, submandibuar, anterior or posterior cervical lymphadenopathy. Psychiatric: The patient is awake, alert, and oriented to person, place and time. Behavior is normal. Thought content normal.   Cardiovascular: No clubbing, cyanosis. No edema bilateral lower extremities. Pulmonary: No tripoding nor accessory muscle recruitment. Breathing normally, no distress, no audible wheezing. Distal cap refill intact at 2/2 Ferdinand UE / LE. Neuro intact Ferdinand UE/LE to noxious stimuli        Ortho Specific exam:        Right knee reveals skin intact. No warmth, erythema, edema, or ecchymosis. Trace effusion. Active range of motion 110 degrees -5. No instability on varus valgus stressing. ACL PCL intact no laxity pain or giveaway.                   IMPRESSION:    Right knee pain  Right knee osteoarthritis  Decreased range of motion of the right knee PLAN: Today we discussed alternatives care to include but not limited to completion of Euflexxa to his right knee. Patient agreed with plan of care. Patient return to the office in 1 week for continuity of care. Procedural: Using sterile technique and verbal and written consent were obtained appropriate timeout formed patient laying supine right knee flexed at 20 degrees on a bolster 2 cc of Euflexxa injected using superior lateral interarticular approach. There were no complications. Patient tolerated the procedure well. Please Note:        [address] using a frequency of 10MHz with a 12L-RS transducer head was used to confirm needle placement. Ultrasound images captured using [address] Ultrasound machine and scanned into patient's chart. Chart reviewed for the following:   Corazon Oconnor PA-C, have reviewed the History, Physical and updated the Allergic reactions for [address] performed immediately prior to start of procedure:   Shila JENSEN PA-C, have performed the following reviews on [facility] prior to the start of the procedure:            * Patient was identified by name and date of birth   * Agreement on procedure being performed was verified  * Risks and Benefits explained to the patient  * Procedure site verified and marked as necessary  * Patient was positioned for comfort  * Consent was signed and verified             Date of procedure: 10/31/22    Time: 2:30 PM    Procedure performed by:  Aida Post PA-C    Provider assisted by: None     Patient assisted by: self    How tolerated by patient: tolerated the procedure well with no complications    Comments: none    The patient is asked to continue to rest the area for a few more days before resuming regular activities. It may be more painful for the first 1-2 days. Watch for fever, or increased swelling or persistent pain in the joint.  Call or return to clinic prn if such symptoms occur or there is failure to improve as anticipated. Chart reviewed for the following:   Francesca Oconnor PA-C, have reviewed the History, Physical and updated the Allergic reactions for [address] performed immediately prior to start of procedure:   IHéctor PA-C, have performed the following reviews on Lydia Cee prior to the start of the procedure:            * Patient was identified by name and date of birth   * Agreement on procedure being performed was verified  * Risks and Benefits explained to the patient  * Procedure site verified and marked as necessary  * Patient was positioned for comfort  * Consent was signed and verified             Date of procedure: 10/31/22    Time: 2 PM    Procedure performed by:  Lu Rivera PA-C    Provider assisted by: None     Patient assisted by: self    How tolerated by patient: tolerated the procedure well with no complications    Comments: none    The patient is asked to continue to rest the area for a few more days before resuming regular activities. It may be more painful for the first 1-2 days. Watch for fever, or increased swelling or persistent pain in the joint. Call or return to clinic prn if such symptoms occur or there is failure to improve as anticipated. Additionally today we discussed the diagnosis of obesity and the importance of weight management for both cardiovascular health. The patient was recommended to decrease carbohydrate and sugar intake. Patient recommended a formal dietary consult which they will consider and return a call to our office. In light of the patient's osteoarthritic findings I am making a recommendation for aerobic exercise to include but not limited to stationary bicycle, elliptical, therapeutic walking with good shoes and or swimming. Patient should avoid any running or jumping. If using the treadmill then recommendation for no elevation and no running or jogging.        Care plan outlined and precautions discussed. Results were reviewed with the patient. All medications were reviewed with the patient. All of pt's questions and concerns were addressed. Alarm symptoms and return precautions associated with chief complaint and evaluation were reviewed with the patient in detail. The patient demonstrated adequate understanding. The patient expresses willing compliance with the treatment plan. Special note: Medication management discussed in detail all patient's questions answered to their satisfaction. No Narcotic indicated today. Patient given pain medication for short term acute pain relief. Goal is to treat patient according to above plan and to ultimately have patient off all pain medications once appropriate. If chronic pain management is required beyond what is expected for current orthopedic problem, will refer patient to pain management.  was reviewed and will be reviewed with every medication refill request.         Patient provided a reminder for a \"due or due soon\" health maintenance. I have asked the patient to schedule an appointment with their primary care provider for follow-up on general health maintenance concerns. Today all the patient's questions were answered to their satisfaction. Copies of x-rays reviewed if obtained this visit, and provided to patient. Dictation disclaimer:  Please note that this dictation was completed with [employer], the [employer] voice recognition software. Quite often unanticipated grammatical, syntax, homophones, and other interpretive errors are inadvertently transcribed by the computer software. Please disregard these errors. Please excuse any errors that have escaped final proofreading. Troy MICHELLE, APC, MPAS, PA-C  [facility]

## 2022-11-03 ENCOUNTER — OFFICE VISIT (OUTPATIENT)
Dept: CARDIOLOGY CLINIC | Age: 67
End: 2022-11-03
Payer: COMMERCIAL

## 2022-11-03 VITALS
OXYGEN SATURATION: 94 % | DIASTOLIC BLOOD PRESSURE: 87 MMHG | SYSTOLIC BLOOD PRESSURE: 163 MMHG | HEIGHT: 71 IN | BODY MASS INDEX: 38.08 KG/M2 | HEART RATE: 71 BPM | WEIGHT: 272 LBS

## 2022-11-03 DIAGNOSIS — E66.01 SEVERE OBESITY (BMI 35.0-39.9) WITH COMORBIDITY (HCC): ICD-10-CM

## 2022-11-03 DIAGNOSIS — R07.89 OTHER CHEST PAIN: ICD-10-CM

## 2022-11-03 DIAGNOSIS — E78.00 HYPERCHOLESTEROLEMIA: ICD-10-CM

## 2022-11-03 DIAGNOSIS — I50.32 CHRONIC DIASTOLIC CONGESTIVE HEART FAILURE (HCC): ICD-10-CM

## 2022-11-03 DIAGNOSIS — I10 ESSENTIAL HYPERTENSION: ICD-10-CM

## 2022-11-03 DIAGNOSIS — Z98.61 POSTSURGICAL PERCUTANEOUS TRANSLUMINAL CORONARY ANGIOPLASTY STATUS: ICD-10-CM

## 2022-11-03 DIAGNOSIS — I25.110 ATHEROSCLEROSIS OF NATIVE CORONARY ARTERY OF NATIVE HEART WITH UNSTABLE ANGINA PECTORIS (HCC): Primary | ICD-10-CM

## 2022-11-03 PROCEDURE — 3078F DIAST BP <80 MM HG: CPT | Performed by: INTERNAL MEDICINE

## 2022-11-03 PROCEDURE — 3074F SYST BP LT 130 MM HG: CPT | Performed by: INTERNAL MEDICINE

## 2022-11-03 PROCEDURE — 99214 OFFICE O/P EST MOD 30 MIN: CPT | Performed by: INTERNAL MEDICINE

## 2022-11-03 PROCEDURE — 1123F ACP DISCUSS/DSCN MKR DOCD: CPT | Performed by: INTERNAL MEDICINE

## 2022-11-03 PROCEDURE — 93000 ELECTROCARDIOGRAM COMPLETE: CPT | Performed by: INTERNAL MEDICINE

## 2022-11-03 RX ORDER — CHOLECALCIFEROL (VITAMIN D3) 125 MCG
CAPSULE ORAL DAILY
COMMUNITY

## 2022-11-03 RX ORDER — CARVEDILOL 25 MG/1
25 TABLET ORAL 2 TIMES DAILY WITH MEALS
Qty: 180 TABLET | Refills: 1 | Status: SHIPPED | OUTPATIENT
Start: 2022-11-03

## 2022-11-03 RX ORDER — CLOPIDOGREL BISULFATE 75 MG/1
75 TABLET ORAL DAILY
COMMUNITY

## 2022-11-03 NOTE — PROGRESS NOTES
1. Have you been to the ER, urgent care clinic since your last visit? Hospitalized since your last visit?     no    2. Have you seen or consulted any other health care providers outside of the [address] [place] [address] since your last visit? Include any pap smears or colon screening. No     3. Since your last visit, have you had any of the following symptoms? chest pains, shortness of breath, and swelling in legs/arms. 4.  Have you had any blood work, X-rays or cardiac testing? Yes          5. Where do you normally have your labs drawn? 6. Do you need any refills today?    no
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
## 2022-11-03 NOTE — PATIENT INSTRUCTIONS
There are no discontinued medications. After the recommended changes have been made in blood pressure medicines, patient advised to keep BP/HR(pulse rate) chart twice daily and bring us results in next 4 to 5 days. Patient may send the results via \"My Chart\" if desired. Please rest for 5-10 minutes before checking blood pressure. Sit on a comfortable chair without crossing the legs and put your arm on a table. We recommend that you use an upper arm cuff. Check the blood pressure 3 times each time you check the blood pressure and record the lowest reading. If you check the blood pressure in both arms, use the higher reading. Learning About the [address] [facility] Diet  What is the Mediterranean diet? The Mediterranean diet is a style of eating rather than a diet plan. It features foods eaten in [place], [place], [place] and [place], and other countries along the [facility]. It emphasizes eating foods like fish, fruits, vegetables, beans, high-fiber breads and whole grains, nuts, and olive oil. This style of eating includes limited red meat, cheese, and sweets. Why choose the Mediterranean diet? A Mediterranean-style diet may improve heart health. It contains more fat than other heart-healthy diets. But the fats are mainly from nuts, unsaturated oils (such as fish oils and olive oil), and certain nut or seed oils (such as canola, soybean, or flaxseed oil). These fats may help protect the heart and blood vessels. How can you get started on the Mediterranean diet? Here are some things you can do to switch to a more Mediterranean way of eating. What to eat  Eat a variety of fruits and vegetables each day, such as grapes, blueberries, tomatoes, broccoli, peppers, figs, olives, spinach, eggplant, beans, lentils, and chickpeas. Eat a variety of whole-grain foods each day, such as oats, brown rice, and whole wheat bread, pasta, and couscous. Eat fish at least 2 times a week.  Try tuna, salmon, mackerel, lake trout, herring, or sardines. Eat moderate amounts of low-fat dairy products, such as milk, cheese, or yogurt. Eat moderate amounts of poultry and eggs. Choose healthy (unsaturated) fats, such as nuts, olive oil, and certain nut or seed oils like canola, soybean, and flaxseed. Limit unhealthy (saturated) fats, such as butter, palm oil, and coconut oil. And limit fats found in animal products, such as meat and dairy products made with whole milk. Try to eat red meat only a few times a month in very small amounts. Limit sweets and desserts to only a few times a week. This includes sugar-sweetened drinks like soda. The Mediterranean diet may also include red wine with your meal--1 glass each day for women and up to 2 glasses a day for men. Tips for eating at home  Use herbs, spices, garlic, lemon zest, and citrus juice instead of salt to add flavor to foods. Add avocado slices to your sandwich instead of colindres. Have fish for lunch or dinner instead of red meat. Brush the fish with olive oil, and broil or grill it. Sprinkle your salad with seeds or nuts instead of cheese. Cook with olive or canola oil instead of butter or oils that are high in saturated fat. Switch from 2% milk or whole milk to 1% or fat-free milk. Dip raw vegetables in a vinaigrette dressing or hummus instead of dips made from mayonnaise or sour cream.  Have a piece of fruit for dessert instead of a piece of cake. Try baked apples, or have some dried fruit. Tips for eating out  Try broiled, grilled, baked, or poached fish instead of having it fried or breaded. Ask your  to have your meals prepared with olive oil instead of butter. Order dishes made with marinara sauce or sauces made from olive oil. Avoid sauces made from cream or mayonnaise. Choose whole-grain breads, whole wheat pasta and pizza crust, brown rice, beans, and lentils. Cut back on butter or margarine on bread.  Instead, you can dip your bread in a small amount of olive oil. Ask for a side salad or grilled vegetables instead of french fries or chips. Where can you learn more? Go to http://www.[employer].com/  Enter O407 in the search box to learn more about \"Learning About the Mediterranean Diet. \"  Current as of: May 9, 2022               Content Version: 13.4  © 9661-6163 [employer]. Care instructions adapted under license by [employer] (which disclaims liability or warranty for this information). If you have questions about a medical condition or this instruction, always ask your healthcare professional. [place] [address] any warranty or liability for your use of this information. No

## 2022-11-07 NOTE — PROGRESS NOTES
1. Have you been to the ER, urgent care clinic since your last visit? Hospitalized since your last visit?     no    2. Have you seen or consulted any other health care providers outside of the [address] [place] [address] since your last visit? Include any pap smears or colon screening. no    3. Since your last visit, have you had any of the following symptoms?      swelling in legs/arms. Explain:swelling in right ankles down to toes     4. Have you had any blood work, X-rays or cardiac testing? No    5. Where do you normally have your labs drawn? [facility]    6. Do you need any refills today?    no Pt admitted to Observation for acute on chronic respiratory failure. Imaging is characteristic of PNA. COPD exacerbation also suspected due to negative procalcitonin. IV steroids, antibiotics, and nebulizer treatments continued. Leukocytosis noted with upward trend in the setting of steroid use. Supplemental oxygen in place at 3L NC. Pt verbalized symptom improvement on prescribed regimen    11/8:    Examination done bedside, patient stated improvement in shortness of breath, denied any acute issues.    Able to ambulate with no issues.    Hemodynamically stable, currently on 3 L nasal cannula saturating about 92, at baseline oxygen requirement.    Labs reviewed.    Ordered mucolytics, chest physiotherapy.    Home oxygen evaluation and follow-up with  for home oxygen delivery at bedside.    PT OT evaluated and recommended home health-f/u with ;   Considering clinical and hemodynamic stability planning to discharge patient today.  Patient agreed to the plan.

## 2022-11-16 ENCOUNTER — TELEPHONE (OUTPATIENT)
Dept: CARDIOLOGY CLINIC | Age: 67
End: 2022-11-16

## 2022-11-16 NOTE — TELEPHONE ENCOUNTER
Spoke to patient regarding test per Dr. Daniel Schmitz. Nuclear stress test reviewed. Please let patient know that the stress test shows very small defect and we will continue to treat him with medications. Let us know if he gets any significant chest pains. He voices understanding and acceptance of this advice and will call back if any further questions or concerns.

## 2022-11-16 NOTE — TELEPHONE ENCOUNTER
----- Message from Sam Estes MD sent at 11/16/2022 11:27 AM EST -----  Please let patient know that the stress test shows very small defect and we will continue to treat him with medications. Let us know if he gets any significant chest pains.  ----- Message -----  From: Lila Wyatt MD  Sent: 11/16/2022  10:52 AM EST  To:  Sam Estes MD

## 2022-11-17 ENCOUNTER — OFFICE VISIT (OUTPATIENT)
Dept: FAMILY MEDICINE CLINIC | Age: 67
End: 2022-11-17
Payer: COMMERCIAL

## 2022-11-17 VITALS
HEIGHT: 71 IN | DIASTOLIC BLOOD PRESSURE: 69 MMHG | RESPIRATION RATE: 16 BRPM | SYSTOLIC BLOOD PRESSURE: 122 MMHG | WEIGHT: 267 LBS | HEART RATE: 97 BPM | BODY MASS INDEX: 37.38 KG/M2 | OXYGEN SATURATION: 96 %

## 2022-11-17 DIAGNOSIS — R73.03 PREDIABETES: ICD-10-CM

## 2022-11-17 DIAGNOSIS — E78.2 MIXED HYPERLIPIDEMIA: ICD-10-CM

## 2022-11-17 DIAGNOSIS — N18.32 STAGE 3B CHRONIC KIDNEY DISEASE (HCC): ICD-10-CM

## 2022-11-17 DIAGNOSIS — I10 ESSENTIAL HYPERTENSION: Primary | ICD-10-CM

## 2022-11-17 DIAGNOSIS — M10.39 GOUT OF MULTIPLE SITES DUE TO RENAL IMPAIRMENT, UNSPECIFIED CHRONICITY: ICD-10-CM
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
## 2022-11-17 DIAGNOSIS — I50.42 CHRONIC COMBINED SYSTOLIC AND DIASTOLIC CONGESTIVE HEART FAILURE (HCC): ICD-10-CM

## 2022-11-17 DIAGNOSIS — Z23 NEEDS FLU SHOT: ICD-10-CM

## 2022-11-17 DIAGNOSIS — M79.10 MUSCLE PAIN: ICD-10-CM

## 2022-11-17 DIAGNOSIS — R60.0 LOWER EXTREMITY EDEMA: ICD-10-CM

## 2022-11-17 PROCEDURE — 3078F DIAST BP <80 MM HG: CPT | Performed by: STUDENT IN AN ORGANIZED HEALTH CARE EDUCATION/TRAINING PROGRAM

## 2022-11-17 PROCEDURE — 90471 IMMUNIZATION ADMIN: CPT | Performed by: STUDENT IN AN ORGANIZED HEALTH CARE EDUCATION/TRAINING PROGRAM

## 2022-11-17 PROCEDURE — 3074F SYST BP LT 130 MM HG: CPT | Performed by: STUDENT IN AN ORGANIZED HEALTH CARE EDUCATION/TRAINING PROGRAM

## 2022-11-17 PROCEDURE — 90694 VACC AIIV4 NO PRSRV 0.5ML IM: CPT | Performed by: STUDENT IN AN ORGANIZED HEALTH CARE EDUCATION/TRAINING PROGRAM

## 2022-11-17 PROCEDURE — 1123F ACP DISCUSS/DSCN MKR DOCD: CPT | Performed by: STUDENT IN AN ORGANIZED HEALTH CARE EDUCATION/TRAINING PROGRAM

## 2022-11-17 PROCEDURE — 99214 OFFICE O/P EST MOD 30 MIN: CPT | Performed by: STUDENT IN AN ORGANIZED HEALTH CARE EDUCATION/TRAINING PROGRAM
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
## 2022-11-23 ENCOUNTER — TELEPHONE (OUTPATIENT)
Dept: FAMILY MEDICINE CLINIC | Age: 67
End: 2022-11-23
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
## 2022-11-23 DIAGNOSIS — R52 PAIN: ICD-10-CM

## 2022-11-23 DIAGNOSIS — M79.603 PAIN OF UPPER EXTREMITY, UNSPECIFIED LATERALITY: ICD-10-CM

## 2022-11-23 RX ORDER — TRAMADOL HYDROCHLORIDE 50 MG/1
50 TABLET ORAL
Qty: 20 TABLET | Refills: 0 | Status: SHIPPED | OUTPATIENT
Start: 2022-11-23 | End: 2022-11-28
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
## 2022-11-30 ENCOUNTER — TELEPHONE (OUTPATIENT)
Dept: FAMILY MEDICINE CLINIC | Age: 67
End: 2022-11-30

## 2022-12-02 ENCOUNTER — TELEPHONE (OUTPATIENT)
Dept: PHYSICAL THERAPY | Age: 67
End: 2022-12-02

## 2022-12-12 ENCOUNTER — TELEPHONE (OUTPATIENT)
Dept: FAMILY MEDICINE CLINIC | Age: 67
End: 2022-12-12

## 2022-12-12 NOTE — TELEPHONE ENCOUNTER
[phone]  Patient called requesting a an updated letter for work. 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

## 2022-12-12 NOTE — LETTER
12/16/2022 9:04 AM    Mr. Yomi Hanson  [address] [facility]      To Whom It May Concern:    Yomi Hanson is currently under the care of [address]. Patient to continue with work restrictions due to his chronic medical conditions. 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 He will be re-evaluated again at that time. He is able to operate a forklift. If there are questions or concerns please have the patient contact our office.         Sincerely,      Garry Hill MD

## 2022-12-16 DIAGNOSIS — I10 ESSENTIAL HYPERTENSION: ICD-10-CM

## 2022-12-16 RX ORDER — HYDRALAZINE HYDROCHLORIDE 100 MG/1
TABLET, FILM COATED ORAL
Qty: 270 TABLET | Refills: 1 | Status: SHIPPED | OUTPATIENT
Start: 2022-12-16

## 2023-01-03 DIAGNOSIS — I10 ESSENTIAL HYPERTENSION: ICD-10-CM

## 2023-01-03 DIAGNOSIS — M10.00 IDIOPATHIC GOUT, UNSPECIFIED CHRONICITY, UNSPECIFIED SITE: ICD-10-CM

## 2023-01-03 DIAGNOSIS — I25.10 ATHEROSCLEROSIS OF NATIVE CORONARY ARTERY OF NATIVE HEART WITHOUT ANGINA PECTORIS: ICD-10-CM

## 2023-01-03 RX ORDER — DICLOFENAC SODIUM 75 MG/1
TABLET, DELAYED RELEASE ORAL
Qty: 60 TABLET | Refills: 0 | Status: SHIPPED | OUTPATIENT
Start: 2023-01-03

## 2023-01-03 NOTE — TELEPHONE ENCOUNTER
Requested Prescriptions     Pending Prescriptions Disp Refills    omeprazole (PRILOSEC) 40 mg capsule 180 Capsule 0     Sig: Take 1 Capsule by mouth two (2) times a day. isosorbide mononitrate ER (IMDUR) 60 mg CR tablet 90 Tablet 1     Sig: Take 1 Tablet by mouth daily.     allopurinoL (ZYLOPRIM) 100 mg tablet 90 Tablet 3     Sig: TAKE 1 TABLET BY MOUTH EVERY DAY

## 2023-01-04 RX ORDER — OMEPRAZOLE 40 MG/1
40 CAPSULE, DELAYED RELEASE ORAL 2 TIMES DAILY
Qty: 180 CAPSULE | Refills: 0 | Status: SHIPPED | OUTPATIENT
Start: 2023-01-04

## 2023-01-04 RX ORDER — ALLOPURINOL 100 MG/1
TABLET ORAL
Qty: 90 TABLET | Refills: 3 | Status: SHIPPED | OUTPATIENT
Start: 2023-01-04

## 2023-01-04 RX ORDER — ISOSORBIDE MONONITRATE 60 MG/1
60 TABLET, EXTENDED RELEASE ORAL DAILY
Qty: 90 TABLET | Refills: 1 | Status: SHIPPED | OUTPATIENT
Start: 2023-01-04

## 2023-01-05 ENCOUNTER — HOSPITAL ENCOUNTER (OUTPATIENT)
Dept: PHYSICAL THERAPY | Age: 68
Discharge: HOME | End: 2023-01-05
Attending: STUDENT IN AN ORGANIZED HEALTH CARE EDUCATION/TRAINING PROGRAM
Payer: COMMERCIAL

## 2023-01-05 PROCEDURE — 97162 PT EVAL MOD COMPLEX 30 MIN: CPT
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
## 2023-01-07 DIAGNOSIS — E78.00 HYPERCHOLESTEROLEMIA: ICD-10-CM

## 2023-01-09 RX ORDER — CLOPIDOGREL BISULFATE 75 MG/1
75 TABLET ORAL DAILY
Qty: 90 TABLET | Refills: 2 | Status: SHIPPED | OUTPATIENT
Start: 2023-01-09

## 2023-01-09 RX ORDER — ATORVASTATIN CALCIUM 80 MG/1
TABLET, FILM COATED ORAL
Qty: 90 TABLET | Refills: 2 | Status: SHIPPED | OUTPATIENT
Start: 2023-01-09

## 2023-01-12 ENCOUNTER — HOSPITAL ENCOUNTER (OUTPATIENT)
Dept: PHYSICAL THERAPY | Age: 68
Discharge: HOME | End: 2023-01-12
Attending: STUDENT IN AN ORGANIZED HEALTH CARE EDUCATION/TRAINING PROGRAM
Payer: COMMERCIAL

## 2023-01-12 PROCEDURE — 97140 MANUAL THERAPY 1/> REGIONS: CPT

## 2023-01-12 PROCEDURE — 97112 NEUROMUSCULAR REEDUCATION: CPT

## 2023-01-12 PROCEDURE — 97110 THERAPEUTIC EXERCISES: CPT

## 2023-01-12 NOTE — PROGRESS NOTES
PT DAILY TREATMENT NOTE     Patient Name: Lee Ann Turcios  Date:2023  : 1955  [x]  Patient  Verified  Payor: Judie  / Plan: [place] PPO / Product Type: PPO /    In time: 8:23  Out time: 8:59  Total Treatment Time (min): 36  Visit #: 2 of     Treatment Area: Right-sided back pain, unspecified back location, unspecified chronicity [M54.9]  Unspecified abnormalities of gait and mobility [R26.9]    SUBJECTIVE  Pain Level (0-10 scale):  5/10  Any medication changes, allergies to medications, adverse drug reactions, diagnosis change, or new procedure performed?: [x] No    [] Yes (see summary sheet for update)  Subjective functional status/changes:   [] No changes reported  Pt. Reports he is feeling about the same today. He gets 6 hours of sleep a night. He continues to feel light headed dizziness on and off and has sensitivity to light and noise. OBJECTIVE    18 min Therapeutic Exercise:  [x] See flow sheet :   Rationale: increase ROM and increase strength to improve the patients ability to perform ADLs     10 min Neuromuscular Re-education:  []  See flow sheet : saccades, standing balance activities    Rationale: increase strength and improve coordination  to improve the patients ability to ambulate     8 min Manual Therapy:  trigger point release and STM to right UT and cervical paraspinals   The manual therapy interventions were performed at a separate and distinct time from the therapeutic activities interventions. Rationale: decrease pain and increase ROM to turn head when working. With   [x] TE   [] TA   [] neuro   [] other: Patient Education: [x] Review HEP    [] Progressed/Changed HEP based on:   [] positioning   [] body mechanics   [] transfers   [] heat/ice application    [] other:      Other Objective/Functional Measures:   Saccades: eye strain  Convergence: 25 cm   Messaged Dr. Nelda Rodriguez to see if pt.  Would benefit from seeing a concussion specialist   LOB with Romberg with eyes closed after 25 seconds    Pain Level (0-10 scale) post treatment: 6/10    ASSESSMENT/Changes in Function: pt. Initiated PT and is compliant with his HEP. He does have concussion like symptoms of dizziness, sensitivity to light and noise, sleep dysfunction, and occular convergence over 10 cm. 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 He also continues to have moderate pain throughout right side of his body. Patient will continue to benefit from skilled PT services to modify and progress therapeutic interventions, address functional mobility deficits, address ROM deficits, address strength deficits, analyze and address soft tissue restrictions, analyze and cue movement patterns, and analyze and modify body mechanics/ergonomics to attain remaining goals. Progress towards goals / Updated goals:  Short Term Goals: To be accomplished in 1 weeks:  1. Patient will report compliance with initial HEP to optimize therapy outcomes. Status at eval: established  Met (1/12/23)     Long Term Goals: To be accomplished in 6 weeks:  1. Patient will improve FOTO score by at least 5 points in order to demonstrate functional improvement. Status at eval: 50/100  2. Patient will report no more than \"limited a little\" with \"walking several blocks\" with FOTO in order to demonstrate improved tolerance to work tasks. Status at eval: \"limited a lot:  3. Patient will report no greater than 6/10 pain to right side in order to perform daily tasks with increased ease. Status at eval: 10/10 at max  4. Patient will be able to maintain SLS for at least 5 sec B in order to perform work tasks and ambulate with increased ease and safety. Status at eval: unable to perform;  tandem: 9 sec left posterior, 18 sec right posterior-due to left knee pain   5.  Patient will report at least 50% improvement since start of care in order to demonstrate improved overall QOL.               Status at eval: new goal    PLAN  []  Upgrade activities as tolerated     [x]  Continue plan of care  []  Update interventions per flow sheet       []  Discharge due to:_  []  Other:_      Theodore Licea, PT 1/12/2023  7:51 AM    Future Appointments   Date Time Provider Markie Mehreen   1/12/2023  8:30 AM Griselda Farber, PT [id number] [facility]   1/19/2023  7:00 AM Earline Rodríguez, PT TENSXCA [facility]   3/10/2023  3:00 PM [facility] [address]   3/16/2023 11:20 AM MD SELENA Bhandari-MO BS AMB   3/16/2023  3:00 PM Chelsea GRAMAJO, PA-C [facility] NICOLA SCHED   3/22/2023  1:15 PM Elena Samuel MD CAP BS AMB

## 2023-01-19 ENCOUNTER — HOSPITAL ENCOUNTER (OUTPATIENT)
Dept: PHYSICAL THERAPY | Age: 68
Discharge: HOME | End: 2023-01-19
Attending: STUDENT IN AN ORGANIZED HEALTH CARE EDUCATION/TRAINING PROGRAM
Payer: COMMERCIAL

## 2023-01-19 PROCEDURE — 97140 MANUAL THERAPY 1/> REGIONS: CPT

## 2023-01-19 PROCEDURE — 97110 THERAPEUTIC EXERCISES: CPT

## 2023-01-19 PROCEDURE — 97530 THERAPEUTIC ACTIVITIES: CPT

## 2023-01-19 PROCEDURE — 97112 NEUROMUSCULAR REEDUCATION: CPT

## 2023-01-19 NOTE — PROGRESS NOTES
PT DAILY TREATMENT NOTE     Patient Name: Lynne Owen  Date:2023  : 1955  [x]  Patient  Verified  Payor: Lucy Oiler / Plan: [place] PPO / Product Type: PPO /    In time:7:03  Out time:7:54  Total Treatment Time (min): 51  Visit #: 3 of 18      Treatment Area: Right-sided back pain, unspecified back location, unspecified chronicity [M54.9]  Unspecified abnormalities of gait and mobility [R26.9]    SUBJECTIVE  Pain Level (0-10 scale): 7/10  Any medication changes, allergies to medications, adverse drug reactions, diagnosis change, or new procedure performed?: [x] No    [] Yes (see summary sheet for update)  Subjective functional status/changes:   [] No changes reported  Pt reports dizziness 1-2x per week that lasts 15 minutes to a half hour and is relieved with rest and sitting down. He describes dizziness as a lightheaded sensation. He feels the most off-balance when he's walking, and he veers to the right sometimes with walking. Pt had a fall within the past couple of weeks, but he denies injury.       OBJECTIVE    Modality rationale: decrease pain and increase tissue extensibility to improve the patients ability to tolerated daily tasks    Min Type Additional Details    [] Estim:  []Unatt       []IFC  []Premod                        []Other:  []w/ice   []w/heat  Position:  Location:    [] Estim: []Att    []TENS instruct  []NMES                    []Other:  []w/US   []w/ice   []w/heat  Position:  Location:    []  Traction: [] Cervical       []Lumbar                       [] Prone          []Supine                       []Intermittent   []Continuous Lbs:  [] before manual  [] after manual    []  Ultrasound: []Continuous   [] Pulsed                           []1MHz   []3MHz W/cm2:  Location:    []  Iontophoresis with dexamethasone         Location: [] Take home patch   [] In clinic   10 []  Ice     [x]  heat  []  Ice massage  []  Laser   []  Anodyne Position: seated  Location: right UT    []  Laser with stim  []  Other:  Position:  Location:    []  Vasopneumatic Device    []  Right     []  Left  Pre-treatment girth:  Post-treatment girth:  Measured at (location):  Pressure:       [] lo [] med [] hi   Temperature: [] lo [] med [] hi   [x] Skin assessment post-treatment:  [x]intact []redness- no adverse reaction    []redness - adverse reaction:         17 min Therapeutic Exercise:  [x] See flow sheet :   Rationale: increase ROM, increase strength, and increase proprioception to improve the patients ability to improve ease of job tasks and ADL's    5 min Therapeutic Activity:  []  See flow sheet :    Rationale: To  optimize pt understanding and therapy outcomes      9 min Neuromuscular Re-education:  [x]  See flow sheet : standing balance interventions    Rationale: increase strength, improve balance, and increase proprioception  to improve the patients ability to improve ease/safety with daily tasks     10 min Manual Therapy:  DTM/TPR right UT and cervical paraspinals, TPR right infraspinatus    The manual therapy interventions were performed at a separate and distinct time from the therapeutic activities interventions. Rationale: decrease pain, increase ROM, increase tissue extensibility, and decrease trigger points to improve ease of job tasks and ADL's         With   [x] TE   [] TA   [] neuro   [] other: Patient Education: [x] Review HEP    [] Progressed/Changed HEP based on:   [] positioning   [] body mechanics   [] transfers   [] heat/ice application    [x] other: educated patient regarding self DTM/TPR with theracane or SPC handle to right UT.   Had pt perform with theracane for 3 minutes to ensure correct form      Other Objective/Functional Measures:     /80, heart rate 80bpm taken electronically prior to session  Pain relief reported with manual  Pt pleased with theracane     Concussion Symptom Evaluation: 83/132    Pain Level (0-10 scale) post treatment: 4-5/10    ASSESSMENT/Changes in Function:     Pt is making slow, steady progress towards initial goals in therapy. He has not heard from MD regarding concussion specialist, and pt was encouraged to call MD following therapy session to discuss. Concussion Symptom Evaluation is 83/132. Will continue to address strength, ROM, flexibility, posture, and balance deficits to improve ease of job tasks and allow for return to PLOF. Patient will continue to benefit from skilled PT services to modify and progress therapeutic interventions, address ROM deficits, address strength deficits, analyze and address soft tissue restrictions, analyze and cue movement patterns, analyze and modify body mechanics/ergonomics, assess and modify postural abnormalities, address imbalance/dizziness, and instruct in home and community integration to attain remaining goals. Progress towards goals / Updated goals:  Short Term Goals: To be accomplished in 1 weeks:  1. Patient will report compliance with initial HEP to optimize therapy outcomes. Status at eval: established  Met (1/12/23)     Long Term Goals: To be accomplished in 6 weeks:  1. Patient will improve FOTO score by at least 5 points in order to demonstrate functional improvement. Status at eval: 50/100  2. Patient will report no more than \"limited a little\" with \"walking several blocks\" with FOTO in order to demonstrate improved tolerance to work tasks. Status at eval: \"limited a lot:  3. Patient will report no greater than 6/10 pain to right side in order to perform daily tasks with increased ease. Status at eval: 10/10 at max    Current Status: Not met. Pain 7/10 prior to therapy session 1/19/23  4. Patient will be able to maintain SLS for at least 5 sec B in order to perform work tasks and ambulate with increased ease and safety. Status at eval: unable to perform;  tandem: 9 sec left posterior, 18 sec right posterior-due to left knee pain   5.  Patient will report at least 50% improvement since start of care in order to demonstrate improved overall QOL.                Status at eval: new goal    PLAN  [x]  Upgrade activities as tolerated     [x]  Continue plan of care  []  Update interventions per flow sheet       []  Discharge due to:_  []  Other:_      Adilene Rodriguez, PT 1/19/2023  7:22 AM    Future Appointments   Date Time Provider Markie Mehreen   3/10/2023  3:00 PM [facility] [address] [place] [address]   3/16/2023 11:20 AM MD SELENA Brewster-MO BS AMB   3/16/2023  3:00 PM Maria M GRAMAJO, PA-C [facility] NICOLA SCHED   3/22/2023  1:15 PM Cherry Monae MD CAP BS AMB

## 2023-01-23 ENCOUNTER — HOSPITAL ENCOUNTER (OUTPATIENT)
Dept: PHYSICAL THERAPY | Age: 68
Discharge: HOME | End: 2023-01-23
Attending: STUDENT IN AN ORGANIZED HEALTH CARE EDUCATION/TRAINING PROGRAM
Payer: COMMERCIAL

## 2023-01-23 PROCEDURE — 97140 MANUAL THERAPY 1/> REGIONS: CPT

## 2023-01-23 PROCEDURE — 97110 THERAPEUTIC EXERCISES: CPT

## 2023-01-23 PROCEDURE — 97112 NEUROMUSCULAR REEDUCATION: CPT

## 2023-01-23 NOTE — PROGRESS NOTES
PT DAILY TREATMENT NOTE     Patient Name: Suzie Leon  Date:2023  : 1955  [x]  Patient  Verified  Payor: Amanda Hernández / Plan: [place] [employer] [place] / Product Type: PPO /    In time:5:40  Out time:6:20  Total Treatment Time (min): 40  Visit #: 4 of 18      Treatment Area: Right-sided back pain, unspecified back location, unspecified chronicity [M54.9]  Unspecified abnormalities of gait and mobility [R26.9]    SUBJECTIVE  Pain Level (0-10 scale): 8/10 right side, 10/10 B knees  Any medication changes, allergies to medications, adverse drug reactions, diagnosis change, or new procedure performed?: [x] No    [] Yes (see summary sheet for update)  Subjective functional status/changes:   [] No changes reported  Pt reports that his balance is getting better but his pain is about the same. He reports 50% overall improvement since start of care because he is pleased with the improvement in his balance. Pt reports that he always has 10/10 B knee pain. He anticipates a left TKA in the future. He has not been doing DTM/TPR because he would have to borrow a family member's cane. He is considering purchasing a theracane. OBJECTIVE      10 min Therapeutic Exercise:  [x] See flow sheet :   Rationale: increase ROM, increase strength, and increase proprioception to improve the patients ability to improve ease of job tasks and ADL's     18 min Neuromuscular Re-education:  [x]  See flow sheet : standing balance interventions, pen push up, saccades, dynamic gait    Rationale: increase strength, improve balance, and increase proprioception  to improve the patients ability to improve ease/safety with job tasks and daily activities     12 min Manual Therapy:  DTM/TPR right UT, levator, infraspinatus, teres minor, and rhomboids - pt in sitting    The manual therapy interventions were performed at a separate and distinct time from the therapeutic activities interventions.   Rationale: decrease pain, increase ROM, increase tissue extensibility, and decrease trigger points to improve ease of job tasks and ADL's     With   [] TE   [] TA   [] neuro   [] other: Patient Education: [x] Review HEP    [] Progressed/Changed HEP based on:   [] positioning   [] body mechanics   [] transfers   [] heat/ice application    [] other:      Other Objective/Functional Measures:     /92, heart rate 76bpm taken electronically prior to therapy      Increased sway but no LOB with Romberg Foam EC   Challenged with SLS, required B 2 finger UE support     Pt denied eye strain with saccades and pen push up but reported blurred vision with convergence during pen push up     Dynamic gait: ambulation with horizontal/vertical head turns with EO, forward/backward ambulation with EC  No LOB during dynamic gait  Dynamic gait performed CGA with gait belt   Veering right with backward ambulation with EC     Right upslip corrected with leg lengthening exercise. Right lumbar pain relieved with correction of upslip  No innominate rotation    Right cervical/shoulder pain relieved with manual   Pt reported no change in knee pain during session     Pain Level (0-10 scale) post treatment: 0/10 right side, 10/10 B knees     ASSESSMENT/Changes in Function:     Pt is making slow, steady progress towards initial goals in therapy. Balance is improving, evidenced by decreased sway and UE utilization with static balance interventions. He continues to report relief with DTM/TPR to right shoulder, cervical, and periscapular musculature. Will continue to address strength, ROM, flexibility, posture, and balance deficits to allow for return to PLOF.       Patient will continue to benefit from skilled PT services to modify and progress therapeutic interventions, address functional mobility deficits, address ROM deficits, address strength deficits, analyze and address soft tissue restrictions, analyze and cue movement patterns, analyze and modify body mechanics/ergonomics, assess and modify postural abnormalities, address imbalance/dizziness, and instruct in home and community integration to attain remaining goals. Progress towards goals / Updated goals:  Short Term Goals: To be accomplished in 1 weeks:  1. Patient will report compliance with initial HEP to optimize therapy outcomes. Status at eval: established  Met (1/12/23)     Long Term Goals: To be accomplished in 6 weeks:  1. Patient will improve FOTO score by at least 5 points in order to demonstrate functional improvement. Status at eval: 50/100    2. Patient will report no more than \"limited a little\" with \"walking several blocks\" with FOTO in order to demonstrate improved tolerance to work tasks. Status at eval: \"limited a lot:    3. Patient will report no greater than 6/10 pain to right side in order to perform daily tasks with increased ease. Status at eval: 10/10 at max               Current Status: Not met. Pain 7/10 prior to therapy session 1/19/23    4. Patient will be able to maintain SLS for at least 5 sec B in order to perform work tasks and ambulate with increased ease and safety. Status at eval: unable to perform;  tandem: 9 sec left posterior, 18 sec right posterior-due to left knee pain   Current Status: Not met. Requires 2 finger UE support to complete SLS 1/23/23    5. Patient will report at least 50% improvement since start of care in order to demonstrate improved overall QOL. Status at eval: new goal  Current Status:  Progressing. Pt reports 50% improvement with balance since start of care.   No change in pain 1/23/23        PLAN  [x]  Upgrade activities as tolerated     [x]  Continue plan of care  []  Update interventions per flow sheet       []  Discharge due to:_  []  Other:_      Madonna Choi, PT 1/23/2023  5:41 PM    Future Appointments   Date Time Provider Markie Verde   1/23/2023  6:00 PM [facility]   1/27/2023  7:30 AM Mary Vernertamy Brionesara [facility]   1/31/2023  7:00 AM Zapf, Tres Granton, PT [id number] [facility]   2/2/2023  7:00 AM Zapf, Tres Dharmesh, PT [id number] [facility]   2/7/2023  7:00 AM Zapf, Tres Granton, PT [id number] [facility]   2/9/2023  7:00 AM Zapf, Tres Dharmesh, PT [id number] [facility]   2/13/2023  6:00 PM Levia Davidner, PT [id number] [facility]   2/16/2023  6:00 PM Telma Fontan, PT [id number] [facility]   2/21/2023  7:30 AM Telma Fontan, PT [id number] [facility]   2/23/2023  6:00 PM Telma Fontan, PT [email or web address] [facility]   2/27/2023  6:00 PM Levia Davidner, PT [email or web address] [facility]   3/1/2023  7:30 AM Telma Fontan, PT [email or web address] [facility]   3/10/2023  3:00 PM [facility] [address]   3/16/2023 11:20 AM MD SELENA Segura-MO BS AMB   3/16/2023  3:00 PM Caldwell Buerger D, PA-C [facility] NICOLA SCHED   3/22/2023  1:15 PM Kemar Mujica MD CAP BS AMB

## 2023-01-24 ENCOUNTER — APPOINTMENT (OUTPATIENT)
Dept: PHYSICAL THERAPY | Age: 68
End: 2023-01-24
Attending: STUDENT IN AN ORGANIZED HEALTH CARE EDUCATION/TRAINING PROGRAM
Payer: COMMERCIAL

## 2023-01-25 RX ORDER — DICLOFENAC SODIUM 75 MG/1
TABLET, DELAYED RELEASE ORAL
Qty: 60 TABLET | Refills: 0 | Status: SHIPPED | OUTPATIENT
Start: 2023-01-25

## 2023-01-27 ENCOUNTER — HOSPITAL ENCOUNTER (OUTPATIENT)
Dept: PHYSICAL THERAPY | Age: 68
Discharge: HOME | End: 2023-01-27
Attending: STUDENT IN AN ORGANIZED HEALTH CARE EDUCATION/TRAINING PROGRAM
Payer: COMMERCIAL

## 2023-01-27 PROCEDURE — 97140 MANUAL THERAPY 1/> REGIONS: CPT

## 2023-01-27 PROCEDURE — 97110 THERAPEUTIC EXERCISES: CPT

## 2023-01-27 PROCEDURE — 97112 NEUROMUSCULAR REEDUCATION: CPT

## 2023-01-27 NOTE — PROGRESS NOTES
PT DAILY TREATMENT NOTE     Patient Name: Maria L Nagy  Date:2023  : 1955  [x]  Patient  Verified  Payor: Abdoul Han / Plan: [place] [employer] [place] / Product Type: PPO /    In time: 7:30  Out time: 8:10  Total Treatment Time (min): 40  Visit #: 5 of 18    Treatment Area: Right-sided back pain, unspecified back location, unspecified chronicity [M54.9]  Unspecified abnormalities of gait and mobility [R26.9]    SUBJECTIVE  Pain Level (0-10 scale): 1010 knee pain  Any medication changes, allergies to medications, adverse drug reactions, diagnosis change, or new procedure performed?: [x] No    [] Yes (see summary sheet for update)  Subjective functional status/changes:   [] No changes reported  Pt. Reports he is doing better overall, mainly just his knee hurts today. He reports dizziness is getting better. He continues to have stiffness with turning his head at work.      OBJECTIVE    Modality rationale: decrease pain and increase tissue extensibility to improve the patients ability to work   Mabaya Type Additional Details    [] Estim:  []Unatt       []IFC  []Premod                        []Other:  []w/ice   []w/heat  Position:  Location:    [] Estim: []Att    []TENS instruct  []NMES                    []Other:  []w/US   []w/ice   []w/heat  Position:  Location:    []  Traction: [] Cervical       []Lumbar                       [] Prone          []Supine                       []Intermittent   []Continuous Lbs:  [] before manual  [] after manual    []  Ultrasound: []Continuous   [] Pulsed                           []1MHz   []3MHz W/cm2:  Location:    []  Iontophoresis with dexamethasone         Location: [] Take home patch   [] In clinic   10 []  Ice     [x]  heat  []  Ice massage  []  Laser   []  Anodyne Position: seated  Location: right UT    []  Laser with stim  []  Other:  Position:  Location:    []  Vasopneumatic Device    []  Right     []  Left  Pre-treatment girth:  Post-treatment girth:  Measured at (location): Pressure:       [] lo [] med [] hi   Temperature: [] lo [] med [] hi   [x] Skin assessment post-treatment:  [x]intact []redness- no adverse reaction    []redness - adverse reaction:     12 min Therapeutic Exercise:  [x] See flow sheet :   Rationale: increase ROM and increase strength to improve the patients ability to perform ADLs     10 min Neuromuscular Re-education:  []  See flow sheet : saccades, standing balance activities    Rationale: increase strength, improve coordination, and improve balance  to improve the patients ability to ambulate    8 min Manual Therapy:  trigger point release and STM to right UT   The manual therapy interventions were performed at a separate and distinct time from the therapeutic activities interventions. Rationale: decrease pain, increase ROM, and increase tissue extensibility to turn head while driving. With   [x] TE   [] TA   [] neuro   [] other: Patient Education: [x] Review HEP    [] Progressed/Changed HEP based on:   [] positioning   [] body mechanics   [] transfers   [] heat/ice application    [] other:      Other Objective/Functional Measures:   SLS: right: 8 seconds left:  2 seconds  Pt. Was educated on SNAGs for HEP  Mild dizziness with saccades for 1 minute    Pain Level (0-10 scale) post treatment: 8/10    ASSESSMENT/Changes in Function:  pt. Is progressing slowly towards goals. He is having less pain overall and has improving dizziness symptoms. He continues to be limited by his knee pain. He continues to demonstrate decreased cervical mobility and continues to have decreased cervical flexibility.      Patient will continue to benefit from skilled PT services to modify and progress therapeutic interventions, address functional mobility deficits, address ROM deficits, address strength deficits, analyze and address soft tissue restrictions, analyze and cue movement patterns, analyze and modify body mechanics/ergonomics, and assess and modify postural abnormalities to attain remaining goals. Progress towards goals / Updated goals:  Short Term Goals: To be accomplished in 1 weeks:  1. Patient will report compliance with initial HEP to optimize therapy outcomes. Status at eval: established  Met (1/12/23)     Long Term Goals: To be accomplished in 6 weeks:  1. Patient will improve FOTO score by at least 5 points in order to demonstrate functional improvement. Status at eval: 50/100     2. Patient will report no more than \"limited a little\" with \"walking several blocks\" with FOTO in order to demonstrate improved tolerance to work tasks. Status at eval: \"limited a lot:     3. Patient will report no greater than 6/10 pain to right side in order to perform daily tasks with increased ease. Status at eval: 10/10 at max               Current Status: Not met. Pain 7/10 prior to therapy session 1/19/23     4. Patient will be able to maintain SLS for at least 5 sec B in order to perform work tasks and ambulate with increased ease and safety. Status at eval: unable to perform;  tandem: 9 sec left posterior, 18 sec right posterior-due to left knee pain   Current Status: Not met. right: 8 seconds left: 2 seconds (1/27/23)     5. Patient will report at least 50% improvement since start of care in order to demonstrate improved overall QOL. Status at eval: new goal  Current Status:  Progressing. Pt reports 50% improvement with balance since start of care.   No change in pain 1/23/23     PLAN  []  Upgrade activities as tolerated     [x]  Continue plan of care  []  Update interventions per flow sheet       []  Discharge due to:_  []  Other:_      Noemy Tellez, PT 1/27/2023  6:43 AM    Future Appointments   Date Time Provider Markie Verde   1/27/2023  7:30 AM Isidoro Hanson, PT [email or web address] [facility]   1/31/2023  7:00 AM Bridget Norton, PT [id number] [facility]   2/2/2023  7:00 AM Darryn LANGE, PT [id number] [facility]   2/7/2023  7:00 AM Lethsarah Jolleyy, PT [id number] [facility]   2/9/2023  7:00 AM Phyllis LANGE, PT [id number] [facility]   2/13/2023  6:00 PM Lethea Needy, PT [id number] [facility]   2/16/2023  6:00 PM Luis Hernandez, PT [id number] [facility]   2/21/2023  7:30 AM Luis Hernandez, PT [id number] [facility]   2/23/2023  6:00 PM Luis Hernandez, PT [id number] [facility]   2/27/2023  6:00 PM Hal Palm, PT [id number] [facility]   3/1/2023  7:30 AM Luis Hernandez, PT [email or web address] [facility]   3/10/2023  3:00 PM [facility] [address]   3/16/2023 11:20 AM MD SEELNA Alvarado-MO BS AMB   3/16/2023  3:00 PM Karma GRAMAJO PASaniaC [facility] NICOLA SCHED   3/22/2023  1:15 PM Jonathan Otero MD CAP BS AMB

## 2023-01-31 ENCOUNTER — HOSPITAL ENCOUNTER (OUTPATIENT)
Dept: PHYSICAL THERAPY | Age: 68
Discharge: HOME | End: 2023-01-31
Attending: STUDENT IN AN ORGANIZED HEALTH CARE EDUCATION/TRAINING PROGRAM
Payer: COMMERCIAL

## 2023-01-31 PROCEDURE — 97140 MANUAL THERAPY 1/> REGIONS: CPT

## 2023-01-31 PROCEDURE — 97110 THERAPEUTIC EXERCISES: CPT

## 2023-01-31 PROCEDURE — 97112 NEUROMUSCULAR REEDUCATION: CPT

## 2023-01-31 NOTE — PROGRESS NOTES
PT DAILY TREATMENT NOTE     Patient Name: Fidelia Davial  Date:2023  : 1955  [x]  Patient  Verified  Payor: Lisa Cost / Plan: [place] [employer] [place] / Product Type: PPO /    In time:7:02  Out time:7:48  Total Treatment Time (min): 46  Visit #: 6 of 18    Treatment Area: Right-sided back pain, unspecified back location, unspecified chronicity [M54.9]  Unspecified abnormalities of gait and mobility [R26.9]    SUBJECTIVE  Pain Level (0-10 scale): 10/10 B knees   Any medication changes, allergies to medications, adverse drug reactions, diagnosis change, or new procedure performed?: [x] No    [] Yes (see summary sheet for update)  Subjective functional status/changes:   [] No changes reported  Pt reports 90% improvement since start or care. He feels like his balance is definitely getting better but is still a little off compared to pre-morbid status. He take his BP medication at the same time daily, and he took his BP medication ~50 minutes ago. He has a BP cuff at home but he does not monitor his BP. Pt reports his knee pain is always 10/10 B.       OBJECTIVE    Modality rationale: decrease pain and increase tissue extensibility to improve tolerance/ability with daily tasks    Min Type Additional Details     [] Estim:  []Unatt       []IFC  []Premod                        []Other:  []w/ice   []w/heat  Position:  Location:     [] Estim: []Att    []TENS instruct  []NMES                    []Other:  []w/US   []w/ice   []w/heat  Position:  Location:     []  Traction: [] Cervical       []Lumbar                       [] Prone          []Supine                       []Intermittent   []Continuous Lbs:  [] before manual  [] after manual     []  Ultrasound: []Continuous   [] Pulsed                           []1MHz   []3MHz W/cm2:  Location:     []  Iontophoresis with dexamethasone         Location: [] Take home patch   [] In clinic   10 []  Ice     [x]  heat  []  Ice massage  []  Laser   []  Anodyne Position: seated  Location: B UT     []  Laser with stim  []  Other:  Position:  Location:     []  Vasopneumatic Device    []  Right     []  Left  Pre-treatment girth:  Post-treatment girth:  Measured at (location):  Pressure:       [] lo [] med [] hi   Temperature: [] lo [] med [] hi   [x] Skin assessment post-treatment:  [x]intact []redness- no adverse reaction    []redness - adverse     14 min Therapeutic Exercise:  [x] See flow sheet :   Rationale: increase ROM, increase strength, and increase proprioception to improve the patients ability to improve ease of head movements and ADL's with job tasks    12 min Neuromuscular Re-education:  []  See flow sheet :Standing balance interventions    Rationale: increase strength, improve balance, and increase proprioception  to improve the patients ability to reduce fall risk and improve ease/safety with daily tasks    10 min Manual Therapy:  DTM/TPR right upper trap/levator - patient in sitting    The manual therapy interventions were performed at a separate and distinct time from the therapeutic activities interventions.   Rationale: decrease pain, increase ROM, increase tissue extensibility, and decrease trigger points to improve ease of head movements with driving      With   [] TE   [] TA   [] neuro   [] other: Patient Education: [x] Review HEP    [] Progressed/Changed HEP based on:   [] positioning   [] body mechanics   [] transfers   [] heat/ice application    [x] other: BP norms, dangers of elevated BP, advised monitoring BP 3-4x per day and keep a log and follow up with MD if BP remained elevated, signs/symptoms of heart attack/stroke and go to emergency room immediately if signs/symptoms, 3 systems of balance      Other Objective/Functional Measures:     /91, 72 bpm taken electronically prior to session     Challenged with upgraded balance interventions  UE support on parallel bars intermittently to correct balance with MSR Foam EC and MSR Foam EO with horizontal/vertical head turns     Continued hypertonicity/trigger points right cervical and shoulder musculature but decreasing, addressed manually     Patient reported 8/10 \"muscle soreness\" of right c/s following session. Patient declined heat, however, therapist recommended heat for soreness relief and pt agreed. No c/s pain following heat. No change in knee pain following session     Pain Level (0-10 scale) post treatment: 0/10 c/s, 10/10 B knees     ASSESSMENT/Changes in Function:     Pt is making slow, steady progress towards initial goals in therapy. He reports decreasing pain levels and 90% overall improvement since start of care. His greatest remaining complaint is continued balance deficits, especially when walking. Plan to to assess FGA next session. Will continue to address strength, ROM, balance, posture, and flexibility deficits to allow for return to PLOF. Patient will continue to benefit from skilled PT services to modify and progress therapeutic interventions, address functional mobility deficits, address ROM deficits, address strength deficits, analyze and address soft tissue restrictions, analyze and cue movement patterns, analyze and modify body mechanics/ergonomics, assess and modify postural abnormalities, address imbalance/dizziness, and instruct in home and community integration to attain remaining goals. Progress towards goals / Updated goals:  Short Term Goals: To be accomplished in 1 weeks:  1. Patient will report compliance with initial HEP to optimize therapy outcomes. Status at [facility]: established  Met (1/12/23)     Long Term Goals: To be accomplished in 6 weeks:  1. Patient will improve FOTO score by at least 5 points in order to demonstrate functional improvement. Status at [facility]: 50/100     2. Patient will report no more than \"limited a little\" with \"walking several blocks\" with FOTO in order to demonstrate improved tolerance to work tasks.   Status at eval: \"limited a lot:     3. Patient will report no greater than 6/10 pain to right side in order to perform daily tasks with increased ease. Status at eval: 10/10 at max               Current Status: Progressing. Max pain 7-8/10, average pain 2-3/10, least pain 0/10 1/31/23     4. Patient will be able to maintain SLS for at least 5 sec B in order to perform work tasks and ambulate with increased ease and safety. Status at eval: unable to perform;  tandem: 9 sec left posterior, 18 sec right posterior-due to left knee pain   Current Status: Not met. right: 8 seconds left: 2 seconds (1/27/23)     5. Patient will report at least 50% improvement since start of care in order to demonstrate improved overall QOL. Status at eval: new goal  Current Status:  Goal met.  Pt reports 90% improvement since start of care 1/31/23     PLAN  [x]  Upgrade activities as tolerated     [x]  Continue plan of care  []  Update interventions per flow sheet       []  Discharge due to:_  []  Other:_      Risa Mendez, PT 1/31/2023  7:03 AM    Future Appointments   Date Time Provider Markie Verde   2/2/2023  7:00 AM Jessica Mcgill, PT [email or web address] [facility]   2/7/2023  7:00 AM Jessica Mcgill, PT [id number] [facility]   2/9/2023  7:00 AM Pablito LANGE, PT [id number] [facility]   2/13/2023  6:00 PM Jessica Mcgill, PT [id number] [facility]   2/16/2023  6:00 PM Lakshmi Taylor, PT [email or web address] [facility]   2/21/2023  7:30 AM Lakshmi Taylor, PT [email or web address] [facility]   2/23/2023  6:00 PM Lakshmi Taylor, PT [email or web address] [facility]   2/27/2023  6:00 PM Jessica Mcgill, PT [email or web address] [facility]   3/1/2023  7:30 AM Lakshmi Taylor PT [email or web address] [facility]

## 2023-02-02 ENCOUNTER — HOSPITAL ENCOUNTER (OUTPATIENT)
Dept: PHYSICAL THERAPY | Age: 68
Discharge: HOME | End: 2023-02-02
Attending: STUDENT IN AN ORGANIZED HEALTH CARE EDUCATION/TRAINING PROGRAM
Payer: COMMERCIAL

## 2023-02-02 PROCEDURE — 97110 THERAPEUTIC EXERCISES: CPT

## 2023-02-02 PROCEDURE — 97530 THERAPEUTIC ACTIVITIES: CPT

## 2023-02-02 NOTE — PROGRESS NOTES
PT DAILY TREATMENT NOTE     Patient Name: Brian Reina  Date:2023  : 1955  [x]  Patient  Verified  Payor: Alexia Mackey / Plan: [place] [employer] [place] / Product Type: PPO /    In time:7:03  Out time:7:31  Total Treatment Time (min): 28  Visit #: 7 of 18    Treatment Area: Right-sided back pain, unspecified back location, unspecified chronicity [M54.9]  Unspecified abnormalities of gait and mobility [R26.9]    SUBJECTIVE  Pain Level (0-10 scale): 10/10 B Knees, 0/10 right side   Any medication changes, allergies to medications, adverse drug reactions, diagnosis change, or new procedure performed?: [x] No    [] Yes (see summary sheet for update)  Subjective functional status/changes:   [] No changes reported  Pt reports 100% improvement since start of care. \"I'm not even going to talk about my knees\". OBJECTIVE    10 min Therapeutic Exercise:  [x] See flow sheet :   Rationale: increase ROM, increase strength, and increase proprioception to improve the patients ability to improve ease of c/s movements with job tasks and ADL's    18 min Therapeutic Activity:  []  See flow sheet : gaol assessment, patient education    Rationale:   To   assess progress in therapy and optimize pt understanding and therapy outcomes            With   [] TE   [] TA   [] neuro   [] other: Patient Education: [x] Review HEP    [] Progressed/Changed HEP based on:   [] positioning   [] body mechanics   [] transfers   [] heat/ice application    [x] other: reviewed progress in therapy and remaining deficits, discussed plan to DC after next week pending continued steady progress and pt was in agreement       Other Objective/Functional Measures:     /91, heart rate 86bpm taken electronically prior to therapy    FGA   Concussion Symptom Evaluation: 30132   FOTO 94/100    Patient declined heat     Pain Level (0-10 scale) post treatment: 0/10 right side, 10/10 B knees     ASSESSMENT/Changes in Function: See Progress Note     Patient will continue to benefit from skilled PT services to modify and progress therapeutic interventions, address functional mobility deficits, address ROM deficits, address strength deficits, analyze and address soft tissue restrictions, analyze and cue movement patterns, analyze and modify body mechanics/ergonomics, assess and modify postural abnormalities, address imbalance/dizziness, and instruct in home and community integration to attain remaining goals. []  See Plan of Care  [x]  See progress note/recertification  []  See Discharge Summary         Progress towards goals / Updated goals:  Short Term Goals: To be accomplished in 1 weeks:  1. Patient will report compliance with initial HEP to optimize therapy outcomes. Status at eval: established  Met (1/12/23)     Long Term Goals: To be accomplished in 6 weeks:  1. Patient will improve FOTO score by at least 5 points in order to demonstrate functional improvement. Status at [facility]: 50/100   Current Status: Goal met. Increased 44 points to 94/100 2/2/23     2. Patient will report no more than \"limited a little\" with \"walking several blocks\" with FOTO in order to demonstrate improved tolerance to work tasks. Status at [facility]: \"limited a lot:  Current Status: Goal met. Pt reports no limitation 2/2/23     3. Patient will report no greater than 6/10 pain to right side in order to perform daily tasks with increased ease. Status at [facility]: 10/10 at max               Current Status: Progressing. Max pain 7-8/10, average pain 2-3/10, least pain 0/10 1/31/23 No change 2/2/23      4. Patient will be able to maintain SLS for at least 5 sec B in order to perform work tasks and ambulate with increased ease and safety. Status at eval: unable to perform;  tandem: 9 sec left posterior, 18 sec right posterior-due to left knee pain   Current Status: Not met. right: 8 seconds left: 2 seconds (1/27/23)     5.  Patient will report at least 50% improvement since start of care in order to demonstrate improved overall QOL. Status at eval: new goal  Current Status:  Goal met.  Pt reports 90% improvement since start of care 1/31/23     PLAN  [x]  Upgrade activities as tolerated     [x]  Continue plan of care  []  Update interventions per flow sheet       []  Discharge due to:_  []  Other:_      Gennett Alpers, PT 2/2/2023  7:15 AM    Future Appointments   Date Time Provider Markie Verde   2/7/2023  7:00 AM Stefan Oakley, PT [email or web address] [facility]   2/9/2023  7:00 AM Anastasia Pringle, PT [id number] [facility]   2/13/2023  6:00 PM Anastasia Pringle, PT [id number] [facility]   2/16/2023  6:00 PM Stefan Oakley, PT [id number] [facility]   2/21/2023  7:30 AM Stefan Oakley, PT [id number] [facility]   2/23/2023  6:00 PM Stefan Oakley, PT [id number] [facility]   2/27/2023  6:00 PM Anastasia Pringle, PT [email or web address] [facility]   3/1/2023  7:30 AM Stefan Oakley, PT [email or web address] [facility]

## 2023-02-03 NOTE — PROGRESS NOTES
[facility] [address]  [phone] fax    Physical Therapy Progress Note  Patient name: Tata Agosto Start of Care: 23   Referral source: Pam Thakur MD : 1955   Medical/Treatment Diagnosis: Right-sided back pain, unspecified back location, unspecified chronicity [M54.9]  Unspecified abnormalities of gait and mobility [R26.9]  Payor: Mary Haney / Plan: [place] [employer] [place] / Product Type: PPO /  Onset Date:22     Prior Hospitalization: see medical history Provider#: [id number]   Medications: Verified on Patient Summary List    Comorbidities: HTN, arthritis, heart disease, thyroid problems, history of 3 Mis with stent, left knee pain, cancer   Prior Level of Function: working as  (currently operating [employer]); plans to retire in July; lives in 1st floor apartment with friend     Visits from [place] of Care: 7    Missed Visits: 0    Established Goals:         Excellent           Good         Limited           None  [x] Increased ROM   []  [x]  []  []  [x] Increased Strength  []  [x]  []  []  [] Increased Mobility  []  []  []  []   [x] Decreased Pain   []  [x]  []  []  [] Decreased Swelling  []  []  []  []    Key Functional Changes: FGA 26/30, Concussion Symptom Evaluation 30/132, FOTO score increased 44 points to 94/100, improvement in SLS, subjective reports of 90% improvement     Updated Goals: to be achieved in 4 weeks:  3. Patient will report no greater than 6/10 pain to right side in order to perform daily tasks with increased ease. Status at eval/last progress note: Progressing. Max pain 7-8/10, average pain 2-3/10, least pain 0/10      4. Patient will be able to maintain SLS for at least 5 sec B in order to perform work tasks and ambulate with increased ease and safety. Status at eval/last progress note: Not met. right: 8 seconds left: 2 seconds      5.  Patient will demonstrate understanding/compliance with final HEP in order to allow for continued progression independently following DC. Status at eval/last progress note: To be established next visit. ASSESSMENT/RECOMMENDATIONS:  Pt is making steady progress in therapy, meeting 4/6 initial goals and progressing towards pain and SLS goals. He reports 90% improvement since start of care, and 44 point improvement in FOTO score is indicative of functional improvement. FGA score is 26/30 and Concussion Symptom Evaluation is 30/132. He continues to report elevated pain levels on right side at times; however, average pain is now 2-3/10. Pt will benefit from continued skilled PT to address remaining strength, ROM, flexibility, posture, and balance deficits to allow for return to PLOF. [x]Continue therapy per initial plan/protocol at a frequency of  1-3 x per week for 4 weeks  []Continue therapy with the following recommended changes:_____________________      _____________________________________________________________________  []Discontinue therapy progressing towards or have reached established goals  []Discontinue therapy due to lack of appreciable progress towards goals  []Discontinue therapy due to lack of attendance or compliance  []Await Physician's recommendations/decisions regarding therapy  []Other:________________________________________________________________    Thank you for this referral.   Maverickdisha Rdz, PT 2/3/2023 11:50 AM    NOTE TO PHYSICIAN:  [address] TO [facility]: [phone]  If you are unable to process this request in 24 hours please contact our office: [phone]    I have read the above report and request that my patient continue as recommended.   I have read the above report and request that my patient continue therapy with the following changes/special instructions:____________________________________  I have read the above report and request that my patient be discharged from therapy.     Physicians signature: ______________________________Date: ______Time:______     Breanna Hammond MD

## 2023-02-07 ENCOUNTER — HOSPITAL ENCOUNTER (OUTPATIENT)
Dept: PHYSICAL THERAPY | Age: 68
Discharge: HOME | End: 2023-02-07
Attending: STUDENT IN AN ORGANIZED HEALTH CARE EDUCATION/TRAINING PROGRAM
Payer: COMMERCIAL

## 2023-02-07 DIAGNOSIS — I50.32 CHRONIC DIASTOLIC CONGESTIVE HEART FAILURE (HCC): ICD-10-CM

## 2023-02-07 DIAGNOSIS — N18.9 CHRONIC KIDNEY DISEASE, UNSPECIFIED CKD STAGE: ICD-10-CM

## 2023-02-07 PROCEDURE — 97110 THERAPEUTIC EXERCISES: CPT

## 2023-02-07 PROCEDURE — 97112 NEUROMUSCULAR REEDUCATION: CPT

## 2023-02-07 RX ORDER — DAPAGLIFLOZIN 10 MG/1
TABLET, FILM COATED ORAL
Qty: 30 TABLET | Refills: 3 | Status: SHIPPED | OUTPATIENT
Start: 2023-02-07

## 2023-02-07 NOTE — PROGRESS NOTES
PT DAILY TREATMENT NOTE     Patient Name: Sisi Bryant  Date:2023  : 1955  [x]  Patient  Verified  Payor: Jonn Russo / Plan: [place] [employer] / Product Type: PPO /    In time: 7:00  Out time: 7:30  Total Treatment Time (min): 30  Visit #: 1 of     Treatment Area: Right-sided back pain, unspecified back location, unspecified chronicity [M54.9]  Unspecified abnormalities of gait and mobility [R26.9]    SUBJECTIVE  Pain Level (0-10 scale): 6/10  Any medication changes, allergies to medications, adverse drug reactions, diagnosis change, or new procedure performed?: [x] No    [] Yes (see summary sheet for update)  Subjective functional status/changes:   [] No changes reported  Pt. Reports he slept wrong and has some more neck pain today. OBJECTIVE    12 min Therapeutic Exercise:  [x] See flow sheet :   Rationale: increase ROM and increase strength to improve the patients ability to perform ADLs     18 min Neuromuscular Re-education:  []  See flow sheet : standing balance activities, ambulation with head turns, backward ambulation, slow walking   Rationale: increase strength, improve coordination, and improve balance  to improve the patients ability to ambulate           With   [x] TE   [] TA   [] neuro   [] other: Patient Education: [x] Review HEP    [] Progressed/Changed HEP based on:   [] positioning   [] body mechanics   [] transfers   [] heat/ice application    [] other:      Other Objective/Functional Measures:   Pt. Continues to demonstrate decreased B single leg stability and was challenged with small sharita step overs  Pt. Continues to have decreased B cervical AROM  Limited by B knee pain but gives good effort during PT    Pain Level (0-10 scale) post treatment: 0/10    ASSESSMENT/Changes in Function:  pt. Is progressing well with physical therapy. He has improving dizziness symptoms and balance. He continues to have some neck pain but overall is having less pain.  He also continues to demonstrate decreased single leg stability. Patient will continue to benefit from skilled PT services to modify and progress therapeutic interventions, address functional mobility deficits, address ROM deficits, address strength deficits, analyze and address soft tissue restrictions, analyze and cue movement patterns, analyze and modify body mechanics/ergonomics, assess and modify postural abnormalities, and address imbalance/dizziness to attain remaining goals. Progress towards goals / Updated goals:  1. Patient will report no greater than 6/10 pain to right side in order to perform daily tasks with increased ease. Status at eval/last progress note: Progressing. Max pain 7-8/10, average pain 2-3/10, least pain 0/10      2. Patient will be able to maintain SLS for at least 5 sec B in order to perform work tasks and ambulate with increased ease and safety. Status at eval/last progress note: Not met. right: 8 seconds left: 2 seconds      3. Patient will demonstrate understanding/compliance with final HEP in order to allow for continued progression independently following DC. Status at eval/last progress note: To be established next visit.       PLAN  []  Upgrade activities as tolerated     [x]  Continue plan of care  []  Update interventions per flow sheet       []  Discharge due to:_  []  Other:_      Kayla Campos, PT 2/7/2023  6:33 AM    Future Appointments   Date Time Provider Markie Verde   2/7/2023  7:00 AM Nuno Corcoran, PT [id number] [facility]   2/9/2023  7:00 AM Lorena Thayer, PT [id number] [facility]   2/13/2023  6:00 PM Lorena Thayer, PT [id number] [facility]   2/16/2023  6:00 PM Nuno Corcoran, PT [id number] [facility]   2/21/2023  7:30 AM Nuno Corcoran, PT [id number] [facility]   2/23/2023  6:00 PM Nuno Corcoran, PT [id number] [facility]   2/27/2023  6:00 PM Lorena Thayer, PT [id number] [facility]   3/1/2023  7:30 AM Nuno Corcoran, PT [id number] [facility]

## 2023-02-09 ENCOUNTER — HOSPITAL ENCOUNTER (OUTPATIENT)
Dept: PHYSICAL THERAPY | Age: 68
Discharge: HOME | End: 2023-02-09
Attending: STUDENT IN AN ORGANIZED HEALTH CARE EDUCATION/TRAINING PROGRAM
Payer: COMMERCIAL

## 2023-02-09 PROCEDURE — 97110 THERAPEUTIC EXERCISES: CPT

## 2023-02-09 PROCEDURE — 97112 NEUROMUSCULAR REEDUCATION: CPT

## 2023-02-09 NOTE — PROGRESS NOTES
PT DAILY TREATMENT NOTE     Patient Name: Wanda Whiteside  Date:2023  : 1955  [x]  Patient  Verified  Payor: Adalid Mendoza / Plan: [place] [employer] P[employer] / Product Type: PPO /    In time:7:02 Out time:7:28  Total Treatment Time (min): 26  Visit #: 2 of 12    Treatment Area: Right-sided back pain, unspecified back location, unspecified chronicity [M54.9]  Unspecified abnormalities of gait and mobility [R26.9]    SUBJECTIVE  Pain Level (0-10 scale): 0/10 right side 10/10 B knees   Any medication changes, allergies to medications, adverse drug reactions, diagnosis change, or new procedure performed?: [x] No    [] Yes (see summary sheet for update)  Subjective functional status/changes:   [] No changes reported  Pt reports that he is feeling much better, and he's not sure he needs continued therapy. His pain range on the right side has been 0-3/10 for the past week. He had some pain earlier this week he believes because he slept wrong, but he is feeling much better now. He reports 100% improvement since start of care. He feels his balance is much better. He still has his normal knee pain.   He follows up with his orthopedic MD next week, and he is planning on scheduling a left TKA    OBJECTIVE    Modality rationale: decrease pain and increase tissue extensibility to improve tolerance/ability with daily tasks     Min Type Additional Details     [] Estim:  []Unatt       []IFC  []Premod                        []Other:  []w/ice   []w/heat  Position:  Location:     [] Estim: []Att    []TENS instruct  []NMES                    []Other:  []w/US   []w/ice   []w/heat  Position:  Location:     []  Traction: [] Cervical       []Lumbar                       [] Prone          []Supine                       []Intermittent   []Continuous Lbs:  [] before manual  [] after manual     []  Ultrasound: []Continuous   [] Pulsed                           []1MHz   []3MHz W/cm2:  Location:     []  Iontophoresis with dexamethasone Location: [] Take home patch   [] In clinic   10 []  Ice     [x]  heat  []  Ice massage  []  Laser   []  Anodyne Position: seated  Location: B UT     []  Laser with stim  []  Other:  Position:  Location:     []  Vasopneumatic Device    []  Right     []  Left  Pre-treatment girth:  Post-treatment girth:  Measured at (location):  Pressure:       [] lo [] med [] hi   Temperature: [] lo [] med [] hi   [x] Skin assessment post-treatment:  [x]intact []redness- no adverse reaction    []redness - adverse      18 min Therapeutic Exercise:  [x] See flow sheet :   Rationale: increase ROM, increase strength, and increase proprioception to improve the patients ability to improve ease of head movements and ADL's with job tasks     8 min Neuromuscular Re-education:  [x]  See flow sheet :Standing balance interventions    Rationale: increase strength, improve balance, and increase proprioception  to improve the patients ability to reduce fall risk and improve ease/safety with daily tasks     With   [] TE   [] TA   [] neuro   [] other: Patient Education: [x] Review HEP    [] Progressed/Changed HEP based on:   [] positioning   [] body mechanics   [] transfers   [] heat/ice application    [x] other: discussed options to continue therapy, trial hold or DC. Pt elected to trial hold. Other Objective/Functional Measures:     /86, heart rate 79bpm     Gave and reviewed updated HEP  Required cues for hand placement with SNAGS   SLS max hold time during 30 second trial: Left 3 seconds, Right 10 seconds  Left > Right knee pain with SLS but pt requested to continue       Pain Level (0-10 scale) post treatment: 0/10 right side, 10/10 B knees     ASSESSMENT/Changes in Function:     Pt is making steady progress in therapy, meeting 1/3 updated goals and progressing towards SLS goal.  SLS is decreased on left > right, partially limited by left knee pain. He anticipates scheduling a left TKA.   Pt is compliant with current HEP and was provided with updated HEP this visit. He reports significant pain reduction, improvement in balance, and he is no longer experiencing concussion symptoms. Pt reports 100% improvement since start of care and return to PLOF. He requests trial hold from therapy at this time to determine if he is ready for DC. Pt is aware that he will be DC if he does not call to schedule more appointments within 30 days. Patient will continue to benefit from skilled PT services to modify and progress therapeutic interventions, address functional mobility deficits, address ROM deficits, address strength deficits, analyze and address soft tissue restrictions, analyze and cue movement patterns, analyze and modify body mechanics/ergonomics, assess and modify postural abnormalities, address imbalance/dizziness, and instruct in home and community integration to attain remaining goals. Progress towards goals / Updated goals:  1. Patient will report no greater than 6/10 pain to right side in order to perform daily tasks with increased ease. Status at eval/last progress note: Progressing. Max pain 7-8/10, average pain 2-3/10, least pain 0/10   Current Status. Goal met. Pain range 0-3/10 for the past week 2/9/23     2. Patient will be able to maintain SLS for at least 5 sec B in order to perform work tasks and ambulate with increased ease and safety. Status at eval/last progress note: Not met. right: 8 seconds left: 2 seconds   Current Status: Progressing. Goal met on right, progressing on left. Right 10 seconds, Left 3 seconds 2/9/23     3. Patient will demonstrate understanding/compliance with final HEP in order to allow for continued progression independently following DC. Status at eval/last progress note: To be established next visit.     Current Status: Initiated this visit 2/9/23       PLAN  []  Upgrade activities as tolerated     []  Continue plan of care  []  Update interventions per flow sheet       [] Discharge due to:_  [x]  Other: Trial Hold From Therapy       Caitlyn Stockton, PT 2/9/2023  7:04 AM    Future Appointments   Date Time Provider Markie Verde   2/13/2023  6:00 PM Maya Araujo, PT [email or web address] [facility]   2/16/2023  6:00 PM Catalina Olivera, PT [id number] [facility]   2/21/2023  7:30 AM Catalina Olivera, PT [id number] [facility]   2/23/2023  6:00 PM Catalina Olivera, PT [id number] [facility]   2/27/2023  6:00 PM Maya Araujo, PT [id number] [facility]   3/1/2023  7:30 AM Catalina Olivera, PT MMCPTPB [facility]

## 2023-02-13 ENCOUNTER — APPOINTMENT (OUTPATIENT)
Dept: PHYSICAL THERAPY | Age: 68
End: 2023-02-13
Attending: STUDENT IN AN ORGANIZED HEALTH CARE EDUCATION/TRAINING PROGRAM
Payer: COMMERCIAL

## 2023-02-16 ENCOUNTER — APPOINTMENT (OUTPATIENT)
Dept: PHYSICAL THERAPY | Age: 68
End: 2023-02-16
Attending: STUDENT IN AN ORGANIZED HEALTH CARE EDUCATION/TRAINING PROGRAM
Payer: COMMERCIAL

## 2023-02-21 ENCOUNTER — APPOINTMENT (OUTPATIENT)
Dept: PHYSICAL THERAPY | Age: 68
End: 2023-02-21
Attending: STUDENT IN AN ORGANIZED HEALTH CARE EDUCATION/TRAINING PROGRAM
Payer: COMMERCIAL

## 2023-02-22 DIAGNOSIS — M17.11 UNILATERAL PRIMARY OSTEOARTHRITIS, RIGHT KNEE: Primary | ICD-10-CM

## 2023-02-22 DIAGNOSIS — M17.12 UNILATERAL PRIMARY OSTEOARTHRITIS, LEFT KNEE: ICD-10-CM

## 2023-02-22 RX ORDER — DICLOFENAC SODIUM 75 MG/1
TABLET, DELAYED RELEASE ORAL
Qty: 60 TABLET | Refills: 2 | Status: SHIPPED | OUTPATIENT
Start: 2023-02-22

## 2023-02-23 ENCOUNTER — APPOINTMENT (OUTPATIENT)
Dept: PHYSICAL THERAPY | Age: 68
End: 2023-02-23
Attending: STUDENT IN AN ORGANIZED HEALTH CARE EDUCATION/TRAINING PROGRAM
Payer: COMMERCIAL

## 2023-02-27 ENCOUNTER — APPOINTMENT (OUTPATIENT)
Dept: PHYSICAL THERAPY | Age: 68
End: 2023-02-27
Attending: STUDENT IN AN ORGANIZED HEALTH CARE EDUCATION/TRAINING PROGRAM
Payer: COMMERCIAL

## 2023-03-01 ENCOUNTER — APPOINTMENT (OUTPATIENT)
Dept: PHYSICAL THERAPY | Age: 68
End: 2023-03-01
Attending: STUDENT IN AN ORGANIZED HEALTH CARE EDUCATION/TRAINING PROGRAM

## 2023-03-16 ENCOUNTER — OFFICE VISIT (OUTPATIENT)
Facility: CLINIC | Age: 68
End: 2023-03-16
Payer: COMMERCIAL

## 2023-03-16 VITALS
HEART RATE: 83 BPM | HEIGHT: 71 IN | SYSTOLIC BLOOD PRESSURE: 120 MMHG | RESPIRATION RATE: 16 BRPM | BODY MASS INDEX: 35.56 KG/M2 | DIASTOLIC BLOOD PRESSURE: 70 MMHG | WEIGHT: 254 LBS | OXYGEN SATURATION: 94 %

## 2023-03-16 DIAGNOSIS — I10 ESSENTIAL HYPERTENSION: ICD-10-CM

## 2023-03-16 DIAGNOSIS — K21.9 GASTROESOPHAGEAL REFLUX DISEASE WITHOUT ESOPHAGITIS: ICD-10-CM

## 2023-03-16 DIAGNOSIS — M1A.39X0 CHRONIC GOUT DUE TO RENAL IMPAIRMENT OF MULTIPLE SITES WITHOUT TOPHUS: ICD-10-CM

## 2023-03-16 DIAGNOSIS — N18.9 ACUTE KIDNEY INJURY SUPERIMPOSED ON CKD (HCC): Primary | ICD-10-CM

## 2023-03-16 DIAGNOSIS — C61 MALIGNANT NEOPLASM OF PROSTATE (HCC): ICD-10-CM

## 2023-03-16 DIAGNOSIS — E78.2 MIXED HYPERLIPIDEMIA: ICD-10-CM

## 2023-03-16 DIAGNOSIS — N18.32 CHRONIC KIDNEY DISEASE, STAGE 3B (HCC): ICD-10-CM

## 2023-03-16 DIAGNOSIS — R73.03 PREDIABETES: ICD-10-CM

## 2023-03-16 DIAGNOSIS — N17.9 ACUTE KIDNEY INJURY SUPERIMPOSED ON CKD (HCC): Primary | ICD-10-CM

## 2023-03-16 DIAGNOSIS — I50.42 CHRONIC COMBINED SYSTOLIC (CONGESTIVE) AND DIASTOLIC (CONGESTIVE) HEART FAILURE (HCC): ICD-10-CM

## 2023-03-16 DIAGNOSIS — R60.0 LOCALIZED EDEMA: ICD-10-CM

## 2023-03-16 DIAGNOSIS — M17.0 PRIMARY OSTEOARTHRITIS OF BOTH KNEES: ICD-10-CM

## 2023-03-16 DIAGNOSIS — Z91.81 AT HIGH RISK FOR FALLS: ICD-10-CM

## 2023-03-16 PROCEDURE — 3078F DIAST BP <80 MM HG: CPT | Performed by: STUDENT IN AN ORGANIZED HEALTH CARE EDUCATION/TRAINING PROGRAM

## 2023-03-16 PROCEDURE — 3074F SYST BP LT 130 MM HG: CPT | Performed by: STUDENT IN AN ORGANIZED HEALTH CARE EDUCATION/TRAINING PROGRAM

## 2023-03-16 PROCEDURE — 99214 OFFICE O/P EST MOD 30 MIN: CPT | Performed by: STUDENT IN AN ORGANIZED HEALTH CARE EDUCATION/TRAINING PROGRAM

## 2023-03-16 PROCEDURE — 1123F ACP DISCUSS/DSCN MKR DOCD: CPT | Performed by: STUDENT IN AN ORGANIZED HEALTH CARE EDUCATION/TRAINING PROGRAM

## 2023-03-16 RX ORDER — CLOPIDOGREL BISULFATE 75 MG/1
75 TABLET ORAL DAILY
Qty: 90 TABLET | Refills: 1 | Status: SHIPPED | OUTPATIENT
Start: 2023-03-16

## 2023-03-16 RX ORDER — HYDRALAZINE HYDROCHLORIDE 100 MG/1
TABLET, FILM COATED ORAL
Qty: 90 TABLET | Refills: 3 | Status: SHIPPED | OUTPATIENT
Start: 2023-03-16

## 2023-03-16 RX ORDER — OMEPRAZOLE 40 MG/1
40 CAPSULE, DELAYED RELEASE ORAL DAILY
Qty: 90 CAPSULE | Refills: 1 | Status: SHIPPED | OUTPATIENT
Start: 2023-03-16

## 2023-03-16 RX ORDER — DICLOFENAC SODIUM 75 MG/1
TABLET, DELAYED RELEASE ORAL
Qty: 60 TABLET | Refills: 2 | Status: CANCELLED | OUTPATIENT
Start: 2023-03-16

## 2023-03-16 RX ORDER — ATORVASTATIN CALCIUM 80 MG/1
TABLET, FILM COATED ORAL
Qty: 90 TABLET | Refills: 1 | Status: SHIPPED | OUTPATIENT
Start: 2023-03-16

## 2023-03-16 RX ORDER — TROSPIUM CHLORIDE 20 MG/1
TABLET, FILM COATED ORAL
Qty: 60 TABLET | Status: CANCELLED | OUTPATIENT
Start: 2023-03-16

## 2023-03-16 SDOH — ECONOMIC STABILITY: INCOME INSECURITY: HOW HARD IS IT FOR YOU TO PAY FOR THE VERY BASICS LIKE FOOD, HOUSING, MEDICAL CARE, AND HEATING?: NOT HARD AT ALL

## 2023-03-16 SDOH — ECONOMIC STABILITY: FOOD INSECURITY: WITHIN THE PAST 12 MONTHS, THE FOOD YOU BOUGHT JUST DIDN'T LAST AND YOU DIDN'T HAVE MONEY TO GET MORE.: NEVER TRUE

## 2023-03-16 SDOH — ECONOMIC STABILITY: FOOD INSECURITY: WITHIN THE PAST 12 MONTHS, YOU WORRIED THAT YOUR FOOD WOULD RUN OUT BEFORE YOU GOT MONEY TO BUY MORE.: NEVER TRUE

## 2023-03-16 SDOH — ECONOMIC STABILITY: HOUSING INSECURITY
IN THE LAST 12 MONTHS, WAS THERE A TIME WHEN YOU DID NOT HAVE A STEADY PLACE TO SLEEP OR SLEPT IN A SHELTER (INCLUDING NOW)?: PATIENT REFUSED

## 2023-03-16 ASSESSMENT — PATIENT HEALTH QUESTIONNAIRE - PHQ9
1. LITTLE INTEREST OR PLEASURE IN DOING THINGS: 0
SUM OF ALL RESPONSES TO PHQ QUESTIONS 1-9: 1
SUM OF ALL RESPONSES TO PHQ QUESTIONS 1-9: 1
SUM OF ALL RESPONSES TO PHQ9 QUESTIONS 1 & 2: 1
SUM OF ALL RESPONSES TO PHQ QUESTIONS 1-9: 1
SUM OF ALL RESPONSES TO PHQ QUESTIONS 1-9: 1
2. FEELING DOWN, DEPRESSED OR HOPELESS: 1

## 2023-03-16 ASSESSMENT — ENCOUNTER SYMPTOMS
BACK PAIN: 1
SHORTNESS OF BREATH: 0
CONSTIPATION: 0
ABDOMINAL PAIN: 0
VOMITING: 0
RHINORRHEA: 0
CHEST TIGHTNESS: 0
NAUSEA: 0
DIARRHEA: 0
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
## 2023-03-22 ENCOUNTER — OFFICE VISIT (OUTPATIENT)
Age: 68
End: 2023-03-22
Payer: COMMERCIAL

## 2023-03-22 VITALS
SYSTOLIC BLOOD PRESSURE: 128 MMHG | HEART RATE: 75 BPM | HEIGHT: 71 IN | DIASTOLIC BLOOD PRESSURE: 72 MMHG | BODY MASS INDEX: 36.4 KG/M2 | OXYGEN SATURATION: 97 % | WEIGHT: 260 LBS

## 2023-03-22 DIAGNOSIS — E78.00 PURE HYPERCHOLESTEROLEMIA, UNSPECIFIED: ICD-10-CM

## 2023-03-22 DIAGNOSIS — N18.32 STAGE 3B CHRONIC KIDNEY DISEASE (HCC): ICD-10-CM

## 2023-03-22 DIAGNOSIS — I10 ESSENTIAL (PRIMARY) HYPERTENSION: ICD-10-CM

## 2023-03-22 DIAGNOSIS — Z95.5 HISTORY OF CORONARY ARTERY STENT PLACEMENT: ICD-10-CM

## 2023-03-22 DIAGNOSIS — I50.32 CHRONIC DIASTOLIC (CONGESTIVE) HEART FAILURE (HCC): ICD-10-CM

## 2023-03-22 DIAGNOSIS — I25.118 ATHEROSCLEROSIS OF NATIVE CORONARY ARTERY OF NATIVE HEART WITH STABLE ANGINA PECTORIS (HCC): Primary | ICD-10-CM

## 2023-03-22 PROCEDURE — 3078F DIAST BP <80 MM HG: CPT | Performed by: INTERNAL MEDICINE

## 2023-03-22 PROCEDURE — 3074F SYST BP LT 130 MM HG: CPT | Performed by: INTERNAL MEDICINE

## 2023-03-22 PROCEDURE — 99214 OFFICE O/P EST MOD 30 MIN: CPT | Performed by: INTERNAL MEDICINE

## 2023-03-22 PROCEDURE — 1123F ACP DISCUSS/DSCN MKR DOCD: CPT | Performed by: INTERNAL MEDICINE

## 2023-03-22 RX ORDER — NITROGLYCERIN 0.4 MG/1
0.4 TABLET SUBLINGUAL EVERY 5 MIN PRN
Qty: 25 TABLET | Refills: 0 | Status: SHIPPED | OUTPATIENT
Start: 2023-03-22

## 2023-03-22 ASSESSMENT — ENCOUNTER SYMPTOMS
EYES NEGATIVE: 1
GASTROINTESTINAL NEGATIVE: 1
SHORTNESS OF BREATH: 1
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
## 2023-04-12 NOTE — TELEPHONE ENCOUNTER
Increase Coreg to 25 mg twice daily  Increase ramipril to 10 mg daily. I have sent the prescriptions to his pharmacy  Get home BP chart for 4 days  Get echo as requested by occupational health services.   I have ordered Yes

## 2023-04-19 DIAGNOSIS — I50.42 CHRONIC COMBINED SYSTOLIC (CONGESTIVE) AND DIASTOLIC (CONGESTIVE) HEART FAILURE (HCC): ICD-10-CM

## 2023-04-20 RX ORDER — FUROSEMIDE 40 MG/1
TABLET ORAL
Qty: 90 TABLET | Refills: 1 | Status: SHIPPED | OUTPATIENT
Start: 2023-04-20

## 2023-05-05 ENCOUNTER — OFFICE VISIT (OUTPATIENT)
Age: 68
End: 2023-05-05

## 2023-05-05 ENCOUNTER — TELEPHONE (OUTPATIENT)
Facility: CLINIC | Age: 68
End: 2023-05-05

## 2023-05-05 VITALS
OXYGEN SATURATION: 97 % | BODY MASS INDEX: 37.02 KG/M2 | WEIGHT: 264.4 LBS | HEART RATE: 88 BPM | TEMPERATURE: 98.6 F | HEIGHT: 71 IN

## 2023-05-05 DIAGNOSIS — M17.12 UNILATERAL PRIMARY OSTEOARTHRITIS, LEFT KNEE: ICD-10-CM

## 2023-05-05 DIAGNOSIS — M17.11 UNILATERAL PRIMARY OSTEOARTHRITIS, RIGHT KNEE: Primary | ICD-10-CM

## 2023-05-05 DIAGNOSIS — I50.42 CHRONIC COMBINED SYSTOLIC (CONGESTIVE) AND DIASTOLIC (CONGESTIVE) HEART FAILURE (HCC): Primary | ICD-10-CM

## 2023-05-05 DIAGNOSIS — N18.32 CHRONIC KIDNEY DISEASE, STAGE 3B (HCC): ICD-10-CM

## 2023-05-05 PROBLEM — Z59.02 UNSHELTERED HOMELESSNESS: Status: ACTIVE | Noted: 2023-05-05

## 2023-05-05 PROBLEM — L85.9 HLP (HYPERKERATOSIS LENTICULARIS PERSTANS): Status: ACTIVE | Noted: 2019-07-29

## 2023-05-05 PROBLEM — T14.90XA TRAUMA: Status: ACTIVE | Noted: 2022-11-11

## 2023-05-05 PROBLEM — I26.99 ACUTE PULMONARY EMBOLISM (HCC): Status: ACTIVE | Noted: 2017-05-01

## 2023-05-05 RX ORDER — DICLOFENAC SODIUM 75 MG/1
TABLET, DELAYED RELEASE ORAL
COMMUNITY
Start: 2023-03-21

## 2023-05-05 RX ORDER — BETAMETHASONE SODIUM PHOSPHATE AND BETAMETHASONE ACETATE 3; 3 MG/ML; MG/ML
3 INJECTION, SUSPENSION INTRA-ARTICULAR; INTRALESIONAL; INTRAMUSCULAR; SOFT TISSUE ONCE
Status: COMPLETED | OUTPATIENT
Start: 2023-05-05 | End: 2023-05-05

## 2023-05-05 RX ADMIN — BETAMETHASONE SODIUM PHOSPHATE AND BETAMETHASONE ACETATE 3 MG: 3; 3 INJECTION, SUSPENSION INTRA-ARTICULAR; INTRALESIONAL; INTRAMUSCULAR; SOFT TISSUE at 15:21

## 2023-05-05 RX ADMIN — BETAMETHASONE SODIUM PHOSPHATE AND BETAMETHASONE ACETATE 3 MG: 3; 3 INJECTION, SUSPENSION INTRA-ARTICULAR; INTRALESIONAL; INTRAMUSCULAR; SOFT TISSUE at 15:22

## 2023-05-05 NOTE — PROGRESS NOTES
260 lb (117.9 kg)   23 254 lb (115.2 kg)      Body mass index is 36.88 kg/m². Patient Active Problem List   Diagnosis    Sleep apnea    Hypogonadism male    Elevated PSA    Chest pain    Impotence of organic origin    Prostate cancer [facility])    Gastrointestinal hemorrhage with melena    Hypercholesterolemia    Stented coronary artery    Left ventricular diastolic dysfunction    Precordial pain    Severe obesity (BMI 35.0-39. 9) with comorbidity ([address]    Atherosclerosis of native coronary artery of native heart without angina pectoris    Dyspnea    Gout    Slowing of urinary stream    Congestive heart failure (HCC)    Overactive bladder    Hypokalemia    Essential hypertension    CKD (chronic kidney disease) stage 3, GFR 30-59 ml/min (HCC)    Atypical chest pain    Postsurgical percutaneous transluminal coronary angioplasty status    Prediabetes    Acute pulmonary embolism (HCC)    HLP (hyperkeratosis lenticularis perstans)    Hypertrophy of prostate without urinary obstruction and other lower urinary tract symptoms (LUTS)    Frequency    Trauma    Unsheltered homelessness     Social History     Tobacco Use    Smoking status: Former     Packs/day: 0.25     Years: 0.50     Pack years: 0.13     Types: Cigarettes     Quit date: 10/4/1999     Years since quittin.6    Smokeless tobacco: Never   Vaping Use    Vaping Use: Never used   Substance Use Topics    Alcohol use: Never    Drug use: No        Allergies   Allergen Reactions    Iodine Other (See Comments)     Eyes swell shut    Shellfish Allergy Swelling        Current Outpatient Medications   Medication Sig    diclofenac (VOLTAREN) 75 MG EC tablet     furosemide (LASIX) 40 MG tablet USE DAILY AS NEEDED, ACCUMULATION OF FLUID RESULTING FROM CHRONIC HEART FAILURE    nitroGLYCERIN (NITROSTAT) 0.4 MG SL tablet Place 1 tablet under the tongue every 5 minutes as needed for Chest pain    Compression Stockings MISC by Does not apply route Moderate compression,

## 2023-05-23 DIAGNOSIS — I10 ESSENTIAL (PRIMARY) HYPERTENSION: ICD-10-CM

## 2023-05-23 RX ORDER — AMLODIPINE BESYLATE 5 MG/1
TABLET ORAL
Qty: 90 TABLET | Refills: 2 | Status: SHIPPED | OUTPATIENT
Start: 2023-05-23

## 2023-07-11 ENCOUNTER — OFFICE VISIT (OUTPATIENT)
Age: 68
End: 2023-07-11
Payer: COMMERCIAL

## 2023-07-11 VITALS
SYSTOLIC BLOOD PRESSURE: 139 MMHG | OXYGEN SATURATION: 95 % | BODY MASS INDEX: 36.12 KG/M2 | HEIGHT: 71 IN | WEIGHT: 258 LBS | HEART RATE: 72 BPM | DIASTOLIC BLOOD PRESSURE: 80 MMHG

## 2023-07-11 DIAGNOSIS — Z95.5 HISTORY OF CORONARY ARTERY STENT PLACEMENT: ICD-10-CM

## 2023-07-11 DIAGNOSIS — I25.10 CORONARY ARTERY DISEASE INVOLVING NATIVE CORONARY ARTERY OF NATIVE HEART WITHOUT ANGINA PECTORIS: Primary | ICD-10-CM

## 2023-07-11 DIAGNOSIS — I50.32 CHRONIC DIASTOLIC (CONGESTIVE) HEART FAILURE (HCC): ICD-10-CM

## 2023-07-11 DIAGNOSIS — E78.00 PURE HYPERCHOLESTEROLEMIA, UNSPECIFIED: ICD-10-CM

## 2023-07-11 LAB
AVERAGE GLUCOSE: 111 MG/DL (ref 91–123)
BASOPHILS # BLD: 1 % (ref 0–2)
BASOPHILS ABSOLUTE: 0 K/UL (ref 0–0.2)
CHOLESTEROL/HDL RATIO: 2.3 (ref 0–5)
CHOLESTEROL: 128 MG/DL (ref 110–200)
EOSINOPHIL # BLD: 2 % (ref 0–6)
EOSINOPHILS ABSOLUTE: 0.1 K/UL (ref 0–0.5)
HBA1C MFR BLD: 5.5 % (ref 4.8–5.6)
HCT VFR BLD CALC: 39.3 % (ref 37.8–52.2)
HDLC SERPL-MCNC: 55 MG/DL
HEMOGLOBIN: 11.6 G/DL (ref 12.6–17.1)
LDL CHOLESTEROL CALCULATED: 61 MG/DL (ref 50–99)
LDL/HDL RATIO: 1.1
LYMPHOCYTES # BLD: 36 % (ref 20–45)
LYMPHOCYTES ABSOLUTE: 1.5 K/UL (ref 1–4.8)
MCH RBC QN AUTO: 28 PG (ref 26–34)
MCHC RBC AUTO-ENTMCNC: 30 G/DL (ref 31–36)
MCV RBC AUTO: 96 FL (ref 80–95)
MONOCYTES ABSOLUTE: 0.4 K/UL (ref 0.1–1)
MONOCYTES: 9 % (ref 3–12)
NEUTROPHILS ABSOLUTE: 2.2 K/UL (ref 1.8–7.7)
NEUTROPHILS: 52 % (ref 40–75)
NON-HDL CHOLESTEROL: 73 MG/DL
PDW BLD-RTO: 16.4 % (ref 10–15.5)
PLATELET # BLD: 205 K/UL (ref 140–440)
PMV BLD AUTO: 10.6 FL (ref 9–13)
RBC: 4.1 M/UL (ref 3.8–5.8)
TRIGL SERPL-MCNC: 58 MG/DL (ref 40–149)
VLDLC SERPL CALC-MCNC: 12 MG/DL (ref 8–30)
WBC: 4.3 K/UL (ref 4–11)

## 2023-07-11 PROCEDURE — 3079F DIAST BP 80-89 MM HG: CPT | Performed by: INTERNAL MEDICINE

## 2023-07-11 PROCEDURE — 99214 OFFICE O/P EST MOD 30 MIN: CPT | Performed by: INTERNAL MEDICINE

## 2023-07-11 PROCEDURE — 3075F SYST BP GE 130 - 139MM HG: CPT | Performed by: INTERNAL MEDICINE

## 2023-07-11 PROCEDURE — 1123F ACP DISCUSS/DSCN MKR DOCD: CPT | Performed by: INTERNAL MEDICINE

## 2023-07-11 ASSESSMENT — ENCOUNTER SYMPTOMS
GASTROINTESTINAL NEGATIVE: 1
RESPIRATORY NEGATIVE: 1
SHORTNESS OF BREATH: 0
EYES NEGATIVE: 1
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
## 2023-07-11 NOTE — PROGRESS NOTES
1. Have you been to the ER, urgent care clinic since your last visit? Hospitalized since your last visit? No    2. Have you seen or consulted any other health care providers outside of the [address] [place] [address] since your last visit? Include any pap smears or colon screening. No    3. Since your last visit, have you had any of the following symptoms? no     .     4. Have you had any blood work, X-rays or cardiac testing? Yes Where: pcp         5. Where do you normally have your labs drawn?   pcp    6. Do you need any refills today?    no

## 2023-07-20 ENCOUNTER — OFFICE VISIT (OUTPATIENT)
Facility: CLINIC | Age: 68
End: 2023-07-20
Payer: COMMERCIAL

## 2023-07-20 VITALS
RESPIRATION RATE: 16 BRPM | SYSTOLIC BLOOD PRESSURE: 115 MMHG | DIASTOLIC BLOOD PRESSURE: 66 MMHG | HEIGHT: 71 IN | OXYGEN SATURATION: 94 % | HEART RATE: 70 BPM | BODY MASS INDEX: 35.84 KG/M2 | TEMPERATURE: 98.1 F | WEIGHT: 256 LBS

## 2023-07-20 DIAGNOSIS — I10 ESSENTIAL HYPERTENSION: ICD-10-CM

## 2023-07-20 DIAGNOSIS — I50.32 CHRONIC DIASTOLIC CONGESTIVE HEART FAILURE (HCC): ICD-10-CM

## 2023-07-20 DIAGNOSIS — M1A.39X0 CHRONIC GOUT DUE TO RENAL IMPAIRMENT OF MULTIPLE SITES WITHOUT TOPHUS: ICD-10-CM

## 2023-07-20 DIAGNOSIS — K21.9 GASTROESOPHAGEAL REFLUX DISEASE WITHOUT ESOPHAGITIS: ICD-10-CM

## 2023-07-20 DIAGNOSIS — R73.03 PREDIABETES: ICD-10-CM

## 2023-07-20 DIAGNOSIS — E78.2 MIXED HYPERLIPIDEMIA: ICD-10-CM

## 2023-07-20 DIAGNOSIS — I25.10 ATHEROSCLEROSIS OF NATIVE CORONARY ARTERY OF NATIVE HEART WITHOUT ANGINA PECTORIS: ICD-10-CM

## 2023-07-20 DIAGNOSIS — K59.00 CONSTIPATION, UNSPECIFIED CONSTIPATION TYPE: ICD-10-CM

## 2023-07-20 DIAGNOSIS — M17.0 PRIMARY OSTEOARTHRITIS OF BOTH KNEES: ICD-10-CM

## 2023-07-20 DIAGNOSIS — N18.32 CHRONIC KIDNEY DISEASE, STAGE 3B (HCC): Primary | ICD-10-CM

## 2023-07-20 PROCEDURE — 3074F SYST BP LT 130 MM HG: CPT | Performed by: STUDENT IN AN ORGANIZED HEALTH CARE EDUCATION/TRAINING PROGRAM

## 2023-07-20 PROCEDURE — 99214 OFFICE O/P EST MOD 30 MIN: CPT | Performed by: STUDENT IN AN ORGANIZED HEALTH CARE EDUCATION/TRAINING PROGRAM

## 2023-07-20 PROCEDURE — 3078F DIAST BP <80 MM HG: CPT | Performed by: STUDENT IN AN ORGANIZED HEALTH CARE EDUCATION/TRAINING PROGRAM

## 2023-07-20 PROCEDURE — 1123F ACP DISCUSS/DSCN MKR DOCD: CPT | Performed by: STUDENT IN AN ORGANIZED HEALTH CARE EDUCATION/TRAINING PROGRAM

## 2023-07-20 RX ORDER — ISOSORBIDE MONONITRATE 60 MG/1
60 TABLET, EXTENDED RELEASE ORAL DAILY
Qty: 90 TABLET | Refills: 1 | Status: SHIPPED | OUTPATIENT
Start: 2023-07-20

## 2023-07-20 RX ORDER — ALLOPURINOL 100 MG/1
100 TABLET ORAL DAILY
Qty: 90 TABLET | Refills: 1 | Status: SHIPPED | OUTPATIENT
Start: 2023-07-20

## 2023-07-20 RX ORDER — SENNA AND DOCUSATE SODIUM 50; 8.6 MG/1; MG/1
2 TABLET, FILM COATED ORAL DAILY
Qty: 60 TABLET | Refills: 2 | Status: SHIPPED | OUTPATIENT
Start: 2023-07-20

## 2023-07-20 RX ORDER — OMEPRAZOLE 40 MG/1
40 CAPSULE, DELAYED RELEASE ORAL DAILY
Qty: 90 CAPSULE | Refills: 1 | Status: SHIPPED | OUTPATIENT
Start: 2023-07-20

## 2023-07-20 ASSESSMENT — ENCOUNTER SYMPTOMS
DIARRHEA: 0
VOMITING: 0
ABDOMINAL PAIN: 0
NAUSEA: 0
CHEST TIGHTNESS: 0
RHINORRHEA: 0
CONSTIPATION: 1
BACK PAIN: 1
SHORTNESS OF BREATH: 0

## 2023-07-20 NOTE — PROGRESS NOTES
Vivi Gonzalez is a 79 y.o. male presenting today for Follow-up (4 month)  . Chief Complaint   Patient presents with    Follow-up     4 month       HPI:  Vivi Gonzalez presents to the office today for follow up. Patient has a past medical history of CAD s/p stent, CHF, HTN, HLD, gout. CAD/CHF: Patient follows with cardiology-Dr. Cuauhtemoc Carbone. He is on Lasix every day. Echo in 12/21 showed LVEF 50%. Patient saw cardiology recently on 11/3/2022 with complaints of more frequent chest pains. Stress test was completed which showed small area of ischemia-medication management was continued and his Coreg dose was increased. BP is improved. No repeat CP. HLD: Controlled on statin. Lipid panel on 7/23 showed LDL 61, HDL 55, triglycerides 58. HTN: BP is well controlled on Coreg, Imdur, hydralazine, Coreg     CKD 3: Recent BMP showed creatinine 1.8. This has trended down from prior. Patient stopped using NSAIDs. He is on Katherine. Has not followed up with nephrology recently. Osteoarthritis of bilateral knees: Patient has been following with orthopedics. He had been taking NSAIDs for pain. Recently received cortisone injection with improvement. Gout: Patient is on allopurinol. No recent gout attacks. Today, patient is complaining of worsening heartburn. He ran out of the PPI. He is also reporting constipation-feels it is related to his medications. Review of Systems   Constitutional:  Negative for activity change, appetite change, fatigue and fever. HENT:  Negative for congestion, ear pain, postnasal drip and rhinorrhea. Respiratory:  Negative for chest tightness and shortness of breath. Cardiovascular:  Negative for chest pain, palpitations and leg swelling. Gastrointestinal:  Positive for constipation. Negative for abdominal pain, diarrhea, nausea and vomiting. Endocrine: Negative for cold intolerance, heat intolerance, polydipsia, polyphagia and polyuria.    Genitourinary:

## 2023-11-10 ENCOUNTER — TELEPHONE (OUTPATIENT)
Facility: CLINIC | Age: 68
End: 2023-11-10

## 2023-11-10 DIAGNOSIS — M1A.39X0 CHRONIC GOUT DUE TO RENAL IMPAIRMENT OF MULTIPLE SITES WITHOUT TOPHUS: ICD-10-CM

## 2023-11-10 DIAGNOSIS — K21.9 GASTROESOPHAGEAL REFLUX DISEASE WITHOUT ESOPHAGITIS: ICD-10-CM

## 2023-11-10 DIAGNOSIS — I10 ESSENTIAL HYPERTENSION: ICD-10-CM

## 2023-11-10 DIAGNOSIS — I50.42 CHRONIC COMBINED SYSTOLIC (CONGESTIVE) AND DIASTOLIC (CONGESTIVE) HEART FAILURE (HCC): ICD-10-CM

## 2023-11-10 NOTE — TELEPHONE ENCOUNTER
Patient called and states he needs a refill on   clopidogrel (PLAVIX) 75 MG tablet  trospium (SANCTURA) 20 MG tablet  allopurinol (ZYLOPRIM) 100 MG tablet  isosorbide mononitrate (IMDUR) 60 MG extended release tablet  omeprazole (PRILOSEC) 40 MG delayed release capsule  [facility] pharacy is

## 2023-11-13 RX ORDER — ALLOPURINOL 100 MG/1
100 TABLET ORAL DAILY
Qty: 90 TABLET | Refills: 1 | Status: SHIPPED | OUTPATIENT
Start: 2023-11-13

## 2023-11-13 RX ORDER — ISOSORBIDE MONONITRATE 60 MG/1
60 TABLET, EXTENDED RELEASE ORAL DAILY
Qty: 90 TABLET | Refills: 1 | Status: SHIPPED | OUTPATIENT
Start: 2023-11-13

## 2023-11-13 RX ORDER — CLOPIDOGREL BISULFATE 75 MG/1
75 TABLET ORAL DAILY
Qty: 90 TABLET | Refills: 1 | Status: SHIPPED | OUTPATIENT
Start: 2023-11-13

## 2023-11-13 RX ORDER — OMEPRAZOLE 40 MG/1
40 CAPSULE, DELAYED RELEASE ORAL DAILY
Qty: 90 CAPSULE | Refills: 1 | Status: SHIPPED | OUTPATIENT
Start: 2023-11-13

## 2023-11-16 RX ORDER — HYALURONATE SODIUM 10 MG/ML
20 SYRINGE (ML) INTRAARTICULAR ONCE
Status: CANCELLED | OUTPATIENT
Start: 2023-11-16 | End: 2023-11-16

## 2023-11-17 ENCOUNTER — OFFICE VISIT (OUTPATIENT)
Age: 68
End: 2023-11-17

## 2023-11-17 VITALS — HEIGHT: 71 IN | WEIGHT: 250 LBS | BODY MASS INDEX: 35 KG/M2

## 2023-11-17 DIAGNOSIS — Z91.81 AT HIGH RISK FOR INJURY RELATED TO FALL: ICD-10-CM

## 2023-11-17 DIAGNOSIS — M25.562 CHRONIC PAIN OF LEFT KNEE: ICD-10-CM

## 2023-11-17 DIAGNOSIS — G89.29 CHRONIC PAIN OF LEFT KNEE: ICD-10-CM

## 2023-11-17 DIAGNOSIS — M17.12 UNILATERAL PRIMARY OSTEOARTHRITIS, LEFT KNEE: Primary | ICD-10-CM

## 2023-11-17 DIAGNOSIS — M17.11 UNILATERAL PRIMARY OSTEOARTHRITIS, RIGHT KNEE: ICD-10-CM

## 2023-11-17 RX ORDER — BETAMETHASONE SODIUM PHOSPHATE AND BETAMETHASONE ACETATE 3; 3 MG/ML; MG/ML
3 INJECTION, SUSPENSION INTRA-ARTICULAR; INTRALESIONAL; INTRAMUSCULAR; SOFT TISSUE ONCE
Status: COMPLETED | OUTPATIENT
Start: 2023-11-17 | End: 2023-11-17

## 2023-11-17 RX ADMIN — BETAMETHASONE SODIUM PHOSPHATE AND BETAMETHASONE ACETATE 3 MG: 3; 3 INJECTION, SUSPENSION INTRA-ARTICULAR; INTRALESIONAL; INTRAMUSCULAR; SOFT TISSUE at 10:43

## 2023-11-25 NOTE — LETTER
NOTIFICATION RETURN TO WORK / SCHOOL    1/10/2022 2:07 PM    Mr. Mike Hassan  [address] [facility] [facility]      To Whom It May Concern:    Mike Hassan is currently under the care of [address] [place] [address],[address]. He will return to work/school on: 1/11/2022. 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 If there are questions or concerns please have the patient contact our office.         Sincerely,      MD Mi Iraheta normal...

## 2024-01-03 ENCOUNTER — OFFICE VISIT (OUTPATIENT)
Facility: CLINIC | Age: 69
End: 2024-01-03

## 2024-01-03 VITALS
TEMPERATURE: 99.1 F | SYSTOLIC BLOOD PRESSURE: 137 MMHG | HEIGHT: 71 IN | DIASTOLIC BLOOD PRESSURE: 75 MMHG | OXYGEN SATURATION: 94 % | BODY MASS INDEX: 35.56 KG/M2 | WEIGHT: 254 LBS | RESPIRATION RATE: 16 BRPM | HEART RATE: 55 BPM

## 2024-01-03 DIAGNOSIS — E78.2 MIXED HYPERLIPIDEMIA: ICD-10-CM

## 2024-01-03 DIAGNOSIS — I50.42 CHRONIC COMBINED SYSTOLIC (CONGESTIVE) AND DIASTOLIC (CONGESTIVE) HEART FAILURE (HCC): ICD-10-CM

## 2024-01-03 DIAGNOSIS — Z09 HOSPITAL DISCHARGE FOLLOW-UP: ICD-10-CM

## 2024-01-03 DIAGNOSIS — M17.0 PRIMARY OSTEOARTHRITIS OF BOTH KNEES: ICD-10-CM

## 2024-01-03 DIAGNOSIS — M1A.39X0 CHRONIC GOUT DUE TO RENAL IMPAIRMENT OF MULTIPLE SITES WITHOUT TOPHUS: ICD-10-CM

## 2024-01-03 DIAGNOSIS — Z95.5 HISTORY OF CORONARY ARTERY STENT PLACEMENT: ICD-10-CM

## 2024-01-03 DIAGNOSIS — K21.9 GASTROESOPHAGEAL REFLUX DISEASE WITHOUT ESOPHAGITIS: ICD-10-CM

## 2024-01-03 DIAGNOSIS — R73.03 PREDIABETES: ICD-10-CM

## 2024-01-03 DIAGNOSIS — I25.10 ATHEROSCLEROSIS OF NATIVE CORONARY ARTERY OF NATIVE HEART WITHOUT ANGINA PECTORIS: Primary | ICD-10-CM

## 2024-01-03 DIAGNOSIS — I10 ESSENTIAL HYPERTENSION: ICD-10-CM

## 2024-01-03 DIAGNOSIS — N18.32 CHRONIC KIDNEY DISEASE, STAGE 3B (HCC): ICD-10-CM

## 2024-01-03 SDOH — ECONOMIC STABILITY: INCOME INSECURITY: HOW HARD IS IT FOR YOU TO PAY FOR THE VERY BASICS LIKE FOOD, HOUSING, MEDICAL CARE, AND HEATING?: NOT HARD AT ALL

## 2024-01-03 SDOH — ECONOMIC STABILITY: FOOD INSECURITY: WITHIN THE PAST 12 MONTHS, YOU WORRIED THAT YOUR FOOD WOULD RUN OUT BEFORE YOU GOT MONEY TO BUY MORE.: NEVER TRUE

## 2024-01-03 SDOH — ECONOMIC STABILITY: HOUSING INSECURITY
IN THE LAST 12 MONTHS, WAS THERE A TIME WHEN YOU DID NOT HAVE A STEADY PLACE TO SLEEP OR SLEPT IN A SHELTER (INCLUDING NOW)?: NO

## 2024-01-03 SDOH — ECONOMIC STABILITY: FOOD INSECURITY: WITHIN THE PAST 12 MONTHS, THE FOOD YOU BOUGHT JUST DIDN'T LAST AND YOU DIDN'T HAVE MONEY TO GET MORE.: NEVER TRUE

## 2024-01-03 ASSESSMENT — PATIENT HEALTH QUESTIONNAIRE - PHQ9
SUM OF ALL RESPONSES TO PHQ9 QUESTIONS 1 & 2: 0
2. FEELING DOWN, DEPRESSED OR HOPELESS: 0
SUM OF ALL RESPONSES TO PHQ QUESTIONS 1-9: 0
SUM OF ALL RESPONSES TO PHQ QUESTIONS 1-9: 0
1. LITTLE INTEREST OR PLEASURE IN DOING THINGS: 0
SUM OF ALL RESPONSES TO PHQ QUESTIONS 1-9: 0
SUM OF ALL RESPONSES TO PHQ QUESTIONS 1-9: 0

## 2024-01-03 ASSESSMENT — ENCOUNTER SYMPTOMS
RHINORRHEA: 0
ABDOMINAL PAIN: 0
NAUSEA: 0
BACK PAIN: 1
CHEST TIGHTNESS: 0
SHORTNESS OF BREATH: 0
CONSTIPATION: 1
DIARRHEA: 0
VOMITING: 0

## 2024-01-03 ASSESSMENT — ANXIETY QUESTIONNAIRES
3. WORRYING TOO MUCH ABOUT DIFFERENT THINGS: 0
6. BECOMING EASILY ANNOYED OR IRRITABLE: 0
2. NOT BEING ABLE TO STOP OR CONTROL WORRYING: 0
7. FEELING AFRAID AS IF SOMETHING AWFUL MIGHT HAPPEN: 0
4. TROUBLE RELAXING: 0
IF YOU CHECKED OFF ANY PROBLEMS ON THIS QUESTIONNAIRE, HOW DIFFICULT HAVE THESE PROBLEMS MADE IT FOR YOU TO DO YOUR WORK, TAKE CARE OF THINGS AT HOME, OR GET ALONG WITH OTHER PEOPLE: NOT DIFFICULT AT ALL
1. FEELING NERVOUS, ANXIOUS, OR ON EDGE: 0
5. BEING SO RESTLESS THAT IT IS HARD TO SIT STILL: 0
GAD7 TOTAL SCORE: 0

## 2024-01-03 NOTE — PROGRESS NOTES
Wilmer Levy is a 68 y.o. male presenting today for Follow-up (Fu from hospital. Stents were placed )  .     Chief Complaint   Patient presents with    Follow-up     Fu from hospital. Stents were placed        HPI:  Wilmer Levy presents to the office today for follow up.     Patient has a past medical history of CAD s/p stent, CHF, HTN, HLD, gout, OA.    Patient was admitted to the hospital in 12/23 with NSTEMI.  He underwent PCI -s/p PCI x2 to RCA and x1 to LCx. Postcardiac cath, his creatinine increased to 2.5 from 1.8 at baseline due to contrast induced injury.  He was advised to follow-up with nephrology as outpatient.  He was discharged on ASA, Plavix and statin.  Lasix 40 mg was changed to as needed for edema/weight gain. Patient last took a pill on Friday.  Denies any chest pain or shortness of breath today.  Has mild LE swelling     CAD/CHF: Patient follows with cardiology-Dr. Culp.    Echo in 12/21 showed LVEF 50%.  Patient saw cardiology recently on 11/3/2022 with complaints of more frequent chest pains.  Stress test was completed which showed small area of ischemia-medication management was continued and his Coreg dose was increased.   He then presented with an NSTEMI in 12/23-underwent PCI.     HLD: Controlled on statin.  Lipid panel on 7/23 showed LDL 61, HDL 55, triglycerides 58.     HTN: BP is well controlled on Coreg, Imdur, hydralazine.     CKD 3: Creatinine at discharge was 1.9.  He had been using NSAIDs previously for OA pain    Osteoarthritis of bilateral knees: Patient has been following with orthopedics.  He was counseled on avoiding NSAIDs. He is using a cane to ambulate.     Gout: Patient is on allopurinol.  No recent gout attacks.      Review of Systems   Constitutional:  Negative for activity change, appetite change, fatigue and fever.   HENT:  Negative for congestion, ear pain, postnasal drip and rhinorrhea.    Respiratory:  Negative for chest tightness and shortness of breath.

## 2024-01-10 ENCOUNTER — TELEPHONE (OUTPATIENT)
Facility: CLINIC | Age: 69
End: 2024-01-10

## 2024-01-10 DIAGNOSIS — E66.01 SEVERE OBESITY (BMI 35.0-39.9) WITH COMORBIDITY (HCC): Primary | ICD-10-CM

## 2024-01-10 NOTE — TELEPHONE ENCOUNTER
Patient request a CANE (supply)    Inquired on a referral for sleep study due to previously release from hospital, was told he need to get a sleep study done

## 2024-01-10 NOTE — TELEPHONE ENCOUNTER
Cane has already been ordered.  Please advise patient that it goes through DME request and may take some time.    Referral to sleep medicine has been placed.

## 2024-01-11 ENCOUNTER — TELEPHONE (OUTPATIENT)
Facility: CLINIC | Age: 69
End: 2024-01-11

## 2024-01-11 NOTE — TELEPHONE ENCOUNTER
Patient states [facility] called to inform him they do not have canes.  It will need to go to another medical supplier.

## 2024-01-12 ENCOUNTER — TELEPHONE (OUTPATIENT)
Facility: CLINIC | Age: 69
End: 2024-01-12

## 2024-01-12 NOTE — TELEPHONE ENCOUNTER
Patient states [facility] called to inform him they do not have canes.  The supplier [employer] called another medical supplier, they do, have cane but they didn't have his correct apt Number 2 that needs to be added to his order..    Please contact [phone] or provide contact number for [employer]     Stated they are suppose to come today

## 2024-01-12 NOTE — TELEPHONE ENCOUNTER
TC was made to pt and pt states \" that his address needed to be updated to [address] [facility] [address] order was updated with correct address/

## 2024-01-17 ENCOUNTER — TELEPHONE (OUTPATIENT)
Facility: CLINIC | Age: 69
End: 2024-01-17

## 2024-01-17 LAB
ALBUMIN SERPL-MCNC: 3.8 G/DL (ref 3.5–5)
ANION GAP SERPL CALCULATED.3IONS-SCNC: 10 MMOL/L (ref 3–15)
AVERAGE GLUCOSE: 115 MG/DL (ref 91–123)
BASOPHILS # BLD: 1 % (ref 0–2)
BASOPHILS ABSOLUTE: 0 K/UL (ref 0–0.2)
BUN BLDV-MCNC: 41 MG/DL (ref 6–22)
CALCIUM SERPL-MCNC: 9.2 MG/DL (ref 8.4–10.5)
CHLORIDE BLD-SCNC: 106 MMOL/L (ref 98–110)
CHOLESTEROL/HDL RATIO: 2.6 (ref 0–5)
CHOLESTEROL: 138 MG/DL (ref 110–200)
CO2: 27 MMOL/L (ref 20–32)
CORRECTED SERUM CREATININE: 2.31 MG/DL
CREAT SERPL-MCNC: 2.3 MG/DL (ref 0.8–1.6)
EOSINOPHIL # BLD: 3 % (ref 0–6)
EOSINOPHILS ABSOLUTE: 0.1 K/UL (ref 0–0.5)
GLOMERULAR FILTRATION RATE: 30.2 ML/MIN/1.73 SQ.M.
GLUCOSE: 89 MG/DL (ref 70–99)
HBA1C MFR BLD: 5.6 % (ref 4.8–5.6)
HCT VFR BLD CALC: 36.1 % (ref 37.8–52.2)
HDLC SERPL-MCNC: 54 MG/DL
HEMOGLOBIN: 11 G/DL (ref 12.6–17.1)
LDL CHOLESTEROL CALCULATED: 65 MG/DL (ref 50–99)
LDL/HDL RATIO: 1.2
LYMPHOCYTES # BLD: 32 % (ref 20–45)
LYMPHOCYTES ABSOLUTE: 1.4 K/UL (ref 1–4.8)
MCH RBC QN AUTO: 28 PG (ref 26–34)
MCHC RBC AUTO-ENTMCNC: 31 G/DL (ref 31–36)
MCV RBC AUTO: 93 FL (ref 80–95)
MONOCYTES ABSOLUTE: 0.4 K/UL (ref 0.1–1)
MONOCYTES: 9 % (ref 3–12)
NEUTROPHILS ABSOLUTE: 2.4 K/UL (ref 1.8–7.7)
NEUTROPHILS: 55 % (ref 40–75)
NON-HDL CHOLESTEROL: 84 MG/DL
PDW BLD-RTO: 15.1 % (ref 10–15.5)
PHOSPHORUS: 3.4 MG/DL (ref 2.5–4.5)
PLATELET # BLD: 166 K/UL (ref 140–440)
PMV BLD AUTO: 10.6 FL (ref 9–13)
POTASSIUM SERPL-SCNC: 3.9 MMOL/L (ref 3.5–5.5)
RBC: 3.88 M/UL (ref 3.8–5.8)
SODIUM BLD-SCNC: 143 MMOL/L (ref 133–145)
TRIGL SERPL-MCNC: 91 MG/DL (ref 40–149)
VITAMIN D 25-HYDROXY: 56.2 NG/ML (ref 32–100)
VLDLC SERPL CALC-MCNC: 18 MG/DL (ref 8–30)
WBC: 4.3 K/UL (ref 4–11)

## 2024-01-19 ENCOUNTER — TELEPHONE (OUTPATIENT)
Facility: CLINIC | Age: 69
End: 2024-01-19

## 2024-01-19 NOTE — TELEPHONE ENCOUNTER
Patient inquiring about order for cane.  He states order was sent back for correction.  Please advise.

## 2024-01-24 NOTE — TELEPHONE ENCOUNTER
Order was updated with pt correct Address for [facility] for cane.

## 2024-01-31 ENCOUNTER — OFFICE VISIT (OUTPATIENT)
Age: 69
End: 2024-01-31
Payer: MEDICARE

## 2024-01-31 VITALS
OXYGEN SATURATION: 96 % | BODY MASS INDEX: 35.45 KG/M2 | WEIGHT: 253.2 LBS | HEART RATE: 76 BPM | SYSTOLIC BLOOD PRESSURE: 136 MMHG | DIASTOLIC BLOOD PRESSURE: 82 MMHG | HEIGHT: 71 IN

## 2024-01-31 DIAGNOSIS — Z09 HOSPITAL DISCHARGE FOLLOW-UP: ICD-10-CM

## 2024-01-31 DIAGNOSIS — I10 ESSENTIAL (PRIMARY) HYPERTENSION: ICD-10-CM

## 2024-01-31 DIAGNOSIS — I50.32 CHRONIC DIASTOLIC (CONGESTIVE) HEART FAILURE (HCC): ICD-10-CM

## 2024-01-31 DIAGNOSIS — E78.00 PURE HYPERCHOLESTEROLEMIA, UNSPECIFIED: ICD-10-CM

## 2024-01-31 DIAGNOSIS — I25.10 CORONARY ARTERY DISEASE INVOLVING NATIVE CORONARY ARTERY OF NATIVE HEART WITHOUT ANGINA PECTORIS: Primary | ICD-10-CM

## 2024-01-31 PROCEDURE — 1123F ACP DISCUSS/DSCN MKR DOCD: CPT | Performed by: NURSE PRACTITIONER

## 2024-01-31 PROCEDURE — 99214 OFFICE O/P EST MOD 30 MIN: CPT | Performed by: NURSE PRACTITIONER

## 2024-01-31 PROCEDURE — 3075F SYST BP GE 130 - 139MM HG: CPT | Performed by: NURSE PRACTITIONER

## 2024-01-31 PROCEDURE — 3079F DIAST BP 80-89 MM HG: CPT | Performed by: NURSE PRACTITIONER

## 2024-01-31 PROCEDURE — 1111F DSCHRG MED/CURRENT MED MERGE: CPT | Performed by: NURSE PRACTITIONER

## 2024-01-31 ASSESSMENT — ENCOUNTER SYMPTOMS
EYES NEGATIVE: 1
GASTROINTESTINAL NEGATIVE: 1
SHORTNESS OF BREATH: 0
RESPIRATORY NEGATIVE: 1

## 2024-01-31 NOTE — PROGRESS NOTES
Patient is concerned regarding return to work due to physical demands of job including increased walking and climbing, with ongoing symptoms.  Discussed need for light duty.  Patient would like to pursue \"medical senior care \".  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  At this time will begin Lasix, continue other medical management, follow-up with Dr. Culp on January 12 after seen by nephrology.    1/10/2022 CHF improving gradually. NYHA 2-3.  CAD stable. F/u clinically.  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  BP better controlled.  Lipids are not at goal.  Increase atorvastatin and follow the lipids.  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  Regular exercise and weight loss encouraged.  Mediterranean diet guidelines given.    9/21/2022 CAD stable with stable rare angina.  EKG is stable with ST-T changes as before.  CHF is compensated NYHA class II.  Blood pressure is controlled.  Has severe obesity.  We discussed in detail about the diet.  He does not like Mediterranean diet but is trying to reduce the portions.  I advised him to stay active      11/3/2022 CAD is becoming unstable with more frequent chest pains responding to nitroglycerin.  Plavix was restarted.  Check a stress test to evaluate ischemia  Blood pressure is elevated.  Increase carvedilol and follow the home chart.  CHF is compensated NYHA class II  Diet and weight loss recommended.  Mediterranean diet guidelines given  Lipids are controlled.  Discussed with patient that he may need cardiac catheterization if the stress test shows any significant ischemia.    3/22/2023 angina is stable with rare chest pains.  CHF is stable NYHA class II-III.  Edema is increasing due to underlying CKD and likely obesity.  Since the dyspnea is not too much, I did not increase diuretics but instead recommended compression stockings.  Follow-up labs as ordered by PCP.  Patient

## 2024-02-02 ENCOUNTER — TELEPHONE (OUTPATIENT)
Facility: CLINIC | Age: 69
End: 2024-02-02

## 2024-02-08 NOTE — TELEPHONE ENCOUNTER
Order for Quad cane was originally sent to [facility] they didn't have this it was then submitted through [facility] which they sent order to [facility] and [facility] don't have this item as well I did contact [facility] at [phone] they will be faxing over order form to be completed for pt. They do have this item in stock.

## 2024-02-23 ENCOUNTER — TELEPHONE (OUTPATIENT)
Facility: CLINIC | Age: 69
End: 2024-02-23

## 2024-02-23 DIAGNOSIS — I50.42 CHRONIC COMBINED SYSTOLIC (CONGESTIVE) AND DIASTOLIC (CONGESTIVE) HEART FAILURE (HCC): ICD-10-CM

## 2024-02-23 DIAGNOSIS — Z95.5 HISTORY OF CORONARY ARTERY STENT PLACEMENT: ICD-10-CM

## 2024-02-23 DIAGNOSIS — I25.10 ATHEROSCLEROSIS OF NATIVE CORONARY ARTERY OF NATIVE HEART WITHOUT ANGINA PECTORIS: Primary | ICD-10-CM

## 2024-02-23 NOTE — TELEPHONE ENCOUNTER
Patient sees Dr. Culp, cardiology.  [place] NP referred him to [facility], Cardiac rehab.  They are unable to accept referrals from Nurse Practitioner.  Ericka called cardiology and was told to request referral from PCP.  Please advise.

## 2024-02-26 DIAGNOSIS — K21.9 GASTROESOPHAGEAL REFLUX DISEASE WITHOUT ESOPHAGITIS: ICD-10-CM

## 2024-02-26 DIAGNOSIS — I10 ESSENTIAL HYPERTENSION: ICD-10-CM

## 2024-02-26 RX ORDER — HYDRALAZINE HYDROCHLORIDE 100 MG/1
TABLET, FILM COATED ORAL
Qty: 270 TABLET | Refills: 1 | Status: SHIPPED | OUTPATIENT
Start: 2024-02-26

## 2024-02-28 RX ORDER — OMEPRAZOLE 40 MG/1
CAPSULE, DELAYED RELEASE ORAL DAILY
Qty: 90 CAPSULE | Refills: 1 | Status: SHIPPED | OUTPATIENT
Start: 2024-02-28

## 2024-03-30 DIAGNOSIS — M1A.39X0 CHRONIC GOUT DUE TO RENAL IMPAIRMENT OF MULTIPLE SITES WITHOUT TOPHUS: ICD-10-CM

## 2024-04-05 NOTE — TELEPHONE ENCOUNTER
Pt request a refill medication allopurinol       Location     [facility]     Advised Pcp out of office until 4/8 Pt completed out of medication

## 2024-04-08 RX ORDER — ALLOPURINOL 100 MG/1
100 TABLET ORAL DAILY
Qty: 90 TABLET | Refills: 1 | Status: SHIPPED | OUTPATIENT
Start: 2024-04-08

## 2024-05-02 ENCOUNTER — OFFICE VISIT (OUTPATIENT)
Age: 69
End: 2024-05-02
Payer: MEDICARE

## 2024-05-02 VITALS
SYSTOLIC BLOOD PRESSURE: 97 MMHG | HEART RATE: 85 BPM | OXYGEN SATURATION: 97 % | WEIGHT: 256 LBS | DIASTOLIC BLOOD PRESSURE: 60 MMHG | HEIGHT: 71 IN | BODY MASS INDEX: 35.84 KG/M2

## 2024-05-02 DIAGNOSIS — R42 DIZZINESS: Primary | ICD-10-CM

## 2024-05-02 DIAGNOSIS — E78.00 PURE HYPERCHOLESTEROLEMIA, UNSPECIFIED: ICD-10-CM

## 2024-05-02 DIAGNOSIS — I10 ESSENTIAL HYPERTENSION: ICD-10-CM

## 2024-05-02 DIAGNOSIS — I50.32 CHRONIC DIASTOLIC CONGESTIVE HEART FAILURE (HCC): ICD-10-CM

## 2024-05-02 DIAGNOSIS — I10 ESSENTIAL (PRIMARY) HYPERTENSION: ICD-10-CM

## 2024-05-02 DIAGNOSIS — I95.2 HYPOTENSION DUE TO DRUGS: ICD-10-CM

## 2024-05-02 DIAGNOSIS — I25.10 CORONARY ARTERY DISEASE INVOLVING NATIVE CORONARY ARTERY OF NATIVE HEART WITHOUT ANGINA PECTORIS: ICD-10-CM

## 2024-05-02 PROCEDURE — 1123F ACP DISCUSS/DSCN MKR DOCD: CPT | Performed by: INTERNAL MEDICINE

## 2024-05-02 PROCEDURE — 3078F DIAST BP <80 MM HG: CPT | Performed by: INTERNAL MEDICINE

## 2024-05-02 PROCEDURE — 3074F SYST BP LT 130 MM HG: CPT | Performed by: INTERNAL MEDICINE

## 2024-05-02 PROCEDURE — 99214 OFFICE O/P EST MOD 30 MIN: CPT | Performed by: INTERNAL MEDICINE

## 2024-05-02 RX ORDER — FUROSEMIDE 20 MG/1
20 TABLET ORAL DAILY PRN
Qty: 90 TABLET | Refills: 1 | Status: SHIPPED | OUTPATIENT
Start: 2024-05-02

## 2024-05-02 RX ORDER — AMLODIPINE BESYLATE 5 MG/1
5 TABLET ORAL DAILY
Qty: 90 TABLET | Refills: 2 | Status: SHIPPED | OUTPATIENT
Start: 2024-05-02

## 2024-05-02 RX ORDER — ISOSORBIDE MONONITRATE 60 MG/1
60 TABLET, EXTENDED RELEASE ORAL DAILY
Qty: 90 TABLET | Refills: 1 | Status: SHIPPED | OUTPATIENT
Start: 2024-05-02

## 2024-05-02 ASSESSMENT — ENCOUNTER SYMPTOMS
GASTROINTESTINAL NEGATIVE: 1
EYES NEGATIVE: 1
SHORTNESS OF BREATH: 1
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
## 2024-05-02 NOTE — PROGRESS NOTES
1. Have you been to the ER, urgent care clinic since your last visit?  Hospitalized since your last visit? No    2.  Where do you normally have your labs drawn?  [facility]      3. Do you need any refills today?   No      4. Which local pharmacy do you use?   [facility]      5. Which mail order pharmacy do you use?   None       6. Are you here for surgical clearance? No,  who will be doing your procedure/surgery (care team)?   N/A

## 2024-05-06 ENCOUNTER — HOSPITAL ENCOUNTER (OUTPATIENT)
Facility: HOSPITAL | Age: 69
Discharge: HOME | End: 2024-05-09

## 2024-05-06 LAB — SENTARA SPECIMEN COLLECTION: NORMAL

## 2024-05-06 PROCEDURE — 99001 SPECIMEN HANDLING PT-LAB: CPT

## 2024-05-08 ENCOUNTER — OFFICE VISIT (OUTPATIENT)
Facility: CLINIC | Age: 69
End: 2024-05-08

## 2024-05-08 VITALS
OXYGEN SATURATION: 94 % | HEART RATE: 79 BPM | BODY MASS INDEX: 35.84 KG/M2 | RESPIRATION RATE: 16 BRPM | HEIGHT: 71 IN | WEIGHT: 256 LBS | SYSTOLIC BLOOD PRESSURE: 103 MMHG | TEMPERATURE: 98.4 F | DIASTOLIC BLOOD PRESSURE: 60 MMHG

## 2024-05-08 DIAGNOSIS — K21.9 GASTROESOPHAGEAL REFLUX DISEASE WITHOUT ESOPHAGITIS: ICD-10-CM

## 2024-05-08 DIAGNOSIS — M1A.39X0 CHRONIC GOUT DUE TO RENAL IMPAIRMENT OF MULTIPLE SITES WITHOUT TOPHUS: ICD-10-CM

## 2024-05-08 DIAGNOSIS — I25.10 ATHEROSCLEROSIS OF NATIVE CORONARY ARTERY OF NATIVE HEART WITHOUT ANGINA PECTORIS: ICD-10-CM

## 2024-05-08 DIAGNOSIS — I50.42 CHRONIC COMBINED SYSTOLIC (CONGESTIVE) AND DIASTOLIC (CONGESTIVE) HEART FAILURE (HCC): ICD-10-CM

## 2024-05-08 DIAGNOSIS — E66.01 SEVERE OBESITY (BMI 35.0-39.9) WITH COMORBIDITY (HCC): ICD-10-CM

## 2024-05-08 DIAGNOSIS — N18.32 CHRONIC KIDNEY DISEASE, STAGE 3B (HCC): ICD-10-CM

## 2024-05-08 DIAGNOSIS — E78.2 MIXED HYPERLIPIDEMIA: ICD-10-CM

## 2024-05-08 DIAGNOSIS — R73.03 PREDIABETES: ICD-10-CM

## 2024-05-08 DIAGNOSIS — Z95.5 HISTORY OF CORONARY ARTERY STENT PLACEMENT: ICD-10-CM

## 2024-05-08 DIAGNOSIS — Z12.11 SCREENING FOR COLON CANCER: ICD-10-CM

## 2024-05-08 DIAGNOSIS — I10 ESSENTIAL HYPERTENSION: ICD-10-CM

## 2024-05-08 DIAGNOSIS — Z00.00 MEDICARE ANNUAL WELLNESS VISIT, SUBSEQUENT: Primary | ICD-10-CM

## 2024-05-08 ASSESSMENT — ENCOUNTER SYMPTOMS
SHORTNESS OF BREATH: 0
NAUSEA: 0
CONSTIPATION: 1
RHINORRHEA: 0
DIARRHEA: 0
CHEST TIGHTNESS: 0
ABDOMINAL PAIN: 0
BACK PAIN: 1
VOMITING: 0

## 2024-05-08 ASSESSMENT — PATIENT HEALTH QUESTIONNAIRE - PHQ9
SUM OF ALL RESPONSES TO PHQ QUESTIONS 1-9: 0
2. FEELING DOWN, DEPRESSED OR HOPELESS: NOT AT ALL
SUM OF ALL RESPONSES TO PHQ QUESTIONS 1-9: 0
1. LITTLE INTEREST OR PLEASURE IN DOING THINGS: NOT AT ALL
SUM OF ALL RESPONSES TO PHQ9 QUESTIONS 1 & 2: 0

## 2024-05-08 ASSESSMENT — LIFESTYLE VARIABLES
HOW MANY STANDARD DRINKS CONTAINING ALCOHOL DO YOU HAVE ON A TYPICAL DAY: 1 OR 2
HOW OFTEN DO YOU HAVE A DRINK CONTAINING ALCOHOL: MONTHLY OR LESS

## 2024-05-08 NOTE — PROGRESS NOTES
Medicare Annual Wellness Visit    Wlimer Levy is here for Medicare AWV    Assessment & Plan   Medicare annual wellness visit, subsequent      Recommendations for Preventive Services Due: see orders and patient instructions/AVS.  Recommended screening schedule for the next 5-10 years is provided to the patient in written form: see Patient Instructions/AVS.     Return in about 4 months (around 9/8/2024).     Subjective       Patient's complete Health Risk Assessment and screening values have been reviewed and are found in Flowsheets. The following problems were reviewed today and where indicated follow up appointments were made and/or referrals ordered.    Positive Risk Factor Screenings with Interventions:                    Activity, Diet, and Weight:  On average, how many days per week do you engage in moderate to strenuous exercise (like a brisk walk)?: 7 days  On average, how many minutes do you engage in exercise at this level?: 60 min    Do you eat balanced/healthy meals regularly?: Yes    Body mass index is 35.7 kg/m². (!) Abnormal    Obesity Interventions:  Patient is working with a dietitian and exercising at cardiac rehab.  He has been improving his diet            Dentist Screen:  Have you seen the dentist within the past year?: (!) No    Intervention:  Advised to schedule with their dentist        Advanced Directives:  Do you have a Living Will?: (!) No    Intervention:  has NO advanced directive - information provided                     Objective   Vitals:    05/08/24 1045   BP: 103/60   Site: Right Upper Arm   Position: Sitting   Cuff Size: Large Adult   Pulse: 79   Resp: 16   Temp: 98.4 °F (36.9 °C)   TempSrc: Temporal   SpO2: 94%   Weight: 116.1 kg (256 lb)   Height: 1.803 m (5' 11\")      Body mass index is 35.7 kg/m².             Allergies   Allergen Reactions    Iodine Other (See Comments)     Eyes swell shut  Other reaction(s): Not available    Shellfish Allergy Swelling     Other reaction(s): Not 
\"Have you been to the ER, urgent care clinic since your last visit?  Hospitalized since your last visit?\"    NO    “Have you seen or consulted any other health care providers outside of [facility] since your last visit?”    YES - When: approximately 3 days ago.  Where and Why: Physical .Theraph[facility]            Click Here for Release of Records Request   
(SENOKOT-S) 8.6-50 MG tablet Take 2 tablets by mouth daily (Patient taking differently: Take 2 tablets by mouth as needed) 60 tablet 2    atorvastatin (LIPITOR) 80 MG tablet TAKE 1 TABLET BY MOUTH EVERY DAY 90 tablet 1    acetaminophen (TYLENOL) 500 MG tablet Take by mouth every 6 hours as needed      aspirin 81 MG chewable tablet Take 1 tablet by mouth daily as needed      carvedilol (COREG) 25 MG tablet Take 1 tablet by mouth 2 times daily (with meals)      Multiple Vitamins-Minerals (THERAPEUTIC MULTIVITAMIN-MINERALS) tablet Take 1 tablet by mouth daily (Patient not taking: Reported on 2/5/2024)       No current facility-administered medications for this visit.       /60 (Site: Right Upper Arm, Position: Sitting, Cuff Size: Large Adult)   Pulse 79   Temp 98.4 °F (36.9 °C) (Temporal)   Resp 16   Ht 1.803 m (5' 11\")   Wt 116.1 kg (256 lb)   SpO2 94%   BMI 35.70 kg/m²      Physical Exam  Vitals and nursing note reviewed.   Constitutional:       General: He is not in acute distress.     Appearance: Normal appearance. He is obese. He is not ill-appearing, toxic-appearing or diaphoretic.   HENT:      Head: Normocephalic and atraumatic.   Eyes:      General: No scleral icterus.     Extraocular Movements: Extraocular movements intact.      Conjunctiva/sclera: Conjunctivae normal.      Pupils: Pupils are equal, round, and reactive to light.   Cardiovascular:      Rate and Rhythm: Normal rate.      Pulses: Normal pulses.      Heart sounds: Normal heart sounds. No murmur heard.  Pulmonary:      Effort: Pulmonary effort is normal. No respiratory distress.      Breath sounds: Normal breath sounds. No wheezing or rales.   Musculoskeletal:      Cervical back: Normal range of motion and neck supple.      Comments: Trace LE edema.    Skin:     General: Skin is warm and dry.   Neurological:      General: No focal deficit present.      Mental Status: He is alert and oriented to person, place, and time. Mental status is

## 2024-05-16 DIAGNOSIS — I50.32 CHRONIC DIASTOLIC CONGESTIVE HEART FAILURE (HCC): ICD-10-CM

## 2024-06-27 ENCOUNTER — TELEPHONE (OUTPATIENT)
Facility: CLINIC | Age: 69
End: 2024-06-27

## 2024-06-27 DIAGNOSIS — E78.2 MIXED HYPERLIPIDEMIA: ICD-10-CM

## 2024-06-27 DIAGNOSIS — I50.32 CHRONIC DIASTOLIC CONGESTIVE HEART FAILURE (HCC): ICD-10-CM

## 2024-06-27 DIAGNOSIS — I50.42 CHRONIC COMBINED SYSTOLIC (CONGESTIVE) AND DIASTOLIC (CONGESTIVE) HEART FAILURE (HCC): ICD-10-CM

## 2024-06-27 DIAGNOSIS — I10 ESSENTIAL (PRIMARY) HYPERTENSION: ICD-10-CM

## 2024-06-27 RX ORDER — CLOPIDOGREL BISULFATE 75 MG/1
75 TABLET ORAL DAILY
Qty: 90 TABLET | Refills: 1 | Status: SHIPPED | OUTPATIENT
Start: 2024-06-27

## 2024-06-27 RX ORDER — FUROSEMIDE 20 MG/1
20 TABLET ORAL DAILY PRN
Qty: 90 TABLET | Refills: 1 | Status: SHIPPED | OUTPATIENT
Start: 2024-06-27

## 2024-06-27 RX ORDER — AMLODIPINE BESYLATE 5 MG/1
5 TABLET ORAL DAILY
Qty: 90 TABLET | Refills: 2 | Status: SHIPPED | OUTPATIENT
Start: 2024-06-27

## 2024-06-27 RX ORDER — ATORVASTATIN CALCIUM 80 MG/1
TABLET, FILM COATED ORAL
Qty: 90 TABLET | Refills: 1 | Status: SHIPPED | OUTPATIENT
Start: 2024-06-27

## 2024-06-27 NOTE — TELEPHONE ENCOUNTER
Patient called needs the following refilled:    carvedilol (COREG) 25 MG tablet     atorvastatin (LIPITOR) 80 MG tablet     amLODIPine (NORVASC) 5 MG tablet    furosemide (LASIX) 20 MG tablet    Please call it into this pharmacy- patient states he only as 1 week of his meds left      Pharmacy: [id number] - [facility] - [address] - P [phone] - F [phone]

## 2024-07-31 ENCOUNTER — OFFICE VISIT (OUTPATIENT)
Age: 69
End: 2024-07-31
Payer: MEDICARE

## 2024-07-31 VITALS
SYSTOLIC BLOOD PRESSURE: 178 MMHG | RESPIRATION RATE: 16 BRPM | HEART RATE: 64 BPM | HEIGHT: 71 IN | TEMPERATURE: 97.7 F | OXYGEN SATURATION: 95 % | BODY MASS INDEX: 34.72 KG/M2 | DIASTOLIC BLOOD PRESSURE: 96 MMHG | WEIGHT: 248 LBS

## 2024-07-31 DIAGNOSIS — R06.02 WAKING AT NIGHT SHORT OF BREATH: ICD-10-CM

## 2024-07-31 DIAGNOSIS — G47.00 INSOMNIA W/ SLEEP APNEA: ICD-10-CM

## 2024-07-31 DIAGNOSIS — G47.33 OBSTRUCTIVE SLEEP APNEA SYNDROME: Primary | ICD-10-CM

## 2024-07-31 DIAGNOSIS — I25.2 HISTORY OF MI (MYOCARDIAL INFARCTION): ICD-10-CM

## 2024-07-31 DIAGNOSIS — I10 ESSENTIAL HYPERTENSION: ICD-10-CM

## 2024-07-31 DIAGNOSIS — E66.3 OVERWEIGHT: ICD-10-CM

## 2024-07-31 DIAGNOSIS — G47.30 INSOMNIA W/ SLEEP APNEA: ICD-10-CM

## 2024-07-31 DIAGNOSIS — N18.30 STAGE 3 CHRONIC KIDNEY DISEASE, UNSPECIFIED WHETHER STAGE 3A OR 3B CKD (HCC): ICD-10-CM

## 2024-07-31 DIAGNOSIS — Z95.5 STENTED CORONARY ARTERY: ICD-10-CM

## 2024-07-31 DIAGNOSIS — G47.19 EXCESSIVE DAYTIME SLEEPINESS: ICD-10-CM

## 2024-07-31 DIAGNOSIS — I51.9 LEFT VENTRICULAR DIASTOLIC DYSFUNCTION: ICD-10-CM

## 2024-07-31 DIAGNOSIS — R35.1 NOCTURIA: ICD-10-CM

## 2024-07-31 DIAGNOSIS — R06.83 LOUD SNORING: ICD-10-CM

## 2024-07-31 PROCEDURE — 99204 OFFICE O/P NEW MOD 45 MIN: CPT | Performed by: OTOLARYNGOLOGY

## 2024-07-31 PROCEDURE — 1123F ACP DISCUSS/DSCN MKR DOCD: CPT | Performed by: OTOLARYNGOLOGY

## 2024-07-31 PROCEDURE — 3077F SYST BP >= 140 MM HG: CPT | Performed by: OTOLARYNGOLOGY

## 2024-07-31 PROCEDURE — 3080F DIAST BP >= 90 MM HG: CPT | Performed by: OTOLARYNGOLOGY

## 2024-07-31 RX ORDER — DAPAGLIFLOZIN AND METFORMIN HYDROCHLORIDE 10; 500 MG/1; MG/1
TABLET, FILM COATED, EXTENDED RELEASE ORAL
COMMUNITY
Start: 2024-05-30

## 2024-07-31 ASSESSMENT — SLEEP AND FATIGUE QUESTIONNAIRES
HOW LIKELY ARE YOU TO NOD OFF OR FALL ASLEEP WHILE LYING DOWN TO REST IN THE AFTERNOON WHEN CIRCUMSTANCES PERMIT: HIGH CHANCE OF DOZING
HOW LIKELY ARE YOU TO NOD OFF OR FALL ASLEEP WHILE SITTING QUIETLY AFTER LUNCH WITHOUT ALCOHOL: HIGH CHANCE OF DOZING
HOW LIKELY ARE YOU TO NOD OFF OR FALL ASLEEP WHILE WATCHING TV: HIGH CHANCE OF DOZING
HOW LIKELY ARE YOU TO NOD OFF OR FALL ASLEEP IN A CAR, WHILE STOPPED FOR A FEW MINUTES IN TRAFFIC: MODERATE CHANCE OF DOZING
HOW LIKELY ARE YOU TO NOD OFF OR FALL ASLEEP WHEN YOU ARE A PASSENGER IN A CAR FOR AN HOUR WITHOUT A BREAK: HIGH CHANCE OF DOZING
ESS TOTAL SCORE: 19
HOW LIKELY ARE YOU TO NOD OFF OR FALL ASLEEP WHILE SITTING AND READING: HIGH CHANCE OF DOZING
HOW LIKELY ARE YOU TO NOD OFF OR FALL ASLEEP WHILE SITTING AND TALKING TO SOMEONE: WOULD NEVER DOZE
HOW LIKELY ARE YOU TO NOD OFF OR FALL ASLEEP WHILE SITTING INACTIVE IN A PUBLIC PLACE: MODERATE CHANCE OF DOZING

## 2024-07-31 ASSESSMENT — PATIENT HEALTH QUESTIONNAIRE - PHQ9
SUM OF ALL RESPONSES TO PHQ QUESTIONS 1-9: 1
SUM OF ALL RESPONSES TO PHQ QUESTIONS 1-9: 1
1. LITTLE INTEREST OR PLEASURE IN DOING THINGS: NOT AT ALL
SUM OF ALL RESPONSES TO PHQ QUESTIONS 1-9: 1
2. FEELING DOWN, DEPRESSED OR HOPELESS: SEVERAL DAYS
SUM OF ALL RESPONSES TO PHQ QUESTIONS 1-9: 1
SUM OF ALL RESPONSES TO PHQ9 QUESTIONS 1 & 2: 1

## 2024-07-31 ASSESSMENT — ENCOUNTER SYMPTOMS
SHORTNESS OF BREATH: 1
CHOKING: 0
COUGH: 0

## 2024-07-31 NOTE — PROGRESS NOTES
Wilmer Levy presents today for   Chief Complaint   Patient presents with    Snoring    Sleep Problem       Is someone accompanying this pt? no    Is the patient using any DME equipment during OV? no    -DME Company: N/A    Have you ever had a sleep study done before? yes    Depression Screenin/31/2024     2:13 PM   PHQ-9    Little interest or pleasure in doing things 0   Feeling down, depressed, or hopeless 1   PHQ-2 Score 1   PHQ-9 Total Score 1        [place] Sleepiness Scale:      2024     2:17 PM   Sleep Medicine   Sitting and reading 3   Watching TV 3   Sitting, inactive in a public place (e.g. a theatre or a meeting) 2   As a passenger in a car for an hour without a break 3   Lying down to rest in the afternoon when circumstances permit 3   Sitting and talking to someone 0   Sitting quietly after a lunch without alcohol 3   In a car, while stopped for a few minutes in traffic 2   [place] Sleepiness Score 19   Neck (Inches) 15       Stop-Ban/31/2024     2:00 PM   STOP-BANG QUESTIONNAIRE   Are you a loud and/or regular snorer? 1   Do you often feel tired or groggy upon awakening or do you awaken with a headache? 1   Have you been observed to gasp or stop breathing during sleep? 0   Are you often tired or fatigued during wake time hours?  0   Do you fall asleep sitting, reading, watching TV or driving? 0   Do you often have problems with memory or concentration? 0   Do you have or are you being treated for high blood pressure? 1   Recent BMI (Calculated) 35.8   Is BMI greater than 35 kg/m2? 1=Yes   Age older than 50 years old? 1=Yes   Is your neck circumference greater than 17 inches (Male) or 16 inches (Female)? 0   Gender - Male 1=Yes   STOP-Bang Total Score 41.8         Coordination of Care:  1. Have you been to the ER, urgent care clinic since your last visit? Hospitalized since your last visit? no    2. Have you seen or consulted any other health care providers outside of the [facility] 
myocardial ischemia. There is a mild severity left ventricular stress perfusion defect that is small in size present in the basal inferolateral segment(s) that is partially reversible. The defect is consistent with abnormal perfusion in the LCx territory. There is normal wall motion in the defect area. The defect appears to be ischemia.    Nuclear Findings: The study is most consistent with myocardial ischemia. Findings suggest a low risk of myocardial ischemia.    ECG: Resting ECG demonstrates normal sinus rhythm.    ECG: Stress ECG was negative for ischemia.    Stress Test: A Giovany protocol stress test was performed. Overall, the patient's exercise capacity was average for their age. The patient was stressed for 6 min and 9 sec. Hemodynamics are adequate for diagnosis. Blood pressure demonstrated a normal response and heart rate demonstrated a normal response to stress. The patient reported chest pain during the stress test. Chest pain was characterized as typical.  Graded at 6 on a scale of 0-10.    Post-stress ejection fraction is 51%.       Historical Sleep Testing Data:  Not available for review    Total time spent on this encounter, including previsit review of of separately obtained history, face to face interaction, performing medically appropriate physical exam, patient/counseling, ordering tests, care coordination and documentation was  32  minutes.  I reviewed referring provider's notes as well as recent cardiac summaries and notes.  I spent 20 minutes or so face-to-face with the patient and ordered a PSG.  I also reviewed recent labs and study results as documented above.    This note was dictated utilizing voice recognition software which may lead to typographical errors.  I apologize in advance if the situation occurs.  If questions arise please do not hesitate to contact me or call our department.    Electronically signed by Linsey Ortega DO on7/31/2024 at 2:50 PM

## 2024-07-31 NOTE — ASSESSMENT & PLAN NOTE
Asymptomatic, continue current medications, elevated to 178/96 today.  Patient stated today that he forgot to take his blood pressure medications which accounts for his elevated measurement in the office.

## 2024-07-31 NOTE — PATIENT INSTRUCTIONS
Please make a follow up appointment to discuss the results of your sleep study. If this is impossible for some reason, please send me a \"My Chart\" message so that I may get back with you in a timely manner.    The [facility] Sleep Lab is located in the [facility], adjacent to [facility]. The lab is on the second floor. The direct number to call for sleep study related questions is: [phone].    Please call our clinic back at [phone] or send a message on [employer] if you have any questions or concerns or if you are experiencing any of the following:     You have not received a follow up appointment within 30 days prior the recommended follow up time.    If you are not tolerating treatment plan and/or not able to obtain equipment or prescribed medication(s).  if you are experiencing any difficulties with the Durable Medical Equipment  (DME) Company you may be using or is assigned to you.  Two weeks have passed and you have not received an appointment for a scheduled procedure.  Two weeks have passed since you underwent a test and/or procedure and you have not received your results.     If you are using a CPAP/BIPAP, or Home Ventilator Device- Please note the following.  Currently, many DMEs are experiencing supply chain difficulties and orders for equipment may be back logged several weeks.     Your  Durable Medical Equipment (DME ) company is supposed to provide you with replacement filters, tubing and masks. You can either call your DME when you need new supplies or you can arrange for an automatic shipment schedule.    Your need to be seen by our office at lat minimum of every 12 months in order to renew the prescription for these supplies.   Please make note of who your DME company is and their phone number.   Please make sure that you clean your mask and hosing on a regular basis.  Your DME can provide you with additional information regarding proper care and cleaning of your

## 2024-07-31 NOTE — ASSESSMENT & PLAN NOTE
Remote history of obstructive sleep apnea -no available studies to review and I suspect his study was done more than 20 years ago.  Since that study was done, patient lost over 100 pounds after having bariatric surgery years ago.  He also has a significant cardiac history and just completed cardiac rehab after having a myocardial infarction.

## 2024-07-31 NOTE — ASSESSMENT & PLAN NOTE
This is actually his worst symptom and he is getting up to use the restroom during the night 6-8 times, which is obviously very disruptive to his sleep.  This is likely a combination of severe untreated obstructive sleep apnea as well as some of his medications.

## 2024-08-06 PROBLEM — I25.10 CORONARY ATHEROSCLEROSIS: Status: ACTIVE | Noted: 2023-12-26

## 2024-08-06 PROBLEM — I21.4 NSTEMI (NON-ST ELEVATED MYOCARDIAL INFARCTION) (HCC): Status: ACTIVE | Noted: 2023-12-19

## 2024-08-07 ENCOUNTER — HOSPITAL ENCOUNTER (OUTPATIENT)
Facility: HOSPITAL | Age: 69
Discharge: HOME | End: 2024-08-10

## 2024-08-07 LAB — SENTARA SPECIMEN COLLECTION: NORMAL

## 2024-08-07 PROCEDURE — 99001 SPECIMEN HANDLING PT-LAB: CPT

## 2024-08-09 ENCOUNTER — HOSPITAL ENCOUNTER (OUTPATIENT)
Dept: SLEEP MEDICINE | Facility: HOSPITAL | Age: 69
Discharge: HOME | End: 2024-08-09
Attending: OTOLARYNGOLOGY
Payer: MEDICARE

## 2024-08-09 DIAGNOSIS — G47.19 EXCESSIVE DAYTIME SLEEPINESS: ICD-10-CM

## 2024-08-09 DIAGNOSIS — G47.33 OBSTRUCTIVE SLEEP APNEA SYNDROME: ICD-10-CM

## 2024-08-09 DIAGNOSIS — R06.83 LOUD SNORING: ICD-10-CM

## 2024-08-09 DIAGNOSIS — R35.1 NOCTURIA: ICD-10-CM

## 2024-08-09 PROCEDURE — 95810 POLYSOM 6/> YRS 4/> PARAM: CPT

## 2024-08-10 VITALS
BODY MASS INDEX: 34.59 KG/M2 | HEART RATE: 41 BPM | DIASTOLIC BLOOD PRESSURE: 74 MMHG | SYSTOLIC BLOOD PRESSURE: 171 MMHG | HEIGHT: 71 IN

## 2024-08-10 ASSESSMENT — SLEEP AND FATIGUE QUESTIONNAIRES
HOW LIKELY ARE YOU TO NOD OFF OR FALL ASLEEP WHILE LYING DOWN TO REST IN THE AFTERNOON WHEN CIRCUMSTANCES PERMIT: HIGH CHANCE OF DOZING
HOW LIKELY ARE YOU TO NOD OFF OR FALL ASLEEP WHILE SITTING AND READING: HIGH CHANCE OF DOZING
HOW LIKELY ARE YOU TO NOD OFF OR FALL ASLEEP WHEN YOU ARE A PASSENGER IN A CAR FOR AN HOUR WITHOUT A BREAK: HIGH CHANCE OF DOZING
HOW LIKELY ARE YOU TO NOD OFF OR FALL ASLEEP IN A CAR, WHILE STOPPED FOR A FEW MINUTES IN TRAFFIC: MODERATE CHANCE OF DOZING
HOW LIKELY ARE YOU TO NOD OFF OR FALL ASLEEP WHILE SITTING QUIETLY AFTER LUNCH WITHOUT ALCOHOL: HIGH CHANCE OF DOZING
HOW LIKELY ARE YOU TO NOD OFF OR FALL ASLEEP WHILE WATCHING TV: MODERATE CHANCE OF DOZING
HOW LIKELY ARE YOU TO NOD OFF OR FALL ASLEEP WHILE SITTING INACTIVE IN A PUBLIC PLACE: MODERATE CHANCE OF DOZING
ESS TOTAL SCORE: 19
HOW LIKELY ARE YOU TO NOD OFF OR FALL ASLEEP WHILE SITTING AND TALKING TO SOMEONE: SLIGHT CHANCE OF DOZING

## 2024-08-10 NOTE — PROGRESS NOTES
Patient arrived for sleep study.  Physicians orders were reviewed.  Patient education to include initiation of PAP therapy per protocol.  Patient questions were answered.  The patient demonstrated good understanding of the positive airway device.

## 2024-08-10 NOTE — PROGRESS NOTES
Sleep Study completed per physician order.  Patient discharged from sleep center.   Patient awake, alert and able to leave the facility under their own power.   Instructed to follow up with their sleep physician for results.

## 2024-08-20 ENCOUNTER — TELEPHONE (OUTPATIENT)
Age: 69
End: 2024-08-20

## 2024-08-20 ENCOUNTER — OFFICE VISIT (OUTPATIENT)
Age: 69
End: 2024-08-20
Payer: MEDICARE

## 2024-08-20 VITALS
WEIGHT: 254 LBS | DIASTOLIC BLOOD PRESSURE: 67 MMHG | BODY MASS INDEX: 35.56 KG/M2 | HEART RATE: 69 BPM | OXYGEN SATURATION: 97 % | SYSTOLIC BLOOD PRESSURE: 115 MMHG | HEIGHT: 71 IN

## 2024-08-20 DIAGNOSIS — Z95.5 STENTED CORONARY ARTERY: ICD-10-CM

## 2024-08-20 DIAGNOSIS — E78.5 DYSLIPIDEMIA: ICD-10-CM

## 2024-08-20 DIAGNOSIS — I50.32 CHRONIC DIASTOLIC CONGESTIVE HEART FAILURE (HCC): ICD-10-CM

## 2024-08-20 DIAGNOSIS — E66.01 SEVERE OBESITY (BMI 35.0-39.9) WITH COMORBIDITY (HCC): ICD-10-CM

## 2024-08-20 DIAGNOSIS — I10 ESSENTIAL HYPERTENSION: ICD-10-CM

## 2024-08-20 DIAGNOSIS — G47.33 OBSTRUCTIVE SLEEP APNEA SYNDROME: ICD-10-CM

## 2024-08-20 DIAGNOSIS — I25.118 CORONARY ARTERY DISEASE OF NATIVE ARTERY OF NATIVE HEART WITH STABLE ANGINA PECTORIS (HCC): Primary | ICD-10-CM

## 2024-08-20 PROBLEM — Z98.61 POSTSURGICAL PERCUTANEOUS TRANSLUMINAL CORONARY ANGIOPLASTY STATUS: Status: RESOLVED | Noted: 2019-11-08 | Resolved: 2024-08-20

## 2024-08-20 PROCEDURE — 3078F DIAST BP <80 MM HG: CPT | Performed by: INTERNAL MEDICINE

## 2024-08-20 PROCEDURE — 1123F ACP DISCUSS/DSCN MKR DOCD: CPT | Performed by: INTERNAL MEDICINE

## 2024-08-20 PROCEDURE — 3074F SYST BP LT 130 MM HG: CPT | Performed by: INTERNAL MEDICINE

## 2024-08-20 PROCEDURE — 93000 ELECTROCARDIOGRAM COMPLETE: CPT | Performed by: INTERNAL MEDICINE

## 2024-08-20 PROCEDURE — 99214 OFFICE O/P EST MOD 30 MIN: CPT | Performed by: INTERNAL MEDICINE

## 2024-08-20 RX ORDER — ISOSORBIDE MONONITRATE 120 MG/1
120 TABLET, EXTENDED RELEASE ORAL DAILY
COMMUNITY
End: 2024-08-20 | Stop reason: SDUPTHER

## 2024-08-20 RX ORDER — AMLODIPINE BESYLATE 10 MG/1
10 TABLET ORAL DAILY
COMMUNITY

## 2024-08-20 RX ORDER — ISOSORBIDE MONONITRATE 60 MG/1
60 TABLET, EXTENDED RELEASE ORAL DAILY
Qty: 90 TABLET | Refills: 1 | Status: SHIPPED | OUTPATIENT
Start: 2024-08-20

## 2024-08-20 RX ORDER — EZETIMIBE 10 MG/1
10 TABLET ORAL DAILY
Qty: 90 TABLET | Refills: 1 | Status: SHIPPED | OUTPATIENT
Start: 2024-08-20

## 2024-08-20 ASSESSMENT — ENCOUNTER SYMPTOMS
SHORTNESS OF BREATH: 1
EYES NEGATIVE: 1
GASTROINTESTINAL NEGATIVE: 1

## 2024-08-20 NOTE — PROGRESS NOTES
1. Have you been to the ER, urgent care clinic since your last visit?  Hospitalized since your last visit?     no      2.  Where do you normally have your labs drawn?   [facility]     3. Do you need any refills today?   no    4. Which local pharmacy do you use (enter pharmacy)?   [facility]    5. Which mail order pharmacy do you use (enter pharmacy)?        6. Are you here for surgical clearance and if so who will be doing your     procedure/surgery (care team)?   no      
  Procedure Laterality Date    CARDIAC CATHETERIZATION  7/30/10    COLONOSCOPY N/A 9/18/2019    COLONOSCOPY performed by Óscar Laureano MD at [facility] ENDOSCOPY    COLONOSCOPY FLX DX W/COLLJ SPEC WHEN PFRMD      I & D ABSC XTRAORAL SOFT TISS COMP      OTHER SURGICAL HISTORY  06/27/13    Prostate    TONSILLECTOMY         Vitals:    08/20/24 0951   BP: 115/67   Pulse: 69   SpO2: 97%   Weight: 115.2 kg (254 lb)   Height: 1.803 m (5' 11\")          Diagnostic Studies:  I have reviewed the relevant tests done on the patient and show as follows  EKG tracings reviewed by me today.      Mr. Levy has a reminder for a \"due or due soon\" health maintenance. I have asked that he contact his primary care provider for follow-up on this health maintenance.  Diagnostic Studies:  I have reviewed the relevant tests done on the patient and show as follows  EKG tracings reviewed by me today.      8/18 Card Cath/PCI  Conclusion           Three-vessel coronary disease with 50% mid right coronary stenosis, 70% ostial second diagonal stenosis and 90% mid circumflex stenosis  Circumflex appears to be the culprit vessel for present non-STEMI  Successful coronary intervention of mid circumflex deploying a drug-eluting stent with residual 0% stenosis.   8/18 ECHO  Interpretation Summary        Definity contrast was given to enhance imaging.  Estimated left ventricular ejection fraction is 56 - 60%. Left ventricular mild concentric hypertrophy. Normal left ventricular wall motion, no regional wall motion abnormality noted. Moderate (grade 2) left ventricular diastolic dysfunction.  Right ventricular cavity size is mildly dilated.  Left atrial cavity size is moderately dilated.  Tricuspid regurgitation is inadequate for estimation of right ventricular systolic pressure.  Trace mitral valve regurgitation.         12/21 echo  CONCLUSIONS     * Left ventricular systolic function is low normal with an ejection fraction   of 50 % by visual estimation.

## 2024-08-28 DIAGNOSIS — G47.33 OBSTRUCTIVE SLEEP APNEA SYNDROME: Primary | ICD-10-CM

## 2024-08-28 PROBLEM — G47.31 COMPLEX SLEEP APNEA SYNDROME: Status: ACTIVE | Noted: 2024-08-28

## 2024-08-28 PROBLEM — I49.3 PVC'S (PREMATURE VENTRICULAR CONTRACTIONS): Status: ACTIVE | Noted: 2024-08-28

## 2024-08-28 PROBLEM — G47.39 COMPLEX SLEEP APNEA SYNDROME: Status: ACTIVE | Noted: 2024-08-28

## 2024-08-28 PROBLEM — G47.61 PERIODIC LIMB MOVEMENT SLEEP DISORDER: Status: ACTIVE | Noted: 2024-08-28

## 2024-08-30 ENCOUNTER — HOSPITAL ENCOUNTER (OUTPATIENT)
Facility: HOSPITAL | Age: 69
End: 2024-08-30
Attending: INTERNAL MEDICINE
Payer: MEDICARE

## 2024-08-30 ENCOUNTER — HOSPITAL ENCOUNTER (OUTPATIENT)
Facility: HOSPITAL | Age: 69
Discharge: HOME | End: 2024-08-30
Attending: INTERNAL MEDICINE
Payer: MEDICARE

## 2024-08-30 ENCOUNTER — TELEPHONE (OUTPATIENT)
Age: 69
End: 2024-08-30

## 2024-08-30 VITALS
HEIGHT: 71 IN | DIASTOLIC BLOOD PRESSURE: 94 MMHG | BODY MASS INDEX: 35.7 KG/M2 | SYSTOLIC BLOOD PRESSURE: 171 MMHG | WEIGHT: 255 LBS | HEART RATE: 64 BPM

## 2024-08-30 DIAGNOSIS — I50.32 CHRONIC DIASTOLIC CONGESTIVE HEART FAILURE (HCC): Primary | ICD-10-CM

## 2024-08-30 DIAGNOSIS — I25.118 CORONARY ARTERY DISEASE OF NATIVE ARTERY OF NATIVE HEART WITH STABLE ANGINA PECTORIS (HCC): ICD-10-CM

## 2024-08-30 LAB
ECHO BSA: 2.41 M2
NUC STRESS EJECTION FRACTION: 54 %
STRESS BASELINE DIAS BP: 94 MMHG
STRESS BASELINE HR: 63 BPM
STRESS BASELINE SYS BP: 171 MMHG
STRESS ESTIMATED WORKLOAD: 1 METS
STRESS PEAK DIAS BP: 94 MMHG
STRESS PEAK SYS BP: 171 MMHG
STRESS PERCENT HR ACHIEVED: 60 %
STRESS POST PEAK HR: 92 BPM
STRESS RATE PRESSURE PRODUCT: NORMAL BPM*MMHG
STRESS TARGET HR: 153 BPM
TID: 1

## 2024-08-30 PROCEDURE — 93016 CV STRESS TEST SUPVJ ONLY: CPT | Performed by: INTERNAL MEDICINE

## 2024-08-30 PROCEDURE — 93018 CV STRESS TEST I&R ONLY: CPT | Performed by: INTERNAL MEDICINE

## 2024-08-30 PROCEDURE — 6360000002 HC RX W HCPCS: Performed by: INTERNAL MEDICINE

## 2024-08-30 PROCEDURE — 3430000000 HC RX DIAGNOSTIC RADIOPHARMACEUTICAL: Performed by: INTERNAL MEDICINE

## 2024-08-30 PROCEDURE — A9502 TC99M TETROFOSMIN: HCPCS | Performed by: INTERNAL MEDICINE

## 2024-08-30 PROCEDURE — 2580000003 HC RX 258: Performed by: INTERNAL MEDICINE

## 2024-08-30 PROCEDURE — 78452 HT MUSCLE IMAGE SPECT MULT: CPT | Performed by: INTERNAL MEDICINE

## 2024-08-30 RX ORDER — 0.9 % SODIUM CHLORIDE 0.9 %
250 INTRAVENOUS SOLUTION INTRAVENOUS ONCE
Status: COMPLETED | OUTPATIENT
Start: 2024-08-30 | End: 2024-08-30

## 2024-08-30 RX ORDER — REGADENOSON 0.08 MG/ML
0.4 INJECTION, SOLUTION INTRAVENOUS
Status: COMPLETED | OUTPATIENT
Start: 2024-08-30 | End: 2024-08-30

## 2024-08-30 RX ADMIN — REGADENOSON 0.4 MG: 0.08 INJECTION, SOLUTION INTRAVENOUS at 11:36

## 2024-08-30 RX ADMIN — TETROFOSMIN 33 MILLICURIE: 1.38 INJECTION, POWDER, LYOPHILIZED, FOR SOLUTION INTRAVENOUS at 11:36

## 2024-08-30 RX ADMIN — SODIUM CHLORIDE 250 ML: 9 INJECTION, SOLUTION INTRAVENOUS at 11:36

## 2024-08-30 RX ADMIN — TETROFOSMIN 10.3 MILLICURIE: 1.38 INJECTION, POWDER, LYOPHILIZED, FOR SOLUTION INTRAVENOUS at 09:55

## 2024-09-03 ENCOUNTER — CLINICAL DOCUMENTATION (OUTPATIENT)
Age: 69
End: 2024-09-03

## 2024-09-10 ENCOUNTER — OFFICE VISIT (OUTPATIENT)
Facility: CLINIC | Age: 69
End: 2024-09-10
Payer: MEDICARE

## 2024-09-10 ENCOUNTER — TELEPHONE (OUTPATIENT)
Facility: CLINIC | Age: 69
End: 2024-09-10

## 2024-09-10 VITALS
OXYGEN SATURATION: 94 % | WEIGHT: 248.2 LBS | BODY MASS INDEX: 34.75 KG/M2 | HEART RATE: 76 BPM | SYSTOLIC BLOOD PRESSURE: 106 MMHG | HEIGHT: 71 IN | DIASTOLIC BLOOD PRESSURE: 76 MMHG

## 2024-09-10 DIAGNOSIS — N18.32 CHRONIC KIDNEY DISEASE, STAGE 3B (HCC): ICD-10-CM

## 2024-09-10 DIAGNOSIS — I50.42 CHRONIC COMBINED SYSTOLIC (CONGESTIVE) AND DIASTOLIC (CONGESTIVE) HEART FAILURE (HCC): ICD-10-CM

## 2024-09-10 DIAGNOSIS — I10 ESSENTIAL (PRIMARY) HYPERTENSION: Primary | ICD-10-CM

## 2024-09-10 DIAGNOSIS — I25.10 ATHEROSCLEROSIS OF NATIVE CORONARY ARTERY OF NATIVE HEART WITHOUT ANGINA PECTORIS: ICD-10-CM

## 2024-09-10 DIAGNOSIS — Z95.5 HISTORY OF CORONARY ARTERY STENT PLACEMENT: ICD-10-CM

## 2024-09-10 DIAGNOSIS — M1A.39X0 CHRONIC GOUT DUE TO RENAL IMPAIRMENT OF MULTIPLE SITES WITHOUT TOPHUS: ICD-10-CM

## 2024-09-10 DIAGNOSIS — E78.2 MIXED HYPERLIPIDEMIA: ICD-10-CM

## 2024-09-10 DIAGNOSIS — I10 ESSENTIAL HYPERTENSION: ICD-10-CM

## 2024-09-10 PROCEDURE — 3074F SYST BP LT 130 MM HG: CPT | Performed by: STUDENT IN AN ORGANIZED HEALTH CARE EDUCATION/TRAINING PROGRAM

## 2024-09-10 PROCEDURE — 3078F DIAST BP <80 MM HG: CPT | Performed by: STUDENT IN AN ORGANIZED HEALTH CARE EDUCATION/TRAINING PROGRAM

## 2024-09-10 PROCEDURE — 99214 OFFICE O/P EST MOD 30 MIN: CPT | Performed by: STUDENT IN AN ORGANIZED HEALTH CARE EDUCATION/TRAINING PROGRAM

## 2024-09-10 PROCEDURE — 1123F ACP DISCUSS/DSCN MKR DOCD: CPT | Performed by: STUDENT IN AN ORGANIZED HEALTH CARE EDUCATION/TRAINING PROGRAM

## 2024-09-10 SDOH — ECONOMIC STABILITY: FOOD INSECURITY: WITHIN THE PAST 12 MONTHS, THE FOOD YOU BOUGHT JUST DIDN'T LAST AND YOU DIDN'T HAVE MONEY TO GET MORE.: NEVER TRUE

## 2024-09-10 SDOH — ECONOMIC STABILITY: FOOD INSECURITY: WITHIN THE PAST 12 MONTHS, YOU WORRIED THAT YOUR FOOD WOULD RUN OUT BEFORE YOU GOT MONEY TO BUY MORE.: NEVER TRUE

## 2024-09-10 SDOH — ECONOMIC STABILITY: INCOME INSECURITY: HOW HARD IS IT FOR YOU TO PAY FOR THE VERY BASICS LIKE FOOD, HOUSING, MEDICAL CARE, AND HEATING?: NOT HARD AT ALL

## 2024-09-10 ASSESSMENT — ANXIETY QUESTIONNAIRES
3. WORRYING TOO MUCH ABOUT DIFFERENT THINGS: SEVERAL DAYS
6. BECOMING EASILY ANNOYED OR IRRITABLE: SEVERAL DAYS
5. BEING SO RESTLESS THAT IT IS HARD TO SIT STILL: SEVERAL DAYS
4. TROUBLE RELAXING: SEVERAL DAYS
7. FEELING AFRAID AS IF SOMETHING AWFUL MIGHT HAPPEN: SEVERAL DAYS
IF YOU CHECKED OFF ANY PROBLEMS ON THIS QUESTIONNAIRE, HOW DIFFICULT HAVE THESE PROBLEMS MADE IT FOR YOU TO DO YOUR WORK, TAKE CARE OF THINGS AT HOME, OR GET ALONG WITH OTHER PEOPLE: SOMEWHAT DIFFICULT
2. NOT BEING ABLE TO STOP OR CONTROL WORRYING: SEVERAL DAYS
GAD7 TOTAL SCORE: 7
1. FEELING NERVOUS, ANXIOUS, OR ON EDGE: SEVERAL DAYS

## 2024-09-10 ASSESSMENT — ENCOUNTER SYMPTOMS
RHINORRHEA: 0
DIARRHEA: 0
CONSTIPATION: 1
NAUSEA: 0
VOMITING: 0
CHEST TIGHTNESS: 0
ABDOMINAL PAIN: 0
BACK PAIN: 1
SHORTNESS OF BREATH: 0

## 2024-09-10 ASSESSMENT — PATIENT HEALTH QUESTIONNAIRE - PHQ9
2. FEELING DOWN, DEPRESSED OR HOPELESS: NOT AT ALL
SUM OF ALL RESPONSES TO PHQ QUESTIONS 1-9: 0
1. LITTLE INTEREST OR PLEASURE IN DOING THINGS: NOT AT ALL
SUM OF ALL RESPONSES TO PHQ9 QUESTIONS 1 & 2: 0

## 2024-09-11 RX ORDER — DAPAGLIFLOZIN 10 MG/1
10 TABLET, FILM COATED ORAL EVERY MORNING
Qty: 90 TABLET | Refills: 3 | Status: SHIPPED | OUTPATIENT
Start: 2024-09-11

## 2024-09-17 DIAGNOSIS — I10 ESSENTIAL HYPERTENSION: ICD-10-CM

## 2024-09-17 DIAGNOSIS — I25.118 CORONARY ARTERY DISEASE OF NATIVE ARTERY OF NATIVE HEART WITH STABLE ANGINA PECTORIS (HCC): ICD-10-CM

## 2024-09-26 ENCOUNTER — TELEPHONE (OUTPATIENT)
Age: 69
End: 2024-09-26

## 2024-10-02 ENCOUNTER — OFFICE VISIT (OUTPATIENT)
Age: 69
End: 2024-10-02
Payer: MEDICARE

## 2024-10-02 VITALS
HEIGHT: 71 IN | HEART RATE: 67 BPM | BODY MASS INDEX: 34.72 KG/M2 | WEIGHT: 248 LBS | SYSTOLIC BLOOD PRESSURE: 121 MMHG | OXYGEN SATURATION: 94 % | DIASTOLIC BLOOD PRESSURE: 74 MMHG

## 2024-10-02 DIAGNOSIS — I25.118 CORONARY ARTERY DISEASE OF NATIVE ARTERY OF NATIVE HEART WITH STABLE ANGINA PECTORIS (HCC): Primary | ICD-10-CM

## 2024-10-02 DIAGNOSIS — I10 ESSENTIAL HYPERTENSION: ICD-10-CM

## 2024-10-02 DIAGNOSIS — E78.00 PURE HYPERCHOLESTEROLEMIA: ICD-10-CM

## 2024-10-02 DIAGNOSIS — E66.811 OBESITY (BMI 30.0-34.9): ICD-10-CM

## 2024-10-02 DIAGNOSIS — N18.32 STAGE 3B CHRONIC KIDNEY DISEASE (HCC): ICD-10-CM

## 2024-10-02 DIAGNOSIS — G47.33 OBSTRUCTIVE SLEEP APNEA SYNDROME: ICD-10-CM

## 2024-10-02 DIAGNOSIS — Z95.5 HISTORY OF CORONARY ARTERY STENT PLACEMENT: ICD-10-CM

## 2024-10-02 PROCEDURE — 99214 OFFICE O/P EST MOD 30 MIN: CPT | Performed by: INTERNAL MEDICINE

## 2024-10-02 PROCEDURE — 1123F ACP DISCUSS/DSCN MKR DOCD: CPT | Performed by: INTERNAL MEDICINE

## 2024-10-02 PROCEDURE — 3078F DIAST BP <80 MM HG: CPT | Performed by: INTERNAL MEDICINE

## 2024-10-02 PROCEDURE — 3074F SYST BP LT 130 MM HG: CPT | Performed by: INTERNAL MEDICINE

## 2024-10-02 RX ORDER — EZETIMIBE 10 MG/1
10 TABLET ORAL DAILY
Qty: 90 TABLET | Refills: 1 | Status: SHIPPED | OUTPATIENT
Start: 2024-10-02

## 2024-10-02 RX ORDER — ISOSORBIDE MONONITRATE 120 MG/1
60 TABLET, EXTENDED RELEASE ORAL
COMMUNITY
End: 2024-10-02 | Stop reason: SDUPTHER

## 2024-10-02 RX ORDER — ISOSORBIDE MONONITRATE 30 MG/1
30 TABLET, EXTENDED RELEASE ORAL
Qty: 90 TABLET | Refills: 1 | Status: SHIPPED | OUTPATIENT
Start: 2024-10-02

## 2024-10-02 ASSESSMENT — ENCOUNTER SYMPTOMS
SHORTNESS OF BREATH: 1
GASTROINTESTINAL NEGATIVE: 1
EYES NEGATIVE: 1

## 2024-10-02 NOTE — PROGRESS NOTES
1. Have you been to the ER, urgent care clinic since your last visit?  Hospitalized since your last visit?     Yes When: 8/2024 Where: [facility]  Reason for visit: Dizziness       2.  Where do you normally have your labs drawn?       3. Do you need any refills today?   no    4. Which local pharmacy do you use (enter pharmacy)?   [facility]    5. Which mail order pharmacy do you use (enter pharmacy)?        6. Are you here for surgical clearance and if so who will be doing your     procedure/surgery (care team)?   no      
class II-III.  Edema is increasing due to underlying CKD and likely obesity.  Since the dyspnea is not too much, I did not increase diuretics but instead recommended compression stockings.  Follow-up labs as ordered by PCP.  Patient is on Farxiga and we need to watch closely as his renal dysfunction is advancing.  CAD to be treated medically for now and will keep invasive procedures only if need be.    7/11/2023 CAD clinically stable.  CHF symptoms are nearly resolved.  Lipids are pending and were tested at PCP yesterday.  Detailed discussion on diet weight and exercise.  He is trying to lose weight.  Mediterranean diet guidelines given again.      1/31/2024  Seen s/p NSTEMI with XAVIER x 2 to RCA and XAVIER to L Cx..  Referred to [facility] for cardiac rehab. B/P is controlled, will continue current medications to include DAPT. To F/U with nephrology next week. Lipids at goal.    5/2/2024  Plan for medicines : Symptomatic hypotension secondary to drugs.  Reduce Lasix, Imdur and amlodipine.  Take Lasix only as needed for moderate edema.  CAD stable post PCI.  Blood pressure is low normal with symptoms.  Exercise Plan: Recommend to stay active and walk as much as possible.  He uses a stick to walk.  Diet plan: Mediterranean diet guidelines reinforced.    8/20/2024  Plan for medicines : Reduce isosorbide to just in the morning and watch for chest pains.  EKG has diffuse T wave inversions as opposed to previous EKG.  Check a stress test as he had multivessel stenting 8 months ago.  Lipids are acceptable but would like to lower the LDL further.  Add Zetia and goal LDL will be 55 or less.  Add Jardiance for CHF and CAD as well as renal preservation.  Follow-up electrolytes and renal function.  Exercise Plan: Encouraged to walk 30 to 40 minutes a day.  Weight loss recommended.  Diet plan: Mediterranean diet guidelines reinforced.    Wilmer was seen today for follow-up.    Diagnoses and all orders for this visit:    Coronary artery

## 2024-10-04 ENCOUNTER — TELEPHONE (OUTPATIENT)
Age: 69
End: 2024-10-04

## 2024-10-04 DIAGNOSIS — E78.00 PURE HYPERCHOLESTEROLEMIA: Primary | ICD-10-CM

## 2024-10-04 RX ORDER — EVOLOCUMAB 140 MG/ML
140 INJECTION, SOLUTION SUBCUTANEOUS
Qty: 2 ADJUSTABLE DOSE PRE-FILLED PEN SYRINGE | Refills: 3 | Status: SHIPPED | OUTPATIENT
Start: 2024-10-04

## 2024-10-04 NOTE — TELEPHONE ENCOUNTER
Paper messaged per Dr. Robbi jeffries to try Repatha 140 mg every 14 days d/t insurance plan requested before using Leqvio. Patient was made aware. He voices understanding and acceptance of this advice and will call back if any further questions or concerns.

## 2024-10-07 ENCOUNTER — OFFICE VISIT (OUTPATIENT)
Age: 69
End: 2024-10-07
Payer: MEDICARE

## 2024-10-07 VITALS
BODY MASS INDEX: 34.86 KG/M2 | HEART RATE: 83 BPM | SYSTOLIC BLOOD PRESSURE: 122 MMHG | OXYGEN SATURATION: 97 % | TEMPERATURE: 97.8 F | WEIGHT: 249 LBS | HEIGHT: 71 IN | RESPIRATION RATE: 16 BRPM | DIASTOLIC BLOOD PRESSURE: 62 MMHG

## 2024-10-07 DIAGNOSIS — I51.9 LEFT VENTRICULAR DIASTOLIC DYSFUNCTION: ICD-10-CM

## 2024-10-07 DIAGNOSIS — G47.30 INSOMNIA W/ SLEEP APNEA: ICD-10-CM

## 2024-10-07 DIAGNOSIS — I10 ESSENTIAL HYPERTENSION: ICD-10-CM

## 2024-10-07 DIAGNOSIS — R06.02 WAKING AT NIGHT SHORT OF BREATH: ICD-10-CM

## 2024-10-07 DIAGNOSIS — G47.19 EXCESSIVE DAYTIME SLEEPINESS: ICD-10-CM

## 2024-10-07 DIAGNOSIS — G47.00 INSOMNIA W/ SLEEP APNEA: ICD-10-CM

## 2024-10-07 DIAGNOSIS — G47.39 COMPLEX SLEEP APNEA SYNDROME: Primary | ICD-10-CM

## 2024-10-07 DIAGNOSIS — N18.30 STAGE 3 CHRONIC KIDNEY DISEASE, UNSPECIFIED WHETHER STAGE 3A OR 3B CKD (HCC): ICD-10-CM

## 2024-10-07 DIAGNOSIS — I49.3 PVC (PREMATURE VENTRICULAR CONTRACTION): ICD-10-CM

## 2024-10-07 DIAGNOSIS — R35.1 NOCTURIA: ICD-10-CM

## 2024-10-07 PROCEDURE — 3078F DIAST BP <80 MM HG: CPT | Performed by: OTOLARYNGOLOGY

## 2024-10-07 PROCEDURE — 99214 OFFICE O/P EST MOD 30 MIN: CPT | Performed by: OTOLARYNGOLOGY

## 2024-10-07 PROCEDURE — 3074F SYST BP LT 130 MM HG: CPT | Performed by: OTOLARYNGOLOGY

## 2024-10-07 PROCEDURE — 1123F ACP DISCUSS/DSCN MKR DOCD: CPT | Performed by: OTOLARYNGOLOGY

## 2024-10-07 ASSESSMENT — SLEEP AND FATIGUE QUESTIONNAIRES
HOW LIKELY ARE YOU TO NOD OFF OR FALL ASLEEP WHILE WATCHING TV: SLIGHT CHANCE OF DOZING
ESS TOTAL SCORE: 10
HOW LIKELY ARE YOU TO NOD OFF OR FALL ASLEEP WHILE SITTING QUIETLY AFTER LUNCH WITHOUT ALCOHOL: SLIGHT CHANCE OF DOZING
HOW LIKELY ARE YOU TO NOD OFF OR FALL ASLEEP IN A CAR, WHILE STOPPED FOR A FEW MINUTES IN TRAFFIC: MODERATE CHANCE OF DOZING
HOW LIKELY ARE YOU TO NOD OFF OR FALL ASLEEP WHILE SITTING AND READING: SLIGHT CHANCE OF DOZING
HOW LIKELY ARE YOU TO NOD OFF OR FALL ASLEEP WHILE SITTING INACTIVE IN A PUBLIC PLACE: SLIGHT CHANCE OF DOZING
HOW LIKELY ARE YOU TO NOD OFF OR FALL ASLEEP WHILE LYING DOWN TO REST IN THE AFTERNOON WHEN CIRCUMSTANCES PERMIT: HIGH CHANCE OF DOZING
HOW LIKELY ARE YOU TO NOD OFF OR FALL ASLEEP WHEN YOU ARE A PASSENGER IN A CAR FOR AN HOUR WITHOUT A BREAK: SLIGHT CHANCE OF DOZING
HOW LIKELY ARE YOU TO NOD OFF OR FALL ASLEEP WHILE SITTING AND TALKING TO SOMEONE: WOULD NEVER DOZE

## 2024-10-07 ASSESSMENT — PATIENT HEALTH QUESTIONNAIRE - PHQ9
2. FEELING DOWN, DEPRESSED OR HOPELESS: SEVERAL DAYS
1. LITTLE INTEREST OR PLEASURE IN DOING THINGS: NOT AT ALL
SUM OF ALL RESPONSES TO PHQ9 QUESTIONS 1 & 2: 1
SUM OF ALL RESPONSES TO PHQ QUESTIONS 1-9: 1

## 2024-10-07 NOTE — ASSESSMENT & PLAN NOTE
Monitored by specialist- no acute findings meriting change in the plan, well controlled on current medications.  Has had recent follow-up with cardiology

## 2024-10-07 NOTE — PROGRESS NOTES
[facility]   Sleep Medicine     Office Progress Note         [address]  [address]  [facility] [address]  [phone] Fax    Reason for visit/referral: f/u PSG  Assessment:      1. Complex sleep apnea syndrome  -     DME - DURABLE MEDICAL EQUIPMENT  2. PVC (premature ventricular contraction)  3. Excessive daytime sleepiness  4. Nocturia  5. Insomnia w/ sleep apnea  6. Waking at night short of breath  7. Essential hypertension  Assessment & Plan:   Monitored by specialist- no acute findings meriting change in the plan, well controlled on current medications.  Has had recent follow-up with cardiology  8. Left ventricular diastolic dysfunction  9. Stage 3 chronic kidney disease, unspecified whether stage 3a or 3b CKD (HCC)    I discussed the results of the patient's sleep study with him today in some detail.  He does have severe obstructive sleep apnea with an AHI of 40 and mild complex sleep apnea with a central apnea index of 12.8.  There was no REM seen on the study which may underestimate his degree of obstructive sleep apnea.  He also spent 16.9 minutes below 88% oxygen and his low O2 was 75%.  His periodic limb movements were mildly elevated but the associated arousal index was not.    Prior discussion:  Patient just completed cardiac rehab through [facility].  Patient stated today that he had  an NSTEMI in December 2024.  Also stated today that he had 4 heart attacks and has for coronary artery stents.    In reviewing his record, it is obvious that he has longstanding chronic cardiac issues to include hypertension, cardiac stenting, CHF and has ongoing intermittent chest pain and shortness of breath.     I have discussed the nature and definition of obstructive sleep apnea and why it would be important to evaluate for this.  We did discuss how undiagnosed/untreated obstructive sleep apnea can affect other medical conditions/disorders, and in particular, 
screening. no    Medication list has been updated according to patient.

## 2024-10-07 NOTE — PATIENT INSTRUCTIONS
Please make a follow up appointment to discuss the results of your sleep study. If this is impossible for some reason, please send me a \"My Chart\" message so that I may get back with you in a timely manner.    The [facility] Sleep Lab is located in the [facility], adjacent to [facility]. The lab is on the second floor. The direct number to call for sleep study related questions is: [phone].    Please call our clinic back at [phone] or send a message on [employer] if you have any questions or concerns or if you are experiencing any of the following:     You have not received a follow up appointment within 30 days prior the recommended follow up time.    If you are not tolerating treatment plan and/or not able to obtain equipment or prescribed medication(s).  if you are experiencing any difficulties with the Durable Medical Equipment  (DME) Company you may be using or is assigned to you.  Two weeks have passed and you have not received an appointment for a scheduled procedure.  Two weeks have passed since you underwent a test and/or procedure and you have not received your results.     If you are using a CPAP/BIPAP, or Home Ventilator Device- Please note the following.  Currently, many DMEs are experiencing supply chain difficulties and orders for equipment may be back logged several weeks.     Your  Durable Medical Equipment (DME ) company is supposed to provide you with replacement filters, tubing and masks. You can either call your DME when you need new supplies or you can arrange for an automatic shipment schedule.    Your need to be seen by our office at lat minimum of every 12 months in order to renew the prescription for these supplies.   Please make note of who your DME company is and their phone number.   Please make sure that you clean your mask and hosing on a regular basis.  Your DME can provide you with additional information regarding proper care and cleaning of your

## 2024-10-08 ENCOUNTER — CLINICAL DOCUMENTATION (OUTPATIENT)
Age: 69
End: 2024-10-08

## 2024-10-17 NOTE — TELEPHONE ENCOUNTER
Okay to give the note for full-time work no shortness of breath/rate regular/depth regular/pattern regular/unlabored

## 2024-10-21 ENCOUNTER — TELEPHONE (OUTPATIENT)
Age: 69
End: 2024-10-21

## 2024-10-21 DIAGNOSIS — I50.32 CHRONIC DIASTOLIC CONGESTIVE HEART FAILURE (HCC): ICD-10-CM

## 2024-10-21 RX ORDER — FUROSEMIDE 40 MG/1
40 TABLET ORAL DAILY PRN
Qty: 90 TABLET | Refills: 1 | Status: SHIPPED | OUTPATIENT
Start: 2024-10-21

## 2024-10-21 NOTE — TELEPHONE ENCOUNTER
Patient states has been having severe bilateral leg and feet swelling x 5 days, states he has been taking Lasix 20 mg daily since the swelling has started and it has not gotten any better also c/o  occasional shortness of breath, no other symptoms.  Please advise.

## 2024-11-13 DIAGNOSIS — E78.00 PURE HYPERCHOLESTEROLEMIA: ICD-10-CM

## 2024-11-13 DIAGNOSIS — I10 ESSENTIAL HYPERTENSION: ICD-10-CM

## 2024-12-06 ENCOUNTER — HOSPITAL ENCOUNTER (OUTPATIENT)
Facility: HOSPITAL | Age: 69
Discharge: HOME | End: 2024-12-09
Payer: MEDICARE

## 2024-12-06 LAB
ALBUMIN SERPL-MCNC: 3.5 G/DL (ref 3.4–5)
ALBUMIN/GLOB SERPL: 1 (ref 0.8–1.7)
ALP SERPL-CCNC: 93 U/L (ref 45–117)
ALT SERPL-CCNC: 21 U/L (ref 16–61)
ANION GAP SERPL CALC-SCNC: 5 MMOL/L (ref 3–18)
AST SERPL-CCNC: 16 U/L (ref 10–38)
BILIRUB DIRECT SERPL-MCNC: 0.2 MG/DL (ref 0–0.2)
BILIRUB SERPL-MCNC: 0.5 MG/DL (ref 0.2–1)
BUN SERPL-MCNC: 29 MG/DL (ref 7–18)
BUN/CREAT SERPL: 13 (ref 12–20)
CALCIUM SERPL-MCNC: 9 MG/DL (ref 8.5–10.1)
CHLORIDE SERPL-SCNC: 111 MMOL/L (ref 100–111)
CHOLEST SERPL-MCNC: 137 MG/DL
CO2 SERPL-SCNC: 29 MMOL/L (ref 21–32)
CREAT SERPL-MCNC: 2.16 MG/DL (ref 0.6–1.3)
GLOBULIN SER CALC-MCNC: 3.4 G/DL (ref 2–4)
GLUCOSE SERPL-MCNC: 87 MG/DL (ref 74–99)
HDLC SERPL-MCNC: 66 MG/DL (ref 40–60)
HDLC SERPL: 2.1 (ref 0–5)
LDLC SERPL CALC-MCNC: 59.8 MG/DL (ref 0–100)
LIPID PANEL: ABNORMAL
POTASSIUM SERPL-SCNC: 3.8 MMOL/L (ref 3.5–5.5)
PROT SERPL-MCNC: 6.9 G/DL (ref 6.4–8.2)
SODIUM SERPL-SCNC: 145 MMOL/L (ref 136–145)
TRIGL SERPL-MCNC: 56 MG/DL
VLDLC SERPL CALC-MCNC: 11.2 MG/DL

## 2024-12-06 PROCEDURE — 80076 HEPATIC FUNCTION PANEL: CPT

## 2024-12-06 PROCEDURE — 80061 LIPID PANEL: CPT

## 2024-12-06 PROCEDURE — 80048 BASIC METABOLIC PNL TOTAL CA: CPT

## 2024-12-06 PROCEDURE — 36415 COLL VENOUS BLD VENIPUNCTURE: CPT

## 2024-12-09 ENCOUNTER — OFFICE VISIT (OUTPATIENT)
Age: 69
End: 2024-12-09
Payer: MEDICARE

## 2024-12-09 VITALS
HEART RATE: 91 BPM | HEIGHT: 71 IN | SYSTOLIC BLOOD PRESSURE: 163 MMHG | WEIGHT: 256 LBS | BODY MASS INDEX: 35.84 KG/M2 | DIASTOLIC BLOOD PRESSURE: 94 MMHG | OXYGEN SATURATION: 98 %

## 2024-12-09 DIAGNOSIS — E78.5 DYSLIPIDEMIA: ICD-10-CM

## 2024-12-09 DIAGNOSIS — I50.33 ACUTE ON CHRONIC DIASTOLIC CONGESTIVE HEART FAILURE (HCC): Primary | ICD-10-CM

## 2024-12-09 DIAGNOSIS — I25.118 CORONARY ARTERY DISEASE OF NATIVE ARTERY OF NATIVE HEART WITH STABLE ANGINA PECTORIS (HCC): ICD-10-CM

## 2024-12-09 DIAGNOSIS — Z95.5 HISTORY OF CORONARY ARTERY STENT PLACEMENT: ICD-10-CM

## 2024-12-09 DIAGNOSIS — N18.32 STAGE 3B CHRONIC KIDNEY DISEASE (HCC): ICD-10-CM

## 2024-12-09 DIAGNOSIS — I10 ESSENTIAL HYPERTENSION: ICD-10-CM

## 2024-12-09 DIAGNOSIS — I49.3 PVC'S (PREMATURE VENTRICULAR CONTRACTIONS): ICD-10-CM

## 2024-12-09 PROCEDURE — 1160F RVW MEDS BY RX/DR IN RCRD: CPT | Performed by: INTERNAL MEDICINE

## 2024-12-09 PROCEDURE — 99214 OFFICE O/P EST MOD 30 MIN: CPT | Performed by: INTERNAL MEDICINE

## 2024-12-09 PROCEDURE — 93000 ELECTROCARDIOGRAM COMPLETE: CPT | Performed by: INTERNAL MEDICINE

## 2024-12-09 PROCEDURE — 3080F DIAST BP >= 90 MM HG: CPT | Performed by: INTERNAL MEDICINE

## 2024-12-09 PROCEDURE — 3077F SYST BP >= 140 MM HG: CPT | Performed by: INTERNAL MEDICINE

## 2024-12-09 PROCEDURE — 1159F MED LIST DOCD IN RCRD: CPT | Performed by: INTERNAL MEDICINE

## 2024-12-09 PROCEDURE — 1123F ACP DISCUSS/DSCN MKR DOCD: CPT | Performed by: INTERNAL MEDICINE

## 2024-12-09 RX ORDER — BUMETANIDE 2 MG/1
2 TABLET ORAL DAILY
Qty: 30 TABLET | Refills: 3 | Status: SHIPPED | OUTPATIENT
Start: 2024-12-09

## 2024-12-09 RX ORDER — BLOOD PRESSURE TEST KIT-WRIST
1 KIT MISCELLANEOUS DAILY
Qty: 1 KIT | Refills: 0 | Status: SHIPPED | OUTPATIENT
Start: 2024-12-09

## 2024-12-09 ASSESSMENT — ENCOUNTER SYMPTOMS
EYES NEGATIVE: 1
SHORTNESS OF BREATH: 1
GASTROINTESTINAL NEGATIVE: 1

## 2024-12-09 NOTE — PROGRESS NOTES
1. Have you been to the ER, urgent care clinic since your last visit?  Hospitalized since your last visit?     no      2.  Where do you normally have your labs drawn?       3. Do you need any refills today?   no    4. Which local pharmacy do you use (enter pharmacy)?   [facility]    5. Which mail order pharmacy do you use (enter pharmacy)?        6. Are you here for surgical clearance and if so who will be doing your     procedure/surgery (care team)?   no      
hydrALAZINE (APRESOLINE) 100 MG tablet TAKE 1 TABLET BY MOUTH THREE TIMES DAILY 270 tablet 1    trospium (SANCTURA) 20 MG tablet TAKE 1 TABLET BY MOUTH EVERY DAY 90 tablet 1    sennosides-docusate sodium (SENOKOT-S) 8.6-50 MG tablet Take 2 tablets by mouth daily 60 tablet 2    Multiple Vitamins-Minerals (THERAPEUTIC MULTIVITAMIN-MINERALS) tablet Take 1 tablet by mouth daily      acetaminophen (TYLENOL) 500 MG tablet Take by mouth every 6 hours as needed      aspirin 81 MG chewable tablet Take 1 tablet by mouth daily as needed      carvedilol (COREG) 25 MG tablet Take 1 tablet by mouth 2 times daily (with meals)       No current facility-administered medications for this visit.        Past Surgical History:   Procedure Laterality Date    CARDIAC CATHETERIZATION  7/30/10    COLONOSCOPY N/A 9/18/2019    COLONOSCOPY performed by Óscar Laureano MD at [facility] ENDOSCOPY    COLONOSCOPY FLX DX W/COLLJ SPEC WHEN PFRMD      I & D ABSC XTRAORAL SOFT TISS COMP      OTHER SURGICAL HISTORY  06/27/13    Prostate    TONSILLECTOMY         Vitals:    12/09/24 1048 12/09/24 1100   BP: (!) 172/93 (!) 163/94   Pulse: 92 91   SpO2: 98%    Weight: 116.1 kg (256 lb)    Height: 1.803 m (5' 11\")           Diagnostic Studies:  I have reviewed the relevant tests done on the patient and show as follows  EKG tracings reviewed by me today.      Mr. Levy has a reminder for a \"due or due soon\" health maintenance. I have asked that he contact his primary care provider for follow-up on this health maintenance.  Diagnostic Studies:  I have reviewed the relevant tests done on the patient and show as follows  EKG tracings reviewed by me today.      8/18 Card Cath/PCI  Conclusion           Three-vessel coronary disease with 50% mid right coronary stenosis, 70% ostial second diagonal stenosis and 90% mid circumflex stenosis  Circumflex appears to be the culprit vessel for present non-STEMI  Successful coronary intervention of mid circumflex deploying a

## 2024-12-11 ENCOUNTER — OFFICE VISIT (OUTPATIENT)
Facility: CLINIC | Age: 69
End: 2024-12-11

## 2024-12-11 VITALS
HEIGHT: 71 IN | WEIGHT: 257.2 LBS | HEART RATE: 76 BPM | OXYGEN SATURATION: 97 % | DIASTOLIC BLOOD PRESSURE: 60 MMHG | BODY MASS INDEX: 36.01 KG/M2 | SYSTOLIC BLOOD PRESSURE: 143 MMHG

## 2024-12-11 DIAGNOSIS — Z91.81 AT HIGH RISK FOR FALLS: ICD-10-CM

## 2024-12-11 DIAGNOSIS — I50.42 CHRONIC COMBINED SYSTOLIC (CONGESTIVE) AND DIASTOLIC (CONGESTIVE) HEART FAILURE (HCC): Primary | ICD-10-CM

## 2024-12-11 DIAGNOSIS — N18.32 CHRONIC KIDNEY DISEASE, STAGE 3B (HCC): ICD-10-CM

## 2024-12-11 DIAGNOSIS — M79.89 LEG SWELLING: ICD-10-CM

## 2024-12-11 DIAGNOSIS — R06.02 SHORTNESS OF BREATH: ICD-10-CM

## 2024-12-11 DIAGNOSIS — R05.3 CHRONIC COUGH: ICD-10-CM

## 2024-12-11 DIAGNOSIS — I10 ESSENTIAL (PRIMARY) HYPERTENSION: ICD-10-CM

## 2024-12-11 PROCEDURE — 3077F SYST BP >= 140 MM HG: CPT | Performed by: STUDENT IN AN ORGANIZED HEALTH CARE EDUCATION/TRAINING PROGRAM

## 2024-12-11 PROCEDURE — 99214 OFFICE O/P EST MOD 30 MIN: CPT | Performed by: STUDENT IN AN ORGANIZED HEALTH CARE EDUCATION/TRAINING PROGRAM

## 2024-12-11 PROCEDURE — 1126F AMNT PAIN NOTED NONE PRSNT: CPT | Performed by: STUDENT IN AN ORGANIZED HEALTH CARE EDUCATION/TRAINING PROGRAM

## 2024-12-11 PROCEDURE — 1123F ACP DISCUSS/DSCN MKR DOCD: CPT | Performed by: STUDENT IN AN ORGANIZED HEALTH CARE EDUCATION/TRAINING PROGRAM

## 2024-12-11 PROCEDURE — 3078F DIAST BP <80 MM HG: CPT | Performed by: STUDENT IN AN ORGANIZED HEALTH CARE EDUCATION/TRAINING PROGRAM

## 2024-12-11 SDOH — ECONOMIC STABILITY: FOOD INSECURITY: WITHIN THE PAST 12 MONTHS, YOU WORRIED THAT YOUR FOOD WOULD RUN OUT BEFORE YOU GOT MONEY TO BUY MORE.: NEVER TRUE

## 2024-12-11 SDOH — ECONOMIC STABILITY: FOOD INSECURITY: WITHIN THE PAST 12 MONTHS, THE FOOD YOU BOUGHT JUST DIDN'T LAST AND YOU DIDN'T HAVE MONEY TO GET MORE.: NEVER TRUE

## 2024-12-11 SDOH — ECONOMIC STABILITY: INCOME INSECURITY: HOW HARD IS IT FOR YOU TO PAY FOR THE VERY BASICS LIKE FOOD, HOUSING, MEDICAL CARE, AND HEATING?: NOT HARD AT ALL

## 2024-12-11 NOTE — PROGRESS NOTES
Wilmer Levy is a 70 y.o. male presenting today for Cough (PT presents of coughing and wheezing for a month. )  .     Chief Complaint   Patient presents with    Cough     PT presents of coughing and wheezing for a month.          HPI:  Wilmer Levy presents to the office today for follow up.     Patient has a past medical history of CAD s/p stent, CHF, HTN, HLD, gout, OA.    Patient is complaining of coughing and wheezing over the last month.  He denies any fever, chills.  Reports intermittent shortness of breath.  Patient had also been complaining of worsening lower extremity swelling.  He saw cardiology on 12/9/2024-patient was noted to be fluid overloaded.  Lasix was discontinued and he was switched over to Bumex.  Jardiance 10 mg daily was started.    He presented to the ED due to lightheadedness on 8/28/2024.  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  Patient saw cardiology and underwent a stress test -which was abnormal showing a moderate risk of cardiac events with perfusion defect of lateral wall, small to medium in size that is partially reversible.  He was started on Zetia.  He was also started on Jardiance for CHF and CAD.      Patient was admitted to the hospital in 12/23 with NSTEMI.  He underwent PCI -s/p PCI x2 to RCA and x1 to LCx. Postcardiac cath, his creatinine increased to 2.5 from 1.8 at baseline due to contrast induced injury.  He was advised to follow-up with nephrology as outpatient.  He was discharged on ASA, Plavix and statin.  Lasix 40 mg was changed to as needed for edema/weight gain.        CAD/CHF: Patient follows with cardiology-Dr. Culp.    Echo in 12/21 showed LVEF 50%.  He presented with an NSTEMI in 12/23-underwent PCI.  Patient saw cardiology on 12/9/2024-he had worsening dyspnea on exertion and fluid retention.  He was switched to Bumex and Lasix discontinued.  Jardiance was started.  Discussed with patient that if he has increasing 
08/01/2024    COVID-19 Vaccine (5 - 2023-24 season) 09/01/2024    Colorectal Cancer Screen  09/18/2024        - LIZBET HANKINS        Click Here for Release of Records Request

## 2024-12-13 ENCOUNTER — HOSPITAL ENCOUNTER (OUTPATIENT)
Facility: HOSPITAL | Age: 69
Discharge: HOME | End: 2024-12-16
Payer: MEDICARE

## 2024-12-13 DIAGNOSIS — R06.02 SHORTNESS OF BREATH: ICD-10-CM

## 2024-12-13 PROCEDURE — 71046 X-RAY EXAM CHEST 2 VIEWS: CPT

## 2024-12-15 ASSESSMENT — SLEEP AND FATIGUE QUESTIONNAIRES
HOW LIKELY ARE YOU TO NOD OFF OR FALL ASLEEP WHEN YOU ARE A PASSENGER IN A CAR FOR AN HOUR WITHOUT A BREAK: SLIGHT CHANCE OF DOZING
HOW LIKELY ARE YOU TO NOD OFF OR FALL ASLEEP WHILE LYING DOWN TO REST IN THE AFTERNOON WHEN CIRCUMSTANCES PERMIT: HIGH CHANCE OF DOZING
HOW LIKELY ARE YOU TO NOD OFF OR FALL ASLEEP WHILE SITTING AND TALKING TO SOMEONE: SLIGHT CHANCE OF DOZING
HOW LIKELY ARE YOU TO NOD OFF OR FALL ASLEEP WHILE SITTING QUIETLY AFTER LUNCH WITHOUT ALCOHOL: SLIGHT CHANCE OF DOZING
HOW LIKELY ARE YOU TO NOD OFF OR FALL ASLEEP WHILE SITTING QUIETLY AFTER LUNCH WITHOUT ALCOHOL: SLIGHT CHANCE OF DOZING
HOW LIKELY ARE YOU TO NOD OFF OR FALL ASLEEP WHILE SITTING AND READING: MODERATE CHANCE OF DOZING
HOW LIKELY ARE YOU TO NOD OFF OR FALL ASLEEP IN A CAR, WHILE STOPPED FOR A FEW MINUTES IN TRAFFIC: SLIGHT CHANCE OF DOZING
HOW LIKELY ARE YOU TO NOD OFF OR FALL ASLEEP WHILE SITTING INACTIVE IN A PUBLIC PLACE: MODERATE CHANCE OF DOZING
HOW LIKELY ARE YOU TO NOD OFF OR FALL ASLEEP WHILE SITTING AND TALKING TO SOMEONE: SLIGHT CHANCE OF DOZING
HOW LIKELY ARE YOU TO NOD OFF OR FALL ASLEEP WHILE WATCHING TV: HIGH CHANCE OF DOZING
HOW LIKELY ARE YOU TO NOD OFF OR FALL ASLEEP WHILE SITTING AND READING: MODERATE CHANCE OF DOZING
HOW LIKELY ARE YOU TO NOD OFF OR FALL ASLEEP WHILE SITTING INACTIVE IN A PUBLIC PLACE: MODERATE CHANCE OF DOZING
HOW LIKELY ARE YOU TO NOD OFF OR FALL ASLEEP WHILE LYING DOWN TO REST IN THE AFTERNOON WHEN CIRCUMSTANCES PERMIT: HIGH CHANCE OF DOZING
HOW LIKELY ARE YOU TO NOD OFF OR FALL ASLEEP WHEN YOU ARE A PASSENGER IN A CAR FOR AN HOUR WITHOUT A BREAK: SLIGHT CHANCE OF DOZING
ESS TOTAL SCORE: 14
HOW LIKELY ARE YOU TO NOD OFF OR FALL ASLEEP WHILE WATCHING TV: HIGH CHANCE OF DOZING
HOW LIKELY ARE YOU TO NOD OFF OR FALL ASLEEP IN A CAR, WHILE STOPPED FOR A FEW MINUTES IN TRAFFIC: SLIGHT CHANCE OF DOZING

## 2024-12-16 ENCOUNTER — OFFICE VISIT (OUTPATIENT)
Age: 69
End: 2024-12-16
Payer: MEDICARE

## 2024-12-16 ENCOUNTER — TELEPHONE (OUTPATIENT)
Dept: SLEEP MEDICINE | Facility: HOSPITAL | Age: 69
End: 2024-12-16

## 2024-12-16 VITALS
HEIGHT: 71 IN | DIASTOLIC BLOOD PRESSURE: 76 MMHG | TEMPERATURE: 97 F | HEART RATE: 55 BPM | BODY MASS INDEX: 35.98 KG/M2 | WEIGHT: 257 LBS | SYSTOLIC BLOOD PRESSURE: 144 MMHG | RESPIRATION RATE: 16 BRPM | OXYGEN SATURATION: 98 %

## 2024-12-16 DIAGNOSIS — G47.00 INSOMNIA W/ SLEEP APNEA: ICD-10-CM

## 2024-12-16 DIAGNOSIS — G47.39 COMPLEX SLEEP APNEA SYNDROME: Primary | ICD-10-CM

## 2024-12-16 DIAGNOSIS — I10 ESSENTIAL HYPERTENSION: ICD-10-CM

## 2024-12-16 DIAGNOSIS — I51.9 LEFT VENTRICULAR DIASTOLIC DYSFUNCTION: ICD-10-CM

## 2024-12-16 DIAGNOSIS — G47.30 INSOMNIA W/ SLEEP APNEA: ICD-10-CM

## 2024-12-16 DIAGNOSIS — R35.1 NOCTURIA: ICD-10-CM

## 2024-12-16 DIAGNOSIS — G47.19 EXCESSIVE DAYTIME SLEEPINESS: ICD-10-CM

## 2024-12-16 DIAGNOSIS — I50.33 ACUTE ON CHRONIC DIASTOLIC CONGESTIVE HEART FAILURE (HCC): ICD-10-CM

## 2024-12-16 PROCEDURE — 1123F ACP DISCUSS/DSCN MKR DOCD: CPT | Performed by: OTOLARYNGOLOGY

## 2024-12-16 PROCEDURE — 3078F DIAST BP <80 MM HG: CPT | Performed by: OTOLARYNGOLOGY

## 2024-12-16 PROCEDURE — 99214 OFFICE O/P EST MOD 30 MIN: CPT | Performed by: OTOLARYNGOLOGY

## 2024-12-16 PROCEDURE — 1159F MED LIST DOCD IN RCRD: CPT | Performed by: OTOLARYNGOLOGY

## 2024-12-16 PROCEDURE — 3077F SYST BP >= 140 MM HG: CPT | Performed by: OTOLARYNGOLOGY

## 2024-12-16 PROCEDURE — 1160F RVW MEDS BY RX/DR IN RCRD: CPT | Performed by: OTOLARYNGOLOGY

## 2024-12-16 RX ORDER — KETOCONAZOLE 20 MG/G
CREAM TOPICAL
COMMUNITY
Start: 2024-10-09

## 2024-12-16 ASSESSMENT — PATIENT HEALTH QUESTIONNAIRE - PHQ9
SUM OF ALL RESPONSES TO PHQ QUESTIONS 1-9: 1
SUM OF ALL RESPONSES TO PHQ QUESTIONS 1-9: 1
1. LITTLE INTEREST OR PLEASURE IN DOING THINGS: NOT AT ALL
SUM OF ALL RESPONSES TO PHQ9 QUESTIONS 1 & 2: 1
2. FEELING DOWN, DEPRESSED OR HOPELESS: SEVERAL DAYS
SUM OF ALL RESPONSES TO PHQ QUESTIONS 1-9: 1
SUM OF ALL RESPONSES TO PHQ QUESTIONS 1-9: 1

## 2024-12-16 NOTE — ASSESSMENT & PLAN NOTE
Chronic, not at goal (unstable), continue current treatment plan, history of severe obstructive sleep apnea and mild central sleep apnea with an AHI of 40 and a central apnea index of 12.8.  Also had elevated periodic limb movements of sleep but associated arousal index not elevated.  Got his current CPAP machine November 15, 2024 and uses intermittent at best in terms of even days used.  Clearly not getting maximal medical benefit due to occasional use as we discussed today.

## 2024-12-16 NOTE — ASSESSMENT & PLAN NOTE
Monitored by specialist- no acute findings meriting change in the plan, saw cardiology in follow-up December 9, 2024.

## 2024-12-16 NOTE — PATIENT INSTRUCTIONS
Please make a follow up appointment to discuss the results of your sleep study. If this is impossible for some reason, please send me a \"My Chart\" message so that I may get back with you in a timely manner.    The [facility] Lab is located in the [employer] [facility], adjacent to [facility]. The lab is on the second floor. The direct number to call for sleep study related questions is: [phone].    Please call our clinic back at [phone] or send a message on [employer] if you have any questions or concerns or if you are experiencing any of the following:     You have not received a follow up appointment within 30 days prior the recommended follow up time.    If you are not tolerating treatment plan and/or not able to obtain equipment or prescribed medication(s).  if you are experiencing any difficulties with the Durable Medical Equipment  (DME) Company you may be using or is assigned to you.  Two weeks have passed and you have not received an appointment for a scheduled procedure.  Two weeks have passed since you underwent a test and/or procedure and you have not received your results.     If you are using a CPAP/BIPAP, or Home Ventilator Device- Please note the following.  Currently, many DMEs are experiencing supply chain difficulties and orders for equipment may be back logged several weeks.     Your  Durable Medical Equipment (DME ) company is supposed to provide you with replacement filters, tubing and masks. You can either call your DME when you need new supplies or you can arrange for an automatic shipment schedule.    Your need to be seen by our office at lat minimum of every 12 months in order to renew the prescription for these supplies.   Please make note of who your DME company is and their phone number.   Please make sure that you clean your mask and hosing on a regular basis.  Your DME can provide you with additional information regarding proper care and cleaning of your

## 2024-12-16 NOTE — PROGRESS NOTES
colon screening. no    Medication list has been updated according to patient.  
Atypical chest pain    Prediabetes    Acute pulmonary embolism (HCC)    HLP (hyperkeratosis lenticularis perstans)    Hypertrophy of prostate without urinary obstruction and other lower urinary tract symptoms (LUTS)    Frequency    Trauma    Unsheltered homelessness    Nocturia    Excessive daytime sleepiness    NSTEMI (non-ST elevated myocardial infarction) (HCC)    Complex sleep apnea syndrome    Periodic limb movement sleep disorder    PVC's (premature ventricular contractions)    Pure hypercholesterolemia    Obesity (BMI 30.0-34.9)       Past Medical History:   Diagnosis Date    Abnormal myocardial perfusion study 09/05/08    Basal inferior defect c/w artifact; EF 63%    Atypical chest pain 4/12/2011    BPH without obstruction/lower urinary tract symptoms     Bursitis of right shoulder     CAD (coronary artery disease)     Chest pain     history of hospitalization with chest pain and a negative workup in 2008    Chronic edema     Constipation     die to medication    Coronary atherosclerosis of native coronary artery     mild, non obstructive/EF 65%    Depression 12/16/2018    Dyspnea on exertion     Echo diplacusis 12/16/08    IVC dilated; suboptimal endocardial border; EF 65%    ED (erectile dysfunction)     Elevated PSA     Essential hypertension, benign     Frequency     GERD (gastroesophageal reflux disease)     Gout     H/O cystoscopy 06/20/2013    Heart attack (HCC)     Hematuria, unspecified     Hypercholesterolemia     Hypertension     Hypogonadism male     Impotence of organic origin     Left ventricular diastolic dysfunction     Malignant neoplasm of prostate (HCC)     hx of t1a, joleen 6, 5 % of 1  core    Morbid obesity     Overactive bladder     Prostate cancer (HCC) 2/9/2017    S/P cardiac cath 07/30/10    oD2-40%; pRCA-20-30%; EF 65%    S/P gastric surgery 8/28/2018    Sleep apnea     Slowing of urinary stream     Type 2 diabetes with nephropathy ([facility]) 9/27/2018    Type II or unspecified type

## 2024-12-19 DIAGNOSIS — K21.9 GASTROESOPHAGEAL REFLUX DISEASE WITHOUT ESOPHAGITIS: ICD-10-CM

## 2024-12-19 DIAGNOSIS — E78.2 MIXED HYPERLIPIDEMIA: ICD-10-CM

## 2024-12-19 DIAGNOSIS — I50.42 CHRONIC COMBINED SYSTOLIC (CONGESTIVE) AND DIASTOLIC (CONGESTIVE) HEART FAILURE (HCC): ICD-10-CM

## 2024-12-19 RX ORDER — OMEPRAZOLE 40 MG/1
CAPSULE, DELAYED RELEASE ORAL DAILY
Qty: 90 CAPSULE | Refills: 1 | Status: SHIPPED | OUTPATIENT
Start: 2024-12-19

## 2024-12-19 RX ORDER — CLOPIDOGREL BISULFATE 75 MG/1
75 TABLET ORAL DAILY
Qty: 90 TABLET | Refills: 1 | Status: SHIPPED | OUTPATIENT
Start: 2024-12-19

## 2024-12-19 RX ORDER — ATORVASTATIN CALCIUM 80 MG/1
TABLET, FILM COATED ORAL
Qty: 90 TABLET | Refills: 1 | Status: SHIPPED | OUTPATIENT
Start: 2024-12-19

## 2024-12-27 ENCOUNTER — HOSPITAL ENCOUNTER (OUTPATIENT)
Dept: SLEEP MEDICINE | Facility: HOSPITAL | Age: 69
Discharge: HOME | End: 2024-12-30
Attending: OTOLARYNGOLOGY
Payer: MEDICARE

## 2024-12-27 DIAGNOSIS — G47.39 COMPLEX SLEEP APNEA SYNDROME: ICD-10-CM

## 2024-12-27 PROCEDURE — 95811 POLYSOM 6/>YRS CPAP 4/> PARM: CPT

## 2024-12-28 VITALS
DIASTOLIC BLOOD PRESSURE: 69 MMHG | SYSTOLIC BLOOD PRESSURE: 177 MMHG | HEART RATE: 42 BPM | HEIGHT: 71 IN | WEIGHT: 249.13 LBS | BODY MASS INDEX: 34.88 KG/M2

## 2024-12-28 ASSESSMENT — SLEEP AND FATIGUE QUESTIONNAIRES
HOW LIKELY ARE YOU TO NOD OFF OR FALL ASLEEP WHILE WATCHING TV: SLIGHT CHANCE OF DOZING
HOW LIKELY ARE YOU TO NOD OFF OR FALL ASLEEP WHILE SITTING AND TALKING TO SOMEONE: MODERATE CHANCE OF DOZING
ESS TOTAL SCORE: 18
HOW LIKELY ARE YOU TO NOD OFF OR FALL ASLEEP IN A CAR, WHILE STOPPED FOR A FEW MINUTES IN TRAFFIC: HIGH CHANCE OF DOZING
HOW LIKELY ARE YOU TO NOD OFF OR FALL ASLEEP WHILE SITTING QUIETLY AFTER LUNCH WITHOUT ALCOHOL: MODERATE CHANCE OF DOZING
HOW LIKELY ARE YOU TO NOD OFF OR FALL ASLEEP WHILE SITTING INACTIVE IN A PUBLIC PLACE: HIGH CHANCE OF DOZING
HOW LIKELY ARE YOU TO NOD OFF OR FALL ASLEEP WHILE SITTING AND READING: MODERATE CHANCE OF DOZING
HOW LIKELY ARE YOU TO NOD OFF OR FALL ASLEEP WHILE LYING DOWN TO REST IN THE AFTERNOON WHEN CIRCUMSTANCES PERMIT: MODERATE CHANCE OF DOZING
HOW LIKELY ARE YOU TO NOD OFF OR FALL ASLEEP WHEN YOU ARE A PASSENGER IN A CAR FOR AN HOUR WITHOUT A BREAK: HIGH CHANCE OF DOZING

## 2025-01-02 NOTE — PROGRESS NOTES
Patient presented to office for flu shot. Allergies noted. Patient well and consenting to injection. Vaccine given intramuscular in right deltoid. Patient tolerated injection well and left office ambulatory. 99

## 2025-01-08 ENCOUNTER — TELEPHONE (OUTPATIENT)
Age: 70
End: 2025-01-08

## 2025-01-08 DIAGNOSIS — I50.32 CHRONIC DIASTOLIC (CONGESTIVE) HEART FAILURE (HCC): Primary | ICD-10-CM

## 2025-01-08 DIAGNOSIS — I10 ESSENTIAL HYPERTENSION: ICD-10-CM

## 2025-01-08 NOTE — TELEPHONE ENCOUNTER
Patient made aware of Dr. Culp's recommendation below.    Get a BMP  If edema too bad, to emergency room or see his kidney doctor       Patient plans to complete lab on 1/10/25 at [facility]

## 2025-01-08 NOTE — TELEPHONE ENCOUNTER
Not responding to diuretic    Received call from patient regarding edema in bilateral lower extremities     Patient reports on last visit 12/9/24 lasix changed to bumetanide 2mg. Patient states edema has become worse sine last seen. He feels like he has gained weight. However, unable to obtain weight due to not having a home scale. Likewise, with obtaining BP as per Dr. Culp's order due to recent med change he requested patient log BP x 3-5 days post med changes.     Patient reports he's voiding regularly 3-5 times daily, having normal BM. He reports having D.O.E ~ stable.  Denies cough, falls, vertigo, cp/palp, discoloration in nail bed (blue, purple), pain, muscle cramps, coolness.    Patient does not include salt in diet, refrains from eating pasturized meat.    He has compression stockings however, just not able to put on due to the amount of swelling. Advise patient to continue to elevate legs.     Side note: Patient plans to order home scale and BP monitor through [employer].

## 2025-01-10 ENCOUNTER — APPOINTMENT (OUTPATIENT)
Facility: HOSPITAL | Age: 70
End: 2025-01-10
Payer: MEDICARE

## 2025-01-10 ENCOUNTER — HOSPITAL ENCOUNTER (OUTPATIENT)
Facility: HOSPITAL | Age: 70
Setting detail: SPECIMEN
Discharge: HOME | End: 2025-01-13

## 2025-01-10 ENCOUNTER — HOSPITAL ENCOUNTER (EMERGENCY)
Facility: HOSPITAL | Age: 70
Discharge: HOME | End: 2025-01-10
Payer: MEDICARE

## 2025-01-10 VITALS
TEMPERATURE: 98 F | SYSTOLIC BLOOD PRESSURE: 138 MMHG | HEIGHT: 71 IN | RESPIRATION RATE: 16 BRPM | OXYGEN SATURATION: 98 % | BODY MASS INDEX: 34.72 KG/M2 | WEIGHT: 248 LBS | HEART RATE: 99 BPM | DIASTOLIC BLOOD PRESSURE: 88 MMHG

## 2025-01-10 DIAGNOSIS — E87.6 HYPOKALEMIA: Primary | ICD-10-CM

## 2025-01-10 DIAGNOSIS — I50.9 ACUTE ON CHRONIC CONGESTIVE HEART FAILURE, UNSPECIFIED HEART FAILURE TYPE (HCC): ICD-10-CM

## 2025-01-10 LAB
ALBUMIN SERPL-MCNC: 3.4 G/DL (ref 3.4–5)
ALBUMIN/GLOB SERPL: 1.1 (ref 0.8–1.7)
ALP SERPL-CCNC: 91 U/L (ref 45–117)
ALT SERPL-CCNC: 14 U/L (ref 16–61)
ANION GAP SERPL CALC-SCNC: 5 MMOL/L (ref 3–18)
AST SERPL-CCNC: 11 U/L (ref 10–38)
BASOPHILS # BLD: 0.03 K/UL (ref 0–0.1)
BASOPHILS NFR BLD: 0.5 % (ref 0–2)
BILIRUB SERPL-MCNC: 0.4 MG/DL (ref 0.2–1)
BUN SERPL-MCNC: 35 MG/DL (ref 7–18)
BUN/CREAT SERPL: 15 (ref 12–20)
CALCIUM SERPL-MCNC: 8.7 MG/DL (ref 8.5–10.1)
CHLORIDE SERPL-SCNC: 112 MMOL/L (ref 100–111)
CO2 SERPL-SCNC: 27 MMOL/L (ref 21–32)
CREAT SERPL-MCNC: 2.37 MG/DL (ref 0.6–1.3)
DIFFERENTIAL METHOD BLD: ABNORMAL
EOSINOPHIL # BLD: 0.09 K/UL (ref 0–0.4)
EOSINOPHIL NFR BLD: 1.5 % (ref 0–5)
ERYTHROCYTE [DISTWIDTH] IN BLOOD BY AUTOMATED COUNT: 16.4 % (ref 11.6–14.5)
GLOBULIN SER CALC-MCNC: 3.1 G/DL (ref 2–4)
GLUCOSE SERPL-MCNC: 168 MG/DL (ref 74–99)
HCT VFR BLD AUTO: 30 % (ref 36–48)
HGB BLD-MCNC: 8.6 G/DL (ref 13–16)
IMM GRANULOCYTES # BLD AUTO: 0.01 K/UL (ref 0–0.04)
IMM GRANULOCYTES NFR BLD AUTO: 0.2 % (ref 0–0.5)
LYMPHOCYTES # BLD: 1.27 K/UL (ref 0.9–3.6)
LYMPHOCYTES NFR BLD: 21.1 % (ref 21–52)
MAGNESIUM SERPL-MCNC: 2.4 MG/DL (ref 1.6–2.6)
MCH RBC QN AUTO: 23.4 PG (ref 24–34)
MCHC RBC AUTO-ENTMCNC: 28.7 G/DL (ref 31–37)
MCV RBC AUTO: 81.5 FL (ref 78–100)
MONOCYTES # BLD: 0.38 K/UL (ref 0.05–1.2)
MONOCYTES NFR BLD: 6.3 % (ref 3–10)
NEUTS SEG # BLD: 4.22 K/UL (ref 1.8–8)
NEUTS SEG NFR BLD: 70.4 % (ref 40–73)
NRBC # BLD: 0 K/UL (ref 0–0.01)
NRBC BLD-RTO: 0 PER 100 WBC
NT PRO BNP: 2042 PG/ML (ref 0–900)
PLATELET # BLD AUTO: 194 K/UL (ref 135–420)
PLATELET COMMENT: ABNORMAL
PMV BLD AUTO: 9.6 FL (ref 9.2–11.8)
POTASSIUM SERPL-SCNC: 3.1 MMOL/L (ref 3.5–5.5)
PROT SERPL-MCNC: 6.5 G/DL (ref 6.4–8.2)
RBC # BLD AUTO: 3.68 M/UL (ref 4.35–5.65)
RBC MORPH BLD: ABNORMAL
RBC MORPH BLD: ABNORMAL
SENTARA SPECIMEN COLLECTION: NORMAL
SODIUM SERPL-SCNC: 144 MMOL/L (ref 136–145)
TROPONIN I SERPL HS-MCNC: 57 NG/L (ref 0–78)
WBC # BLD AUTO: 6 K/UL (ref 4.6–13.2)

## 2025-01-10 PROCEDURE — 99285 EMERGENCY DEPT VISIT HI MDM: CPT

## 2025-01-10 PROCEDURE — 6360000002 HC RX W HCPCS: Performed by: HEALTH CARE PROVIDER

## 2025-01-10 PROCEDURE — 71046 X-RAY EXAM CHEST 2 VIEWS: CPT

## 2025-01-10 PROCEDURE — 96374 THER/PROPH/DIAG INJ IV PUSH: CPT

## 2025-01-10 PROCEDURE — 84484 ASSAY OF TROPONIN QUANT: CPT

## 2025-01-10 PROCEDURE — 99001 SPECIMEN HANDLING PT-LAB: CPT

## 2025-01-10 PROCEDURE — 83880 ASSAY OF NATRIURETIC PEPTIDE: CPT

## 2025-01-10 PROCEDURE — 85025 COMPLETE CBC W/AUTO DIFF WBC: CPT

## 2025-01-10 PROCEDURE — 83735 ASSAY OF MAGNESIUM: CPT

## 2025-01-10 PROCEDURE — 80053 COMPREHEN METABOLIC PANEL: CPT

## 2025-01-10 PROCEDURE — 6370000000 HC RX 637 (ALT 250 FOR IP): Performed by: HEALTH CARE PROVIDER

## 2025-01-10 PROCEDURE — 93005 ELECTROCARDIOGRAM TRACING: CPT | Performed by: EMERGENCY MEDICINE

## 2025-01-10 RX ORDER — FUROSEMIDE 10 MG/ML
40 INJECTION INTRAMUSCULAR; INTRAVENOUS ONCE
Status: COMPLETED | OUTPATIENT
Start: 2025-01-10 | End: 2025-01-10

## 2025-01-10 RX ORDER — POTASSIUM CHLORIDE 750 MG/1
20 TABLET, EXTENDED RELEASE ORAL 2 TIMES DAILY
Qty: 56 TABLET | Refills: 0 | Status: SHIPPED | OUTPATIENT
Start: 2025-01-10 | End: 2025-01-17

## 2025-01-10 RX ADMIN — POTASSIUM BICARBONATE 20 MEQ: 782 TABLET, EFFERVESCENT ORAL at 18:03

## 2025-01-10 RX ADMIN — FUROSEMIDE 40 MG: 10 INJECTION, SOLUTION INTRAMUSCULAR; INTRAVENOUS at 16:12

## 2025-01-10 RX ADMIN — POTASSIUM BICARBONATE 40 MEQ: 782 TABLET, EFFERVESCENT ORAL at 16:11

## 2025-01-10 ASSESSMENT — ENCOUNTER SYMPTOMS
DIARRHEA: 0
SHORTNESS OF BREATH: 1
COUGH: 0
WHEEZING: 0
NAUSEA: 0
VOMITING: 0
STRIDOR: 0
CHEST TIGHTNESS: 0
ABDOMINAL PAIN: 0

## 2025-01-10 ASSESSMENT — PAIN - FUNCTIONAL ASSESSMENT: PAIN_FUNCTIONAL_ASSESSMENT: NONE - DENIES PAIN

## 2025-01-10 NOTE — DISCHARGE INSTRUCTIONS
Take Bumex 2 mg twice daily (morning and after lunch) for the next 2 days    Take Potassium 40 meq daily when taking bumex twice daily. Take 20 meq daily when taking bumex once daily.    Follow-up with cardiology or ED on Monday for re-evaluation    Self-care for lower extremity edema:  Elevate your legs. Raise your legs above the level of your heart as often as you can. This will help decrease swelling and pain. Prop your legs on pillows or blankets to keep them elevated comfortably.   Wear pressure stockings These tight stockings put pressure on your legs to promote blood flow and prevent blood clots. Put them on before you get out of bed. Wear the stockings during the day. Do not wear them while you sleep.   Stay active. Do not stand or sit for long periods of time. Ask your healthcare provider about the best exercise plan for you.   Eat healthy foods. Healthy foods include fruits, vegetables, whole-grain breads, low-fat dairy products, beans, lean meats, and fish. Ask if you need to be on a special diet.   Limit sodium (salt). Salt will make your body hold even more fluid. Your primary care provider will tell you how many milligrams (mg) of salt you can have each day.    Call your local emergency number (911 in the [place]) for any of the following:  You cannot walk.  You have chest pain or trouble breathing that is worse when you lie down.  You suddenly feel lightheaded and have trouble breathing.  You have new and sudden chest pain. You may have more pain when you take deep breaths or cough.  You cough up blood.  Return to the emergency department if:  You feel faint or confused.  Your skin turns blue or gray.  Your leg feels warm, tender, and painful. It may be swollen and red.  Call your doctor if:  You have a fever or feel more tired than usual.  The veins in your legs look larger than usual. They may look full or bulging.  Your legs itch or feel heavy.  You have red or white areas or sores on your legs. The

## 2025-01-10 NOTE — ED TRIAGE NOTES
Pt arrives to ED with cc bilateral leg swelling.     Pt was prescribed diuretic x3 weeks ago. Reports his legs are swelling and was informed to go to the emergency department

## 2025-01-10 NOTE — ED PROVIDER NOTES
EMERGENCY DEPARTMENT HISTORY AND PHYSICAL EXAM        Date: 1/10/2025  Patient Name: Wilmer Levy    History of Presenting Illness     Chief Complaint   Patient presents with    Leg Swelling       History Provided By: History obtained from patient    HPI: Wilmer Levy, 70 y.o. male presents to the ED with cc of bilateral lower extremity edema x 3 weeks    Patient reports starting Bumex 2 mg daily previously taking Lasix 40 mg daily.  This medication was changed December 9 by his cardiologist, see record review.  He reports increased dyspnea on exertion and bilateral lower extremity edema since switching medications.  He is able to lay flat when sleeping at night does not have a cough.  He came by bus today.  He did take his medication this morning.    Pmh: Prostate cancer coronary artery disease with stent CHF CKD 3 pulmonary embolism    No nausea, vomiting, diarrhea, fever, chills, chest pain    There are no other complaints, changes, or physical findings at this time.    Records Reviewed:   Phone call from patient to cardiologist Dr. Culp office January 8, 2025 patient complained of bilateral lower extremity edema since starting Bumex previously on Lasix this happened December 9, patient not tracking weight states that he voids 3-5 times a day.  Endorsing dyspnea on exertion not much worse than at baseline.  Unable to wear compression stockings.    Dr Culp 12/9/24  dyspnea on exertion walking from postop to my office in [facility]. Has had 3 episodes of chest pain relieved with sublingual nitroglycerin since last visit 2 months ago. Has had edema for last few weeks which is not responding to diuretics. [Stopped lasix and started bumex.]    NM Stres test 8/30/24     Perfusion Comments: LV perfusion is probably abnormal.    Perfusion Defect: There is a mild severity left ventricular stress perfusion defect of lateral wall that is small to medium in size that is partially reversible. There is normal wall motion in the

## 2025-01-11 LAB
ANION GAP SERPL CALCULATED.3IONS-SCNC: 15 MMOL/L (ref 3–15)
BUN BLDV-MCNC: 36 MG/DL (ref 6–22)
CALCIUM SERPL-MCNC: 9.4 MG/DL (ref 8.4–10.5)
CHLORIDE BLD-SCNC: 108 MMOL/L (ref 98–110)
CO2: 25 MMOL/L (ref 20–32)
CREAT SERPL-MCNC: 2.2 MG/DL (ref 0.8–1.6)
EKG ATRIAL RATE: 103 BPM
EKG DIAGNOSIS: NORMAL
EKG P AXIS: 54 DEGREES
EKG P-R INTERVAL: 174 MS
EKG Q-T INTERVAL: 372 MS
EKG QRS DURATION: 104 MS
EKG QTC CALCULATION (BAZETT): 487 MS
EKG R AXIS: 10 DEGREES
EKG T AXIS: 181 DEGREES
EKG VENTRICULAR RATE: 103 BPM
GFR, ESTIMATED: 31.6 ML/MIN/1.73 SQ.M.
GLUCOSE: 75 MG/DL (ref 70–99)
POTASSIUM SERPL-SCNC: 3.7 MMOL/L (ref 3.5–5.5)
SODIUM BLD-SCNC: 148 MMOL/L (ref 133–145)

## 2025-01-11 PROCEDURE — 93010 ELECTROCARDIOGRAM REPORT: CPT | Performed by: INTERNAL MEDICINE

## 2025-01-13 ENCOUNTER — APPOINTMENT (OUTPATIENT)
Facility: HOSPITAL | Age: 70
End: 2025-01-13
Payer: MEDICARE

## 2025-01-13 ENCOUNTER — HOSPITAL ENCOUNTER (EMERGENCY)
Facility: HOSPITAL | Age: 70
Discharge: HOME | End: 2025-01-13
Payer: MEDICARE

## 2025-01-13 VITALS
DIASTOLIC BLOOD PRESSURE: 99 MMHG | TEMPERATURE: 98.1 F | RESPIRATION RATE: 18 BRPM | OXYGEN SATURATION: 96 % | SYSTOLIC BLOOD PRESSURE: 160 MMHG | HEART RATE: 51 BPM

## 2025-01-13 DIAGNOSIS — R60.0 BILATERAL LOWER EXTREMITY EDEMA: Primary | ICD-10-CM

## 2025-01-13 LAB
ALBUMIN SERPL-MCNC: 3.4 G/DL (ref 3.4–5)
ALBUMIN/GLOB SERPL: 1 (ref 0.8–1.7)
ALP SERPL-CCNC: 94 U/L (ref 45–117)
ALT SERPL-CCNC: 12 U/L (ref 16–61)
ANION GAP SERPL CALC-SCNC: 5 MMOL/L (ref 3–18)
AST SERPL-CCNC: 12 U/L (ref 10–38)
BASOPHILS # BLD: 0.01 K/UL (ref 0–0.1)
BASOPHILS NFR BLD: 0.2 % (ref 0–2)
BILIRUB SERPL-MCNC: 0.6 MG/DL (ref 0.2–1)
BUN SERPL-MCNC: 33 MG/DL (ref 7–18)
BUN/CREAT SERPL: 13 (ref 12–20)
CALCIUM SERPL-MCNC: 9.2 MG/DL (ref 8.5–10.1)
CHLORIDE SERPL-SCNC: 107 MMOL/L (ref 100–111)
CO2 SERPL-SCNC: 33 MMOL/L (ref 21–32)
CREAT SERPL-MCNC: 2.45 MG/DL (ref 0.6–1.3)
DIFFERENTIAL METHOD BLD: ABNORMAL
EKG ATRIAL RATE: 95 BPM
EKG DIAGNOSIS: NORMAL
EKG P AXIS: 49 DEGREES
EKG P-R INTERVAL: 178 MS
EKG Q-T INTERVAL: 380 MS
EKG QRS DURATION: 100 MS
EKG QTC CALCULATION (BAZETT): 477 MS
EKG R AXIS: 8 DEGREES
EKG T AXIS: 158 DEGREES
EKG VENTRICULAR RATE: 95 BPM
EOSINOPHIL # BLD: 0.06 K/UL (ref 0–0.4)
EOSINOPHIL NFR BLD: 1.2 % (ref 0–5)
ERYTHROCYTE [DISTWIDTH] IN BLOOD BY AUTOMATED COUNT: 16.3 % (ref 11.6–14.5)
GLOBULIN SER CALC-MCNC: 3.3 G/DL (ref 2–4)
GLUCOSE SERPL-MCNC: 92 MG/DL (ref 74–99)
HCT VFR BLD AUTO: 30.6 % (ref 36–48)
HGB BLD-MCNC: 8.9 G/DL (ref 13–16)
IMM GRANULOCYTES # BLD AUTO: 0 K/UL (ref 0–0.04)
IMM GRANULOCYTES NFR BLD AUTO: 0 % (ref 0–0.5)
LYMPHOCYTES # BLD: 1.18 K/UL (ref 0.9–3.6)
LYMPHOCYTES NFR BLD: 24.4 % (ref 21–52)
MCH RBC QN AUTO: 23.1 PG (ref 24–34)
MCHC RBC AUTO-ENTMCNC: 29.1 G/DL (ref 31–37)
MCV RBC AUTO: 79.5 FL (ref 78–100)
MONOCYTES # BLD: 0.36 K/UL (ref 0.05–1.2)
MONOCYTES NFR BLD: 7.4 % (ref 3–10)
NEUTS SEG # BLD: 3.23 K/UL (ref 1.8–8)
NEUTS SEG NFR BLD: 66.8 % (ref 40–73)
NRBC # BLD: 0 K/UL (ref 0–0.01)
NRBC BLD-RTO: 0 PER 100 WBC
NT PRO BNP: 2000 PG/ML (ref 0–900)
PLATELET # BLD AUTO: 207 K/UL (ref 135–420)
PMV BLD AUTO: 9.3 FL (ref 9.2–11.8)
POTASSIUM SERPL-SCNC: 3.2 MMOL/L (ref 3.5–5.5)
PROT SERPL-MCNC: 6.7 G/DL (ref 6.4–8.2)
RBC # BLD AUTO: 3.85 M/UL (ref 4.35–5.65)
SODIUM SERPL-SCNC: 145 MMOL/L (ref 136–145)
TROPONIN I SERPL HS-MCNC: 46 NG/L (ref 0–78)
WBC # BLD AUTO: 4.8 K/UL (ref 4.6–13.2)

## 2025-01-13 PROCEDURE — 6370000000 HC RX 637 (ALT 250 FOR IP)

## 2025-01-13 PROCEDURE — 84484 ASSAY OF TROPONIN QUANT: CPT

## 2025-01-13 PROCEDURE — 99285 EMERGENCY DEPT VISIT HI MDM: CPT

## 2025-01-13 PROCEDURE — 93010 ELECTROCARDIOGRAM REPORT: CPT | Performed by: INTERNAL MEDICINE

## 2025-01-13 PROCEDURE — 85025 COMPLETE CBC W/AUTO DIFF WBC: CPT

## 2025-01-13 PROCEDURE — 93005 ELECTROCARDIOGRAM TRACING: CPT | Performed by: STUDENT IN AN ORGANIZED HEALTH CARE EDUCATION/TRAINING PROGRAM

## 2025-01-13 PROCEDURE — 83880 ASSAY OF NATRIURETIC PEPTIDE: CPT

## 2025-01-13 PROCEDURE — 80053 COMPREHEN METABOLIC PANEL: CPT

## 2025-01-13 PROCEDURE — 96374 THER/PROPH/DIAG INJ IV PUSH: CPT

## 2025-01-13 PROCEDURE — 6360000002 HC RX W HCPCS

## 2025-01-13 PROCEDURE — 71046 X-RAY EXAM CHEST 2 VIEWS: CPT

## 2025-01-13 RX ORDER — BUMETANIDE 0.25 MG/ML
1 INJECTION, SOLUTION INTRAMUSCULAR; INTRAVENOUS
Status: COMPLETED | OUTPATIENT
Start: 2025-01-13 | End: 2025-01-13

## 2025-01-13 RX ADMIN — BUMETANIDE 1 MG: 0.25 INJECTION INTRAMUSCULAR; INTRAVENOUS at 14:36

## 2025-01-13 RX ADMIN — POTASSIUM BICARBONATE 40 MEQ: 782 TABLET, EFFERVESCENT ORAL at 14:36

## 2025-01-13 NOTE — ED TRIAGE NOTES
Patient states that has delon leg and foot swelling. Hx of CHF and ESRD. States that started 3 weeks ago and worsening.

## 2025-01-13 NOTE — ED PROVIDER NOTES
EMERGENCY DEPARTMENT HISTORY AND PHYSICAL EXAM      Patient Name: Wilmer Levy  MRN: [id number]  YOB: 1955  Provider: Tamar Sethi PA-C  PCP: Mariajose Rios MD   Time/Date of evaluation: 2:39 PM EST on 1/13/25    History of Presenting Illness     Chief Complaint   Patient presents with    Foot Swelling    Leg Swelling       History Provided By: Patient     History (Narative):   Wilmer Levy is a 70 y.o. male of coronary artery disease, chronic edema, elevated PSA, GERD, hematuria, hypercholesteremia, who presents to the emergency department  by POV C/O lower extremity swelling.  Patient endorses that he has had progressive worsening of his lower extremity swelling for the past 3 to 3 weeks.  He states that he is supposed to be taking Bumex, however had an issue with the pharmacy, and has not been taking it consistently.  He denies any new chest pain or shortness of breath.  Patient states that he follows up with Dr. Culp, however it does not have an appointment until February.    Past History     Past Medical History:  Past Medical History:   Diagnosis Date    Abnormal myocardial perfusion study 09/05/08    Basal inferior defect c/w artifact; EF 63%    Atypical chest pain 4/12/2011    BPH without obstruction/lower urinary tract symptoms     Bursitis of right shoulder     CAD (coronary artery disease)     Chest pain     history of hospitalization with chest pain and a negative workup in 2008    Chronic edema     Constipation     die to medication    Coronary atherosclerosis of native coronary artery     mild, non obstructive/EF 65%    Depression 12/16/2018    Dyspnea on exertion     Echo diplacusis 12/16/08    IVC dilated; suboptimal endocardial border; EF 65%    ED (erectile dysfunction)     Elevated PSA     Essential hypertension, benign     Frequency     GERD (gastroesophageal reflux disease)     Gout     H/O cystoscopy 06/20/2013    Heart attack (HCC)     Hematuria, unspecified

## 2025-01-14 ENCOUNTER — TELEPHONE (OUTPATIENT)
Facility: CLINIC | Age: 70
End: 2025-01-14

## 2025-01-14 DIAGNOSIS — G47.33 OSA (OBSTRUCTIVE SLEEP APNEA): Primary | ICD-10-CM

## 2025-01-14 NOTE — TELEPHONE ENCOUNTER
Patient called in for a refill for:  Prednisone    Patient states he has a really bad case of gout and needs the prednisone again. Please advise.    Pharmacy info:  [id number] [place], [place] - [facility] [address] - P [phone] - F [phone]

## 2025-01-14 NOTE — TELEPHONE ENCOUNTER
Patient was seen in the ED yesterday with bilateral lower extremity swelling.  Will have patient be scheduled for a visit.

## 2025-01-15 ENCOUNTER — CLINICAL DOCUMENTATION (OUTPATIENT)
Age: 70
End: 2025-01-15

## 2025-01-17 ENCOUNTER — OFFICE VISIT (OUTPATIENT)
Facility: CLINIC | Age: 70
End: 2025-01-17
Payer: MEDICARE

## 2025-01-17 VITALS
BODY MASS INDEX: 34.72 KG/M2 | OXYGEN SATURATION: 96 % | HEART RATE: 66 BPM | HEIGHT: 71 IN | WEIGHT: 248 LBS | DIASTOLIC BLOOD PRESSURE: 84 MMHG | SYSTOLIC BLOOD PRESSURE: 162 MMHG

## 2025-01-17 DIAGNOSIS — E87.6 HYPOKALEMIA: ICD-10-CM

## 2025-01-17 DIAGNOSIS — N18.32 CHRONIC KIDNEY DISEASE, STAGE 3B (HCC): ICD-10-CM

## 2025-01-17 DIAGNOSIS — I25.10 ATHEROSCLEROSIS OF NATIVE CORONARY ARTERY OF NATIVE HEART WITHOUT ANGINA PECTORIS: ICD-10-CM

## 2025-01-17 DIAGNOSIS — E78.2 MIXED HYPERLIPIDEMIA: ICD-10-CM

## 2025-01-17 DIAGNOSIS — R73.03 PREDIABETES: ICD-10-CM

## 2025-01-17 DIAGNOSIS — I10 ESSENTIAL (PRIMARY) HYPERTENSION: ICD-10-CM

## 2025-01-17 DIAGNOSIS — R60.0 LOCALIZED EDEMA: ICD-10-CM

## 2025-01-17 DIAGNOSIS — I50.42 CHRONIC COMBINED SYSTOLIC (CONGESTIVE) AND DIASTOLIC (CONGESTIVE) HEART FAILURE (HCC): Primary | ICD-10-CM

## 2025-01-17 DIAGNOSIS — Z95.5 HISTORY OF CORONARY ARTERY STENT PLACEMENT: ICD-10-CM

## 2025-01-17 PROCEDURE — 1160F RVW MEDS BY RX/DR IN RCRD: CPT | Performed by: STUDENT IN AN ORGANIZED HEALTH CARE EDUCATION/TRAINING PROGRAM

## 2025-01-17 PROCEDURE — 3079F DIAST BP 80-89 MM HG: CPT | Performed by: STUDENT IN AN ORGANIZED HEALTH CARE EDUCATION/TRAINING PROGRAM

## 2025-01-17 PROCEDURE — 3077F SYST BP >= 140 MM HG: CPT | Performed by: STUDENT IN AN ORGANIZED HEALTH CARE EDUCATION/TRAINING PROGRAM

## 2025-01-17 PROCEDURE — 99214 OFFICE O/P EST MOD 30 MIN: CPT | Performed by: STUDENT IN AN ORGANIZED HEALTH CARE EDUCATION/TRAINING PROGRAM

## 2025-01-17 PROCEDURE — 1159F MED LIST DOCD IN RCRD: CPT | Performed by: STUDENT IN AN ORGANIZED HEALTH CARE EDUCATION/TRAINING PROGRAM

## 2025-01-17 PROCEDURE — 1123F ACP DISCUSS/DSCN MKR DOCD: CPT | Performed by: STUDENT IN AN ORGANIZED HEALTH CARE EDUCATION/TRAINING PROGRAM

## 2025-01-17 RX ORDER — CARVEDILOL 12.5 MG/1
12.5 TABLET ORAL 2 TIMES DAILY
Qty: 60 TABLET | Refills: 2 | Status: SHIPPED | OUTPATIENT
Start: 2025-01-17

## 2025-01-17 RX ORDER — POTASSIUM CHLORIDE 1500 MG/1
TABLET, EXTENDED RELEASE ORAL
Qty: 90 TABLET | Refills: 0 | Status: SHIPPED | OUTPATIENT
Start: 2025-01-17

## 2025-01-17 SDOH — ECONOMIC STABILITY: FOOD INSECURITY: WITHIN THE PAST 12 MONTHS, THE FOOD YOU BOUGHT JUST DIDN'T LAST AND YOU DIDN'T HAVE MONEY TO GET MORE.: NEVER TRUE

## 2025-01-17 SDOH — ECONOMIC STABILITY: FOOD INSECURITY: WITHIN THE PAST 12 MONTHS, YOU WORRIED THAT YOUR FOOD WOULD RUN OUT BEFORE YOU GOT MONEY TO BUY MORE.: NEVER TRUE

## 2025-01-17 ASSESSMENT — ENCOUNTER SYMPTOMS
SHORTNESS OF BREATH: 1
CONSTIPATION: 1
NAUSEA: 0
BACK PAIN: 1
RHINORRHEA: 0
VOMITING: 0
CHEST TIGHTNESS: 0
ABDOMINAL PAIN: 0
DIARRHEA: 0

## 2025-01-17 ASSESSMENT — PATIENT HEALTH QUESTIONNAIRE - PHQ9
SUM OF ALL RESPONSES TO PHQ QUESTIONS 1-9: 3
SUM OF ALL RESPONSES TO PHQ QUESTIONS 1-9: 3
2. FEELING DOWN, DEPRESSED OR HOPELESS: NEARLY EVERY DAY
SUM OF ALL RESPONSES TO PHQ9 QUESTIONS 1 & 2: 3
1. LITTLE INTEREST OR PLEASURE IN DOING THINGS: NOT AT ALL
SUM OF ALL RESPONSES TO PHQ QUESTIONS 1-9: 3
SUM OF ALL RESPONSES TO PHQ QUESTIONS 1-9: 3

## 2025-01-17 NOTE — PROGRESS NOTES
of native coronary artery of native heart without angina pectoris        5. History of coronary artery stent placement        6. Chronic kidney disease, stage 3b (HCC)        7. Localized edema        8. Hypokalemia  potassium chloride (KLOR-CON M) 20 MEQ extended release tablet          ED notes and labs reviewed    HTN: Continue amlodipine, hydralazine, Imdur.  Resume on Coreg.     CAD/CHF: Recently admitted for NSTEMI and underwent PCI.  Following with cardiology . Continue aspirin, Plavix and statin.  Continue Jardiance  Patient noted to have significant LE swelling.  Counseled on salt restriction, fluid restriction.  Advised to schedule follow-up appointment with cardiology.  Advised to take Bumex 2 mg twice daily for 5 days followed by once daily.     CKD3: Following with nephrology.  He is no longer taking NSAIDs.    Osteoarthritis: Continue Tylenol as needed.    GERD: Continue Prilosec    HLD: Continue Lipitor and Zetia.    Gout: Continue allopurinol.  No recent gout attack.    History of prostate Ca: Following with urology.  MRI pelvis showed PI-RADS 2 lesion which was low risk.  He is undergoing surveillance.    Patient has follow-up scheduled for 3/11/2025      Return if symptoms worsen or fail to improve.     I asked the patient if he  had any questions and answered his  questions.  The patient stated that he understands the treatment plan and agrees with the treatment plan    This document was created with a voice activated dictation system and may contain transcription errors.     On the basis of positive falls risk screening, assessment and plan is as follows: home safety tips provided. Patient underwent PT with improvement in balance.

## 2025-01-23 ASSESSMENT — ENCOUNTER SYMPTOMS: SHORTNESS OF BREATH: 1

## 2025-02-11 DIAGNOSIS — M1A.39X0 CHRONIC GOUT DUE TO RENAL IMPAIRMENT OF MULTIPLE SITES WITHOUT TOPHUS: ICD-10-CM

## 2025-02-12 RX ORDER — ALLOPURINOL 100 MG/1
100 TABLET ORAL DAILY
Qty: 90 TABLET | Refills: 1 | Status: SHIPPED | OUTPATIENT
Start: 2025-02-12

## 2025-02-25 ENCOUNTER — TELEPHONE (OUTPATIENT)
Age: 70
End: 2025-02-25

## 2025-02-25 ENCOUNTER — TELEPHONE (OUTPATIENT)
Facility: CLINIC | Age: 70
End: 2025-02-25

## 2025-02-25 DIAGNOSIS — N32.81 OVERACTIVE BLADDER: Primary | ICD-10-CM

## 2025-02-25 RX ORDER — TROSPIUM CHLORIDE 20 MG/1
20 TABLET, FILM COATED ORAL DAILY
Qty: 90 TABLET | Refills: 1 | Status: SHIPPED | OUTPATIENT
Start: 2025-02-25

## 2025-02-25 NOTE — TELEPHONE ENCOUNTER
Pt requesting refill    Trospiumcl 20 mg      Elizabeth     [address]  [facility]    [phone]

## 2025-02-25 NOTE — TELEPHONE ENCOUNTER
Pt needs refill on Potassium chloride 20 meq, as directed, take 40 meq daily when taking bumex twice a day and only once a day when taking bumex once  a day. # 90 to Elizabeth

## 2025-02-26 ENCOUNTER — TELEPHONE (OUTPATIENT)
Age: 70
End: 2025-02-26

## 2025-02-26 DIAGNOSIS — I50.33 ACUTE ON CHRONIC DIASTOLIC CONGESTIVE HEART FAILURE (HCC): ICD-10-CM

## 2025-02-26 DIAGNOSIS — E87.6 HYPOKALEMIA: ICD-10-CM

## 2025-02-26 RX ORDER — POTASSIUM CHLORIDE 1500 MG/1
TABLET, EXTENDED RELEASE ORAL
Qty: 90 TABLET | Refills: 0 | Status: SHIPPED | OUTPATIENT
Start: 2025-02-26 | End: 2025-02-27 | Stop reason: SDUPTHER

## 2025-02-26 RX ORDER — BUMETANIDE 2 MG/1
2 TABLET ORAL DAILY
Qty: 30 TABLET | Refills: 3 | Status: SHIPPED | OUTPATIENT
Start: 2025-02-26

## 2025-02-26 NOTE — TELEPHONE ENCOUNTER
Dr Culp's pt. Since starting   Bumex he states the swelling will go down but then comes back. Please advise

## 2025-02-27 DIAGNOSIS — E87.6 HYPOKALEMIA: ICD-10-CM

## 2025-02-27 RX ORDER — POTASSIUM CHLORIDE 1500 MG/1
TABLET, EXTENDED RELEASE ORAL
Qty: 90 TABLET | Refills: 1 | Status: SHIPPED | OUTPATIENT
Start: 2025-02-27

## 2025-03-11 ENCOUNTER — OFFICE VISIT (OUTPATIENT)
Facility: CLINIC | Age: 70
End: 2025-03-11
Payer: MEDICARE

## 2025-03-11 VITALS
HEART RATE: 75 BPM | OXYGEN SATURATION: 96 % | SYSTOLIC BLOOD PRESSURE: 157 MMHG | HEIGHT: 71 IN | BODY MASS INDEX: 35.42 KG/M2 | DIASTOLIC BLOOD PRESSURE: 87 MMHG | WEIGHT: 253 LBS

## 2025-03-11 DIAGNOSIS — N18.32 CHRONIC KIDNEY DISEASE, STAGE 3B (HCC): ICD-10-CM

## 2025-03-11 DIAGNOSIS — I10 ESSENTIAL HYPERTENSION: Primary | ICD-10-CM

## 2025-03-11 DIAGNOSIS — Z12.11 SCREENING FOR COLON CANCER: ICD-10-CM

## 2025-03-11 DIAGNOSIS — I50.42 CHRONIC COMBINED SYSTOLIC (CONGESTIVE) AND DIASTOLIC (CONGESTIVE) HEART FAILURE (HCC): ICD-10-CM

## 2025-03-11 DIAGNOSIS — I10 ESSENTIAL (PRIMARY) HYPERTENSION: ICD-10-CM

## 2025-03-11 DIAGNOSIS — R73.03 PREDIABETES: ICD-10-CM

## 2025-03-11 LAB — HBA1C MFR BLD: 5.6 %

## 2025-03-11 PROCEDURE — G2211 COMPLEX E/M VISIT ADD ON: HCPCS | Performed by: STUDENT IN AN ORGANIZED HEALTH CARE EDUCATION/TRAINING PROGRAM

## 2025-03-11 PROCEDURE — 1123F ACP DISCUSS/DSCN MKR DOCD: CPT | Performed by: STUDENT IN AN ORGANIZED HEALTH CARE EDUCATION/TRAINING PROGRAM

## 2025-03-11 PROCEDURE — 99214 OFFICE O/P EST MOD 30 MIN: CPT | Performed by: STUDENT IN AN ORGANIZED HEALTH CARE EDUCATION/TRAINING PROGRAM

## 2025-03-11 PROCEDURE — 3079F DIAST BP 80-89 MM HG: CPT | Performed by: STUDENT IN AN ORGANIZED HEALTH CARE EDUCATION/TRAINING PROGRAM

## 2025-03-11 PROCEDURE — 83036 HEMOGLOBIN GLYCOSYLATED A1C: CPT | Performed by: STUDENT IN AN ORGANIZED HEALTH CARE EDUCATION/TRAINING PROGRAM

## 2025-03-11 PROCEDURE — 3077F SYST BP >= 140 MM HG: CPT | Performed by: STUDENT IN AN ORGANIZED HEALTH CARE EDUCATION/TRAINING PROGRAM

## 2025-03-11 RX ORDER — ISOSORBIDE MONONITRATE 60 MG/1
60 TABLET, EXTENDED RELEASE ORAL
Qty: 90 TABLET | Refills: 1 | Status: SHIPPED | OUTPATIENT
Start: 2025-03-11

## 2025-03-11 SDOH — ECONOMIC STABILITY: FOOD INSECURITY: WITHIN THE PAST 12 MONTHS, THE FOOD YOU BOUGHT JUST DIDN'T LAST AND YOU DIDN'T HAVE MONEY TO GET MORE.: NEVER TRUE

## 2025-03-11 SDOH — ECONOMIC STABILITY: FOOD INSECURITY: WITHIN THE PAST 12 MONTHS, YOU WORRIED THAT YOUR FOOD WOULD RUN OUT BEFORE YOU GOT MONEY TO BUY MORE.: NEVER TRUE

## 2025-03-11 ASSESSMENT — ENCOUNTER SYMPTOMS
DIARRHEA: 0
CONSTIPATION: 1
VOMITING: 0
SHORTNESS OF BREATH: 1
CHEST TIGHTNESS: 0
ABDOMINAL PAIN: 0
RHINORRHEA: 0
BACK PAIN: 1
NAUSEA: 0

## 2025-03-12 ENCOUNTER — TELEPHONE (OUTPATIENT)
Facility: CLINIC | Age: 70
End: 2025-03-12

## 2025-03-14 ENCOUNTER — OFFICE VISIT (OUTPATIENT)
Facility: CLINIC | Age: 70
End: 2025-03-14

## 2025-03-14 VITALS
OXYGEN SATURATION: 97 % | SYSTOLIC BLOOD PRESSURE: 155 MMHG | DIASTOLIC BLOOD PRESSURE: 82 MMHG | BODY MASS INDEX: 35.42 KG/M2 | WEIGHT: 253 LBS | HEART RATE: 75 BPM | HEIGHT: 71 IN

## 2025-03-14 DIAGNOSIS — R41.3 MEMORY DEFICIT: ICD-10-CM

## 2025-03-14 DIAGNOSIS — I10 ESSENTIAL (PRIMARY) HYPERTENSION: ICD-10-CM

## 2025-03-14 DIAGNOSIS — Z00.00 MEDICARE ANNUAL WELLNESS VISIT, SUBSEQUENT: Primary | ICD-10-CM

## 2025-03-14 DIAGNOSIS — H91.93 DECREASED HEARING, BILATERAL: ICD-10-CM

## 2025-03-14 DIAGNOSIS — M17.0 PRIMARY OSTEOARTHRITIS OF BOTH KNEES: ICD-10-CM

## 2025-03-14 DIAGNOSIS — R29.6 RECURRENT FALLS: ICD-10-CM

## 2025-03-14 DIAGNOSIS — R26.81 UNSTEADY GAIT: ICD-10-CM

## 2025-03-14 DIAGNOSIS — I10 ESSENTIAL HYPERTENSION: ICD-10-CM

## 2025-03-14 ASSESSMENT — ENCOUNTER SYMPTOMS
NAUSEA: 0
DIARRHEA: 0
CHEST TIGHTNESS: 0
VOMITING: 0
ABDOMINAL PAIN: 0
BACK PAIN: 1
SHORTNESS OF BREATH: 1
RHINORRHEA: 0
CONSTIPATION: 1

## 2025-03-14 ASSESSMENT — PATIENT HEALTH QUESTIONNAIRE - PHQ9
SUM OF ALL RESPONSES TO PHQ QUESTIONS 1-9: 1
2. FEELING DOWN, DEPRESSED OR HOPELESS: SEVERAL DAYS
1. LITTLE INTEREST OR PLEASURE IN DOING THINGS: NOT AT ALL
SUM OF ALL RESPONSES TO PHQ QUESTIONS 1-9: 1
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
## 2025-03-25 ENCOUNTER — HOSPITAL ENCOUNTER (OUTPATIENT)
Facility: HOSPITAL | Age: 70
Setting detail: RECURRING SERIES
Discharge: HOME | End: 2025-03-28
Attending: STUDENT IN AN ORGANIZED HEALTH CARE EDUCATION/TRAINING PROGRAM
Payer: MEDICARE

## 2025-03-25 DIAGNOSIS — I50.32 CHRONIC DIASTOLIC CONGESTIVE HEART FAILURE (HCC): ICD-10-CM

## 2025-03-25 PROCEDURE — 97110 THERAPEUTIC EXERCISES: CPT

## 2025-03-25 PROCEDURE — 97162 PT EVAL MOD COMPLEX 30 MIN: CPT
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
## 2025-03-25 NOTE — PROGRESS NOTES
PT DAILY TREATMENT NOTE/KNEE EVAL       Patient Name: Wilmer Levy    Date: 3/25/2025    : 1955  Insurance: Payor: [employer] MEDICARE / Plan: [employer] MEDICARE ENGAGE HMO CSNP / Product Type: *No Product type* /      Patient  verified yes     Visit #   Current / Total 1 36   Time   In / Out 1040 1120   Pain   In / Out 7 7   Subjective Functional Status/Changes: See POC     Treatment Area: Primary osteoarthritis of both knees  Recurrent falls  Unsteady gait    30 min [x]Eval  - untimed                   Therapeutic Procedures:  Tx Min Billable or 1:1 Min (if diff from Tx Min) Procedure, Rationale, Specifics   10  52346 Therapeutic Exercise (timed):  increase ROM, strength, coordination, balance, and proprioception to improve patient's ability to progress to PLOF and address remaining functional goals. (see flow sheet as applicable)     Details if applicable:     Total Total Reminder: MC/BC bill using total billable min of TIMED therapeutic procedures (example: do not include dry needle or estim unattended, both untimed codes, in totals to left)     [x]  Patient Education billed concurrently with other procedures       Pain Level (0-10 scale) post treatment: 7    ASSESSMENT/Changes in Function: see POC    Patient will continue to benefit from skilled PT services to modify and progress therapeutic interventions, analyze and address functional mobility deficits, analyze and address ROM deficits, analyze and address strength deficits, analyze and address soft tissue restrictions, analyze and cue for proper movement patterns, analyze and modify for postural abnormalities, and instruct in home and community integration to address functional deficits and attain remaining goals.       [x]  See Plan of Care for goals and reassessment       PLAN  []  Upgrade activities as tolerated     []  Continue plan of care  []  Update interventions per flow sheet       []  Other:_      Jefferson Sanchez, PT 3/25/2025  10:34

## 2025-03-26 RX ORDER — FUROSEMIDE 20 MG/1
TABLET ORAL
Qty: 90 TABLET | Refills: 1 | OUTPATIENT
Start: 2025-03-26

## 2025-04-01 ENCOUNTER — HOSPITAL ENCOUNTER (OUTPATIENT)
Facility: HOSPITAL | Age: 70
Setting detail: RECURRING SERIES
Discharge: HOME | End: 2025-04-04
Attending: STUDENT IN AN ORGANIZED HEALTH CARE EDUCATION/TRAINING PROGRAM
Payer: MEDICARE

## 2025-04-01 PROCEDURE — 97110 THERAPEUTIC EXERCISES: CPT

## 2025-04-01 NOTE — PROGRESS NOTES
PHYSICAL / OCCUPATIONAL THERAPY - DAILY TREATMENT NOTE (updated )    Patient Name: Wilmer Levy    Date: 2025    : 1955  Insurance: Payor: [employer] MEDICARE / Plan: [employer] ENGAGE HMO CSNP / Product Type: *No Product type* /      Patient  verified YES    Visit #   Current / Total 2 36   Time   In / Out 1152 1241   Pain   In / Out 4/10 4   Subjective Functional Status/Changes: MRI scheduled for Friday     TREATMENT AREA =  Primary osteoarthritis of both knees  Recurrent falls  Unsteady gait  Pain in right knee  Pain in left knee  Unsteadiness on feet    Next PN/ RC due 25  Auth due NA    OBJECTIVE    Ice (UNBILLED):  location/position: B Knee, Supine BLE elevated     Min Rationale   10 decrease edema, decrease inflammation, and decrease pain to improve patient's ability to progress to PLOF and address remaining functional goals.     Skin assessment post-treatment:   Intact     Therapeutic Procedures:  Tx Min Billable or 1:1 Min (if diff from Tx Min) Procedure, Rationale, Specifics   39  46973 Therapeutic Exercise (timed):  increase ROM, strength, coordination, balance, and proprioception to improve patient's ability to progress to PLOF and address remaining functional goals. (see flow sheet as applicable)     Details if applicable:    See flowsheet     39  MC BC Totals Reminder: bill using total billable min of TIMED therapeutic procedures (example: do not include dry needle or estim unattended, both untimed codes, in totals to left)  8-22 min = 1 unit; 23-37 min = 2 units; 38-52 min = 3 units; 53-67 min = 4 units; 68-82 min = 5 units   Total Total     Charge Capture    [x]  Patient Education billed concurrently with other procedures   [x] Review HEP    [] Progressed/Changed HEP, detail:    [] Other detail:       Objective Information/Functional Measures/Assessment    Pt tolerated session well and demonstrated mild apprehension during session. Required cuing throughout session.

## 2025-04-02 ENCOUNTER — HOSPITAL ENCOUNTER (OUTPATIENT)
Facility: HOSPITAL | Age: 70
Setting detail: RECURRING SERIES
Discharge: HOME | End: 2025-04-05
Attending: STUDENT IN AN ORGANIZED HEALTH CARE EDUCATION/TRAINING PROGRAM
Payer: MEDICARE

## 2025-04-02 PROCEDURE — 97112 NEUROMUSCULAR REEDUCATION: CPT

## 2025-04-02 PROCEDURE — 97530 THERAPEUTIC ACTIVITIES: CPT

## 2025-04-02 PROCEDURE — 97110 THERAPEUTIC EXERCISES: CPT

## 2025-04-02 NOTE — PROGRESS NOTES
PHYSICAL  DAILY TREATMENT NOTE     Patient Name: Wilmer Levy    Date: 2025    : 1955  Insurance: Payor: [employer] / Plan: [employer] MED[employer] ENGAGE HMO CSNP / Product Type: *No Product type* /      Patient  verified Yes     Visit #   Current / Total 3 36   Time   In / Out 1242 133   Pain   In / Out 7/10 0/10   Subjective Functional Status/Changes: \"I do really feel it after I been sitting\"  Pt reports some soreness after yesterdays visit; \"not bad\"       Next PN/ RC due 25  Auth due 10/27/25  36 V    TREATMENT AREA =  Primary osteoarthritis of both knees  Recurrent falls  Unsteady gait  Pain in right knee  Pain in left knee  Unsteadiness on feet     OBJECTIVE      Modalities Rationale:     decrease inflammation and decrease pain to improve patient's ability to progress to PLOF and address remaining functional goals.    10 min  unbill [x]  Ice     []  Heat    location/position: Supine with wedge under B Les     Skin assessment post-treatment:   Intact         Therapeutic Procedures:    Tx Min Billable or 1:1 Min (if diff from Tx Min) Procedure, Rationale, Specifics   21  45227 Therapeutic Exercise (timed):  increase ROM, strength, coordination, balance, and proprioception to improve patient's ability to progress to PLOF and address remaining functional goals. (see flow sheet as applicable)     Details if applicable:       10  64740 Neuromuscular Re-Education (timed):  improve balance, coordination, kinesthetic sense, posture, core stability and proprioception to improve patient's ability to develop conscious control of individual muscles and awareness of position of extremities in order to progress to PLOF and address remaining functional goals. (see flow sheet as applicable)     Details if applicable:     10  98564 Therapeutic Activity (timed):  use of dynamic activities replicating functional movements to increase ROM, strength, coordination, balance, and proprioception in order to improve

## 2025-04-04 ENCOUNTER — HOSPITAL ENCOUNTER (OUTPATIENT)
Facility: HOSPITAL | Age: 70
Discharge: HOME | End: 2025-04-04
Attending: STUDENT IN AN ORGANIZED HEALTH CARE EDUCATION/TRAINING PROGRAM
Payer: MEDICARE

## 2025-04-04 DIAGNOSIS — R41.3 MEMORY DEFICIT: ICD-10-CM

## 2025-04-04 PROCEDURE — 70551 MRI BRAIN STEM W/O DYE: CPT

## 2025-04-07 ENCOUNTER — APPOINTMENT (OUTPATIENT)
Facility: HOSPITAL | Age: 70
End: 2025-04-07
Attending: STUDENT IN AN ORGANIZED HEALTH CARE EDUCATION/TRAINING PROGRAM
Payer: MEDICARE

## 2025-04-09 ENCOUNTER — HOSPITAL ENCOUNTER (OUTPATIENT)
Facility: HOSPITAL | Age: 70
Setting detail: RECURRING SERIES
Discharge: HOME | End: 2025-04-12
Attending: STUDENT IN AN ORGANIZED HEALTH CARE EDUCATION/TRAINING PROGRAM
Payer: MEDICARE

## 2025-04-09 PROCEDURE — 97112 NEUROMUSCULAR REEDUCATION: CPT

## 2025-04-09 PROCEDURE — 97110 THERAPEUTIC EXERCISES: CPT

## 2025-04-09 PROCEDURE — 97016 VASOPNEUMATIC DEVICE THERAPY: CPT

## 2025-04-09 PROCEDURE — 97530 THERAPEUTIC ACTIVITIES: CPT

## 2025-04-09 NOTE — PROGRESS NOTES
PHYSICAL  DAILY TREATMENT NOTE     Patient Name: Wilmer Levy    Date: 2025    : 1955  Insurance: Payor: S[employer]NY MEDICARE / Plan: [employer] MEDICARE ENGAGE HMO CSNP / Product Type: *No Product type* /      Patient  verified Yes     Visit #   Current / Total 4 36   Time   In / Out 1241 130   Pain   In / Out 5/10 0/10   Subjective Functional Status/Changes: \"I walk 2 blocks from the bus to get here, and its ok, it feels good to walk\"  Pt reports its 20 mins to grocery store and he can carry about 2 bags back to home       Next PN/ RC due 25  Auth due 10/27/25  36 V    TREATMENT AREA =  Primary osteoarthritis of both knees  Recurrent falls  Unsteady gait  Pain in right knee  Pain in left knee  Unsteadiness on feet     OBJECTIVE      Modalities Rationale:     decrease edema, decrease inflammation, and decrease pain to improve patient's ability to progress to PLOF and address remaining functional goals.    10 min []  Vasopneumatic Device, press/temp:   max pressure/34 degs    min []  Whirlpool / Fluido:    If using vaso (only need to measure limb vaso being performed on)      pre-treatment girth : 49 cm      post-treatment girth : 48 cm      measured at (landmark location) : mid patella    min []  Other:    Skin assessment post-treatment:   Intact        Therapeutic Procedures:    Tx Min Billable or 1:1 Min (if diff from Tx Min) Procedure, Rationale, Specifics     82358 Therapeutic Exercise (timed):  increase ROM, strength, coordination, balance, and proprioception to improve patient's ability to progress to PLOF and address remaining functional goals. (see flow sheet as applicable)     Details if applicable:       10  36650 Neuromuscular Re-Education (timed):  improve balance, coordination, kinesthetic sense, posture, core stability and proprioception to improve patient's ability to develop conscious control of individual muscles and awareness of position of extremities in order to progress to PLOF and

## 2025-04-11 DIAGNOSIS — I25.118 CORONARY ARTERY DISEASE OF NATIVE ARTERY OF NATIVE HEART WITH STABLE ANGINA PECTORIS: ICD-10-CM

## 2025-04-11 DIAGNOSIS — I10 ESSENTIAL HYPERTENSION: ICD-10-CM

## 2025-04-12 RX ORDER — EZETIMIBE 10 MG/1
10 TABLET ORAL DAILY
Qty: 90 TABLET | Refills: 1 | Status: SHIPPED | OUTPATIENT
Start: 2025-04-12

## 2025-04-14 ENCOUNTER — HOSPITAL ENCOUNTER (OUTPATIENT)
Facility: HOSPITAL | Age: 70
Setting detail: RECURRING SERIES
Discharge: HOME | End: 2025-04-17
Attending: STUDENT IN AN ORGANIZED HEALTH CARE EDUCATION/TRAINING PROGRAM
Payer: MEDICARE

## 2025-04-14 PROCEDURE — 97112 NEUROMUSCULAR REEDUCATION: CPT

## 2025-04-14 PROCEDURE — 97530 THERAPEUTIC ACTIVITIES: CPT

## 2025-04-14 PROCEDURE — 97016 VASOPNEUMATIC DEVICE THERAPY: CPT

## 2025-04-14 PROCEDURE — 97110 THERAPEUTIC EXERCISES: CPT

## 2025-04-14 NOTE — PROGRESS NOTES
PHYSICAL  DAILY TREATMENT NOTE     Patient Name: Wilmer Levy    Date: 2025    : 1955  Insurance: Payor: S[employer]NY MEDICARE / Plan: [employer] ENGAGE HMO CSNP / Product Type: *No Product type* /      Patient  verified Yes     Visit #   Current / Total 5 36   Time   In / Out 1043 1145   Pain   In / Out 7/10 0/10   Subjective Functional Status/Changes: \"I did not do my HEP this weekend, I know I should\"  Pt reports no p! With amb       Next PN/ RC due 25  Auth due 10/27/25  36 V    TREATMENT AREA =  Primary osteoarthritis of both knees  Recurrent falls  Unsteady gait  Pain in right knee  Pain in left knee  Unsteadiness on feet     OBJECTIVE      Modalities Rationale:     decrease edema, decrease inflammation, and decrease pain to improve patient's ability to progress to PLOF and address remaining functional goals.    10 min [x]  Vasopneumatic Device, press/temp:   max pressure/34 degs    min []  Whirlpool / Fluido:    If using vaso (only need to measure limb vaso being performed on)      pre-treatment girth : 50 cm      post-treatment girth : 48 cm      measured at (landmark location) : mid patella    min []  Other:    Skin assessment post-treatment:   Intact        Therapeutic Procedures:    Tx Min Billable or 1:1 Min (if diff from Tx Min) Procedure, Rationale, Specifics   110 Therapeutic Exercise (timed):  increase ROM, strength, coordination, balance, and proprioception to improve patient's ability to progress to PLOF and address remaining functional goals. (see flow sheet as applicable)     Details if applicable:       10 10 06760 Neuromuscular Re-Education (timed):  improve balance, coordination, kinesthetic sense, posture, core stability and proprioception to improve patient's ability to develop conscious control of individual muscles and awareness of position of extremities in order to progress to PLOF and address remaining functional goals. (see flow sheet as applicable)

## 2025-04-16 ENCOUNTER — HOSPITAL ENCOUNTER (OUTPATIENT)
Facility: HOSPITAL | Age: 70
Setting detail: RECURRING SERIES
Discharge: HOME | End: 2025-04-19
Attending: STUDENT IN AN ORGANIZED HEALTH CARE EDUCATION/TRAINING PROGRAM
Payer: MEDICARE

## 2025-04-16 PROCEDURE — 97112 NEUROMUSCULAR REEDUCATION: CPT

## 2025-04-16 PROCEDURE — 97016 VASOPNEUMATIC DEVICE THERAPY: CPT

## 2025-04-16 PROCEDURE — 97110 THERAPEUTIC EXERCISES: CPT

## 2025-04-16 PROCEDURE — 97530 THERAPEUTIC ACTIVITIES: CPT

## 2025-04-16 NOTE — PROGRESS NOTES
PHYSICAL  DAILY TREATMENT NOTE     Patient Name: Wilmer Levy    Date: 2025    : 1955  Ins[facility]: [facility] / Plan: [employer] MEDICARE ENGAGE HMO CSNP / Product Type: *No Product type* /      Patient  verified Yes     Visit #   Current / Total 6 36   Time   In / Out 1242 150   Pain   In / Out 3/10 4/10   Subjective Functional Status/Changes: \"I feel it more on my R leg below the knee, it started yesterday when I was walking\"  Pt reports he has been ambulating more the last 2 days       Next PN/ RC due 25  Auth due 10/27/25  36 V    TREATMENT AREA =  Primary osteoarthritis of both knees  Recurrent falls  Unsteady gait  Pain in right knee  Pain in left knee  Unsteadiness on feet     OBJECTIVE      Modalities Rationale:     decrease edema, decrease inflammation, and decrease pain to improve patient's ability to progress to PLOF and address remaining functional goals.    10 min [x]  Vasopneumatic Device, press/temp:   max pressure/34 degs    min []  Whirlpool / Fluido:    If using vaso (only need to measure limb vaso being performed on)      pre-treatment girth : 50 cm      post-treatment girth : 48 cm      measured at (landmark location) : mid patella    min []  Other:    Skin assessment post-treatment:   Intact        Therapeutic Procedures:    Tx Min Billable or 1:1 Min (if diff from Tx Min) Procedure, Rationale, Specifics   8  03610 Gait Training (timed):   (I) with amb with an emphasis to increase LLE stance time and promote symmetrical Wbing x 100' x 1, 60' x 2 with LBQC   Cuing to decrease R lateral trunk lean with LLE stance time.  To improve safety and dynamic movement with household/community ambulation.  (see flow sheet as applicable)      16  46586 Therapeutic Exercise (timed):  increase ROM, strength, coordination, balance, and proprioception to improve patient's ability to progress to PLOF and address remaining functional goals. (see flow sheet as applicable)     Details if

## 2025-04-20 DIAGNOSIS — I50.42 CHRONIC COMBINED SYSTOLIC (CONGESTIVE) AND DIASTOLIC (CONGESTIVE) HEART FAILURE (HCC): ICD-10-CM

## 2025-04-20 DIAGNOSIS — I10 ESSENTIAL (PRIMARY) HYPERTENSION: ICD-10-CM

## 2025-04-21 ENCOUNTER — HOSPITAL ENCOUNTER (OUTPATIENT)
Facility: HOSPITAL | Age: 70
Setting detail: RECURRING SERIES
Discharge: HOME | End: 2025-04-24
Attending: STUDENT IN AN ORGANIZED HEALTH CARE EDUCATION/TRAINING PROGRAM
Payer: MEDICARE

## 2025-04-21 PROCEDURE — 97530 THERAPEUTIC ACTIVITIES: CPT

## 2025-04-21 PROCEDURE — 97112 NEUROMUSCULAR REEDUCATION: CPT

## 2025-04-21 PROCEDURE — 97016 VASOPNEUMATIC DEVICE THERAPY: CPT

## 2025-04-21 PROCEDURE — 97110 THERAPEUTIC EXERCISES: CPT

## 2025-04-21 NOTE — PROGRESS NOTES
PHYSICAL THERAPY - DAILY TREATMENT NOTE (updated )    Patient Name: Wilmer Levy    Date: 2025    : 1955  Insurance: Payor: SONNY MEDICARE / Plan: [employer] ENGAGE H[employer] CSNP / Product Type: *No Product type* /      Patient  verified yes     Visit #   Current / Total 7 36   Time   In / Out 4:25 5:20   Pain   In / Out 5/10 0/10   Subjective Functional Status/Changes: \"My left knee is hurting more than my right.\"     TREATMENT AREA =  Primary osteoarthritis of both knees  Recurrent falls  Unsteady gait  Pain in right knee  Pain in left knee  Unsteadiness on feet    Next PN/ RC due 25  Auth due 10/27/25  36 V    OBJECTIVE    Modalities Rationale:     decrease edema, decrease inflammation, and decrease pain to improve patient's ability to progress to PLOF and address remaining functional goals.             10 min [x]  Vasopneumatic Device, press/temp:   med pressure/34 degs     min []  Whirlpool / Fluido:     If using vaso (only need to measure limb vaso being performed on)      pre-treatment girth : 47.0 cm      post-treatment girth : 45.5 cm      measured at (landmark location) : mid patella     min []  Other:     Skin assessment post-treatment:   Intact    Therapeutic Procedures:  Tx Min Billable or 1:1 Min (if diff from Tx Min) Procedure, Rationale, Specifics   15  40582 Therapeutic Exercise (timed):  increase ROM, strength, coordination, balance, and proprioception to improve patient's ability to progress to PLOF and address remaining functional goals. (see flow sheet as applicable)     Details if applicable:  Captain [place]ina, stand HR, incline stretch     18  72780 Therapeutic Activity (timed):  use of dynamic activities replicating functional movements to increase ROM, strength, coordination, balance, and proprioception in order to improve patient's ability to progress to PLOF and address remaining functional goals.  (see flow sheet as applicable)     Details if applicable:  Alfredo

## 2025-04-22 ENCOUNTER — TELEPHONE (OUTPATIENT)
Facility: CLINIC | Age: 70
End: 2025-04-22

## 2025-04-22 DIAGNOSIS — R41.3 MEMORY DEFICIT: Primary | ICD-10-CM

## 2025-04-23 ENCOUNTER — HOSPITAL ENCOUNTER (OUTPATIENT)
Facility: HOSPITAL | Age: 70
Setting detail: RECURRING SERIES
Discharge: HOME | End: 2025-04-26
Attending: STUDENT IN AN ORGANIZED HEALTH CARE EDUCATION/TRAINING PROGRAM
Payer: MEDICARE

## 2025-04-23 PROCEDURE — 97110 THERAPEUTIC EXERCISES: CPT | Performed by: PHYSICAL THERAPIST

## 2025-04-23 PROCEDURE — 97112 NEUROMUSCULAR REEDUCATION: CPT | Performed by: PHYSICAL THERAPIST

## 2025-04-23 PROCEDURE — 97530 THERAPEUTIC ACTIVITIES: CPT | Performed by: PHYSICAL THERAPIST

## 2025-04-23 RX ORDER — DONEPEZIL HYDROCHLORIDE 5 MG/1
5 TABLET, FILM COATED ORAL NIGHTLY
Qty: 90 TABLET | Refills: 1 | Status: SHIPPED | OUTPATIENT
Start: 2025-04-23

## 2025-04-23 RX ORDER — CARVEDILOL 12.5 MG/1
12.5 TABLET ORAL 2 TIMES DAILY
Qty: 60 TABLET | Refills: 2 | Status: SHIPPED | OUTPATIENT
Start: 2025-04-23

## 2025-04-23 NOTE — PROGRESS NOTES
PHYSICAL THERAPY - DAILY TREATMENT NOTE (updated )    Patient Name: Wilmer Levy    Date: 2025    : 1955  Insurance: Payor: SONNY MEDICARE / Plan: [employer] MEDIC[employer] ENGAGE HMO CSNP / Product Type: *No Product type* /      Patient  verified yes     Visit #   Current / Total 8 36   Time   In / Out 1241p 140pm   Pain   In / Out 5 L knee 3   Subjective Functional Status/Changes: See PN     TREATMENT AREA =  Primary osteoarthritis of both knees  Recurrent falls  Unsteady gait  Pain in right knee  Pain in left knee  Unsteadiness on feet    Next PN/ RC due 25  Auth due 10/27/25  36 V    OBJECTIVE    Modalities Rationale:     decrease edema, decrease inflammation, and decrease pain to improve patient's ability to progress to PLOF and address remaining functional goals.             10 min [x]  Vasopneumatic Device, press/temp:   med pressure/34 degs     min []  Whirlpool / Fluido:     If using vaso (only need to measure limb vaso being performed on)      pre-treatment girth : 47.0 cm      post-treatment girth : 45.5 cm      measured at (landmark location) : mid patella     min []  Other:     Skin assessment post-treatment:   Intact    Therapeutic Procedures:  Tx Min Billable or 1:1 Min (if diff from Tx Min) Procedure, Rationale, Specifics   22  76896 Therapeutic Exercise (timed):  increase ROM, strength, coordination, balance, and proprioception to improve patient's ability to progress to PLOF and address remaining functional goals. (see flow sheet as applicable)     Details if applicable:   stand HR, incline stretch     15  84450 Therapeutic Activity (timed):  use of dynamic activities replicating functional movements to increase ROM, strength, coordination, balance, and proprioception in order to improve patient's ability to progress to PLOF and address remaining functional goals.  (see flow sheet as applicable)     Details if applicable:  NuStep, step ups, bridge, sit to stand   12  83263

## 2025-04-23 NOTE — TELEPHONE ENCOUNTER
Spoke to the patient over the phone.  MRI brain showed mild to moderate generalized atrophy consistent with early Alzheimer's.  He does have a family history of dementia.  Mini cog was abnormal when patient was recently seen.  Will start on donepezil.  Will reschedule patient for an earlier appointment in the office.  Patient requesting a letter stating this -which will be provided

## 2025-04-23 NOTE — PROGRESS NOTES
[facility]  [address] [place] [address]   Phone: [phone] Fax: [phone]  PROGRESS NOTE  Patient Name: Wilmer Levy : 1955   Treatment/Medical Diagnosis: Primary osteoarthritis of both knees [M17.0]  Recurrent falls [R29.6]  Unsteady gait [R26.81]  Pain in right knee [M25.561]  Pain in left knee [M25.562]  Unsteadiness on feet [R26.81] Primary osteoarthritis of both knees [M17.0]  Recurrent falls [R29.6]  Unsteady gait [R26.81]  Pain in right knee [M25.561]  Pain in left knee [M25.562]  Unsteadiness on feet [R26.81]   Referral Source: Mariajose Rios MD     Date of Initial Visit: 3/25/25 Attended Visits: 8 Missed Visits: 1     SUMMARY OF TREATMENT  Treatment consist of therapeutic exercise including ROM, stretching, strengthening, Therapeutic activities, NM re-education, postural ed, HEP instruction, CP,  and Vaso.    CURRENT STATUS  Patient has attended 8 therapy sessions and is making steady progress.   Subjective: Patient reports 50% improvements in sx since SOC. Pt reports no recent fall.  Pain levels range from 3 to 8 (stiffness from sitting too long).   Patient's current complaints: continued pain in R knee.  Patient performing HEP daily.  Objective: Right knee AAROM/AROM: flexion to 115/120deg  deg and left knee AAROM flexion to 115/109 deg  L knee quad strength: 4+/5  with break test,  continues to be challenged with step ups and eccentric lowering using L LE.    5 Times Sit to Stand: 19 (eval)      30 sec STS test:  6 reps in 30 sec (PN)  Single Leg Balance (eval/PN) Left: 4/4 seconds Right: 6/8 seconds  Airex Narrow EO: 30\"  Airex Narrow EC: 30\" (increased sway)   Functional gains: balance improved, less pain with using cane, walking endurance, sitting tolerance is longer.  Functional Deficits: walking half mi with quad cane,   Patient goal: strengthen the knees and decrease the pain.  LEFS: 41 at eval/56 at PN , FGA:

## 2025-04-30 NOTE — PROGRESS NOTES
Antony Everett is a 60 y.o. male presenting today for Hypertension and Depression  . HPI:  Antony Everett presents to the office today for hypertension, hyperlipidemia and depression follow-up care. Patient present today stating he feels fine. He is negative for chest pain, palpation or dyspnea. He is negative for cough or dyspnea. He reports he is compliant with taking his medication daily and denies any adverse effects. He reports his depression is okay. He notes he is no longer taking Zoloft He reports he is coping. Patient presents with no chest pain, palpitation or lower extremity edema. Denies any cough, wheezing or congestion. Is negative for abdominal pain, nausea, vomiting, diarrhea or constipation. No headache or dizziness today    Review of Systems   HENT: Negative for congestion. Respiratory: Negative for cough and sputum production. Cardiovascular: Negative for chest pain, palpitations and leg swelling. Gastrointestinal: Negative for abdominal pain, nausea and vomiting. Musculoskeletal: Negative for myalgias. Neurological: Negative for dizziness and headaches. Allergies   Allergen Reactions    Iodine Other (comments)     Eyes swell shut      Shellfish Containing Products Swelling       Current Outpatient Medications   Medication Sig Dispense Refill    isosorbide mononitrate ER (IMDUR) 60 mg CR tablet TAKE 1 TABLET BY MOUTH DAILY 30 Tab 0    omeprazole (PRILOSEC) 40 mg capsule TAKE ONE CAPSULE BY MOUTH TWICE A DAY 60 Cap 2    amLODIPine (NORVASC) 5 mg tablet TAKE 1 TABLET BY MOUTH EVERY DAY 30 Tab 1    cloNIDine (CATAPRES) 0.1 mg/24 hr ptwk 1 Patch by TransDERmal route every seven (7) days. 12 Patch 1    carvedilol (COREG) 12.5 mg tablet TAKE 1 TABLET BY MOUTH TWICE A DAY WITH MEALS 180 Tab 1    KLOR-CON M20 20 mEq tablet TAKE 1 TABLET BY MOUTH EVERY DAY 90 Tab 1    sertraline (ZOLOFT) 50 mg tablet Take 1 Tab by mouth daily.  90 Tab 1    multivitamin (ONE A DAY) tablet Session ID: [id number]  Language: Spencer Heath   ID: #[id number]   Name: Samreen Take 1 Tab by mouth daily.  allopurinol (ZYLOPRIM) 100 mg tablet TAKE 1 TAB BY MOUTH DAILY. 90 Tab 3    nitroglycerin (NITROSTAT) 0.4 mg SL tablet 1 Tab by SubLINGual route every five (5) minutes as needed for Chest Pain. 1 Bottle 1    calcium carbonate (TUMS) 200 mg calcium (500 mg) chew Take 1 Tab by mouth daily.  hydroCHLOROthiazide (HYDRODIURIL) 25 mg tablet Take 1 Tab by mouth daily. (Patient taking differently: Take 12.5 mg by mouth daily.) 30 Tab 2    clopidogrel (PLAVIX) 75 mg tab Take 1 Tab by mouth daily. 90 Tab 3    atorvastatin (LIPITOR) 40 mg tablet Take 1 Tab by mouth nightly. 90 Tab 3    aspirin 81 mg chewable tablet Take 1 Tab by mouth two (2) times a day.  [facility] Tab 0       Past Medical History:   Diagnosis Date    BPH without obstruction/lower urinary tract symptoms     Bursitis of right shoulder     CAD (coronary artery disease)     Chest pain     history of hospitalization with chest pain and a negative workup in 2008    Chronic edema     Constipation     die to medication    Coronary artery disease     mild, non obstructive/EF 65%    Dyspnea on exertion     Echocardiogram 12/16/08    IVC dilated; suboptimal endocardial border; EF 65%    ED (erectile dysfunction)     Elevated PSA     Frequency     GERD (gastroesophageal reflux disease)     Gout     H/O cystoscopy 06/20/2013    Hematuria, unspecified     Hypercholesterolemia     Hypertension     Hypertension      Hypogonadism male     Impotence of organic origin     Left ventricular diastolic dysfunction     Malignant neoplasm of prostate (HCC)     hx of t1a, izzy 6, 5 % of 1  core    Morbid obesity ([address]     Myocardial perfusion 09/05/08    Basal inferior defect c/w artifact; EF 63%    Overactive bladder     S/P cardiac cath 07/30/10    oD2-40%; pRCA-20-30%; EF 65%    Sleep apnea     Slowing of urinary stream     Type II or unspecified type diabetes mellitus without mention of complication, not stated as uncontrolled        Past Surgical History:   Procedure Laterality Date    HX HEART CATHETERIZATION  7/30/10    HX OTHER SURGICAL  13    Prostate    HX TONSILLECTOMY      ME COLONOSCOPY FLX DX W/COLLJ SPEC WHEN PFRMD      ME I & D ABSC XTRAORAL SOFT TISS COMP         Social History     Socioeconomic History    Marital status: SINGLE     Spouse name: Not on file    Number of children: Not on file    Years of education: Not on file    Highest education level: Not on file   Occupational History    Not on file   Social Needs    Financial resource strain: Not on file    Food insecurity:     Worry: Not on file     Inability: Not on file    Transportation needs:     Medical: Not on file     Non-medical: Not on file   Tobacco Use    Smoking status: Former Smoker     Types: Cigars     Last attempt to quit: 10/4/1999     Years since quittin.7    Smokeless tobacco: Never Used   Substance and Sexual Activity    Alcohol use: Not Currently     Comment: socially    Drug use: No    Sexual activity: Not Currently   Lifestyle    Physical activity:     Days per week: Not on file     Minutes per session: Not on file    Stress: Not on file   Relationships    Social connections:     Talks on phone: Not on file     Gets together: Not on file     Attends Mandaen service: Not on file     Active member of club or organization: Not on file     Attends meetings of clubs or organizations: Not on file     Relationship status: Not on file    Intimate partner violence:     Fear of current or ex partner: Not on file     Emotionally abused: Not on file     Physically abused: Not on file     Forced sexual activity: Not on file   Other Topics Concern    Not on file   Social History Narrative    Not on file       Patient does not have an advanced directive on file    Vitals:    19 1604   BP: 142/82   Pulse: 76   Resp: 20   Temp: 98 °F (36.7 °C)   TempSrc: Oral   SpO2: 98%   Weight: 255 lb (115.7 kg) Height: 5' 11\" (1.803 m)   PainSc:   0 - No pain       Physical Exam   Constitutional: He is oriented to person, place, and time. No distress. Cardiovascular: Normal rate, regular rhythm and normal heart sounds. Pulmonary/Chest: Effort normal and breath sounds normal.   Abdominal: Soft. Lymphadenopathy:     He has no cervical adenopathy. Neurological: He is alert and oriented to person, place, and time. Nursing note and vitals reviewed. No visits with results within 3 Month(s) from this visit. Latest known visit with results is:   Office Visit on 02/25/2019   Component Date Value Ref Range Status    Glucose 02/25/2019 98  70 - 99 mg/dL Final    BUN 02/25/2019 38* 6 - 22 mg/dL Final    Creatinine 02/25/2019 2.7* 0.8 - 1.6 mg/dL Final    Sodium 02/25/2019 142  133 - 145 mmol/L Final    Potassium 02/25/2019 3.7  3.5 - 5.5 mmol/L Final    Chloride 02/25/2019 98  98 - 110 mmol/L Final    CO2 02/25/2019 28  20 - 32 mmol/L Final    Calcium 02/25/2019 9.1  8.4 - 10.4 mg/dL Final    Anion gap 02/25/2019 16.0  mmol/L Final    Comment: Test includes BUN, CO2, Chloride, Creatinine, Glucose, Potassium, Calcium and  Sodium. Estimated GFR results are reported in mL/min/1.73 sq.m. by the MDRD equation. This eGFR is validated for stable chronic renal failure patients. This   equation  is unreliable in acute illness or patients with normal renal function.  GFRAA 02/25/2019 29.2* >60.0 Final    GFRNA 02/25/2019 24.1* >60.0 Final       .No results found for any visits on 06/07/19. Assessment / Plan:      ICD-10-CM ICD-9-CM    1. Type 2 diabetes with nephropathy (HCC) E11.21 250.40      583.81    2. Essential hypertension I10 401.9    3. Depression, unspecified depression type F32.9 311    4. Morbid obesity ([facility] [address] E66.01 278.01      Hypertension-blood pressure is mildly elevated today. No change to current treatment plan  Depression-patient stopped Zoloft.   Instructed to patient to return to the office as needed for increased depression  Patient is status post gastric bypass. Since patient is March 25, 2019 visit he has gained 12 pounds. Diabetes mellitus-patient last hemoglobin A1c was 5.0. Repeat hemoglobin A1c at the next office visit      Follow-up and Dispositions    · Return in about 1 month (around 7/5/2019). I asked the patient if he  had any questions and answered his  questions. The patient stated that he understands the treatment plan and agrees with the treatment plan    This document was created with a voice activated dictation system and may contain transcription errors.

## 2025-05-09 ENCOUNTER — HOSPITAL ENCOUNTER (OUTPATIENT)
Facility: HOSPITAL | Age: 70
Setting detail: RECURRING SERIES
Discharge: HOME | End: 2025-05-12
Attending: STUDENT IN AN ORGANIZED HEALTH CARE EDUCATION/TRAINING PROGRAM
Payer: MEDICARE

## 2025-05-09 PROCEDURE — 97110 THERAPEUTIC EXERCISES: CPT

## 2025-05-09 PROCEDURE — 97112 NEUROMUSCULAR REEDUCATION: CPT

## 2025-05-09 PROCEDURE — 97530 THERAPEUTIC ACTIVITIES: CPT

## 2025-05-09 NOTE — PROGRESS NOTES
PHYSICAL / OCCUPATIONAL THERAPY - DAILY TREATMENT NOTE    Patient Name: Wilmer Levy    Date: 2025    : 1955  Insurance: Payor: SONNY MEDICARE / Plan: [employer] ENGAGE HMO CSNP / Product Type: *No Product type* /      Patient  verified Yes     Visit #   Current / Total 1 10   Time   In / Out 130 210   Pain   In / Out 2 3   Subjective Functional Status/Changes: Pt reports feeling stiff today.     TREATMENT AREA =  Primary osteoarthritis of both knees  Recurrent falls  Unsteady gait  Pain in right knee  Pain in left knee  Unsteadiness on feet    OBJECTIVE         Therapeutic Procedures:    Tx Min Billable or 1:1 Min (if diff from Tx Min) Procedure, Rationale, Specifics   15  32313 Therapeutic Exercise (timed):  increase ROM, strength, coordination, balance, and proprioception to improve patient's ability to progress to PLOF and address remaining functional goals. (see flow sheet as applicable)     Details if applicable:       15  22111 Neuromuscular Re-Education (timed):  improve balance, coordination, kinesthetic sense, posture, core stability and proprioception to improve patient's ability to develop conscious control of individual muscles and awareness of position of extremities in order to progress to PLOF and address remaining functional goals. (see flow sheet as applicable)     Details if applicable:     10  01144 Therapeutic Activity (timed):  use of dynamic activities replicating functional movements to increase ROM, strength, coordination, balance, and proprioception in order to improve patient's ability to progress to PLOF and address remaining functional goals.  (see flow sheet as applicable)     Details if applicable:            Details if applicable:            Details if applicable:     40  [facility] Totals Reminder: bill using total billable min of TIMED therapeutic procedures (example: do not include dry needle or estim unattended, both untimed codes, in totals to left)  8-22 min = 1

## 2025-05-14 ENCOUNTER — HOSPITAL ENCOUNTER (OUTPATIENT)
Facility: HOSPITAL | Age: 70
Setting detail: RECURRING SERIES
Discharge: HOME | End: 2025-05-17
Attending: STUDENT IN AN ORGANIZED HEALTH CARE EDUCATION/TRAINING PROGRAM
Payer: MEDICARE

## 2025-05-14 PROCEDURE — 97112 NEUROMUSCULAR REEDUCATION: CPT

## 2025-05-14 PROCEDURE — 97530 THERAPEUTIC ACTIVITIES: CPT

## 2025-05-14 PROCEDURE — 97110 THERAPEUTIC EXERCISES: CPT

## 2025-05-14 NOTE — PROGRESS NOTES
PHYSICAL  DAILY TREATMENT NOTE     Patient Name: Wilmer Levy    Date: 2025    : 1955  Insurance: Payor: [employer]ARA MEDICARE / Plan: [employer] MEDICARE ENGAGE HMO CSNP / Product Type: *No Product type* /      Patient  verified Yes     Visit #   Current / Total 3 10   Time   In / Out 328 429   Pain   In / Out 2/10 0/10   Subjective Functional Status/Changes: Pt reports less p! overall and improved p! with amb       Next PN/ RC due 25  Auth due 25  36V  TREATMENT AREA =  Primary osteoarthritis of both knees  Recurrent falls  Unsteady gait  Pain in right knee  Pain in left knee  Unsteadiness on feet     OBJECTIVE      Modalities Rationale:     decrease inflammation and decrease pain to improve patient's ability to progress to PLOF and address remaining functional goals.    10 min  unbill [x]  Ice     []  Heat    location/position: Supine with wedge under B Les     Skin assessment post-treatment:   Intact        Therapeutic Procedures:    Tx Min Billable or 1:1 Min (if diff from Tx Min) Procedure, Rationale, Specifics   24  53477 Therapeutic Exercise (timed):  increase ROM, strength, coordination, balance, and proprioception to improve patient's ability to progress to PLOF and address remaining functional goals. (see flow sheet as applicable)     Details if applicable:       11  32734 Neuromuscular Re-Education (timed):  improve balance, coordination, kinesthetic sense, posture, core stability and proprioception to improve patient's ability to develop conscious control of individual muscles and awareness of position of extremities in order to progress to PLOF and address remaining functional goals. (see flow sheet as applicable)     Details if applicable:     26  90181 Therapeutic Activity (timed):  use of dynamic activities replicating functional movements to increase ROM, strength, coordination, balance, and proprioception in order to improve patient's ability to progress to PLOF and address

## 2025-05-14 NOTE — PROGRESS NOTES
mg  3 mg Intra-artICUlar Once    BUPivacaine (MARCAINE) 0.5 % injection 20 mg  4 mL Intra-artICUlar Once    BUPivacaine (MARCAINE) 0.5 % injection 20 mg  4 mL Intra-artICUlar Once        PHYSICAL EXAMINATION:  Ht 1.803 m (5' 11\")   Wt 94.3 kg (208 lb)   BMI 29.01 kg/m²      ORTHO EXAMINATION:  Examination Right knee Left knee   Skin Intact Intact   Range of motion 110-0 110-0   Effusion - -   Medial joint line tenderness + +   Lateral joint line tenderness - -   Popliteal tenderness - -   Osteophytes palpable + +   Ellis’s - -   Patella crepitus + +   Anterior drawer - -   Lateral laxity - -   Medial laxity - -   Varus deformity - -   Valgus deformity - -   Pretibial edema - -   Calf tenderness - -      RADIOGRAPHS:   XR LEFT KNEE 1/4/18  IMPRESSION: Osteoarthritis, particularly involving the patellofemoral joint, moderate joint effusion. No fracture evident.      XR RIGHT KNEE 9/1/17  IMPRESSION:  Three views - No fractures, no effusion, complete medial joint R, moderate L space narrowing, medial and lateral osteophytes present. Varus deformity.       XR RIGHT KNEE 12/22/15   IMPRESSION:  No fractures, no effusion, medial joint space narrowing, medial osteophytes present, varus deformity.     PROCEDURE: Bilateral knee injected 4 cc 0.25% Marcaine and 0.5 cc (3 mg) Celestone.  Chart reviewed for the following:   Jorge L ESCALANTE MD, have reviewed the History, Physical and updated the Allergic reactions for Wilmer Levy     TIME OUT performed immediately prior to start of procedure:  Jorge L ESCALANTE MD, have performed the following reviews on Wilmer Levy prior to the start of the procedure:            * Patient was identified by name and date of birth   * Agreement on procedure being performed was verified  * Risks and Benefits explained to the patient  * Procedure site verified and marked as necessary  * Patient was positioned for comfort  * Consent was obtained  Time: 4:17 PM  Date of procedure:

## 2025-05-15 ENCOUNTER — OFFICE VISIT (OUTPATIENT)
Age: 70
End: 2025-05-15

## 2025-05-15 VITALS — BODY MASS INDEX: 29.12 KG/M2 | HEIGHT: 71 IN | WEIGHT: 208 LBS

## 2025-05-15 DIAGNOSIS — G89.29 CHRONIC PAIN OF RIGHT KNEE: ICD-10-CM

## 2025-05-15 DIAGNOSIS — M25.561 CHRONIC PAIN OF RIGHT KNEE: ICD-10-CM

## 2025-05-15 DIAGNOSIS — M17.11 UNILATERAL PRIMARY OSTEOARTHRITIS, RIGHT KNEE: ICD-10-CM

## 2025-05-15 DIAGNOSIS — M17.12 UNILATERAL PRIMARY OSTEOARTHRITIS, LEFT KNEE: ICD-10-CM

## 2025-05-15 DIAGNOSIS — M25.562 CHRONIC PAIN OF LEFT KNEE: Primary | ICD-10-CM

## 2025-05-15 DIAGNOSIS — G89.29 CHRONIC PAIN OF LEFT KNEE: Primary | ICD-10-CM

## 2025-05-15 DIAGNOSIS — Z91.81 AT HIGH RISK FOR INJURY RELATED TO FALL: ICD-10-CM

## 2025-05-15 RX ORDER — BETAMETHASONE SODIUM PHOSPHATE AND BETAMETHASONE ACETATE 3; 3 MG/ML; MG/ML
3 INJECTION, SUSPENSION INTRA-ARTICULAR; INTRALESIONAL; INTRAMUSCULAR; SOFT TISSUE ONCE
Status: COMPLETED | OUTPATIENT
Start: 2025-05-15 | End: 2025-05-15

## 2025-05-15 RX ORDER — BUPIVACAINE HYDROCHLORIDE 5 MG/ML
4 INJECTION, SOLUTION PERINEURAL ONCE
Status: COMPLETED | OUTPATIENT
Start: 2025-05-15 | End: 2025-05-15

## 2025-05-15 RX ADMIN — BUPIVACAINE HYDROCHLORIDE 20 MG: 5 INJECTION, SOLUTION PERINEURAL at 16:20

## 2025-05-15 RX ADMIN — BUPIVACAINE HYDROCHLORIDE 20 MG: 5 INJECTION, SOLUTION PERINEURAL at 16:19

## 2025-05-15 RX ADMIN — BETAMETHASONE SODIUM PHOSPHATE AND BETAMETHASONE ACETATE 3 MG: 3; 3 INJECTION, SUSPENSION INTRA-ARTICULAR; INTRALESIONAL; INTRAMUSCULAR; SOFT TISSUE at 16:19

## 2025-05-21 ENCOUNTER — HOSPITAL ENCOUNTER (OUTPATIENT)
Facility: HOSPITAL | Age: 70
Setting detail: RECURRING SERIES
Discharge: HOME | End: 2025-05-24
Attending: STUDENT IN AN ORGANIZED HEALTH CARE EDUCATION/TRAINING PROGRAM
Payer: MEDICARE

## 2025-05-21 PROCEDURE — 97112 NEUROMUSCULAR REEDUCATION: CPT

## 2025-05-21 PROCEDURE — 97110 THERAPEUTIC EXERCISES: CPT

## 2025-05-21 PROCEDURE — 97530 THERAPEUTIC ACTIVITIES: CPT

## 2025-05-22 NOTE — PROGRESS NOTES
PHYSICAL  DAILY TREATMENT NOTE     Patient Name: Wilmer Levy    Date: 2025    : 1955  [facility] / Plan: [employer]AGE H[employer] CSNP / Product Type: *No Product type* /      Patient  verified Yes     Visit #   Current / Total 3 10   Time   In / Out 211 305   Pain   In / Out 010 0/10   Subjective Functional Status/Changes:  Pt reports 80% improvement overall since SOC  Pt reports he was able to amb 2 miles from bus stop to clinic with SPC  with max p! 8/10  Pt reports he received steroid injections in B Knees and has been p! Free        Next PN/ RC due 25  Auth due 25  36V  TREATMENT AREA =  Primary osteoarthritis of both knees  Recurrent falls  Unsteady gait  Pain in right knee  Pain in left knee  Unsteadiness on feet     OBJECTIVE      Modalities Rationale:     decrease inflammation and decrease pain to improve patient's ability to progress to PLOF and address remaining functional goals.    10 min  unbill [x]  Ice     []  Heat    location/position: Supine with wedge under B Les     Skin assessment post-treatment:   Intact        Therapeutic Procedures:    Tx Min Billable or 1:1 Min (if diff from Tx Min) Procedure, Rationale, Specifics   10  29967 Therapeutic Exercise (timed):  increase ROM, strength, coordination, balance, and proprioception to improve patient's ability to progress to PLOF and address remaining functional goals. (see flow sheet as applicable)     Details if applicable:       10  54388 Neuromuscular Re-Education (timed):  improve balance, coordination, kinesthetic sense, posture, core stability and proprioception to improve patient's ability to develop conscious control of individual muscles and awareness of position of extremities in order to progress to PLOF and address remaining functional goals. (see flow sheet as applicable)     Details if applicable:       06540 Therapeutic Activity (timed):  use of dynamic activities replicating 
on/off of his bike.   Last RC: 4+/5 bilaterally at eval      Frequency / Duration:   Patient to be seen   2   times per week for   5    weeks:  Frequency / Duration: Patient would benefit from skilled PT 2 times per week for 24 VISITS sessions as needed in this certification period.  Goals will be assigned and reassessed every 10 visits/ 30 days per Medicare guidelines    Assessments/Recommendations:     Pt self-reports 80% improvement since beginning PT with fair progress. Pt reports functional gains that include: walking further. Pt reports functional deficits that include: increased pain with increased activity. Pt reports a recent average pain of 5-6/10, 8/10 at worst, and 5/10 at best. Objectively pt is demonstrating progressing AROM AROM into bilateral knee flexion, improved FGA score suggesting progressing dynamic balance as well as progress with his LEFS. Pt would benefit from continued skilled PT to address remaining functional deficits. We will continue with PT at 1-2x/wk for 5 weeks.    If you have any questions/comments please contact us directly at [phone].   Thank you for allowing us to assist in the care of your patient.    Certification Period: 5/21/25 - 6/19/25  Reporting Period (date from last assessment to current assessment): 3/25/25 - 6/24/25    Clifton Perez, PT       5/22/2025       9:10 AM      ___ I have read the above report and request that my patient continue as recommended.   ___ I have read the above report and request that my patient continue therapy with the following changes/special instructions: ________________________________________________   ___ I have read the above report and request that my patient be discharged from therapy.     Physician's Signature:_________________________   DATE:_________   TIME:________                           Mariajose Rios MD    ** Signature, Date and Time must be completed for valid certification **  Please sign and return to [facility] 
Patient is pregnant regardless of trimester (administer thimerosal-free vaccine)

## 2025-05-28 ENCOUNTER — OFFICE VISIT (OUTPATIENT)
Age: 70
End: 2025-05-28
Payer: MEDICARE

## 2025-05-28 ENCOUNTER — HOSPITAL ENCOUNTER (OUTPATIENT)
Facility: HOSPITAL | Age: 70
Setting detail: RECURRING SERIES
Discharge: HOME | End: 2025-05-31
Attending: STUDENT IN AN ORGANIZED HEALTH CARE EDUCATION/TRAINING PROGRAM
Payer: MEDICARE

## 2025-05-28 VITALS
DIASTOLIC BLOOD PRESSURE: 68 MMHG | WEIGHT: 255 LBS | BODY MASS INDEX: 35.7 KG/M2 | HEIGHT: 71 IN | OXYGEN SATURATION: 100 % | SYSTOLIC BLOOD PRESSURE: 136 MMHG | HEART RATE: 57 BPM

## 2025-05-28 DIAGNOSIS — I10 ESSENTIAL HYPERTENSION: ICD-10-CM

## 2025-05-28 DIAGNOSIS — E66.01 SEVERE OBESITY (BMI 35.0-39.9) WITH COMORBIDITY (HCC): ICD-10-CM

## 2025-05-28 DIAGNOSIS — N18.4 STAGE 4 CHRONIC KIDNEY DISEASE (HCC): ICD-10-CM

## 2025-05-28 DIAGNOSIS — G47.33 OBSTRUCTIVE SLEEP APNEA SYNDROME: ICD-10-CM

## 2025-05-28 DIAGNOSIS — Z95.5 HISTORY OF CORONARY ARTERY STENT PLACEMENT: ICD-10-CM

## 2025-05-28 DIAGNOSIS — I50.32 CHRONIC DIASTOLIC CONGESTIVE HEART FAILURE (HCC): ICD-10-CM

## 2025-05-28 DIAGNOSIS — Z01.810 PREOP CARDIOVASCULAR EXAM: ICD-10-CM

## 2025-05-28 DIAGNOSIS — E78.00 PURE HYPERCHOLESTEROLEMIA: ICD-10-CM

## 2025-05-28 DIAGNOSIS — I49.3 PVC'S (PREMATURE VENTRICULAR CONTRACTIONS): ICD-10-CM

## 2025-05-28 DIAGNOSIS — I25.118 CORONARY ARTERY DISEASE OF NATIVE ARTERY OF NATIVE HEART WITH STABLE ANGINA PECTORIS: Primary | ICD-10-CM

## 2025-05-28 PROCEDURE — 1123F ACP DISCUSS/DSCN MKR DOCD: CPT | Performed by: INTERNAL MEDICINE

## 2025-05-28 PROCEDURE — 97110 THERAPEUTIC EXERCISES: CPT

## 2025-05-28 PROCEDURE — 99214 OFFICE O/P EST MOD 30 MIN: CPT | Performed by: INTERNAL MEDICINE

## 2025-05-28 PROCEDURE — 3078F DIAST BP <80 MM HG: CPT | Performed by: INTERNAL MEDICINE

## 2025-05-28 PROCEDURE — 93000 ELECTROCARDIOGRAM COMPLETE: CPT | Performed by: INTERNAL MEDICINE

## 2025-05-28 PROCEDURE — 97112 NEUROMUSCULAR REEDUCATION: CPT

## 2025-05-28 PROCEDURE — 3075F SYST BP GE 130 - 139MM HG: CPT | Performed by: INTERNAL MEDICINE

## 2025-05-28 PROCEDURE — 97530 THERAPEUTIC ACTIVITIES: CPT

## 2025-05-28 ASSESSMENT — ENCOUNTER SYMPTOMS
RESPIRATORY NEGATIVE: 1
SHORTNESS OF BREATH: 0
GASTROINTESTINAL NEGATIVE: 1
EYES NEGATIVE: 1

## 2025-05-28 NOTE — PROGRESS NOTES
1. Have you been to the ER, urgent care clinic since your last visit?  Hospitalized since your last visit?     Yes When: 3/2025 Where: [facility] Reason for visit: leg swelling      2.  Where do you normally have your labs drawn?       3. Do you need any refills today?   no    4. Which local pharmacy do you use (enter pharmacy)?   [facility]    5. Which mail order pharmacy do you use (enter pharmacy)?        6. Are you here for surgical clearance and if so who will be doing your     procedure/surgery (care team)?   colonoscopy on 6/27/25, need instructions for Plavix.. [facility], [phone], fax: [phone]

## 2025-05-28 NOTE — PROGRESS NOTES
PHYSICAL  DAILY TREATMENT NOTE     Patient Name: Wilmer Levy    Date: 2025    : 1955  Insurance: Payor: SONNY MEDICARE / Plan: [employer] ENGAGE [employer] CSNP / Product Type: *No Product type* /      Patient  verified Yes     Visit #   Current / Total 1 10   Time   In / Out 212 311   Pain   In / Out 0/10 0/10   Subjective Functional Status/Changes:  Pt reports he is just tired all over and achy from moving stuff in his home for them to spray the house       Next PN/ RC due 25  Auth due 25  36V 24 remaining   TREATMENT AREA =  Primary osteoarthritis of both knees  Recurrent falls  Unsteady gait  Pain in right knee  Pain in left knee  Unsteadiness on feet     OBJECTIVE      Modalities Rationale:     decrease inflammation and decrease pain to improve patient's ability to progress to PLOF and address remaining functional goals.    10 min  unbill [x]  Ice     []  Heat    location/position: Supine with wedge under B LEs     Skin assessment post-treatment:   Intact        Therapeutic Procedures:    Tx Min Billable or 1:1 Min (if diff from Tx Min) Procedure, Rationale, Specifics   13 10 56536 Therapeutic Exercise (timed):  increase ROM, strength, coordination, balance, and proprioception to improve patient's ability to progress to PLOF and address remaining functional goals. (see flow sheet as applicable)     Details if applicable:       12 10 37821 Neuromuscular Re-Education (timed):  improve balance, coordination, kinesthetic sense, posture, core stability and proprioception to improve patient's ability to develop conscious control of individual muscles and awareness of position of extremities in order to progress to PLOF and address remaining functional goals. (see flow sheet as applicable)     Details if applicable:      99291 Therapeutic Activity (timed):  use of dynamic activities replicating functional movements to increase ROM, strength, coordination, balance, and proprioception in
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
## 2025-06-02 ENCOUNTER — HOSPITAL ENCOUNTER (OUTPATIENT)
Facility: HOSPITAL | Age: 70
Setting detail: RECURRING SERIES
Discharge: HOME | End: 2025-06-05
Attending: STUDENT IN AN ORGANIZED HEALTH CARE EDUCATION/TRAINING PROGRAM
Payer: MEDICARE

## 2025-06-02 PROCEDURE — 97530 THERAPEUTIC ACTIVITIES: CPT

## 2025-06-02 PROCEDURE — 97110 THERAPEUTIC EXERCISES: CPT

## 2025-06-02 PROCEDURE — 97112 NEUROMUSCULAR REEDUCATION: CPT

## 2025-06-02 NOTE — PROGRESS NOTES
PHYSICAL / OCCUPATIONAL THERAPY - DAILY TREATMENT NOTE    Patient Name: Wilmer Levy    Date: 2025    : 1955  Insurance: Payor: SONNY MEDICARE / Plan: [employer] ENGAGE HMO CSNP / Product Type: *No Product type* /      Patient  verified Yes     Visit #   Current / Total 2 10   Time   In / Out 130 225   Pain   In / Out 0 0   Subjective Functional Status/Changes: No pain today, just some intermittent balance issues but no falls.      TREATMENT AREA =  Primary osteoarthritis of both knees  Recurrent falls  Unsteady gait  Pain in right knee  Pain in left knee  Unsteadiness on feet    OBJECTIVE    Modalities Rationale:     decrease inflammation and decrease pain to improve patient's ability to progress to PLOF and address remaining functional goals.     min [] Estim Unattended, type/location:                                      []  w/ice    []  w/heat    min [] Estim Attended, type/location:                                     []  w/US     []  w/ice    []  w/heat    []  TENS insruct      min []  Mechanical Traction: type/lbs                   []  pro   []  sup   []  int   []  cont    []  before manual    []  after manual    min []  Ultrasound, settings/location:     10 min  unbill [x]  Ice     []  Heat    location/position: Supine with HOB elevated, bilateral knees    min []  Paraffin,  details:     min []  Vasopneumatic Device, press/temp:     min []  Whirlpool / Fluido:    If using vaso (only need to measure limb vaso being performed on)      pre-treatment girth :       post-treatment girth :       measured at (landmark location) :      min []  Other:    Skin assessment post-treatment:   Intact      Therapeutic Procedures:    Tx Min Billable or 1:1 Min (if diff from Tx Min) Procedure, Rationale, Specifics   56  49366 Therapeutic Exercise (timed):  increase ROM, strength, coordination, balance, and proprioception to improve patient's ability to progress to PLOF and address remaining functional

## 2025-06-04 ENCOUNTER — HOSPITAL ENCOUNTER (OUTPATIENT)
Facility: HOSPITAL | Age: 70
Setting detail: RECURRING SERIES
Discharge: HOME | End: 2025-06-07
Attending: STUDENT IN AN ORGANIZED HEALTH CARE EDUCATION/TRAINING PROGRAM
Payer: MEDICARE

## 2025-06-04 PROCEDURE — 97110 THERAPEUTIC EXERCISES: CPT

## 2025-06-04 PROCEDURE — 97530 THERAPEUTIC ACTIVITIES: CPT

## 2025-06-04 PROCEDURE — 97112 NEUROMUSCULAR REEDUCATION: CPT

## 2025-06-04 NOTE — PROGRESS NOTES
PHYSICAL  DAILY TREATMENT NOTE     Patient Name: Wilmer Levy    Date: 2025    : 1955  [facility] / Plan: [employer] MEDICARE ENGAGE HMO CSNP / Product Type: *No Product type* /      Patient  verified Yes     Visit #   Current / Total 3 10   Time   In / Out 333 430   Pain   In / Out 0/10 0/10   Subjective Functional Status/Changes:  Pt reports he is just tired all over and achy from moving stuff in his home for them to spray the house       Next PN/ RC due 25  Auth due 25  36V 22 remaining     TREATMENT AREA =  Primary osteoarthritis of both knees  Recurrent falls  Unsteady gait  Pain in right knee  Pain in left knee  Unsteadiness on feet     OBJECTIVE      Modalities Rationale:     decrease inflammation and decrease pain to improve patient's ability to progress to PLOF and address remaining functional goals.    10 min  unbill [x]  Ice     []  Heat    location/position: Supine with wedge under B LEs     Skin assessment post-treatment:   Intact        Therapeutic Procedures:    Tx Min Billable or 1:1 Min (if diff from Tx Min) Procedure, Rationale, Specifics   13  40898 Therapeutic Exercise (timed):  increase ROM, strength, coordination, balance, and proprioception to improve patient's ability to progress to PLOF and address remaining functional goals. (see flow sheet as applicable)     Details if applicable:       12  19389 Neuromuscular Re-Education (timed):  improve balance, coordination, kinesthetic sense, posture, core stability and proprioception to improve patient's ability to develop conscious control of individual muscles and awareness of position of extremities in order to progress to PLOF and address remaining functional goals. (see flow sheet as applicable)     Details if applicable:     22  39177 Therapeutic Activity (timed):  use of dynamic activities replicating functional movements to increase ROM, strength, coordination, balance, and proprioception in order

## 2025-06-09 ENCOUNTER — TELEPHONE (OUTPATIENT)
Facility: CLINIC | Age: 70
End: 2025-06-09

## 2025-06-09 ENCOUNTER — HOSPITAL ENCOUNTER (OUTPATIENT)
Facility: HOSPITAL | Age: 70
Setting detail: RECURRING SERIES
Discharge: HOME | End: 2025-06-12
Attending: STUDENT IN AN ORGANIZED HEALTH CARE EDUCATION/TRAINING PROGRAM
Payer: MEDICARE

## 2025-06-09 PROCEDURE — 97112 NEUROMUSCULAR REEDUCATION: CPT

## 2025-06-09 PROCEDURE — 97530 THERAPEUTIC ACTIVITIES: CPT

## 2025-06-09 PROCEDURE — 97110 THERAPEUTIC EXERCISES: CPT

## 2025-06-09 NOTE — PROGRESS NOTES
achieved in   5  weeks:     1.    Pt will demonstrate right knee flexion AROM to at least 116 degrees to improve stance phase. progressing  Last RC status: AAROM flexion to 115 deg       2.    Pt will demonstrate left knee flexion AROM to at least 104 degrees to improve gait training.  Last RC status: AAROM flexion to 115 deg   Updated goal: 120 degrees     3.  Pt will be independent with HEP at D/C for self management. Progressing, min VCing  Last RC:     4.  Pt will improve FGA to at least 16/30 to indicate decreased falls risk.    Last RC: 17/30  Updated goal: 13/30     5.  Pt will improve LEFS outcome measure by 12 points to decrease functional limitations.                              Last RC: 69/80  Updated: Maintain a score of at least a 70 points to ensure carry over of functional gains made with PT.      6. Pt will be able be able to improve bilateral hip flexion strength to at least 5/5 to improve his ability to get on/off of his bike. Progressing, increased to 45# without difficulty   Last RC: 4+/5 bilaterally at eval    PLAN  Yes  Continue plan of care  [x]  Upgrade activities as tolerated  []  Discharge due to :  [x]  Other:d/c in [place]    Jeri Vu PTA    6/9/2025    1:35 PM    If an interpreting service was utilized for treatment of this patient, the contents of this document represent the material reviewed with the patient via the .    Future Appointments   Date Time Provider Department Center   6/11/2025  1:30 PM Jeri Vu PTA [facility]   6/16/2025 11:30 AM Jeri Vu PTA [facility]   7/15/2025  1:20 PM Mariajose Rios MD [facility] DEP   8/4/2025  1:30 PM [facility] CANCER BLOOD ROOM [facility] Sched   8/15/2025  1:00 PM Roxana Becker PA [facility] Sched   12/8/2025  2:30 PM Era Culp MD [facility] AMB

## 2025-06-09 NOTE — TELEPHONE ENCOUNTER
Pt called stated that he went to go see a kidney doctor, dr quinteros patient would like for you to call the kidney doctor cause he didn't understand what the doctor was saying he just understood that his levels was high. And he was advise to get in contact with his pcp he just needs clarification    Dr quinteros call back number is [phone]

## 2025-06-10 NOTE — TELEPHONE ENCOUNTER
Spoke to the patient over the phone.  Reviewed renal labs.  Noted that his hemoglobin is 8.2.  It has drifted down.  Patient is scheduled for colonoscopy.  He denies any NSAID use or dark stools.    Reports that repeat/further blood work has been ordered by nephrologist-likely iron studies etc.  Will follow-up

## 2025-06-11 ENCOUNTER — APPOINTMENT (OUTPATIENT)
Facility: HOSPITAL | Age: 70
End: 2025-06-11
Attending: STUDENT IN AN ORGANIZED HEALTH CARE EDUCATION/TRAINING PROGRAM
Payer: MEDICARE

## 2025-06-17 DIAGNOSIS — K21.9 GASTROESOPHAGEAL REFLUX DISEASE WITHOUT ESOPHAGITIS: ICD-10-CM

## 2025-06-17 RX ORDER — OMEPRAZOLE 40 MG/1
CAPSULE, DELAYED RELEASE ORAL DAILY
Qty: 90 CAPSULE | Refills: 1 | Status: SHIPPED | OUTPATIENT
Start: 2025-06-17

## 2025-06-18 DIAGNOSIS — I50.42 CHRONIC COMBINED SYSTOLIC (CONGESTIVE) AND DIASTOLIC (CONGESTIVE) HEART FAILURE (HCC): ICD-10-CM

## 2025-06-18 DIAGNOSIS — N32.81 OVERACTIVE BLADDER: ICD-10-CM

## 2025-06-18 RX ORDER — CLOPIDOGREL BISULFATE 75 MG/1
75 TABLET ORAL DAILY
Qty: 90 TABLET | Refills: 1 | Status: SHIPPED | OUTPATIENT
Start: 2025-06-18

## 2025-06-21 DIAGNOSIS — I50.33 ACUTE ON CHRONIC DIASTOLIC CONGESTIVE HEART FAILURE (HCC): ICD-10-CM

## 2025-06-21 DIAGNOSIS — E78.2 MIXED HYPERLIPIDEMIA: ICD-10-CM

## 2025-06-24 DIAGNOSIS — I50.33 ACUTE ON CHRONIC DIASTOLIC CONGESTIVE HEART FAILURE (HCC): ICD-10-CM

## 2025-06-24 RX ORDER — EMPAGLIFLOZIN 10 MG/1
10 TABLET, FILM COATED ORAL DAILY
Qty: 90 TABLET | Refills: 3 | Status: SHIPPED | OUTPATIENT
Start: 2025-06-24

## 2025-06-24 RX ORDER — AMLODIPINE BESYLATE 5 MG/1
5 TABLET ORAL DAILY
Qty: 90 TABLET | Refills: 3 | Status: SHIPPED | OUTPATIENT
Start: 2025-06-24

## 2025-06-24 RX ORDER — AMLODIPINE BESYLATE 5 MG/1
5 TABLET ORAL DAILY
Qty: 90 TABLET | Refills: 1 | Status: SHIPPED | OUTPATIENT
Start: 2025-06-24 | End: 2025-06-24 | Stop reason: SDUPTHER

## 2025-06-25 RX ORDER — ATORVASTATIN CALCIUM 80 MG/1
80 TABLET, FILM COATED ORAL DAILY
Qty: 90 TABLET | Refills: 1 | Status: SHIPPED | OUTPATIENT
Start: 2025-06-25

## 2025-06-30 ENCOUNTER — HOSPITAL ENCOUNTER (OUTPATIENT)
Facility: HOSPITAL | Age: 70
Setting detail: RECURRING SERIES
End: 2025-06-30
Attending: STUDENT IN AN ORGANIZED HEALTH CARE EDUCATION/TRAINING PROGRAM
Payer: MEDICARE

## 2025-07-02 ENCOUNTER — HOSPITAL ENCOUNTER (OUTPATIENT)
Facility: HOSPITAL | Age: 70
Setting detail: RECURRING SERIES
Discharge: HOME | End: 2025-07-05
Attending: STUDENT IN AN ORGANIZED HEALTH CARE EDUCATION/TRAINING PROGRAM
Payer: MEDICARE

## 2025-07-02 PROCEDURE — 97530 THERAPEUTIC ACTIVITIES: CPT

## 2025-07-02 PROCEDURE — 97112 NEUROMUSCULAR REEDUCATION: CPT

## 2025-07-02 PROCEDURE — 97110 THERAPEUTIC EXERCISES: CPT

## 2025-07-02 NOTE — PROGRESS NOTES
27-Aug-2019 06:53 PHYSICAL  DAILY TREATMENT NOTE     Patient Name: Wilmer Levy    Date: 2025    : 1955  [facility] / Plan: [employer] MEDICARE ENGAGE HMO CSNP / Product Type: *No Product type* /      Patient  verified Yes     Visit #   Current / Total 1 10   Time   In / Out 256 346   Pain   In / Out 0/10 010   Subjective Functional Status/Changes:  See PN           Next PN/ RC due 25  Auth due 25  20 remaining     TREATMENT AREA =  Primary osteoarthritis of both knees  Recurrent falls  Unsteady gait  Pain in right knee  Pain in left knee  Unsteadiness on feet     OBJECTIVE      Modalities Rationale:     decrease inflammation and decrease pain to improve patient's ability to progress to PLOF and address remaining functional goals.    10 min  unbill [x]  Ice     []  Heat    location/position: Supine with wedge under B LEs     Skin assessment post-treatment:   Intact        Therapeutic Procedures:    Tx Min Billable or 1:1 Min (if diff from Tx Min) Procedure, Rationale, Specifics   10  92727 Therapeutic Exercise (timed):  increase ROM, strength, coordination, balance, and proprioception to improve patient's ability to progress to PLOF and address remaining functional goals. (see flow sheet as applicable)     Details if applicable:       10  45204 Neuromuscular Re-Education (timed):  improve balance, coordination, kinesthetic sense, posture, core stability and proprioception to improve patient's ability to develop conscious control of individual muscles and awareness of position of extremities in order to progress to PLOF and address remaining functional goals. (see flow sheet as applicable)     Details if applicable:     20  47951 Therapeutic Activity (timed):  use of dynamic activities replicating functional movements to increase ROM, strength, coordination, balance, and proprioception in order to improve patient's ability to progress to PLOF and address remaining functional goals.  (see flow  27-Aug-2019 06:50
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
## 2025-07-03 NOTE — PROGRESS NOTES
[facility] [address]  [address]  [place] [address]  [phone] fax    DISCHARGE SUMMARY  Patient Name: Wilmer Levy : 1955   Treatment/Medical Diagnosis: Primary osteoarthritis of both knees [M17.0]  Recurrent falls [R29.6]  Unsteady gait [R26.81]  Pain in right knee [M25.561]  Pain in left knee [M25.562]  Unsteadiness on feet [R26.81]   Referral Source: Mariajose Rios MD     Date of Initial Visit: 3/25/25 Attended Visits: 16 Missed Visits: 0     SUMMARY OF TREATMENT   Pt self-reports 90% improvement since beginning PT with fair progress. Pt reports functional gains that include: Ambulate > 1hr . Pt reports functional deficits that include: Going up stairs, and standing x 1 hr . Pt reports a recent average pain of 0/10, 1-2/10 at worst, and 0/10 at best. Objectively pt is progressing & was able to meet his FGA suggesting minimal risk for falls. Pt to be discharged at this time with an updated HEP for independent symptom management, should he feel like he needs more skilled PT services an updated referral would be needed from a physician.     CURRENT STATUS  1. Pt will demonstrate left knee flexion AROM to at least 104 degrees to improve gait training.   Updated goal: 120 degrees                                RC : progressed but not met  L knee flex AROM= 118 degs      RECOMMENDATIONS  Pt to be discharged with an updated HEP to further enhance carry over of functional gains made with PT & independent symptom management. Should the patient require more physical therapy, we would be a happy to work with patient again with an updated referral from a physician.      If you have any questions/comments please contact us directly at [phone].   Thank you for allowing us to assist in the care of your patient.  PTA signature       Therapist Signature: Clifton Perez PT Date: 7/3/25   Reporting Period: 25 - 25 Time: 7:57 AM
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
## 2025-07-14 ENCOUNTER — OFFICE VISIT (OUTPATIENT)
Age: 70
End: 2025-07-14

## 2025-07-14 DIAGNOSIS — M17.11 UNILATERAL PRIMARY OSTEOARTHRITIS, RIGHT KNEE: ICD-10-CM

## 2025-07-14 DIAGNOSIS — M17.12 UNILATERAL PRIMARY OSTEOARTHRITIS, LEFT KNEE: Primary | ICD-10-CM

## 2025-07-15 ENCOUNTER — OFFICE VISIT (OUTPATIENT)
Facility: CLINIC | Age: 70
End: 2025-07-15
Payer: MEDICARE

## 2025-07-15 VITALS
OXYGEN SATURATION: 98 % | WEIGHT: 249 LBS | BODY MASS INDEX: 34.86 KG/M2 | HEIGHT: 71 IN | DIASTOLIC BLOOD PRESSURE: 65 MMHG | SYSTOLIC BLOOD PRESSURE: 138 MMHG | HEART RATE: 70 BPM

## 2025-07-15 DIAGNOSIS — G30.0 MILD EARLY ONSET ALZHEIMER'S DEMENTIA WITHOUT BEHAVIORAL DISTURBANCE, PSYCHOTIC DISTURBANCE, MOOD DISTURBANCE, OR ANXIETY (HCC): ICD-10-CM

## 2025-07-15 DIAGNOSIS — F02.A0 MILD EARLY ONSET ALZHEIMER'S DEMENTIA WITHOUT BEHAVIORAL DISTURBANCE, PSYCHOTIC DISTURBANCE, MOOD DISTURBANCE, OR ANXIETY (HCC): ICD-10-CM

## 2025-07-15 DIAGNOSIS — R73.03 PREDIABETES: ICD-10-CM

## 2025-07-15 DIAGNOSIS — M1A.39X0 CHRONIC GOUT DUE TO RENAL IMPAIRMENT OF MULTIPLE SITES WITHOUT TOPHUS: ICD-10-CM

## 2025-07-15 DIAGNOSIS — N18.32 CHRONIC KIDNEY DISEASE, STAGE 3B (HCC): ICD-10-CM

## 2025-07-15 DIAGNOSIS — I50.42 CHRONIC COMBINED SYSTOLIC (CONGESTIVE) AND DIASTOLIC (CONGESTIVE) HEART FAILURE (HCC): ICD-10-CM

## 2025-07-15 DIAGNOSIS — Z95.5 HISTORY OF CORONARY ARTERY STENT PLACEMENT: ICD-10-CM

## 2025-07-15 DIAGNOSIS — R26.81 UNSTEADY GAIT: ICD-10-CM

## 2025-07-15 DIAGNOSIS — D64.9 ANEMIA, UNSPECIFIED TYPE: ICD-10-CM

## 2025-07-15 DIAGNOSIS — M17.0 PRIMARY OSTEOARTHRITIS OF BOTH KNEES: ICD-10-CM

## 2025-07-15 DIAGNOSIS — K59.00 CONSTIPATION, UNSPECIFIED CONSTIPATION TYPE: ICD-10-CM

## 2025-07-15 DIAGNOSIS — I25.10 ATHEROSCLEROSIS OF NATIVE CORONARY ARTERY OF NATIVE HEART WITHOUT ANGINA PECTORIS: ICD-10-CM

## 2025-07-15 DIAGNOSIS — R60.0 LOCALIZED EDEMA: ICD-10-CM

## 2025-07-15 DIAGNOSIS — N32.81 OVERACTIVE BLADDER: ICD-10-CM

## 2025-07-15 DIAGNOSIS — I10 ESSENTIAL (PRIMARY) HYPERTENSION: Primary | ICD-10-CM

## 2025-07-15 DIAGNOSIS — E78.2 MIXED HYPERLIPIDEMIA: ICD-10-CM

## 2025-07-15 DIAGNOSIS — R41.3 MEMORY DEFICIT: ICD-10-CM

## 2025-07-15 PROCEDURE — 1123F ACP DISCUSS/DSCN MKR DOCD: CPT | Performed by: STUDENT IN AN ORGANIZED HEALTH CARE EDUCATION/TRAINING PROGRAM

## 2025-07-15 PROCEDURE — 3078F DIAST BP <80 MM HG: CPT | Performed by: STUDENT IN AN ORGANIZED HEALTH CARE EDUCATION/TRAINING PROGRAM

## 2025-07-15 PROCEDURE — 3075F SYST BP GE 130 - 139MM HG: CPT | Performed by: STUDENT IN AN ORGANIZED HEALTH CARE EDUCATION/TRAINING PROGRAM

## 2025-07-15 PROCEDURE — 99214 OFFICE O/P EST MOD 30 MIN: CPT | Performed by: STUDENT IN AN ORGANIZED HEALTH CARE EDUCATION/TRAINING PROGRAM

## 2025-07-15 PROCEDURE — G2211 COMPLEX E/M VISIT ADD ON: HCPCS | Performed by: STUDENT IN AN ORGANIZED HEALTH CARE EDUCATION/TRAINING PROGRAM

## 2025-07-15 RX ORDER — AMLODIPINE BESYLATE 5 MG/1
5 TABLET ORAL DAILY
Qty: 90 TABLET | Refills: 3 | Status: SHIPPED | OUTPATIENT
Start: 2025-07-15

## 2025-07-15 RX ORDER — SENNA AND DOCUSATE SODIUM 50; 8.6 MG/1; MG/1
2 TABLET, FILM COATED ORAL DAILY
Qty: 60 TABLET | Refills: 2 | Status: SHIPPED | OUTPATIENT
Start: 2025-07-15

## 2025-07-15 RX ORDER — TROSPIUM CHLORIDE 20 MG/1
20 TABLET, FILM COATED ORAL DAILY
Qty: 90 TABLET | Refills: 1 | Status: SHIPPED | OUTPATIENT
Start: 2025-07-15

## 2025-07-15 RX ORDER — TROSPIUM CHLORIDE 20 MG/1
20 TABLET, FILM COATED ORAL DAILY
Qty: 90 TABLET | Refills: 1 | OUTPATIENT
Start: 2025-07-15

## 2025-07-15 SDOH — ECONOMIC STABILITY: FOOD INSECURITY: WITHIN THE PAST 12 MONTHS, THE FOOD YOU BOUGHT JUST DIDN'T LAST AND YOU DIDN'T HAVE MONEY TO GET MORE.: NEVER TRUE

## 2025-07-15 SDOH — ECONOMIC STABILITY: FOOD INSECURITY: WITHIN THE PAST 12 MONTHS, YOU WORRIED THAT YOUR FOOD WOULD RUN OUT BEFORE YOU GOT MONEY TO BUY MORE.: NEVER TRUE

## 2025-07-15 ASSESSMENT — ENCOUNTER SYMPTOMS
VOMITING: 0
BACK PAIN: 1
RHINORRHEA: 0
SHORTNESS OF BREATH: 1
CONSTIPATION: 1
CHEST TIGHTNESS: 0
DIARRHEA: 0
ABDOMINAL PAIN: 0
NAUSEA: 0

## 2025-07-15 ASSESSMENT — PATIENT HEALTH QUESTIONNAIRE - PHQ9
SUM OF ALL RESPONSES TO PHQ QUESTIONS 1-9: 0
2. FEELING DOWN, DEPRESSED OR HOPELESS: NOT AT ALL
SUM OF ALL RESPONSES TO PHQ QUESTIONS 1-9: 0
1. LITTLE INTEREST OR PLEASURE IN DOING THINGS: NOT AT ALL

## 2025-07-15 NOTE — PROGRESS NOTES
Behavior: Behavior normal.         Thought Content: Thought content normal.         Judgment: Judgment normal.          No visits with results within 3 Month(s) from this visit.   Latest known visit with results is:   Office Visit on 03/11/2025   Component Date Value Ref Range Status    Hemoglobin A1C, POC 03/11/2025 5.6  % Final       No results found for any visits on 07/15/25.      Patient Care Team:  Patient Care Team:  Mariajose Rios MD as PCP - General  Mariajose Rios MD as PCP [facility]  Óscar Laureano MD as Consulting Physician      Assessment / Plan:     Diagnosis Orders   1. Essential (primary) hypertension  amLODIPine (NORVASC) 5 MG tablet      2. Constipation, unspecified constipation type  sennosides-docusate sodium (SENOKOT-S) 8.6-50 MG tablet      3. Overactive bladder  trospium (SANCTURA) 20 MG tablet      4. Mixed hyperlipidemia  Lipid Panel      5. Chronic combined systolic (congestive) and diastolic (congestive) heart failure (HCC)  empagliflozin (JARDIANCE) 10 MG tablet      6. Mild early onset Alzheimer's dementia without behavioral disturbance, psychotic disturbance, mood disturbance, or anxiety (HCC)        7. Memory deficit        8. Primary osteoarthritis of both knees        9. Unsteady gait        10. Chronic kidney disease, stage 3b (HCC)  empagliflozin (JARDIANCE) 10 MG tablet    Renal Function Panel      11. Prediabetes  Albumin/Creatinine Ratio, Urine    Hemoglobin A1C      12. Chronic gout due to renal impairment of multiple sites without tophus        13. History of coronary artery stent placement        14. Atherosclerosis of native coronary artery of native heart without angina pectoris        15. Localized edema        16. Anemia, unspecified type  CBC    Ferritin    Iron and TIBC          Labs and notes reviewed    HTN: Continue amlodipine, hydralazine, Imdur, Coreg.  BP is controlled.     Osteoarthritis: Continue Tylenol as needed.  Patient reports improvement

## 2025-07-17 NOTE — PROGRESS NOTES
Patient: Wilemr Levy                MRN: [id number]       SSN: [id number]  YOB: 1955        AGE: 70 y.o.        SEX: male  There is no height or weight on file to calculate BMI.    PCP: Mariajose Rios MD  07/21/25    Chief Complaint   Patient presents with    Knee Pain     bilateral     HISTORY:  Wilmer Levy is a 70 y.o. male who is seen for bilateral  knee pain. He presents today for his second injection in the Euflexxa visco supplementation series.      ICD-10-CM    1. Unilateral primary osteoarthritis, left knee  M17.12 DRAIN/INJECT LARGE JOINT/BURSA     sodium hyaluronate (EUFLEXXA, HYALGAN) injection 20 mg      2. Unilateral primary osteoarthritis, right knee  M17.11 DRAIN/INJECT LARGE JOINT/BURSA     sodium hyaluronate (EUFLEXXA, HYALGAN) injection 20 mg         PROCEDURE:  Mr. Levy's knees injected with 2 cc of Euflexxa.     Chart reviewed for the following:   Jorge L ESCALANTE MD, have reviewed the History, Physical and updated the Allergic reactions for Wilmer Levy     TIME OUT performed immediately prior to start of procedure:  Jorge L ESCALANTE MD, have performed the following reviews on Wilmer Levy prior to the start of the procedure:            * Patient was identified by name and date of birth   * Agreement on procedure being performed was verified  * Risks and Benefits explained to the patient  * Procedure site verified and marked as necessary  * Patient was positioned for comfort  * Consent was obtained     Time: 11:26 AM     Date of procedure: 7/21/2025    Procedure performed by:  Jorge L Ardon MD    Mr. Levy tolerated the procedure well with no complications.    PLAN: Mr. Levy's knees injected with 2 cc of Euflexxa. Mr. Levy will follow up in one week to complete his visco supplementation injection series.    Documentation by jeff Alcaraz, as documented by Jorge L Ardon MD.

## 2025-07-21 ENCOUNTER — OFFICE VISIT (OUTPATIENT)
Age: 70
End: 2025-07-21
Payer: MEDICARE

## 2025-07-21 DIAGNOSIS — M17.11 UNILATERAL PRIMARY OSTEOARTHRITIS, RIGHT KNEE: ICD-10-CM

## 2025-07-21 DIAGNOSIS — R41.3 MEMORY DEFICIT: ICD-10-CM

## 2025-07-21 DIAGNOSIS — M17.12 UNILATERAL PRIMARY OSTEOARTHRITIS, LEFT KNEE: Primary | ICD-10-CM

## 2025-07-21 PROCEDURE — 20610 DRAIN/INJ JOINT/BURSA W/O US: CPT | Performed by: SPECIALIST

## 2025-07-24 RX ORDER — DONEPEZIL HYDROCHLORIDE 5 MG/1
5 TABLET, FILM COATED ORAL NIGHTLY
Qty: 90 TABLET | Refills: 1 | Status: SHIPPED | OUTPATIENT
Start: 2025-07-24

## 2025-07-30 ENCOUNTER — OFFICE VISIT (OUTPATIENT)
Age: 70
End: 2025-07-30

## 2025-07-30 VITALS — BODY MASS INDEX: 34.86 KG/M2 | WEIGHT: 249 LBS | HEIGHT: 71 IN

## 2025-07-30 DIAGNOSIS — M17.11 UNILATERAL PRIMARY OSTEOARTHRITIS, RIGHT KNEE: ICD-10-CM

## 2025-07-30 DIAGNOSIS — M17.12 UNILATERAL PRIMARY OSTEOARTHRITIS, LEFT KNEE: Primary | ICD-10-CM

## 2025-08-03 DIAGNOSIS — I10 ESSENTIAL (PRIMARY) HYPERTENSION: ICD-10-CM

## 2025-08-03 DIAGNOSIS — I50.42 CHRONIC COMBINED SYSTOLIC (CONGESTIVE) AND DIASTOLIC (CONGESTIVE) HEART FAILURE (HCC): ICD-10-CM

## 2025-08-07 RX ORDER — CARVEDILOL 12.5 MG/1
12.5 TABLET ORAL 2 TIMES DAILY
Qty: 60 TABLET | Refills: 2 | Status: SHIPPED | OUTPATIENT
Start: 2025-08-07

## 2025-08-12 DIAGNOSIS — N18.32 CHRONIC KIDNEY DISEASE, STAGE 3B (HCC): ICD-10-CM

## 2025-08-12 DIAGNOSIS — R73.03 PREDIABETES: ICD-10-CM

## 2025-08-12 DIAGNOSIS — D64.9 ANEMIA, UNSPECIFIED TYPE: ICD-10-CM

## 2025-08-12 DIAGNOSIS — E78.2 MIXED HYPERLIPIDEMIA: ICD-10-CM

## 2025-08-13 LAB
ANISOCYTOSIS: ABNORMAL
CREATININE, URINE  MG/DL: 343 MG/DL
ESTIMATED AVERAGE GLUCOSE: 113 MG/DL (ref 91–123)
HBA1C MFR BLD: 5.6 % (ref 4.8–5.6)
HCT VFR BLD CALC: 28.9 % (ref 37.8–52.2)
HEMOGLOBIN: 7.5 G/DL (ref 12.6–17.1)
HYPOCHROMASIA: ABNORMAL
MCH RBC QN AUTO: 20 PG (ref 26–34)
MCHC RBC AUTO-ENTMCNC: 26 G/DL (ref 31–36)
MCV RBC AUTO: 77 FL (ref 80–95)
MICROALBUMIN/CREAT 24H UR: 274.2 MG/L (ref 0.1–17)
MICROALBUMIN/CREAT UR-RTO: 79.9 (ref 0–30)
MICROCYTES: ABNORMAL
OVALOCYTES: ABNORMAL
PDW BLD-RTO: 22.1 % (ref 10–15.5)
PLATELET # BLD: 265 K/UL (ref 140–440)
PMV BLD AUTO: 9.8 FL (ref 9–13)
POLYCHROMASIA: ABNORMAL
RBC # BLD: 3.74 M/UL (ref 3.8–5.8)
RBC FRAGMENTS: ABNORMAL
SMEAR REVIEW: ABNORMAL
TARGET CELLS: ABNORMAL
WBC # BLD: 5 K/UL (ref 4–11)

## 2025-08-28 DIAGNOSIS — M1A.39X0 CHRONIC GOUT DUE TO RENAL IMPAIRMENT OF MULTIPLE SITES WITHOUT TOPHUS: ICD-10-CM

## 2025-08-28 RX ORDER — ALLOPURINOL 100 MG/1
100 TABLET ORAL DAILY
Qty: 90 TABLET | Refills: 1 | Status: SHIPPED | OUTPATIENT
Start: 2025-08-28

## (undated) DEVICE — CATHETER ANGIO 4FR L100CM S STL NYL JL4 3 SEG BRAID SFT

## (undated) DEVICE — PRESSURE MONITORING SET: Brand: TRUWAVE

## (undated) DEVICE — SOLUTION IRRIG 1000ML H2O STRL BLT

## (undated) DEVICE — (D)GLOVE EXAM LG NITRL NS -- DISC BY MFR NO SUB

## (undated) DEVICE — TREK CORONARY DILATATION CATHETER 2.50 MM X 12 MM / RAPID-EXCHANGE: Brand: TREK

## (undated) DEVICE — MEDI-VAC NON-CONDUCTIVE SUCTION TUBING: Brand: CARDINAL HEALTH

## (undated) DEVICE — SYR 50ML SLIP TIP NSAF LF STRL --

## (undated) DEVICE — PACK PROCEDURE SURG VASC CATH 161 MMC LF

## (undated) DEVICE — RUNTHROUGH NS EXTRA FLOPPY PTCA GUIDEWIRE: Brand: RUNTHROUGH

## (undated) DEVICE — SYRINGE MED 20ML STD CLR PLAS LUERLOCK TIP N CTRL DISP

## (undated) DEVICE — FLEX ADVANTAGE 3000CC: Brand: FLEX ADVANTAGE

## (undated) DEVICE — INTRODUCER SHTH 6FR CANN L11CM DIL TIP 35MM GRN TUNGSTEN

## (undated) DEVICE — SYR 10ML LUER LOK 1/5ML GRAD --

## (undated) DEVICE — CATHETER ANGIO 4FR L100CM COR NYL AR MOD W/O SIDE H RADPQ

## (undated) DEVICE — PROCEDURE KIT FLUID MGMT 10 FR CUST MAINFOLD

## (undated) DEVICE — CATHETER SUCT TR FL TIP 14FR W/ O CTRL

## (undated) DEVICE — COVER US PRB W15XL120CM W/ GEL RUBBERBAND TAPE STRP FLD GEN

## (undated) DEVICE — ANGIO-SEAL VIP VASCULAR CLOSURE DEVICE: Brand: ANGIO-SEAL

## (undated) DEVICE — SET FLD ADMIN 3 W STPCOCK FIX FEM L BOR 1IN

## (undated) DEVICE — ENDOSCOPY PUMP TUBING/ CAP SET: Brand: ERBE

## (undated) DEVICE — CANNULA ORIG TL CLR W FOAM CUSHIONS AND 14FT SUPL TB 3 CHN

## (undated) DEVICE — GAUZE SPONGES,16 PLY: Brand: CURITY

## (undated) DEVICE — GUIDE CATHETER: Brand: MACH1™

## (undated) DEVICE — COPILOT KIT INCLUDES BLEEDBACK CONTROL VALVE 20/30 INDEFLATOR INFLATION DEVICE 30 ATM 20 CC / GUIDE WIRE INTRODUCER / TORQUE DEVICE: Brand: INDEFLATOR

## (undated) DEVICE — AIRLIFE™ NASAL OXYGEN CANNULA CURVED, NONFLARED TIP WITH 14 FOOT (4.3 M) CRUSH-RESISTANT TUBING, OVER-THE-EAR STYLE: Brand: AIRLIFE™

## (undated) DEVICE — NC TREK CORONARY DILATATION CATHETER 3.0 MM X 12 MM / RAPID-EXCHANGE: Brand: NC TREK

## (undated) DEVICE — FLUFF AND POLYMER UNDERPAD,EXTRA HEAVY: Brand: WINGS

## (undated) DEVICE — MEDI-VAC SUCTION HIGH CAPACITY: Brand: CARDINAL HEALTH

## (undated) DEVICE — SYRINGE MED 25GA 3ML L5/8IN SUBQ PLAS W/ DETACH NDL SFTY

## (undated) DEVICE — GOWN ISOL IMPERV UNIV, DISP, OPEN BACK, BLUE --